# Patient Record
Sex: MALE | Race: WHITE | NOT HISPANIC OR LATINO | Employment: OTHER | URBAN - METROPOLITAN AREA
[De-identification: names, ages, dates, MRNs, and addresses within clinical notes are randomized per-mention and may not be internally consistent; named-entity substitution may affect disease eponyms.]

---

## 2017-05-01 ENCOUNTER — RECORDS - HEALTHEAST (OUTPATIENT)
Dept: LAB | Facility: CLINIC | Age: 62
End: 2017-05-01

## 2017-05-01 LAB
CHOLEST SERPL-MCNC: 211 MG/DL
FASTING STATUS PATIENT QL REPORTED: NO
HDLC SERPL-MCNC: 46 MG/DL
LDLC SERPL CALC-MCNC: 104 MG/DL
TRIGL SERPL-MCNC: 307 MG/DL

## 2018-04-18 ENCOUNTER — RECORDS - HEALTHEAST (OUTPATIENT)
Dept: LAB | Facility: CLINIC | Age: 63
End: 2018-04-18

## 2018-04-18 LAB
ALBUMIN SERPL-MCNC: 4 G/DL (ref 3.5–5)
ALP SERPL-CCNC: 98 U/L (ref 45–120)
ALT SERPL W P-5'-P-CCNC: 67 U/L (ref 0–45)
ANION GAP SERPL CALCULATED.3IONS-SCNC: 11 MMOL/L (ref 5–18)
AST SERPL W P-5'-P-CCNC: 34 U/L (ref 0–40)
BILIRUB SERPL-MCNC: 0.7 MG/DL (ref 0–1)
BUN SERPL-MCNC: 23 MG/DL (ref 8–22)
CALCIUM SERPL-MCNC: 9.3 MG/DL (ref 8.5–10.5)
CHLORIDE BLD-SCNC: 103 MMOL/L (ref 98–107)
CHOLEST SERPL-MCNC: 210 MG/DL
CO2 SERPL-SCNC: 24 MMOL/L (ref 22–31)
CREAT SERPL-MCNC: 0.84 MG/DL (ref 0.7–1.3)
FASTING STATUS PATIENT QL REPORTED: NO
GFR SERPL CREATININE-BSD FRML MDRD: >60 ML/MIN/1.73M2
GLUCOSE BLD-MCNC: 81 MG/DL (ref 70–125)
HDLC SERPL-MCNC: 47 MG/DL
LDLC SERPL CALC-MCNC: 120 MG/DL
POTASSIUM BLD-SCNC: 3.9 MMOL/L (ref 3.5–5)
PROT SERPL-MCNC: 8.4 G/DL (ref 6–8)
PSA SERPL-MCNC: 3 NG/ML (ref 0–4.5)
SODIUM SERPL-SCNC: 138 MMOL/L (ref 136–145)
TRIGL SERPL-MCNC: 217 MG/DL

## 2018-04-19 ENCOUNTER — RECORDS - HEALTHEAST (OUTPATIENT)
Dept: LAB | Facility: CLINIC | Age: 63
End: 2018-04-19

## 2018-04-20 LAB
ALBUMIN PERCENT: 55.9 % (ref 51–67)
ALBUMIN SERPL ELPH-MCNC: 4.2 G/DL (ref 3.2–4.7)
ALPHA 1 PERCENT: 2.3 % (ref 2–4)
ALPHA 2 PERCENT: 9.2 % (ref 5–13)
ALPHA1 GLOB SERPL ELPH-MCNC: 0.2 G/DL (ref 0.1–0.3)
ALPHA2 GLOB SERPL ELPH-MCNC: 0.7 G/DL (ref 0.4–0.9)
B-GLOBULIN SERPL ELPH-MCNC: 0.8 G/DL (ref 0.7–1.2)
BETA PERCENT: 11.1 % (ref 10–17)
GAMMA GLOB SERPL ELPH-MCNC: 1.6 G/DL (ref 0.6–1.4)
GAMMA GLOBULIN PERCENT: 21.5 % (ref 9–20)
M PROTEIN SERPL ELPH-MCNC: 1.4 G/DL
PATH ICD:: ABNORMAL
PATH ICD:: NORMAL
PROT PATTERN SERPL ELPH-IMP: ABNORMAL
PROT PATTERN SERPL IFE-IMP: NORMAL
PROT SERPL-MCNC: 7.6 G/DL (ref 6–8)
REVIEWING PATHOLOGIST: ABNORMAL
REVIEWING PATHOLOGIST: NORMAL

## 2018-05-09 ENCOUNTER — TRANSFERRED RECORDS (OUTPATIENT)
Dept: HEALTH INFORMATION MANAGEMENT | Facility: CLINIC | Age: 63
End: 2018-05-09

## 2018-11-18 ENCOUNTER — RECORDS - HEALTHEAST (OUTPATIENT)
Dept: LAB | Facility: CLINIC | Age: 63
End: 2018-11-18

## 2018-11-20 LAB — BACTERIA SPEC CULT: NORMAL

## 2018-12-19 ENCOUNTER — RECORDS - HEALTHEAST (OUTPATIENT)
Dept: LAB | Facility: CLINIC | Age: 63
End: 2018-12-19

## 2018-12-19 LAB
ALBUMIN SERPL-MCNC: 4 G/DL (ref 3.5–5)
ALP SERPL-CCNC: 90 U/L (ref 45–120)
ALT SERPL W P-5'-P-CCNC: 57 U/L (ref 0–45)
ANION GAP SERPL CALCULATED.3IONS-SCNC: 9 MMOL/L (ref 5–18)
AST SERPL W P-5'-P-CCNC: 34 U/L (ref 0–40)
BILIRUB SERPL-MCNC: 0.9 MG/DL (ref 0–1)
BUN SERPL-MCNC: 15 MG/DL (ref 8–22)
CALCIUM SERPL-MCNC: 9.4 MG/DL (ref 8.5–10.5)
CHLORIDE BLD-SCNC: 103 MMOL/L (ref 98–107)
CHOLEST SERPL-MCNC: 200 MG/DL
CO2 SERPL-SCNC: 26 MMOL/L (ref 22–31)
CREAT SERPL-MCNC: 0.89 MG/DL (ref 0.7–1.3)
FASTING STATUS PATIENT QL REPORTED: YES
GFR SERPL CREATININE-BSD FRML MDRD: >60 ML/MIN/1.73M2
GLUCOSE BLD-MCNC: 111 MG/DL (ref 70–125)
HDLC SERPL-MCNC: 52 MG/DL
LDLC SERPL CALC-MCNC: 109 MG/DL
POTASSIUM BLD-SCNC: 4.4 MMOL/L (ref 3.5–5)
PROT SERPL-MCNC: 8.4 G/DL (ref 6–8)
SODIUM SERPL-SCNC: 138 MMOL/L (ref 136–145)
TRIGL SERPL-MCNC: 193 MG/DL

## 2019-01-01 ENCOUNTER — TRANSFERRED RECORDS (OUTPATIENT)
Dept: MULTI SPECIALTY CLINIC | Facility: CLINIC | Age: 64
End: 2019-01-01

## 2019-11-25 ENCOUNTER — RECORDS - HEALTHEAST (OUTPATIENT)
Dept: LAB | Facility: CLINIC | Age: 64
End: 2019-11-25

## 2019-11-25 LAB
ALBUMIN SERPL-MCNC: 4.2 G/DL (ref 3.5–5)
ALP SERPL-CCNC: 89 U/L (ref 45–120)
ALT SERPL W P-5'-P-CCNC: 71 U/L (ref 0–45)
ANION GAP SERPL CALCULATED.3IONS-SCNC: 9 MMOL/L (ref 5–18)
AST SERPL W P-5'-P-CCNC: 41 U/L (ref 0–40)
BILIRUB SERPL-MCNC: 0.7 MG/DL (ref 0–1)
BUN SERPL-MCNC: 16 MG/DL (ref 8–22)
CALCIUM SERPL-MCNC: 9.1 MG/DL (ref 8.5–10.5)
CHLORIDE BLD-SCNC: 105 MMOL/L (ref 98–107)
CHOLEST SERPL-MCNC: 196 MG/DL
CO2 SERPL-SCNC: 24 MMOL/L (ref 22–31)
CREAT SERPL-MCNC: 0.84 MG/DL (ref 0.7–1.3)
FASTING STATUS PATIENT QL REPORTED: ABNORMAL
GFR SERPL CREATININE-BSD FRML MDRD: >60 ML/MIN/1.73M2
GLUCOSE BLD-MCNC: 94 MG/DL (ref 70–125)
HDLC SERPL-MCNC: 46 MG/DL
LDLC SERPL CALC-MCNC: 97 MG/DL
POTASSIUM BLD-SCNC: 3.7 MMOL/L (ref 3.5–5)
PROT SERPL-MCNC: 8.2 G/DL (ref 6–8)
PSA SERPL-MCNC: 2.8 NG/ML (ref 0–4.5)
SODIUM SERPL-SCNC: 138 MMOL/L (ref 136–145)
TRIGL SERPL-MCNC: 266 MG/DL

## 2020-05-19 ENCOUNTER — RECORDS - HEALTHEAST (OUTPATIENT)
Dept: LAB | Facility: CLINIC | Age: 65
End: 2020-05-19

## 2020-05-20 LAB
ALBUMIN SERPL-MCNC: 4 G/DL (ref 3.5–5)
ALP SERPL-CCNC: 83 U/L (ref 45–120)
ALT SERPL W P-5'-P-CCNC: 76 U/L (ref 0–45)
ANION GAP SERPL CALCULATED.3IONS-SCNC: 10 MMOL/L (ref 5–18)
AST SERPL W P-5'-P-CCNC: 35 U/L (ref 0–40)
BILIRUB SERPL-MCNC: 0.7 MG/DL (ref 0–1)
BUN SERPL-MCNC: 19 MG/DL (ref 8–22)
CALCIUM SERPL-MCNC: 8.9 MG/DL (ref 8.5–10.5)
CHLORIDE BLD-SCNC: 106 MMOL/L (ref 98–107)
CO2 SERPL-SCNC: 23 MMOL/L (ref 22–31)
CREAT SERPL-MCNC: 0.91 MG/DL (ref 0.7–1.3)
GFR SERPL CREATININE-BSD FRML MDRD: >60 ML/MIN/1.73M2
GLUCOSE BLD-MCNC: 102 MG/DL (ref 70–125)
POTASSIUM BLD-SCNC: 3.9 MMOL/L (ref 3.5–5)
PROT SERPL-MCNC: 8.5 G/DL (ref 6–8)
SODIUM SERPL-SCNC: 139 MMOL/L (ref 136–145)

## 2020-06-13 ENCOUNTER — RECORDS - HEALTHEAST (OUTPATIENT)
Dept: LAB | Facility: CLINIC | Age: 65
End: 2020-06-13

## 2020-06-16 LAB
B BURGDOR IGG+IGM SER QL: 2.47 INDEX VALUE
E CHAFFEENSIS IGG TITR SER IF: NORMAL {TITER}
E CHAFFEENSIS IGM TITR SER IF: NORMAL {TITER}

## 2020-06-19 LAB
B BURGDOR AB SER-IMP: NORMAL
LYME AB IGG BAND(S): NORMAL
LYME AB IGM BAND(S): NORMAL
LYME IGG BLOT: NEGATIVE
LYME IGM BLOT: NEGATIVE

## 2021-02-18 ENCOUNTER — RECORDS - HEALTHEAST (OUTPATIENT)
Dept: LAB | Facility: CLINIC | Age: 66
End: 2021-02-18

## 2021-02-18 LAB
ALBUMIN SERPL-MCNC: 4.2 G/DL (ref 3.5–5)
ALP SERPL-CCNC: 96 U/L (ref 45–120)
ALT SERPL W P-5'-P-CCNC: 93 U/L (ref 0–45)
ANION GAP SERPL CALCULATED.3IONS-SCNC: 9 MMOL/L (ref 5–18)
AST SERPL W P-5'-P-CCNC: 47 U/L (ref 0–40)
BILIRUB SERPL-MCNC: 0.7 MG/DL (ref 0–1)
BUN SERPL-MCNC: 15 MG/DL (ref 8–22)
CALCIUM SERPL-MCNC: 8.8 MG/DL (ref 8.5–10.5)
CHLORIDE BLD-SCNC: 102 MMOL/L (ref 98–107)
CHOLEST SERPL-MCNC: 214 MG/DL
CO2 SERPL-SCNC: 24 MMOL/L (ref 22–31)
CREAT SERPL-MCNC: 0.8 MG/DL (ref 0.7–1.3)
FASTING STATUS PATIENT QL REPORTED: ABNORMAL
GFR SERPL CREATININE-BSD FRML MDRD: >60 ML/MIN/1.73M2
GLUCOSE BLD-MCNC: 116 MG/DL (ref 70–125)
HDLC SERPL-MCNC: 43 MG/DL
LDLC SERPL CALC-MCNC: 118 MG/DL
POTASSIUM BLD-SCNC: 3.9 MMOL/L (ref 3.5–5)
PROT SERPL-MCNC: 8.7 G/DL (ref 6–8)
PSA SERPL-MCNC: 2.6 NG/ML (ref 0–4.5)
SODIUM SERPL-SCNC: 135 MMOL/L (ref 136–145)
TRIGL SERPL-MCNC: 266 MG/DL

## 2021-06-01 ENCOUNTER — RECORDS - HEALTHEAST (OUTPATIENT)
Dept: ADMINISTRATIVE | Facility: CLINIC | Age: 66
End: 2021-06-01

## 2021-09-14 ENCOUNTER — LAB REQUISITION (OUTPATIENT)
Dept: LAB | Facility: CLINIC | Age: 66
End: 2021-09-14
Payer: COMMERCIAL

## 2021-09-14 DIAGNOSIS — I10 ESSENTIAL (PRIMARY) HYPERTENSION: ICD-10-CM

## 2021-09-14 LAB
ANION GAP SERPL CALCULATED.3IONS-SCNC: 9 MMOL/L (ref 5–18)
BUN SERPL-MCNC: 14 MG/DL (ref 8–22)
CALCIUM SERPL-MCNC: 8.9 MG/DL (ref 8.5–10.5)
CHLORIDE BLD-SCNC: 105 MMOL/L (ref 98–107)
CO2 SERPL-SCNC: 24 MMOL/L (ref 22–31)
CREAT SERPL-MCNC: 0.82 MG/DL (ref 0.7–1.3)
GFR SERPL CREATININE-BSD FRML MDRD: >90 ML/MIN/1.73M2
GLUCOSE BLD-MCNC: 120 MG/DL (ref 70–125)
POTASSIUM BLD-SCNC: 3.8 MMOL/L (ref 3.5–5)
SODIUM SERPL-SCNC: 138 MMOL/L (ref 136–145)

## 2021-09-14 PROCEDURE — 80048 BASIC METABOLIC PNL TOTAL CA: CPT | Mod: ORL | Performed by: PHYSICIAN ASSISTANT

## 2021-09-14 PROCEDURE — 36415 COLL VENOUS BLD VENIPUNCTURE: CPT | Mod: ORL | Performed by: PHYSICIAN ASSISTANT

## 2021-12-10 ENCOUNTER — LAB REQUISITION (OUTPATIENT)
Dept: LAB | Facility: CLINIC | Age: 66
End: 2021-12-10
Payer: MEDICARE

## 2021-12-10 DIAGNOSIS — R19.7 DIARRHEA, UNSPECIFIED: ICD-10-CM

## 2021-12-10 PROCEDURE — 87493 C DIFF AMPLIFIED PROBE: CPT | Mod: ORL | Performed by: PHYSICIAN ASSISTANT

## 2021-12-10 PROCEDURE — 87177 OVA AND PARASITES SMEARS: CPT | Mod: ORL | Performed by: PHYSICIAN ASSISTANT

## 2021-12-10 PROCEDURE — 87209 SMEAR COMPLEX STAIN: CPT | Mod: ORL | Performed by: PHYSICIAN ASSISTANT

## 2021-12-10 PROCEDURE — 87506 IADNA-DNA/RNA PROBE TQ 6-11: CPT | Mod: ORL | Performed by: PHYSICIAN ASSISTANT

## 2021-12-11 LAB
C COLI+JEJUNI+LARI FUSA STL QL NAA+PROBE: NOT DETECTED
C DIFF TOX B STL QL: NEGATIVE
EC STX1 GENE STL QL NAA+PROBE: NOT DETECTED
EC STX2 GENE STL QL NAA+PROBE: NOT DETECTED
NOROV GI+II ORF1-ORF2 JNC STL QL NAA+PR: NOT DETECTED
RVA NSP5 STL QL NAA+PROBE: NOT DETECTED
SALMONELLA SP RPOD STL QL NAA+PROBE: NOT DETECTED
SHIGELLA SP+EIEC IPAH STL QL NAA+PROBE: NOT DETECTED
V CHOL+PARA RFBL+TRKH+TNAA STL QL NAA+PR: NOT DETECTED
Y ENTERO RECN STL QL NAA+PROBE: NOT DETECTED

## 2021-12-13 LAB — O+P STL MICRO: NEGATIVE

## 2022-08-22 ENCOUNTER — LAB REQUISITION (OUTPATIENT)
Dept: LAB | Facility: CLINIC | Age: 67
End: 2022-08-22
Payer: MEDICARE

## 2022-08-22 DIAGNOSIS — Z01.812 ENCOUNTER FOR PREPROCEDURAL LABORATORY EXAMINATION: ICD-10-CM

## 2022-08-22 PROCEDURE — U0003 INFECTIOUS AGENT DETECTION BY NUCLEIC ACID (DNA OR RNA); SEVERE ACUTE RESPIRATORY SYNDROME CORONAVIRUS 2 (SARS-COV-2) (CORONAVIRUS DISEASE [COVID-19]), AMPLIFIED PROBE TECHNIQUE, MAKING USE OF HIGH THROUGHPUT TECHNOLOGIES AS DESCRIBED BY CMS-2020-01-R: HCPCS | Mod: ORL | Performed by: PHYSICIAN ASSISTANT

## 2022-08-23 LAB — SARS-COV-2 RNA RESP QL NAA+PROBE: NEGATIVE

## 2022-12-12 ENCOUNTER — LAB REQUISITION (OUTPATIENT)
Dept: LAB | Facility: CLINIC | Age: 67
End: 2022-12-12
Payer: MEDICARE

## 2022-12-12 DIAGNOSIS — Z12.5 ENCOUNTER FOR SCREENING FOR MALIGNANT NEOPLASM OF PROSTATE: ICD-10-CM

## 2022-12-12 DIAGNOSIS — I10 ESSENTIAL (PRIMARY) HYPERTENSION: ICD-10-CM

## 2022-12-12 DIAGNOSIS — E78.5 HYPERLIPIDEMIA, UNSPECIFIED: ICD-10-CM

## 2022-12-12 LAB
ANION GAP SERPL CALCULATED.3IONS-SCNC: 12 MMOL/L (ref 7–15)
BUN SERPL-MCNC: 14.2 MG/DL (ref 8–23)
CALCIUM SERPL-MCNC: 9.2 MG/DL (ref 8.8–10.2)
CHLORIDE SERPL-SCNC: 100 MMOL/L (ref 98–107)
CHOLEST SERPL-MCNC: 201 MG/DL
CREAT SERPL-MCNC: 0.79 MG/DL (ref 0.67–1.17)
DEPRECATED HCO3 PLAS-SCNC: 25 MMOL/L (ref 22–29)
GFR SERPL CREATININE-BSD FRML MDRD: >90 ML/MIN/1.73M2
GLUCOSE SERPL-MCNC: 92 MG/DL (ref 70–99)
HDLC SERPL-MCNC: 47 MG/DL
LDLC SERPL CALC-MCNC: 85 MG/DL
NONHDLC SERPL-MCNC: 154 MG/DL
POTASSIUM SERPL-SCNC: 4 MMOL/L (ref 3.4–5.3)
PSA SERPL-MCNC: 2.34 NG/ML (ref 0–4.5)
SODIUM SERPL-SCNC: 137 MMOL/L (ref 136–145)
TRIGL SERPL-MCNC: 346 MG/DL

## 2022-12-12 PROCEDURE — G0103 PSA SCREENING: HCPCS | Mod: ORL | Performed by: PHYSICIAN ASSISTANT

## 2022-12-12 PROCEDURE — 80061 LIPID PANEL: CPT | Mod: ORL | Performed by: PHYSICIAN ASSISTANT

## 2022-12-12 PROCEDURE — 80048 BASIC METABOLIC PNL TOTAL CA: CPT | Mod: ORL | Performed by: PHYSICIAN ASSISTANT

## 2023-02-22 ENCOUNTER — LAB REQUISITION (OUTPATIENT)
Dept: LAB | Facility: CLINIC | Age: 68
End: 2023-02-22
Payer: MEDICARE

## 2023-02-22 DIAGNOSIS — R06.02 SHORTNESS OF BREATH: ICD-10-CM

## 2023-02-22 LAB
ALBUMIN SERPL BCG-MCNC: 4.3 G/DL (ref 3.5–5.2)
ALP SERPL-CCNC: 113 U/L (ref 40–129)
ALT SERPL W P-5'-P-CCNC: 95 U/L (ref 10–50)
ANION GAP SERPL CALCULATED.3IONS-SCNC: 13 MMOL/L (ref 7–15)
AST SERPL W P-5'-P-CCNC: 49 U/L (ref 10–50)
BILIRUB SERPL-MCNC: 0.7 MG/DL
BUN SERPL-MCNC: 18.6 MG/DL (ref 8–23)
CALCIUM SERPL-MCNC: 9 MG/DL (ref 8.8–10.2)
CHLORIDE SERPL-SCNC: 106 MMOL/L (ref 98–107)
CREAT SERPL-MCNC: 0.76 MG/DL (ref 0.67–1.17)
DEPRECATED HCO3 PLAS-SCNC: 20 MMOL/L (ref 22–29)
FERRITIN SERPL-MCNC: 311 NG/ML (ref 31–409)
GFR SERPL CREATININE-BSD FRML MDRD: >90 ML/MIN/1.73M2
GLUCOSE SERPL-MCNC: 109 MG/DL (ref 70–99)
POTASSIUM SERPL-SCNC: 4 MMOL/L (ref 3.4–5.3)
PROT SERPL-MCNC: 8.6 G/DL (ref 6.4–8.3)
SODIUM SERPL-SCNC: 139 MMOL/L (ref 136–145)
VIT B12 SERPL-MCNC: 817 PG/ML (ref 232–1245)

## 2023-02-22 PROCEDURE — 82728 ASSAY OF FERRITIN: CPT | Mod: ORL | Performed by: PHYSICIAN ASSISTANT

## 2023-02-22 PROCEDURE — 82607 VITAMIN B-12: CPT | Mod: ORL | Performed by: PHYSICIAN ASSISTANT

## 2023-02-22 PROCEDURE — 80053 COMPREHEN METABOLIC PANEL: CPT | Mod: ORL | Performed by: PHYSICIAN ASSISTANT

## 2023-03-06 ENCOUNTER — HOSPITAL ENCOUNTER (OUTPATIENT)
Dept: CARDIOLOGY | Facility: CLINIC | Age: 68
Discharge: HOME | End: 2023-03-06
Attending: PHYSICIAN ASSISTANT | Admitting: PHYSICIAN ASSISTANT
Payer: MEDICARE

## 2023-03-06 DIAGNOSIS — R06.02 SOB (SHORTNESS OF BREATH): ICD-10-CM

## 2023-03-06 PROCEDURE — 93016 CV STRESS TEST SUPVJ ONLY: CPT | Performed by: INTERNAL MEDICINE

## 2023-03-06 PROCEDURE — 93325 DOPPLER ECHO COLOR FLOW MAPG: CPT | Mod: 26 | Performed by: GENERAL ACUTE CARE HOSPITAL

## 2023-03-06 PROCEDURE — 93018 CV STRESS TEST I&R ONLY: CPT | Performed by: GENERAL ACUTE CARE HOSPITAL

## 2023-03-06 PROCEDURE — 93321 DOPPLER ECHO F-UP/LMTD STD: CPT | Mod: 26 | Performed by: GENERAL ACUTE CARE HOSPITAL

## 2023-03-06 PROCEDURE — 93325 DOPPLER ECHO COLOR FLOW MAPG: CPT | Mod: TC

## 2023-03-06 PROCEDURE — 93350 STRESS TTE ONLY: CPT | Mod: 26 | Performed by: GENERAL ACUTE CARE HOSPITAL

## 2023-03-06 PROCEDURE — 93321 DOPPLER ECHO F-UP/LMTD STD: CPT | Mod: TC

## 2023-03-06 RX ORDER — LOSARTAN POTASSIUM 50 MG/1
50 TABLET ORAL DAILY
COMMUNITY
End: 2024-01-19

## 2023-03-06 RX ORDER — HYDROCHLOROTHIAZIDE 12.5 MG/1
12.5 TABLET ORAL DAILY
Status: ON HOLD | COMMUNITY
End: 2023-06-11

## 2023-03-06 RX ORDER — NITROGLYCERIN 0.4 MG/1
0.4 TABLET SUBLINGUAL EVERY 5 MIN PRN
COMMUNITY
End: 2023-10-23

## 2023-03-06 RX ORDER — ASPIRIN 81 MG/1
81 TABLET ORAL DAILY
COMMUNITY
End: 2024-01-23

## 2023-03-06 RX ORDER — SIMVASTATIN 40 MG
40 TABLET ORAL DAILY
COMMUNITY
End: 2023-08-22

## 2023-04-19 ENCOUNTER — TRANSFERRED RECORDS (OUTPATIENT)
Dept: HEALTH INFORMATION MANAGEMENT | Facility: CLINIC | Age: 68
End: 2023-04-19

## 2023-06-02 ENCOUNTER — HOSPITAL ENCOUNTER (INPATIENT)
Facility: CLINIC | Age: 68
LOS: 7 days | Discharge: HOME | DRG: 193 | End: 2023-06-11
Attending: EMERGENCY MEDICINE | Admitting: INTERNAL MEDICINE
Payer: MEDICARE

## 2023-06-02 ENCOUNTER — APPOINTMENT (OUTPATIENT)
Dept: CT IMAGING | Facility: CLINIC | Age: 68
DRG: 193 | End: 2023-06-02
Attending: EMERGENCY MEDICINE
Payer: MEDICARE

## 2023-06-02 DIAGNOSIS — J98.4 PNEUMONITIS: Primary | ICD-10-CM

## 2023-06-02 DIAGNOSIS — C90.00 MULTIPLE MYELOMA NOT HAVING ACHIEVED REMISSION (H): ICD-10-CM

## 2023-06-02 DIAGNOSIS — J18.9 PNEUMONIA OF BOTH LUNGS DUE TO INFECTIOUS ORGANISM, UNSPECIFIED PART OF LUNG: ICD-10-CM

## 2023-06-02 DIAGNOSIS — J96.01 ACUTE HYPOXEMIC RESPIRATORY FAILURE (H): ICD-10-CM

## 2023-06-02 LAB
ALBUMIN SERPL-MCNC: 2.9 G/DL (ref 3.5–5)
ALBUMIN UR-MCNC: 10 MG/DL
ALP SERPL-CCNC: 155 U/L (ref 45–120)
ALT SERPL W P-5'-P-CCNC: 95 U/L (ref 0–45)
ANION GAP SERPL CALCULATED.3IONS-SCNC: 11 MMOL/L (ref 5–18)
APPEARANCE UR: CLEAR
AST SERPL W P-5'-P-CCNC: 32 U/L (ref 0–40)
ATRIAL RATE - MUSE: 88 BPM
BASOPHILS # BLD MANUAL: 0 10E3/UL (ref 0–0.2)
BASOPHILS NFR BLD MANUAL: 0 %
BILIRUB SERPL-MCNC: 0.5 MG/DL (ref 0–1)
BILIRUB UR QL STRIP: NEGATIVE
BNP SERPL-MCNC: 47 PG/ML (ref 0–65)
BUN SERPL-MCNC: 24 MG/DL (ref 8–22)
C REACTIVE PROTEIN LHE: 11.3 MG/DL (ref 0–?)
CALCIUM SERPL-MCNC: 10.2 MG/DL (ref 8.5–10.5)
CHLORIDE BLD-SCNC: 101 MMOL/L (ref 98–107)
CO2 SERPL-SCNC: 25 MMOL/L (ref 22–31)
COLOR UR AUTO: ABNORMAL
CREAT SERPL-MCNC: 0.82 MG/DL (ref 0.7–1.3)
DIASTOLIC BLOOD PRESSURE - MUSE: NORMAL MMHG
EOSINOPHIL # BLD MANUAL: 0 10E3/UL (ref 0–0.7)
EOSINOPHIL NFR BLD MANUAL: 0 %
ERYTHROCYTE [DISTWIDTH] IN BLOOD BY AUTOMATED COUNT: 15.9 % (ref 10–15)
GFR SERPL CREATININE-BSD FRML MDRD: >90 ML/MIN/1.73M2
GLUCOSE BLD-MCNC: 152 MG/DL (ref 70–125)
GLUCOSE UR STRIP-MCNC: NEGATIVE MG/DL
HCT VFR BLD AUTO: 25.8 % (ref 40–53)
HGB BLD-MCNC: 8.5 G/DL (ref 13.3–17.7)
HGB UR QL STRIP: NEGATIVE
INTERPRETATION ECG - MUSE: NORMAL
KETONES UR STRIP-MCNC: NEGATIVE MG/DL
LACTATE SERPL-SCNC: 1.4 MMOL/L (ref 0.7–2)
LEUKOCYTE ESTERASE UR QL STRIP: NEGATIVE
LYMPHOCYTES # BLD MANUAL: 0.5 10E3/UL (ref 0.8–5.3)
LYMPHOCYTES NFR BLD MANUAL: 6 %
MCH RBC QN AUTO: 33.7 PG (ref 26.5–33)
MCHC RBC AUTO-ENTMCNC: 32.9 G/DL (ref 31.5–36.5)
MCV RBC AUTO: 102 FL (ref 78–100)
METAMYELOCYTES # BLD MANUAL: 0.1 10E3/UL
METAMYELOCYTES NFR BLD MANUAL: 1 %
MONOCYTES # BLD MANUAL: 0.2 10E3/UL (ref 0–1.3)
MONOCYTES NFR BLD MANUAL: 2 %
MUCOUS THREADS #/AREA URNS LPF: PRESENT /LPF
NEUTROPHILS # BLD MANUAL: 7.6 10E3/UL (ref 1.6–8.3)
NEUTROPHILS NFR BLD MANUAL: 91 %
NITRATE UR QL: NEGATIVE
NRBC # BLD AUTO: 0.2 10E3/UL
NRBC BLD MANUAL-RTO: 3 %
P AXIS - MUSE: 44 DEGREES
PH UR STRIP: 7 [PH] (ref 5–7)
PLAT MORPH BLD: ABNORMAL
PLATELET # BLD AUTO: 363 10E3/UL (ref 150–450)
POLYCHROMASIA BLD QL SMEAR: SLIGHT
POTASSIUM BLD-SCNC: 3.8 MMOL/L (ref 3.5–5)
PR INTERVAL - MUSE: 154 MS
PROCALCITONIN SERPL-MCNC: 0.09 NG/ML (ref 0–0.49)
PROT SERPL-MCNC: 7.4 G/DL (ref 6–8)
QRS DURATION - MUSE: 88 MS
QT - MUSE: 372 MS
QTC - MUSE: 450 MS
R AXIS - MUSE: -20 DEGREES
RBC # BLD AUTO: 2.52 10E6/UL (ref 4.4–5.9)
RBC MORPH BLD: ABNORMAL
RBC URINE: 0 /HPF
SODIUM SERPL-SCNC: 137 MMOL/L (ref 136–145)
SP GR UR STRIP: 1.02 (ref 1–1.03)
SQUAMOUS EPITHELIAL: <1 /HPF
SYSTOLIC BLOOD PRESSURE - MUSE: NORMAL MMHG
T AXIS - MUSE: 48 DEGREES
TROPONIN I SERPL-MCNC: 0.02 NG/ML (ref 0–0.29)
UROBILINOGEN UR STRIP-MCNC: <2 MG/DL
VENTRICULAR RATE- MUSE: 88 BPM
WBC # BLD AUTO: 8.3 10E3/UL (ref 4–11)
WBC URINE: <1 /HPF

## 2023-06-02 PROCEDURE — 99285 EMERGENCY DEPT VISIT HI MDM: CPT | Mod: 25

## 2023-06-02 PROCEDURE — G1010 CDSM STANSON: HCPCS

## 2023-06-02 PROCEDURE — 250N000011 HC RX IP 250 OP 636: Performed by: EMERGENCY MEDICINE

## 2023-06-02 PROCEDURE — 83880 ASSAY OF NATRIURETIC PEPTIDE: CPT | Performed by: EMERGENCY MEDICINE

## 2023-06-02 PROCEDURE — 84145 PROCALCITONIN (PCT): CPT | Performed by: EMERGENCY MEDICINE

## 2023-06-02 PROCEDURE — 93005 ELECTROCARDIOGRAM TRACING: CPT | Performed by: EMERGENCY MEDICINE

## 2023-06-02 PROCEDURE — 83605 ASSAY OF LACTIC ACID: CPT | Performed by: EMERGENCY MEDICINE

## 2023-06-02 PROCEDURE — 87899 AGENT NOS ASSAY W/OPTIC: CPT | Performed by: INTERNAL MEDICINE

## 2023-06-02 PROCEDURE — 86140 C-REACTIVE PROTEIN: CPT | Performed by: EMERGENCY MEDICINE

## 2023-06-02 PROCEDURE — 87040 BLOOD CULTURE FOR BACTERIA: CPT | Performed by: EMERGENCY MEDICINE

## 2023-06-02 PROCEDURE — 84484 ASSAY OF TROPONIN QUANT: CPT | Performed by: EMERGENCY MEDICINE

## 2023-06-02 PROCEDURE — 85027 COMPLETE CBC AUTOMATED: CPT | Performed by: EMERGENCY MEDICINE

## 2023-06-02 PROCEDURE — 80053 COMPREHEN METABOLIC PANEL: CPT | Performed by: EMERGENCY MEDICINE

## 2023-06-02 PROCEDURE — 36415 COLL VENOUS BLD VENIPUNCTURE: CPT | Performed by: EMERGENCY MEDICINE

## 2023-06-02 PROCEDURE — 99223 1ST HOSP IP/OBS HIGH 75: CPT | Performed by: INTERNAL MEDICINE

## 2023-06-02 PROCEDURE — 85007 BL SMEAR W/DIFF WBC COUNT: CPT | Performed by: EMERGENCY MEDICINE

## 2023-06-02 PROCEDURE — G0378 HOSPITAL OBSERVATION PER HR: HCPCS

## 2023-06-02 PROCEDURE — 81003 URINALYSIS AUTO W/O SCOPE: CPT | Performed by: EMERGENCY MEDICINE

## 2023-06-02 RX ORDER — ACETAMINOPHEN 325 MG/1
975 TABLET ORAL EVERY 6 HOURS PRN
Status: DISCONTINUED | OUTPATIENT
Start: 2023-06-02 | End: 2023-06-03

## 2023-06-02 RX ORDER — IOPAMIDOL 755 MG/ML
100 INJECTION, SOLUTION INTRAVASCULAR ONCE
Status: COMPLETED | OUTPATIENT
Start: 2023-06-02 | End: 2023-06-02

## 2023-06-02 RX ORDER — ONDANSETRON 4 MG/1
4 TABLET, ORALLY DISINTEGRATING ORAL EVERY 6 HOURS PRN
Status: DISCONTINUED | OUTPATIENT
Start: 2023-06-02 | End: 2023-06-11 | Stop reason: HOSPADM

## 2023-06-02 RX ORDER — ONDANSETRON 2 MG/ML
4 INJECTION INTRAMUSCULAR; INTRAVENOUS EVERY 6 HOURS PRN
Status: DISCONTINUED | OUTPATIENT
Start: 2023-06-02 | End: 2023-06-11 | Stop reason: HOSPADM

## 2023-06-02 RX ORDER — HYDROCODONE BITARTRATE AND ACETAMINOPHEN 5; 325 MG/1; MG/1
1 TABLET ORAL EVERY 4 HOURS PRN
Status: DISCONTINUED | OUTPATIENT
Start: 2023-06-02 | End: 2023-06-04

## 2023-06-02 RX ORDER — SODIUM CHLORIDE 9 MG/ML
INJECTION, SOLUTION INTRAVENOUS CONTINUOUS
Status: DISCONTINUED | OUTPATIENT
Start: 2023-06-02 | End: 2023-06-06

## 2023-06-02 RX ORDER — ACETAMINOPHEN 650 MG/1
650 SUPPOSITORY RECTAL EVERY 6 HOURS PRN
Status: DISCONTINUED | OUTPATIENT
Start: 2023-06-02 | End: 2023-06-03

## 2023-06-02 RX ADMIN — IOPAMIDOL 100 ML: 755 INJECTION, SOLUTION INTRAVENOUS at 19:57

## 2023-06-02 ASSESSMENT — ACTIVITIES OF DAILY LIVING (ADL)
ADLS_ACUITY_SCORE: 35
ADLS_ACUITY_SCORE: 35
ADLS_ACUITY_SCORE: 33

## 2023-06-02 NOTE — ED PROVIDER NOTES
EMERGENCY DEPARTMENT ENCOUNTER      NAME: Billy Carroll  AGE: 67 year old male  YOB: 1955  MRN: [id number]  EVALUATION DATE & TIME: No admission date for patient encounter.    PCP: Jory Krishnamurthy    ED PROVIDER: Rosa Velázquez MD      Chief Complaint   Patient presents with     Shortness of Breath     Abnormal Labs         FINAL IMPRESSION:  1. Pneumonia of both lungs due to infectious organism, unspecified part of lung          ED COURSE & MEDICAL DECISION MAKING:    Pertinent Labs & Imaging studies reviewed. (See chart for details)    7:04 PM I introduced myself to the patient, obtained patient history, performed a physical exam, and discussed plan for ED workup including potential diagnostic laboratory/imaging studies and interventions.    9:45 PM I spoke with [facility], Dr. Melendez, regarding patient's care.    10:12 PM I spoke with hospitalist, Dr. Grubbs, regarding patient's care.    10:26 PM I updated the patient on laboratory and imaging results. We discussed plan of admission.    67 year old male with history of multiple myeloma on Revlimid, Velcade, and dexamethasone which started on 10 May who presents for evaluation of shortness of breath with recently diagnosed pneumonia.  He was seen by his oncologist with [facility] and had a chest x-ray performed which indicated infection.  They started him on levofloxacin which she has been taking for 3 days.  He was referred to pulmonology and called them today and was told he should present to the ER for evaluation due to concern for PCP pneumonia.  Patient is immunocompromised.  He did take his Levaquin today.  On exam, he is satting normally on room air and is in no acute distress.  He does not have any signs of respiratory distress at this time.  With his cancer will obtain CT of the chest for evaluation for PE as well as further characterization of the chest x-ray findings.  Also obtain laboratory studies including blood  cultures.  EKG was obtained which did not reveal any evidence of acute ischemia.    White blood cell count is 8.3.  Hemoglobin 8.5.  CMP largely unremarkable.  Lactic acid within normal limits.  CRP 11.  Troponin within normal limits.  BNP within normal limits making pulmonary edema less likely.  EKG without acute ischemia and troponin within normal limits making acute coronary syndrome less likely.  Blood cultures pending.  Urinalysis also ordered.  CT does not reveal any evidence of PE.  No signs of acute aortic syndrome.  Nonspecific acute symmetric bilateral multifocal pneumonitis.  Heart size is within normal limits but there is severe three-vessel coronary artery calcification and dense calcification and thickening of the aortic valve leaflets.  Destructive soft tissue mass centered in the right side of the T4 vertebral body crosses the T4-T5 interspace to involve the right side of the T5 vertebral body and may also encroach upon the right ventral epidural space.  Additional definitive lytic lesions throughout the visualized bones.  This is known.  Patient did have previous radiation.  Spoke with the on-call physician with [facility] who stated that they were concerned this could potentially be PCP pneumonia and that they had tried to get him expedited outpatient follow-up with ID and pulmonary but were unable to do so and do feel that he warrants admission for aggressive treatment of this.  Discussed if they had a specific antibiotic that they would like to use and they defer this to ID.  Spoke with the hospitalist who excepted the patient for admission and would also like ID to weigh in on antibiotic treatment.  Did attempt to page them but have not yet spoken to them on the patient's admission.  He has received levofloxacin today.  The hospitalist did not want me to provide any further IV antibiotics until ID gives their recommendation.  This is still pending.  Patient was in agreement with the  plan.  He was admitted to the hospitalist and his care was transferred to the hospitalist in stable condition.    ED Course as of 06/08/23 2145 Fri Jun 02, 2023 2156 Hospitalist requests ID consult for antibiotic recommendations and does not want to start IV bactrim or other antibiotics until ID input. Have not yet heard from ID on admission       At the conclusion of the encounter I discussed the results of all of the tests and the disposition. The questions were answered. The patient or family acknowledged understanding and was agreeable with the care plan.         Medical Decision Making    History:    Supplemental history from: Documented in chart, if applicable    External Record(s) reviewed: Documented in chart, if applicable.    Work Up:    Chart documentation includes differential considered and any EKGs or imaging independently interpreted by provider.    In additional to work up documented, I considered the following work up: Documented in chart, if applicable.    External consultation:    Discussion of management with another provider: Documented in chart, if applicable    Complicating factors:    Care impacted by chronic illness: Cancer/Chemotherapy    Care affected by social determinants of health: N/A    Disposition considerations: Admit.      MEDICATIONS GIVEN IN THE EMERGENCY:  Medications   iopamidol (ISOVUE-370) solution 100 mL (100 mLs Intravenous $Given 6/2/23 1957)       NEW PRESCRIPTIONS STARTED AT TODAY'S ER VISIT  New Prescriptions    No medications on file          =================================================================    HPI    Patient information was obtained from: Patient    Use of : N/A      Billy Carroll is a 67 year old male with a pertinent history of multiple myeloma who presents to this ED for evaluation of shortness of breath and abnormal chest x-ray.    Patient reports an onset of a cough which occurred on 5/30. He endorses swollen ankles, palpitations,  "and shortness of breath. He notes his shortness of breath worsens when walking, but sitting or laying down is ok.  He also developed a fever.  He spoke to his oncologist who prescribed him levofloxacin.. He has taken 3-4 tablets so far. Patient has been taking 3,000 mg of Tylenol daily since his coughing started.. He last took Tylenol at 1:00 PM today. Patient was called by pulmonary, regarding x-ray results from today. He had \"white spots\" in his lungs and a fungal concern. He was encouraged to go into the ED. No recent sick contacts. He did travel to [place] recently.  He did not go into any caves.  Patient denies chest pain, abdominal pain, nausea, and vomiting. No other medical concerns are expressed at this time.     Patient reports a having multiple melanoma, which he was diagnosed in 2018. He notes his symptoms got \"worse\" in March and April of this year. He received a CT scan in late April showing a mass in the thoracic spine and received radiation treatment for 5 days. His recent hemoglobin level was 8 and has a low white blood count. He recieves an IV infusion once a week for treatment.     REVIEW OF SYSTEMS   Review of Systems   Pertinent positives and negatives are documented in the HPI. All other systems reviewed and are negative.      PAST MEDICAL HISTORY:  Past Medical History:   Diagnosis Date     Cancer (H)        PAST SURGICAL HISTORY:  History reviewed. No pertinent surgical history.        CURRENT MEDICATIONS:    aspirin 81 MG EC tablet  hydrochlorothiazide (HYDRODIURIL) 12.5 MG tablet  losartan (COZAAR) 50 MG tablet  nitroGLYcerin (NITROSTAT) 0.4 MG sublingual tablet  simvastatin (ZOCOR) 40 MG tablet        ALLERGIES:  Allergies   Allergen Reactions     Tylenol With Codeine #3 [Acetaminophen-Codeine] Nausea       FAMILY HISTORY:  History reviewed. No pertinent family history.    SOCIAL HISTORY:   Social History     Socioeconomic History     Marital status:      Spouse name: None     " "Number of children: None     Years of education: None     Highest education level: None   Tobacco Use     Smoking status: Never     Smokeless tobacco: Never       VITALS:  BP (!) 140/93   Pulse 90   Temp 98.8  F (37.1  C) (Oral)   Resp 25   Ht 1.778 m (5' 10\")   Wt 90.7 kg (200 lb)   SpO2 94%   BMI 28.70 kg/m      PHYSICAL EXAM    Physical Exam  Constitutional: Well developed, Well nourished, NAD, GCS 15  HENT: Normocephalic, Atraumatic, Bilateral external ears normal, Oropharynx normal, mucous membranes moist, Nose normal. Neck- Normal range of motion, No tenderness, Supple, No stridor.   Eyes: PERRL, EOMI, Conjunctiva normal, No discharge.   Respiratory: Normal breath sounds, No respiratory distress, No wheezing or crackles, Speaks in full sentences easily.   Cardiovascular: Normal heart rate, Regular rhythm, No murmurs, No rubs, No gallops. 2+ radial pulses bilaterally  GI: Bowel sounds normal, Soft, No tenderness, No masses, No rebound or guarding.   Musculoskeletal: 2+ DP pulses. No significant lower extremity edema.  No cyanosis, No clubbing. Good range of motion in all major joints. No tenderness to palpation or major deformities noted. No tenderness of the CTLS spine.    Integument: Warm, Dry, No erythema, No rash. No petechiae.   Neurologic: Alert & oriented x 3, 5/5 strength in all 4 extremities bilaterally. Sensation intact to light touch in all 4 extremities and the face bilaterally. No focal deficits noted.   Psychiatric: Affect normal, Judgment normal, Mood normal. Cooperative.      LAB:  All pertinent labs reviewed and interpreted.  Results for orders placed or performed during the hospital encounter of 06/02/23   CT Chest Pulmonary Embolism w Contrast    Impression    IMPRESSION:  1.  No pulmonary emboli. No signs of acute aortic syndrome.  2.  Nonspecific acute symmetric bilateral multifocal pneumonitis.  3.  Heart size is within normal limits, but there is severe three-vessel coronary artery " calcification and dense calcification and thickening of the aortic valve leaflets.   4.  A 3.5 x 3.5 x 3.0 cm destructive soft tissue mass centered in the right side of the T4 vertebral body crosses the T4-T5 interspace to involve the right side of the T5 vertebral body and may also encroach upon the right ventral epidural space.   Additional diminutive lytic lesions throughout the visualized bones. Findings consistent with multiple myeloma.   Comprehensive metabolic panel   Result Value Ref Range    Sodium 137 136 - 145 mmol/L    Potassium 3.8 3.5 - 5.0 mmol/L    Chloride 101 98 - 107 mmol/L    Carbon Dioxide (CO2) 25 22 - 31 mmol/L    Anion Gap 11 5 - 18 mmol/L    Urea Nitrogen 24 (H) 8 - 22 mg/dL    Creatinine 0.82 0.70 - 1.30 mg/dL    Calcium 10.2 8.5 - 10.5 mg/dL    Glucose 152 (H) 70 - 125 mg/dL    Alkaline Phosphatase 155 (H) 45 - 120 U/L    AST 32 0 - 40 U/L    ALT 95 (H) 0 - 45 U/L    Protein Total 7.4 6.0 - 8.0 g/dL    Albumin 2.9 (L) 3.5 - 5.0 g/dL    Bilirubin Total 0.5 0.0 - 1.0 mg/dL    GFR Estimate >90 >60 mL/min/1.73m2   UA with Microscopic reflex to Culture    Specimen: Urine, Clean Catch   Result Value Ref Range    Color Urine Light Yellow Colorless, Straw, Light Yellow, Yellow    Appearance Urine Clear Clear    Glucose Urine Negative Negative mg/dL    Bilirubin Urine Negative Negative    Ketones Urine Negative Negative mg/dL    Specific Gravity Urine 1.025 1.001 - 1.030    Blood Urine Negative Negative    pH Urine 7.0 5.0 - 7.0    Protein Albumin Urine 10 (A) Negative mg/dL    Urobilinogen Urine <2.0 <2.0 mg/dL    Nitrite Urine Negative Negative    Leukocyte Esterase Urine Negative Negative    Mucus Urine Present (A) None Seen /LPF    RBC Urine 0 <=2 /HPF    WBC Urine <1 <=5 /HPF    Squamous Epithelials Urine <1 <=1 /HPF   CRP inflammation   Result Value Ref Range    CRP 11.3 (H) 0.0 - <0.8 mg/dL   Result Value Ref Range    Procalcitonin 0.09 0.00 - 0.49 ng/mL   Lactic acid whole blood   Result  Value Ref Range    Lactic Acid 1.4 0.7 - 2.0 mmol/L   B-Type Natriuretic Peptide ([facility] Only)   Result Value Ref Range    BNP 47 0 - 65 pg/mL   Result Value Ref Range    Troponin I 0.02 0.00 - 0.29 ng/mL   CBC with platelets and differential   Result Value Ref Range    WBC Count 8.3 4.0 - 11.0 10e3/uL    RBC Count 2.52 (L) 4.40 - 5.90 10e6/uL    Hemoglobin 8.5 (L) 13.3 - 17.7 g/dL    Hematocrit 25.8 (L) 40.0 - 53.0 %     (H) 78 - 100 fL    MCH 33.7 (H) 26.5 - 33.0 pg    MCHC 32.9 31.5 - 36.5 g/dL    RDW 15.9 (H) 10.0 - 15.0 %    Platelet Count 363 150 - 450 10e3/uL   Manual Differential   Result Value Ref Range    % Neutrophils 91 %    % Lymphocytes 6 %    % Monocytes 2 %    % Eosinophils 0 %    % Basophils 0 %    % Metamyelocytes 1 %    NRBCs per 100 WBC 3 (H) <=0 %    Absolute Neutrophils 7.6 1.6 - 8.3 10e3/uL    Absolute Lymphocytes 0.5 (L) 0.8 - 5.3 10e3/uL    Absolute Monocytes 0.2 0.0 - 1.3 10e3/uL    Absolute Eosinophils 0.0 0.0 - 0.7 10e3/uL    Absolute Basophils 0.0 0.0 - 0.2 10e3/uL    Absolute Metamyelocytes 0.1 (H) <=0.0 10e3/uL    Absolute NRBCs 0.2 (H) <=0.0 10e3/uL    RBC Morphology Confirmed RBC Indices     Platelet Assessment (A) Automated Count Confirmed. Platelet morphology is normal.     Automated Count Confirmed. Giant platelets are present.    Polychromasia Slight (A) None Seen   ECG 12-LEAD WITH MUSE ([facility])   Result Value Ref Range    Systolic Blood Pressure  mmHg    Diastolic Blood Pressure  mmHg    Ventricular Rate 88 BPM    Atrial Rate 88 BPM    VT Interval 154 ms    QRS Duration 88 ms     ms    QTc 450 ms    P Axis 44 degrees    R AXIS -20 degrees    T Axis 48 degrees    Interpretation ECG       Sinus rhythm  Voltage criteria for left ventricular hypertrophy  Abnormal ECG  No previous ECGs available  Confirmed by SEE ED PROVIDER NOTE FOR, ECG INTERPRETATION ([id number]),  GLORIA CIFUENTES ([id number]) on 6/2/2023 6:59:55 PM         RADIOLOGY:  Reviewed all pertinent imaging. Please  see official radiology report.  CT Chest Pulmonary Embolism w Contrast   Final Result   IMPRESSION:   1.  No pulmonary emboli. No signs of acute aortic syndrome.   2.  Nonspecific acute symmetric bilateral multifocal pneumonitis.   3.  Heart size is within normal limits, but there is severe three-vessel coronary artery calcification and dense calcification and thickening of the aortic valve leaflets.    4.  A 3.5 x 3.5 x 3.0 cm destructive soft tissue mass centered in the right side of the T4 vertebral body crosses the T4-T5 interspace to involve the right side of the T5 vertebral body and may also encroach upon the right ventral epidural space.    Additional diminutive lytic lesions throughout the visualized bones. Findings consistent with multiple myeloma.          EKG:    Performed at: 18:57    Impression: No evidence of acute ischemia.    Rate: 88  Rhythm: Sinus  Axis: Normal  CO Interval: 154  QRS Interval: 88  QTc Interval: 450  ST Changes: None  Comparison: No previous ECGs available    I have independently reviewed and interpreted the EKG(s) documented above.    PROCEDURES:   None      [facility] [employer] [facility] Documentation:   CMS Diagnoses:               I, Gracia Adela, am serving as a scribe to document services personally performed by Rosa Velázquez MD based on my observation and the provider's statements to me. I, Rosa Velázquez MD, attest that Gracia Chapman is acting in a scribe capacity, has observed my performance of the services and has documented them in accordance with my direction.    Rosa Velázquez MD  [facility] EMERGENCY ROOM  [address] [id number] [phone]     Rosa Velázquez MD  06/08/23 7435

## 2023-06-02 NOTE — ED TRIAGE NOTES
"Pt c/o shortness of breath and having an abnormal x-ray. The pt has a recent diagnoses of multiple myeloma and is currently being treated with radiation and chemo. He developed a new cough 1 week ago and fevers this past Tuesday. He was given antibiotics but a new scan of his chest showed new \"spots\" and his pulmonologist wanted him to go to the ED for evaluation.       Triage Assessment     Row Name 06/02/23 7575       Triage Assessment (Adult)    Airway WDL WDL       Respiratory WDL    Respiratory WDL X;rhythm/pattern    Rhythm/Pattern, Respiratory tachypneic       Skin Circulation/Temperature WDL    Skin Circulation/Temperature WDL WDL       Cardiac WDL    Cardiac WDL WDL       Peripheral/Neurovascular WDL    Peripheral Neurovascular WDL WDL       Cognitive/Neuro/Behavioral WDL    Cognitive/Neuro/Behavioral WDL WDL              "

## 2023-06-03 LAB
ALBUMIN SERPL-MCNC: 2.5 G/DL (ref 3.5–5)
ALP SERPL-CCNC: 132 U/L (ref 45–120)
ALT SERPL W P-5'-P-CCNC: 67 U/L (ref 0–45)
ANION GAP SERPL CALCULATED.3IONS-SCNC: 9 MMOL/L (ref 5–18)
AST SERPL W P-5'-P-CCNC: 19 U/L (ref 0–40)
BASE EXCESS BLDV CALC-SCNC: 1.5 MMOL/L
BASOPHILS # BLD MANUAL: 0 10E3/UL (ref 0–0.2)
BASOPHILS NFR BLD MANUAL: 0 %
BILIRUB SERPL-MCNC: 0.3 MG/DL (ref 0–1)
BUN SERPL-MCNC: 20 MG/DL (ref 8–22)
C PNEUM DNA SPEC QL NAA+PROBE: NOT DETECTED
CALCIUM SERPL-MCNC: 9 MG/DL (ref 8.5–10.5)
CHLORIDE BLD-SCNC: 101 MMOL/L (ref 98–107)
CO2 SERPL-SCNC: 26 MMOL/L (ref 22–31)
CREAT SERPL-MCNC: 0.8 MG/DL (ref 0.7–1.3)
CRYPTOC AG SPEC QL: NEGATIVE
EOSINOPHIL # BLD MANUAL: 0 10E3/UL (ref 0–0.7)
EOSINOPHIL NFR BLD MANUAL: 0 %
ERYTHROCYTE [DISTWIDTH] IN BLOOD BY AUTOMATED COUNT: 15.9 % (ref 10–15)
FLUAV H1 2009 PAND RNA SPEC QL NAA+PROBE: NOT DETECTED
FLUAV H1 RNA SPEC QL NAA+PROBE: NOT DETECTED
FLUAV H3 RNA SPEC QL NAA+PROBE: NOT DETECTED
FLUAV RNA SPEC QL NAA+PROBE: NOT DETECTED
FLUBV RNA SPEC QL NAA+PROBE: NOT DETECTED
GFR SERPL CREATININE-BSD FRML MDRD: >90 ML/MIN/1.73M2
GLUCOSE BLD-MCNC: 189 MG/DL (ref 70–125)
HADV DNA SPEC QL NAA+PROBE: NOT DETECTED
HCO3 BLDV-SCNC: 25 MMOL/L (ref 24–30)
HCOV PNL SPEC NAA+PROBE: NOT DETECTED
HCT VFR BLD AUTO: 24.4 % (ref 40–53)
HGB BLD-MCNC: 8 G/DL (ref 13.3–17.7)
HMPV RNA SPEC QL NAA+PROBE: NOT DETECTED
HPIV1 RNA SPEC QL NAA+PROBE: NOT DETECTED
HPIV2 RNA SPEC QL NAA+PROBE: NOT DETECTED
HPIV3 RNA SPEC QL NAA+PROBE: NOT DETECTED
HPIV4 RNA SPEC QL NAA+PROBE: NOT DETECTED
L PNEUMO1 AG UR QL IA: NEGATIVE
LYMPHOCYTES # BLD MANUAL: 0.6 10E3/UL (ref 0.8–5.3)
LYMPHOCYTES NFR BLD MANUAL: 10 %
M PNEUMO DNA SPEC QL NAA+PROBE: NOT DETECTED
MCH RBC QN AUTO: 33.5 PG (ref 26.5–33)
MCHC RBC AUTO-ENTMCNC: 32.8 G/DL (ref 31.5–36.5)
MCV RBC AUTO: 102 FL (ref 78–100)
METAMYELOCYTES # BLD MANUAL: 0.1 10E3/UL
METAMYELOCYTES NFR BLD MANUAL: 2 %
MONOCYTES # BLD MANUAL: 0.1 10E3/UL (ref 0–1.3)
MONOCYTES NFR BLD MANUAL: 2 %
NEUTROPHILS # BLD MANUAL: 5.5 10E3/UL (ref 1.6–8.3)
NEUTROPHILS NFR BLD MANUAL: 86 %
OXYHGB MFR BLDV: 75.8 % (ref 70–75)
PATH INTERP SPEC-IMP: NORMAL
PCO2 BLDV: 35 MM HG (ref 35–50)
PH BLDV: 7.46 [PH] (ref 7.35–7.45)
PLAT MORPH BLD: ABNORMAL
PLATELET # BLD AUTO: 303 10E3/UL (ref 150–450)
PO2 BLDV: 42 MM HG (ref 25–47)
POLYCHROMASIA BLD QL SMEAR: SLIGHT
POTASSIUM BLD-SCNC: 4 MMOL/L (ref 3.5–5)
PROT SERPL-MCNC: 6.5 G/DL (ref 6–8)
RBC # BLD AUTO: 2.39 10E6/UL (ref 4.4–5.9)
RBC MORPH BLD: ABNORMAL
RSV RNA SPEC QL NAA+PROBE: NOT DETECTED
RSV RNA SPEC QL NAA+PROBE: NOT DETECTED
RV+EV RNA SPEC QL NAA+PROBE: NOT DETECTED
S PNEUM AG SPEC QL: NEGATIVE
SAO2 % BLDV: 77.6 % (ref 70–75)
SARS-COV-2 RNA RESP QL NAA+PROBE: NEGATIVE
SODIUM SERPL-SCNC: 136 MMOL/L (ref 136–145)
TOXIC GRANULES BLD QL SMEAR: PRESENT
WBC # BLD AUTO: 6.4 10E3/UL (ref 4–11)

## 2023-06-03 PROCEDURE — 250N000013 HC RX MED GY IP 250 OP 250 PS 637: Performed by: INTERNAL MEDICINE

## 2023-06-03 PROCEDURE — 258N000003 HC RX IP 258 OP 636: Performed by: INTERNAL MEDICINE

## 2023-06-03 PROCEDURE — 87798 DETECT AGENT NOS DNA AMP: CPT | Performed by: INTERNAL MEDICINE

## 2023-06-03 PROCEDURE — 87015 SPECIMEN INFECT AGNT CONCNTJ: CPT | Performed by: INTERNAL MEDICINE

## 2023-06-03 PROCEDURE — 86789 WEST NILE VIRUS ANTIBODY: CPT | Performed by: INTERNAL MEDICINE

## 2023-06-03 PROCEDURE — 87449 NOS EACH ORGANISM AG IA: CPT | Performed by: INTERNAL MEDICINE

## 2023-06-03 PROCEDURE — 80053 COMPREHEN METABOLIC PANEL: CPT | Performed by: INTERNAL MEDICINE

## 2023-06-03 PROCEDURE — 99223 1ST HOSP IP/OBS HIGH 75: CPT | Performed by: INTERNAL MEDICINE

## 2023-06-03 PROCEDURE — 96376 TX/PRO/DX INJ SAME DRUG ADON: CPT

## 2023-06-03 PROCEDURE — 36415 COLL VENOUS BLD VENIPUNCTURE: CPT | Performed by: INTERNAL MEDICINE

## 2023-06-03 PROCEDURE — 99222 1ST HOSP IP/OBS MODERATE 55: CPT | Performed by: INTERNAL MEDICINE

## 2023-06-03 PROCEDURE — 86651 ENCEPHALITIS CALIFORN ANTBDY: CPT | Performed by: INTERNAL MEDICINE

## 2023-06-03 PROCEDURE — 86666 EHRLICHIA ANTIBODY: CPT | Performed by: INTERNAL MEDICINE

## 2023-06-03 PROCEDURE — 87633 RESP VIRUS 12-25 TARGETS: CPT | Performed by: INTERNAL MEDICINE

## 2023-06-03 PROCEDURE — 96361 HYDRATE IV INFUSION ADD-ON: CPT

## 2023-06-03 PROCEDURE — 85027 COMPLETE CBC AUTOMATED: CPT | Performed by: INTERNAL MEDICINE

## 2023-06-03 PROCEDURE — 87385 HISTOPLASMA CAPSUL AG IA: CPT | Performed by: INTERNAL MEDICINE

## 2023-06-03 PROCEDURE — 87476 LYME DIS DNA AMP PROBE: CPT | Performed by: INTERNAL MEDICINE

## 2023-06-03 PROCEDURE — 86622 BRUCELLA ANTIBODY: CPT | Performed by: INTERNAL MEDICINE

## 2023-06-03 PROCEDURE — 86606 ASPERGILLUS ANTIBODY: CPT | Performed by: INTERNAL MEDICINE

## 2023-06-03 PROCEDURE — 87486 CHLMYD PNEUM DNA AMP PROBE: CPT | Performed by: INTERNAL MEDICINE

## 2023-06-03 PROCEDURE — 250N000011 HC RX IP 250 OP 636: Performed by: INTERNAL MEDICINE

## 2023-06-03 PROCEDURE — 87484 EHRLICHA CHAFFEENSIS AMP PRB: CPT | Performed by: INTERNAL MEDICINE

## 2023-06-03 PROCEDURE — 87635 SARS-COV-2 COVID-19 AMP PRB: CPT | Performed by: INTERNAL MEDICINE

## 2023-06-03 PROCEDURE — 87899 AGENT NOS ASSAY W/OPTIC: CPT | Performed by: INTERNAL MEDICINE

## 2023-06-03 PROCEDURE — 85007 BL SMEAR W/DIFF WBC COUNT: CPT | Performed by: INTERNAL MEDICINE

## 2023-06-03 PROCEDURE — 99233 SBSQ HOSP IP/OBS HIGH 50: CPT | Performed by: INTERNAL MEDICINE

## 2023-06-03 PROCEDURE — 96366 THER/PROPH/DIAG IV INF ADDON: CPT

## 2023-06-03 PROCEDURE — 82805 BLOOD GASES W/O2 SATURATION: CPT | Performed by: INTERNAL MEDICINE

## 2023-06-03 PROCEDURE — G0378 HOSPITAL OBSERVATION PER HR: HCPCS

## 2023-06-03 PROCEDURE — 96365 THER/PROPH/DIAG IV INF INIT: CPT

## 2023-06-03 PROCEDURE — 86612 BLASTOMYCES ANTIBODY: CPT | Performed by: INTERNAL MEDICINE

## 2023-06-03 PROCEDURE — 87207 SMEAR SPECIAL STAIN: CPT | Performed by: INTERNAL MEDICINE

## 2023-06-03 PROCEDURE — 86618 LYME DISEASE ANTIBODY: CPT | Performed by: INTERNAL MEDICINE

## 2023-06-03 PROCEDURE — 96375 TX/PRO/DX INJ NEW DRUG ADDON: CPT

## 2023-06-03 RX ORDER — PROCHLORPERAZINE MALEATE 10 MG
10 TABLET ORAL EVERY 6 HOURS PRN
COMMUNITY
Start: 2023-05-17 | End: 2023-09-28

## 2023-06-03 RX ORDER — MEROPENEM 1 G/1
1 INJECTION, POWDER, FOR SOLUTION INTRAVENOUS EVERY 8 HOURS
Status: DISCONTINUED | OUTPATIENT
Start: 2023-06-03 | End: 2023-06-08

## 2023-06-03 RX ORDER — FLUCONAZOLE 100 MG/1
200 TABLET ORAL DAILY
Status: DISCONTINUED | OUTPATIENT
Start: 2023-06-03 | End: 2023-06-05

## 2023-06-03 RX ORDER — ZINC GLUCONATE 50 MG
50 TABLET ORAL DAILY
COMMUNITY
End: 2024-01-23

## 2023-06-03 RX ORDER — NALOXONE HYDROCHLORIDE 0.4 MG/ML
0.4 INJECTION, SOLUTION INTRAMUSCULAR; INTRAVENOUS; SUBCUTANEOUS
Status: DISCONTINUED | OUTPATIENT
Start: 2023-06-03 | End: 2023-06-11 | Stop reason: HOSPADM

## 2023-06-03 RX ORDER — HYDROCHLOROTHIAZIDE 12.5 MG/1
12.5 CAPSULE ORAL DAILY
Status: DISCONTINUED | OUTPATIENT
Start: 2023-06-03 | End: 2023-06-11 | Stop reason: HOSPADM

## 2023-06-03 RX ORDER — POLYETHYLENE GLYCOL 3350 17 G/17G
1 POWDER, FOR SOLUTION ORAL DAILY PRN
Status: ON HOLD | COMMUNITY
End: 2023-10-24

## 2023-06-03 RX ORDER — CYCLOBENZAPRINE HCL 5 MG
5 TABLET ORAL EVERY 8 HOURS PRN
COMMUNITY
Start: 2023-03-15 | End: 2023-06-03

## 2023-06-03 RX ORDER — LEVOFLOXACIN 250 MG/1
500 TABLET, FILM COATED ORAL DAILY
Status: DISCONTINUED | OUTPATIENT
Start: 2023-06-04 | End: 2023-06-05

## 2023-06-03 RX ORDER — LACTOBACILLUS RHAMNOSUS GG 10B CELL
1 CAPSULE ORAL DAILY
COMMUNITY
End: 2023-11-07

## 2023-06-03 RX ORDER — LEVOFLOXACIN 750 MG/1
750 TABLET, FILM COATED ORAL DAILY
Status: DISCONTINUED | OUTPATIENT
Start: 2023-06-03 | End: 2023-06-03

## 2023-06-03 RX ORDER — OMEGA-3S/DHA/EPA/FISH OIL/D3 300MG-1000
1 CAPSULE ORAL DAILY
COMMUNITY
End: 2024-03-14

## 2023-06-03 RX ORDER — ACETAMINOPHEN 325 MG/1
650 TABLET ORAL EVERY 6 HOURS PRN
Status: DISCONTINUED | OUTPATIENT
Start: 2023-06-03 | End: 2023-06-04

## 2023-06-03 RX ORDER — BORTEXOMIB 3.5 MG/1
1.3 INJECTION, POWDER, LYOPHILIZED, FOR SOLUTION INTRAVENOUS WEEKLY
COMMUNITY
End: 2023-11-07

## 2023-06-03 RX ORDER — SULFAMETHOXAZOLE/TRIMETHOPRIM 800-160 MG
2 TABLET ORAL 2 TIMES DAILY
Status: DISCONTINUED | OUTPATIENT
Start: 2023-06-03 | End: 2023-06-08

## 2023-06-03 RX ORDER — DOXYCYCLINE 100 MG/1
100 CAPSULE ORAL EVERY 12 HOURS SCHEDULED
Status: COMPLETED | OUTPATIENT
Start: 2023-06-03 | End: 2023-06-09

## 2023-06-03 RX ORDER — NALOXONE HYDROCHLORIDE 0.4 MG/ML
0.2 INJECTION, SOLUTION INTRAMUSCULAR; INTRAVENOUS; SUBCUTANEOUS
Status: DISCONTINUED | OUTPATIENT
Start: 2023-06-03 | End: 2023-06-11 | Stop reason: HOSPADM

## 2023-06-03 RX ORDER — UBIDECARENONE 100 MG
100 CAPSULE ORAL DAILY
COMMUNITY
End: 2024-01-23

## 2023-06-03 RX ORDER — ACYCLOVIR 200 MG/1
400 CAPSULE ORAL 2 TIMES DAILY
Status: DISCONTINUED | OUTPATIENT
Start: 2023-06-03 | End: 2023-06-11 | Stop reason: HOSPADM

## 2023-06-03 RX ORDER — VIT C/B6/B5/MAGNESIUM/HERB 173 50-5-6-5MG
2 CAPSULE ORAL DAILY
COMMUNITY
End: 2024-01-23

## 2023-06-03 RX ORDER — ACYCLOVIR 400 MG/1
400 TABLET ORAL 2 TIMES DAILY
COMMUNITY
Start: 2023-05-17 | End: 2024-01-09

## 2023-06-03 RX ORDER — SIMVASTATIN 40 MG
40 TABLET ORAL DAILY
Status: DISCONTINUED | OUTPATIENT
Start: 2023-06-03 | End: 2023-06-11 | Stop reason: HOSPADM

## 2023-06-03 RX ORDER — DEXAMETHASONE 4 MG/1
40 TABLET ORAL WEEKLY
Status: ON HOLD | COMMUNITY
Start: 2023-06-02 | End: 2023-06-11

## 2023-06-03 RX ORDER — HYDROCODONE BITARTRATE AND ACETAMINOPHEN 5; 325 MG/1; MG/1
1 TABLET ORAL EVERY 4 HOURS PRN
Status: DISCONTINUED | OUTPATIENT
Start: 2023-06-03 | End: 2023-06-04

## 2023-06-03 RX ORDER — NITROGLYCERIN 0.4 MG/1
0.4 TABLET SUBLINGUAL EVERY 5 MIN PRN
Status: DISCONTINUED | OUTPATIENT
Start: 2023-06-03 | End: 2023-06-11 | Stop reason: HOSPADM

## 2023-06-03 RX ORDER — LOSARTAN POTASSIUM 50 MG/1
100 TABLET ORAL DAILY
Status: DISCONTINUED | OUTPATIENT
Start: 2023-06-03 | End: 2023-06-11 | Stop reason: HOSPADM

## 2023-06-03 RX ORDER — ASPIRIN 81 MG/1
81 TABLET ORAL DAILY
Status: DISCONTINUED | OUTPATIENT
Start: 2023-06-03 | End: 2023-06-11 | Stop reason: HOSPADM

## 2023-06-03 RX ORDER — LEVOFLOXACIN 500 MG/1
500 TABLET, FILM COATED ORAL DAILY
Status: ON HOLD | COMMUNITY
Start: 2023-05-31 | End: 2023-06-11

## 2023-06-03 RX ORDER — LENALIDOMIDE 25 MG/1
25 CAPSULE ORAL DAILY
Status: ON HOLD | COMMUNITY
Start: 2023-05-24 | End: 2023-06-11

## 2023-06-03 RX ORDER — HYDROCODONE BITARTRATE AND ACETAMINOPHEN 5; 325 MG/1; MG/1
1 TABLET ORAL EVERY 4 HOURS PRN
COMMUNITY
Start: 2023-05-10 | End: 2023-08-24

## 2023-06-03 RX ORDER — HYDROCHLOROTHIAZIDE 12.5 MG/1
12.5 TABLET ORAL DAILY
Status: DISCONTINUED | OUTPATIENT
Start: 2023-06-03 | End: 2023-06-03

## 2023-06-03 RX ADMIN — Medication 1 MG: at 00:27

## 2023-06-03 RX ADMIN — MEROPENEM 1 G: 1 INJECTION, POWDER, FOR SOLUTION INTRAVENOUS at 18:02

## 2023-06-03 RX ADMIN — ACETAMINOPHEN 975 MG: 325 TABLET ORAL at 06:40

## 2023-06-03 RX ADMIN — SODIUM CHLORIDE: 9 INJECTION, SOLUTION INTRAVENOUS at 18:51

## 2023-06-03 RX ADMIN — ACETAMINOPHEN 650 MG: 325 TABLET ORAL at 20:51

## 2023-06-03 RX ADMIN — FLUCONAZOLE 200 MG: 100 TABLET ORAL at 11:07

## 2023-06-03 RX ADMIN — ASPIRIN 81 MG: 81 TABLET, COATED ORAL at 11:07

## 2023-06-03 RX ADMIN — SODIUM CHLORIDE: 9 INJECTION, SOLUTION INTRAVENOUS at 00:29

## 2023-06-03 RX ADMIN — SULFAMETHOXAZOLE AND TRIMETHOPRIM 2 TABLET: 800; 160 TABLET ORAL at 11:15

## 2023-06-03 RX ADMIN — ACYCLOVIR 400 MG: 200 CAPSULE ORAL at 20:50

## 2023-06-03 RX ADMIN — ACETAMINOPHEN 975 MG: 325 TABLET ORAL at 00:27

## 2023-06-03 RX ADMIN — ACYCLOVIR 400 MG: 200 CAPSULE ORAL at 11:08

## 2023-06-03 RX ADMIN — DOXYCYCLINE 100 MG: 100 CAPSULE ORAL at 13:55

## 2023-06-03 RX ADMIN — ACETAMINOPHEN 975 MG: 325 TABLET ORAL at 14:33

## 2023-06-03 RX ADMIN — SULFAMETHOXAZOLE AND TRIMETHOPRIM 2 TABLET: 800; 160 TABLET ORAL at 20:50

## 2023-06-03 RX ADMIN — HYDROCHLOROTHIAZIDE 12.5 MG: 12.5 CAPSULE ORAL at 11:08

## 2023-06-03 RX ADMIN — SIMVASTATIN 40 MG: 20 TABLET, FILM COATED ORAL at 11:07

## 2023-06-03 RX ADMIN — HYDROCODONE BITARTRATE AND ACETAMINOPHEN 1 TABLET: 5; 325 TABLET ORAL at 20:50

## 2023-06-03 RX ADMIN — DOXYCYCLINE 100 MG: 100 CAPSULE ORAL at 20:50

## 2023-06-03 RX ADMIN — VANCOMYCIN HYDROCHLORIDE 2000 MG: 5 INJECTION, POWDER, LYOPHILIZED, FOR SOLUTION INTRAVENOUS at 13:39

## 2023-06-03 RX ADMIN — LOSARTAN POTASSIUM 100 MG: 25 TABLET, FILM COATED ORAL at 11:05

## 2023-06-03 RX ADMIN — MEROPENEM 1 G: 1 INJECTION, POWDER, FOR SOLUTION INTRAVENOUS at 11:05

## 2023-06-03 ASSESSMENT — ACTIVITIES OF DAILY LIVING (ADL)
ADLS_ACUITY_SCORE: 22
ADLS_ACUITY_SCORE: 22
DEPENDENT_IADLS:: INDEPENDENT
ADLS_ACUITY_SCORE: 35
ADLS_ACUITY_SCORE: 35
ADLS_ACUITY_SCORE: 22
ADLS_ACUITY_SCORE: 35
ADLS_ACUITY_SCORE: 35
ADLS_ACUITY_SCORE: 22
ADLS_ACUITY_SCORE: 35
ADLS_ACUITY_SCORE: 22

## 2023-06-03 NOTE — PHARMACY-ADMISSION MEDICATION HISTORY
Pharmacist Admission Medication History    Admission medication history is complete. The information provided in this note is only as accurate as the sources available at the time of the update.    Medication reconciliation/reorder completed by provider prior to medication history? No    Information Source(s): Patient, Patient's pharmacy and Clinic records via in-person    Pertinent Information: Patient to bring in Revlimid     Changes made to PTA medication list:    Added: Revlimid, Velcade, Dexamethasone, levofloxacin, numerous vitamins/supplements.    Deleted: cyclobenzaprine, prochlorperazine     Changed: losartan increased     Medication Affordability:       Allergies reviewed with patient and updates made in EHR: yes    Medication History Completed By: Robert Huffman RPH 6/3/2023 8:11 AM    Prior to Admission medications    Medication Sig Last Dose Taking? Auth Provider Long Term End Date   acyclovir (ZOVIRAX) 400 MG tablet Take 400 mg by mouth 2 times daily 6/2/2023 at AM Yes Unknown, Entered By History Yes    aspirin 81 MG EC tablet Take 81 mg by mouth daily 6/2/2023 at AM Yes Reported, Patient     bortezomib 3.5 MG injection Inject 1.3 mg/m2 into the vein once a week 6/1/2023 Yes Unknown, Entered By History Yes    Calcium-Magnesium-Zinc 333-133-5 MG TABS per tablet Take 2 tablets by mouth daily 6/2/2023 Yes Unknown, Entered By History     cholecalciferol 50 MCG (2000 UT) tablet Take 2,000 Units by mouth daily 6/2/2023 at AM Yes Unknown, Entered By History     co-enzyme Q-10 100 MG CAPS capsule Take 100 mg by mouth daily 6/2/2023 Yes Unknown, Entered By History     dexamethasone (DECADRON) 4 MG tablet Take 40 mg by mouth once a week 6/1/2023 Yes Unknown, Entered By History Yes    hydrochlorothiazide (HYDRODIURIL) 12.5 MG tablet Take 12.5 mg by mouth daily 6/2/2023 Yes Reported, Patient Yes    HYDROcodone-acetaminophen (NORCO) 5-325 MG tablet Take 1 tablet by mouth every 4 hours as needed for pain Past Week  Yes Unknown, Entered By History     lactobacillus rhamnosus, GG, (CULTURELL) capsule Take 1 capsule by mouth daily 6/2/2023 Yes Unknown, Entered By History     levofloxacin (LEVAQUIN) 500 MG tablet Take 500 mg by mouth daily 6/2/2023 Yes Unknown, Entered By History  6/6/23   losartan (COZAAR) 50 MG tablet Take 100 mg by mouth daily 6/2/2023 Yes Reported, Patient Yes    nitroGLYcerin (NITROSTAT) 0.4 MG sublingual tablet Place 0.4 mg under the tongue every 5 minutes as needed for chest pain For GILLETTE More than a month Yes Reported, Patient Yes    Omega-3 Fatty Acids (FISH OIL BURP-LESS) 1000 MG CAPS Take 1 capsule by mouth daily 6/2/2023 Yes Unknown, Entered By History     polyethylene glycol (MIRALAX) 17 g packet Take 1 packet by mouth daily as needed for constipation Past Week Yes Unknown, Entered By History     prochlorperazine (COMPAZINE) 10 MG tablet Take 10 mg by mouth every 6 hours as needed for nausea More than a month Yes Unknown, Entered By History     REVLIMID 25 MG CAPS capsule Take 25 mg by mouth daily Take daily for 14 days then hold for 7 days every 21 day cycle 6/2/2023 at Pt to bring in Yes Unknown, Entered By History Yes    simvastatin (ZOCOR) 40 MG tablet Take 40 mg by mouth daily 6/2/2023 Yes Reported, Patient Yes    Turmeric 500 MG CAPS Take 2 capsules by mouth daily 6/2/2023 Yes Unknown, Entered By History     vitamin B complex with vitamin C (VITAMIN  B COMPLEX) tablet Take 1 tablet by mouth daily 6/2/2023 Yes Unknown, Entered By History     zinc gluconate 50 MG tablet Take 50 mg by mouth daily 6/2/2023 Yes Unknown, Entered By History

## 2023-06-03 NOTE — PROGRESS NOTES
"PRIMARY DIAGNOSIS: \"GENERIC\" NURSING   OUTPATIENT/OBSERVATION GOALS TO BE MET BEFORE DISCHARGE:  1. ADLs back to baseline: Yes    2. Activity and level of assistance: Ambulating independently.    3. Pain status: Improved-controlled with oral pain medications.    4. Return to near baseline physical activity: Yes     Discharge Planner Nurse   Safe discharge environment identified: Yes  Barriers to discharge: Yes       Entered by: Shanell Young RN 06/03/2023 5:55 AM     Please review provider order for any additional goals.   Nurse to notify provider when observation goals have been met and patient is ready for discharge.  .  .  .  .  .  Afebrile this shift, vs  Blood cultures pending  Urine strep/legionella pending  c/o pain to back, prn 975mg tylenol administered every 6hrs per pt   Need sputum   Consistant dry, non productive cough  Resp panel pending     Hem/Onc and ID consults pending   "

## 2023-06-03 NOTE — PROGRESS NOTES
PRIMARY DIAGNOSIS: PNEUMONIA  OUTPATIENT/OBSERVATION GOALS TO BE MET BEFORE DISCHARGE:  1. Dyspnea improved and O2 sats >88% on RA or back to baseline O2 levels: No --02 placed at 1L per NC when patient lying flatter  SpO2: (!) 89 %, O2 Device: Nasal cannula    2. Tolerating oral abx or appropriate plans made outpatient infusion: No--still requiring IV ABX    3. Vitals signs normal or return to baseline: No--02 at 1L per NC    4. Short term supplemental O2 needed with activity at home: No--TBD at discharge    5. Tolerate oral intake to maintain hydration: Yes    6. Return to near baseline physical activity: Yes    Discharge Planner Nurse   Safe discharge environment identified: Yes  Barriers to discharge: Yes--clearance from consulting MD's, IV ABX, Labs pending, 02 at 1L per NC       Entered by: SHELLEY DICKENS RN 06/03/2023 3:23 PM     Please review provider order for any additional goals.   Nurse to notify provider when observation goals have been met and patient is ready for discharge.

## 2023-06-03 NOTE — CONSULTS
[place]COLOGY Consult    IMPRESSION:   Billy Carroll is a 67-year-old male with a diagnosis of an IgG multiple myeloma.  I have been following him for a smoldering myeloma with stable quantitative IgG, calcium and hemoglobin as well as a normal skeletal survey.  Bone marrow biopsy shows 10 to 15% plasma cell.  Most recently he presented with back pain and had a CT scan which showed large soft tissue mass centered at the right T5 costovertebral junction and associated osseous destruction of T4, T5 and medial right #5 rib, moderate compression deformity at T5 and and diffuse lytic lesions throughout the remainder of the visible skeleton. He received palliative radiation therapy the involved area and systemic therapy with VRD.    Plan  I will hold myeloma therapy until acute process resolves.    Pneumonia: Antibiotics per ID and pulmo. Will await bronchoscopy    Multiple Myeloma: plan is 12-week treatment induction followed by stem cell transplant. Will hold triplet therapy Revlimid, Velcade and dexamethsone he started on 5/10/2023.     He continues to have low back and pelvic pain.  He has an MRI done at [place] yesterday.  We dont know results. He will continue with his current pain medication regimen.  He will be referred to palliative care.    Abnormal LFT's improving here in [place] compare to our reading on 6/1/23:  , , AST 54.             Billy Carroll is a 67-year-old male with a diagnosis of multiple myeloma.      His history is reviewed as follows.    4/18/2018: He had his routine annual examination with his primary care provider.  Comprehensive metabolic profile showed elevated total protein of 8.4.  Calcium and creatinine were all normal.    4/20/2018 serum protein electrophoresis was performed which showed a monoclonal peak of 1.4 g/dL.  Immunofixation revealed monoclonal IgG kappa.  Patient was referred to hematology clinic for further evaluation.    4/13/2018: Quantitative immunoglobulin showed  IgG to be 2246 and IgA 30.8 and IgM 26 (high IgG and low IgA and IgM).  Hemoglobin normal.  Serum protein electrophoresis showed an M protein of 1.7 g/dL.  Kappa light chain 2.36 and lambda light chain 0.77 with a kappa to lambda ratio of 3.06.  24 hours urine electrophoresis showed no increase in protein.  Skeletal survey showed no lytic lesions.    5/9/2018: Bone marrow biopsy shows plasma cell neoplasm involving 5% of marrow cellularity.    8/25/2022: Bone marrow biopsy shows plasma cell involving 10-15% of the bone marrow.  FISH for multiple myeloma shows positivity for a trisomy result with gains of chromosome 5, 9, and 15 suggesting hyperdiploidy.  Negative for remaining panel probes.    4/19/2023: CT chest without contrast shows large soft tissue mass centered at the right T5 costovertebral junction measuring up to 4.6 cm.  Associated osseous destruction of T4, T5 and medial right fifth rib, most significant for moderate compression deformity at T5 and soft tissue extension into the spinal cannel [appearing to abut the spinal cord].  Diffuse lytic lesions throughout the remainder of the visible skeleton.  Findings are typical for multiple myeloma in light of known history.    5/3/2023 to 5/9/2023:  Received IMRT radiation to T3-T5 vertebral bodies, 2000 cGy to vertebral bodies, 2500 cGy boost to gross disease) under the direction of Dr. Arcelia Sheldon.     5/10/2023: VRD started.    CURRENT HISTORY:  [place] hiking, no hot tub. No one he can remember being sick there.   He reports starting to have a cough on Tuesday of this week. He then had a fever to 103+. He called and was stated on an antibiotics (wed night) and this did not sig change his symptoms. He had a CXR that was abnormal and was told to come the the ED for evaluation.     Past Medical and Surgical History  1.  Hyperlipidemia  2.  Hypertension  3.  Osteoarthritis  4.   "Tonsillectomy  5.  Vasectomy  6.  Right thumb repair  7.  Right index finger extensor tendon repair    FAMILY HISTORY  Father had myeloma at age 77.  A sister with a history of breast cancer.    Social History  He is .  He is a non-smoker.  He is self-employed and semi-retired.  He drinks alcohol every day up to 10-12 drinks a week.Vital Signs    BP (!) 148/77 (BP Location: Right arm, Patient Position: Semi-Bobby's, Cuff Size: Adult Regular)   Pulse 78   Temp 98.9  F (37.2  C) (Oral)   Resp 20   Ht 1.778 m (5' 10\")   Wt 90.7 kg (200 lb)   SpO2 96%   BMI 28.70 kg/m        Head:  Normocephalic.    Eyes:  PERRLA, full EOM.  External exams normal.     Nose:  Patent, without deformity.    Throat:  Moist mucous membranes without lesions, erythema, or exudate.    Neck:  Supple, without masses, lymphadenopathy or tenderness.    Respiratory:  Normal respiratory effort.  Lungs are clear with good breath sounds.    Heart:  RR without murmurs, rubs, or gallops.    Abdomen:  The abdomen was flat, soft and nontender without guarding rebound or masses.    Extremities:  Full ROM without limitation, deformity or edema.   Skin:  Smooth without excessive sweating, with normal hair distribution.  No suspicious lesions visible.  Neuro:  CN 2-12 intact.  Strength and sensation intact in all extremities.  Motor function intact.       "

## 2023-06-03 NOTE — H&P
[facility] MEDICINE ADMISSION HISTORY AND PHYSICAL       Assessment & Plan      1. Cough, fever and exertional SOB with chest CT -- Nonspecific acute symmetric bilateral multifocal pneumonitis. On D3 - Levofloxacin    Has Multiple myeloma on Revlimid, Velcade and dexamethsone started on 5/10/2023. Also had radiation therapy for back pain, thoracic pain T4-T5    He was able to speak full sentences despite CT findings, and sat stayed 96% on RA. Did not appear SOB. Not septic looking at this point.    When he took his decadron, he was able to walk a mile with his dog which improved his breathing -- not sure if this pneumonitis is drug induced OR infectious     - IVF, pain meds  - ID consult/Onco consult   - COVID testing  - CMP/CBC and procalcitonin  - Sputum PCR, urinary antigens, sputum culture, PCP test   - VBG  - pulse ox     2. CT also showed -- severe three-vessel coronary artery calcification and dense calcification and thickening of the aortic valve leaflets -- He was informed of this an follow up with clinic MD. He said, he had stress test -- no CP     Last March 2023 --  Normal stress echocardiogram with no evidence of stress-induced ischemia.  Estimated post exercise left ventricular ejection fraction is greater than 70%.    3. Hypoalbuminemia - 2.9 - could be from chronic disease   - if dropping further, consider nutrition consult     4. Anemia of 8+ - stable  - transfuse if hgb less than 7     5. Loud murmur, LPSB --- has Calcified aortic valve with an indeterminate number of leaflets with moderate aortic stenosis. Peak forward velocity measures 3.9 m/s, mean gradient 37 mm Hg, calculated aortic valve area 1.1 cm2, and dimensionless index 0.31. Mild aortic regurgitation. He was made aware.     VTE prophylaxis: SCDs,  Diet:  Regular   Code Status: Full,  COVID vaccination: yes  Barriers to discharge: admitting clinical condition  Discharge Disposition and goals:  Unable to  determine at this point, pending clinical progress and response to treatment. Patient may need transfer to SNF or ACR if unsafe to go home and needed treatment inappropriate at home setting OR may need home health care evaluation if care can be delivered at home settings. Consider referral to care manager/    PPE - I was wearing PPE when I met the patient including but not limited to - N95 mask, Gloves, and/or Safety glasses.      Care plan was created based on available information and patient's condition at the time of encounter. This was discussed with the patient and/or family members using layman's terms, including counseling/education and they have agreed to proceed. I recommend to revise care plan and to review history if there is change in condition and/or new clinical information that is not available during my encounter. At the end of night shift, this case will be presented to the AM Hospitalist.       75 minutes spent by me on the date of service doing chart review, history, exam, diagnostic test results interpretation, documentation & further activities per the note.      Richi Grubbs MD, MPH, FACP, [facility]  Internal Medicine - Hospitalist        Chief Complaint Cough      HISTORY     - I met him in ED-9. He is here because of cough and been on D3 - Levofloxacin. Had chest XR June 2 and was abnormal per info. Dont have official result. He was told to come to ED. MD concern for PCP pneumonia. Plannd for bactrim but has not started.     - He has multiple myeloma and on chemotherapy. It was reported that cough started about 3-4 days ago, with fevers as high as 103 and been taking tylenol. Also feels SOB on exertion. Also has sore throat and neck nodes.    - He said, had his 40 mgs decadron and able to walk a mile with his dog today.      - In the ED, Chest CT with no PE - but has Nonspecific acute symmetric bilateral multifocal pneumonitis. I recommended ED to call ID for treatment guidance  regarding PCP concerns.     - ROS --- No headache. No dizziness. No weakness. No palpitations. No abdominal pain. No nausea or vomiting. No urinary symptoms. No bleeding symptoms. No weight loss. Rest of 12 point ROS was reviewed and negative.       Past Medical History     Past Medical History:   Diagnosis Date     Cancer (H)          Surgical History     History reviewed. No pertinent surgical history.     Family History      History reviewed. No pertinent family history.      Social History      .  Social History     Socioeconomic History     Marital status:      Spouse name: Not on file     Number of children: Not on file     Years of education: Not on file     Highest education level: Not on file   Occupational History     Not on file   Tobacco Use     Smoking status: Never     Smokeless tobacco: Never   Vaping Use     Vaping status: Not on file   Substance and Sexual Activity     Alcohol use: Not on file     Drug use: Not on file     Sexual activity: Not on file   Other Topics Concern     Not on file   Social History Narrative     Not on file     Social Determinants of Health     Financial Resource Strain: Not on file   Food Insecurity: Not on file   Transportation Needs: Not on file   Physical Activity: Not on file   Stress: Not on file   Social Connections: Not on file   Intimate Partner Violence: Not on file   Housing Stability: Not on file          Allergies        Allergies   Allergen Reactions     Tylenol With Codeine #3 [Acetaminophen-Codeine] Nausea         Prior to Admission Medications      No current facility-administered medications on file prior to encounter.  aspirin 81 MG EC tablet, Take 81 mg by mouth daily  hydrochlorothiazide (HYDRODIURIL) 12.5 MG tablet, Take 12.5 mg by mouth daily  losartan (COZAAR) 50 MG tablet, Take 50 mg by mouth daily  nitroGLYcerin (NITROSTAT) 0.4 MG sublingual tablet, Place 0.4 mg under the tongue every 5 minutes as needed for chest pain For GILLETTE  simvastatin  "(ZOCOR) 40 MG tablet, Take 40 mg by mouth At Bedtime            Review of Systems     A 12 point comprehensive review of systems was negative except as noted above in HPI.    PHYSICAL EXAMINATION       Vitals      Vitals: BP (!) 140/93   Pulse 90   Temp 98.8  F (37.1  C) (Oral)   Resp 25   Ht 1.778 m (5' 10\")   Wt 90.7 kg (200 lb)   SpO2 94%   BMI 28.70 kg/m    BMI= Body mass index is 28.7 kg/m .      Examination     General Appearance:  Alert, cooperative, no distress  Head:    Normocephalic, without obvious abnormality, atraumatic  EENT:  PERRL, conjunctiva/corneas clear, EOM's intact.   Neck:   Supple, symmetrical, trachea midline, no adenopathy; no NVE  Back:  Symmetric, no curvature, no CVA tenderness  Chest/Lungs: Clear to auscultation bilaterally, respirations unlabored, No tenderness or deformity. No abdominal breathing or use of accessory muscles.   Heart:    Regular rate and rhythm, S1 and S2 normal,2/6 SM at LPSB   Abdomen: Soft, non-tender, bowel sounds active all four quadrants, not peritoneal on palpation. Not distended  Extremities:  Extremities normal, atraumatic, no swelling   Skin:  Skin color, texture, turgor normal, no rashes or lesion  Neurologic:  Awake and alert,  No lateralizing or localizing signs        Pertinent Lab     Results for orders placed or performed during the hospital encounter of 06/02/23   CT Chest Pulmonary Embolism w Contrast    Impression    IMPRESSION:  1.  No pulmonary emboli. No signs of acute aortic syndrome.  2.  Nonspecific acute symmetric bilateral multifocal pneumonitis.  3.  Heart size is within normal limits, but there is severe three-vessel coronary artery calcification and dense calcification and thickening of the aortic valve leaflets.   4.  A 3.5 x 3.5 x 3.0 cm destructive soft tissue mass centered in the right side of the T4 vertebral body crosses the T4-T5 interspace to involve the right side of the T5 vertebral body and may also encroach upon the " right ventral epidural space.   Additional diminutive lytic lesions throughout the visualized bones. Findings consistent with multiple myeloma.   Comprehensive metabolic panel   Result Value Ref Range    Sodium 137 136 - 145 mmol/L    Potassium 3.8 3.5 - 5.0 mmol/L    Chloride 101 98 - 107 mmol/L    Carbon Dioxide (CO2) 25 22 - 31 mmol/L    Anion Gap 11 5 - 18 mmol/L    Urea Nitrogen 24 (H) 8 - 22 mg/dL    Creatinine 0.82 0.70 - 1.30 mg/dL    Calcium 10.2 8.5 - 10.5 mg/dL    Glucose 152 (H) 70 - 125 mg/dL    Alkaline Phosphatase 155 (H) 45 - 120 U/L    AST 32 0 - 40 U/L    ALT 95 (H) 0 - 45 U/L    Protein Total 7.4 6.0 - 8.0 g/dL    Albumin 2.9 (L) 3.5 - 5.0 g/dL    Bilirubin Total 0.5 0.0 - 1.0 mg/dL    GFR Estimate >90 >60 mL/min/1.73m2   UA with Microscopic reflex to Culture    Specimen: Urine, Clean Catch   Result Value Ref Range    Color Urine Light Yellow Colorless, Straw, Light Yellow, Yellow    Appearance Urine Clear Clear    Glucose Urine Negative Negative mg/dL    Bilirubin Urine Negative Negative    Ketones Urine Negative Negative mg/dL    Specific Gravity Urine 1.025 1.001 - 1.030    Blood Urine Negative Negative    pH Urine 7.0 5.0 - 7.0    Protein Albumin Urine 10 (A) Negative mg/dL    Urobilinogen Urine <2.0 <2.0 mg/dL    Nitrite Urine Negative Negative    Leukocyte Esterase Urine Negative Negative    Mucus Urine Present (A) None Seen /LPF    RBC Urine 0 <=2 /HPF    WBC Urine <1 <=5 /HPF    Squamous Epithelials Urine <1 <=1 /HPF   CRP inflammation   Result Value Ref Range    CRP 11.3 (H) 0.0 - <0.8 mg/dL   Result Value Ref Range    Procalcitonin 0.09 0.00 - 0.49 ng/mL   Lactic acid whole blood   Result Value Ref Range    Lactic Acid 1.4 0.7 - 2.0 mmol/L   B-Type Natriuretic Peptide ([facility] Only)   Result Value Ref Range    BNP 47 0 - 65 pg/mL   Result Value Ref Range    Troponin I 0.02 0.00 - 0.29 ng/mL   CBC with platelets and differential   Result Value Ref Range    WBC Count 8.3 4.0 - 11.0 10e3/uL     RBC Count 2.52 (L) 4.40 - 5.90 10e6/uL    Hemoglobin 8.5 (L) 13.3 - 17.7 g/dL    Hematocrit 25.8 (L) 40.0 - 53.0 %     (H) 78 - 100 fL    MCH 33.7 (H) 26.5 - 33.0 pg    MCHC 32.9 31.5 - 36.5 g/dL    RDW 15.9 (H) 10.0 - 15.0 %    Platelet Count 363 150 - 450 10e3/uL   Manual Differential   Result Value Ref Range    % Neutrophils 91 %    % Lymphocytes 6 %    % Monocytes 2 %    % Eosinophils 0 %    % Basophils 0 %    % Metamyelocytes 1 %    NRBCs per 100 WBC 3 (H) <=0 %    Absolute Neutrophils 7.6 1.6 - 8.3 10e3/uL    Absolute Lymphocytes 0.5 (L) 0.8 - 5.3 10e3/uL    Absolute Monocytes 0.2 0.0 - 1.3 10e3/uL    Absolute Eosinophils 0.0 0.0 - 0.7 10e3/uL    Absolute Basophils 0.0 0.0 - 0.2 10e3/uL    Absolute Metamyelocytes 0.1 (H) <=0.0 10e3/uL    Absolute NRBCs 0.2 (H) <=0.0 10e3/uL    RBC Morphology Confirmed RBC Indices     Platelet Assessment (A) Automated Count Confirmed. Platelet morphology is normal.     Automated Count Confirmed. Giant platelets are present.    Polychromasia Slight (A) None Seen   ECG 12-LEAD WITH MUSE ([facility])   Result Value Ref Range    Systolic Blood Pressure  mmHg    Diastolic Blood Pressure  mmHg    Ventricular Rate 88 BPM    Atrial Rate 88 BPM    OK Interval 154 ms    QRS Duration 88 ms     ms    QTc 450 ms    P Axis 44 degrees    R AXIS -20 degrees    T Axis 48 degrees    Interpretation ECG       Sinus rhythm  Voltage criteria for left ventricular hypertrophy  Abnormal ECG  No previous ECGs available  Confirmed by SEE ED PROVIDER NOTE FOR, ECG INTERPRETATION ([id number]),  GLORIA CIFUENTES ([id number]) on 6/2/2023 6:59:55 PM             Pertinent Radiology

## 2023-06-03 NOTE — PHARMACY-VANCOMYCIN DOSING SERVICE
Pharmacy Vancomycin Initial Note  Date of Service Xiomy 3, 2023  Patient's  1955  67 year old, male    Indication: MM, pneumonia    Current estimated CrCl = Estimated Creatinine Clearance: 98.7 mL/min (based on SCr of 0.8 mg/dL).    Creatinine for last 3 days  2023:  6:57 PM Creatinine 0.82 mg/dL  6/3/2023:  8:39 AM Creatinine 0.80 mg/dL    Recent Vancomycin Level(s) for last 3 days  No results found for requested labs within last 3 days.      Vancomycin IV Administrations (past 72 hours)      No vancomycin orders with administrations in past 72 hours.                Nephrotoxins and other renal medications (From now, onward)    Start     Dose/Rate Route Frequency Ordered Stop    23 0500  vancomycin (VANCOCIN) 1,500 mg in 0.9% NaCl 250 mL intermittent infusion         1,500 mg  over 90 Minutes Intravenous EVERY 18 HOURS 23 1011      23 1100  vancomycin (VANCOCIN) 2,000 mg in 0.9% NaCl 500 mL intermittent infusion         2,000 mg  over 2 Hours Intravenous ONCE 23 0953      23 1000  acyclovir (ZOVIRAX) capsule 400 mg        Note to Pharmacy: PTA Sig:Take 400 mg by mouth 2 times daily      400 mg Oral 2 TIMES DAILY 23 0854            Contrast Orders - past 72 hours (72h ago, onward)    Start     Dose/Rate Route Frequency Stop    23 2000  iopamidol (ISOVUE-370) solution 100 mL         100 mL Intravenous ONCE 23          [employer] Prediction of Planned Initial Vancomycin Regimen  Loading dose: 2000 mg at 10:00 2023.  Regimen: 1500 mg IV every 18 hours.  Start time: 10:12 on 2023  Exposure target: AUC24 (range)400-600 mg/L.hr   AUC24,ss: 470 mg/L.hr  Probability of AUC24 > 400: 67 %  Ctrough,ss: 13.3 mg/L  Probability of Ctrough,ss > 20: 21 %  Probability of nephrotoxicity (Lodise ALEKSANDRA ): 8 %          Plan:  1. Start vancomycin  2000mg IV x 1 then 1500mg every 18 hours  2. Vancomycin monitoring method: AUC  3. Vancomycin therapeutic monitoring  goal: 400-600 mg*h/L  4. Pharmacy will check vancomycin levels as appropriate in 1-3 Days.    5. Serum creatinine levels will be ordered daily for the first week of therapy and at least twice weekly for subsequent weeks.      Nisha Zuniga RPH

## 2023-06-03 NOTE — PLAN OF CARE
"NS ADMISSION NOTE    Patient admitted to room 2108 at approximately 1520 via bed from emergency room. Patient was accompanied by transport tech.     Verbal SBAR report received from LATONYA Bower prior to patient arrival.     Patient ambulated to bed independently. Patient alert and oriented X 3. Pain is controlled with current analgesics.  Medication(s) being used: acetaminophen and ibuprofen (OTC).  . Admission vital signs: Blood pressure (!) 160/86, pulse 78, temperature 98.3  F (36.8  C), temperature source Oral, resp. rate 20, height 1.778 m (5' 10\"), weight 90.7 kg (200 lb), SpO2 95 %. Patient was oriented to plan of care, call light, bed controls, tv, telephone, bathroom, and visiting hours.     Risk Assessment    The following safety risks were identified during admission: none. Yellow risk band applied: No      Skin Initial Assessment    This writer admitted this patient and completed a full skin assessment and Phillip score in the Adult PCS flowsheet. Appropriate interventions initiated as needed.     Refused full skin assessment at this time.    Phillip Risk Assessment  Sensory Perception: 4-->no impairment  Moisture: 4-->rarely moist  Activity: 3-->walks occasionally  Mobility: 4-->no limitation  Nutrition: 3-->adequate  Friction and Shear: 3-->no apparent problem  Phillip Score: 21    Education    Patient has a [place] to Observation order: Yes  Observation education completed and documented: Yes, pt reported.       Adrianne Smallwood RN      "

## 2023-06-03 NOTE — PROGRESS NOTES
PRIMARY DIAGNOSIS: PNEUMONIA  OUTPATIENT/OBSERVATION GOALS TO BE MET BEFORE DISCHARGE:  1. Dyspnea improved and O2 sats >88% on RA or back to baseline O2 levels: Yes   SpO2: 95 %, O2 Device: None (Room air)    2. Tolerating oral abx or appropriate plans made outpatient infusion: No    3. Vitals signs normal or return to baseline: Yes    4. Short term supplemental O2 needed with activity at home: No    5. Tolerate oral intake to maintain hydration: Yes    6. Return to near baseline physical activity: Yes    Discharge Planner Nurse   Safe discharge environment identified: Yes  Barriers to discharge: Yes--pt on IV ABX, to be seen by ID and HemOnc       Entered by: SHELLEY DICKENS RN 06/03/2023 9:44 AM     Please review provider order for any additional goals.   Nurse to notify provider when observation goals have been met and patient is ready for discharge.

## 2023-06-03 NOTE — CONSULTS
[facility]    Infectious Disease Consultation     Date of Admission:  6/2/2023  Date of Consult (When I saw the patient): 06/03/23    Assessment & Plan   Billy Carroll is a 67 year old male who was admitted on 6/2/2023.     Impression:    Multifocal, bilateral pneumonia  Immunosuppressed patient- On bortezomib infusions every Thursday.  Revlimid.  Started 14-day cycle on 5/31  MM plan was 12-week treatment induction followed by stem cell transplant- triplet therapy Revlimid, Velcade and dexamethsone he started on 5/10/2023.   Patient was at a cabin, had been planting, digging, irrigation system  Multiple mosquito bites  Tick bites, though patient thinks it was mostly wood ticks.  Patient had Lyme in the past  Broad differential diagnosis including opportunistic infections, viral bacterial fungal infections, tickborne infection  Back pain, MRI done at [facility] last week  Resp multiplex PCR panel negative        Recommendations  1. Pulmonary consult- bronch?  Appreciate pulmonary consult  2. Work up- bacterial, PJP, Viral, fungal  3. Empiric doxycycline, TMP-SMX  4. Vanco, Meropenem, Fluconazole  5. Extensive additional ID work-up ordered  6. Focus antibiotics based on clinical response, tests and culture results  7. Follow-up MRI at [facility] done earlier this week--primary team to coordinate please  8. Patient seen in ED and plan discussed  9. Thank you for consulting infectious disease.  We will follow        Krystal Kwok MD  [facility]  Answering Service: [phone]  On-Call ID provider: [phone], option: 9      Reason for Consult   Reason for consult: I was asked to evaluate this patient forcough and fever, has MM on chemo   concern for PCP pneumonia, has myeloma    Primary Care Physician   Jory Krishnamurthy    Chief Complaint   Weakness, cough  History is obtained from the patient and medical records    History of Present Illness   Billy  ARMANDO Carroll is a 67 year old male who presents with history of multiple myeloma, on therapy, presents with cough that developed over the last few days, with weakness.  Patient had been at a cabin, had been taking for plants, including hibiscus, shrubs, working on irrigation system inhaled dust.  Also had multiple allergens in the environment.  Patient also had back pain recently had MRI done at [facility]  Admitted with bilateral pneumonia, still has intermittent coughing, on supplemental oxygen.  Was not on oxygen prior to admission    Past Medical History   I have reviewed this patient's medical history and updated it with pertinent information if needed.   Past Medical History:   Diagnosis Date     Cancer (H)    Multiple myeloma, on chemotherapy, immunosuppressed  Patient Active Problem List   Diagnosis     Pneumonia of both lungs due to infectious organism, unspecified part of lung       Past Surgical History   I have reviewed this patient's surgical history and updated it with pertinent information if needed.  History reviewed. No pertinent surgical history.    Prior to Admission Medications   Prior to Admission Medications   Prescriptions Last Dose Informant Patient Reported? Taking?   Calcium-Magnesium-Zinc 333-133-5 MG TABS per tablet 6/2/2023  Yes Yes   Sig: Take 2 tablets by mouth daily   HYDROcodone-acetaminophen (NORCO) 5-325 MG tablet Past Week  Yes Yes   Sig: Take 1 tablet by mouth every 4 hours as needed for pain   Omega-3 Fatty Acids (FISH OIL BURP-LESS) 1000 MG CAPS 6/2/2023  Yes Yes   Sig: Take 1 capsule by mouth daily   REVLIMID 25 MG CAPS capsule 6/2/2023 at Pt to bring in  Yes Yes   Sig: Take 25 mg by mouth daily Take daily for 14 days then hold for 7 days every 21 day cycle   Turmeric 500 MG CAPS 6/2/2023  Yes Yes   Sig: Take 2 capsules by mouth daily   acyclovir (ZOVIRAX) 400 MG tablet 6/2/2023 at AM  Yes Yes   Sig: Take 400 mg by mouth 2 times daily   aspirin 81 MG EC tablet 6/2/2023 at  AM  Yes Yes   Sig: Take 81 mg by mouth daily   bortezomib 3.5 MG injection 6/1/2023  Yes Yes   Sig: Inject 1.3 mg/m2 into the vein once a week   cholecalciferol 50 MCG (2000 UT) tablet 6/2/2023 at AM  Yes Yes   Sig: Take 2,000 Units by mouth daily   co-enzyme Q-10 100 MG CAPS capsule 6/2/2023  Yes Yes   Sig: Take 100 mg by mouth daily   dexamethasone (DECADRON) 4 MG tablet 6/1/2023  Yes Yes   Sig: Take 40 mg by mouth once a week   hydrochlorothiazide (HYDRODIURIL) 12.5 MG tablet 6/2/2023  Yes Yes   Sig: Take 12.5 mg by mouth daily   lactobacillus rhamnosus, GG, (CULTURELL) capsule 6/2/2023  Yes Yes   Sig: Take 1 capsule by mouth daily   levofloxacin (LEVAQUIN) 500 MG tablet 6/2/2023  Yes Yes   Sig: Take 500 mg by mouth daily   losartan (COZAAR) 50 MG tablet 6/2/2023  Yes Yes   Sig: Take 100 mg by mouth daily   nitroGLYcerin (NITROSTAT) 0.4 MG sublingual tablet More than a month  Yes Yes   Sig: Place 0.4 mg under the tongue every 5 minutes as needed for chest pain For GILLETTE   polyethylene glycol (MIRALAX) 17 g packet Past Week  Yes Yes   Sig: Take 1 packet by mouth daily as needed for constipation   prochlorperazine (COMPAZINE) 10 MG tablet More than a month  Yes Yes   Sig: Take 10 mg by mouth every 6 hours as needed for nausea   simvastatin (ZOCOR) 40 MG tablet 6/2/2023  Yes Yes   Sig: Take 40 mg by mouth daily   vitamin B complex with vitamin C (VITAMIN  B COMPLEX) tablet 6/2/2023  Yes Yes   Sig: Take 1 tablet by mouth daily   zinc gluconate 50 MG tablet 6/2/2023  Yes Yes   Sig: Take 50 mg by mouth daily      Facility-Administered Medications: None     Allergies   Allergies   Allergen Reactions     Codeine Nausea       Immunization History   Immunization History   Administered Date(s) Administered     COVID-19 Bivalent 18+ (Moderna) 11/29/2022     COVID-19 Monovalent 18+ (Moderna) 02/12/2021, 03/14/2021, 11/09/2021, 04/15/2022       Social History   I have reviewed this patient's social history and updated it with  pertinent information if needed. Billy Carroll  reports that he has never smoked. He has never used smokeless tobacco.    Family History   I have reviewed this patient's family history and updated it with pertinent information if needed.   No sick contacts in family members    Review of Systems   The 10 point Review of Systems is negative other than noted in the HPI or here.     Physical Exam   Temp: 99.2  F (37.3  C) Temp src: Oral BP: (!) 143/80 Pulse: 83   Resp: 20 SpO2: 95 % O2 Device: None (Room air)    Vital Signs with Ranges  Temp:  [98.2  F (36.8  C)-99.2  F (37.3  C)] 99.2  F (37.3  C)  Pulse:  [83-98] 83  Resp:  [19-25] 20  BP: (135-154)/(73-93) 143/80  SpO2:  [94 %-96 %] 95 %  200 lbs 0 oz  Body mass index is 28.7 kg/m .    GENERAL APPEARANCE:  awake, NAD  EYES: Eyes grossly normal to inspection, conjunctivae and sclerae normal  HENT: mouth without ulcers or lesions  NECK: supple  RESP: On supplemental oxygen, harsh breath sounds bilaterally  CV: regular rates and rhythm, S1 S2, murmur  LYMPHATICS: no swelling  ABDOMEN: soft, nontender, nondistended  MS: extremities normal- no gross deformities noted  SKIN: no suspicious lesions or rashes  NEURO: coherent      LABORATORY DATA:  Reviewed    CBC RESULTS:   Recent Labs   Lab Test 06/03/23  0839   WBC 6.4   RBC 2.39*   HGB 8.0*   HCT 24.4*   *   MCH 33.5*   MCHC 32.8   RDW 15.9*           Last Comprehensive Metabolic Panel:  Sodium   Date Value Ref Range Status   06/02/2023 137 136 - 145 mmol/L Final     Potassium   Date Value Ref Range Status   06/02/2023 3.8 3.5 - 5.0 mmol/L Final     Chloride   Date Value Ref Range Status   06/02/2023 101 98 - 107 mmol/L Final     Carbon Dioxide (CO2)   Date Value Ref Range Status   06/02/2023 25 22 - 31 mmol/L Final     Anion Gap   Date Value Ref Range Status   06/02/2023 11 5 - 18 mmol/L Final     Glucose   Date Value Ref Range Status   06/02/2023 152 (H) 70 - 125 mg/dL Final     Urea Nitrogen   Date Value  Ref Range Status   06/02/2023 24 (H) 8 - 22 mg/dL Final     Creatinine   Date Value Ref Range Status   06/02/2023 0.82 0.70 - 1.30 mg/dL Final     GFR Estimate   Date Value Ref Range Status   06/02/2023 >90 >60 mL/min/1.73m2 Final     Comment:     eGFR calculated using 2021 CKD-EPI equation.   02/18/2021 >60 >60 mL/min/1.73m2 Final     Calcium   Date Value Ref Range Status   06/02/2023 10.2 8.5 - 10.5 mg/dL Final     Bilirubin Total   Date Value Ref Range Status   06/02/2023 0.5 0.0 - 1.0 mg/dL Final     Alkaline Phosphatase   Date Value Ref Range Status   06/02/2023 155 (H) 45 - 120 U/L Final     ALT   Date Value Ref Range Status   06/02/2023 95 (H) 0 - 45 U/L Final     AST   Date Value Ref Range Status   06/02/2023 32 0 - 40 U/L Final             CRP   Date Value Ref Range Status   06/02/2023 11.3 (H) 0.0 - <0.8 mg/dL Final            MICROBIOLOGY:    Reviewed    Blood cultures  Other Micro: Reviewed  7-Day Micro Results     Collected Updated Procedure Result Status      06/03/2023 1345 06/03/2023 1350 Blood parasitology exam [[id number]]   Peripheral Blood    In process Component Value   No component results            06/03/2023 0959 06/03/2023 1021 1,3 Beta D glucan fungitell [[id number]]   Blood from Arm, Right    In process Component Value   No component results            06/03/2023 0959 06/03/2023 1021 Blastomyces antibody ID [[id number]]   Blood from Arm, Right    In process Component Value   No component results            06/03/2023 0959 06/03/2023 1021 Fungal Antibodies [[id number]]   Blood from Arm, Right    In process Component Value   No component results            06/03/2023 0959 06/03/2023 1021 Blastomyces Agn Quant EIA Blood [[id number]]   Blood from Arm, Right    In process Component Value   No component results            06/03/2023 0959 06/03/2023 1021 Cryptococcus antigen [[id number]]   Blood from Arm, Right    In process Component Value   No component results            06/03/2023  0839 06/03/2023 0940 Histoplasma Capsulatum Antigen [[id number]]   Blood from Arm, Right    In process Component Value   No component results            06/03/2023 0032 06/03/2023 0212 Asymptomatic COVID-19 Virus (Coronavirus) by PCR Nasopharyngeal [[id number]]    Swab from Nasopharyngeal    Final result Component Value   SARS CoV2 PCR Negative   NEGATIVE: SARS-CoV-2 (COVID-19) RNA not detected, presumed negative.            06/03/2023 0032 06/03/2023 1209 Respiratory Panel PCR [[id number]]    Swab from Nasopharyngeal    Final result Component Value   Adenovirus Not Detected   Coronavirus Not Detected   This test detects Coronavirus 229E, HKU1, NL63 and OC43 but does not distinguish between them. It does not detect MERS ( Respiratory Syndrome), SARS (Severe Acute Respiratory Syndrome) or 2019-nCoV (Novel 2019) Coronavirus.   Human Metapneumovirus Not Detected   Human Rhin/Enterovirus Not Detected   Influenza A Not Detected   Influenza A, H1 Not Detected   Influenza A 2009 H1N1 Not Detected   Influenza A, H3 Not Detected   Influenza B Not Detected   Parainfluenza Virus 1 Not Detected   Parainfluenza Virus 2 Not Detected   Parainfluenza Virus 3 Not Detected   Parainfluenza Virus 4 Not Detected   Respiratory Syncytial Virus A Not Detected   Respiratory Syncytial Virus B Not Detected   Chlamydia Pneumoniae Not Detected   Mycoplasma Pneumoniae Not Detected            06/02/2023 1931 06/03/2023 1007 Legionella pneumophila antigen urine [[id number]]   Urine, Midstream    Final result Component Value   Legionella pneumophila serogroup 1 urinary antigen Negative   Suggests no recent or current infection. Infection due to Legionella cannot be ruled out, since other serogroups and species may cause disease, antigen may not be present in urine in early infection, and the level of antigen present in the urine may be below detectable limits of the test.            06/02/2023 1931 06/03/2023 1007  Streptococcus pneumoniae antigen [[id number]]   Urine, Midstream    Final result Component Value   Streptococcus pneumoniae antigen Negative   A negative Streptococcus pneumoniae antigen result does not rule out infection with Streptococcus pneumoniae.            06/02/2023 1912 06/03/2023 1302 Blood Culture Peripheral Blood [[id number]]   Peripheral Blood    Preliminary result Component Value   Culture No growth after 12 hours  [P]                06/02/2023 1857 06/03/2023 1016 Blood Culture Peripheral Blood [[id number]]   Peripheral Blood    Preliminary result Component Value   Culture No growth after 12 hours  [P]                        RADIOLOGY:    CT Chest Pulmonary Embolism w Contrast    Result Date: 6/2/2023  EXAM: CT CHEST PULMONARY EMBOLISM W CONTRAST LOCATION: [facility] DATE/TIME: 6/2/2023 8:00 PM CDT INDICATION: Cough, fever, myeloma, immunosuppressed, shortness of breath, eval for pneumonia, PE, etc COMPARISON: CT abdomen 02/24/2009 TECHNIQUE: CT chest pulmonary angiogram during arterial phase injection of IV contrast. Multiplanar reformats and MIP reconstructions were performed. Dose reduction techniques were used. CONTRAST: 100 ml Isovue 370 FINDINGS: ANGIOGRAM CHEST: Pulmonary arteries are normal caliber with no pulmonary emboli. Normal caliber thoracic aorta with no CTA signs of acute aortic syndrome. LUNGS AND PLEURA: Bilateral symmetric peribronchovascular distribution multifocal ground-glass airspace opacity that is mid and upper lung predominant and compatible with nonspecific acute multifocal pneumonitis. A 6 mL right middle lobe nodule is stable  and benign. No pleural effusion. No pneumothorax. MEDIASTINUM/AXILLAE: Heart size is within normal limits, but there is severe three-vessel coronary artery calcification and dense calcification and thickening of the aortic valve leaflets. No pericardial effusion. No lymphadenopathy. CORONARY ARTERY CALCIFICATION:  Severe. UPPER ABDOMEN: Normal. MUSCULOSKELETAL: A 3.5 x 3.5 x 3.0 cm destructive soft tissue mass centered in the right side of the T4 vertebral body crosses the T4-T5 interspace to involve the right side of the T5 vertebral body and may also encroach upon the right ventral epidural space. Additional diminutive lytic lesions throughout the visualized bones. Findings consistent with multiple myeloma.     IMPRESSION: 1.  No pulmonary emboli. No signs of acute aortic syndrome. 2.  Nonspecific acute symmetric bilateral multifocal pneumonitis. 3.  Heart size is within normal limits, but there is severe three-vessel coronary artery calcification and dense calcification and thickening of the aortic valve leaflets. 4.  A 3.5 x 3.5 x 3.0 cm destructive soft tissue mass centered in the right side of the T4 vertebral body crosses the T4-T5 interspace to involve the right side of the T5 vertebral body and may also encroach upon the right ventral epidural space. Additional diminutive lytic lesions throughout the visualized bones. Findings consistent with multiple myeloma.

## 2023-06-03 NOTE — CONSULTS
Ana Care Management Initial Consult    General Information  Assessment completed with: Patient, Children,    Type of CM/SW Visit: Initial Assessment    Primary Care Provider verified and updated as needed: Yes   Readmission within the last 30 days: no previous admission in last 30 days      Reason for Consult: discharge planning  Advance Care Planning: Advance Care Planning Reviewed: no concerns identified, other (see comments) (Declined Honoring Choices)          Communication Assessment  Patient's communication style: spoken language (English or Bilingual)    Hearing Difficulty or Deaf: yes   Wear Glasses or Blind: yes    Cognitive  Cognitive/Neuro/Behavioral: WDL                      Living Environment:   People in home: spouse     Current living Arrangements: house      Able to return to prior arrangements: yes  Living Arrangement Comments: Lives with wife Britney    Family/Social Support:  Care provided by: self  Provides care for: no one  Marital Status:   Wife, Children  Britney       Description of Support System: Supportive, Involved    Support Assessment: Adequate family and caregiver support    Current Resources:   Patient receiving home care services: No     Community Resources: None  Equipment currently used at home: none  Supplies currently used at home: Hearing Aid Batteries    Employment/Financial:  Employment Status: retired        Financial Concerns: No concerns identified         Does the patient's insurance plan have a 3 day qualifying hospital stay waiver? Yes    Lifestyle & Psychosocial Needs:  Social Determinants of Health     Tobacco Use: Low Risk  (6/2/2023)    Patient History      Smoking Tobacco Use: Never      Smokeless Tobacco Use: Never      Passive Exposure: Not on file   Alcohol Use: Not on file   Financial Resource Strain: Not on file   Food Insecurity: Not on file   Transportation Needs: Not on file   Physical Activity: Not on file   Stress: Not on file   Social Connections: Not on  file   Intimate Partner Violence: Not on file   Depression: Not on file   Housing Stability: Not on file       Functional Status:  Prior to admission patient needed assistance:   Dependent ADLs:: Independent  Dependent IADLs:: Independent  Assesssment of Functional Status: At functional baseline    Mental Health Status:          Chemical Dependency Status:              Values/Beliefs:  Spiritual, Cultural Beliefs, Jainism Practices, Values that affect care:               Additional Information:   Patient at [facility] ED because of cough and been on D3 - Levofloxacin. Had chest XR June 2 and was abnormal per info. Don t have official result. He was told to come to ED. MD concern for PCP pneumonia. Planned for Bactrim but has not started. Has multiple myeloma and on chemotherapy. It was reported that cough started about 3-4 days ago, with fevers as high as 103 and been taking Tylenol. Also feels SOB on exertion. Also has sore throat and neck nodes .    Reports he lives with wife Britney in a private residence. States independence with I/ADLs; no assistive device; no home care services; does drive. Explained LOCO/Observation Status to patient. Plans to return home with wife or daughter transporting patient on discharge. Don t anticipate CM needs on discharge.      RAJEEV RAMOS RN/CM

## 2023-06-03 NOTE — PROGRESS NOTES
[facility]    Medicine Progress Note - Hospitalist Service    Date of Admission:  6/2/2023    Assessment & Plan   67-year-old male immunosuppressed with history of IgG multiple myeloma, on treatment with bortezomib, Velcade and Revlimid.  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  Patient developed cough on Tuesday, May 30 and was started on Levaquin on Wednesday with no improvement.  Presents with cough, fever.   CT chest revealed acute symmetric bilateral multifocal pneumonitis.    Multifocal pneumonitis.  Immunosuppressed patient  Patient not requiring supplemental oxygen.  CTA PE protocol was negative.  CRP 11.3.  Lactic acid normal at 1.4.  Procalcitonin normal  CT chest shows no pulmonary emboli.  There is acute symmetric bilateral multifocal pneumonitis  Continue fluconazole 200 mg daily, meropenem 1 g every 8 hours, Bactrim DS 2 tabs twice daily and vancomycin pharmacy to dose.  Appreciate pulmonology and ID recommendations    Multiple myeloma  Monoclonal gammopathy  On bortezomib infusions every Thursday.  Revlimid.  Started 14-day cycle on 5/31.  Will hold pending oncology recommendations and ID recommendation  CT scan indicates soft tissue mass right side T4 vertebral body, additional lytic lesions throughout visualized bones consistent with multiple myeloma.  Continue PTA medication: Acyclovir 400 mg twice daily.    Mild elevation in liver transaminase  ALT 95.  Repeat hepatic panel in a.m.    Macrocytic anemia  Hemoglobin 8.5, .  Repeat CBC in a.m.    Essential hypertension   PTA medication: Hydrochlorothiazide 12.5 mg daily.  Losartan 100 mg daily.  Hypercholesterolemia  PTA medication: Simvastatin 40 mg daily.       Diet: Regular Diet Adult    DVT Prophylaxis: Pneumatic Compression Devices  Gutierrez Catheter: Not present  Lines: None     Cardiac Monitoring: None  Code Status: Full Code      Clinically Significant Risk  "Factors Present on Admission           # Hypercalcemia: Highest Ca = 10.2 mg/dL in last 2 days, will monitor as appropriate    # Hypoalbuminemia: Lowest albumin = 2.9 g/dL at 6/2/2023  6:57 PM, will monitor as appropriate   # Drug Induced Platelet Defect: home medication list includes an antiplatelet medication   # Hypertension: Home medication list includes antihypertensive(s)      # Overweight: Estimated body mass index is 28.7 kg/m  as calculated from the following:    Height as of this encounter: 1.778 m (5' 10\").    Weight as of this encounter: 90.7 kg (200 lb).            Disposition Plan      Expected Discharge Date: 06/03/2023                  Ashkan Morris DO  Hospitalist Service  [facility]  Securely message with [employer] (more info)  Text page via [employer] Paging/Directory   ______________________________________________________________________    Interval History   Patient reports cough    Physical Exam   Vital Signs: Temp: 98.2  F (36.8  C) Temp src: Oral BP: 135/73 Pulse: 83   Resp: 19 SpO2: 95 % O2 Device: None (Room air)    Weight: 200 lbs 0 oz  GEN: sitting up in bed, NAD  HEENT: MMM, large collar size  CVS: regular rhythm, no murmurs  RESP: faint crackles mraía, no wheezing  ABD: Soft, No abdominal pain with palpation, no guarding, no rigidity  EXT: Warm, well perfused, no edema  NEURO: moving all extremities, nonfocal  PSYCH: pleasant    Medical Decision Making     50 MINUTES SPENT BY ME on the date of service doing chart review, history, exam, documentation & further activities per the note.      Data     I have personally reviewed the following data over the past 24 hrs:    6.4  \   8.0 (L)   / 303     136 101 20 /  189 (H)   4.0 26 0.80 \       ALT: 67 (H) AST: 19 AP: 132 (H) TBILI: 0.3   ALB: 2.5 (L) TOT PROTEIN: 6.5 LIPASE: N/A       Trop: N/A BNP: 47       Procal: 0.09 CRP: 11.3 (H) Lactic Acid: 1.4         Imaging results reviewed over the past 24 hrs:   Recent " Results (from the past 24 hour(s))   CT Chest Pulmonary Embolism w Contrast    Narrative    EXAM: CT CHEST PULMONARY EMBOLISM W CONTRAST  LOCATION: [facility]  DATE/TIME: 6/2/2023 8:00 PM CDT    INDICATION: Cough, fever, myeloma, immunosuppressed, shortness of breath, eval for pneumonia, PE, etc  COMPARISON: CT abdomen 02/24/2009  TECHNIQUE: CT chest pulmonary angiogram during arterial phase injection of IV contrast. Multiplanar reformats and MIP reconstructions were performed. Dose reduction techniques were used.   CONTRAST: 100 ml Isovue 370    FINDINGS:  ANGIOGRAM CHEST: Pulmonary arteries are normal caliber with no pulmonary emboli. Normal caliber thoracic aorta with no CTA signs of acute aortic syndrome.    LUNGS AND PLEURA: Bilateral symmetric peribronchovascular distribution multifocal ground-glass airspace opacity that is mid and upper lung predominant and compatible with nonspecific acute multifocal pneumonitis. A 6 mL right middle lobe nodule is stable   and benign. No pleural effusion. No pneumothorax.    MEDIASTINUM/AXILLAE: Heart size is within normal limits, but there is severe three-vessel coronary artery calcification and dense calcification and thickening of the aortic valve leaflets. No pericardial effusion. No lymphadenopathy.    CORONARY ARTERY CALCIFICATION: Severe.    UPPER ABDOMEN: Normal.    MUSCULOSKELETAL: A 3.5 x 3.5 x 3.0 cm destructive soft tissue mass centered in the right side of the T4 vertebral body crosses the T4-T5 interspace to involve the right side of the T5 vertebral body and may also encroach upon the right ventral epidural   space. Additional diminutive lytic lesions throughout the visualized bones. Findings consistent with multiple myeloma.      Impression    IMPRESSION:  1.  No pulmonary emboli. No signs of acute aortic syndrome.  2.  Nonspecific acute symmetric bilateral multifocal pneumonitis.  3.  Heart size is within normal limits, but there is  severe three-vessel coronary artery calcification and dense calcification and thickening of the aortic valve leaflets.   4.  A 3.5 x 3.5 x 3.0 cm destructive soft tissue mass centered in the right side of the T4 vertebral body crosses the T4-T5 interspace to involve the right side of the T5 vertebral body and may also encroach upon the right ventral epidural space.   Additional diminutive lytic lesions throughout the visualized bones. Findings consistent with multiple myeloma.

## 2023-06-03 NOTE — CONSULTS
[facility] Pulmonary/Critical Care Consult Team Note    Billy Carroll,  1955, MRN [id number]  Admitting Dx: Pneumonia of both lungs due to infectious organism, unspecified part of lung [J18.9]  Date / Time of Admission:  2023  6:41 PM    [place], no hot tub. No one he can remember being sick there.   He reports starting to have a cough on Tuesday of this week. He then had a fever to 103+. He called and was stated on an antibiotics (wed night) and this did not sig change his symptoms. He had a CXR that was abnormal and was told to come the the ED for evaluation.       Assessment/Plan: Billy Carroll is a 67 year old male with PMHx of IgG multiple myeloma on Revlimid, Velcade and dexamethsone started on 5/10/2023 who started having a cough and was started on Abx with no improvement and a abnormal CXR told to come to the ED for evaluation    Bronchitis/Pneumonitis vs Drug Reaction  - Given pt is immunosuppressed fungal, viral and PNA panel  - agree with course of levaquin  - He was only on decadron once a week related to his injections  - we discussed possibility of drug reaction and likely matthews unless he has a dramatic improvement for a bronch on Monday  - He has no WBC count, neg Procalcitonin, no further fevers after the one, and no improvement with Abx          Medical Care Time excluding procedures and family discussions greater than: 45 Minutes    Risk Factors Present on Admission:  Clinically Significant Risk Factors Present on Admission           # Hypercalcemia: Highest Ca = 10.2 mg/dL in last 2 days, will monitor as appropriate    # Hypoalbuminemia: Lowest albumin = 2.5 g/dL at 6/3/2023  8:39 AM, will monitor as appropriate   # Drug Induced Platelet Defect: home medication list includes an antiplatelet medication   # Hypertension: Home medication list includes antihypertensive(s)      # Overweight: Estimated body mass index  "is 28.7 kg/m  as calculated from the following:    Height as of this encounter: 1.778 m (5' 10\").    Weight as of this encounter: 90.7 kg (200 lb).         # Anemia: based on hgb <11           Maru Malagon DO  Pulmonary and Critical Care Attending  pgr [phone]    Allergies   Allergen Reactions     Codeine Nausea       Meds: See MAR    Physical Exam:  BP (!) 148/77 (BP Location: Right arm, Patient Position: Semi-Bobby's, Cuff Size: Adult Regular)   Pulse 78   Temp 98.9  F (37.2  C) (Oral)   Resp 20   Ht 1.778 m (5' 10\")   Wt 90.7 kg (200 lb)   SpO2 96%   BMI 28.70 kg/m    Intake/Output this shift:  I/O this shift:  In: 638 [I.V.:638]  Out: -   GEN: sitting up in bed, NAD  HEENT: MMM, large collar size  CVS: regular rhythm, no murmurs  RESP: faint crackles maría, no wheezing  ABD: Soft, No abdominal pain with palpation, no guarding, no rigidity  EXT: Warm, well perfused, no edema  NEURO: moving all extremities, nonfocal  PSYCH: pleasant    Pertinent Labs: Latest lab results in EHR personally reviewed.   CMP  Recent Labs   Lab 06/03/23  0839 06/02/23  1857    137   POTASSIUM 4.0 3.8   CHLORIDE 101 101   CO2 26 25   ANIONGAP 9 11   * 152*   BUN 20 24*   CR 0.80 0.82   GFRESTIMATED >90 >90   SESAR 9.0 10.2   PROTTOTAL 6.5 7.4   ALBUMIN 2.5* 2.9*   BILITOTAL 0.3 0.5   ALKPHOS 132* 155*   AST 19 32   ALT 67* 95*     CBC  Recent Labs   Lab 06/03/23  0839 06/02/23  1857   WBC 6.4 8.3   RBC 2.39* 2.52*   HGB 8.0* 8.5*   HCT 24.4* 25.8*   * 102*   MCH 33.5* 33.7*   MCHC 32.8 32.9   RDW 15.9* 15.9*    363     INRNo lab results found in last 7 days.  Arterial Blood GasNo lab results found in last 7 days.    Cultures: not yet available.    Imaging: personally reviewed.   Results for orders placed or performed during the hospital encounter of 06/02/23   CT Chest Pulmonary Embolism w Contrast    Narrative    EXAM: CT CHEST PULMONARY EMBOLISM W CONTRAST  LOCATION: [facility]  DATE/TIME: 6/2/2023 8:00 PM CDT    INDICATION: Cough, fever, myeloma, immunosuppressed, shortness of breath, eval for pneumonia, PE, etc  COMPARISON: CT abdomen 02/24/2009  TECHNIQUE: CT chest pulmonary angiogram during arterial phase injection of IV contrast. Multiplanar reformats and MIP reconstructions were performed. Dose reduction techniques were used.   CONTRAST: 100 ml Isovue 370    FINDINGS:  ANGIOGRAM CHEST: Pulmonary arteries are normal caliber with no pulmonary emboli. Normal caliber thoracic aorta with no CTA signs of acute aortic syndrome.    LUNGS AND PLEURA: Bilateral symmetric peribronchovascular distribution multifocal ground-glass airspace opacity that is mid and upper lung predominant and compatible with nonspecific acute multifocal pneumonitis. A 6 mL right middle lobe nodule is stable   and benign. No pleural effusion. No pneumothorax.    MEDIASTINUM/AXILLAE: Heart size is within normal limits, but there is severe three-vessel coronary artery calcification and dense calcification and thickening of the aortic valve leaflets. No pericardial effusion. No lymphadenopathy.    CORONARY ARTERY CALCIFICATION: Severe.    UPPER ABDOMEN: Normal.    MUSCULOSKELETAL: A 3.5 x 3.5 x 3.0 cm destructive soft tissue mass centered in the right side of the T4 vertebral body crosses the T4-T5 interspace to involve the right side of the T5 vertebral body and may also encroach upon the right ventral epidural   space. Additional diminutive lytic lesions throughout the visualized bones. Findings consistent with multiple myeloma.      Impression    IMPRESSION:  1.  No pulmonary emboli. No signs of acute aortic syndrome.  2.  Nonspecific acute symmetric bilateral multifocal pneumonitis.  3.  Heart size is within normal limits, but there is severe three-vessel coronary artery calcification and dense calcification and thickening of the aortic valve leaflets.   4.  A 3.5 x 3.5 x 3.0 cm destructive soft tissue mass  centered in the right side of the T4 vertebral body crosses the T4-T5 interspace to involve the right side of the T5 vertebral body and may also encroach upon the right ventral epidural space.   Additional diminutive lytic lesions throughout the visualized bones. Findings consistent with multiple myeloma.       Patient Active Problem List   Diagnosis     Pneumonia of both lungs due to infectious organism, unspecified part of lung         Surgical History  He  has no past surgical history on file.    Family History  Reviewed, and family history is not on file.    Social History  Reviewed, and he  reports that he has never smoked. He has never used smokeless tobacco.    Allergies  Allergies   Allergen Reactions     Codeine Nausea              Maru Malagon,   Pulmonary and Critical Care Attending  pgr [phone]    Securely message with the [employer] (learn more here)

## 2023-06-04 PROBLEM — Z79.69 IMMUNOSUPPRESSED DUE TO CHEMOTHERAPY: Status: ACTIVE | Noted: 2023-06-04

## 2023-06-04 PROBLEM — D84.821 IMMUNOSUPPRESSED DUE TO CHEMOTHERAPY (H): Status: ACTIVE | Noted: 2023-06-04

## 2023-06-04 PROBLEM — J96.01 ACUTE HYPOXEMIC RESPIRATORY FAILURE (H): Status: ACTIVE | Noted: 2023-06-04

## 2023-06-04 PROBLEM — T45.1X5A IMMUNOSUPPRESSED DUE TO CHEMOTHERAPY (H): Status: ACTIVE | Noted: 2023-06-04

## 2023-06-04 PROBLEM — Z79.899 IMMUNOSUPPRESSED DUE TO CHEMOTHERAPY (H): Status: ACTIVE | Noted: 2023-06-04

## 2023-06-04 PROBLEM — M48.9 MASS OF THORACIC VERTEBRA: Status: ACTIVE | Noted: 2023-06-04

## 2023-06-04 LAB
1,3 BETA GLUCAN SER-MCNC: >500 PG/ML
ALBUMIN SERPL-MCNC: 2.6 G/DL (ref 3.5–5)
ALP SERPL-CCNC: 135 U/L (ref 45–120)
ALT SERPL W P-5'-P-CCNC: 65 U/L (ref 0–45)
ANAPLASMA BLD MOD GIEMSA: NEGATIVE
ANION GAP SERPL CALCULATED.3IONS-SCNC: 10 MMOL/L (ref 5–18)
AST SERPL W P-5'-P-CCNC: 28 U/L (ref 0–40)
B MICROTI BLD SMEAR: NEGATIVE
BILIRUB DIRECT SERPL-MCNC: 0.1 MG/DL
BILIRUB SERPL-MCNC: 0.4 MG/DL (ref 0–1)
BUN SERPL-MCNC: 15 MG/DL (ref 8–22)
CALCIUM SERPL-MCNC: 8.7 MG/DL (ref 8.5–10.5)
CHLORIDE BLD-SCNC: 104 MMOL/L (ref 98–107)
CO2 SERPL-SCNC: 21 MMOL/L (ref 22–31)
CREAT SERPL-MCNC: 0.87 MG/DL (ref 0.7–1.3)
EHRLICHIA SPEC QL MICRO: NEGATIVE
ERYTHROCYTE [DISTWIDTH] IN BLOOD BY AUTOMATED COUNT: 16.2 % (ref 10–15)
GFR SERPL CREATININE-BSD FRML MDRD: >90 ML/MIN/1.73M2
GLUCOSE BLD-MCNC: 134 MG/DL (ref 70–125)
HCT VFR BLD AUTO: 26.6 % (ref 40–53)
HGB BLD-MCNC: 8.7 G/DL (ref 13.3–17.7)
MCH RBC QN AUTO: 33.9 PG (ref 26.5–33)
MCHC RBC AUTO-ENTMCNC: 32.7 G/DL (ref 31.5–36.5)
MCV RBC AUTO: 104 FL (ref 78–100)
OBSERVATION IMP: POSITIVE
PLATELET # BLD AUTO: 305 10E3/UL (ref 150–450)
POTASSIUM BLD-SCNC: 4.1 MMOL/L (ref 3.5–5)
PROT SERPL-MCNC: 6.7 G/DL (ref 6–8)
RBC # BLD AUTO: 2.57 10E6/UL (ref 4.4–5.9)
SODIUM SERPL-SCNC: 135 MMOL/L (ref 136–145)
WBC # BLD AUTO: 7.4 10E3/UL (ref 4–11)

## 2023-06-04 PROCEDURE — 82248 BILIRUBIN DIRECT: CPT | Performed by: INTERNAL MEDICINE

## 2023-06-04 PROCEDURE — 120N000001 HC R&B MED SURG/OB

## 2023-06-04 PROCEDURE — 250N000013 HC RX MED GY IP 250 OP 250 PS 637: Performed by: INTERNAL MEDICINE

## 2023-06-04 PROCEDURE — 258N000003 HC RX IP 258 OP 636: Performed by: INTERNAL MEDICINE

## 2023-06-04 PROCEDURE — 99233 SBSQ HOSP IP/OBS HIGH 50: CPT | Performed by: INTERNAL MEDICINE

## 2023-06-04 PROCEDURE — 82310 ASSAY OF CALCIUM: CPT | Performed by: INTERNAL MEDICINE

## 2023-06-04 PROCEDURE — 36415 COLL VENOUS BLD VENIPUNCTURE: CPT | Performed by: INTERNAL MEDICINE

## 2023-06-04 PROCEDURE — 250N000011 HC RX IP 250 OP 636: Performed by: INTERNAL MEDICINE

## 2023-06-04 PROCEDURE — 96361 HYDRATE IV INFUSION ADD-ON: CPT

## 2023-06-04 PROCEDURE — 96376 TX/PRO/DX INJ SAME DRUG ADON: CPT

## 2023-06-04 PROCEDURE — 99232 SBSQ HOSP IP/OBS MODERATE 35: CPT | Performed by: INTERNAL MEDICINE

## 2023-06-04 PROCEDURE — 99233 SBSQ HOSP IP/OBS HIGH 50: CPT | Performed by: FAMILY MEDICINE

## 2023-06-04 PROCEDURE — G0378 HOSPITAL OBSERVATION PER HR: HCPCS

## 2023-06-04 PROCEDURE — 87798 DETECT AGENT NOS DNA AMP: CPT | Performed by: INTERNAL MEDICINE

## 2023-06-04 PROCEDURE — 85027 COMPLETE CBC AUTOMATED: CPT | Performed by: INTERNAL MEDICINE

## 2023-06-04 PROCEDURE — 250N000013 HC RX MED GY IP 250 OP 250 PS 637: Performed by: FAMILY MEDICINE

## 2023-06-04 RX ORDER — ACETAMINOPHEN 325 MG/1
325 TABLET ORAL
Status: DISCONTINUED | OUTPATIENT
Start: 2023-06-04 | End: 2023-06-11 | Stop reason: HOSPADM

## 2023-06-04 RX ORDER — CODEINE PHOSPHATE AND GUAIFENESIN 10; 100 MG/5ML; MG/5ML
10 SOLUTION ORAL EVERY 4 HOURS PRN
Status: DISCONTINUED | OUTPATIENT
Start: 2023-06-04 | End: 2023-06-11 | Stop reason: HOSPADM

## 2023-06-04 RX ORDER — HYDROCODONE BITARTRATE AND ACETAMINOPHEN 5; 325 MG/1; MG/1
1 TABLET ORAL EVERY 6 HOURS PRN
Status: DISCONTINUED | OUTPATIENT
Start: 2023-06-04 | End: 2023-06-11 | Stop reason: HOSPADM

## 2023-06-04 RX ORDER — AMOXICILLIN 250 MG
1 CAPSULE ORAL 2 TIMES DAILY PRN
Status: DISCONTINUED | OUTPATIENT
Start: 2023-06-04 | End: 2023-06-11 | Stop reason: HOSPADM

## 2023-06-04 RX ORDER — LIDOCAINE 40 MG/G
CREAM TOPICAL
Status: DISCONTINUED | OUTPATIENT
Start: 2023-06-04 | End: 2023-06-11 | Stop reason: HOSPADM

## 2023-06-04 RX ORDER — IBUPROFEN 400 MG/1
400 TABLET, FILM COATED ORAL ONCE
Status: COMPLETED | OUTPATIENT
Start: 2023-06-04 | End: 2023-06-04

## 2023-06-04 RX ORDER — ACETAMINOPHEN 325 MG/1
975 TABLET ORAL
Status: DISCONTINUED | OUTPATIENT
Start: 2023-06-04 | End: 2023-06-11 | Stop reason: HOSPADM

## 2023-06-04 RX ADMIN — SODIUM CHLORIDE: 9 INJECTION, SOLUTION INTRAVENOUS at 23:28

## 2023-06-04 RX ADMIN — ACETAMINOPHEN 650 MG: 325 TABLET ORAL at 04:52

## 2023-06-04 RX ADMIN — Medication 5 MG: at 20:36

## 2023-06-04 RX ADMIN — VANCOMYCIN HYDROCHLORIDE 1500 MG: 5 INJECTION, POWDER, LYOPHILIZED, FOR SOLUTION INTRAVENOUS at 21:24

## 2023-06-04 RX ADMIN — SENNOSIDES AND DOCUSATE SODIUM 1 TABLET: 50; 8.6 TABLET ORAL at 17:32

## 2023-06-04 RX ADMIN — ACYCLOVIR 400 MG: 200 CAPSULE ORAL at 20:35

## 2023-06-04 RX ADMIN — HYDROCHLOROTHIAZIDE 12.5 MG: 12.5 CAPSULE ORAL at 08:33

## 2023-06-04 RX ADMIN — ACETAMINOPHEN 975 MG: 325 TABLET ORAL at 17:30

## 2023-06-04 RX ADMIN — FLUCONAZOLE 200 MG: 100 TABLET ORAL at 08:32

## 2023-06-04 RX ADMIN — ACETAMINOPHEN 975 MG: 325 TABLET ORAL at 12:18

## 2023-06-04 RX ADMIN — MEROPENEM 1 G: 1 INJECTION, POWDER, FOR SOLUTION INTRAVENOUS at 01:59

## 2023-06-04 RX ADMIN — VANCOMYCIN HYDROCHLORIDE 1500 MG: 5 INJECTION, POWDER, LYOPHILIZED, FOR SOLUTION INTRAVENOUS at 04:52

## 2023-06-04 RX ADMIN — IBUPROFEN 400 MG: 400 TABLET ORAL at 13:54

## 2023-06-04 RX ADMIN — ACYCLOVIR 400 MG: 200 CAPSULE ORAL at 08:32

## 2023-06-04 RX ADMIN — LEVOFLOXACIN 500 MG: 500 TABLET, FILM COATED ORAL at 08:33

## 2023-06-04 RX ADMIN — HYDROCODONE BITARTRATE AND ACETAMINOPHEN 1 TABLET: 5; 325 TABLET ORAL at 01:59

## 2023-06-04 RX ADMIN — ACETAMINOPHEN 325 MG: 325 TABLET ORAL at 21:31

## 2023-06-04 RX ADMIN — MEROPENEM 1 G: 1 INJECTION, POWDER, FOR SOLUTION INTRAVENOUS at 17:30

## 2023-06-04 RX ADMIN — LOSARTAN POTASSIUM 100 MG: 25 TABLET, FILM COATED ORAL at 08:33

## 2023-06-04 RX ADMIN — SULFAMETHOXAZOLE AND TRIMETHOPRIM 2 TABLET: 800; 160 TABLET ORAL at 20:36

## 2023-06-04 RX ADMIN — SIMVASTATIN 40 MG: 20 TABLET, FILM COATED ORAL at 08:33

## 2023-06-04 RX ADMIN — SULFAMETHOXAZOLE AND TRIMETHOPRIM 2 TABLET: 800; 160 TABLET ORAL at 08:32

## 2023-06-04 RX ADMIN — ASPIRIN 81 MG: 81 TABLET, COATED ORAL at 08:32

## 2023-06-04 RX ADMIN — MEROPENEM 1 G: 1 INJECTION, POWDER, FOR SOLUTION INTRAVENOUS at 09:34

## 2023-06-04 RX ADMIN — DOXYCYCLINE 100 MG: 100 CAPSULE ORAL at 20:36

## 2023-06-04 RX ADMIN — DOXYCYCLINE 100 MG: 100 CAPSULE ORAL at 08:32

## 2023-06-04 ASSESSMENT — ACTIVITIES OF DAILY LIVING (ADL)
ADLS_ACUITY_SCORE: 22

## 2023-06-04 NOTE — PROGRESS NOTES
Care Management Follow Up    Length of Stay (days): 0    Expected Discharge Date: 06/04/2023     Concerns to be Addressed: discharge planning     Patient plan of care discussed at interdisciplinary rounds: Yes    Anticipated Discharge Disposition: Home     Anticipated Discharge Services: None  Anticipated Discharge DME: None    Patient/family educated on Medicare website which has current facility and service quality ratings:  n/a  Education Provided on the Discharge Plan:  yes  Patient/Family in Agreement with the Plan: yes    Referrals Placed by CM/SW:  yes  Private pay costs discussed: Not applicable    Additional Information:  No needs anticipated from CM at discharge per pt; independent at baseline. Care management to follow for discharge recommendations and progression of care through hospitalization. Family to transport home.  7:51 AM    DANIEL Zheng  6/4/2023

## 2023-06-04 NOTE — PLAN OF CARE
Problem: Pain Acute  Goal: Optimal Pain Control and Function  Outcome: Progressing  Intervention: Develop Pain Management Plan  Flowsheets  Taken 6/4/2023 1849  Pain Management Interventions:   medication (see MAR)   MD notified (comment)   rest   repositioned   quiet environment facilitated  Taken 6/4/2023 1354  Pain Management Interventions: medication (see MAR)  Taken 6/4/2023 1303  Pain Management Interventions: declines  Taken 6/4/2023 1218  Pain Management Interventions: medication (see MAR)  Taken 6/4/2023 0816  Pain Management Interventions: declines  Intervention: Prevent or Manage Pain  Flowsheets (Taken 6/4/2023 1849)  Bowel Elimination Promotion:   adequate fluid intake promoted   privacy promoted  Medication Review/Management: medications reviewed  Intervention: Optimize Psychosocial Wellbeing  Flowsheets (Taken 6/4/2023 1849)  Supportive Measures: active listening utilized

## 2023-06-04 NOTE — PROGRESS NOTES
[facility]    Infectious Disease Progress Note    Date of Service : 06/04/2023     Assessment & Plan   Billy Carroll is a 67 year old male who was admitted on 6/2/2023.     ASSESSMENT:  Multifocal, bilateral pneumonia/pneumonitis- active issue  Immunosuppressed patient- On bortezomib infusions every Thursday.  Revlimid.  Started 14-day cycle on 5/31  MM plan was 12-week treatment induction followed by stem cell transplant- triplet therapy Revlimid, Velcade and dexamethsone he started on 5/10/2023.   Patient was at a cabin, had been planting, digging, irrigation system  Multiple mosquito bites  Tick bites, though patient thinks it was mostly wood ticks.  Patient had Lyme in the past  Broad differential diagnosis including opportunistic infections, viral bacterial fungal infections, tickborne infection  Back pain, MRI done at [facility] last week  Resp multiplex PCR panel negative     Images on admission- reviewed         RECOMMENDATIONS:    1. Pulmonary consult appreciated- plan for bronch on 6/5/2023  2. Antibiotics- Vanco, meropenem, fluconazole  3. Risk of opportunistic infection- empiric TMP-SMX  4. Tick exposure/bite- Doxycycline while work up in process  5. Bacterial, Viral, PJP, Fungal work up in process  6. Back pain- follow up MRI at [facility] done earlier this week  7. Follow culture results   8. Focus/de-escalate antibiotics based on final culture results  9. Monitor CBC, CMP  10. Discussed with patient   11. ID will follow      Krystal Kwok MD  [facility]  [phone]      Interval History   Coughing this morning, better now  Updated patient regarding test results    Physical Exam   Temp: 99.3  F (37.4  C) Temp src: Oral BP: (!) 150/71 Pulse: 115   Resp: 16 SpO2: 94 % O2 Device: Nasal cannula Oxygen Delivery: 2 LPM  Vitals:    06/02/23 1806 06/04/23 0505   Weight: 90.7 kg (200 lb) 88.6 kg (195 lb 4.8 oz)     Vital Signs with  Ranges  Temp:  [98.1  F (36.7  C)-99.3  F (37.4  C)] 99.3  F (37.4  C)  Pulse:  [100-115] 115  Resp:  [16-18] 16  BP: (134-160)/(71-87) 150/71  SpO2:  [89 %-96 %] 94 %    Constitutional: Awake, no apparent distress  Lungs: coughing, scattered harsh breath sounds  Cardiovascular: Regular rate and rhythm, S1 S2  Abdomen: non distended  Skin: warm  Neuro: deconditioned  Psych: able to answer questions    Medications     [Held by provider] sodium chloride 75 mL/hr at 06/03/23 1851       acetaminophen  975 mg Oral TID AC     acyclovir  400 mg Oral BID     aspirin  81 mg Oral Daily     doxycycline hyclate  100 mg Oral Q12H [facility] (08/20)     fluconazole  200 mg Oral Daily     hydrochlorothiazide  12.5 mg Oral Daily     levofloxacin  500 mg Oral Daily     lidocaine inhalant  3 mL Nebulization Once     losartan  100 mg Oral Daily     melatonin  5 mg Oral At Bedtime     meropenem  1 g Intravenous Q8H     simvastatin  40 mg Oral Daily     sodium chloride (PF)  3 mL Intracatheter Q8H     sulfamethoxazole-trimethoprim  2 tablet Oral BID     vancomycin  1,500 mg Intravenous Q18H       Data   All microbiology laboratory data reviewed.  Reviewed CBC  Reviewed Creatinine    Recent Labs   Lab Test 06/04/23  0739 06/03/23  0839 06/02/23  1857   WBC 7.4 6.4 8.3   HGB 8.7* 8.0* 8.5*   HCT 26.6* 24.4* 25.8*   * 102* 102*    303 363     Recent Labs   Lab Test 06/04/23  0739 06/03/23  0839 06/02/23  1857   CR 0.87 0.80 0.82     No lab results found.  No lab results found.    Invalid input(s):     MICROBIOLOGY:    Reviewed Smear, resp panel, culture results    7-Day Micro Results     Collected Updated Procedure Result Status      06/03/2023 1345 06/04/2023 1437 Blood parasitology exam [[id number]]    Peripheral Blood    Final result Component Value   Babesia Negative   Anaplasma Negative   If Anaplasma (Ehrlichia) infection is highly suspected, consider obtaining blood PCR specific assay.  Light microscopy does not exclude  the diagnosis.     Ehrlichia Negative            06/03/2023 0959 06/03/2023 1021 1,3 Beta D glucan fungitell [[id number]]   Blood from Arm, Right    In process Component Value   No component results            06/03/2023 0959 06/03/2023 1021 Blastomyces antibody ID [[id number]]   Blood from Arm, Right    In process Component Value   No component results            06/03/2023 0959 06/03/2023 1021 Fungal Antibodies [[id number]]   Blood from Arm, Right    In process Component Value   No component results            06/03/2023 0959 06/03/2023 1021 Blastomyces Agn Quant EIA Blood [[id number]]   Blood from Arm, Right    In process Component Value   No component results            06/03/2023 0959 06/03/2023 1543 Cryptococcus antigen [[id number]]   Blood from Arm, Right    Final result Component Value   Cryptococcal Antigen Negative   Cryptococcal titer interpretation N/A            06/03/2023 0839 06/03/2023 0940 Histoplasma Capsulatum Antigen [[id number]]   Blood from Arm, Right    In process Component Value   No component results            06/03/2023 0032 06/03/2023 0212 Asymptomatic COVID-19 Virus (Coronavirus) by PCR Nasopharyngeal [[id number]]    Swab from Nasopharyngeal    Final result Component Value   SARS CoV2 PCR Negative   NEGATIVE: SARS-CoV-2 (COVID-19) RNA not detected, presumed negative.            06/03/2023 0032 06/03/2023 1209 Respiratory Panel PCR [[id number]]    Swab from Nasopharyngeal    Final result Component Value   Adenovirus Not Detected   Coronavirus Not Detected   This test detects Coronavirus 229E, HKU1, NL63 and OC43 but does not distinguish between them. It does not detect MERS ( Respiratory Syndrome), SARS (Severe Acute Respiratory Syndrome) or 2019-nCoV (Novel 2019) Coronavirus.   Human Metapneumovirus Not Detected   Human Rhin/Enterovirus Not Detected   Influenza A Not Detected   Influenza A, H1 Not Detected   Influenza A 2009 H1N1 Not Detected   Influenza A, H3  Not Detected   Influenza B Not Detected   Parainfluenza Virus 1 Not Detected   Parainfluenza Virus 2 Not Detected   Parainfluenza Virus 3 Not Detected   Parainfluenza Virus 4 Not Detected   Respiratory Syncytial Virus A Not Detected   Respiratory Syncytial Virus B Not Detected   Chlamydia Pneumoniae Not Detected   Mycoplasma Pneumoniae Not Detected            06/02/2023 1931 06/03/2023 1007 Legionella pneumophila antigen urine [[id number]]   Urine, Midstream    Final result Component Value   Legionella pneumophila serogroup 1 urinary antigen Negative   Suggests no recent or current infection. Infection due to Legionella cannot be ruled out, since other serogroups and species may cause disease, antigen may not be present in urine in early infection, and the level of antigen present in the urine may be below detectable limits of the test.            06/02/2023 1931 06/03/2023 1007 Streptococcus pneumoniae antigen [[id number]]   Urine, Midstream    Final result Component Value   Streptococcus pneumoniae antigen Negative   A negative Streptococcus pneumoniae antigen result does not rule out infection with Streptococcus pneumoniae.            06/02/2023 1912 06/04/2023 0104 Blood Culture Peripheral Blood [[id number]]   Peripheral Blood    Preliminary result Component Value   Culture No growth after 1 day  [P]                06/02/2023 1857 06/03/2023 2216 Blood Culture Peripheral Blood [[id number]]   Peripheral Blood    Preliminary result Component Value   Culture No growth after 1 day  [P]                       RADIOLOGY:    Reviewed  CT Chest Pulmonary Embolism w Contrast    Result Date: 6/2/2023  EXAM: CT CHEST PULMONARY EMBOLISM W CONTRAST LOCATION: [facility] DATE/TIME: 6/2/2023 8:00 PM CDT INDICATION: Cough, fever, myeloma, immunosuppressed, shortness of breath, eval for pneumonia, PE, etc COMPARISON: CT abdomen 02/24/2009 TECHNIQUE: CT chest pulmonary angiogram during arterial  phase injection of IV contrast. Multiplanar reformats and MIP reconstructions were performed. Dose reduction techniques were used. CONTRAST: 100 ml Isovue 370 FINDINGS: ANGIOGRAM CHEST: Pulmonary arteries are normal caliber with no pulmonary emboli. Normal caliber thoracic aorta with no CTA signs of acute aortic syndrome. LUNGS AND PLEURA: Bilateral symmetric peribronchovascular distribution multifocal ground-glass airspace opacity that is mid and upper lung predominant and compatible with nonspecific acute multifocal pneumonitis. A 6 mL right middle lobe nodule is stable  and benign. No pleural effusion. No pneumothorax. MEDIASTINUM/AXILLAE: Heart size is within normal limits, but there is severe three-vessel coronary artery calcification and dense calcification and thickening of the aortic valve leaflets. No pericardial effusion. No lymphadenopathy. CORONARY ARTERY CALCIFICATION: Severe. UPPER ABDOMEN: Normal. MUSCULOSKELETAL: A 3.5 x 3.5 x 3.0 cm destructive soft tissue mass centered in the right side of the T4 vertebral body crosses the T4-T5 interspace to involve the right side of the T5 vertebral body and may also encroach upon the right ventral epidural space. Additional diminutive lytic lesions throughout the visualized bones. Findings consistent with multiple myeloma.     IMPRESSION: 1.  No pulmonary emboli. No signs of acute aortic syndrome. 2.  Nonspecific acute symmetric bilateral multifocal pneumonitis. 3.  Heart size is within normal limits, but there is severe three-vessel coronary artery calcification and dense calcification and thickening of the aortic valve leaflets. 4.  A 3.5 x 3.5 x 3.0 cm destructive soft tissue mass centered in the right side of the T4 vertebral body crosses the T4-T5 interspace to involve the right side of the T5 vertebral body and may also encroach upon the right ventral epidural space. Additional diminutive lytic lesions throughout the visualized bones. Findings consistent  with multiple myeloma.

## 2023-06-04 NOTE — PROGRESS NOTES
Prn tylenol dose lowered to avoid doses >4g of tylenol daily as pt is also receiving norco. Concern was raised by the bedside RN.  Evie Taylor MD

## 2023-06-04 NOTE — PROGRESS NOTES
[facility] Pulmonary/Critical Care Consult Team Note    Billy Carroll,  1955, MRN [id number]  Admitting Dx: Pneumonia of both lungs due to infectious organism, unspecified part of lung [J18.9]  Date / Time of Admission:  2023  6:41 PM    He is feeling worse today then yesterday  Exhausted  Did not sleep well  He has overall acheness  His cough is dry and bothersome    ID Studies  West Nile - pending  Erhlichia  - pending  Lyme - pending  Brucella - pending  Parasite - pending  Crypto - neg  Blasto - pending  Fungal - pending  Babesia - neg  Histo - pending  Strep - neg  Legionella- neg  resp viral panel - neg    We discussed bronch for tomorrow, around 12-1pm, informed RN, consent signed, discussed with RT      Assessment/Plan: Billy Carroll is a 67 year old male with PMHx of IgG multiple myeloma on Revlimid, Velcade and dexamethsone started on 5/10/2023 who started having a cough and was started on Abx with no improvement and a abnormal CXR told to come to the ED for evaluation    Bronchitis/Pneumonitis vs Drug Reaction  - Given pt is immunosuppressed fungal, viral and PNA panel  - agree with course of levaquin  - He was only on decadron once a week related to his injections  - we discussed possibility of drug reaction and likely matthews unless he has a dramatic improvement for a bronch on Monday  - He has no WBC count, neg Procalcitonin, no further fevers after the one, and no improvement with Abx   - add cough meds     Medical Care Time excluding procedures and family discussions greater than: 45 Minutes    Risk Factors Present on Admission:  Clinically Significant Risk Factors Present on Admission           # Hypercalcemia: Highest Ca = 10.2 mg/dL in last 2 days, will monitor as appropriate    # Hypoalbuminemia: Lowest albumin = 2.5 g/dL at 6/3/2023  8:39 AM, will monitor as appropriate   # Drug Induced Platelet Defect: home medication list includes an antiplatelet medication   # Hypertension: Home  "medication list includes antihypertensive(s)      # Overweight: Estimated body mass index is 28.02 kg/m  as calculated from the following:    Height as of this encounter: 1.778 m (5' 10\").    Weight as of this encounter: 88.6 kg (195 lb 4.8 oz).         # Anemia: based on hgb <11           Maru Malagon DO  Pulmonary and Critical Care Attending  pgr [phone]    Allergies   Allergen Reactions     Codeine Nausea       Meds: See MAR    Physical Exam:  BP (!) 150/71 (BP Location: Right arm)   Pulse 115   Temp 99.3  F (37.4  C) (Oral)   Resp 16   Ht 1.778 m (5' 10\")   Wt 88.6 kg (195 lb 4.8 oz)   SpO2 94%   BMI 28.02 kg/m    Intake/Output this shift:  I/O this shift:  In: 1830 [I.V.:1830]  Out: -   GEN: sitting up in bed, NAD  HEENT: MMM, large collar size  CVS: regular rhythm, no murmurs  RESP: faint crackles maría, no wheezing  ABD: Soft, No abdominal pain with palpation, no guarding, no rigidity  EXT: Warm, well perfused, no edema  NEURO: moving all extremities, nonfocal  PSYCH: pleasant    Pertinent Labs: Latest lab results in EHR personally reviewed.   CMP  Recent Labs   Lab 06/04/23  0739 06/03/23  0839 06/02/23  1857   * 136 137   POTASSIUM 4.1 4.0 3.8   CHLORIDE 104 101 101   CO2 21* 26 25   ANIONGAP 10 9 11   * 189* 152*   BUN 15 20 24*   CR 0.87 0.80 0.82   GFRESTIMATED >90 >90 >90   SESAR 8.7 9.0 10.2   PROTTOTAL 6.7 6.5 7.4   ALBUMIN 2.6* 2.5* 2.9*   BILITOTAL 0.4 0.3 0.5   ALKPHOS 135* 132* 155*   AST 28 19 32   ALT 65* 67* 95*     CBC  Recent Labs   Lab 06/04/23  0739 06/03/23  0839 06/02/23  1857   WBC 7.4 6.4 8.3   RBC 2.57* 2.39* 2.52*   HGB 8.7* 8.0* 8.5*   HCT 26.6* 24.4* 25.8*   * 102* 102*   MCH 33.9* 33.5* 33.7*   MCHC 32.7 32.8 32.9   RDW 16.2* 15.9* 15.9*    303 363     INRNo lab results found in last 7 days.  Arterial Blood GasNo lab results found in last 7 days.    Cultures: personally reviewed.   7-Day Micro Results    Collected Updated Procedure Result " Status    06/03/2023 1345 06/04/2023 1220 Blood parasitology exam [[id number]]    Peripheral Blood    Preliminary result Component Value   Babesia Negative P   Anaplasma Negative P   If Anaplasma (Ehrlichia) infection is highly suspected, consider obtaining blood PCR specific assay.  Light microscopy does not exclude the diagnosis.    Ehrlichia Negative P          06/03/2023 0959 06/03/2023 1021 1,3 Beta D glucan fungitell [[id number]]   Blood from Arm, Right    In process Component Value   No component results          06/03/2023 0959 06/03/2023 1021 Blastomyces antibody ID [[id number]]   Blood from Arm, Right    In process Component Value   No component results          06/03/2023 0959 06/03/2023 1021 Fungal Antibodies [[id number]]   Blood from Arm, Right    In process Component Value   No component results          06/03/2023 0959 06/03/2023 1021 Blastomyces Agn Quant EIA Blood [[id number]]   Blood from Arm, Right    In process Component Value   No component results          06/03/2023 0959 06/03/2023 1543 Cryptococcus antigen [[id number]]   Blood from Arm, Right    Final result Component Value   Cryptococcal Antigen Negative   Cryptococcal titer interpretation N/A          06/03/2023 0839 06/03/2023 0940 Histoplasma Capsulatum Antigen [[id number]]   Blood from Arm, Right    In process Component Value   No component results          06/03/2023 0032 06/03/2023 0212 Asymptomatic COVID-19 Virus (Coronavirus) by PCR Nasopharyngeal [[id number]]    Swab from Nasopharyngeal    Final result Component Value   SARS CoV2 PCR Negative   NEGATIVE: SARS-CoV-2 (COVID-19) RNA not detected, presumed negative.          06/03/2023 0032 06/03/2023 1209 Respiratory Panel PCR [[id number]]    Swab from Nasopharyngeal    Final result Component Value   Adenovirus Not Detected   Coronavirus Not Detected   This test detects Coronavirus 229E, HKU1, NL63 and OC43 but does not distinguish between them. It does not detect MERS  ( Respiratory Syndrome), SARS (Severe Acute Respiratory Syndrome) or 2019-nCoV (Novel 2019) Coronavirus.   Human Metapneumovirus Not Detected   Human Rhin/Enterovirus Not Detected   Influenza A Not Detected   Influenza A, H1 Not Detected   Influenza A 2009 H1N1 Not Detected   Influenza A, H3 Not Detected   Influenza B Not Detected   Parainfluenza Virus 1 Not Detected   Parainfluenza Virus 2 Not Detected   Parainfluenza Virus 3 Not Detected   Parainfluenza Virus 4 Not Detected   Respiratory Syncytial Virus A Not Detected   Respiratory Syncytial Virus B Not Detected   Chlamydia Pneumoniae Not Detected   Mycoplasma Pneumoniae Not Detected          06/02/2023 1931 06/03/2023 1007 Legionella pneumophila antigen urine [[id number]]   Urine, Midstream    Final result Component Value   Legionella pneumophila serogroup 1 urinary antigen Negative   Suggests no recent or current infection. Infection due to Legionella cannot be ruled out, since other serogroups and species may cause disease, antigen may not be present in urine in early infection, and the level of antigen present in the urine may be below detectable limits of the test.          06/02/2023 1931 06/03/2023 1007 Streptococcus pneumoniae antigen [[id number]]   Urine, Midstream    Final result Component Value   Streptococcus pneumoniae antigen Negative   A negative Streptococcus pneumoniae antigen result does not rule out infection with Streptococcus pneumoniae.          06/02/2023 1912 06/04/2023 0104 Blood Culture Peripheral Blood [[id number]]   Peripheral Blood    Preliminary result Component Value   Culture No growth after 1 day P             06/02/2023 1857 06/03/2023 2216 Blood Culture Peripheral Blood [[id number]]   Peripheral Blood    Preliminary result Component Value   Culture No growth after 1 day P          Imaging: personally reviewed.   Results for orders placed or performed during the hospital encounter of 06/02/23   CT Chest  Pulmonary Embolism w Contrast    Narrative    EXAM: CT CHEST PULMONARY EMBOLISM W CONTRAST  LOCATION: [facility]  DATE/TIME: 6/2/2023 8:00 PM CDT    INDICATION: Cough, fever, myeloma, immunosuppressed, shortness of breath, eval for pneumonia, PE, etc  COMPARISON: CT abdomen 02/24/2009  TECHNIQUE: CT chest pulmonary angiogram during arterial phase injection of IV contrast. Multiplanar reformats and MIP reconstructions were performed. Dose reduction techniques were used.   CONTRAST: 100 ml Isovue 370    FINDINGS:  ANGIOGRAM CHEST: Pulmonary arteries are normal caliber with no pulmonary emboli. Normal caliber thoracic aorta with no CTA signs of acute aortic syndrome.    LUNGS AND PLEURA: Bilateral symmetric peribronchovascular distribution multifocal ground-glass airspace opacity that is mid and upper lung predominant and compatible with nonspecific acute multifocal pneumonitis. A 6 mL right middle lobe nodule is stable   and benign. No pleural effusion. No pneumothorax.    MEDIASTINUM/AXILLAE: Heart size is within normal limits, but there is severe three-vessel coronary artery calcification and dense calcification and thickening of the aortic valve leaflets. No pericardial effusion. No lymphadenopathy.    CORONARY ARTERY CALCIFICATION: Severe.    UPPER ABDOMEN: Normal.    MUSCULOSKELETAL: A 3.5 x 3.5 x 3.0 cm destructive soft tissue mass centered in the right side of the T4 vertebral body crosses the T4-T5 interspace to involve the right side of the T5 vertebral body and may also encroach upon the right ventral epidural   space. Additional diminutive lytic lesions throughout the visualized bones. Findings consistent with multiple myeloma.      Impression    IMPRESSION:  1.  No pulmonary emboli. No signs of acute aortic syndrome.  2.  Nonspecific acute symmetric bilateral multifocal pneumonitis.  3.  Heart size is within normal limits, but there is severe three-vessel coronary artery  calcification and dense calcification and thickening of the aortic valve leaflets.   4.  A 3.5 x 3.5 x 3.0 cm destructive soft tissue mass centered in the right side of the T4 vertebral body crosses the T4-T5 interspace to involve the right side of the T5 vertebral body and may also encroach upon the right ventral epidural space.   Additional diminutive lytic lesions throughout the visualized bones. Findings consistent with multiple myeloma.       Patient Active Problem List   Diagnosis     Pneumonia of both lungs due to infectious organism, unspecified part of lung     Multiple myeloma (H)     Mass of thoracic vertebra     Acute hypoxemic respiratory failure (H)     Immunosuppressed due to chemotherapy (H)         Surgical History  He  has no past surgical history on file.    Family History  Reviewed, and family history is not on file.    Social History  Reviewed, and he  reports that he has never smoked. He has never used smokeless tobacco.    Allergies  Allergies   Allergen Reactions     Codeine Nausea              Maru Malagon,   Pulmonary and Critical Care Attending  [facility] [phone]    Securely message with the Vocera Web Console (learn more here)

## 2023-06-04 NOTE — PROGRESS NOTES
Report given to RN assuming care of patient at 1700.     Patient transported to room 3112 at 1845. All belongings brought with patient.

## 2023-06-04 NOTE — PROGRESS NOTES
[facility] MEDICINE  PROGRESS NOTE     Code Status: Full Code       Identification/Summary:   Billy Carroll is a 67-year-old male immunosuppressed with history of IgG multiple myeloma, 3 cm mass on T4, on treatment with bortezomib, Velcade and Revlimid managed by [place] [facility].  Recent trip to his cabin, outdoors and around multiple people.  5/30 developed cough and fever.  5/31 started Levaquin with no improvement.  6/1 saw some at for thoracic MRI.  6/2 admitted.  CT chest revealed acute symmetric bilateral multifocal pneumonitis.  ID, oncology and pulmonary consulted.  6/4 requiring oxygen after ambulation.  Planning on bronchoscopy 6/5.  Consult [facility] spine.    Assessment and Plan:    Multifocal pneumonitis.  Immunosuppressed   Acute hypoxemic respiratory failure  At admission not requiring supplemental oxygen.    CT chest shows no pulmonary emboli.  There is acute symmetric bilateral multifocal pneumonitis.  CRP 11.3.  Lactic acid normal at 1.4.  Procalcitonin normal.  Appreciate pulmonology and ID recommendations.  Continue fluconazole 200 mg daily, meropenem 1 g every 8 hours, Bactrim DS 2 tabs twice daily and vancomycin pharmacy to dose.  Multiple respiratory studies pending.  6/4 developed hypoxia after ambulating to and from the bathroom.  Recovered with 2 L.  Anticipating bronchoscopy on 6/5.  Multiple myeloma  Monoclonal gammopathy  On bortezomib infusions every Thursday.  Revlimid.  Started 14-day cycle on 5/31.   Holding treatment per oncology and ID recommendations.  Continue PTA medication: Acyclovir 400 mg twice daily.  3 cm mass on T4  6/1 had MRI thoracic spine done at [facility].  CT scan indicates soft tissue mass right side T4 vertebral body, additional lytic lesions throughout visualized bones consistent with multiple myeloma.  Order scheduled Tylenol 975 mg 3 times daily with meals.  As needed Norco or oral Tylenol at bedtime.  Was  supposed to follow-up with [facility] on 6/5 as an outpatient.  We will consult them to assess while in the hospital.  Mild elevation in liver transaminase  ALT 95--> 67--> 65.  Likely secondary to cancer.  No further testing indicated.  Macrocytic anemia  Hemoglobin 8.5, .  Platelets 363.  Repeat hemoglobin 8--> 8.7.  No further checks at this time.  Essential hypertension   Continue home hydrochlorothiazide 12.5 mg daily.  Losartan 100 mg daily.  Hypercholesterolemia  Continue home simvastatin 40 mg daily.  ASCVD  Aortic valve calcifications  CT scan showed severe three-vessel coronary artery calcification and dense calcifications and thickening of the aortic valves.  March 2023 stress echo shows EF 55 to 60%.  Calcified aortic valve noted.  No evidence of stress-induced ischemia.  Follow-up as outpatient.    Anticoagulation   ASA 81 mg daily.  Encouraging ambulation and SCDs.    COVID-19 PCR negative from 6/3/2023  Noted.  Fluids: Saline lock  Pain meds: Scheduled Tylenol with meals and bedtime as needed Norco versus Tylenol  Therapy: N/A  Gutierrez:Not present  Lines: None       Current Diet  Orders Placed This Encounter      Regular Diet Adult    Supplements  None    Barriers to Discharge: Hypoxia, persistent fevers, pending bronchoscopy    Disposition: Likely here at least 2 more days    Addendum 11:11 AM    Spoke with Dr. Lanec Samayoa from [facility].  Patient had been seen previously x1 at [facility] for a lumbar MRI.  Findings showed right L5 nerve impingement.  Reviewed CT scan findings of T4 lesion with Dr. Samayoa.  He recommended follow-up if necessary with [facility].  Would not recommend a LYRIC at this time for the L5 impingement but could follow-up with [facility] after his more acute medical issues are addressed.    Franco Lora MD  6/4/2023  11:11 AM    Clinically Significant Risk Factors Present on Admission           # Hypercalcemia: Highest Ca = 10.2 mg/dL in last 2 days,  "will monitor as appropriate    # Hypoalbuminemia: Lowest albumin = 2.5 g/dL at 6/3/2023  8:39 AM, will monitor as appropriate   # Drug Induced Platelet Defect: home medication list includes an antiplatelet medication   # Hypertension: Home medication list includes antihypertensive(s)      # Overweight: Estimated body mass index is 28.02 kg/m  as calculated from the following:    Height as of this encounter: 1.778 m (5' 10\").    Weight as of this encounter: 88.6 kg (195 lb 4.8 oz).            Interval History/Subjective:  This morning after ambulating to the bathroom on room air patient desaturated into the 70s and was quite short of breath with coughing.  Rapidly recovered with just 2 L.  Still running a slight temperature 99.3.  Back pain states is routinely under control with extra strength Tylenol with meals and then either Norco or Tylenol at bedtime.  Like to have his pain meds adjusted to reflect this.  No nausea or vomiting.  Good oral intake.  Questions answered to verbalized satisfaction.      Last 24H PRN:      acetaminophen (TYLENOL) tablet 650 mg, 650 mg at 06/04/23 0452 **OR** [DISCONTINUED] acetaminophen (TYLENOL) Suppository 650 mg     HYDROcodone-acetaminophen (NORCO) 5-325 MG per tablet 1 tablet, 1 tablet at 06/04/23 0159     HYDROcodone-acetaminophen (NORCO) 5-325 MG per tablet 1 tablet, 1 tablet at 06/03/23 2050    Physical Exam/Objective:  Temp:  [98.1  F (36.7  C)-99.3  F (37.4  C)] 99.3  F (37.4  C)  Pulse:  [100-115] 115  Resp:  [16-18] 16  BP: (134-160)/(71-87) 150/71  SpO2:  [89 %-96 %] 92 %  Wt Readings from Last 4 Encounters:   06/04/23 88.6 kg (195 lb 4.8 oz)     Body mass index is 28.02 kg/m .    Constitutional: awake, alert, cooperative, no apparent distress, and appears stated age  ENT: Normocephalic, without obvious abnormality, atraumatic, external ears without lesions, oral pharynx with moist mucous membranes, tonsils without erythema or exudates, gums normal and good " dentition.  Respiratory: No increased work of breathing, good air exchange, clear to auscultation bilaterally, no crackles or wheezing  Cardiovascular: Tachycardic regular rhythm  GI: No scars, normal bowel sounds, soft, non-distended, non-tender, no masses palpated, no hepatosplenomegally  Skin: normal skin color, texture, turgor, no redness, warmth, or swelling, and no rashes  Musculoskeletal: There is no redness, warmth, or swelling of the joints.  Motor strength is 5 out of 5 all extremities bilaterally.  Tone is normal. no lower extremity pitting edema present  Neurologic: Cranial nerves II-XII are grossly intact. Sensory:  Sensory intact  Neuropsychiatric: General: normal, calm and normal eye contact Level of consciousness: alert / normal Affect: normal Orientation: oriented to self, place, time and situation Memory and insight: normal, memory for past and recent events intact and thought process normal      Medications:   Personally Reviewed.  Medications     sodium chloride 75 mL/hr at 06/03/23 1851       acyclovir  400 mg Oral BID     aspirin  81 mg Oral Daily     doxycycline hyclate  100 mg Oral Q12H [facility] (08/20)     fluconazole  200 mg Oral Daily     hydrochlorothiazide  12.5 mg Oral Daily     levofloxacin  500 mg Oral Daily     losartan  100 mg Oral Daily     meropenem  1 g Intravenous Q8H     simvastatin  40 mg Oral Daily     sulfamethoxazole-trimethoprim  2 tablet Oral BID     vancomycin  1,500 mg Intravenous Q18H       Data reviewed today: I personally reviewed all new medications, labs, imaging/diagnostics reports over the past 24 hours. Pertinent findings include:    Imaging:   No results found for this or any previous visit (from the past 24 hour(s)).    Labs:  CT Chest Pulmonary Embolism w Contrast   Final Result   IMPRESSION:   1.  No pulmonary emboli. No signs of acute aortic syndrome.   2.  Nonspecific acute symmetric bilateral multifocal pneumonitis.   3.  Heart size is within normal limits,  but there is severe three-vessel coronary artery calcification and dense calcification and thickening of the aortic valve leaflets.    4.  A 3.5 x 3.5 x 3.0 cm destructive soft tissue mass centered in the right side of the T4 vertebral body crosses the T4-T5 interspace to involve the right side of the T5 vertebral body and may also encroach upon the right ventral epidural space.    Additional diminutive lytic lesions throughout the visualized bones. Findings consistent with multiple myeloma.        Recent Results (from the past 24 hour(s))   Cryptococcus antigen    Collection Time: 06/03/23  9:59 AM    Specimen: Arm, Right; Blood   Result Value Ref Range    Cryptococcal Antigen Negative Negative    Cryptococcal titer interpretation N/A    Basic metabolic panel    Collection Time: 06/04/23  7:39 AM   Result Value Ref Range    Sodium 135 (L) 136 - 145 mmol/L    Potassium 4.1 3.5 - 5.0 mmol/L    Chloride 104 98 - 107 mmol/L    Carbon Dioxide (CO2) 21 (L) 22 - 31 mmol/L    Anion Gap 10 5 - 18 mmol/L    Urea Nitrogen 15 8 - 22 mg/dL    Creatinine 0.87 0.70 - 1.30 mg/dL    Calcium 8.7 8.5 - 10.5 mg/dL    Glucose 134 (H) 70 - 125 mg/dL    GFR Estimate >90 >60 mL/min/1.73m2   CBC with platelets    Collection Time: 06/04/23  7:39 AM   Result Value Ref Range    WBC Count 7.4 4.0 - 11.0 10e3/uL    RBC Count 2.57 (L) 4.40 - 5.90 10e6/uL    Hemoglobin 8.7 (L) 13.3 - 17.7 g/dL    Hematocrit 26.6 (L) 40.0 - 53.0 %     (H) 78 - 100 fL    MCH 33.9 (H) 26.5 - 33.0 pg    MCHC 32.7 31.5 - 36.5 g/dL    RDW 16.2 (H) 10.0 - 15.0 %    Platelet Count 305 150 - 450 10e3/uL   Hepatic function panel    Collection Time: 06/04/23  7:39 AM   Result Value Ref Range    Bilirubin Total 0.4 0.0 - 1.0 mg/dL    Bilirubin Direct 0.1 <=0.5 mg/dL    Protein Total 6.7 6.0 - 8.0 g/dL    Albumin 2.6 (L) 3.5 - 5.0 g/dL    Alkaline Phosphatase 135 (H) 45 - 120 U/L    AST 28 0 - 40 U/L    ALT 65 (H) 0 - 45 U/L       Pending Labs:  Unresulted Labs Ordered  in the Past 30 Days of this Admission     Date and Time Order Name Status Description    6/3/2023  2:31 PM Arbovirus Antibodies, IgG and IgM, Serum In process     6/3/2023  2:31 PM West nile virus RNA by PCR In process     6/3/2023  2:31 PM West Nile Virus Antibody IgG IgM In process     6/3/2023  2:31 PM Hypersensitivity Pneumonitis 2 In process     6/3/2023  2:31 PM Hypersensitivity pneumonitis In process     6/3/2023  1:32 PM Lyme Disease Total Abs Bld with Reflex to Confirm CLIA In process     6/3/2023  1:32 PM Lyme disease DNA detection by PCR In process     6/3/2023  1:32 PM Brucella species antibody In process     6/3/2023  1:32 PM Babesia Species by PCR In process     6/3/2023  1:32 PM Blood parasitology exam In process     6/3/2023  1:32 PM Ehrlichia Anaplasma Sp by PCR In process     6/3/2023  1:32 PM Anaplasma phagocytoph Antibodies IgG IgM In process     6/3/2023  9:22 AM Blastomyces Agn Quant EIA Blood In process     6/3/2023  9:22 AM Fungal Antibodies In process     6/3/2023  9:22 AM Blastomyces antibody ID In process     6/3/2023  9:22 AM Histoplasma Capsulatum Antigen In process     6/3/2023  9:22 AM 1,3 Beta D glucan fungitell In process     6/2/2023  6:32 PM Blood Culture Peripheral Blood Preliminary     6/2/2023  6:32 PM Blood Culture Peripheral Blood Preliminary             Franco Lora MD  [facility] Medicine  [facility]  Phone: #[phone]

## 2023-06-04 NOTE — PROGRESS NOTES
"PRIMARY DIAGNOSIS: \"GENERIC\" NURSING  OUTPATIENT/OBSERVATION GOALS TO BE MET BEFORE DISCHARGE:  1. ADLs back to baseline:yes    2. Activity and level of assistance: Independent. Ambulating in room.    3. Pain status:c/o back pain 5/10. Was given tylenol and norco for pain control.    4. Return to near baseline physical activity: Yes      Discharge Planner Nurse   Safe discharge environment identified: no  Barriers to discharge: Yes       Entered by: Olivia Bartlett RN 06/03/2023 9:18 PM   VSS except intermittently tachy <105. O2 has been satting >93% on RA. Afebrile. PIV running MF and intermittent abx. Reported GILLETTE and has frequent dry cough. Reported having BM x1. Will continue to monitor.   Please review provider order for any additional goals.   Nurse to notify provider when observation goals have been met and patient is ready for discharge.  "

## 2023-06-04 NOTE — PROGRESS NOTES
Patient ambulated to bathroom on room air, patient had self disconnected pulse ox while using the restroom. This RN entered room to give patient AM medications. Upon returning to bed and placing pulse ox, patient O2 saturation was 76%. Patient placed on 4L NC. O2 sat increased to 92%. Patient have continuous coughing spell after he was back in bed. Patient weaned down to 1L and O2 sat 91%.      After taking medications patient requested to sit in chair. This RN assisted patient with ambulating to chair. This RN increased O2 flow to 4L during ambulation, patient O2 saturation at 87% on 4L. Patient placed in chair and weaned down to 2L with O2 sat at 90%.

## 2023-06-04 NOTE — PROGRESS NOTES
"Patient states he is feeling \"much better\" than he was in the AM. Patient has not had any coughing spells in the afternoon. Patient is ambulating on 4L with O2 sat at 92% and at rest is on 1L with O2 sat at 93%. Patient is able to ambulate and does not have coughing spell after ambulation.   "

## 2023-06-04 NOTE — PLAN OF CARE
Problem: Gas Exchange Impaired  Goal: Optimal Gas Exchange  Outcome: Progressing     Problem: Pain Acute  Goal: Optimal Pain Control and Function  Outcome: Progressing  Intervention: Develop Pain Management Plan  Recent Flowsheet Documentation  Taken 6/4/2023 0159 by Olivia Bartlett RN  Pain Management Interventions: medication (see MAR)  Taken 6/3/2023 2341 by Olivia Bartlett RN  Pain Management Interventions: repositioned  Taken 6/3/2023 2051 by Olivia Bartlett RN  Pain Management Interventions: medication (see MAR)  Intervention: Prevent or Manage Pain  Recent Flowsheet Documentation  Taken 6/3/2023 2329 by Olivia Bartlett RN  Medication Review/Management: medications reviewed  Taken 6/3/2023 2053 by Olivia Bartlett RN  Medication Review/Management: medications reviewed     Goal Outcome Evaluation:  VSS except HTN within parameters, is on scheduled BP meds, Intermittently tachy <105. Afebrile. Patient did complain of GILLETTE when up OOB. Frequent dry cough. Educated patient on IS and encouraged use. O2 saturation was <92% late in the shift when sleeping, patient was put on 1L of O2 NC, O2 saturation increased to >93%. C/O back pain and headache,Tylenol and norco given for pain control. A&Ox4. PIV running NS @ 75ml/hr w/ intermittent abx. Up independently in room. Voiding spontaneously. X1 BM this shift. Continue w/ POC.

## 2023-06-04 NOTE — UTILIZATION REVIEW
Admission Status; Secondary Review Determination   Under the authority of the Utilization Management Committee, the utilization review process indicated a secondary review on Billy Carroll. The review outcome is based on review of the medical records, discussions with staff, and applying clinical experience noted on the date of the review.   (x) Inpatient Status Appropriate - This patient's medical care is consistent with medical management for inpatient care and reasonable inpatient medical practice.     RATIONALE FOR DETERMINATION   Billy Carroll is a 67 yr old male immunosuppressed with IgG multiple myeloma on bortezomib, velcade and revlimid who presented to ED on 6/1/23 with cough.  Symptoms since May 30.  On IV Levaquin but ongoing.  In ED noted multifocal pneumonitis.  Possible infection given fever x 1 but also possible drug reaction.  IV fluconazole, meropenem q8 and bactrim per ID recommendations.  Pulmonary consulted as well as Oncology.  Planning bronch to further identify etiology.  Patient also with hypoxia intermittently--mostly with exertion.  Currently 2L NC.    At the time of admission with the information available to the attending physician more than 2 nights Hospital complex care was anticipated, based on patient risk of adverse outcome if treated as outpatient and complex care required. Inpatient admission is appropriate based on the Medicare guidelines.   The information on this document is developed by the utilization review team in order for the business office to ensure compliance. This only denotes the appropriateness of proper admission status and does not reflect the quality of care rendered.   The definitions of Inpatient Status and Observation Status used in making the determination above are those provided in the CMS Coverage Manual, Chapter 1 and Chapter 6, section 70.4.   Sincerely,   Brittany Ayala MD  Utilization Review  Physician Advisor  [facility]

## 2023-06-04 NOTE — PROGRESS NOTES
"PRIMARY DIAGNOSIS: \"GENERIC\" NURSING  OUTPATIENT/OBSERVATION GOALS TO BE MET BEFORE DISCHARGE:  1. ADLs back to baseline:yes    Activity and level of assistance: Independent, ambulates to bathroom     2. Pain status: c/o back pain 5/10 norco given for pain control.     3. Return to near baseline physical activity: Yes      Discharge Planner Nurse   Safe discharge environment identified:No   Barriers to discharge:Yes        Entered by: Olivia Bartlett RN 06/04/2023 2:23 AM      VSS. Afebrile. O2 sats >93% on RA. A&Ox4. Ambulating to bathroom. Voiding spontaneously.   Patient is now resting. Will continue to monitor.   Please review provider order for any additional goals.   Nurse to notify provider when observation goals have been met and patient is ready for discharge.  "

## 2023-06-04 NOTE — PROVIDER NOTIFICATION
M.N. 2108- Patient has order for 975mg Tylenol and wants to take Norco as well. Can the Tylenol dose be lowered to 650mg? Thank you. Olivia HANKS    Addendum: Spoke w/ Dr. Taylor and was told order for tylenol 975mg will be modified.

## 2023-06-05 ENCOUNTER — OFFICE VISIT (OUTPATIENT)
Dept: CARDIOLOGY | Facility: CLINIC | Age: 68
DRG: 193 | End: 2023-06-05
Attending: INTERNAL MEDICINE
Payer: MEDICARE

## 2023-06-05 VITALS
OXYGEN SATURATION: 96 % | DIASTOLIC BLOOD PRESSURE: 61 MMHG | TEMPERATURE: 98.7 F | SYSTOLIC BLOOD PRESSURE: 108 MMHG | RESPIRATION RATE: 23 BRPM | HEART RATE: 102 BPM

## 2023-06-05 PROBLEM — M54.17 LUMBOSACRAL RADICULOPATHY AT L5: Status: ACTIVE | Noted: 2023-06-05

## 2023-06-05 LAB
APPEARANCE FLD: CLEAR
B BURGDOR IGG+IGM SER QL: 0.16
C PNEUM DNA SPEC QL NAA+PROBE: NOT DETECTED
CELL COUNT BODY FLUID SOURCE: NORMAL
COLOR FLD: COLORLESS
CREAT SERPL-MCNC: 0.85 MG/DL (ref 0.7–1.3)
FLUAV H1 2009 PAND RNA SPEC QL NAA+PROBE: NOT DETECTED
FLUAV H1 RNA SPEC QL NAA+PROBE: NOT DETECTED
FLUAV H3 RNA SPEC QL NAA+PROBE: NOT DETECTED
FLUAV RNA SPEC QL NAA+PROBE: NOT DETECTED
FLUBV RNA SPEC QL NAA+PROBE: NOT DETECTED
GFR SERPL CREATININE-BSD FRML MDRD: >90 ML/MIN/1.73M2
HADV DNA SPEC QL NAA+PROBE: NOT DETECTED
HCOV PNL SPEC NAA+PROBE: NOT DETECTED
HMPV RNA SPEC QL NAA+PROBE: NOT DETECTED
HPIV1 RNA SPEC QL NAA+PROBE: NOT DETECTED
HPIV2 RNA SPEC QL NAA+PROBE: NOT DETECTED
HPIV3 RNA SPEC QL NAA+PROBE: NOT DETECTED
HPIV4 RNA SPEC QL NAA+PROBE: NOT DETECTED
KOH PREPARATION: NORMAL
KOH PREPARATION: NORMAL
M PNEUMO DNA SPEC QL NAA+PROBE: NOT DETECTED
RSV RNA SPEC QL NAA+PROBE: NOT DETECTED
RSV RNA SPEC QL NAA+PROBE: NOT DETECTED
RV+EV RNA SPEC QL NAA+PROBE: NOT DETECTED
VANCOMYCIN SERPL-MCNC: 8.6 MG/L
WBC # FLD AUTO: 21 /UL

## 2023-06-05 PROCEDURE — 87081 CULTURE SCREEN ONLY: CPT | Performed by: INTERNAL MEDICINE

## 2023-06-05 PROCEDURE — 250N000013 HC RX MED GY IP 250 OP 250 PS 637: Performed by: INTERNAL MEDICINE

## 2023-06-05 PROCEDURE — 999N000289 HC STATISTIC BRONCHOSCOPY ASST

## 2023-06-05 PROCEDURE — 87486 CHLMYD PNEUM DNA AMP PROBE: CPT | Performed by: INTERNAL MEDICINE

## 2023-06-05 PROCEDURE — 87102 FUNGUS ISOLATION CULTURE: CPT | Performed by: INTERNAL MEDICINE

## 2023-06-05 PROCEDURE — 87633 RESP VIRUS 12-25 TARGETS: CPT | Performed by: INTERNAL MEDICINE

## 2023-06-05 PROCEDURE — 250N000013 HC RX MED GY IP 250 OP 250 PS 637: Performed by: FAMILY MEDICINE

## 2023-06-05 PROCEDURE — 0B9D8ZX DRAINAGE OF RIGHT MIDDLE LUNG LOBE, VIA NATURAL OR ARTIFICIAL OPENING ENDOSCOPIC, DIAGNOSTIC: ICD-10-PCS | Performed by: INTERNAL MEDICINE

## 2023-06-05 PROCEDURE — 82565 ASSAY OF CREATININE: CPT | Performed by: INTERNAL MEDICINE

## 2023-06-05 PROCEDURE — 250N000011 HC RX IP 250 OP 636: Performed by: INTERNAL MEDICINE

## 2023-06-05 PROCEDURE — 88305 TISSUE EXAM BY PATHOLOGIST: CPT | Mod: TC | Performed by: INTERNAL MEDICINE

## 2023-06-05 PROCEDURE — 31624 DX BRONCHOSCOPE/LAVAGE: CPT

## 2023-06-05 PROCEDURE — 87106 FUNGI IDENTIFICATION YEAST: CPT | Performed by: INTERNAL MEDICINE

## 2023-06-05 PROCEDURE — 99222 1ST HOSP IP/OBS MODERATE 55: CPT | Performed by: RADIOLOGY

## 2023-06-05 PROCEDURE — 89051 BODY FLUID CELL COUNT: CPT | Performed by: INTERNAL MEDICINE

## 2023-06-05 PROCEDURE — 80202 ASSAY OF VANCOMYCIN: CPT | Performed by: FAMILY MEDICINE

## 2023-06-05 PROCEDURE — 99233 SBSQ HOSP IP/OBS HIGH 50: CPT | Performed by: STUDENT IN AN ORGANIZED HEALTH CARE EDUCATION/TRAINING PROGRAM

## 2023-06-05 PROCEDURE — 99232 SBSQ HOSP IP/OBS MODERATE 35: CPT | Performed by: FAMILY MEDICINE

## 2023-06-05 PROCEDURE — 999N000157 HC STATISTIC RCP TIME EA 10 MIN

## 2023-06-05 PROCEDURE — G0500 MOD SEDAT ENDO SERVICE >5YRS: HCPCS

## 2023-06-05 PROCEDURE — 87210 SMEAR WET MOUNT SALINE/INK: CPT | Performed by: INTERNAL MEDICINE

## 2023-06-05 PROCEDURE — 87798 DETECT AGENT NOS DNA AMP: CPT | Performed by: INTERNAL MEDICINE

## 2023-06-05 PROCEDURE — 87206 SMEAR FLUORESCENT/ACID STAI: CPT | Performed by: INTERNAL MEDICINE

## 2023-06-05 PROCEDURE — 258N000003 HC RX IP 258 OP 636: Performed by: INTERNAL MEDICINE

## 2023-06-05 PROCEDURE — 250N000009 HC RX 250: Performed by: INTERNAL MEDICINE

## 2023-06-05 PROCEDURE — 258N000003 HC RX IP 258 OP 636: Performed by: STUDENT IN AN ORGANIZED HEALTH CARE EDUCATION/TRAINING PROGRAM

## 2023-06-05 PROCEDURE — 250N000011 HC RX IP 250 OP 636: Performed by: STUDENT IN AN ORGANIZED HEALTH CARE EDUCATION/TRAINING PROGRAM

## 2023-06-05 PROCEDURE — 87116 MYCOBACTERIA CULTURE: CPT | Performed by: INTERNAL MEDICINE

## 2023-06-05 PROCEDURE — 31624 DX BRONCHOSCOPE/LAVAGE: CPT | Performed by: INTERNAL MEDICINE

## 2023-06-05 PROCEDURE — 36415 COLL VENOUS BLD VENIPUNCTURE: CPT | Performed by: INTERNAL MEDICINE

## 2023-06-05 PROCEDURE — 120N000001 HC R&B MED SURG/OB

## 2023-06-05 RX ORDER — SODIUM CHLORIDE 9 MG/ML
INJECTION, SOLUTION INTRAVENOUS CONTINUOUS PRN
Status: SHIPPED | OUTPATIENT
Start: 2023-06-05

## 2023-06-05 RX ORDER — LIDOCAINE HYDROCHLORIDE 20 MG/ML
SOLUTION OROPHARYNGEAL PRN
Status: SHIPPED | OUTPATIENT
Start: 2023-06-05

## 2023-06-05 RX ORDER — FENTANYL CITRATE 50 UG/ML
INJECTION, SOLUTION INTRAMUSCULAR; INTRAVENOUS PRN
Status: SHIPPED | OUTPATIENT
Start: 2023-06-05

## 2023-06-05 RX ORDER — IBUPROFEN 200 MG
200-400 TABLET ORAL EVERY 6 HOURS PRN
Status: DISCONTINUED | OUTPATIENT
Start: 2023-06-05 | End: 2023-06-11 | Stop reason: HOSPADM

## 2023-06-05 RX ORDER — LIDOCAINE HYDROCHLORIDE 10 MG/ML
INJECTION, SOLUTION INFILTRATION; PERINEURAL PRN
Status: SHIPPED | OUTPATIENT
Start: 2023-06-05

## 2023-06-05 RX ORDER — LIDOCAINE HYDROCHLORIDE 40 MG/ML
INJECTION, SOLUTION RETROBULBAR PRN
Status: SHIPPED | OUTPATIENT
Start: 2023-06-05

## 2023-06-05 RX ADMIN — MEROPENEM 1 G: 1 INJECTION, POWDER, FOR SOLUTION INTRAVENOUS at 10:17

## 2023-06-05 RX ADMIN — MEROPENEM 1 G: 1 INJECTION, POWDER, FOR SOLUTION INTRAVENOUS at 19:26

## 2023-06-05 RX ADMIN — ACETAMINOPHEN 975 MG: 325 TABLET ORAL at 08:24

## 2023-06-05 RX ADMIN — DOXYCYCLINE 100 MG: 100 CAPSULE ORAL at 08:28

## 2023-06-05 RX ADMIN — LIDOCAINE HYDROCHLORIDE 30 ML: 20 SOLUTION ORAL; TOPICAL at 12:31

## 2023-06-05 RX ADMIN — HYDROCHLOROTHIAZIDE 12.5 MG: 12.5 CAPSULE ORAL at 08:25

## 2023-06-05 RX ADMIN — ACETAMINOPHEN 325 MG: 325 TABLET ORAL at 22:22

## 2023-06-05 RX ADMIN — FENTANYL CITRATE 75 MCG: 50 INJECTION, SOLUTION INTRAMUSCULAR; INTRAVENOUS at 12:41

## 2023-06-05 RX ADMIN — SULFAMETHOXAZOLE AND TRIMETHOPRIM 2 TABLET: 800; 160 TABLET ORAL at 08:25

## 2023-06-05 RX ADMIN — Medication 3 MG: at 22:19

## 2023-06-05 RX ADMIN — LOSARTAN POTASSIUM 100 MG: 25 TABLET, FILM COATED ORAL at 08:27

## 2023-06-05 RX ADMIN — ACETAMINOPHEN 975 MG: 325 TABLET ORAL at 16:00

## 2023-06-05 RX ADMIN — ASPIRIN 81 MG: 81 TABLET, COATED ORAL at 08:28

## 2023-06-05 RX ADMIN — MICAFUNGIN 100 MG: 10 INJECTION, POWDER, LYOPHILIZED, FOR SOLUTION INTRAVENOUS at 15:03

## 2023-06-05 RX ADMIN — SIMVASTATIN 40 MG: 20 TABLET, FILM COATED ORAL at 08:28

## 2023-06-05 RX ADMIN — SENNOSIDES AND DOCUSATE SODIUM 1 TABLET: 50; 8.6 TABLET ORAL at 16:07

## 2023-06-05 RX ADMIN — MEROPENEM 1 G: 1 INJECTION, POWDER, FOR SOLUTION INTRAVENOUS at 00:20

## 2023-06-05 RX ADMIN — SODIUM CHLORIDE 500 ML: 0.9 INJECTION, SOLUTION INTRAVENOUS at 12:34

## 2023-06-05 RX ADMIN — FLUCONAZOLE 200 MG: 100 TABLET ORAL at 08:25

## 2023-06-05 RX ADMIN — IBUPROFEN 400 MG: 200 TABLET, FILM COATED ORAL at 19:58

## 2023-06-05 RX ADMIN — SULFAMETHOXAZOLE AND TRIMETHOPRIM 2 TABLET: 800; 160 TABLET ORAL at 22:18

## 2023-06-05 RX ADMIN — LEVOFLOXACIN 500 MG: 500 TABLET, FILM COATED ORAL at 08:28

## 2023-06-05 RX ADMIN — LIDOCAINE HYDROCHLORIDE 4 ML: 40 INJECTION, SOLUTION RETROBULBAR; TOPICAL at 13:00

## 2023-06-05 RX ADMIN — DOXYCYCLINE 100 MG: 100 CAPSULE ORAL at 22:19

## 2023-06-05 RX ADMIN — ACYCLOVIR 400 MG: 200 CAPSULE ORAL at 08:28

## 2023-06-05 RX ADMIN — SODIUM CHLORIDE: 9 INJECTION, SOLUTION INTRAVENOUS at 19:25

## 2023-06-05 RX ADMIN — FENTANYL CITRATE 50 MCG: 50 INJECTION, SOLUTION INTRAMUSCULAR; INTRAVENOUS at 12:47

## 2023-06-05 RX ADMIN — MIDAZOLAM 1 MG: 1 INJECTION INTRAMUSCULAR; INTRAVENOUS at 12:47

## 2023-06-05 RX ADMIN — ACYCLOVIR 400 MG: 200 CAPSULE ORAL at 22:18

## 2023-06-05 RX ADMIN — LIDOCAINE HYDROCHLORIDE 4 ML: 10 INJECTION, SOLUTION INFILTRATION; PERINEURAL at 12:59

## 2023-06-05 RX ADMIN — MIDAZOLAM 2 MG: 1 INJECTION INTRAMUSCULAR; INTRAVENOUS at 12:41

## 2023-06-05 RX ADMIN — FENTANYL CITRATE 50 MCG: 50 INJECTION, SOLUTION INTRAMUSCULAR; INTRAVENOUS at 12:51

## 2023-06-05 ASSESSMENT — ACTIVITIES OF DAILY LIVING (ADL)
ADLS_ACUITY_SCORE: 22

## 2023-06-05 NOTE — PROGRESS NOTES
[facility] MEDICINE  PROGRESS NOTE     Code Status: Full Code       Identification/Summary:   Billy Carroll is a 67-year-old male immunosuppressed with history of IgG multiple myeloma, 3 cm mass on T4, on treatment with bortezomib, Velcade and Revlimid managed by [place] [facility].  Recent trip to his cabin, outdoors and around multiple people.  5/30 developed cough and fever.  5/31 started Levaquin with no improvement.  6/1 saw [facility] for low back pain.  Lumbar MRI showed right L5 nerve impingement.  6/2 admitted [facility] for fevers.  CT chest revealed acute symmetric bilateral multifocal pneumonitis.  ID, oncology and pulmonary consulted.  Receiving multiple antibiotics/antifungals.  6/4 requiring oxygen after ambulation.  Planning on bronchoscopy 6/5.       Assessment and Plan:     Multifocal pneumonitis.  Immunosuppressed   Acute hypoxemic respiratory failure  At admission not requiring supplemental oxygen.    CT chest no pulmonary emboli.  There is acute symmetric bilateral multifocal pneumonitis.  CRP 11.3.  Lactic acid normal at 1.4.  Procalcitonin normal.  Appreciate pulmonology and ID recommendations.  Receiving fluconazole 200 mg daily, meropenem 1 g every 8 hours, Bactrim DS 2 tabs twice daily and vancomycin pharmacy to dose.  6/4 developed hypoxia after ambulating to and from the bathroom.  Recovered with 2 L.  Respiratory viral PCR negative, Ehrlichia, Anaplasma right base negative.  B-D glucan positive.  Unclear significance.  Discussed case with Dr. Hernandez from infectious disease.  Further antibiotic adjustments per their recommendations.  Anticipating bronchoscopy on 6/5.  Multiple myeloma  Monoclonal gammopathy  On bortezomib infusions every Thursday.  Revlimid.  Started 14-day cycle on 5/31.   Holding treatment per oncology and ID recommendations.  Continue PTA medication: Acyclovir 400 mg twice daily.  3 cm mass on T4  CT scan indicates soft tissue mass right side T4  vertebral body, additional lytic lesions throughout visualized bones consistent with multiple myeloma.  Utilize scheduled Tylenol 975 mg 3 times daily with meals.  As needed Norco or oral Tylenol at bedtime.  Neurosurgery was consulted.  Likely would not undergo any procedures until treating his current infection and cleared by oncology.  Low back pain  Right L5 nerve impingement  6/1 saw [facility] for low back pain.  Per report from [facility] MRI lumbar spine showed right L5 nerve impingement.  Getting a TLSO brace per orthopedic recommendations.  Can follow-up with them as an outpatient.  Mild elevation in liver transaminase  ALT 95--> 67--> 65.  Likely secondary to cancer.  No further testing indicated.  Macrocytic anemia  Hemoglobin 8.5, .  Platelets 363.  Repeat hemoglobin 8--> 8.7.  No further checks at this time.  Essential hypertension   Continue home hydrochlorothiazide 12.5 mg daily.  Losartan 100 mg daily.  Hypercholesterolemia  Continue home simvastatin 40 mg daily.  ASCVD  Aortic valve calcifications  CT scan showed severe three-vessel coronary artery calcification and dense calcifications and thickening of the aortic valves.  March 2023 stress echo shows EF 55 to 60%.  Calcified aortic valve noted.  No evidence of stress-induced ischemia.  Follow-up as outpatient.     Anticoagulation   ASA 81 mg daily.  Encouraging ambulation and SCDs.     COVID-19 PCR negative from 6/3/2023  Noted.  Fluids:  Normal saline at 75 since n.p.o. last evening  Pain meds: Scheduled Tylenol with meals and bedtime as needed Norco versus Tylenol  Therapy: N/A  Gutierrze:Not present  Lines: None       Current Diet  Orders Placed This Encounter      NPO for Medical/Clinical Reasons Except for: Meds, Ice Chips    Supplements  None    Barriers to Discharge: Bronchoscopy, fevers, intravenous antibiotics/antifungals    Disposition: Likely here at least 2 more days    Clinically Significant Risk Factors Present on Admission  "             # Hypoalbuminemia: Lowest albumin = 2.5 g/dL at 6/3/2023  8:39 AM, will monitor as appropriate   # Drug Induced Platelet Defect: home medication list includes an antiplatelet medication   # Hypertension: Home medication list includes antihypertensive(s)      # Overweight: Estimated body mass index is 27.89 kg/m  as calculated from the following:    Height as of this encounter: 1.778 m (5' 10\").    Weight as of this encounter: 88.2 kg (194 lb 6.4 oz).            Interval History/Subjective:  Patient continues to have fevers temperature of 100 this morning.  Get sweats and chills.  Still requiring supplemental oxygen.  Back pain is manageable.  His worst pain is in the low back.  No nausea or vomiting.  Son is present and supportive.  Multiple questions answered to verbalized satisfaction.      Last 24H PRN:      acetaminophen (TYLENOL) tablet 325 mg, 325 mg at 06/04/23 2131 **OR** [DISCONTINUED] acetaminophen (TYLENOL) Suppository 650 mg     senna-docusate (SENOKOT-S/PERICOLACE) 8.6-50 MG per tablet 1 tablet, 1 tablet at 06/04/23 1732    Physical Exam/Objective:  Temp:  [98.3  F (36.8  C)-100  F (37.8  C)] 100  F (37.8  C)  Pulse:  [] 120  Resp:  [18-20] 18  BP: (119-135)/(60-68) 120/64  SpO2:  [91 %-94 %] 91 %  Wt Readings from Last 4 Encounters:   06/05/23 88.2 kg (194 lb 6.4 oz)     Body mass index is 27.89 kg/m .    Constitutional: awake, alert, cooperative, no apparent distress, and appears stated age and diaphoretic  ENT: Normocephalic, without obvious abnormality, atraumatic, external ears without lesions, oral pharynx with moist mucous membranes, tonsils without erythema or exudates, gums normal and good dentition.  Respiratory: No increased work of breathing, good air exchange, clear to auscultation bilaterally, no crackles or wheezing  Cardiovascular: Normal apical impulse, regular rate and rhythm, normal S1 and S2, no S3 or S4, and no murmur noted  GI: No scars, normal bowel sounds, " soft, non-distended, non-tender, no masses palpated, no hepatosplenomegally  Skin: normal skin color, texture, turgor, no redness, warmth, or swelling, and no rashes  Musculoskeletal: There is no redness, warmth, or swelling of the joints.  Motor strength is 5 out of 5 all extremities bilaterally.  Tone is normal. no lower extremity pitting edema present  Neurologic: Cranial nerves II-XII are grossly intact. Sensory:  Sensory intact  Neuropsychiatric: General: normal, calm and normal eye contact Level of consciousness: alert / normal Affect: normal Orientation: oriented to self, place, time and situation Memory and insight: normal, memory for past and recent events intact and thought process normal      Medications:   Personally Reviewed.  Medications     sodium chloride 75 mL/hr at 06/04/23 2328       acetaminophen  975 mg Oral TID AC     acyclovir  400 mg Oral BID     aspirin  81 mg Oral Daily     doxycycline hyclate  100 mg Oral Q12H [facility] (08/20)     fluconazole  200 mg Oral Daily     hydrochlorothiazide  12.5 mg Oral Daily     levofloxacin  500 mg Oral Daily     lidocaine inhalant  3 mL Nebulization Once     losartan  100 mg Oral Daily     melatonin  5 mg Oral At Bedtime     meropenem  1 g Intravenous Q8H     simvastatin  40 mg Oral Daily     sodium chloride (PF)  3 mL Intracatheter Q8H     sulfamethoxazole-trimethoprim  2 tablet Oral BID     vancomycin  1,500 mg Intravenous Q18H       Data reviewed today: I personally reviewed all new medications, labs, imaging/diagnostics reports over the past 24 hours. Pertinent findings include:    Imaging:   No results found for this or any previous visit (from the past 24 hour(s)).    Labs:  CT Chest Pulmonary Embolism w Contrast   Final Result   IMPRESSION:   1.  No pulmonary emboli. No signs of acute aortic syndrome.   2.  Nonspecific acute symmetric bilateral multifocal pneumonitis.   3.  Heart size is within normal limits, but there is severe three-vessel coronary  artery calcification and dense calcification and thickening of the aortic valve leaflets.    4.  A 3.5 x 3.5 x 3.0 cm destructive soft tissue mass centered in the right side of the T4 vertebral body crosses the T4-T5 interspace to involve the right side of the T5 vertebral body and may also encroach upon the right ventral epidural space.    Additional diminutive lytic lesions throughout the visualized bones. Findings consistent with multiple myeloma.        Recent Results (from the past 24 hour(s))   Creatinine    Collection Time: 06/05/23  7:49 AM   Result Value Ref Range    Creatinine 0.85 0.70 - 1.30 mg/dL    GFR Estimate >90 >60 mL/min/1.73m2       Pending Labs:  Unresulted Labs Ordered in the Past 30 Days of this Admission     Date and Time Order Name Status Description    6/5/2023  9:43 AM Vancomycin level In process     6/3/2023  2:31 PM Arbovirus Antibodies, IgG and IgM, Serum In process     6/3/2023  2:31 PM West nile virus RNA by PCR In process     6/3/2023  2:31 PM West Nile Virus Antibody IgG IgM In process     6/3/2023  2:31 PM Hypersensitivity Pneumonitis 2 In process     6/3/2023  2:31 PM Hypersensitivity pneumonitis In process     6/3/2023  1:32 PM Lyme Disease Total Abs Bld with Reflex to Confirm CLIA In process     6/3/2023  1:32 PM Lyme disease DNA detection by PCR In process     6/3/2023  1:32 PM Brucella species antibody In process     6/3/2023  1:32 PM Babesia Species by PCR In process     6/3/2023  1:32 PM Ehrlichia Anaplasma Sp by PCR In process     6/3/2023  1:32 PM Anaplasma phagocytoph Antibodies IgG IgM In process     6/3/2023  9:22 AM Blastomyces Agn Quant EIA Blood In process     6/3/2023  9:22 AM Fungal Antibodies In process     6/3/2023  9:22 AM Blastomyces antibody ID In process     6/3/2023  9:22 AM Histoplasma Capsulatum Antigen In process     6/2/2023  6:32 PM Blood Culture Peripheral Blood Preliminary     6/2/2023  6:32 PM Blood Culture Peripheral Blood Preliminary              Franco Lora MD  [facility] Medicine  [facility]  Phone: #[phone]

## 2023-06-05 NOTE — PROCEDURES
FIBEROPTIC BRONCHOSCOPY PROCEDURE NOTE (NON-OR)  Date of Procedure: 6/5/2023  Performing Physician: Maru Malagon DO  Pre-Procedure Diagnosis:   Pneumonia  Post-Procedure Diagnosis:  Same as Pre-Procedure Diagnosis  Procedure:  Diagnostic Flexible Fiberoptic Bronchoscopy   Indications:  Billy Carroll is a 67 year old male with PMHx of IgG multiple myeloma on Revlimid, Velcade and dexamethsone started on 5/10/2023 who started having a cough and was started on Abx with no improvement and a abnormal CXR told to come to the ED for evaluation   Preop evaluation:  Procedure:  Intravenous Sedation.    Expected level:  Moderate sedation  ASA Class:  2  Mallampati:  2.  Anesthesia:  3 mg Versed IV, 175 mcg fentanyl, 4ml of 4% nebulized lidocaine, 4ml 4% lidocaine to vocal cords,  2 ml 1% Xylocaine sub glottic   Specimen:  BAL Right Middle Lobe  Estimated Blood Loss: none  Complications:  none  TB RIsk: low and therefore procedure not done in a negative pressure room    Findings:  After obtaining informed consent and time out performed the bronchoscope was introduced through the mouth. The nasopharynx/oropharynx appears:  Normal .  The larynx appears normal.  The vocal cords are normal and moved normally with phonation and breathing rate is normal.  The trachea is of  Normal  caliber.  The tere is sharp.  The tracheal tree of the right and left lung was examined to at least the first subsegmental level.  Bronchial mucosa and anatomy were  Normal  .  There were no endobronchial lesions and secretions were minimal.  A BAL was performed on the right middle lobe with 80cc and returning 20cc's.    Specimens were sent for microbiological analysis, cytology, pathology.     Procedure Details:   A history and physical exam has been performed. Patients medications and allergies reviewed. The risks, benefits of the procedure as well  sedation options and risks were discussed as were potential complications and expected outcomes were  discussed with patient. The risks and potential complications of their problem and proposed treatment include but are not limited to infection, bleeding, pain, adverse drug reaction, pulmonary aspiration, the need for additional procedures, failure to diagnose a condition, creating a complication requiring transfusion or operation, and complication secondary to the anesthetic. All questions were answered and informed consent was obtained.     Patient identification and proposed procedure were verified prior to the procedure by the physician.  Mental status examination: Normal, Airway examination: Normal, ASA grade assessment: 2.  After reviewing the risks and benefits the patient was deemed in satisfactory condition to undergo the procedure.  Immediately prior to administration of medications patient was reassessed for adequacy to receive sedatives.  Heart rate, respiratory rate, oxygen saturations, blood pressure, adequacy of pulmonary ventilation, response to care were monitored throughout the procedure.  The physical status of the patient was reassessed after the procedure.     The procedure was a completed without difficulty the patient tolerated the procedure well.     Impression: Bronchoscopy findings are NOT consistent with infectious Pneumonia    Maru Malagon DO, 6/5/2023, 1:02 PM    Referring Physician: * No referring provider recorded for this case *  Attending Physician: Franco Lora MD  Primary Care Physician: Jroy Krishnamurthy

## 2023-06-05 NOTE — PHARMACY-VANCOMYCIN DOSING SERVICE
Pharmacy Vancomycin Note  Date of Service 2023  Patient's  1955   67 year old, male    Indication: MM, PNA  Day of Therapy: 3  Current vancomycin regimen:  1500 mg IV q18h  Current vancomycin monitoring method: AUC  Current vancomycin therapeutic monitoring goal: 400-600 mg*h/L    InsightRX Prediction of Current Vancomycin Regimen    Regimen: 1500 mg IV every 18 hours.  Start time: 15:24 on 2023  Exposure target: AUC24 (range)400-600 mg/L.hr   AUC24,ss: 344 mg/L.hr  Probability of AUC24 > 400: 22 %  Ctrough,ss: 7.4 mg/L  Probability of Ctrough,ss > 20: 0 %  Probability of nephrotoxicity (Lodise ALEKSANDRA ): 4 %    Current estimated CrCl = Estimated Creatinine Clearance: 91.8 mL/min (based on SCr of 0.85 mg/dL).    Creatinine for last 3 days  2023:  6:57 PM Creatinine 0.82 mg/dL  6/3/2023:  8:39 AM Creatinine 0.80 mg/dL  2023:  7:39 AM Creatinine 0.87 mg/dL  2023:  7:49 AM Creatinine 0.85 mg/dL    Recent Vancomycin Levels (past 3 days)  2023:  7:49 AM Vancomycin 8.6 mg/L    Vancomycin IV Administrations (past 72 hours)                   vancomycin (VANCOCIN) 1,500 mg in 0.9% NaCl 250 mL intermittent infusion (mg) 1,500 mg New Bag 23 2124     1,500 mg New Bag  0452    vancomycin (VANCOCIN) 2,000 mg in 0.9% NaCl 500 mL intermittent infusion (mg) 2,000 mg New Bag 23 1339                Nephrotoxins and other renal medications (From now, onward)    Start     Dose/Rate Route Frequency Ordered Stop    23 0500  vancomycin (VANCOCIN) 1,500 mg in 0.9% NaCl 250 mL intermittent infusion         1,500 mg  over 90 Minutes Intravenous EVERY 18 HOURS 23 1011      23 1000  acyclovir (ZOVIRAX) capsule 400 mg        Note to Pharmacy: PTA Sig:Take 400 mg by mouth 2 times daily      400 mg Oral 2 TIMES DAILY 23 0854               Contrast Orders - past 72 hours (72h ago, onward)    Start     Dose/Rate Route Frequency Stop    23  iopamidol (ISOVUE-370)  solution 100 mL         100 mL Intravenous ONCE 06/02/23 1957          Interpretation of levels and current regimen:  Vancomycin level is reflective of AUC less than 400    Has serum creatinine changed greater than 50% in last 72 hours: No    Urine output:  unable to determine    Renal Function: Stable    InsightRX Prediction of Planned New Vancomycin Regimen  Regimen: 1500 mg IV every 12 hours.  Start time: 15:24 on 06/05/2023  Exposure target: AUC24 (range)400-600 mg/L.hr   AUC24,ss: 514 mg/L.hr  Probability of AUC24 > 400: 90 %  Ctrough,ss: 13.6 mg/L  Probability of Ctrough,ss > 20: 10 %  Probability of nephrotoxicity (Lodise ALEKSANDRA 2009): 9 %      Plan:  1. Increase Dose to 1500mg q12h  2. Vancomycin monitoring method: AUC  3. Vancomycin therapeutic monitoring goal: 400-600 mg*h/L  4. Pharmacy will check vancomycin levels as appropriate in 1-3 Days.  5. Serum creatinine levels will be ordered daily for the first week of therapy and at least twice weekly for subsequent weeks.    Dana Ga, [facility]

## 2023-06-05 NOTE — PLAN OF CARE
Problem: Plan of Care - These are the overarching goals to be used throughout the patient stay.    Goal: Optimal Comfort and Wellbeing  Intervention: Monitor Pain and Promote Comfort      Received report from Evelina on 2E regarding pt. Pt arrived onto the unit at 1845, VSS on 1L of O2. CMS in tact. Pt was settled into recliner and was showed the call light. Pt independent in room. Pain free. Discussed plan of being NPO at midnight and for procedure tomorrow. Pt and family agreeable to this plan. Discharge home pending medical progression

## 2023-06-05 NOTE — PROGRESS NOTES
S: Billy was seen at the [facility] room 3112, for the Evaluation, Fit and Delivery of a LSO.  O:  A Large [place] quickdraw Style Orthosis was selected.  The rigid anterior panel was assembled and set to the patient s midline.  Strings when pulled forward will compression and add anterior/ posterior and lateral stability.  A:  I explained to the patient the purpose and function of the brace as well as donning and doffing.  I also provided the patient with the written use and care instructions from the .  P: If there is a need to adjust the brace while the patient still in the hospital, the patient s nurse should contact the [facility].  G: This orthosis applies compression to the patient s soft tissues in the lumbosacral region of the spine.  Compression of the patient s soft tissues unloads the vertebrae, which in turn reduces pain in the spine.  The orthosis also increases medial-lateral and anterior-posterior stability of the back/spine.  The goal of this orthosis is to reduce pain and increase the comfort while the patient performs their ADLs.    Electronically signed by  Carlos Montalvo, Board Eligible Prosthetist/ Orthotist

## 2023-06-05 NOTE — PROGRESS NOTES
[facility]  Neurosurgery Progress Note    67M with history of multiple myeloma, T4 mass previously treated with radiation 5/3 to 5/9 with Dr. Arcelia Sheldon, now admitted with pneumonia. [facility] managing L5 superior endplate compression fracture and consulted Neurosurgery for evaluation of T4 mass.     Reviewed with Dr. Aguilera. Patient would be a poor surgical candidate. Recommend Radiation Oncology consult for T4 mass.     Marilin Schuler CNP  [facility] Neurosurgery  [address] [address]  Tel [phone]  Pager [phone]

## 2023-06-05 NOTE — PLAN OF CARE
Problem: Plan of Care - These are the overarching goals to be used throughout the patient stay.    Goal: Optimal Comfort and Wellbeing  Intervention: Monitor Pain and Promote Comfort  Recent Flowsheet Documentation  Taken 6/5/2023 0824 by Cindy Londono RN  Pain Management Interventions:   medication (see MAR)   emotional support     Problem: Gas Exchange Impaired  Goal: Optimal Gas Exchange  Outcome: Progressing     Patient is alert and oriented x 4. Ambulating independently. Generalized pain managed with Tylenol. Patient requiring 5L O2 via NC. Patient went down for a Bronchoscopy this afternoon. Patient was very drowsy upon returning to the unit.

## 2023-06-05 NOTE — PROGRESS NOTES
Assisted with Bronchoscope procedure. Patient tolerated well without complications. BAL done and sample sent.     Marichuy Major RT

## 2023-06-05 NOTE — CONSULTS
ORTHOPEDIC CONSULTATION    Consultation  Billy Carroll,  1955, MRN [id number]    [place]  Pneumonia of both lungs due to infectious organism, unspecified part of lung [J18.9]    PCP: Jory Krishnamurthy, [phone]   Code status:  Full Code       Extended Emergency Contact Information  Primary Emergency Contact: CHRISTY CARROLL  Home Phone: [phone]  Relation: Spouse  Secondary Emergency Contact: Hood Carroll  Mobile Phone: [phone]  Relation: Son         IMPRESSION:   L5 superior endplate compression fracture, diagnosed on MRI on , initial encounter     PLAN:  This patient was discussed with Dr. Go, on-call surgeon for [facility] and they are in agreement with the following plan.     -TLSO brace  -Pain control  -PT OT  -Neurosurgery consult for T4 mass    Thank you for including [facility] in the care of Billy Carroll. It has been a pleasure participating in Billy's care.        CHIEF COMPLAINT: Pneumonia of both lungs due to infectious organism, unspecified part of lung    HISTORY OF PRESENT ILLNESS:  The patient is seen in orthopedic consultation at the request of Dr. Lora.  The patient is a 67 year old male with mild pain of the Low back . The patient reports that he had a MRI done at [facility] last Wednesday and was supposed to follow-up with us today for his low back pain.  He does not have any radicular symptoms down his bilateral lower legs.  He was recently also diagnosed with a mass on his T4 vertebrae.  Some does not follow thoracic spine at this time will order neurosurgery consult.  In regards to his compression fractures diagnosed on his MRI done at [facility] will order TLSO brace and have him follow-up on the  for kyphoplasty    PAST MEDICAL HISTORY:  Past Medical History:   Diagnosis Date     Cancer (H)      Multiple myeloma (H)           ALLERGIES:   Review of patient's allergies indicates   Allergies   Allergen Reactions     Codeine Nausea          MEDICATIONS UPON ADMISSION:  Medications were reviewed.  They include:   Medications Prior to Admission   Medication Sig Dispense Refill Last Dose     acyclovir (ZOVIRAX) 400 MG tablet Take 400 mg by mouth 2 times daily   6/2/2023 at AM     aspirin 81 MG EC tablet Take 81 mg by mouth daily   6/2/2023 at AM     bortezomib 3.5 MG injection Inject 1.3 mg/m2 into the vein once a week   6/1/2023     Calcium-Magnesium-Zinc 333-133-5 MG TABS per tablet Take 2 tablets by mouth daily   6/2/2023     cholecalciferol 50 MCG (2000 UT) tablet Take 2,000 Units by mouth daily   6/2/2023 at AM     co-enzyme Q-10 100 MG CAPS capsule Take 100 mg by mouth daily   6/2/2023     dexamethasone (DECADRON) 4 MG tablet Take 40 mg by mouth once a week   6/1/2023     hydrochlorothiazide (HYDRODIURIL) 12.5 MG tablet Take 12.5 mg by mouth daily   6/2/2023     HYDROcodone-acetaminophen (NORCO) 5-325 MG tablet Take 1 tablet by mouth every 4 hours as needed for pain   Past Week     lactobacillus rhamnosus, GG, (CULTURELL) capsule Take 1 capsule by mouth daily   6/2/2023     levofloxacin (LEVAQUIN) 500 MG tablet Take 500 mg by mouth daily   6/2/2023     losartan (COZAAR) 50 MG tablet Take 100 mg by mouth daily   6/2/2023     nitroGLYcerin (NITROSTAT) 0.4 MG sublingual tablet Place 0.4 mg under the tongue every 5 minutes as needed for chest pain For GILLETTE   More than a month     Omega-3 Fatty Acids (FISH OIL BURP-LESS) 1000 MG CAPS Take 1 capsule by mouth daily   6/2/2023     polyethylene glycol (MIRALAX) 17 g packet Take 1 packet by mouth daily as needed for constipation   Past Week     prochlorperazine (COMPAZINE) 10 MG tablet Take 10 mg by mouth every 6 hours as needed for nausea   More than a month     REVLIMID 25 MG CAPS capsule Take 25 mg by mouth daily Take daily for 14 days then hold for 7 days every 21 day cycle   6/2/2023 at Pt to bring in     simvastatin (ZOCOR) 40 MG tablet Take 40 mg by mouth daily   6/2/2023     Turmeric 500 MG  CAPS Take 2 capsules by mouth daily   6/2/2023     vitamin B complex with vitamin C (VITAMIN  B COMPLEX) tablet Take 1 tablet by mouth daily   6/2/2023     zinc gluconate 50 MG tablet Take 50 mg by mouth daily   6/2/2023         SOCIAL HISTORY:   he  reports that he has never smoked. He has never used smokeless tobacco.    FAMILY HISTORY:  family history is not on file.      REVIEW OF SYSTEMS:   Reviewed with patient. See HPI, otherwise negative       PHYSICAL EXAMINATION:  Vitals: Temp:  [98.3  F (36.8  C)-100  F (37.8  C)] 100  F (37.8  C)  Pulse:  [] 120  Resp:  [18-20] 18  BP: (119-135)/(60-68) 120/64  SpO2:  [91 %-94 %] 91 %  General: On examination, the patient is resting comfortably, NAD, awake and alert and oriented to person, place, time, and and general circumstances   SKIN: There is no evidence of ecchymosis, erythema, swelling, fluctuance or induration  Pulses:  dorsalis pedis and posterior tibial pulse is intact and equal bilaterally  Sensation: intact and equal bilaterally to the distal lower extremities.  Tenderness: Tenderness to palpation primarily at L5 vertebra as well as the paraspinous muscles  ROM: Able to fully range his hips knees ankles without pain  Motor: 5/5 strength of EHL, gastroc, quad and hamstring  Contralateral side= Full range of motion, Negative joint instability findings, 5/5 motor groups about the joint, Non-tender.       RADIOGRAPHIC EVALUATION:  Personally reviewed    PERTINENT LABS:  Lab Results: personally reviewed.     Lab Results   Component Value Date    WBC 7.4 06/04/2023    HGB 8.7 06/04/2023    HCT 26.6 06/04/2023     06/04/2023     06/04/2023         Carrol Rhodes PA-C, JIM  Date: 6/5/2023  Time: 9:44 AM    CC1:   Franco Lora MD    CC2:   Jory Krishnamurthy

## 2023-06-05 NOTE — PRE-PROCEDURE
GENERAL PRE-PROCEDURE:   Procedure:  Bronchoscopy  Date/Time:  6/5/2023 1:01 PM    Verbal consent obtained?: Yes    Written consent obtained?: Yes    Risks and benefits: Risks, benefits and alternatives were discussed    Consent given by:  Patient  Patient states understanding of procedure being performed: Yes    Patient's understanding of procedure matches consent: Yes    Procedure consent matches procedure scheduled: Yes    Expected level of sedation:  Moderate  Appropriately NPO:  Yes  Mallampati  :  Grade 1- soft palate, uvula, tonsillar pillars, and posterior pharyngeal wall visible  Lungs:  Crackles left base and crackles right base  Heart:  Normal heart sounds and rate  History & Physical reviewed:  History and physical reviewed and no updates needed  Statement of review:  I have reviewed the lab findings, diagnostic data, medications, and the plan for sedation

## 2023-06-05 NOTE — PROGRESS NOTES
Care Management Follow Up    Length of Stay (days): 1    Expected Discharge Date: 06/06/2023     Concerns to be Addressed: discharge planning     Patient plan of care discussed at interdisciplinary rounds: Yes    Anticipated Discharge Disposition: Home     Anticipated Discharge Services: None  Anticipated Discharge DME: None    Patient/family educated on Medicare website which has current facility and service quality ratings:  n/a  Education Provided on the Discharge Plan:  yes  Patient/Family in Agreement with the Plan: yes    Referrals Placed by CM/SW:  yes  Private pay costs discussed: Not applicable    Additional Information:  No needs anticipated from CM at discharge per pt; independent at baseline. Care management to follow for discharge recommendations and progression of care through hospitalization. Family to transport home.  2:48 PM    DANIEL Zheng  6/5/2023

## 2023-06-06 LAB
% LINING CELLS, BODY FLUID: 14 %
ASPERGILLUS AB TITR SER CF: NORMAL {TITER}
B DERMAT AB SER-ACNC: 0 IV
CMV DNA SPEC NAA+PROBE-ACNC: NOT DETECTED IU/ML
COCCIDIOIDES AB TITR SER CF: NORMAL {TITER}
CREAT SERPL-MCNC: 0.76 MG/DL (ref 0.7–1.3)
EOSINOPHIL NFR FLD MANUAL: 1 %
GFR SERPL CREATININE-BSD FRML MDRD: >90 ML/MIN/1.73M2
H CAPSUL MYC AB TITR SER CF: NORMAL {TITER}
H CAPSUL YST AB TITR SER CF: NORMAL {TITER}
LYMPHOCYTES NFR FLD MANUAL: 35 %
MONOS+MACROS NFR FLD MANUAL: 34 %
NEUTS BAND NFR FLD MANUAL: 13 %
OTHER CELLS FLD MANUAL: 3 %

## 2023-06-06 PROCEDURE — 250N000013 HC RX MED GY IP 250 OP 250 PS 637: Performed by: INTERNAL MEDICINE

## 2023-06-06 PROCEDURE — 258N000003 HC RX IP 258 OP 636: Performed by: STUDENT IN AN ORGANIZED HEALTH CARE EDUCATION/TRAINING PROGRAM

## 2023-06-06 PROCEDURE — 99233 SBSQ HOSP IP/OBS HIGH 50: CPT | Performed by: STUDENT IN AN ORGANIZED HEALTH CARE EDUCATION/TRAINING PROGRAM

## 2023-06-06 PROCEDURE — 250N000011 HC RX IP 250 OP 636: Performed by: STUDENT IN AN ORGANIZED HEALTH CARE EDUCATION/TRAINING PROGRAM

## 2023-06-06 PROCEDURE — 99232 SBSQ HOSP IP/OBS MODERATE 35: CPT | Performed by: INTERNAL MEDICINE

## 2023-06-06 PROCEDURE — 120N000001 HC R&B MED SURG/OB

## 2023-06-06 PROCEDURE — 250N000011 HC RX IP 250 OP 636: Performed by: FAMILY MEDICINE

## 2023-06-06 PROCEDURE — 250N000013 HC RX MED GY IP 250 OP 250 PS 637: Performed by: FAMILY MEDICINE

## 2023-06-06 PROCEDURE — 99232 SBSQ HOSP IP/OBS MODERATE 35: CPT | Performed by: FAMILY MEDICINE

## 2023-06-06 PROCEDURE — 82565 ASSAY OF CREATININE: CPT | Performed by: INTERNAL MEDICINE

## 2023-06-06 PROCEDURE — 250N000011 HC RX IP 250 OP 636: Performed by: INTERNAL MEDICINE

## 2023-06-06 PROCEDURE — 36415 COLL VENOUS BLD VENIPUNCTURE: CPT | Performed by: INTERNAL MEDICINE

## 2023-06-06 RX ORDER — FUROSEMIDE 10 MG/ML
20 INJECTION INTRAMUSCULAR; INTRAVENOUS ONCE
Status: COMPLETED | OUTPATIENT
Start: 2023-06-06 | End: 2023-06-06

## 2023-06-06 RX ORDER — METHYLPREDNISOLONE SODIUM SUCCINATE 40 MG/ML
40 INJECTION, POWDER, LYOPHILIZED, FOR SOLUTION INTRAMUSCULAR; INTRAVENOUS EVERY 12 HOURS
Status: DISCONTINUED | OUTPATIENT
Start: 2023-06-06 | End: 2023-06-07

## 2023-06-06 RX ORDER — HYDROXYZINE HYDROCHLORIDE 10 MG/1
10-20 TABLET, FILM COATED ORAL EVERY 8 HOURS PRN
COMMUNITY
End: 2023-08-24

## 2023-06-06 RX ORDER — HYDROXYZINE HYDROCHLORIDE 10 MG/1
10 TABLET, FILM COATED ORAL EVERY 6 HOURS PRN
Status: DISCONTINUED | OUTPATIENT
Start: 2023-06-06 | End: 2023-06-11 | Stop reason: HOSPADM

## 2023-06-06 RX ADMIN — SENNOSIDES AND DOCUSATE SODIUM 1 TABLET: 50; 8.6 TABLET ORAL at 01:59

## 2023-06-06 RX ADMIN — HYDROXYZINE HYDROCHLORIDE 10 MG: 10 TABLET ORAL at 22:14

## 2023-06-06 RX ADMIN — MICAFUNGIN 100 MG: 10 INJECTION, POWDER, LYOPHILIZED, FOR SOLUTION INTRAVENOUS at 12:29

## 2023-06-06 RX ADMIN — MEROPENEM 1 G: 1 INJECTION, POWDER, FOR SOLUTION INTRAVENOUS at 01:50

## 2023-06-06 RX ADMIN — ACYCLOVIR 400 MG: 200 CAPSULE ORAL at 08:30

## 2023-06-06 RX ADMIN — DOXYCYCLINE 100 MG: 100 CAPSULE ORAL at 08:30

## 2023-06-06 RX ADMIN — IBUPROFEN 400 MG: 200 TABLET, FILM COATED ORAL at 18:00

## 2023-06-06 RX ADMIN — MEROPENEM 1 G: 1 INJECTION, POWDER, FOR SOLUTION INTRAVENOUS at 17:33

## 2023-06-06 RX ADMIN — HYDROXYZINE HYDROCHLORIDE 10 MG: 10 TABLET ORAL at 15:32

## 2023-06-06 RX ADMIN — ACETAMINOPHEN 975 MG: 325 TABLET ORAL at 06:41

## 2023-06-06 RX ADMIN — ACETAMINOPHEN 975 MG: 325 TABLET ORAL at 15:32

## 2023-06-06 RX ADMIN — FUROSEMIDE 20 MG: 10 INJECTION, SOLUTION INTRAMUSCULAR; INTRAVENOUS at 09:33

## 2023-06-06 RX ADMIN — ACYCLOVIR 400 MG: 200 CAPSULE ORAL at 20:09

## 2023-06-06 RX ADMIN — METHYLPREDNISOLONE SODIUM SUCCINATE 40 MG: 40 INJECTION, POWDER, FOR SOLUTION INTRAMUSCULAR; INTRAVENOUS at 22:13

## 2023-06-06 RX ADMIN — SIMVASTATIN 40 MG: 20 TABLET, FILM COATED ORAL at 08:30

## 2023-06-06 RX ADMIN — METHYLPREDNISOLONE SODIUM SUCCINATE 40 MG: 40 INJECTION, POWDER, FOR SOLUTION INTRAMUSCULAR; INTRAVENOUS at 10:47

## 2023-06-06 RX ADMIN — ACETAMINOPHEN 975 MG: 325 TABLET ORAL at 10:51

## 2023-06-06 RX ADMIN — HYDROCODONE BITARTRATE AND ACETAMINOPHEN 1 TABLET: 5; 325 TABLET ORAL at 13:02

## 2023-06-06 RX ADMIN — SULFAMETHOXAZOLE AND TRIMETHOPRIM 2 TABLET: 800; 160 TABLET ORAL at 20:08

## 2023-06-06 RX ADMIN — SULFAMETHOXAZOLE AND TRIMETHOPRIM 2 TABLET: 800; 160 TABLET ORAL at 08:29

## 2023-06-06 RX ADMIN — HYDROCODONE BITARTRATE AND ACETAMINOPHEN 1 TABLET: 5; 325 TABLET ORAL at 22:14

## 2023-06-06 RX ADMIN — DOXYCYCLINE 100 MG: 100 CAPSULE ORAL at 20:08

## 2023-06-06 RX ADMIN — HYDROCHLOROTHIAZIDE 12.5 MG: 12.5 CAPSULE ORAL at 08:30

## 2023-06-06 RX ADMIN — MEROPENEM 1 G: 1 INJECTION, POWDER, FOR SOLUTION INTRAVENOUS at 09:40

## 2023-06-06 RX ADMIN — ASPIRIN 81 MG: 81 TABLET, COATED ORAL at 08:30

## 2023-06-06 RX ADMIN — HYDROCODONE BITARTRATE AND ACETAMINOPHEN 1 TABLET: 5; 325 TABLET ORAL at 01:49

## 2023-06-06 ASSESSMENT — ACTIVITIES OF DAILY LIVING (ADL)
ADLS_ACUITY_SCORE: 22

## 2023-06-06 NOTE — PROGRESS NOTES
[facility] MEDICINE  PROGRESS NOTE     Code Status: Full Code       Identification/Summary:   Billy Carroll is a 67-year-old male immunosuppressed with history of IgG multiple myeloma, 3 cm mass on T4, on treatment with bortezomib, Velcade and Revlimid managed by [facility].  Recent trip to his cabin, outdoors and around multiple people.  5/30 developed cough and fever.  5/31 started Levaquin with no improvement.  6/1 saw [place] for low back pain.  Lumbar MRI showed right L5 nerve impingement.  6/2 admitted [facility] for fevers.  CT chest revealed acute symmetric bilateral multifocal pneumonitis.  ID, oncology and pulmonary consulted.  Receiving multiple antibiotics/antifungals.  6/4 requiring oxygen after ambulation.  6/5 bronchoscopy.  Viral PCR testing negative.  Multiple studies pending.  6/6 oxygen need at 5 L.  Give Lasix IV x1.  Monitor hypoxia afterwards.     Assessment and Plan:     Multifocal pneumonitis  Fever  Immunosuppressed   Acute hypoxemic respiratory failure  Bronchoscopy 6/5/2023  At admission not requiring supplemental oxygen.    CT chest no pulmonary emboli.  There is acute symmetric bilateral multifocal pneumonitis.  CRP 11.3.  Lactic acid normal at 1.4.  Procalcitonin normal.  Given fluconazole 200 mg daily, meropenem 1 g every 8 hours, Bactrim DS 2 tabs twice daily and vancomycin pharmacy to dose.  6/4 developed hypoxia after ambulating to and from the bathroom.  Recovered with 2 L.  Respiratory viral PCR negative, Ehrlichia, Anaplasma right base negative.  B-D glucan positive.  Unclear significance.  6/5 underwent bronchoscopy.  No complications.  Afebrile overnight.  Treatment adjusted to acyclovir, doxycycline, Bactrim, meropenem and micafungin.  6/6 requiring 5 L nasal cannular oxygen.  Will give Lasix 20 mg IV x1.  Monitor response.  Further management per ID and pulmonary service.  Multiple myeloma  Monoclonal gammopathy  On bortezomib infusions  every Thursday.  Revlimid.  Started 14-day cycle on 5/31.   Holding treatment per oncology and ID recommendations.  Continue PTA medication: Acyclovir 400 mg twice daily.  3 cm mass on T4  CT scan indicates soft tissue mass right side T4 vertebral body, additional lytic lesions throughout visualized bones consistent with multiple myeloma.  Utilize scheduled Tylenol 975 mg 3 times daily with meals.  As needed Norco or oral Tylenol at bedtime.  Neurosurgery consulted.  Recommend further management per rad/onc with [facility].  Low back pain  Right L5 nerve impingement  6/1 saw [place] for low back pain.  Per report from [facility] MRI lumbar spine showed right L5 nerve impingement.  Getting a TLSO brace per orthopedic recommendations.  Will follow-up with them as an outpatient.  Mild elevation in liver transaminase  ALT 95--> 67--> 65.  Likely secondary to cancer.  No further testing indicated.  Macrocytic anemia  Hemoglobin 8.5, .  Platelets 363.  Repeat hemoglobin 8--> 8.7.  No further checks at this time.  Essential hypertension   Continue home hydrochlorothiazide 12.5 mg daily.  Losartan 100 mg daily.  Hypercholesterolemia  Continue home simvastatin 40 mg daily.  ASCVD  Aortic valve calcifications  CT scan showed severe three-vessel coronary artery calcification and dense calcifications and thickening of the aortic valves.  March 2023 stress echo shows EF 55 to 60%.  Calcified aortic valve noted.  No evidence of stress-induced ischemia.  Follow-up as outpatient.     Anticoagulation   ASA 81 mg daily.  Encouraging ambulation and SCDs.     COVID-19 PCR negative from 6/3/2023  Noted.  Fluids:   Saline lock  Pain meds: Scheduled Tylenol with meals and bedtime as needed Norco versus Tylenol  Therapy: N/A   Gutierrez:Not present  Lines: None       Current Diet  Orders Placed This Encounter      Regular Diet Adult    Supplements  None    Barriers to Discharge: Hypoxia, bronchoscopy  "results    Disposition: To be determined    Clinically Significant Risk Factors              # Hypoalbuminemia: Lowest albumin = 2.5 g/dL at 6/3/2023  8:39 AM, will monitor as appropriate            # Overweight: Estimated body mass index is 29.92 kg/m  as calculated from the following:    Height as of this encounter: 1.778 m (5' 10\").    Weight as of this encounter: 94.6 kg (208 lb 8.9 oz)., PRESENT ON ADMISSION          Interval History/Subjective:  Patient doing well after bronchoscopy.  Requiring 5 L of nasal cannular oxygen.  Denies any perceived shortness of breath at this time.  No chest pain.  No nausea or vomiting.  Tmax 100 degrees on 6/5 0730 hrs.  No fever overnight.  Questions answered to verbalized satisfaction.      Last 24H PRN:      acetaminophen (TYLENOL) tablet 325 mg, 325 mg at 06/05/23 2222 **OR** [DISCONTINUED] acetaminophen (TYLENOL) Suppository 650 mg     HYDROcodone-acetaminophen (NORCO) 5-325 MG per tablet 1 tablet, 1 tablet at 06/06/23 0149     ibuprofen (ADVIL/MOTRIN) tablet 200-400 mg, 400 mg at 06/05/23 1958     senna-docusate (SENOKOT-S/PERICOLACE) 8.6-50 MG per tablet 1 tablet, 1 tablet at 06/06/23 0159    Physical Exam/Objective:  Temp:  [97.5  F (36.4  C)-98.7  F (37.1  C)] 98.1  F (36.7  C)  Pulse:  [] 89  Resp:  [16-32] 16  BP: ()/(55-70) 114/55  SpO2:  [89 %-100 %] 89 %  Wt Readings from Last 4 Encounters:   06/06/23 94.6 kg (208 lb 8.9 oz)     Body mass index is 29.92 kg/m .    Constitutional: awake, alert, cooperative, no apparent distress, and appears stated age  ENT: Normocephalic, without obvious abnormality, atraumatic, external ears without lesions, oral pharynx with moist mucous membranes, tonsils without erythema or exudates, gums normal and good dentition.  Respiratory: Some crackles at bases.  Coughing with expiration otherwise good air movement.  Cardiovascular: Normal apical impulse, regular rate and rhythm, normal S1 and S2, no S3 or S4, and no murmur " noted  GI: No scars, normal bowel sounds, soft, non-distended, non-tender, no masses palpated, no hepatosplenomegally  Skin: normal skin color, texture, turgor, no redness, warmth, or swelling, and no rashes  Musculoskeletal: There is no redness, warmth, or swelling of the joints.  Motor strength is 5 out of 5 all extremities bilaterally.  Tone is normal. no lower extremity pitting edema present  Neurologic: Cranial nerves II-XII are grossly intact. Sensory:  Sensory intact  Neuropsychiatric: General: normal, calm and normal eye contact Level of consciousness: alert / normal Affect: normal Orientation: oriented to self, place, time and situation Memory and insight: normal, memory for past and recent events intact and thought process normal      Medications:   Personally Reviewed.  Medications       acetaminophen  975 mg Oral TID AC     acyclovir  400 mg Oral BID     aspirin  81 mg Oral Daily     doxycycline hyclate  100 mg Oral Q12H [facility] (08/20)     furosemide  20 mg Intravenous Once     hydrochlorothiazide  12.5 mg Oral Daily     lidocaine inhalant  3 mL Nebulization Once     losartan  100 mg Oral Daily     melatonin  5 mg Oral At Bedtime     meropenem  1 g Intravenous Q8H     micafungin  100 mg Intravenous Q24H     simvastatin  40 mg Oral Daily     sodium chloride (PF)  3 mL Intracatheter Q8H     sulfamethoxazole-trimethoprim  2 tablet Oral BID       Data reviewed today: I personally reviewed all new medications, labs, imaging/diagnostics reports over the past 24 hours. Pertinent findings include:    Imaging:   No results found for this or any previous visit (from the past 24 hour(s)).    Labs:  CT Chest Pulmonary Embolism w Contrast   Final Result   IMPRESSION:   1.  No pulmonary emboli. No signs of acute aortic syndrome.   2.  Nonspecific acute symmetric bilateral multifocal pneumonitis.   3.  Heart size is within normal limits, but there is severe three-vessel coronary artery calcification and dense  calcification and thickening of the aortic valve leaflets.    4.  A 3.5 x 3.5 x 3.0 cm destructive soft tissue mass centered in the right side of the T4 vertebral body crosses the T4-T5 interspace to involve the right side of the T5 vertebral body and may also encroach upon the right ventral epidural space.    Additional diminutive lytic lesions throughout the visualized bones. Findings consistent with multiple myeloma.        Recent Results (from the past 24 hour(s))   Respiratory Aerobic Bacterial Culture with Gram Stain    Collection Time: 06/05/23  1:34 PM    Specimen: Bronchus, Right; Bronchial Alveolar Lavage   Result Value Ref Range    Gram Stain Result <25 PMNs/low power field     Gram Stain Result No organisms seen    Koh prep - BAL Site 1    Collection Time: 06/05/23  1:34 PM    Specimen: Lung, Right; Bronchial Alveolar Lavage   Result Value Ref Range    KOH Preparation No fungal elements seen     KOH Preparation Reference Range: No fungal elements seen.    Respiratory Panel PCR - BAL Site 1    Collection Time: 06/05/23  1:34 PM    Specimen: Bronchus; Bronchial Alveolar Lavage   Result Value Ref Range    Adenovirus Not Detected Not Detected    Coronavirus Not Detected Not Detected    Human Metapneumovirus Not Detected Not Detected    Human Rhin/Enterovirus Not Detected Not Detected    Influenza A Not Detected Not Detected    Influenza A, H1 Not Detected Not Detected    Influenza A 2009 H1N1 Not Detected Not Detected    Influenza A, H3 Not Detected Not Detected    Influenza B Not Detected Not Detected    Parainfluenza Virus 1 Not Detected Not Detected    Parainfluenza Virus 2 Not Detected Not Detected    Parainfluenza Virus 3 Not Detected Not Detected    Parainfluenza Virus 4 Not Detected Not Detected    Respiratory Syncytial Virus A Not Detected Not Detected    Respiratory Syncytial Virus B Not Detected Not Detected    Chlamydia Pneumoniae Not Detected Not Detected    Mycoplasma Pneumoniae Not Detected Not  Detected   Cell Count Body Fluid    Collection Time: 06/05/23  1:34 PM   Result Value Ref Range    Color Colorless Colorless, Yellow    Clarity Clear Clear    Cell Count Fluid Source Lung, Right     Total Nucleated Cells 21 /uL   Creatinine    Collection Time: 06/06/23  6:42 AM   Result Value Ref Range    Creatinine 0.76 0.70 - 1.30 mg/dL    GFR Estimate >90 >60 mL/min/1.73m2       Pending Labs:  Unresulted Labs Ordered in the Past 30 Days of this Admission     Date and Time Order Name Status Description    6/5/2023  1:40 PM Differential Body Fluid In process     6/5/2023  1:33 PM Acid-Fast Bacilli Culture and Stain with AFB Stain In process     6/5/2023  1:07 PM CMV DNA quantification - BAL Site 1 In process     6/5/2023  1:07 PM Legionella culture - BAL Site 1 In process     6/5/2023  1:07 PM Fungus Culture, non-blood - BAL Site 1 In process     6/5/2023  1:07 PM Acid-Fast Bacilli Culture and Stain with AFB Stain In process     6/5/2023  1:07 PM Respiratory Aerobic Bacterial Culture with Gram Stain Preliminary     6/5/2023  1:07 PM Cytology non gyn - BAL Sites In process     6/3/2023  2:31 PM Arbovirus Antibodies, IgG and IgM, Serum In process     6/3/2023  2:31 PM West nile virus RNA by PCR In process     6/3/2023  2:31 PM West Nile Virus Antibody IgG IgM In process     6/3/2023  2:31 PM Hypersensitivity Pneumonitis 2 In process     6/3/2023  2:31 PM Hypersensitivity pneumonitis In process     6/3/2023  1:32 PM Lyme disease DNA detection by PCR In process     6/3/2023  1:32 PM Brucella species antibody In process     6/3/2023  1:32 PM Babesia Species by PCR In process     6/3/2023  1:32 PM Ehrlichia Anaplasma Sp by PCR In process     6/3/2023  1:32 PM Anaplasma phagocytoph Antibodies IgG IgM In process     6/3/2023  9:22 AM Blastomyces Agn Quant EIA Blood In process     6/3/2023  9:22 AM Blastomyces antibody ID In process     6/3/2023  9:22 AM Histoplasma Capsulatum Antigen In process     6/2/2023 11:31 PM  Pneumocystis jirovecil by PCR In process     6/2/2023  6:32 PM Blood Culture Peripheral Blood Preliminary     6/2/2023  6:32 PM Blood Culture Peripheral Blood Preliminary             Franco Lora MD  [facility]  Phone: #[phone]     Yes

## 2023-06-06 NOTE — PLAN OF CARE
Problem: Plan of Care - These are the overarching goals to be used throughout the patient stay.    Goal: Plan of Care Review  Description: The Plan of Care Review/Shift note should be completed every shift.  The Outcome Evaluation is a brief statement about your assessment that the patient is improving, declining, or no change.  This information will be displayed automatically on your shift note.  Outcome: Progressing     Pt alert and oriented. Pt is on 5L oxygen via NC. IV Fluids continuous. Pt uses urinal at bedside. Pt requested Ibuprofen for use between Tylenol doses. MD was paged and received PRN Ibuprofen orders- Ibuprofen was effective for pain. Pt tolerating regular diet. Family visited at bedside for dinner. Discharge to home once medically cleared.

## 2023-06-06 NOTE — PROGRESS NOTES
"[facility] Pulmonary/Critical Care Consult Team Note    Billy Carroll,  1955, MRN [id number]  Admitting Dx: Pneumonia of both lungs due to infectious organism, unspecified part of lung [J18.9]  Date / Time of Admission:  2023  6:41 PM    ID Studies  West Nile - pending  Erhlichia  - pending  Lyme - pending  Brucella - neg  Parasite - pending  Crypto - neg  Blasto - pending  Fungal - pending  Babesia - neg  Histo - pending  Strep - neg  Legionella- neg  resp viral panel - neg  Beta-Glucan - POSITIVE    Results of the Bronchoscopy are pending. On visual appearance the mucosa was not inflamed and there were no secretions indicating bacterial PNA  Discussed with him, he is also hoping the steroids will help with his back pain  He is still on 5L Oxymask      Assessment/Plan: Billy Carroll is a 67 year old male with PMHx of IgG multiple myeloma on Revlimid, Velcade and dexamethsone started on 5/10/2023 who started having a cough and was started on Abx with no improvement and a abnormal CXR told to come to the ED for evaluation    Bronchitis/Pneumonitis vs Drug Reaction  - Given pt is immunosuppressed fungal, viral and PNA panel  - agree with course of levaquin  - He was only on decadron once a week related to his injections  - unclear how to interpret the positive B-glucan test. I would give steroids given he is still needing supplemental O2, will start Methylpred 40 q12     Medical Care Time excluding procedures and family discussions greater than: 45 Minutes    Risk Factors Present on Admission:  Clinically Significant Risk Factors              # Hypoalbuminemia: Lowest albumin = 2.5 g/dL at 6/3/2023  8:39 AM, will monitor as appropriate            # Overweight: Estimated body mass index is 29.92 kg/m  as calculated from the following:    Height as of this encounter: 1.778 m (5' 10\").    Weight as of this encounter: 94.6 kg (208 lb 8.9 oz)., PRESENT ON ADMISSION                 Maru Malagon,   Pulmonary " "and Critical Care Attending  pgr [phone]    Allergies   Allergen Reactions     Codeine Nausea       Meds: See MAR    Physical Exam:  /55 (BP Location: Right arm)   Pulse 89   Temp 98.1  F (36.7  C) (Oral)   Resp 16   Ht 1.778 m (5' 10\")   Wt 94.6 kg (208 lb 8.9 oz)   SpO2 90%   BMI 29.92 kg/m    Intake/Output this shift:  I/O this shift:  In: 240 [P.O.:240]  Out: -   GEN: sitting up in bed, NAD  HEENT: MMM, large collar size, ventimask in place  CVS: regular rhythm, no murmurs  RESP: faint crackles maría, no wheezing  ABD: Soft, No abdominal pain with palpation, no guarding, no rigidity  EXT: Warm, well perfused, no edema  NEURO: moving all extremities, nonfocal  PSYCH: pleasant    Pertinent Labs: Latest lab results in EHR personally reviewed.   CMP  Recent Labs   Lab 06/06/23  0642 06/05/23  0749 06/04/23  0739 06/03/23  0839 06/02/23  1857   NA  --   --  135* 136 137   POTASSIUM  --   --  4.1 4.0 3.8   CHLORIDE  --   --  104 101 101   CO2  --   --  21* 26 25   ANIONGAP  --   --  10 9 11   GLC  --   --  134* 189* 152*   BUN  --   --  15 20 24*   CR 0.76 0.85 0.87 0.80 0.82   GFRESTIMATED >90 >90 >90 >90 >90   SESAR  --   --  8.7 9.0 10.2   PROTTOTAL  --   --  6.7 6.5 7.4   ALBUMIN  --   --  2.6* 2.5* 2.9*   BILITOTAL  --   --  0.4 0.3 0.5   ALKPHOS  --   --  135* 132* 155*   AST  --   --  28 19 32   ALT  --   --  65* 67* 95*     CBC  Recent Labs   Lab 06/04/23  0739 06/03/23  0839 06/02/23  1857   WBC 7.4 6.4 8.3   RBC 2.57* 2.39* 2.52*   HGB 8.7* 8.0* 8.5*   HCT 26.6* 24.4* 25.8*   * 102* 102*   MCH 33.9* 33.5* 33.7*   MCHC 32.7 32.8 32.9   RDW 16.2* 15.9* 15.9*    303 363     INRNo lab results found in last 7 days.  Arterial Blood GasNo lab results found in last 7 days.    Cultures: personally reviewed.   7-Day Micro Results    Collected Updated Procedure Result Status    06/05/2023 1334 06/06/2023 0721 Cytology non gyn - BAL Sites [[id number]]   Bronchial Alveolar Lavage from " Bronchus, Right    In process Component Value   No component results          06/05/2023 1334 06/05/2023 1529 Cell count with differential fluid - BAL Site 1 [[id number]]    Bronchial Alveolar Lavage from Bronchus, Right    In process Component Value   No component results          06/05/2023 1334 06/05/2023 2007 Respiratory Aerobic Bacterial Culture with Gram Stain [[id number]]   Bronchial Alveolar Lavage from Bronchus, Right    Preliminary result Component Value   Gram Stain Result <25 PMNs/low power field P    No organisms seen P             06/05/2023 1334 06/05/2023 1340 Acid-Fast Bacilli Culture and Stain with AFB Stain [[id number]]    Bronchial Alveolar Lavage from Lung, Right    In process Component Value   No component results          06/05/2023 1334 06/05/2023 1344 Fungus Culture, non-blood - BAL Site 1 [[id number]]   Bronchial Alveolar Lavage from Lung, Right    In process Component Value   No component results             06/05/2023 1334 06/05/2023 2044 Koh prep - BAL Site 1 [[id number]]   Bronchial Alveolar Lavage from Lung, Right    Final result Component Value   KOH Preparation No fungal elements seen   KOH Preparation Reference Range: No fungal elements seen.          06/05/2023 1334 06/05/2023 1344 Legionella culture - BAL Site 1 [[id number]]   Bronchial Alveolar Lavage from Lung, Right    In process Component Value   No component results             06/05/2023 1334 06/05/2023 2029 Respiratory Panel PCR - BAL Site 1 [[id number]]    Bronchial Alveolar Lavage from Bronchus    Final result Component Value   Adenovirus Not Detected   Coronavirus Not Detected   This test detects Coronavirus 229E, HKU1, NL63 and OC43 but does not distinguish between them. It does not detect MERS ( Respiratory Syndrome), SARS (Severe Acute Respiratory Syndrome) or 2019-nCoV (Novel 2019) Coronavirus.   Human Metapneumovirus Not Detected   Human Rhin/Enterovirus Not Detected   Influenza A Not  Detected   Influenza A, H1 Not Detected   Influenza A 2009 H1N1 Not Detected   Influenza A, H3 Not Detected   Influenza B Not Detected   Parainfluenza Virus 1 Not Detected   Parainfluenza Virus 2 Not Detected   Parainfluenza Virus 3 Not Detected   Parainfluenza Virus 4 Not Detected   Respiratory Syncytial Virus A Not Detected   Respiratory Syncytial Virus B Not Detected   Chlamydia Pneumoniae Not Detected   Mycoplasma Pneumoniae Not Detected          06/05/2023 1334 06/05/2023 1340 CMV DNA quantification - BAL Site 1 [[id number]]   Bronchial Alveolar Lavage from Lung, Right    In process Component Value   No component results          06/05/2023 1334 06/05/2023 1529 Cell Count Body Fluid [[id number]]    Bronchial Alveolar Lavage from Bronchus, Right    Final result Component Value Units   Color Colorless    Clarity Clear    Cell Count Fluid Source Lung, Right    Total Nucleated Cells 21 /uL          06/05/2023 1334 06/05/2023 1340 Acid-Fast Bacilli Culture and Stain with AFB Stain [[id number]]   Bronchial Alveolar Lavage from Lung, Right    In process Component Value   No component results          06/05/2023 1334 06/05/2023 1340 Differential Body Fluid [[id number]]   Bronchial Alveolar Lavage from Bronchus, Right    In process Component Value   No component results          06/03/2023 1345 06/04/2023 1437 Blood parasitology exam [[id number]]    Peripheral Blood    Final result Component Value   Babesia Negative   Anaplasma Negative   If Anaplasma (Ehrlichia) infection is highly suspected, consider obtaining blood PCR specific assay.  Light microscopy does not exclude the diagnosis.    Ehrlichia Negative          06/03/2023 0959 06/05/2023 0024 1,3 Beta D glucan fungitell [[id number]]   (Abnormal)   Blood from Arm, Right    Final result Component Value Units   (1,3)-Beta-D-Glucan >500 pg/mL   B-D GLUCAN INTERPRETATION (1,3) Positive Abnormal     INTERPRETIVE INFORMATION: (1,3)-beta-D-glucan (Fungitell)        Less than 31 pg/mL ................... Negative     31-59 pg/mL .......................... Negative     60-79 pg/mL .......................... Indeterminate     Greater than or equal to 80 pg/mL .... Positive     The Fungitell test is indicated for presumptive diagnosis   of fungal infection and should be used in conjunction with   other diagnostic procedures. This test does not detect   certain fungal species such as Cryptococcus, which produce   very low levels of (1,3)-beta-D-glucan. This test will not   detect the zygomycetes, such as Absidia, Mucor, and   Rhizopus, which are not known to produce   (1,3)-beta-D-glucan. In addition, the yeast phase of   Blastomyces dermatitidis produces little   (1,3)-beta-D-glucan and may not be detected by the assay.   Performed By: [facility]   [address] [place], [place] [id number]   : Benny Stein MD, PhD          06/03/2023 0959 06/03/2023 1021 Blastomyces antibody ID [[id number]]   Blood from Arm, Right    In process Component Value   No component results          06/03/2023 0959 06/06/2023 0552 Fungal Antibodies [[id number]]   Blood from Arm, Right    Final result Component Value Units   Blastomyces [place] by EIA 0.0 IV   INTERPRETIVE INFORMATION: Blastomyces Antibodies EIA, SER     0.9 IV or less.......Negative   1.0-1.4 IV...........Equivocal   1.5 IV or greater....Positive   Aspergillus Antibody by CF <1:8    INTERPRETIVE INFORMATION: Aspergillus Antibodies by CF     A titer of 1:8 or greater suggests Aspergillus infection or   allergy.  Cross-reactions with dimorphic fungi are not   unusual within the genus Aspergillus.   Performed By: [facility]   [address] [place], [place] [id number]   : Benny Stein MD, PhD   Coccidioides Antibody by CF <1:2    INTERPRETIVE INFORMATION: Coccidioides Ab by Complement   Fixation (CF)     Any titer suggests past or current infection. However,   greater  than 30 percent of cases with chronic residual   pulmonary disease have negative Complement Fixation (CF)   tests. Titers of less than 1:32 (even as low as 1:2) may   indicate past infection or self-limited disease;   anticoccidiodal CF antibody titers in excess of 1:16 may   indicate disseminated infection. CF serology may be used to   follow therapy. Antibody in CSF is considered diagnostic   for coccidioidal meningitis, although 10 percent of   patients with coccidioidal meningitis will not have   antibody in CSF.   Histoplasma Mycelia, CF <1:8    INTREPRETIVE INFORMATION: Histoplasma Mycelia Antibodies                             by CF   A titer of 1:8 or greater is generally considered   presumptive evidence of histoplasmosis. A titer of 1:32 or   greater or rising titers indicate strong presumptive   evidence of histoplasmosis. Cross reactions, usually at   lower titers, may occur with other fungal diseases.   Histoplasma Yeast, CF <1:8    INTERPRETIVE INFORMATION: Histoplasma Yeast Antibodies                             by CF   A titer of 1:8 or greater is generally considered   presumptive evidence of histoplasmosis. A titer of 1:32 or   greater or rising titers indicate strong presumptive   evidence of histoplasmosis. Cross reactions, usually at   lower titers, may occur with other fungal diseases.          06/03/2023 0959 06/03/2023 1021 Blastomyces Agn Quant EIA Blood [[id number]]   Blood from Arm, Right    In process Component Value   No component results          06/03/2023 0959 06/03/2023 1543 Cryptococcus antigen [[id number]]   Blood from Arm, Right    Final result Component Value   Cryptococcal Antigen Negative   Cryptococcal titer interpretation N/A          06/03/2023 0839 06/03/2023 0940 Histoplasma Capsulatum Antigen [[id number]]   Blood from Arm, Right    In process Component Value   No component results           06/05/2023 1343 Respiratory Aerobic Bacterial Culture with Gram Stain    Sputum from Expectorate     Component Value   No component results             06/03/2023 0032 06/03/2023 0212 Asymptomatic COVID-19 Virus (Coronavirus) by PCR Nasopharyngeal [[id number]]    Swab from Nasopharyngeal    Final result Component Value   SARS CoV2 PCR Negative   NEGATIVE: SARS-CoV-2 (COVID-19) RNA not detected, presumed negative.          06/03/2023 0032 06/03/2023 1209 Respiratory Panel PCR [[id number]]    Swab from Nasopharyngeal    Final result Component Value   Adenovirus Not Detected   Coronavirus Not Detected   This test detects Coronavirus 229E, HKU1, NL63 and OC43 but does not distinguish between them. It does not detect MERS ( Respiratory Syndrome), SARS (Severe Acute Respiratory Syndrome) or 2019-nCoV (Novel 2019) Coronavirus.   Human Metapneumovirus Not Detected   Human Rhin/Enterovirus Not Detected   Influenza A Not Detected   Influenza A, H1 Not Detected   Influenza A 2009 H1N1 Not Detected   Influenza A, H3 Not Detected   Influenza B Not Detected   Parainfluenza Virus 1 Not Detected   Parainfluenza Virus 2 Not Detected   Parainfluenza Virus 3 Not Detected   Parainfluenza Virus 4 Not Detected   Respiratory Syncytial Virus A Not Detected   Respiratory Syncytial Virus B Not Detected   Chlamydia Pneumoniae Not Detected   Mycoplasma Pneumoniae Not Detected          06/02/2023 1931 06/03/2023 1007 Legionella pneumophila antigen urine [[id number]]   Urine, Midstream    Final result Component Value   Legionella pneumophila serogroup 1 urinary antigen Negative   Suggests no recent or current infection. Infection due to Legionella cannot be ruled out, since other serogroups and species may cause disease, antigen may not be present in urine in early infection, and the level of antigen present in the urine may be below detectable limits of the test.          06/02/2023 1931 06/03/2023 1007 Streptococcus pneumoniae antigen [[id number]]   Urine, Midstream    Final result Component  Value   Streptococcus pneumoniae antigen Negative   A negative Streptococcus pneumoniae antigen result does not rule out infection with Streptococcus pneumoniae.          06/02/2023 1912 06/06/2023 0105 Blood Culture Peripheral Blood [[id number]]   Peripheral Blood    Preliminary result Component Value   Culture No growth after 3 days P             06/02/2023 1857 06/05/2023 2216 Blood Culture Peripheral Blood [[id number]]   Peripheral Blood    Preliminary result Component Value   Culture No growth after 3 days P            Imaging: personally reviewed.   Results for orders placed or performed during the hospital encounter of 06/02/23   CT Chest Pulmonary Embolism w Contrast    Narrative    EXAM: CT CHEST PULMONARY EMBOLISM W CONTRAST  LOCATION: [facility]  DATE/TIME: 6/2/2023 8:00 PM CDT    INDICATION: Cough, fever, myeloma, immunosuppressed, shortness of breath, eval for pneumonia, PE, etc  COMPARISON: CT abdomen 02/24/2009  TECHNIQUE: CT chest pulmonary angiogram during arterial phase injection of IV contrast. Multiplanar reformats and MIP reconstructions were performed. Dose reduction techniques were used.   CONTRAST: 100 ml Isovue 370    FINDINGS:  ANGIOGRAM CHEST: Pulmonary arteries are normal caliber with no pulmonary emboli. Normal caliber thoracic aorta with no CTA signs of acute aortic syndrome.    LUNGS AND PLEURA: Bilateral symmetric peribronchovascular distribution multifocal ground-glass airspace opacity that is mid and upper lung predominant and compatible with nonspecific acute multifocal pneumonitis. A 6 mL right middle lobe nodule is stable   and benign. No pleural effusion. No pneumothorax.    MEDIASTINUM/AXILLAE: Heart size is within normal limits, but there is severe three-vessel coronary artery calcification and dense calcification and thickening of the aortic valve leaflets. No pericardial effusion. No lymphadenopathy.    CORONARY ARTERY CALCIFICATION:  Severe.    UPPER ABDOMEN: Normal.    MUSCULOSKELETAL: A 3.5 x 3.5 x 3.0 cm destructive soft tissue mass centered in the right side of the T4 vertebral body crosses the T4-T5 interspace to involve the right side of the T5 vertebral body and may also encroach upon the right ventral epidural   space. Additional diminutive lytic lesions throughout the visualized bones. Findings consistent with multiple myeloma.      Impression    IMPRESSION:  1.  No pulmonary emboli. No signs of acute aortic syndrome.  2.  Nonspecific acute symmetric bilateral multifocal pneumonitis.  3.  Heart size is within normal limits, but there is severe three-vessel coronary artery calcification and dense calcification and thickening of the aortic valve leaflets.   4.  A 3.5 x 3.5 x 3.0 cm destructive soft tissue mass centered in the right side of the T4 vertebral body crosses the T4-T5 interspace to involve the right side of the T5 vertebral body and may also encroach upon the right ventral epidural space.   Additional diminutive lytic lesions throughout the visualized bones. Findings consistent with multiple myeloma.       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    Family History  Reviewed, and family history is not on file.    Social History  Reviewed, and he  reports that he has never smoked. He has never used smokeless tobacco.    Allergies  Allergies   Allergen Reactions     Codeine Nausea              Maru Malagon, DO  Pulmonary and Critical Care Attending  [facility] [phone]    Securely message with the Vocera Web Console (learn more here)

## 2023-06-06 NOTE — PROGRESS NOTES
[facility]    Infectious Disease Progress Note    Date of Service : 06/06/2023     Assessment & Plan   Billy Carroll is a 67 year old male who was admitted on 6/2/2023.     ASSESSMENT:  Multifocal, bilateral pneumonia/pneumonitis- active issue  Immunosuppressed patient- On bortezomib infusions every Thursday.  Revlimid.  Started 14-day cycle on 5/31  MM plan was 12-week treatment induction followed by stem cell transplant- triplet therapy Revlimid, Velcade and dexamethsone he started on 5/10/2023.   Patient was at a cabin, had been planting, digging, irrigation system  Multiple mosquito bites  Tick bites, though patient thinks it was mostly wood ticks.  Patient had Lyme in the past  Broad differential diagnosis including opportunistic infections, viral bacterial fungal infections, tickborne infection, drug-induced pneumonitis.  Beta D glucan positive.  Back pain, MRI done at [facility] last week  Resp multiplex PCR panel negative.  Positive beta D glucan on 6/3/2023.  Clinical significance unclear.     Images on admission- reviewed       Previously on acyclovir, doxycycline, fluconazole, Levaquin, meropenem, Bactrim, and IV vancomycin.  Sputum cultures so far no growth.  Vancomycin can be effectively discontinued.  Also will discontinue Levaquin since he is on meropenem which will provide Pseudomonas coverage and doxycycline which will provide atypical coverage.  Will change fluconazole to micafungin for positive beta D glucan. IV Vanco and Levaquin and Fluconazole discontinued on 6/5. Micafungin started 6/5. S/P bronc. Cultures in process.       ID Studies  West Nile - pending  Erhlichia  - pending  Lyme - pending  Brucella - neg  Parasite - pending  Crypto - neg  Blasto - pending  Fungal - pending  Babesia - neg  Histo - negative   Strep - neg  Legionella- neg  resp viral panel - neg  Beta-Glucan - POSITIVE    RECOMMENDATIONS:    1. Pulmonary consult appreciated- plan for bronch on  6/5/2023  2. Continue acyclovir, doxycycline, meropenem, Bactrim.  3. Continue micafungin.  4. Follow-up pending results.  5. Risk of opportunistic infection- empiric TMP-SMX  6. Tick exposure/bite- Doxycycline while work up in process  7. Bacterial, Viral, PJP, Fungal work up in process  8. Back pain- follow up MRI at [facility] done earlier this week  9. Monitor CBC, CMP  10. Discussed with patient, son, nursing staff, and Pharm.D.  11. ID will follow    Tita Hernandez MD  [facility]  [phone]      Interval History   Doing okay today.  Had bronchoscopy yesterday.  Cultures reviewed.    Physical Exam   Temp: 98.1  F (36.7  C) Temp src: Oral BP: 114/55 Pulse: 89   Resp: 16 SpO2: 91 % O2 Device: Nasal cannula Oxygen Delivery: 6 LPM  Vitals:    06/04/23 0505 06/05/23 0509 06/06/23 0008   Weight: 88.6 kg (195 lb 4.8 oz) 88.2 kg (194 lb 6.4 oz) 94.6 kg (208 lb 8.9 oz)     Vital Signs with Ranges  Temp:  [98.1  F (36.7  C)-98.5  F (36.9  C)] 98.1  F (36.7  C)  Pulse:  [] 89  Resp:  [16-20] 16  BP: (112-143)/(55-70) 114/55  SpO2:  [84 %-97 %] 91 %    Constitutional: Awake, no apparent distress  Lungs: coughing, scattered harsh breath sounds  Cardiovascular: Regular rate and rhythm, S1 S2  Abdomen: non distended  Skin: warm  Neuro: deconditioned  Psych: able to answer questions    Medications       acetaminophen  975 mg Oral TID AC     acyclovir  400 mg Oral BID     aspirin  81 mg Oral Daily     doxycycline hyclate  100 mg Oral Q12H [facility] (08/20)     hydrochlorothiazide  12.5 mg Oral Daily     losartan  100 mg Oral Daily     melatonin  5 mg Oral At Bedtime     meropenem  1 g Intravenous Q8H     methylPREDNISolone  40 mg Intravenous Q12H     micafungin  100 mg Intravenous Q24H     simvastatin  40 mg Oral Daily     sodium chloride (PF)  3 mL Intracatheter Q8H     sulfamethoxazole-trimethoprim  2 tablet Oral BID       Data   All microbiology laboratory data  reviewed.  Reviewed CBC  Reviewed Creatinine    Recent Labs   Lab Test 06/04/23  0739 06/03/23  0839 06/02/23  1857   WBC 7.4 6.4 8.3   HGB 8.7* 8.0* 8.5*   HCT 26.6* 24.4* 25.8*   * 102* 102*    303 363     Recent Labs   Lab Test 06/06/23  0642 06/05/23  0749 06/04/23  0739   CR 0.76 0.85 0.87     No lab results found.  No lab results found.    Invalid input(s):     MICROBIOLOGY:    Reviewed Smear, resp panel, culture results    7-Day Micro Results     Collected Updated Procedure Result Status      06/05/2023 1334 06/06/2023 0721 Cytology non gyn - BAL Sites [[id number]]   Bronchial Alveolar Lavage from Bronchus, Right    In process Component Value   No component results            06/05/2023 1334 06/06/2023 1101 Cell count with differential fluid - BAL Site 1 [[id number]]    Bronchial Alveolar Lavage from Bronchus, Right    Final result Component Value   No component results            06/05/2023 1334 06/06/2023 1205 Respiratory Aerobic Bacterial Culture with Gram Stain [[id number]]   Bronchial Alveolar Lavage from Bronchus, Right    Preliminary result Component Value   Culture No growth, less than 1 day  [P]    Gram Stain Result <25 PMNs/low power field  [P]     No organisms seen  [P]                06/05/2023 1334 06/05/2023 1340 Acid-Fast Bacilli Culture and Stain with AFB Stain [[id number]]    Bronchial Alveolar Lavage from Lung, Right    In process Component Value   No component results            06/05/2023 1334 06/05/2023 1344 Fungus Culture, non-blood - BAL Site 1 [[id number]]   Bronchial Alveolar Lavage from Lung, Right    In process Component Value   No component results               06/05/2023 1334 06/05/2023 2044 Koh prep - BAL Site 1 [[id number]]   Bronchial Alveolar Lavage from Lung, Right    Final result Component Value   KOH Preparation No fungal elements seen   KOH Preparation Reference Range: No fungal elements seen.            06/05/2023 1334 06/06/2023 1027 Legionella  culture - BAL Site 1 [[id number]]   Bronchial Alveolar Lavage from Lung, Right    Preliminary result Component Value   Culture Culture negative, monitoring continues  [P]                06/05/2023 1334 06/05/2023 2029 Respiratory Panel PCR - BAL Site 1 [[id number]]    Bronchial Alveolar Lavage from Bronchus    Final result Component Value   Adenovirus Not Detected   Coronavirus Not Detected   This test detects Coronavirus 229E, HKU1, NL63 and OC43 but does not distinguish between them. It does not detect MERS ( Respiratory Syndrome), SARS (Severe Acute Respiratory Syndrome) or 2019-nCoV (Novel 2019) Coronavirus.   Human Metapneumovirus Not Detected   Human Rhin/Enterovirus Not Detected   Influenza A Not Detected   Influenza A, H1 Not Detected   Influenza A 2009 H1N1 Not Detected   Influenza A, H3 Not Detected   Influenza B Not Detected   Parainfluenza Virus 1 Not Detected   Parainfluenza Virus 2 Not Detected   Parainfluenza Virus 3 Not Detected   Parainfluenza Virus 4 Not Detected   Respiratory Syncytial Virus A Not Detected   Respiratory Syncytial Virus B Not Detected   Chlamydia Pneumoniae Not Detected   Mycoplasma Pneumoniae Not Detected            06/05/2023 1334 06/06/2023 1305 CMV DNA quantification - BAL Site 1 [[id number]]    Bronchial Alveolar Lavage from Lung, Right    Final result Component Value Units   CMV DNA IU/mL Not Detected IU/mL            06/05/2023 1334 06/05/2023 1529 Cell Count Body Fluid [[id number]]    Bronchial Alveolar Lavage from Bronchus, Right    Final result Component Value Units   Color Colorless    Clarity Clear    Cell Count Fluid Source Lung, Right    Total Nucleated Cells 21 /uL            06/05/2023 1334 06/05/2023 1340 Acid-Fast Bacilli Culture and Stain with AFB Stain [[id number]]   Bronchial Alveolar Lavage from Lung, Right    In process Component Value   No component results            06/05/2023 1334 06/06/2023 1101 Differential Body Fluid  [[id number]]    Bronchial Alveolar Lavage from Bronchus, Right    Final result Component Value Units   % Neutrophils 13 %   % Lymphocytes 35 %   % Monocyte/Macrophages 34 %   % Eosinophils 1 %   % Lining Cells 14 %   % Other Cells 3 %            06/03/2023 1345 06/04/2023 1437 Blood parasitology exam [[id number]]    Peripheral Blood    Final result Component Value   Babesia Negative   Anaplasma Negative   If Anaplasma (Ehrlichia) infection is highly suspected, consider obtaining blood PCR specific assay.  Light microscopy does not exclude the diagnosis.     Ehrlichia Negative            06/03/2023 0959 06/05/2023 0024 1,3 Beta D glucan fungitell [[id number]]   (Abnormal)   Blood from Arm, Right    Final result Component Value Units   (1,3)-Beta-D-Glucan >500 pg/mL   B-D GLUCAN INTERPRETATION (1,3) Positive    INTERPRETIVE INFORMATION: (1,3)-beta-D-glucan (Fungitell)      Less than 31 pg/mL ................... Negative    31-59 pg/mL .......................... Negative    60-79 pg/mL .......................... Indeterminate    Greater than or equal to 80 pg/mL .... Positive    The Fungitell test is indicated for presumptive diagnosis   of fungal infection and should be used in conjunction with   other diagnostic procedures. This test does not detect   certain fungal species such as Cryptococcus, which produce   very low levels of (1,3)-beta-D-glucan. This test will not   detect the zygomycetes, such as Absidia, Mucor, and   Rhizopus, which are not known to produce   (1,3)-beta-D-glucan. In addition, the yeast phase of   Blastomyces dermatitidis produces little   (1,3)-beta-D-glucan and may not be detected by the assay.  Performed By: [facility]  [address] [place] [id number]  : Benny Stein MD, PhD            06/03/2023 0959 06/03/2023 1021 Blastomyces antibody ID [[id number]]   Blood from Arm, Right    In process Component Value   No component results             06/03/2023 0959 06/06/2023 0552 Fungal Antibodies [[id number]]   Blood from Arm, Right    Final result Component Value Units   Blastomyces [place] by EIA 0.0 IV   INTERPRETIVE INFORMATION: Blastomyces Antibodies EIA, SER    0.9 IV or less.......Negative  1.0-1.4 IV...........Equivocal   1.5 IV or greater....Positive   Aspergillus Antibody by CF <1:8    INTERPRETIVE INFORMATION: Aspergillus Antibodies by CF     A titer of 1:8 or greater suggests Aspergillus infection or   allergy.  Cross-reactions with dimorphic fungi are not   unusual within the genus Aspergillus.  Performed By: [facility]  [address] [place] [id number]  : Benny Stein MD, PhD   Coccidioides Antibody by CF <1:2    INTERPRETIVE INFORMATION: Coccidioides Ab by Complement   Fixation (CF)    Any titer suggests past or current infection. However,   greater than 30 percent of cases with chronic residual   pulmonary disease have negative Complement Fixation (CF)   tests. Titers of less than 1:32 (even as low as 1:2) may   indicate past infection or self-limited disease;   anticoccidiodal CF antibody titers in excess of 1:16 may   indicate disseminated infection. CF serology may be used to   follow therapy. Antibody in CSF is considered diagnostic   for coccidioidal meningitis, although 10 percent of   patients with coccidioidal meningitis will not have   antibody in CSF.   Histoplasma Mycelia, CF <1:8    INTREPRETIVE INFORMATION: Histoplasma Mycelia Antibodies                            by CF  A titer of 1:8 or greater is generally considered   presumptive evidence of histoplasmosis. A titer of 1:32 or   greater or rising titers indicate strong presumptive   evidence of histoplasmosis. Cross reactions, usually at   lower titers, may occur with other fungal diseases.   Histoplasma Yeast, CF <1:8    INTERPRETIVE INFORMATION: Histoplasma Yeast Antibodies                             by CF  A titer of 1:8 or greater is  generally considered   presumptive evidence of histoplasmosis. A titer of 1:32 or   greater or rising titers indicate strong presumptive   evidence of histoplasmosis. Cross reactions, usually at   lower titers, may occur with other fungal diseases.            06/03/2023 0959 06/03/2023 1021 Blastomyces Agn Quant EIA Blood [[id number]]   Blood from Arm, Right    In process Component Value   No component results            06/03/2023 0959 06/03/2023 1543 Cryptococcus antigen [[id number]]   Blood from Arm, Right    Final result Component Value   Cryptococcal Antigen Negative   Cryptococcal titer interpretation N/A            06/03/2023 0839 06/03/2023 0940 Histoplasma Capsulatum Antigen [[id number]]   Blood from Arm, Right    In process Component Value   No component results             06/05/2023 1343 Respiratory Aerobic Bacterial Culture with Gram Stain   Sputum from Expectorate     Component Value   No component results               06/03/2023 0032 06/03/2023 0212 Asymptomatic COVID-19 Virus (Coronavirus) by PCR Nasopharyngeal [[id number]]    Swab from Nasopharyngeal    Final result Component Value   SARS CoV2 PCR Negative   NEGATIVE: SARS-CoV-2 (COVID-19) RNA not detected, presumed negative.            06/03/2023 0032 06/03/2023 1209 Respiratory Panel PCR [[id number]]    Swab from Nasopharyngeal    Final result Component Value   Adenovirus Not Detected   Coronavirus Not Detected   This test detects Coronavirus 229E, HKU1, NL63 and OC43 but does not distinguish between them. It does not detect MERS ( Respiratory Syndrome), SARS (Severe Acute Respiratory Syndrome) or 2019-nCoV (Novel 2019) Coronavirus.   Human Metapneumovirus Not Detected   Human Rhin/Enterovirus Not Detected   Influenza A Not Detected   Influenza A, H1 Not Detected   Influenza A 2009 H1N1 Not Detected   Influenza A, H3 Not Detected   Influenza B Not Detected   Parainfluenza Virus 1 Not Detected   Parainfluenza Virus 2 Not  Detected   Parainfluenza Virus 3 Not Detected   Parainfluenza Virus 4 Not Detected   Respiratory Syncytial Virus A Not Detected   Respiratory Syncytial Virus B Not Detected   Chlamydia Pneumoniae Not Detected   Mycoplasma Pneumoniae Not Detected            06/02/2023 1931 06/03/2023 1007 Legionella pneumophila antigen urine [[id number]]   Urine, Midstream    Final result Component Value   Legionella pneumophila serogroup 1 urinary antigen Negative   Suggests no recent or current infection. Infection due to Legionella cannot be ruled out, since other serogroups and species may cause disease, antigen may not be present in urine in early infection, and the level of antigen present in the urine may be below detectable limits of the test.            06/02/2023 1931 06/03/2023 1007 Streptococcus pneumoniae antigen [[id number]]   Urine, Midstream    Final result Component Value   Streptococcus pneumoniae antigen Negative   A negative Streptococcus pneumoniae antigen result does not rule out infection with Streptococcus pneumoniae.            06/02/2023 1912 06/06/2023 0105 Blood Culture Peripheral Blood [[id number]]   Peripheral Blood    Preliminary result Component Value   Culture No growth after 3 days  [P]                06/02/2023 1857 06/05/2023 2216 Blood Culture Peripheral Blood [[id number]]   Peripheral Blood    Preliminary result Component Value   Culture No growth after 3 days  [P]                       RADIOLOGY:    Reviewed  CT Chest Pulmonary Embolism w Contrast    Result Date: 6/2/2023  EXAM: CT CHEST PULMONARY EMBOLISM W CONTRAST LOCATION: [facility] DATE/TIME: 6/2/2023 8:00 PM CDT INDICATION: Cough, fever, myeloma, immunosuppressed, shortness of breath, eval for pneumonia, PE, etc COMPARISON: CT abdomen 02/24/2009 TECHNIQUE: CT chest pulmonary angiogram during arterial phase injection of IV contrast. Multiplanar reformats and MIP reconstructions were performed. Dose  reduction techniques were used. CONTRAST: 100 ml Isovue 370 FINDINGS: ANGIOGRAM CHEST: Pulmonary arteries are normal caliber with no pulmonary emboli. Normal caliber thoracic aorta with no CTA signs of acute aortic syndrome. LUNGS AND PLEURA: Bilateral symmetric peribronchovascular distribution multifocal ground-glass airspace opacity that is mid and upper lung predominant and compatible with nonspecific acute multifocal pneumonitis. A 6 mL right middle lobe nodule is stable  and benign. No pleural effusion. No pneumothorax. MEDIASTINUM/AXILLAE: Heart size is within normal limits, but there is severe three-vessel coronary artery calcification and dense calcification and thickening of the aortic valve leaflets. No pericardial effusion. No lymphadenopathy. CORONARY ARTERY CALCIFICATION: Severe. UPPER ABDOMEN: Normal. MUSCULOSKELETAL: A 3.5 x 3.5 x 3.0 cm destructive soft tissue mass centered in the right side of the T4 vertebral body crosses the T4-T5 interspace to involve the right side of the T5 vertebral body and may also encroach upon the right ventral epidural space. Additional diminutive lytic lesions throughout the visualized bones. Findings consistent with multiple myeloma.     IMPRESSION: 1.  No pulmonary emboli. No signs of acute aortic syndrome. 2.  Nonspecific acute symmetric bilateral multifocal pneumonitis. 3.  Heart size is within normal limits, but there is severe three-vessel coronary artery calcification and dense calcification and thickening of the aortic valve leaflets. 4.  A 3.5 x 3.5 x 3.0 cm destructive soft tissue mass centered in the right side of the T4 vertebral body crosses the T4-T5 interspace to involve the right side of the T5 vertebral body and may also encroach upon the right ventral epidural space. Additional diminutive lytic lesions throughout the visualized bones. Findings consistent with multiple myeloma.

## 2023-06-06 NOTE — PLAN OF CARE
Problem: Pain Acute  Goal: Optimal Pain Control and Function  Intervention: Prevent or Manage Pain  Recent Flowsheet Documentation  Taken 6/6/2023 0530 by Josefa Smallwood RN  Bowel Elimination Promotion:   adequate fluid intake promoted   privacy promoted  Medication Review/Management: medications reviewed  Taken 6/6/2023 0130 by Josefa Smallwood RN  Bowel Elimination Promotion:   adequate fluid intake promoted   privacy promoted  Medication Review/Management: medications reviewed   Goal Outcome Evaluation:    A&OX4 and VSS on RA. Ind in room w/ urinal by bedside. Constipation treated w/ senna. Senna PRN BID and given one time on shift. Pt reports minimal pain and treated w/ Norco proving to be effective. Multiple cultures pending. NS infusing 125. Reg diet. Pt on 5 LO2 via NC. Cough frequent and course lung sounds. Discharge home pending cultures and medical progression.

## 2023-06-06 NOTE — PLAN OF CARE
Problem: Gas Exchange Impaired  Goal: Optimal Gas Exchange  Outcome: Progressing  Intervention: Optimize Oxygenation and Ventilation  Recent Flowsheet Documentation  Taken 6/6/2023 0808 by Debbi Gr RN  Head of Bed (HOB) Positioning: HOB at 60-90 degrees     Problem: Pain Acute  Goal: Optimal Pain Control and Function  Outcome: Progressing  Intervention: Develop Pain Management Plan  Recent Flowsheet Documentation  Taken 6/6/2023 1302 by Debbi Gr RN  Pain Management Interventions: medication (see MAR)  Intervention: Prevent or Manage Pain  Recent Flowsheet Documentation  Taken 6/6/2023 1530 by Debbi Gr RN  Medication Review/Management: medications reviewed  Taken 6/6/2023 0830 by Debbi Gr RN  Medication Review/Management: medications reviewed   Goal Outcome Evaluation:    Pt c/o of lower back pain and just not being able to get comfortable.  He was given some Norco with some relief and a hydroxazine.  His O2 has been in the high 80's to low 90's all day.  With activity he gets short of breath and his O2 drops to the low 80's.  Switched him to a forehead pulse ox instead of a finger one and this seems to work better. At one point he was on 10 L via oxymask after getting up to go to the bathroom.  He is on 7L NC and is sating in the low 90's.  He has his call light within reach and makes his needs known.

## 2023-06-06 NOTE — PROGRESS NOTES
Oncology Progress Note:    DOS:  6/6/2023    Oncologic History:  4/18/2018: He had his routine annual examination with his primary care provider.  Comprehensive metabolic profile showed elevated total protein of 8.4.  Calcium and creatinine were all normal.    4/20/2018 serum protein electrophoresis was performed which showed a monoclonal peak of 1.4 g/dL.  Immunofixation revealed monoclonal IgG kappa.  Patient was referred to hematology clinic for further evaluation.    4/13/2018: Quantitative immunoglobulin showed IgG to be 2246 and IgA 30.8 and IgM 26 (high IgG and low IgA and IgM).  Hemoglobin normal.  Serum protein electrophoresis showed an M protein of 1.7 g/dL.  Kappa light chain 2.36 and lambda light chain 0.77 with a kappa to lambda ratio of 3.06.  24 hours urine electrophoresis showed no increase in protein.  Skeletal survey showed no lytic lesions.    5/9/2018: Bone marrow biopsy shows plasma cell neoplasm involving 5% of marrow cellularity.    8/25/2022: Bone marrow biopsy shows plasma cell involving 10-15% of the bone marrow.  FISH for multiple myeloma shows positivity for a trisomy result with gains of chromosome 5, 9, and 15 suggesting hyperdiploidy.  Negative for remaining panel probes.    4/19/2023: CT chest without contrast shows large soft tissue mass centered at the right T5 costovertebral junction measuring up to 4.6 cm.  Associated osseous destruction of T4, T5 and medial right fifth rib, most significant for moderate compression deformity at T5 and soft tissue extension into the spinal cannel [appearing to abut the spinal cord].  Diffuse lytic lesions throughout the remainder of the visible skeleton.  Findings are typical for multiple myeloma in light of known history.    4/25/2023:  M-spike = 2.29.  Kappa light chain = 11.82, Lambda light chain = 0.29, K/L light chain ratio = 40.76.  IgG = 2617.54, IgA = 21.02, IgM = <15.00.    5/3/2023 to 5/9/2023:  Received IMRT radiation to T3-T5 vertebral  bodies, 2000 cGy to vertebral bodies, 2500 cGy boost to gross disease) under the direction of Dr. Arcelia Sheldon.     5/11/2023 - 5/25/2023:  Cycle 1 RVD - lenalidomide 25 mg days 1 - 21, bortezomib 2.8 mg subcutaneously weekly and dexamethasone 40 mg weekly).  M-spike = 0.74. Kappa light chain = 0.79, Lambda light chain = 0.27, K/L light chain ratio = 2.93.      6/1/2023:  Cycle 2, Day 1.  Started lenalidomide 25 mg and received bortezomib 2.8 mg subcutaneously.  Took dexamethasone 40 mg.  M protein = 0.48, Kappa light chain = 2.43, Lambda light chain = 1.03, K/L light chain ratio = 2.36, IgG = 746.62, IgA = 56.08,  IgM = 25.43.      Assessment/Plan:    1)  Multifocal pneumonitis:  Appreciate extensive work up and management of his oxygenation needs.  Several results still pending (See Dr. Malagon's note).  I reviewed the list with the patient so he could better understand that several results are pending and why those results take longer to receive.  Defer to pulmonary and ID regarding management plan.    2)  Multiple Myeloma:  Continue to hold therapy until current situation resolved.  If no infectious source is identified, would start course of prednisone.  Discussed with Dr. Ruelas and both lenalidomide and bortezomib can have pulmonary toxicity.  We will follow along closely to determine if/when prednisone should be initiated.  Just started Solu Medrol 40 mg IV every 12 hours this morning.      3)  Back pain:  Continue current pain management plan.     4)  Anemia:  Transfuse if Hgb < 7.0.  Would recommend CBC every other day to monitor.      Subjective:  Patient resting in bed with face mask on for oxygen.  Notes that the face mask makes him nervous and claustrophobic but also notes that the nasal cannula was always beeping because his oxygen saturations are low.  Discouraged by his respiratory status.  Notes continued back pain.      Objective:    General:  Alert, oriented and appears anxious.  VSS.      Labs  & Imaging reviewed.      We will continue to follow closely.     Gabby Owen, RN, MS, CNP  [facility]  [phone]  Cell:  [phone] (Tuesday - Friday, 8:30 am to 5 pm)

## 2023-06-07 ENCOUNTER — APPOINTMENT (OUTPATIENT)
Dept: CT IMAGING | Facility: CLINIC | Age: 68
DRG: 193 | End: 2023-06-07
Attending: HOSPITALIST
Payer: MEDICARE

## 2023-06-07 LAB
ABO/RH(D): NORMAL
ANION GAP SERPL CALCULATED.3IONS-SCNC: 8 MMOL/L (ref 5–18)
ANTIBODY SCREEN: NEGATIVE
B DERMAT AB SER QL ID: NOT DETECTED
BACTERIA BLD CULT: NO GROWTH
BASE EXCESS BLDV CALC-SCNC: 3.9 MMOL/L
BLD PROD TYP BPU: NORMAL
BLOOD COMPONENT TYPE: NORMAL
BNP SERPL-MCNC: 52 PG/ML (ref 0–65)
BUN SERPL-MCNC: 24 MG/DL (ref 8–22)
CALCIUM SERPL-MCNC: 8.7 MG/DL (ref 8.5–10.5)
CHLORIDE BLD-SCNC: 102 MMOL/L (ref 98–107)
CO2 SERPL-SCNC: 26 MMOL/L (ref 22–31)
CODING SYSTEM: NORMAL
CREAT SERPL-MCNC: 0.75 MG/DL (ref 0.7–1.3)
CREAT SERPL-MCNC: 0.77 MG/DL (ref 0.7–1.3)
CROSSMATCH: NORMAL
ERYTHROCYTE [DISTWIDTH] IN BLOOD BY AUTOMATED COUNT: 15.8 % (ref 10–15)
GFR SERPL CREATININE-BSD FRML MDRD: >90 ML/MIN/1.73M2
GFR SERPL CREATININE-BSD FRML MDRD: >90 ML/MIN/1.73M2
GLUCOSE BLD-MCNC: 135 MG/DL (ref 70–125)
HCO3 BLDV-SCNC: 28 MMOL/L (ref 24–30)
HCT VFR BLD AUTO: 20.9 % (ref 40–53)
HGB BLD-MCNC: 7 G/DL (ref 13.3–17.7)
HGB BLD-MCNC: 8.6 G/DL (ref 13.3–17.7)
ISSUE DATE AND TIME: NORMAL
MCH RBC QN AUTO: 33.7 PG (ref 26.5–33)
MCHC RBC AUTO-ENTMCNC: 33.5 G/DL (ref 31.5–36.5)
MCV RBC AUTO: 101 FL (ref 78–100)
OXYHGB MFR BLDV: 75.8 % (ref 70–75)
PCO2 BLDV: 40 MM HG (ref 35–50)
PH BLDV: 7.45 [PH] (ref 7.35–7.45)
PLATELET # BLD AUTO: 373 10E3/UL (ref 150–450)
PO2 BLDV: 43 MM HG (ref 25–47)
POTASSIUM BLD-SCNC: 4.6 MMOL/L (ref 3.5–5)
RBC # BLD AUTO: 2.08 10E6/UL (ref 4.4–5.9)
SAO2 % BLDV: 77.7 % (ref 70–75)
SODIUM SERPL-SCNC: 136 MMOL/L (ref 136–145)
SPECIMEN EXPIRATION DATE: NORMAL
UNIT ABO/RH: NORMAL
UNIT NUMBER: NORMAL
UNIT STATUS: NORMAL
UNIT TYPE ISBT: 5100
WBC # BLD AUTO: 8 10E3/UL (ref 4–11)

## 2023-06-07 PROCEDURE — 82805 BLOOD GASES W/O2 SATURATION: CPT | Performed by: HOSPITALIST

## 2023-06-07 PROCEDURE — 83880 ASSAY OF NATRIURETIC PEPTIDE: CPT | Performed by: HOSPITALIST

## 2023-06-07 PROCEDURE — 99233 SBSQ HOSP IP/OBS HIGH 50: CPT | Performed by: INTERNAL MEDICINE

## 2023-06-07 PROCEDURE — 258N000003 HC RX IP 258 OP 636: Performed by: STUDENT IN AN ORGANIZED HEALTH CARE EDUCATION/TRAINING PROGRAM

## 2023-06-07 PROCEDURE — 82565 ASSAY OF CREATININE: CPT | Performed by: FAMILY MEDICINE

## 2023-06-07 PROCEDURE — 250N000013 HC RX MED GY IP 250 OP 250 PS 637: Performed by: INTERNAL MEDICINE

## 2023-06-07 PROCEDURE — 85027 COMPLETE CBC AUTOMATED: CPT | Performed by: HOSPITALIST

## 2023-06-07 PROCEDURE — 86923 COMPATIBILITY TEST ELECTRIC: CPT | Performed by: FAMILY MEDICINE

## 2023-06-07 PROCEDURE — 250N000011 HC RX IP 250 OP 636: Performed by: FAMILY MEDICINE

## 2023-06-07 PROCEDURE — 250N000011 HC RX IP 250 OP 636: Performed by: HOSPITALIST

## 2023-06-07 PROCEDURE — 36415 COLL VENOUS BLD VENIPUNCTURE: CPT | Performed by: HOSPITALIST

## 2023-06-07 PROCEDURE — 36415 COLL VENOUS BLD VENIPUNCTURE: CPT | Performed by: FAMILY MEDICINE

## 2023-06-07 PROCEDURE — 85018 HEMOGLOBIN: CPT | Performed by: FAMILY MEDICINE

## 2023-06-07 PROCEDURE — 999N000215 HC STATISTIC HFNC ADULT NON-CPAP

## 2023-06-07 PROCEDURE — P9016 RBC LEUKOCYTES REDUCED: HCPCS | Performed by: FAMILY MEDICINE

## 2023-06-07 PROCEDURE — G1010 CDSM STANSON: HCPCS

## 2023-06-07 PROCEDURE — 80048 BASIC METABOLIC PNL TOTAL CA: CPT | Performed by: HOSPITALIST

## 2023-06-07 PROCEDURE — 250N000013 HC RX MED GY IP 250 OP 250 PS 637: Performed by: FAMILY MEDICINE

## 2023-06-07 PROCEDURE — 86850 RBC ANTIBODY SCREEN: CPT | Performed by: FAMILY MEDICINE

## 2023-06-07 PROCEDURE — 250N000011 HC RX IP 250 OP 636: Performed by: INTERNAL MEDICINE

## 2023-06-07 PROCEDURE — 71250 CT THORAX DX C-: CPT | Mod: MG

## 2023-06-07 PROCEDURE — 120N000004 HC R&B MS OVERFLOW

## 2023-06-07 PROCEDURE — 272N000054 HC CANNULA HIGH FLOW, ADULT

## 2023-06-07 PROCEDURE — 86901 BLOOD TYPING SEROLOGIC RH(D): CPT | Performed by: FAMILY MEDICINE

## 2023-06-07 PROCEDURE — 999N000157 HC STATISTIC RCP TIME EA 10 MIN

## 2023-06-07 PROCEDURE — 99233 SBSQ HOSP IP/OBS HIGH 50: CPT | Performed by: FAMILY MEDICINE

## 2023-06-07 PROCEDURE — 99233 SBSQ HOSP IP/OBS HIGH 50: CPT | Performed by: STUDENT IN AN ORGANIZED HEALTH CARE EDUCATION/TRAINING PROGRAM

## 2023-06-07 PROCEDURE — 250N000011 HC RX IP 250 OP 636: Performed by: STUDENT IN AN ORGANIZED HEALTH CARE EDUCATION/TRAINING PROGRAM

## 2023-06-07 RX ORDER — ENOXAPARIN SODIUM 100 MG/ML
40 INJECTION SUBCUTANEOUS EVERY 24 HOURS
Status: DISCONTINUED | OUTPATIENT
Start: 2023-06-07 | End: 2023-06-11 | Stop reason: HOSPADM

## 2023-06-07 RX ORDER — METHYLPREDNISOLONE SODIUM SUCCINATE 125 MG/2ML
60 INJECTION, POWDER, LYOPHILIZED, FOR SOLUTION INTRAMUSCULAR; INTRAVENOUS EVERY 6 HOURS
Status: DISCONTINUED | OUTPATIENT
Start: 2023-06-07 | End: 2023-06-11

## 2023-06-07 RX ORDER — METHYLPREDNISOLONE SODIUM SUCCINATE 125 MG/2ML
60 INJECTION, POWDER, LYOPHILIZED, FOR SOLUTION INTRAMUSCULAR; INTRAVENOUS EVERY 6 HOURS
Status: DISCONTINUED | OUTPATIENT
Start: 2023-06-07 | End: 2023-06-07

## 2023-06-07 RX ORDER — FUROSEMIDE 10 MG/ML
40 INJECTION INTRAMUSCULAR; INTRAVENOUS EVERY 12 HOURS
Status: DISCONTINUED | OUTPATIENT
Start: 2023-06-07 | End: 2023-06-11

## 2023-06-07 RX ORDER — NICOTINE POLACRILEX 4 MG
15-30 LOZENGE BUCCAL
Status: DISCONTINUED | OUTPATIENT
Start: 2023-06-07 | End: 2023-06-11 | Stop reason: HOSPADM

## 2023-06-07 RX ORDER — DEXTROSE MONOHYDRATE 25 G/50ML
25-50 INJECTION, SOLUTION INTRAVENOUS
Status: DISCONTINUED | OUTPATIENT
Start: 2023-06-07 | End: 2023-06-11 | Stop reason: HOSPADM

## 2023-06-07 RX ADMIN — ACETAMINOPHEN 975 MG: 325 TABLET ORAL at 11:25

## 2023-06-07 RX ADMIN — SULFAMETHOXAZOLE AND TRIMETHOPRIM 2 TABLET: 800; 160 TABLET ORAL at 07:56

## 2023-06-07 RX ADMIN — FUROSEMIDE 40 MG: 10 INJECTION, SOLUTION INTRAVENOUS at 14:44

## 2023-06-07 RX ADMIN — HYDROCODONE BITARTRATE AND ACETAMINOPHEN 1 TABLET: 5; 325 TABLET ORAL at 04:25

## 2023-06-07 RX ADMIN — FUROSEMIDE 40 MG: 10 INJECTION, SOLUTION INTRAVENOUS at 03:13

## 2023-06-07 RX ADMIN — Medication 5 MG: at 21:53

## 2023-06-07 RX ADMIN — METHYLPREDNISOLONE SODIUM SUCCINATE 62.5 MG: 125 INJECTION, POWDER, FOR SOLUTION INTRAMUSCULAR; INTRAVENOUS at 14:44

## 2023-06-07 RX ADMIN — METHYLPREDNISOLONE SODIUM SUCCINATE 40 MG: 40 INJECTION, POWDER, FOR SOLUTION INTRAMUSCULAR; INTRAVENOUS at 10:22

## 2023-06-07 RX ADMIN — ASPIRIN 81 MG: 81 TABLET, COATED ORAL at 07:57

## 2023-06-07 RX ADMIN — MEROPENEM 1 G: 1 INJECTION, POWDER, FOR SOLUTION INTRAVENOUS at 02:03

## 2023-06-07 RX ADMIN — ACETAMINOPHEN 975 MG: 325 TABLET ORAL at 16:10

## 2023-06-07 RX ADMIN — MEROPENEM 1 G: 1 INJECTION, POWDER, FOR SOLUTION INTRAVENOUS at 10:22

## 2023-06-07 RX ADMIN — LOSARTAN POTASSIUM 100 MG: 25 TABLET, FILM COATED ORAL at 07:59

## 2023-06-07 RX ADMIN — SIMVASTATIN 40 MG: 20 TABLET, FILM COATED ORAL at 07:57

## 2023-06-07 RX ADMIN — MICAFUNGIN 100 MG: 10 INJECTION, POWDER, LYOPHILIZED, FOR SOLUTION INTRAVENOUS at 13:02

## 2023-06-07 RX ADMIN — ACYCLOVIR 400 MG: 200 CAPSULE ORAL at 07:56

## 2023-06-07 RX ADMIN — ACETAMINOPHEN 975 MG: 325 TABLET ORAL at 07:56

## 2023-06-07 RX ADMIN — HYDROCHLOROTHIAZIDE 12.5 MG: 12.5 CAPSULE ORAL at 07:56

## 2023-06-07 RX ADMIN — DOXYCYCLINE 100 MG: 100 CAPSULE ORAL at 20:18

## 2023-06-07 RX ADMIN — MEROPENEM 1 G: 1 INJECTION, POWDER, FOR SOLUTION INTRAVENOUS at 18:12

## 2023-06-07 RX ADMIN — DOXYCYCLINE 100 MG: 100 CAPSULE ORAL at 07:56

## 2023-06-07 RX ADMIN — SULFAMETHOXAZOLE AND TRIMETHOPRIM 2 TABLET: 800; 160 TABLET ORAL at 20:17

## 2023-06-07 RX ADMIN — ACYCLOVIR 400 MG: 200 CAPSULE ORAL at 20:17

## 2023-06-07 RX ADMIN — IBUPROFEN 400 MG: 200 TABLET, FILM COATED ORAL at 02:08

## 2023-06-07 RX ADMIN — HYDROCODONE BITARTRATE AND ACETAMINOPHEN 1 TABLET: 5; 325 TABLET ORAL at 21:54

## 2023-06-07 RX ADMIN — METHYLPREDNISOLONE SODIUM SUCCINATE 62.5 MG: 125 INJECTION, POWDER, FOR SOLUTION INTRAMUSCULAR; INTRAVENOUS at 20:18

## 2023-06-07 RX ADMIN — ACETAMINOPHEN 325 MG: 325 TABLET ORAL at 21:54

## 2023-06-07 RX ADMIN — ENOXAPARIN SODIUM 40 MG: 100 INJECTION SUBCUTANEOUS at 10:22

## 2023-06-07 ASSESSMENT — ACTIVITIES OF DAILY LIVING (ADL)
ADLS_ACUITY_SCORE: 24
ADLS_ACUITY_SCORE: 22
ADLS_ACUITY_SCORE: 24
ADLS_ACUITY_SCORE: 24
ADLS_ACUITY_SCORE: 22
ADLS_ACUITY_SCORE: 24

## 2023-06-07 NOTE — PROGRESS NOTES
[facility] MEDICINE  PROGRESS NOTE     Code Status: Full Code       Identification/Summary:   Billy Carroll is a 67-year-old male immunosuppressed with history of IgG multiple myeloma, 3 cm mass on T4, on treatment with bortezomib, Velcade and Revlimid managed by [facility].  Recent trip to his cabin, outdoors and around multiple people.  5/30 developed cough and fever.  5/31 started Levaquin with no improvement.  6/1 saw [facility] for low back pain.  Lumbar MRI showed right L5 nerve impingement.  6/2 admitted [facility] for fevers.  CT chest revealed acute symmetric bilateral multifocal pneumonitis.  ID, oncology and pulmonary consulted.  Receiving multiple antibiotics/antifungals.  6/4 requiring oxygen after ambulation.  6/5 bronchoscopy.  Viral PCR testing negative.  Multiple studies pending.  6/6 worsening oxygen demand despite Solu-Medrol and Lasix IV x1.  6/7 overnight started on HFNC 40 L at 50% FiO2.  Discussed with pulmonary and transferring to ICU status.  Having some tearfulness likely secondary to steroids.  Family updated in person.     Assessment and Plan:     Multifocal pneumonitis  Fever  Immunosuppressed   Acute hypoxemic respiratory failure  Bronchoscopy 6/5/2023  At admission not requiring supplemental oxygen.    CT chest no pulmonary emboli.  There is acute symmetric bilateral multifocal pneumonitis.  CRP 11.3.  Lactic acid normal at 1.4.  Procalcitonin normal.  Given fluconazole 200 mg daily, meropenem 1 g every 8 hours, Bactrim DS 2 tabs twice daily and vancomycin pharmacy to dose.  6/4 developed hypoxia after ambulating to and from the bathroom.    Respiratory viral PCR negative, Ehrlichia, Anaplasma, Babesia negative.  B-D glucan/Fungitell positive but fungal antibodies negative.  Unclear significance.  Multiple studies pending.  6/5 underwent bronchoscopy.  No complications.  Afebrile overnight.  Treatment adjusted to acyclovir, doxycycline, Bactrim,  meropenem and micafungin.  6/6 requiring 5 L nasal cannular oxygen.    Given Lasix 20 mg IV x1 and intravenous Solu-Medrol.  6/7 overnight started on HFNC 40 L at 50% FiO2.  CT scan chest shows significant progression of bilateral infiltrates.  Discussed case with intensivist and will transfer to ICU.  Further management per ID and pulmonary service.  Multiple myeloma  Monoclonal gammopathy  On bortezomib infusions every Thursday.  Revlimid.  Started 14-day cycle on 5/31.   Holding treatment per oncology and ID recommendations.  Continue PTA medication: Acyclovir 400 mg twice daily.  3 cm mass on T4  CT scan indicates soft tissue mass right side T4 vertebral body, additional lytic lesions throughout visualized bones consistent with multiple myeloma.  Utilize scheduled Tylenol 975 mg 3 times daily with meals.  As needed Norco or oral Tylenol at bedtime.  Neurosurgery consulted.  Recommend further management per rad/onc with [facility].  Low back pain  Right L5 nerve impingement  6/1 saw [facility] for low back pain.  Per report from [facility] MRI lumbar spine showed right L5 nerve impingement.  Received TLSO brace per orthopedic recommendations.  Will follow-up with them as an outpatient.  Mild elevation in liver transaminase  ALT 95--> 67--> 65.  Likely secondary to cancer.  No further testing indicated.  Acute on chronic macrocytic anemia  Hemoglobin 8.5, .  Platelets 363.  Repeat hemoglobin 8--> 8.7--> 7.  6/7 transfusion consent obtained via telephone.  Will transfuse PRBC x1.  Follow daily CBC.  Essential hypertension   Continue home hydrochlorothiazide 12.5 mg daily.  Losartan 100 mg daily.  Hypercholesterolemia  Continue home simvastatin 40 mg daily.  ASCVD  Aortic valve calcifications  CT scan showed severe three-vessel coronary artery calcification and dense calcifications and thickening of the aortic valves.  March 2023 stress echo shows EF 55 to 60%.  Calcified aortic valve noted.  No  "evidence of stress-induced ischemia.  Follow-up as outpatient.  Tearfulness/emotional liability  Not unexpected given the intravenous steroids and clinical situation.  Patient's Atarax makes him very sleepy so we will hold at this time.  Can try as needed melatonin.  Other treatment options defer to pulmonary given respiratory situation.     Anticoagulation   ASA 81 mg daily.  6/7 order lovenox subcutaneous per protocol.     COVID-19 PCR negative from 6/3/2023  Noted.  Fluids:   Saline lock  Pain meds: Scheduled Tylenol with meals and bedtime as needed Norco versus Tylenol  Therapy: N/A    Gutierrez:Not present  Lines: None       Current Diet  Orders Placed This Encounter      Regular Diet Adult    Supplements  None    Barriers to Discharge: Worsening hypoxia    Disposition: Likely here 4-5 more days    Clinically Significant Risk Factors              # Hypoalbuminemia: Lowest albumin = 2.5 g/dL at 6/3/2023  8:39 AM, will monitor as appropriate            # Overweight: Estimated body mass index is 27.58 kg/m  as calculated from the following:    Height as of this encounter: 1.778 m (5' 10\").    Weight as of this encounter: 87.2 kg (192 lb 3.2 oz)., PRESENT ON ADMISSION          Interval History/Subjective:  Overnight patient had increasing oxygen demand requiring high flow nasal cannular oxygen.  Clinically patient states that his breathing feels stable.  Does feel little winded with ambulation.  Main complaint is significant tearfulness that he is interested in treating.  Did try Atarax which she has taken at home before but it made him extremely sleepy.  No chest pain.  No nausea or vomiting.  Good appetite.  Family members present and multiple questions answered to verbalized satisfaction.      Last 24H PRN:      HYDROcodone-acetaminophen (NORCO) 5-325 MG per tablet 1 tablet, 1 tablet at 06/07/23 2765     [Held by provider] hydrOXYzine (ATARAX) tablet 10 mg, 10 mg at 06/06/23 0914     ibuprofen (ADVIL/MOTRIN) tablet " 200-400 mg, 400 mg at 06/07/23 0208    Physical Exam/Objective:  Temp:  [98.1  F (36.7  C)-98.2  F (36.8  C)] 98.1  F (36.7  C)  Pulse:  [] 82  Resp:  [16-20] 20  BP: (115-137)/(62-81) 137/81  FiO2 (%):  [50 %-55 %] 50 %  SpO2:  [84 %-97 %] 94 %  Wt Readings from Last 4 Encounters:   06/07/23 87.2 kg (192 lb 3.2 oz)     Body mass index is 27.58 kg/m .    Constitutional: awake, alert, cooperative, no apparent distress, and appears stated age and tearful but appropriate  ENT: Normocephalic, without obvious abnormality, atraumatic, external ears without lesions, oral pharynx with moist mucous membranes, tonsils without erythema or exudates, gums normal and good dentition.  Respiratory: Good air movement.  Does cough after each expiration.  No wheezing rales or rhonchi.  Cardiovascular: Normal apical impulse, regular rate and rhythm, normal S1 and S2, no S3 or S4, and no murmur noted  GI: No scars, normal bowel sounds, soft, non-distended, non-tender, no masses palpated, no hepatosplenomegally  Skin: normal skin color, texture, turgor, no redness, warmth, or swelling, and no rashes  Musculoskeletal: There is no redness, warmth, or swelling of the joints.  Motor strength is 5 out of 5 all extremities bilaterally.  Tone is normal. no lower extremity pitting edema present  Neurologic: Cranial nerves II-XII are grossly intact. Sensory:  Sensory intact  Neuropsychiatric: General: normal, calm and normal eye contact Level of consciousness: alert / normal Affect: tearful Orientation: oriented to self, place, time and situation Memory and insight: normal, memory for past and recent events intact and thought process normal      Medications:   Personally Reviewed.  Medications       acetaminophen  975 mg Oral TID AC     acyclovir  400 mg Oral BID     aspirin  81 mg Oral Daily     doxycycline hyclate  100 mg Oral Q12H [facility] (08/20)     furosemide  40 mg Intravenous Q12H     hydrochlorothiazide  12.5 mg Oral Daily     losartan   100 mg Oral Daily     melatonin  5 mg Oral At Bedtime     meropenem  1 g Intravenous Q8H     methylPREDNISolone  40 mg Intravenous Q12H     micafungin  100 mg Intravenous Q24H     simvastatin  40 mg Oral Daily     sodium chloride (PF)  3 mL Intracatheter Q8H     sulfamethoxazole-trimethoprim  2 tablet Oral BID       Data reviewed today: I personally reviewed all new medications, labs, imaging/diagnostics reports over the past 24 hours. Pertinent findings include:    Imaging:   Recent Results (from the past 24 hour(s))   CT Chest w/o Contrast    Narrative    EXAM: CT CHEST W/O CONTRAST  LOCATION: [facility]  DATE/TIME: 6/7/2023 1:33 AM CDT    INDICATION: Bilateral infiltrates, worsening hypoxia.  COMPARISON: 6/2/2023  TECHNIQUE: CT chest without IV contrast. Multiplanar reformats were obtained. Dose reduction techniques were used.  CONTRAST: None.    FINDINGS:   LUNGS AND PLEURA: Extensive diffuse bilateral pulmonary infiltrates most marked in the upper lobes has shown significant progression compared to 6/2/2023. Central airways are clear. Minimal bilateral pleural effusions are new.    MEDIASTINUM/AXILLAE: No adenopathy. Normal heart size. No pericardial effusion. Borderline mild ectasia of the ascending thoracic aorta measuring 4.0 cm, unchanged. Descending thoracic aorta is normal in caliber. Esophagus is grossly negative.    CORONARY ARTERY CALCIFICATION: Severe.    UPPER ABDOMEN: No significant finding.    MUSCULOSKELETAL: Again noted are numerous lytic bony lesions compatible with multiple myeloma. Pathologic compression fracture at the T5 level is unchanged.      Impression    IMPRESSION:   1.  Extensive diffuse bilateral pulmonary infiltrates have shown significant progression compared with 6/2/2023.    2.  Small bilateral pleural effusions are new.    3.  Borderline mild ectasia of the ascending thoracic aorta measuring 4.0 cm, unchanged.    4.  Severe coronary artery  calcification.    5.  Numerous lytic bony lesions compatible with the patient's known multiple myeloma including pathologic fracture at the T5 level, unchanged.         Labs:  CT Chest w/o Contrast   Final Result   IMPRESSION:    1.  Extensive diffuse bilateral pulmonary infiltrates have shown significant progression compared with 6/2/2023.      2.  Small bilateral pleural effusions are new.      3.  Borderline mild ectasia of the ascending thoracic aorta measuring 4.0 cm, unchanged.      4.  Severe coronary artery calcification.      5.  Numerous lytic bony lesions compatible with the patient's known multiple myeloma including pathologic fracture at the T5 level, unchanged.         CT Chest Pulmonary Embolism w Contrast   Final Result   IMPRESSION:   1.  No pulmonary emboli. No signs of acute aortic syndrome.   2.  Nonspecific acute symmetric bilateral multifocal pneumonitis.   3.  Heart size is within normal limits, but there is severe three-vessel coronary artery calcification and dense calcification and thickening of the aortic valve leaflets.    4.  A 3.5 x 3.5 x 3.0 cm destructive soft tissue mass centered in the right side of the T4 vertebral body crosses the T4-T5 interspace to involve the right side of the T5 vertebral body and may also encroach upon the right ventral epidural space.    Additional diminutive lytic lesions throughout the visualized bones. Findings consistent with multiple myeloma.        Recent Results (from the past 24 hour(s))   B-Type Natriuretic Peptide ([facility] Only)    Collection Time: 06/07/23 12:49 AM   Result Value Ref Range    BNP 52 0 - 65 pg/mL   CBC with platelets    Collection Time: 06/07/23 12:49 AM   Result Value Ref Range    WBC Count 8.0 4.0 - 11.0 10e3/uL    RBC Count 2.08 (L) 4.40 - 5.90 10e6/uL    Hemoglobin 7.0 (L) 13.3 - 17.7 g/dL    Hematocrit 20.9 (L) 40.0 - 53.0 %     (H) 78 - 100 fL    MCH 33.7 (H) 26.5 - 33.0 pg    MCHC 33.5 31.5 - 36.5 g/dL    RDW 15.8 (H)  10.0 - 15.0 %    Platelet Count 373 150 - 450 10e3/uL   Basic metabolic panel    Collection Time: 06/07/23 12:49 AM   Result Value Ref Range    Sodium 136 136 - 145 mmol/L    Potassium 4.6 3.5 - 5.0 mmol/L    Chloride 102 98 - 107 mmol/L    Carbon Dioxide (CO2) 26 22 - 31 mmol/L    Anion Gap 8 5 - 18 mmol/L    Urea Nitrogen 24 (H) 8 - 22 mg/dL    Creatinine 0.77 0.70 - 1.30 mg/dL    Calcium 8.7 8.5 - 10.5 mg/dL    Glucose 135 (H) 70 - 125 mg/dL    GFR Estimate >90 >60 mL/min/1.73m2   Blood gas venous    Collection Time: 06/07/23 12:49 AM   Result Value Ref Range    pH Venous 7.45 7.35 - 7.45    pCO2 Venous 40 35 - 50 mm Hg    pO2 Venous 43 25 - 47 mm Hg    Bicarbonate Venous 28 24 - 30 mmol/L    Base Excess/Deficit (+/-) 3.9   mmol/L    Oxyhemoglobin Venous 75.8 (H) 70.0 - 75.0 %    O2 Sat, Venous 77.7 (H) 70.0 - 75.0 %       Pending Labs:  Unresulted Labs Ordered in the Past 30 Days of this Admission     Date and Time Order Name Status Description    6/5/2023  1:33 PM Acid-Fast Bacilli Culture and Stain with AFB Stain In process     6/5/2023  1:07 PM Legionella culture - BAL Site 1 Preliminary     6/5/2023  1:07 PM Fungus Culture, non-blood - BAL Site 1 Preliminary     6/5/2023  1:07 PM Acid-Fast Bacilli Culture and Stain with AFB Stain In process     6/5/2023  1:07 PM Respiratory Aerobic Bacterial Culture with Gram Stain Preliminary     6/5/2023  1:07 PM Cytology non gyn - BAL Sites In process     6/3/2023  2:31 PM Arbovirus Antibodies, IgG and IgM, Serum In process     6/3/2023  2:31 PM West nile virus RNA by PCR In process     6/3/2023  2:31 PM West Nile Virus Antibody IgG IgM In process     6/3/2023  2:31 PM Hypersensitivity Pneumonitis 2 In process     6/3/2023  2:31 PM Hypersensitivity pneumonitis In process     6/3/2023  1:32 PM Lyme disease DNA detection by PCR In process     6/3/2023  1:32 PM Brucella species antibody In process     6/3/2023  1:32 PM Babesia Species by PCR In process     6/3/2023  1:32 PM  Ehrlichia Anaplasma Sp by PCR In process     6/3/2023  1:32 PM Anaplasma phagocytoph Antibodies IgG IgM In process     6/3/2023  9:22 AM Blastomyces Agn Quant EIA Blood In process     6/3/2023  9:22 AM Blastomyces antibody ID In process     6/3/2023  9:22 AM Histoplasma Capsulatum Antigen In process     6/2/2023 11:31 PM Pneumocystis jirovecil by PCR In process     6/2/2023  6:32 PM Blood Culture Peripheral Blood Preliminary     6/2/2023  6:32 PM Blood Culture Peripheral Blood Preliminary             Franco Lora MD  [facility]  Phone: #[phone]

## 2023-06-07 NOTE — PROGRESS NOTES
Hospitalist follow up note:    Contacted by RN that O2 needs worsening, now on 12lpm, RT recommends HFNC. Will order this and BNP, CBC, BMP and repeat CT chest.     Christiano Chavez DO   Pager #: [phone]

## 2023-06-07 NOTE — PLAN OF CARE
Shift Events:     Pt. Tx to 324, 1 unit RBC transfused    Assessment:     Neuro: A/Ox4 follows commands, occasionally tearful    Respiratory: Ajit flow NC 50L 40%     Cardiovascular: Tele NSR    GI/: voiding up SBA to side of bed with urinal. Diet: Regular. No BM this shift.    Lines/Drains: L) AC and Hand PIVs    Pain: Chronic pain in lower back scheduled tylenol given.      Problem: Plan of Care - These are the overarching goals to be used throughout the patient stay.    Goal: Optimal Comfort and Wellbeing  Outcome: Progressing  Intervention: Monitor Pain and Promote Comfort  Recent Flowsheet Documentation  Taken 6/7/2023 1125 by Cindy Chow RN  Pain Management Interventions: medication (see MAR)     Problem: Pain Acute  Goal: Optimal Pain Control and Function  Outcome: Progressing  Intervention: Develop Pain Management Plan  Recent Flowsheet Documentation  Taken 6/7/2023 1125 by Cindy Chow RN  Pain Management Interventions: medication (see MAR)  Intervention: Prevent or Manage Pain  Recent Flowsheet Documentation  Taken 6/7/2023 1600 by Cindy Chow RN  Bowel Elimination Promotion:    adequate fluid intake promoted    privacy promoted  Medication Review/Management: medications reviewed  Taken 6/7/2023 1200 by Cindy Chow RN  Bowel Elimination Promotion:    adequate fluid intake promoted    privacy promoted  Medication Review/Management: medications reviewed     Problem: Risk for Delirium  Goal: Optimal Coping  Outcome: Progressing   Goal Outcome Evaluation:

## 2023-06-07 NOTE — PROGRESS NOTES
"/67 (BP Location: Right arm)   Pulse 87   Temp 98.1  F (36.7  C) (Axillary)   Resp 23   Ht 1.778 m (5' 10\")   Wt 87.2 kg (192 lb 3.2 oz)   SpO2 92%   BMI 27.58 kg/m      Pt remains on HFNC at this time. Current settings ae 40 lpm @ 50% fio2. Pt is tolerating it fine. RT will continue to follow.   "

## 2023-06-07 NOTE — PROGRESS NOTES
Oncology Progress Note:    DOS:  6/7/2023    Assessment/Plan:   1)  Multifocal pneumonitis:  Recent transfer to ICU due to increasing oxygenation needs and worsening CT scan results.Awaiting full report from bronchial specimens but fungal culture BAL site 1 shows yeast on preliminary result.  RICHARD negative.  Agree with increased steroids.  Would not assume drug toxicity at this point.  Review of pulmonary toxicity for lenalidomide and bortezomib shows symptoms of COPD and dyspnea, do not occur often and usually not of this severity.  Will continue to monitor closely.     2) Multiple Myeloma:  Continue to hold therapy until current situation resolved.  If no infectious source is identified, would start course of prednisone.  Solu Medrol dose increased to 60 mg every 12 hours.    3)  Back pain:  Improved with increased steroids.  Continue current pain management plan.     4)  Anemia: PRBCs being infused today for Hgb = 7.0.  Would continue to monitor every other day.     Subjective:  Patient resting in bed comfortably, eating lunch.  Notes that he feels good with the new oxygen delivery system.  Back pain improved with increased steroids. Does not appear anxious.     Objective:  General:  Alert, oriented and calm.  VSS.      Imaging:  CT of chest without contrast done overnight shows extensive diffuse bilateral pulmonary infiltrates have shown significant progression compared with 6/2/2023. Small bilateral pleural effusions are new.      Labs:  Hgb = 7.0, WBC = 8.0, Platlets = 373,000.    Bronchial washings:  Respiratory Aerobic bacterial culture in process but is showing 1+ candida albicans, <25 PMNs/low power field.  Fungus culture shows yeast.  KOH negative.           We will continue to follow.      Gabby Owen, RN, MS, CNP  [facility]  [phone]  Cell:  [phone] (Tuesday - Friday, 8:30 am to 5 pm)

## 2023-06-07 NOTE — PROGRESS NOTES
[facility] Pulmonary/Critical Care Consult Team Note    Billy Carroll,  1955, MRN [id number]  Admitting Dx: Pneumonia of both lungs due to infectious organism, unspecified part of lung [J18.9]  Date / Time of Admission:  2023  6:41 PM    ID Studies  Bal pending. NO positive findings to date  Beta-Glucan - POSITIVE    Results of the Bronchoscopy are pending. On visual appearance the mucosa was not inflamed and there were no secretions indicating bacterial PNA      Assessment/Plan: Billy Carroll is a 67 year old male with PMHx of IgG multiple myeloma on Revlimid, Velcade and dexamethsone started on 5/10/2023 who started having a cough and was started on Abx with no improvement and a abnormal CXR told to come to the ED for evaluation    #Infectious vs pneumotoxicity from chemo:   -Patient is s/p BAL on 23.  -> 30% lymphocyte noted on Lavage. Pointing to be drug induced pneumotoxicity rather than infection. Still fungus can also cause lymphocytic alveolitis.   - At this point after discussing with ID we will continue with broad spectrum antibiotics. Can stop later in next couple of days once Bal results are back.   - Ct chest on  shows b/l significant infiltrates.  Patient is transferred to ICU for closer monitoring especially due to need to be on high flow oxygen. Non toxic appearing.   - I have increased his solumedrol to 60mg Q6 h for now.  - Once he improves than he will need a long taper. Seems to be most likely secondary to Revliid as it was the most recent addition.    - He was only on decadron once a week related to his injections       Medical Care Time excluding procedures and family discussions greater than: 30 Minutes    Risk Factors Present on Admission:  Clinically Significant Risk Factors              # Hypoalbuminemia: Lowest albumin = 2.5 g/dL at 6/3/2023  8:39 AM, will monitor as appropriate            # Overweight: Estimated body mass index is 27.58 kg/m  as calculated from the  "following:    Height as of this encounter: 1.778 m (5' 10\").    Weight as of this encounter: 87.2 kg (192 lb 3.2 oz)., PRESENT ON ADMISSION                 Rolf Butler MD  Pulmonary and Critical Care Attending  pgr [phone]    Allergies   Allergen Reactions     Codeine Nausea       Meds: See MAR    Physical Exam:  /81 (BP Location: Left arm, Patient Position: Semi-Bobby's)   Pulse 86   Temp 98.1  F (36.7  C) (Oral)   Resp 20   Ht 1.778 m (5' 10\")   Wt 87.2 kg (192 lb 3.2 oz)   SpO2 96%   BMI 27.58 kg/m    Intake/Output this shift:  I/O this shift:  In: 1072 [P.O.:960; I.V.:112]  Out: -   GEN: sitting up in bed, NAD  HEENT: MMM, large collar size, ventimask in place  CVS: regular rhythm, no murmurs  RESP: faint crackles maría, no wheezing  ABD: Soft, No abdominal pain with palpation, no guarding, no rigidity  EXT: Warm, well perfused, no edema  NEURO: moving all extremities, nonfocal  PSYCH: pleasant    Pertinent Labs: Latest lab results in EHR personally reviewed.   CMP  Recent Labs   Lab 06/07/23  0925 06/07/23  0049 06/06/23  0642 06/05/23  0749 06/04/23  0739 06/03/23  0839 06/02/23  1857   NA  --  136  --   --  135* 136 137   POTASSIUM  --  4.6  --   --  4.1 4.0 3.8   CHLORIDE  --  102  --   --  104 101 101   CO2  --  26  --   --  21* 26 25   ANIONGAP  --  8  --   --  10 9 11   GLC  --  135*  --   --  134* 189* 152*   BUN  --  24*  --   --  15 20 24*   CR 0.75 0.77 0.76 0.85 0.87 0.80 0.82   GFRESTIMATED >90 >90 >90 >90 >90 >90 >90   SESAR  --  8.7  --   --  8.7 9.0 10.2   PROTTOTAL  --   --   --   --  6.7 6.5 7.4   ALBUMIN  --   --   --   --  2.6* 2.5* 2.9*   BILITOTAL  --   --   --   --  0.4 0.3 0.5   ALKPHOS  --   --   --   --  135* 132* 155*   AST  --   --   --   --  28 19 32   ALT  --   --   --   --  65* 67* 95*     CBC  Recent Labs   Lab 06/07/23  0049 06/04/23  0739 06/03/23  0839 06/02/23  3017   WBC 8.0 7.4 6.4 8.3   RBC 2.08* 2.57* 2.39* 2.52*   HGB 7.0* 8.7* 8.0* 8.5*   HCT 20.9* 26.6* " 24.4* 25.8*   * 104* 102* 102*   MCH 33.7* 33.9* 33.5* 33.7*   MCHC 33.5 32.7 32.8 32.9   RDW 15.8* 16.2* 15.9* 15.9*    305 303 363     INRNo lab results found in last 7 days.  Arterial Blood GasNo lab results found in last 7 days.    Cultures: personally reviewed.   7-Day Micro Results    Collected Updated Procedure Result Status    06/05/2023 1334 06/06/2023 0721 Cytology non gyn - BAL Sites [[id number]]   Bronchial Alveolar Lavage from Bronchus, Right    In process Component Value   No component results          06/05/2023 1334 06/05/2023 1529 Cell count with differential fluid - BAL Site 1 [[id number]]    Bronchial Alveolar Lavage from Bronchus, Right    In process Component Value   No component results          06/05/2023 1334 06/05/2023 2007 Respiratory Aerobic Bacterial Culture with Gram Stain [[id number]]   Bronchial Alveolar Lavage from Bronchus, Right    Preliminary result Component Value   Gram Stain Result <25 PMNs/low power field P    No organisms seen P             06/05/2023 1334 06/05/2023 1340 Acid-Fast Bacilli Culture and Stain with AFB Stain [[id number]]    Bronchial Alveolar Lavage from Lung, Right    In process Component Value   No component results          06/05/2023 1334 06/05/2023 1344 Fungus Culture, non-blood - BAL Site 1 [[id number]]   Bronchial Alveolar Lavage from Lung, Right    In process Component Value   No component results             06/05/2023 1334 06/05/2023 2044 Koh prep - BAL Site 1 [[id number]]   Bronchial Alveolar Lavage from Lung, Right    Final result Component Value   KOH Preparation No fungal elements seen   KOH Preparation Reference Range: No fungal elements seen.          06/05/2023 1334 06/05/2023 1344 Legionella culture - BAL Site 1 [[id number]]   Bronchial Alveolar Lavage from Lung, Right    In process Component Value   No component results             06/05/2023 1334 06/05/2023 2029 Respiratory Panel PCR - BAL Site 1 [[id number]]     Bronchial Alveolar Lavage from Bronchus    Final result Component Value   Adenovirus Not Detected   Coronavirus Not Detected   This test detects Coronavirus 229E, HKU1, NL63 and OC43 but does not distinguish between them. It does not detect MERS ( Respiratory Syndrome), SARS (Severe Acute Respiratory Syndrome) or 2019-nCoV (Novel 2019) Coronavirus.   Human Metapneumovirus Not Detected   Human Rhin/Enterovirus Not Detected   Influenza A Not Detected   Influenza A, H1 Not Detected   Influenza A 2009 H1N1 Not Detected   Influenza A, H3 Not Detected   Influenza B Not Detected   Parainfluenza Virus 1 Not Detected   Parainfluenza Virus 2 Not Detected   Parainfluenza Virus 3 Not Detected   Parainfluenza Virus 4 Not Detected   Respiratory Syncytial Virus A Not Detected   Respiratory Syncytial Virus B Not Detected   Chlamydia Pneumoniae Not Detected   Mycoplasma Pneumoniae Not Detected          06/05/2023 1334 06/05/2023 1340 CMV DNA quantification - BAL Site 1 [[id number]]   Bronchial Alveolar Lavage from Lung, Right    In process Component Value   No component results          06/05/2023 1334 06/05/2023 1529 Cell Count Body Fluid [[id number]]    Bronchial Alveolar Lavage from Bronchus, Right    Final result Component Value Units   Color Colorless    Clarity Clear    Cell Count Fluid Source Lung, Right    Total Nucleated Cells 21 /uL          06/05/2023 1334 06/05/2023 1340 Acid-Fast Bacilli Culture and Stain with AFB Stain [[id number]]   Bronchial Alveolar Lavage from Lung, Right    In process Component Value   No component results          06/05/2023 1334 06/05/2023 1340 Differential Body Fluid [[id number]]   Bronchial Alveolar Lavage from Bronchus, Right    In process Component Value   No component results          06/03/2023 1345 06/04/2023 1437 Blood parasitology exam [[id number]]    Peripheral Blood    Final result Component Value   Babesia Negative   Anaplasma Negative   If Anaplasma  (Ehrlichia) infection is highly suspected, consider obtaining blood PCR specific assay.  Light microscopy does not exclude the diagnosis.    Ehrlichia Negative          06/03/2023 0959 06/05/2023 0024 1,3 Beta D glucan fungitell [[id number]]   (Abnormal)   Blood from Arm, Right    Final result Component Value Units   (1,3)-Beta-D-Glucan >500 pg/mL   B-D GLUCAN INTERPRETATION (1,3) Positive Abnormal     INTERPRETIVE INFORMATION: (1,3)-beta-D-glucan (Fungitell)       Less than 31 pg/mL ................... Negative     31-59 pg/mL .......................... Negative     60-79 pg/mL .......................... Indeterminate     Greater than or equal to 80 pg/mL .... Positive     The Fungitell test is indicated for presumptive diagnosis   of fungal infection and should be used in conjunction with   other diagnostic procedures. This test does not detect   certain fungal species such as Cryptococcus, which produce   very low levels of (1,3)-beta-D-glucan. This test will not   detect the zygomycetes, such as Absidia, Mucor, and   Rhizopus, which are not known to produce   (1,3)-beta-D-glucan. In addition, the yeast phase of   Blastomyces dermatitidis produces little   (1,3)-beta-D-glucan and may not be detected by the assay.   Performed By: [facility]   [address] [place] [id number]   : Benny Stein MD, PhD          06/03/2023 0959 06/03/2023 1021 Blastomyces antibody ID [[id number]]   Blood from Arm, Right    In process Component Value   No component results          06/03/2023 0959 06/06/2023 0552 Fungal Antibodies [[id number]]   Blood from Arm, Right    Final result Component Value Units   Blastomyces [place] by EIA 0.0 IV   INTERPRETIVE INFORMATION: Blastomyces Antibodies EIA, SER     0.9 IV or less.......Negative   1.0-1.4 IV...........Equivocal   1.5 IV or greater....Positive   Aspergillus Antibody by CF <1:8    INTERPRETIVE INFORMATION: Aspergillus Antibodies by CF     A  titer of 1:8 or greater suggests Aspergillus infection or   allergy.  Cross-reactions with dimorphic fungi are not   unusual within the genus Aspergillus.   Performed By: [facility]   [address] [place] [address]   : Benny Stein MD, PhD   Coccidioides Antibody by CF <1:2    INTERPRETIVE INFORMATION: Coccidioides Ab by Complement   Fixation (CF)     Any titer suggests past or current infection. However,   greater than 30 percent of cases with chronic residual   pulmonary disease have negative Complement Fixation (CF)   tests. Titers of less than 1:32 (even as low as 1:2) may   indicate past infection or self-limited disease;   anticoccidiodal CF antibody titers in excess of 1:16 may   indicate disseminated infection. CF serology may be used to   follow therapy. Antibody in CSF is considered diagnostic   for coccidioidal meningitis, although 10 percent of   patients with coccidioidal meningitis will not have   antibody in CSF.   Histoplasma Mycelia, CF <1:8    INTREPRETIVE INFORMATION: Histoplasma Mycelia Antibodies                             by CF   A titer of 1:8 or greater is generally considered   presumptive evidence of histoplasmosis. A titer of 1:32 or   greater or rising titers indicate strong presumptive   evidence of histoplasmosis. Cross reactions, usually at   lower titers, may occur with other fungal diseases.   Histoplasma Yeast, CF <1:8    INTERPRETIVE INFORMATION: Histoplasma Yeast Antibodies                             by CF   A titer of 1:8 or greater is generally considered   presumptive evidence of histoplasmosis. A titer of 1:32 or   greater or rising titers indicate strong presumptive   evidence of histoplasmosis. Cross reactions, usually at   lower titers, may occur with other fungal diseases.          06/03/2023 0959 06/03/2023 1021 Blastomyces Agn Quant EIA Blood [[id number]]   Blood from Arm, Right    In process Component Value   No component  results          06/03/2023 0959 06/03/2023 1543 Cryptococcus antigen [[id number]]   Blood from Arm, Right    Final result Component Value   Cryptococcal Antigen Negative   Cryptococcal titer interpretation N/A          06/03/2023 0839 06/03/2023 0940 Histoplasma Capsulatum Antigen [[id number]]   Blood from Arm, Right    In process Component Value   No component results           06/05/2023 1343 Respiratory Aerobic Bacterial Culture with Gram Stain   Sputum from Expectorate     Component Value   No component results             06/03/2023 0032 06/03/2023 0212 Asymptomatic COVID-19 Virus (Coronavirus) by PCR Nasopharyngeal [[id number]]    Swab from Nasopharyngeal    Final result Component Value   SARS CoV2 PCR Negative   NEGATIVE: SARS-CoV-2 (COVID-19) RNA not detected, presumed negative.          06/03/2023 0032 06/03/2023 1209 Respiratory Panel PCR [[id number]]    Swab from Nasopharyngeal    Final result Component Value   Adenovirus Not Detected   Coronavirus Not Detected   This test detects Coronavirus 229E, HKU1, NL63 and OC43 but does not distinguish between them. It does not detect MERS ( Respiratory Syndrome), SARS (Severe Acute Respiratory Syndrome) or 2019-nCoV (Novel 2019) Coronavirus.   Human Metapneumovirus Not Detected   Human Rhin/Enterovirus Not Detected   Influenza A Not Detected   Influenza A, H1 Not Detected   Influenza A 2009 H1N1 Not Detected   Influenza A, H3 Not Detected   Influenza B Not Detected   Parainfluenza Virus 1 Not Detected   Parainfluenza Virus 2 Not Detected   Parainfluenza Virus 3 Not Detected   Parainfluenza Virus 4 Not Detected   Respiratory Syncytial Virus A Not Detected   Respiratory Syncytial Virus B Not Detected   Chlamydia Pneumoniae Not Detected   Mycoplasma Pneumoniae Not Detected          06/02/2023 1931 06/03/2023 1007 Legionella pneumophila antigen urine [[id number]]   Urine, Midstream    Final result Component Value   Legionella pneumophila  serogroup 1 urinary antigen Negative   Suggests no recent or current infection. Infection due to Legionella cannot be ruled out, since other serogroups and species may cause disease, antigen may not be present in urine in early infection, and the level of antigen present in the urine may be below detectable limits of the test.          06/02/2023 1931 06/03/2023 1007 Streptococcus pneumoniae antigen [[id number]]   Urine, Midstream    Final result Component Value   Streptococcus pneumoniae antigen Negative   A negative Streptococcus pneumoniae antigen result does not rule out infection with Streptococcus pneumoniae.          06/02/2023 1912 06/06/2023 0105 Blood Culture Peripheral Blood [[id number]]   Peripheral Blood    Preliminary result Component Value   Culture No growth after 3 days P             06/02/2023 1857 06/05/2023 2216 Blood Culture Peripheral Blood [[id number]]   Peripheral Blood    Preliminary result Component Value   Culture No growth after 3 days P            Imaging: personally reviewed.   Results for orders placed or performed during the hospital encounter of 06/02/23   CT Chest Pulmonary Embolism w Contrast    Narrative    EXAM: CT CHEST PULMONARY EMBOLISM W CONTRAST  LOCATION: [facility]  DATE/TIME: 6/2/2023 8:00 PM CDT    INDICATION: Cough, fever, myeloma, immunosuppressed, shortness of breath, eval for pneumonia, PE, etc  COMPARISON: CT abdomen 02/24/2009  TECHNIQUE: CT chest pulmonary angiogram during arterial phase injection of IV contrast. Multiplanar reformats and MIP reconstructions were performed. Dose reduction techniques were used.   CONTRAST: 100 ml Isovue 370    FINDINGS:  ANGIOGRAM CHEST: Pulmonary arteries are normal caliber with no pulmonary emboli. Normal caliber thoracic aorta with no CTA signs of acute aortic syndrome.    LUNGS AND PLEURA: Bilateral symmetric peribronchovascular distribution multifocal ground-glass airspace opacity that is mid  and upper lung predominant and compatible with nonspecific acute multifocal pneumonitis. A 6 mL right middle lobe nodule is stable   and benign. No pleural effusion. No pneumothorax.    MEDIASTINUM/AXILLAE: Heart size is within normal limits, but there is severe three-vessel coronary artery calcification and dense calcification and thickening of the aortic valve leaflets. No pericardial effusion. No lymphadenopathy.    CORONARY ARTERY CALCIFICATION: Severe.    UPPER ABDOMEN: Normal.    MUSCULOSKELETAL: A 3.5 x 3.5 x 3.0 cm destructive soft tissue mass centered in the right side of the T4 vertebral body crosses the T4-T5 interspace to involve the right side of the T5 vertebral body and may also encroach upon the right ventral epidural   space. Additional diminutive lytic lesions throughout the visualized bones. Findings consistent with multiple myeloma.      Impression    IMPRESSION:  1.  No pulmonary emboli. No signs of acute aortic syndrome.  2.  Nonspecific acute symmetric bilateral multifocal pneumonitis.  3.  Heart size is within normal limits, but there is severe three-vessel coronary artery calcification and dense calcification and thickening of the aortic valve leaflets.   4.  A 3.5 x 3.5 x 3.0 cm destructive soft tissue mass centered in the right side of the T4 vertebral body crosses the T4-T5 interspace to involve the right side of the T5 vertebral body and may also encroach upon the right ventral epidural space.   Additional diminutive lytic lesions throughout the visualized bones. Findings consistent with multiple myeloma.       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    Family  History  Reviewed, and family history is not on file.    Social History  Reviewed, and he  reports that he has never smoked. He has never used smokeless tobacco.    Allergies  Allergies   Allergen Reactions     Codeine Nausea              Hem Butler     Securely message with the Vocera Web Console (learn more here)

## 2023-06-07 NOTE — PLAN OF CARE
Goal Outcome Evaluation:      Problem: Plan of Care - These are the overarching goals to be used throughout the patient stay.    Goal: Plan of Care Review  Description: The Plan of Care Review/Shift note should be completed every shift.  The Outcome Evaluation is a brief statement about your assessment that the patient is improving, declining, or no change.  This information will be displayed automatically on your shift note.  Outcome: Progressing     Problem: Gas Exchange Impaired  Goal: Optimal Gas Exchange  Outcome: Progressing    Patient reports back pain 3-4/10 controlled with prn Norco and Ibuprofen.  Using urinal at bedside with SBA.  Pt on high flow O2 at 40L 55% FiO2 with sats in the low 90's.  Pt continues to have a frequent non-productive cough and GILLETTE.  Lung sounds coarse with crackles bilateral upper lobes.  Other vitals stable.

## 2023-06-07 NOTE — PROGRESS NOTES
Hospitalist follow up note:    CT with progression in infiltrates, small bilateral effusions. Will try lasix. O2 sats improved on HFNC.    Christiano Chavez, DO   Pager #: [phone]

## 2023-06-07 NOTE — PROGRESS NOTES
Called by RN to assess patient due to low oxygen. He was on 12L oxymask SAT 91%. Placed him on HFNC at 35L/50%. SAT 97% tolerating well. Will continue to follow as needed.

## 2023-06-07 NOTE — PROGRESS NOTES
[facility]    Infectious Disease Progress Note    Date of Service : 06/07/2023     Assessment & Plan   Billy Carroll is a 67 year old male who was admitted on 6/2/2023.     ASSESSMENT:  Multifocal, bilateral pneumonia/pneumonitis- active issue  Immunosuppressed patient- On bortezomib infusions every Thursday.  Revlimid.  Started 14-day cycle on 5/31  MM plan was 12-week treatment induction followed by stem cell transplant- triplet therapy Revlimid, Velcade and dexamethsone he started on 5/10/2023.   Patient was at a cabin, had been planting, digging, irrigation system  Multiple mosquito bites  Tick bites, though patient thinks it was mostly wood ticks.  Patient had Lyme in the past  Broad differential diagnosis including opportunistic infections, viral bacterial fungal infections, tickborne infection, drug-induced pneumonitis.  Beta D glucan positive.  Back pain, MRI done at [facility] last week  Resp multiplex PCR panel negative.  Positive beta D glucan on 6/3/2023.  Clinical significance unclear.  Status post bronchoscopy on 6/5/2023.  Respiratory multiplex PCR panel again negative.  Steroids added on 6/6/2023.     Images on admission- reviewed       Previously on acyclovir, doxycycline, fluconazole, Levaquin, meropenem, Bactrim, and IV vancomycin.  Sputum cultures so far no growth.  Vancomycin can be effectively discontinued.  Also will discontinue Levaquin since he is on meropenem which will provide Pseudomonas coverage and doxycycline which will provide atypical coverage. IV Vanco and Levaquin and Fluconazole discontinued on 6/5. Micafungin started 6/5 for positive beta D glucan. S/P [facility]. Cultures in process.  All cultures reviewed.      ID Studies  West Nile - pending  Erhlichia  - pending  Lyme - pending  Brucella - neg  Parasite - pending  Crypto - neg  Blasto - pending  Fungal - pending  Babesia - neg  Histo - negative   Strep - neg  Legionella- neg  resp viral panel -  neg  Beta-Glucan - POSITIVE    RECOMMENDATIONS:    1. Continue acyclovir, doxycycline, meropenem, Bactrim.  2. Continue micafungin.  3. Follow-up pending results.  4. Risk of opportunistic infection- empiric TMP-SMX.  We will continue until pathology from bronchoscopy is back.  5. Continue on steroid  6. Tick exposure/bite- Doxycycline while work up in process  7. Bacterial, Viral, PJP, Fungal work up in process  8. Back pain- follow up MRI at [facility] done earlier this week    Discussed with the patient, nursing staff, pulmonary, and Dr. Lora.    ID will follow.      Tita Hernandez MD  [facility]  [phone]      Interval History   Feels okay today.  CT scan reviewed.  Worsening despite very broad-spectrum antimicrobial.  Transferring to the ICU.  Clinically however he feels okay.    Physical Exam   Temp: 98.1  F (36.7  C) Temp src: Oral BP: 137/81 Pulse: 82   Resp: 20 SpO2: 94 % O2 Device: High Flow Nasal Cannula (HFNC) Oxygen Delivery: 40 LPM  Vitals:    06/05/23 0509 06/06/23 0008 06/07/23 0626   Weight: 88.2 kg (194 lb 6.4 oz) 94.6 kg (208 lb 8.9 oz) 87.2 kg (192 lb 3.2 oz)     Vital Signs with Ranges  Temp:  [98.1  F (36.7  C)-98.2  F (36.8  C)] 98.1  F (36.7  C)  Pulse:  [] 82  Resp:  [16-20] 20  BP: (115-137)/(62-81) 137/81  FiO2 (%):  [50 %-55 %] 50 %  SpO2:  [84 %-97 %] 94 %    Constitutional: Awake, no apparent distress  Lungs: coughing, scattered harsh breath sounds  Cardiovascular: Regular rate and rhythm, S1 S2  Abdomen: non distended  Skin: warm  Neuro: deconditioned  Psych: able to answer questions    Medications       acetaminophen  975 mg Oral TID AC     acyclovir  400 mg Oral BID     aspirin  81 mg Oral Daily     doxycycline hyclate  100 mg Oral Q12H [facility] (08/20)     enoxaparin ANTICOAGULANT  40 mg Subcutaneous Q24H     furosemide  40 mg Intravenous Q12H     hydrochlorothiazide  12.5 mg Oral Daily     losartan  100 mg Oral Daily      melatonin  5 mg Oral At Bedtime     meropenem  1 g Intravenous Q8H     methylPREDNISolone  40 mg Intravenous Q12H     micafungin  100 mg Intravenous Q24H     simvastatin  40 mg Oral Daily     sodium chloride (PF)  3 mL Intracatheter Q8H     sulfamethoxazole-trimethoprim  2 tablet Oral BID       Data   All microbiology laboratory data reviewed.  Reviewed CBC  Reviewed Creatinine    Recent Labs   Lab Test 06/07/23  0049 06/04/23  0739 06/03/23  0839   WBC 8.0 7.4 6.4   HGB 7.0* 8.7* 8.0*   HCT 20.9* 26.6* 24.4*   * 104* 102*    305 303     Recent Labs   Lab Test 06/07/23  0049 06/06/23  0642 06/05/23  0749   CR 0.77 0.76 0.85     No lab results found.  No lab results found.    Invalid input(s):     MICROBIOLOGY:    Reviewed Smear, resp panel, culture results    7-Day Micro Results     Collected Updated Procedure Result Status      06/05/2023 1334 06/06/2023 0721 Cytology non gyn - BAL Sites [[id number]]   Bronchial Alveolar Lavage from Bronchus, Right    In process Component Value   No component results            06/05/2023 1334 06/06/2023 1101 Cell count with differential fluid - BAL Site 1 [[id number]]    Bronchial Alveolar Lavage from Bronchus, Right    Final result Component Value   No component results            06/05/2023 1334 06/06/2023 1205 Respiratory Aerobic Bacterial Culture with Gram Stain [[id number]]   Bronchial Alveolar Lavage from Bronchus, Right    Preliminary result Component Value   Culture No growth, less than 1 day  [P]    Gram Stain Result <25 PMNs/low power field  [P]     No organisms seen  [P]                06/05/2023 1334 06/05/2023 1340 Acid-Fast Bacilli Culture and Stain with AFB Stain [[id number]]    Bronchial Alveolar Lavage from Lung, Right    In process Component Value   No component results            06/05/2023 1334 06/06/2023 1947 Fungus Culture, non-blood - BAL Site 1 [[id number]]   Bronchial Alveolar Lavage from Lung, Right    Preliminary result Component  Value   Culture No growth after 1 day  [P]                06/05/2023 1334 06/05/2023 2044 Koh prep - BAL Site 1 [[id number]]   Bronchial Alveolar Lavage from Lung, Right    Final result Component Value   KOH Preparation No fungal elements seen   KOH Preparation Reference Range: No fungal elements seen.            06/05/2023 1334 06/06/2023 1027 Legionella culture - BAL Site 1 [[id number]]   Bronchial Alveolar Lavage from Lung, Right    Preliminary result Component Value   Culture Culture negative, monitoring continues  [P]                06/05/2023 1334 06/05/2023 2029 Respiratory Panel PCR - BAL Site 1 [[id number]]    Bronchial Alveolar Lavage from Bronchus    Final result Component Value   Adenovirus Not Detected   Coronavirus Not Detected   This test detects Coronavirus 229E, HKU1, NL63 and OC43 but does not distinguish between them. It does not detect MERS ( Respiratory Syndrome), SARS (Severe Acute Respiratory Syndrome) or 2019-nCoV (Novel 2019) Coronavirus.   Human Metapneumovirus Not Detected   Human Rhin/Enterovirus Not Detected   Influenza A Not Detected   Influenza A, H1 Not Detected   Influenza A 2009 H1N1 Not Detected   Influenza A, H3 Not Detected   Influenza B Not Detected   Parainfluenza Virus 1 Not Detected   Parainfluenza Virus 2 Not Detected   Parainfluenza Virus 3 Not Detected   Parainfluenza Virus 4 Not Detected   Respiratory Syncytial Virus A Not Detected   Respiratory Syncytial Virus B Not Detected   Chlamydia Pneumoniae Not Detected   Mycoplasma Pneumoniae Not Detected            06/05/2023 1334 06/06/2023 1305 CMV DNA quantification - BAL Site 1 [[id number]]    Bronchial Alveolar Lavage from Lung, Right    Final result Component Value Units   CMV DNA IU/mL Not Detected IU/mL            06/05/2023 1334 06/05/2023 1529 Cell Count Body Fluid [[id number]]    Bronchial Alveolar Lavage from Bronchus, Right    Final result Component Value Units   Color Colorless    Clarity  Clear    Cell Count Fluid Source Lung, Right    Total Nucleated Cells 21 /uL            06/05/2023 1334 06/05/2023 1340 Acid-Fast Bacilli Culture and Stain with AFB Stain [[id number]]   Bronchial Alveolar Lavage from Lung, Right    In process Component Value   No component results            06/05/2023 1334 06/06/2023 1101 Differential Body Fluid [[id number]]    Bronchial Alveolar Lavage from Bronchus, Right    Final result Component Value Units   % Neutrophils 13 %   % Lymphocytes 35 %   % Monocyte/Macrophages 34 %   % Eosinophils 1 %   % Lining Cells 14 %   % Other Cells 3 %            06/03/2023 1345 06/04/2023 1437 Blood parasitology exam [[id number]]    Peripheral Blood    Final result Component Value   Babesia Negative   Anaplasma Negative   If Anaplasma (Ehrlichia) infection is highly suspected, consider obtaining blood PCR specific assay.  Light microscopy does not exclude the diagnosis.     Ehrlichia Negative            06/03/2023 0959 06/05/2023 0024 1,3 Beta D glucan fungitell [[id number]]   (Abnormal)   Blood from Arm, Right    Final result Component Value Units   (1,3)-Beta-D-Glucan >500 pg/mL   B-D GLUCAN INTERPRETATION (1,3) Positive    INTERPRETIVE INFORMATION: (1,3)-beta-D-glucan (Fungitell)      Less than 31 pg/mL ................... Negative    31-59 pg/mL .......................... Negative    60-79 pg/mL .......................... Indeterminate    Greater than or equal to 80 pg/mL .... Positive    The Fungitell test is indicated for presumptive diagnosis   of fungal infection and should be used in conjunction with   other diagnostic procedures. This test does not detect   certain fungal species such as Cryptococcus, which produce   very low levels of (1,3)-beta-D-glucan. This test will not   detect the zygomycetes, such as Absidia, Mucor, and   Rhizopus, which are not known to produce   (1,3)-beta-D-glucan. In addition, the yeast phase of   Blastomyces dermatitidis produces little    (1,3)-beta-D-glucan and may not be detected by the assay.  Performed By: [facility]  [address] [place] [address]  : Benny Stein MD, PhD            06/03/2023 0959 06/03/2023 1021 Blastomyces antibody ID [[id number]]   Blood from Arm, Right    In process Component Value   No component results            06/03/2023 0959 06/06/2023 0552 Fungal Antibodies [[id number]]   Blood from Arm, Right    Final result Component Value Units   Blastomyces [place] by EIA 0.0 IV   INTERPRETIVE INFORMATION: Blastomyces Antibodies EIA, SER    0.9 IV or less.......Negative  1.0-1.4 IV...........Equivocal   1.5 IV or greater....Positive   Aspergillus Antibody by CF <1:8    INTERPRETIVE INFORMATION: Aspergillus Antibodies by CF     A titer of 1:8 or greater suggests Aspergillus infection or   allergy.  Cross-reactions with dimorphic fungi are not   unusual within the genus Aspergillus.  Performed By: [facility]  [address] [place] [address]  : Benny Stein MD, PhD   Coccidioides Antibody by CF <1:2    INTERPRETIVE INFORMATION: Coccidioides Ab by Complement   Fixation (CF)    Any titer suggests past or current infection. However,   greater than 30 percent of cases with chronic residual   pulmonary disease have negative Complement Fixation (CF)   tests. Titers of less than 1:32 (even as low as 1:2) may   indicate past infection or self-limited disease;   anticoccidiodal CF antibody titers in excess of 1:16 may   indicate disseminated infection. CF serology may be used to   follow therapy. Antibody in CSF is considered diagnostic   for coccidioidal meningitis, although 10 percent of   patients with coccidioidal meningitis will not have   antibody in CSF.   Histoplasma Mycelia, CF <1:8    INTREPRETIVE INFORMATION: Histoplasma Mycelia Antibodies                            by CF  A titer of 1:8 or greater is generally considered   presumptive evidence  of histoplasmosis. A titer of 1:32 or   greater or rising titers indicate strong presumptive   evidence of histoplasmosis. Cross reactions, usually at   lower titers, may occur with other fungal diseases.   Histoplasma Yeast, CF <1:8    INTERPRETIVE INFORMATION: Histoplasma Yeast Antibodies                             by CF  A titer of 1:8 or greater is generally considered   presumptive evidence of histoplasmosis. A titer of 1:32 or   greater or rising titers indicate strong presumptive   evidence of histoplasmosis. Cross reactions, usually at   lower titers, may occur with other fungal diseases.            06/03/2023 0959 06/03/2023 1021 Blastomyces Agn Quant EIA Blood [[id number]]   Blood from Arm, Right    In process Component Value   No component results            06/03/2023 0959 06/03/2023 1543 Cryptococcus antigen [[id number]]   Blood from Arm, Right    Final result Component Value   Cryptococcal Antigen Negative   Cryptococcal titer interpretation N/A            06/03/2023 0839 06/03/2023 0940 Histoplasma Capsulatum Antigen [[id number]]   Blood from Arm, Right    In process Component Value   No component results             06/05/2023 1343 Respiratory Aerobic Bacterial Culture with Gram Stain   Sputum from Expectorate     Component Value   No component results               06/03/2023 0032 06/03/2023 0212 Asymptomatic COVID-19 Virus (Coronavirus) by PCR Nasopharyngeal [[id number]]    Swab from Nasopharyngeal    Final result Component Value   SARS CoV2 PCR Negative   NEGATIVE: SARS-CoV-2 (COVID-19) RNA not detected, presumed negative.            06/03/2023 0032 06/03/2023 1209 Respiratory Panel PCR [[id number]]    Swab from Nasopharyngeal    Final result Component Value   Adenovirus Not Detected   Coronavirus Not Detected   This test detects Coronavirus 229E, HKU1, NL63 and OC43 but does not distinguish between them. It does not detect MERS ( Respiratory Syndrome), SARS (Severe Acute  Respiratory Syndrome) or 2019-nCoV (Novel 2019) Coronavirus.   Human Metapneumovirus Not Detected   Human Rhin/Enterovirus Not Detected   Influenza A Not Detected   Influenza A, H1 Not Detected   Influenza A 2009 H1N1 Not Detected   Influenza A, H3 Not Detected   Influenza B Not Detected   Parainfluenza Virus 1 Not Detected   Parainfluenza Virus 2 Not Detected   Parainfluenza Virus 3 Not Detected   Parainfluenza Virus 4 Not Detected   Respiratory Syncytial Virus A Not Detected   Respiratory Syncytial Virus B Not Detected   Chlamydia Pneumoniae Not Detected   Mycoplasma Pneumoniae Not Detected            06/02/2023 1931 06/03/2023 1007 Legionella pneumophila antigen urine [[id number]]   Urine, Midstream    Final result Component Value   Legionella pneumophila serogroup 1 urinary antigen Negative   Suggests no recent or current infection. Infection due to Legionella cannot be ruled out, since other serogroups and species may cause disease, antigen may not be present in urine in early infection, and the level of antigen present in the urine may be below detectable limits of the test.            06/02/2023 1931 06/03/2023 1007 Streptococcus pneumoniae antigen [[id number]]   Urine, Midstream    Final result Component Value   Streptococcus pneumoniae antigen Negative   A negative Streptococcus pneumoniae antigen result does not rule out infection with Streptococcus pneumoniae.            06/02/2023 1912 06/07/2023 0106 Blood Culture Peripheral Blood [[id number]]   Peripheral Blood    Preliminary result Component Value   Culture No growth after 4 days  [P]                06/02/2023 1857 06/06/2023 2216 Blood Culture Peripheral Blood [[id number]]   Peripheral Blood    Preliminary result Component Value   Culture No growth after 4 days  [P]                       RADIOLOGY:    Reviewed  CT Chest Pulmonary Embolism w Contrast    Result Date: 6/2/2023  EXAM: CT CHEST PULMONARY EMBOLISM W CONTRAST LOCATION: [facility] DATE/TIME: 6/2/2023 8:00 PM CDT INDICATION: Cough, fever, myeloma, immunosuppressed, shortness of breath, eval for pneumonia, PE, etc COMPARISON: CT abdomen 02/24/2009 TECHNIQUE: CT chest pulmonary angiogram during arterial phase injection of IV contrast. Multiplanar reformats and MIP reconstructions were performed. Dose reduction techniques were used. CONTRAST: 100 ml Isovue 370 FINDINGS: ANGIOGRAM CHEST: Pulmonary arteries are normal caliber with no pulmonary emboli. Normal caliber thoracic aorta with no CTA signs of acute aortic syndrome. LUNGS AND PLEURA: Bilateral symmetric peribronchovascular distribution multifocal ground-glass airspace opacity that is mid and upper lung predominant and compatible with nonspecific acute multifocal pneumonitis. A 6 mL right middle lobe nodule is stable  and benign. No pleural effusion. No pneumothorax. MEDIASTINUM/AXILLAE: Heart size is within normal limits, but there is severe three-vessel coronary artery calcification and dense calcification and thickening of the aortic valve leaflets. No pericardial effusion. No lymphadenopathy. CORONARY ARTERY CALCIFICATION: Severe. UPPER ABDOMEN: Normal. MUSCULOSKELETAL: A 3.5 x 3.5 x 3.0 cm destructive soft tissue mass centered in the right side of the T4 vertebral body crosses the T4-T5 interspace to involve the right side of the T5 vertebral body and may also encroach upon the right ventral epidural space. Additional diminutive lytic lesions throughout the visualized bones. Findings consistent with multiple myeloma.     IMPRESSION: 1.  No pulmonary emboli. No signs of acute aortic syndrome. 2.  Nonspecific acute symmetric bilateral multifocal pneumonitis. 3.  Heart size is within normal limits, but there is severe three-vessel coronary artery calcification and dense calcification and thickening of the aortic valve leaflets. 4.  A 3.5 x 3.5 x 3.0 cm destructive soft tissue mass centered in the right side of  the T4 vertebral body crosses the T4-T5 interspace to involve the right side of the T5 vertebral body and may also encroach upon the right ventral epidural space. Additional diminutive lytic lesions throughout the visualized bones. Findings consistent with multiple myeloma.

## 2023-06-07 NOTE — PROVIDER NOTIFICATION
Paged Pulmonary per Dr. Lora to update him about pt status.  Dr. Butler called back and requested a page on vocera.  He is on his way to the hospital now.

## 2023-06-08 ENCOUNTER — HOME INFUSION (PRE-WILLOW HOME INFUSION) (OUTPATIENT)
Dept: PHARMACY | Facility: CLINIC | Age: 68
End: 2023-06-08
Payer: MEDICARE

## 2023-06-08 LAB
A PHAGOCYTOPH DNA BLD QL NAA+PROBE: NOT DETECTED
A PHAGOCYTOPH IGG TITR SER IF: NORMAL {TITER}
A PHAGOCYTOPH IGM TITR SER IF: NORMAL {TITER}
B BURGDOR DNA SPEC QL NAA+PROBE: NOT DETECTED
BACTERIA BLD CULT: NO GROWTH
BACTERIA BRONCH: ABNORMAL
BACTERIA BRONCH: ABNORMAL
BRUCELLA AB TITR SER AGGL: NORMAL {TITER}
CREAT SERPL-MCNC: 0.81 MG/DL (ref 0.7–1.3)
E CHAFFEENSIS DNA BLD QL NAA+PROBE: NOT DETECTED
E EWINGII DNA SPEC QL NAA+PROBE: NOT DETECTED
EHRLICHIA DNA SPEC QL NAA+PROBE: NOT DETECTED
ERYTHROCYTE [DISTWIDTH] IN BLOOD BY AUTOMATED COUNT: 16.3 % (ref 10–15)
GFR SERPL CREATININE-BSD FRML MDRD: >90 ML/MIN/1.73M2
GRAM STAIN RESULT: ABNORMAL
GRAM STAIN RESULT: ABNORMAL
HCT VFR BLD AUTO: 25 % (ref 40–53)
HGB BLD-MCNC: 8.1 G/DL (ref 13.3–17.7)
MCH RBC QN AUTO: 32.3 PG (ref 26.5–33)
MCHC RBC AUTO-ENTMCNC: 32.4 G/DL (ref 31.5–36.5)
MCV RBC AUTO: 100 FL (ref 78–100)
PATH REPORT.ADDENDUM SPEC: NORMAL
PATH REPORT.COMMENTS IMP SPEC: NORMAL
PATH REPORT.FINAL DX SPEC: NORMAL
PATH REPORT.GROSS SPEC: NORMAL
PATH REPORT.MICROSCOPIC SPEC OTHER STN: NORMAL
PATH REPORT.RELEVANT HX SPEC: NORMAL
PLATELET # BLD AUTO: 454 10E3/UL (ref 150–450)
RBC # BLD AUTO: 2.51 10E6/UL (ref 4.4–5.9)
SCANNED LAB RESULT: NORMAL
SCANNED LAB RESULT: NORMAL
WBC # BLD AUTO: 9.9 10E3/UL (ref 4–11)
WNV RNA SPEC QL NAA+PROBE: NOT DETECTED

## 2023-06-08 PROCEDURE — 88342 IMHCHEM/IMCYTCHM 1ST ANTB: CPT | Mod: 26 | Performed by: PATHOLOGY

## 2023-06-08 PROCEDURE — 250N000013 HC RX MED GY IP 250 OP 250 PS 637: Performed by: FAMILY MEDICINE

## 2023-06-08 PROCEDURE — 250N000013 HC RX MED GY IP 250 OP 250 PS 637: Performed by: INTERNAL MEDICINE

## 2023-06-08 PROCEDURE — 88364 INSITU HYBRIDIZATION (FISH): CPT | Mod: 26 | Performed by: PATHOLOGY

## 2023-06-08 PROCEDURE — 250N000011 HC RX IP 250 OP 636: Performed by: HOSPITALIST

## 2023-06-08 PROCEDURE — 120N000004 HC R&B MS OVERFLOW

## 2023-06-08 PROCEDURE — 88365 INSITU HYBRIDIZATION (FISH): CPT | Mod: 26 | Performed by: PATHOLOGY

## 2023-06-08 PROCEDURE — 82565 ASSAY OF CREATININE: CPT | Performed by: INTERNAL MEDICINE

## 2023-06-08 PROCEDURE — 250N000011 HC RX IP 250 OP 636: Performed by: INTERNAL MEDICINE

## 2023-06-08 PROCEDURE — 85027 COMPLETE CBC AUTOMATED: CPT | Performed by: FAMILY MEDICINE

## 2023-06-08 PROCEDURE — 99232 SBSQ HOSP IP/OBS MODERATE 35: CPT | Performed by: INTERNAL MEDICINE

## 2023-06-08 PROCEDURE — 88312 SPECIAL STAINS GROUP 1: CPT | Mod: 26 | Performed by: PATHOLOGY

## 2023-06-08 PROCEDURE — 88305 TISSUE EXAM BY PATHOLOGIST: CPT | Mod: 26 | Performed by: PATHOLOGY

## 2023-06-08 PROCEDURE — 250N000011 HC RX IP 250 OP 636: Performed by: STUDENT IN AN ORGANIZED HEALTH CARE EDUCATION/TRAINING PROGRAM

## 2023-06-08 PROCEDURE — 88108 CYTOPATH CONCENTRATE TECH: CPT | Mod: 26 | Performed by: PATHOLOGY

## 2023-06-08 PROCEDURE — 99232 SBSQ HOSP IP/OBS MODERATE 35: CPT | Performed by: STUDENT IN AN ORGANIZED HEALTH CARE EDUCATION/TRAINING PROGRAM

## 2023-06-08 PROCEDURE — 250N000011 HC RX IP 250 OP 636: Performed by: FAMILY MEDICINE

## 2023-06-08 PROCEDURE — 36415 COLL VENOUS BLD VENIPUNCTURE: CPT | Performed by: INTERNAL MEDICINE

## 2023-06-08 PROCEDURE — 258N000003 HC RX IP 258 OP 636: Performed by: STUDENT IN AN ORGANIZED HEALTH CARE EDUCATION/TRAINING PROGRAM

## 2023-06-08 PROCEDURE — 999N000215 HC STATISTIC HFNC ADULT NON-CPAP

## 2023-06-08 PROCEDURE — 999N000157 HC STATISTIC RCP TIME EA 10 MIN

## 2023-06-08 PROCEDURE — 99232 SBSQ HOSP IP/OBS MODERATE 35: CPT | Performed by: FAMILY MEDICINE

## 2023-06-08 PROCEDURE — 88341 IMHCHEM/IMCYTCHM EA ADD ANTB: CPT | Mod: 26 | Performed by: PATHOLOGY

## 2023-06-08 RX ADMIN — ACETAMINOPHEN 975 MG: 325 TABLET ORAL at 15:56

## 2023-06-08 RX ADMIN — IBUPROFEN 400 MG: 200 TABLET, FILM COATED ORAL at 02:02

## 2023-06-08 RX ADMIN — DOXYCYCLINE 100 MG: 100 CAPSULE ORAL at 20:23

## 2023-06-08 RX ADMIN — METHYLPREDNISOLONE SODIUM SUCCINATE 62.5 MG: 125 INJECTION, POWDER, FOR SOLUTION INTRAMUSCULAR; INTRAVENOUS at 01:02

## 2023-06-08 RX ADMIN — METHYLPREDNISOLONE SODIUM SUCCINATE 62.5 MG: 125 INJECTION, POWDER, FOR SOLUTION INTRAMUSCULAR; INTRAVENOUS at 20:23

## 2023-06-08 RX ADMIN — METHYLPREDNISOLONE SODIUM SUCCINATE 62.5 MG: 125 INJECTION, POWDER, FOR SOLUTION INTRAMUSCULAR; INTRAVENOUS at 07:54

## 2023-06-08 RX ADMIN — Medication 5 MG: at 20:23

## 2023-06-08 RX ADMIN — SENNOSIDES AND DOCUSATE SODIUM 1 TABLET: 50; 8.6 TABLET ORAL at 10:03

## 2023-06-08 RX ADMIN — LOSARTAN POTASSIUM 100 MG: 25 TABLET, FILM COATED ORAL at 07:53

## 2023-06-08 RX ADMIN — ACETAMINOPHEN 325 MG: 325 TABLET ORAL at 20:22

## 2023-06-08 RX ADMIN — DOXYCYCLINE 100 MG: 100 CAPSULE ORAL at 07:53

## 2023-06-08 RX ADMIN — SENNOSIDES AND DOCUSATE SODIUM 1 TABLET: 50; 8.6 TABLET ORAL at 20:39

## 2023-06-08 RX ADMIN — METHYLPREDNISOLONE SODIUM SUCCINATE 62.5 MG: 125 INJECTION, POWDER, FOR SOLUTION INTRAMUSCULAR; INTRAVENOUS at 15:36

## 2023-06-08 RX ADMIN — HYDROCHLOROTHIAZIDE 12.5 MG: 12.5 CAPSULE ORAL at 07:54

## 2023-06-08 RX ADMIN — FUROSEMIDE 40 MG: 10 INJECTION, SOLUTION INTRAVENOUS at 02:03

## 2023-06-08 RX ADMIN — FUROSEMIDE 40 MG: 10 INJECTION, SOLUTION INTRAVENOUS at 15:37

## 2023-06-08 RX ADMIN — ENOXAPARIN SODIUM 40 MG: 100 INJECTION SUBCUTANEOUS at 10:04

## 2023-06-08 RX ADMIN — ACETAMINOPHEN 975 MG: 325 TABLET ORAL at 07:53

## 2023-06-08 RX ADMIN — ACETAMINOPHEN 975 MG: 325 TABLET ORAL at 11:02

## 2023-06-08 RX ADMIN — ACYCLOVIR 400 MG: 200 CAPSULE ORAL at 20:22

## 2023-06-08 RX ADMIN — HYDROCODONE BITARTRATE AND ACETAMINOPHEN 1 TABLET: 5; 325 TABLET ORAL at 20:22

## 2023-06-08 RX ADMIN — MEROPENEM 1 G: 1 INJECTION, POWDER, FOR SOLUTION INTRAVENOUS at 11:02

## 2023-06-08 RX ADMIN — ACYCLOVIR 400 MG: 200 CAPSULE ORAL at 07:53

## 2023-06-08 RX ADMIN — MEROPENEM 1 G: 1 INJECTION, POWDER, FOR SOLUTION INTRAVENOUS at 01:02

## 2023-06-08 RX ADMIN — MICAFUNGIN 100 MG: 10 INJECTION, POWDER, LYOPHILIZED, FOR SOLUTION INTRAVENOUS at 12:37

## 2023-06-08 RX ADMIN — SIMVASTATIN 40 MG: 20 TABLET, FILM COATED ORAL at 07:53

## 2023-06-08 RX ADMIN — SULFAMETHOXAZOLE AND TRIMETHOPRIM 2 TABLET: 800; 160 TABLET ORAL at 07:54

## 2023-06-08 RX ADMIN — ASPIRIN 81 MG: 81 TABLET, COATED ORAL at 07:52

## 2023-06-08 ASSESSMENT — ACTIVITIES OF DAILY LIVING (ADL)
ADLS_ACUITY_SCORE: 24
ADLS_ACUITY_SCORE: 24
ADLS_ACUITY_SCORE: 32
ADLS_ACUITY_SCORE: 26
ADLS_ACUITY_SCORE: 32
ADLS_ACUITY_SCORE: 24
ADLS_ACUITY_SCORE: 32
ADLS_ACUITY_SCORE: 32
ADLS_ACUITY_SCORE: 26
ADLS_ACUITY_SCORE: 24

## 2023-06-08 NOTE — PROGRESS NOTES
Oncology Progress Note:    DOS:  6/8/2023    Assessment/Plan:  1)  Multifocal pneumonitis:  Agree with increased steroids to every 6 hours.  Discussed again with Dr. Ruelas again.  Drug toxicity is rare but his clinical picture is starting to look more and more like it is the underlying etiology of his pneumonia.  Continue to monitor closely.  Not ready to covert to oral steroids yet but would recommend a prednisone dose equivalent to his current IV dose or 1 mg/kg (whichever is less).  I did discuss with patient, wife and daughter that his oral taper would take some time and depend on his clinical improvement. Will continue to monitor closely. While not close to discharge, we would monitor his prednisone taper as an outpatient.     2) Multiple Myeloma:  Continue to hold therapy until current situation resolved.  Will need to be followed closely in our clinic and treatment plan will need to be revised.      3)  Back pain:  Improved with increased steroids.  Continue current pain management plan.      4)  Anemia: PRBCs being infused today for Hgb = 7.0.  Would continue to monitor every other day.     Subjective:  Patient resting in bed comfortably.  Notes that his oxygen saturations continue to drop with any standing or movement.  Back pain is controlled with increased steroids and pain medication.  He does report 2 episodes of nosebleeds since yesterday.  He reports them as minor but in discussions with his nurse, the bleeding was substantial and required pressure to stop the bleeding.     Objective:  General:  Alert, oriented and calm.  VSS.  O2 saturation at 90% at time of visit.     Labs:  Hgb = 8.1, WBC = 9.9, platelets = 454,000.    We will continue to follow. Dr. Ruelas can be reached via our office number - [phone]    Gabby Owen, RN, MS, CNP  [facility]  [phone]  Cell:  [phone] (Tuesday - Friday, 8:30 am to 5 pm)

## 2023-06-08 NOTE — PROGRESS NOTES
"[facility] Pulmonary/Critical Care Consult Team Note    Billy Carroll,  1955, MRN [id number]  Admitting Dx: Pneumonia of both lungs due to infectious organism, unspecified part of lung [J18.9]  Date / Time of Admission:  2023  6:41 PM    ID Studies  West Nile - neg  Erhlichia  - neg  Lyme - neg  Brucella - neg  Parasite - neg  Crypto - neg  Blasto - neg  Babesia - neg  Histo - neg  Strep - neg  Legionella- neg  resp viral panel - neg  CMV - neg  Beta-Glucan - POSITIVE  BAL - Candida    He is in the ICU  He is on HFNC  He feels the same as yesterday  He denies pain, his son is in the room and updated    Assessment/Plan: Billy Carroll is a 67 year old male with PMHx of IgG multiple myeloma on Revlimid, Velcade and dexamethsone started on 5/10/2023 who started having a cough and was started on Abx with no improvement and a abnormal CXR told to come to the ED for evaluation    Bronchitis/Pneumonitis vs Drug Reaction  - Given pt is immunosuppressed fungal, viral and PNA panel  - agree with course of levaquin  - He was only on decadron once a week related to his injections  - unclear how to interpret the positive B-glucan test.  - is on solumedrol 60q6 since      Medical Care Time excluding procedures and family discussions greater than: 45 Minutes    Risk Factors Present on Admission:  Clinically Significant Risk Factors              # Hypoalbuminemia: Lowest albumin = 2.5 g/dL at 6/3/2023  8:39 AM, will monitor as appropriate            # Overweight: Estimated body mass index is 27.58 kg/m  as calculated from the following:    Height as of this encounter: 1.778 m (5' 10\").    Weight as of this encounter: 87.2 kg (192 lb 3.2 oz)., PRESENT ON ADMISSION                 Maru Malagon DO  Pulmonary and Critical Care Attending  pgr [phone]    Allergies   Allergen Reactions     Codeine Nausea       Meds: See MAR    Physical Exam:  /64 (BP Location: Right arm)   Pulse 87   Temp 98.3  F (36.8  C) " "(Oral)   Resp 16   Ht 1.778 m (5' 10\")   Wt 87.2 kg (192 lb 3.2 oz)   SpO2 93%   BMI 27.58 kg/m    Intake/Output this shift:  I/O this shift:  In: 480 [P.O.:480]  Out: 300 [Urine:300]  GEN: sitting up in bed, NAD  HEENT: MMM, large collar size, HFNC in place  CVS: regular rhythm, no murmurs  RESP: faint crackles maría, no wheezing  ABD: Soft, No abdominal pain with palpation, no guarding, no rigidity  EXT: Warm, well perfused, no edema  NEURO: moving all extremities, nonfocal  PSYCH: pleasant    Pertinent Labs: Latest lab results in EHR personally reviewed.   CMP  Recent Labs   Lab 06/08/23  0508 06/07/23  0925 06/07/23  0049 06/06/23  0642 06/05/23  0749 06/04/23  0739 06/03/23  0839 06/02/23  1857   NA  --   --  136  --   --  135* 136 137   POTASSIUM  --   --  4.6  --   --  4.1 4.0 3.8   CHLORIDE  --   --  102  --   --  104 101 101   CO2  --   --  26  --   --  21* 26 25   ANIONGAP  --   --  8  --   --  10 9 11   GLC  --   --  135*  --   --  134* 189* 152*   BUN  --   --  24*  --   --  15 20 24*   CR 0.81 0.75 0.77 0.76   < > 0.87 0.80 0.82   GFRESTIMATED >90 >90 >90 >90   < > >90 >90 >90   SESAR  --   --  8.7  --   --  8.7 9.0 10.2   PROTTOTAL  --   --   --   --   --  6.7 6.5 7.4   ALBUMIN  --   --   --   --   --  2.6* 2.5* 2.9*   BILITOTAL  --   --   --   --   --  0.4 0.3 0.5   ALKPHOS  --   --   --   --   --  135* 132* 155*   AST  --   --   --   --   --  28 19 32   ALT  --   --   --   --   --  65* 67* 95*    < > = values in this interval not displayed.     CBC  Recent Labs   Lab 06/08/23  0508 06/07/23  1559 06/07/23  0049 06/04/23  0739 06/03/23  0839   WBC 9.9  --  8.0 7.4 6.4   RBC 2.51*  --  2.08* 2.57* 2.39*   HGB 8.1* 8.6* 7.0* 8.7* 8.0*   HCT 25.0*  --  20.9* 26.6* 24.4*     --  101* 104* 102*   MCH 32.3  --  33.7* 33.9* 33.5*   MCHC 32.4  --  33.5 32.7 32.8   RDW 16.3*  --  15.8* 16.2* 15.9*   *  --  373 305 303     INRNo lab results found in last 7 days.  Arterial Blood GasNo lab results " found in last 7 days.    Cultures: personally reviewed.   7-Day Micro Results    Collected Updated Procedure Result Status    06/05/2023 1334 06/06/2023 0721 Cytology non gyn - BAL Sites [[id number]]   Bronchial Alveolar Lavage from Bronchus, Right    In process Component Value   No component results          06/05/2023 1334 06/05/2023 1529 Cell count with differential fluid - BAL Site 1 [[id number]]    Bronchial Alveolar Lavage from Bronchus, Right    In process Component Value   No component results          06/05/2023 1334 06/05/2023 2007 Respiratory Aerobic Bacterial Culture with Gram Stain [[id number]]   Bronchial Alveolar Lavage from Bronchus, Right    Preliminary result Component Value   Gram Stain Result <25 PMNs/low power field P    No organisms seen P             06/05/2023 1334 06/05/2023 1340 Acid-Fast Bacilli Culture and Stain with AFB Stain [[id number]]    Bronchial Alveolar Lavage from Lung, Right    In process Component Value   No component results          06/05/2023 1334 06/05/2023 1344 Fungus Culture, non-blood - BAL Site 1 [[id number]]   Bronchial Alveolar Lavage from Lung, Right    In process Component Value   No component results             06/05/2023 1334 06/05/2023 2044 Koh prep - BAL Site 1 [[id number]]   Bronchial Alveolar Lavage from Lung, Right    Final result Component Value   KOH Preparation No fungal elements seen   KOH Preparation Reference Range: No fungal elements seen.          06/05/2023 1334 06/05/2023 1344 Legionella culture - BAL Site 1 [[id number]]   Bronchial Alveolar Lavage from Lung, Right    In process Component Value   No component results             06/05/2023 1334 06/05/2023 2029 Respiratory Panel PCR - BAL Site 1 [[id number]]    Bronchial Alveolar Lavage from Bronchus    Final result Component Value   Adenovirus Not Detected   Coronavirus Not Detected   This test detects Coronavirus 229E, HKU1, NL63 and OC43 but does not distinguish between them. It does  not detect MERS ( Respiratory Syndrome), SARS (Severe Acute Respiratory Syndrome) or 2019-nCoV (Novel 2019) Coronavirus.   Human Metapneumovirus Not Detected   Human Rhin/Enterovirus Not Detected   Influenza A Not Detected   Influenza A, H1 Not Detected   Influenza A 2009 H1N1 Not Detected   Influenza A, H3 Not Detected   Influenza B Not Detected   Parainfluenza Virus 1 Not Detected   Parainfluenza Virus 2 Not Detected   Parainfluenza Virus 3 Not Detected   Parainfluenza Virus 4 Not Detected   Respiratory Syncytial Virus A Not Detected   Respiratory Syncytial Virus B Not Detected   Chlamydia Pneumoniae Not Detected   Mycoplasma Pneumoniae Not Detected          06/05/2023 1334 06/05/2023 1340 CMV DNA quantification - BAL Site 1 [[id number]]   Bronchial Alveolar Lavage from Lung, Right    In process Component Value   No component results          06/05/2023 1334 06/05/2023 1529 Cell Count Body Fluid [[id number]]    Bronchial Alveolar Lavage from Bronchus, Right    Final result Component Value Units   Color Colorless    Clarity Clear    Cell Count Fluid Source Lung, Right    Total Nucleated Cells 21 /uL          06/05/2023 1334 06/05/2023 1340 Acid-Fast Bacilli Culture and Stain with AFB Stain [[id number]]   Bronchial Alveolar Lavage from Lung, Right    In process Component Value   No component results          06/05/2023 1334 06/05/2023 1340 Differential Body Fluid [[id number]]   Bronchial Alveolar Lavage from Bronchus, Right    In process Component Value   No component results          06/03/2023 1345 06/04/2023 1437 Blood parasitology exam [[id number]]    Peripheral Blood    Final result Component Value   Babesia Negative   Anaplasma Negative   If Anaplasma (Ehrlichia) infection is highly suspected, consider obtaining blood PCR specific assay.  Light microscopy does not exclude the diagnosis.    Ehrlichia Negative          06/03/2023 0959 06/05/2023 0024 1,3 Beta D glucan fungitell  [[id number]]   (Abnormal)   Blood from Arm, Right    Final result Component Value Units   (1,3)-Beta-D-Glucan >500 pg/mL   B-D GLUCAN INTERPRETATION (1,3) Positive Abnormal     INTERPRETIVE INFORMATION: (1,3)-beta-D-glucan (Fungitell)       Less than 31 pg/mL ................... Negative     31-59 pg/mL .......................... Negative     60-79 pg/mL .......................... Indeterminate     Greater than or equal to 80 pg/mL .... Positive     The Fungitell test is indicated for presumptive diagnosis   of fungal infection and should be used in conjunction with   other diagnostic procedures. This test does not detect   certain fungal species such as Cryptococcus, which produce   very low levels of (1,3)-beta-D-glucan. This test will not   detect the zygomycetes, such as Absidia, Mucor, and   Rhizopus, which are not known to produce   (1,3)-beta-D-glucan. In addition, the yeast phase of   Blastomyces dermatitidis produces little   (1,3)-beta-D-glucan and may not be detected by the assay.   Performed By: [facility]   [address] [place] [id number]   : Benny Stein MD, PhD          06/03/2023 0959 06/03/2023 1021 Blastomyces antibody ID [[id number]]   Blood from Arm, Right    In process Component Value   No component results          06/03/2023 0959 06/06/2023 0552 Fungal Antibodies [[id number]]   Blood from Arm, Right    Final result Component Value Units   Blastomyces [place] by EIA 0.0 IV   INTERPRETIVE INFORMATION: Blastomyces Antibodies EIA, SER     0.9 IV or less.......Negative   1.0-1.4 IV...........Equivocal   1.5 IV or greater....Positive   Aspergillus Antibody by CF <1:8    INTERPRETIVE INFORMATION: Aspergillus Antibodies by CF     A titer of 1:8 or greater suggests Aspergillus infection or   allergy.  Cross-reactions with dimorphic fungi are not   unusual within the genus Aspergillus.   Performed By: [facility]   [address] [place] [address]  [id number]   : Benny Stein MD, PhD   Coccidioides Antibody by CF <1:2    INTERPRETIVE INFORMATION: Coccidioides Ab by Complement   Fixation (CF)     Any titer suggests past or current infection. However,   greater than 30 percent of cases with chronic residual   pulmonary disease have negative Complement Fixation (CF)   tests. Titers of less than 1:32 (even as low as 1:2) may   indicate past infection or self-limited disease;   anticoccidiodal CF antibody titers in excess of 1:16 may   indicate disseminated infection. CF serology may be used to   follow therapy. Antibody in CSF is considered diagnostic   for coccidioidal meningitis, although 10 percent of   patients with coccidioidal meningitis will not have   antibody in CSF.   Histoplasma Mycelia, CF <1:8    INTREPRETIVE INFORMATION: Histoplasma Mycelia Antibodies                             by CF   A titer of 1:8 or greater is generally considered   presumptive evidence of histoplasmosis. A titer of 1:32 or   greater or rising titers indicate strong presumptive   evidence of histoplasmosis. Cross reactions, usually at   lower titers, may occur with other fungal diseases.   Histoplasma Yeast, CF <1:8    INTERPRETIVE INFORMATION: Histoplasma Yeast Antibodies                             by CF   A titer of 1:8 or greater is generally considered   presumptive evidence of histoplasmosis. A titer of 1:32 or   greater or rising titers indicate strong presumptive   evidence of histoplasmosis. Cross reactions, usually at   lower titers, may occur with other fungal diseases.          06/03/2023 0959 06/03/2023 1021 Blastomyces Agn Quant EIA Blood [[id number]]   Blood from Arm, Right    In process Component Value   No component results          06/03/2023 0959 06/03/2023 1543 Cryptococcus antigen [[id number]]   Blood from Arm, Right    Final result Component Value   Cryptococcal Antigen Negative   Cryptococcal titer interpretation N/A           06/03/2023 0839 06/03/2023 0940 Histoplasma Capsulatum Antigen [[id number]]   Blood from Arm, Right    In process Component Value   No component results           06/05/2023 1343 Respiratory Aerobic Bacterial Culture with Gram Stain   Sputum from Expectorate     Component Value   No component results             06/03/2023 0032 06/03/2023 0212 Asymptomatic COVID-19 Virus (Coronavirus) by PCR Nasopharyngeal [[id number]]    Swab from Nasopharyngeal    Final result Component Value   SARS CoV2 PCR Negative   NEGATIVE: SARS-CoV-2 (COVID-19) RNA not detected, presumed negative.          06/03/2023 0032 06/03/2023 1209 Respiratory Panel PCR [[id number]]    Swab from Nasopharyngeal    Final result Component Value   Adenovirus Not Detected   Coronavirus Not Detected   This test detects Coronavirus 229E, HKU1, NL63 and OC43 but does not distinguish between them. It does not detect MERS ( Respiratory Syndrome), SARS (Severe Acute Respiratory Syndrome) or 2019-nCoV (Novel 2019) Coronavirus.   Human Metapneumovirus Not Detected   Human Rhin/Enterovirus Not Detected   Influenza A Not Detected   Influenza A, H1 Not Detected   Influenza A 2009 H1N1 Not Detected   Influenza A, H3 Not Detected   Influenza B Not Detected   Parainfluenza Virus 1 Not Detected   Parainfluenza Virus 2 Not Detected   Parainfluenza Virus 3 Not Detected   Parainfluenza Virus 4 Not Detected   Respiratory Syncytial Virus A Not Detected   Respiratory Syncytial Virus B Not Detected   Chlamydia Pneumoniae Not Detected   Mycoplasma Pneumoniae Not Detected          06/02/2023 1931 06/03/2023 1007 Legionella pneumophila antigen urine [[id number]]   Urine, Midstream    Final result Component Value   Legionella pneumophila serogroup 1 urinary antigen Negative   Suggests no recent or current infection. Infection due to Legionella cannot be ruled out, since other serogroups and species may cause disease, antigen may not be present in urine in  early infection, and the level of antigen present in the urine may be below detectable limits of the test.          06/02/2023 1931 06/03/2023 1007 Streptococcus pneumoniae antigen [[id number]]   Urine, Midstream    Final result Component Value   Streptococcus pneumoniae antigen Negative   A negative Streptococcus pneumoniae antigen result does not rule out infection with Streptococcus pneumoniae.          06/02/2023 1912 06/06/2023 0105 Blood Culture Peripheral Blood [[id number]]   Peripheral Blood    Preliminary result Component Value   Culture No growth after 3 days P             06/02/2023 1857 06/05/2023 2216 Blood Culture Peripheral Blood [[id number]]   Peripheral Blood    Preliminary result Component Value   Culture No growth after 3 days P            Imaging: personally reviewed.   Results for orders placed or performed during the hospital encounter of 06/02/23   CT Chest Pulmonary Embolism w Contrast    Narrative    EXAM: CT CHEST PULMONARY EMBOLISM W CONTRAST  LOCATION: [facility]  DATE/TIME: 6/2/2023 8:00 PM CDT    INDICATION: Cough, fever, myeloma, immunosuppressed, shortness of breath, eval for pneumonia, PE, etc  COMPARISON: CT abdomen 02/24/2009  TECHNIQUE: CT chest pulmonary angiogram during arterial phase injection of IV contrast. Multiplanar reformats and MIP reconstructions were performed. Dose reduction techniques were used.   CONTRAST: 100 ml Isovue 370    FINDINGS:  ANGIOGRAM CHEST: Pulmonary arteries are normal caliber with no pulmonary emboli. Normal caliber thoracic aorta with no CTA signs of acute aortic syndrome.    LUNGS AND PLEURA: Bilateral symmetric peribronchovascular distribution multifocal ground-glass airspace opacity that is mid and upper lung predominant and compatible with nonspecific acute multifocal pneumonitis. A 6 mL right middle lobe nodule is stable   and benign. No pleural effusion. No pneumothorax.    MEDIASTINUM/AXILLAE: Heart size is  within normal limits, but there is severe three-vessel coronary artery calcification and dense calcification and thickening of the aortic valve leaflets. No pericardial effusion. No lymphadenopathy.    CORONARY ARTERY CALCIFICATION: Severe.    UPPER ABDOMEN: Normal.    MUSCULOSKELETAL: A 3.5 x 3.5 x 3.0 cm destructive soft tissue mass centered in the right side of the T4 vertebral body crosses the T4-T5 interspace to involve the right side of the T5 vertebral body and may also encroach upon the right ventral epidural   space. Additional diminutive lytic lesions throughout the visualized bones. Findings consistent with multiple myeloma.      Impression    IMPRESSION:  1.  No pulmonary emboli. No signs of acute aortic syndrome.  2.  Nonspecific acute symmetric bilateral multifocal pneumonitis.  3.  Heart size is within normal limits, but there is severe three-vessel coronary artery calcification and dense calcification and thickening of the aortic valve leaflets.   4.  A 3.5 x 3.5 x 3.0 cm destructive soft tissue mass centered in the right side of the T4 vertebral body crosses the T4-T5 interspace to involve the right side of the T5 vertebral body and may also encroach upon the right ventral epidural space.   Additional diminutive lytic lesions throughout the visualized bones. Findings consistent with multiple myeloma.       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    Family History  Reviewed, and family history is not on file.    Social History  Reviewed, and he  reports that he has never smoked. He has never used smokeless tobacco.    Allergies  Allergies   Allergen Reactions     Codeine Nausea               Maru Malagon,   Pulmonary and Critical Care Attending  pgr [phone]    Securely message with the Vocera Web Console (learn more here)

## 2023-06-08 NOTE — PROGRESS NOTES
[facility]    Infectious Disease Progress Note    Date of Service : 06/08/2023     Assessment & Plan   Billy Carroll is a 67 year old male who was admitted on 6/2/2023.     ASSESSMENT:  Multifocal, bilateral pneumonia/pneumonitis- active issue  Immunosuppressed patient- On bortezomib infusions every Thursday.  Revlimid.  Started 14-day cycle on 5/31  MM plan was 12-week treatment induction followed by stem cell transplant- triplet therapy Revlimid, Velcade and dexamethsone he started on 5/10/2023.   Patient was at a cabin, had been planting, digging, irrigation system  Multiple mosquito bites  Tick bites, though patient thinks it was mostly wood ticks.  Patient had Lyme in the past  Broad differential diagnosis including opportunistic infections, viral bacterial fungal infections, tickborne infection, drug-induced pneumonitis.  Beta D glucan positive.  Back pain, MRI done at [facility] last week  Resp multiplex PCR panel negative.  Positive beta D glucan on 6/3/2023.  Clinical significance unclear.  Status post bronchoscopy on 6/5/2023.  Respiratory multiplex PCR panel again negative.  Sputum culture from 6/5/2023 growing Candida albicans.  This is almost always contaminant.  Steroids added on 6/6/2023.     Images on admission- reviewed       Previously on acyclovir, doxycycline, fluconazole, Levaquin, meropenem, Bactrim, and IV vancomycin.  Sputum cultures so far no growth.  Vancomycin can be effectively discontinued.  Also will discontinue Levaquin since he is on meropenem which will provide Pseudomonas coverage and doxycycline which will provide atypical coverage. IV Vanco and Levaquin and Fluconazole discontinued on 6/5. Micafungin started 6/5 for positive beta D glucan. S/P [facility]. Cultures in process.  All cultures reviewed.      ID Studies  West Nile - pending  Erhlichia  - pending  Lyme - pending  Brucella - neg  Parasite - pending  Crypto - neg  Blasto - pending  Fungal -  pending  Babesia - neg  Histo - negative   Strep - neg  Legionella- neg  resp viral panel - neg  Beta-Glucan - POSITIVE    RECOMMENDATIONS:    1. Continue acyclovir, doxycycline.  2. Discontinue meropenem, Bactrim.  3. Continue micafungin.  4. Follow-up pending results.  5. Continue on steroid  6. Back pain- follow up MRI at [facility] done earlier this week    Discussed with the patient, nursing staff, Dr. Lopez, and Dr. Lora.    ID will follow.      Tita Hernandez MD  [facility]  [phone]      Interval History   Doing about the same today.  All cultures reviewed.  So far no growth except for Candida albicans from bronchoscopy.  Physical Exam   Temp: 98.3  F (36.8  C) Temp src: Oral BP: 128/64 Pulse: 87   Resp: 16 SpO2: 93 % O2 Device: High Flow Nasal Cannula (HFNC) Oxygen Delivery: 40 LPM  Vitals:    06/05/23 0509 06/06/23 0008 06/07/23 0626   Weight: 88.2 kg (194 lb 6.4 oz) 94.6 kg (208 lb 8.9 oz) 87.2 kg (192 lb 3.2 oz)     Vital Signs with Ranges  Temp:  [95  F (35  C)-98.6  F (37  C)] 98.3  F (36.8  C)  Pulse:  [65-95] 87  Resp:  [15-33] 16  BP: (119-155)/(60-79) 128/64  FiO2 (%):  [50 %-54 %] 52 %  SpO2:  [90 %-96 %] 93 %    Constitutional: Awake, no apparent distress  Lungs: coughing, scattered harsh breath sounds  Cardiovascular: Regular rate and rhythm, S1 S2  Abdomen: non distended  Skin: warm  Neuro: deconditioned  Psych: able to answer questions    Medications       acetaminophen  975 mg Oral TID AC     acyclovir  400 mg Oral BID     aspirin  81 mg Oral Daily     doxycycline hyclate  100 mg Oral Q12H [facility] (08/20)     enoxaparin ANTICOAGULANT  40 mg Subcutaneous Q24H     furosemide  40 mg Intravenous Q12H     hydrochlorothiazide  12.5 mg Oral Daily     losartan  100 mg Oral Daily     melatonin  5 mg Oral At Bedtime     meropenem  1 g Intravenous Q8H     methylPREDNISolone  62.5 mg Intravenous Q6H     micafungin  100 mg Intravenous Q24H     simvastatin   40 mg Oral Daily     sodium chloride (PF)  3 mL Intracatheter Q8H     sulfamethoxazole-trimethoprim  2 tablet Oral BID       Data   All microbiology laboratory data reviewed.  Reviewed CBC  Reviewed Creatinine    Recent Labs   Lab Test 06/08/23  0508 06/07/23  1559 06/07/23  0049 06/04/23  0739   WBC 9.9  --  8.0 7.4   HGB 8.1* 8.6* 7.0* 8.7*   HCT 25.0*  --  20.9* 26.6*     --  101* 104*   *  --  373 305     Recent Labs   Lab Test 06/08/23  0508 06/07/23  0925 06/07/23  0049   CR 0.81 0.75 0.77     No lab results found.  No lab results found.    Invalid input(s):     MICROBIOLOGY:    Reviewed Smear, resp panel, culture results    7-Day Micro Results     Collected Updated Procedure Result Status      06/05/2023 1334 06/06/2023 0721 Cytology non gyn - BAL Sites [[id number]]   Bronchial Alveolar Lavage from Bronchus, Right    In process Component Value   No component results            06/05/2023 1334 06/06/2023 1101 Cell count with differential fluid - BAL Site 1 [[id number]]    Bronchial Alveolar Lavage from Bronchus, Right    Final result Component Value   No component results            06/05/2023 1334 06/07/2023 1446 Respiratory Aerobic Bacterial Culture with Gram Stain [[id number]]   (Abnormal)   Bronchial Alveolar Lavage from Bronchus, Right    Preliminary result Component Value   Culture Culture in progress  [P]     1+ Candida albicans  [P]     Susceptibilities not routinely done, refer to antibiogram to view typical susceptibility profiles   Gram Stain Result <25 PMNs/low power field  [P]     No organisms seen  [P]                06/05/2023 1334 06/05/2023 1340 Acid-Fast Bacilli Culture and Stain with AFB Stain [[id number]]    Bronchial Alveolar Lavage from Lung, Right    In process Component Value   No component results            06/05/2023 1334 06/07/2023 0956 Fungus Culture, non-blood - BAL Site 1 [[id number]]   (Abnormal)   Bronchial Alveolar Lavage from Lung, Right    Preliminary  result Component Value   Culture Yeast  [P]                06/05/2023 1334 06/05/2023 2044 Koh prep - BAL Site 1 [[id number]]   Bronchial Alveolar Lavage from Lung, Right    Final result Component Value   KOH Preparation No fungal elements seen   KOH Preparation Reference Range: No fungal elements seen.            06/05/2023 1334 06/06/2023 1027 Legionella culture - BAL Site 1 [[id number]]   Bronchial Alveolar Lavage from Lung, Right    Preliminary result Component Value   Culture Culture negative, monitoring continues  [P]                06/05/2023 1334 06/05/2023 2029 Respiratory Panel PCR - BAL Site 1 [[id number]]    Bronchial Alveolar Lavage from Bronchus    Final result Component Value   Adenovirus Not Detected   Coronavirus Not Detected   This test detects Coronavirus 229E, HKU1, NL63 and OC43 but does not distinguish between them. It does not detect MERS ( Respiratory Syndrome), SARS (Severe Acute Respiratory Syndrome) or 2019-nCoV (Novel 2019) Coronavirus.   Human Metapneumovirus Not Detected   Human Rhin/Enterovirus Not Detected   Influenza A Not Detected   Influenza A, H1 Not Detected   Influenza A 2009 H1N1 Not Detected   Influenza A, H3 Not Detected   Influenza B Not Detected   Parainfluenza Virus 1 Not Detected   Parainfluenza Virus 2 Not Detected   Parainfluenza Virus 3 Not Detected   Parainfluenza Virus 4 Not Detected   Respiratory Syncytial Virus A Not Detected   Respiratory Syncytial Virus B Not Detected   Chlamydia Pneumoniae Not Detected   Mycoplasma Pneumoniae Not Detected            06/05/2023 1334 06/06/2023 1305 CMV DNA quantification - BAL Site 1 [[id number]]    Bronchial Alveolar Lavage from Lung, Right    Final result Component Value Units   CMV DNA IU/mL Not Detected IU/mL            06/05/2023 1334 06/05/2023 1529 Cell Count Body Fluid [[id number]]    Bronchial Alveolar Lavage from Bronchus, Right    Final result Component Value Units   Color Colorless    Clarity  Clear    Cell Count Fluid Source Lung, Right    Total Nucleated Cells 21 /uL            06/05/2023 1334 06/07/2023 1617 Acid-Fast Bacilli Culture and Stain with AFB Stain [[id number]]    Bronchial Alveolar Lavage from Lung, Right    Preliminary result Component Value   Acid Fast Stain No acid fast bacilli seen  [P]    Performed by [employer],[address] [place] [address] [phone].[employer], Benny Stein MD, PHD, Lab. Director            06/05/2023 1334 06/06/2023 1101 Differential Body Fluid [[id number]]    Bronchial Alveolar Lavage from Bronchus, Right    Final result Component Value Units   % Neutrophils 13 %   % Lymphocytes 35 %   % Monocyte/Macrophages 34 %   % Eosinophils 1 %   % Lining Cells 14 %   % Other Cells 3 %            06/03/2023 1345 06/04/2023 1437 Blood parasitology exam [[id number]]    Peripheral Blood    Final result Component Value   Babesia Negative   Anaplasma Negative   If Anaplasma (Ehrlichia) infection is highly suspected, consider obtaining blood PCR specific assay.  Light microscopy does not exclude the diagnosis.     Ehrlichia Negative            06/03/2023 0959 06/05/2023 0024 1,3 Beta D glucan fungitell [[id number]]   (Abnormal)   Blood from Arm, Right    Final result Component Value Units   (1,3)-Beta-D-Glucan >500 pg/mL   B-D GLUCAN INTERPRETATION (1,3) Positive    INTERPRETIVE INFORMATION: (1,3)-beta-D-glucan (Fungitell)      Less than 31 pg/mL ................... Negative    31-59 pg/mL .......................... Negative    60-79 pg/mL .......................... Indeterminate    Greater than or equal to 80 pg/mL .... Positive    The Fungitell test is indicated for presumptive diagnosis   of fungal infection and should be used in conjunction with   other diagnostic procedures. This test does not detect   certain fungal species such as Cryptococcus, which produce   very low levels of (1,3)-beta-D-glucan. This test will not   detect the zygomycetes, such  as Absidia, Mucor, and   Rhizopus, which are not known to produce   (1,3)-beta-D-glucan. In addition, the yeast phase of   Blastomyces dermatitidis produces little   (1,3)-beta-D-glucan and may not be detected by the assay.  Performed By: [facility]  [address] [place] [address]  : Benny Stein MD, PhD            06/03/2023 0959 06/07/2023 2018 Blastomyces antibody ID [[id number]]   Blood from Arm, Right    Final result Component Value   Blastomyces dermatitidis Abs, Precipitin Not Detected     No Blastomyces antibodies were detected. This result does   not exclude Blastomyces infection.  Performed by [facility],  [address] [place] [address] [phone]  [email or web address], Benny Stein MD, PHD, Lab. Director            06/03/2023 0959 06/06/2023 0552 Fungal Antibodies [[id number]]   Blood from Arm, Right    Final result Component Value Units   Blastomyces [place] by EIA 0.0 IV   INTERPRETIVE INFORMATION: Blastomyces Antibodies EIA, SER    0.9 IV or less.......Negative  1.0-1.4 IV...........Equivocal   1.5 IV or greater....Positive   Aspergillus Antibody by CF <1:8    INTERPRETIVE INFORMATION: Aspergillus Antibodies by CF     A titer of 1:8 or greater suggests Aspergillus infection or   allergy.  Cross-reactions with dimorphic fungi are not   unusual within the genus Aspergillus.  Performed By: [facility]  [address] [place] [address]  : Benny Stein MD, PhD   Coccidioides Antibody by CF <1:2    INTERPRETIVE INFORMATION: Coccidioides Ab by Complement   Fixation (CF)    Any titer suggests past or current infection. However,   greater than 30 percent of cases with chronic residual   pulmonary disease have negative Complement Fixation (CF)   tests. Titers of less than 1:32 (even as low as 1:2) may   indicate past infection or self-limited disease;   anticoccidiodal CF antibody titers in excess of 1:16 may   indicate  disseminated infection. CF serology may be used to   follow therapy. Antibody in CSF is considered diagnostic   for coccidioidal meningitis, although 10 percent of   patients with coccidioidal meningitis will not have   antibody in CSF.   Histoplasma Mycelia, CF <1:8    INTREPRETIVE INFORMATION: Histoplasma Mycelia Antibodies                            by CF  A titer of 1:8 or greater is generally considered   presumptive evidence of histoplasmosis. A titer of 1:32 or   greater or rising titers indicate strong presumptive   evidence of histoplasmosis. Cross reactions, usually at   lower titers, may occur with other fungal diseases.   Histoplasma Yeast, CF <1:8    INTERPRETIVE INFORMATION: Histoplasma Yeast Antibodies                             by CF  A titer of 1:8 or greater is generally considered   presumptive evidence of histoplasmosis. A titer of 1:32 or   greater or rising titers indicate strong presumptive   evidence of histoplasmosis. Cross reactions, usually at   lower titers, may occur with other fungal diseases.            06/03/2023 0959 06/08/2023 0852 Blastomyces Agn Quant EIA Blood [[id number]]   Blood from Arm, Right    Final result Component Value   See Scanned Result BLASTOMYCES AGN QUANT EIA BLOOD-Scanned            06/03/2023 0959 06/03/2023 1543 Cryptococcus antigen [[id number]]   Blood from Arm, Right    Final result Component Value   Cryptococcal Antigen Negative   Cryptococcal titer interpretation N/A            06/03/2023 0839 06/08/2023 0855 Histoplasma Capsulatum Antigen [[id number]]   Blood from Arm, Right    Final result Component Value   See Scanned Result HISTOPLASMA CAPSULATUM ANTIGEN-Scanned             06/05/2023 1343 Respiratory Aerobic Bacterial Culture with Gram Stain   Sputum from Expectorate     Component Value   No component results               06/03/2023 0032 06/03/2023 0212 Asymptomatic COVID-19 Virus (Coronavirus) by PCR Nasopharyngeal [[id number]]    Swab from  Nasopharyngeal    Final result Component Value   SARS CoV2 PCR Negative   NEGATIVE: SARS-CoV-2 (COVID-19) RNA not detected, presumed negative.            06/03/2023 0032 06/03/2023 1209 Respiratory Panel PCR [[id number]]    Swab from Nasopharyngeal    Final result Component Value   Adenovirus Not Detected   Coronavirus Not Detected   This test detects Coronavirus 229E, HKU1, NL63 and OC43 but does not distinguish between them. It does not detect MERS ( Respiratory Syndrome), SARS (Severe Acute Respiratory Syndrome) or 2019-nCoV (Novel 2019) Coronavirus.   Human Metapneumovirus Not Detected   Human Rhin/Enterovirus Not Detected   Influenza A Not Detected   Influenza A, H1 Not Detected   Influenza A 2009 H1N1 Not Detected   Influenza A, H3 Not Detected   Influenza B Not Detected   Parainfluenza Virus 1 Not Detected   Parainfluenza Virus 2 Not Detected   Parainfluenza Virus 3 Not Detected   Parainfluenza Virus 4 Not Detected   Respiratory Syncytial Virus A Not Detected   Respiratory Syncytial Virus B Not Detected   Chlamydia Pneumoniae Not Detected   Mycoplasma Pneumoniae Not Detected            06/02/2023 1931 06/03/2023 1007 Legionella pneumophila antigen urine [[id number]]   Urine, Midstream    Final result Component Value   Legionella pneumophila serogroup 1 urinary antigen Negative   Suggests no recent or current infection. Infection due to Legionella cannot be ruled out, since other serogroups and species may cause disease, antigen may not be present in urine in early infection, and the level of antigen present in the urine may be below detectable limits of the test.            06/02/2023 1931 06/03/2023 1007 Streptococcus pneumoniae antigen [[id number]]   Urine, Midstream    Final result Component Value   Streptococcus pneumoniae antigen Negative   A negative Streptococcus pneumoniae antigen result does not rule out infection with Streptococcus pneumoniae.            06/02/2023 1912  06/08/2023 0106 Blood Culture Peripheral Blood [[id number]]   Peripheral Blood    Final result Component Value   Culture No Growth               06/02/2023 1857 06/07/2023 2216 Blood Culture Peripheral Blood [[id number]]   Peripheral Blood    Final result Component Value   Culture No Growth                      RADIOLOGY:    Reviewed  CT Chest Pulmonary Embolism w Contrast    Result Date: 6/2/2023  EXAM: CT CHEST PULMONARY EMBOLISM W CONTRAST LOCATION: [facility] DATE/TIME: 6/2/2023 8:00 PM CDT INDICATION: Cough, fever, myeloma, immunosuppressed, shortness of breath, eval for pneumonia, PE, etc COMPARISON: CT abdomen 02/24/2009 TECHNIQUE: CT chest pulmonary angiogram during arterial phase injection of IV contrast. Multiplanar reformats and MIP reconstructions were performed. Dose reduction techniques were used. CONTRAST: 100 ml Isovue 370 FINDINGS: ANGIOGRAM CHEST: Pulmonary arteries are normal caliber with no pulmonary emboli. Normal caliber thoracic aorta with no CTA signs of acute aortic syndrome. LUNGS AND PLEURA: Bilateral symmetric peribronchovascular distribution multifocal ground-glass airspace opacity that is mid and upper lung predominant and compatible with nonspecific acute multifocal pneumonitis. A 6 mL right middle lobe nodule is stable  and benign. No pleural effusion. No pneumothorax. MEDIASTINUM/AXILLAE: Heart size is within normal limits, but there is severe three-vessel coronary artery calcification and dense calcification and thickening of the aortic valve leaflets. No pericardial effusion. No lymphadenopathy. CORONARY ARTERY CALCIFICATION: Severe. UPPER ABDOMEN: Normal. MUSCULOSKELETAL: A 3.5 x 3.5 x 3.0 cm destructive soft tissue mass centered in the right side of the T4 vertebral body crosses the T4-T5 interspace to involve the right side of the T5 vertebral body and may also encroach upon the right ventral epidural space. Additional diminutive lytic lesions  throughout the visualized bones. Findings consistent with multiple myeloma.     IMPRESSION: 1.  No pulmonary emboli. No signs of acute aortic syndrome. 2.  Nonspecific acute symmetric bilateral multifocal pneumonitis. 3.  Heart size is within normal limits, but there is severe three-vessel coronary artery calcification and dense calcification and thickening of the aortic valve leaflets. 4.  A 3.5 x 3.5 x 3.0 cm destructive soft tissue mass centered in the right side of the T4 vertebral body crosses the T4-T5 interspace to involve the right side of the T5 vertebral body and may also encroach upon the right ventral epidural space. Additional diminutive lytic lesions throughout the visualized bones. Findings consistent with multiple myeloma.

## 2023-06-08 NOTE — PROGRESS NOTES
[facility]    Referral received  from Christal Roach RN CM, for IV antibiotics.    Benefits verified. Pt does not have coverage for IV antibiotics in the home under his Medicare and BCBS supplemental plan.  The drug would be billed to the Part D and supplies would be self-pay.  Based on Micafungin 100 mg q 24 hours, the total cost is $42.47/day for drug and supplies.    For nursing, the patient should have coverage, if he is homebound.  [facility] would need to secure an agency, if he is homebound.  Patient should have coverage in a TCU or possibly in an infusion center, depending on the final IV antibiotic orders.    Should pt require home IV antibiotics at discharge, writer will speak with pt to review benefits, home infusion and to offer choice of agency/home infusion provider.    Thank you for the referral.    Isis Ramirez RN, BSN  [facility] Liaison  [phone] (Mon through Fri, 8:00 am-5:00 pm)  [phone] (Office)

## 2023-06-08 NOTE — PROGRESS NOTES
Care Management Follow Up    Length of Stay (days): 4    Expected Discharge Date: 06/10/2023     Concerns to be Addressed: discharge planning     Patient plan of care discussed at interdisciplinary rounds: Yes    Anticipated Discharge Disposition: Home vs TCU IV abx     Anticipated Discharge Services: TBD  Anticipated Discharge DME: None      Additional Information:  Per chart review, multiple cultures pending, s/p bronchoscopy.  Patient immunosupressed due to dx Multiple Myeloma/Chemo.    Referral sent [facility] to check benefits if needs IV abx at discharge.  Unclear at this time discharge home vs TCU.    CM/SW will continue to follow along.     UPDATE: Per Cindy VELASCOHI patient has no home infusion coverage.   If patient's discharges with just Micafungin, cost to patient for drug/supplies: $42.47.  If patient home bound, patient cost for for RN visit: $90.00/per visit.  Patient does have TCU coverage for infusion therapy.      Christal Roach CM

## 2023-06-08 NOTE — PROGRESS NOTES
Patient does not have iv abx coverage in the home with their Medicare and BCBS Supplement plan. Drug would be billed to the part D and supplies will be self-pay. Based on Micafungin 100mg q24h, total cost is $42.47/day for drug and supplies.     For nursing, patient should have coverage if homebound, however \[facility]\"" is not contracted with Medicare and an outside nursing agency would be utilized instead. If patient is not homebound, there is no coverage and I can see patient if patient agrees to self-pay for $90 per visit.    Patient should have coverage in a TCU or infusion center.    (WW) In reference to admission date 06/02/2023.    Please contact Intake with any questions, [phone] or In Basket pool, [facility] ([phone]).

## 2023-06-08 NOTE — PROGRESS NOTES
[facility] MEDICINE  PROGRESS NOTE     Code Status: Full Code       Identification/Summary:   Billy Carroll is a 67-year-old male immunosuppressed with history of IgG multiple myeloma, 3 cm mass on T4, on treatment with bortezomib, Velcade and Revlimid managed by [place] [facility].  Recent trip to his cabin, outdoors and around multiple people.  5/30 developed cough and fever.  5/31 started Levaquin with no improvement.  6/1 saw [place] for low back pain.  Lumbar MRI showed right L5 nerve impingement.  6/2 admitted [facility] for fevers.  CT chest revealed acute symmetric bilateral multifocal pneumonitis.  ID, oncology and pulmonary consulted.  Receiving multiple antibiotics/antifungals.  6/4 requiring oxygen after ambulation.  6/5 bronchoscopy.  Viral PCR and bacterial testing negative.  Fungitell testing positive.  6/6 worsening oxygen demand despite Solu-Medrol and Lasix IV.  6/7 overnight started on HFNC 40 L at 50% FiO2. Repeat CT chest shows progression of bilateral infiltrates.  Transferred to ICU.  Respiratory status stable.  Treatment simplified to doxycycline and micafungin.  Appreciate pulmonary and infectious disease consultations.  Likely slow progression.     Assessment and Plan:     Multifocal pneumonitis  Fever  Immunosuppressed   Acute hypoxemic respiratory failure  Bronchoscopy 6/5/2023  At admission not requiring supplemental oxygen.    CT chest no pulmonary emboli.  There is acute symmetric bilateral multifocal pneumonitis.  CRP 11.3.  Lactic acid normal at 1.4.  Procalcitonin normal.  Given fluconazole 200 mg daily, meropenem 1 g every 8 hours, Bactrim DS 2 tabs twice daily and vancomycin pharmacy to dose.  6/4 developed hypoxia after ambulating.    Respiratory viral PCR negative, Ehrlichia, Anaplasma, Babesia negative.  B-D glucan/Fungitell positive but fungal antibodies negative.    6/5 underwent bronchoscopy.  No complications.  Afebrile overnight.  Treatment  adjusted to acyclovir, doxycycline, Bactrim, meropenem and micafungin.  6/6 requiring 5 L nasal cannular oxygen.    Started Lasix mg IV and intravenous Solu-Medrol.  6/7 overnight started on HFNC 40 L at 50% FiO2.  Transferred to ICU.  CT scan chest shows significant progression of bilateral infiltrates.  6/8 respiratory status holding stable.  Treatment simplified to doxycycline, micafungin and prophylactic acyclovir.  Further management per ID and pulmonary service.  Multiple myeloma  Monoclonal gammopathy  On bortezomib infusions every Thursday.  Revlimid.  Started 14-day cycle on 5/31.   Holding treatment per oncology and ID recommendations.  Continue PTA medication: Acyclovir 400 mg twice daily.  3 cm mass on T4  CT scan indicates soft tissue mass right side T4 vertebral body, additional lytic lesions throughout visualized bones consistent with multiple myeloma.  Utilize scheduled Tylenol 975 mg 3 times daily with meals.  As needed Norco or oral Tylenol at bedtime.  Neurosurgery recommend further management per rad/onc with [place] [facility] as an outpatient.  Low back pain  Right L5 nerve impingement  6/1 saw [place] for low back pain.  Per report from [place] [facility] MRI lumbar spine showed right L5 nerve impingement.  Received TLSO brace per orthopedic recommendations.  Will follow-up with them as an outpatient.  Mild elevation in liver transaminase  ALT 95--> 67--> 65.  Likely secondary to cancer.  No further testing indicated.  Acute on chronic macrocytic anemia  PRBC transfusion x1  Hemoglobin 8.5, .  Platelets 363.  Repeat hemoglobin 8--> 8.7--> 7.  6/7 transfused PRBC x1.  6/8 hemoglobin 8.6--> 8.1.  Follow daily CBC.  Essential hypertension   Continue home hydrochlorothiazide 12.5 mg daily.  Losartan 100 mg daily.  Hypercholesterolemia  Continue home simvastatin 40 mg daily.  ASCVD  Aortic valve calcifications  CT scan showed severe three-vessel coronary artery calcification and dense  "calcifications and thickening of the aortic valves.  March 2023 stress echo shows EF 55 to 60%.  Calcified aortic valve noted.  No evidence of stress-induced ischemia.  Follow-up as outpatient.  Tearfulness/emotional liability  Not unexpected given the intravenous steroids and clinical situation.  Patient's Atarax makes him very sleepy so holding at this time.  Utilize as needed melatonin.  Other treatment options defer to pulmonary given respiratory situation.     Anticoagulation   ASA 81 mg daily.  6/7 lovenox subcutaneous per ICU protocol.     COVID-19 PCR negative from 6/3/2023  Noted.  Fluids:   Saline lock  Pain meds: Scheduled Tylenol with meals and bedtime as needed Norco versus Tylenol  Therapy: N/A     Gutierrez:Not present  Lines: None       Current Diet  Orders Placed This Encounter      Regular Diet Adult    Supplements  None    Barriers to Discharge: HFNC, intravenous antibiotics and antifungals    Disposition: Prolonged hospitalization likely    Clinically Significant Risk Factors              # Hypoalbuminemia: Lowest albumin = 2.5 g/dL at 6/3/2023  8:39 AM, will monitor as appropriate            # Overweight: Estimated body mass index is 27.58 kg/m  as calculated from the following:    Height as of this encounter: 1.778 m (5' 10\").    Weight as of this encounter: 87.2 kg (192 lb 3.2 oz).           Interval History/Subjective:  Patient stable on his HFNC.  Did have some epistaxis today but controlled.  Otherwise feels like he is doing okay.  No chest pain.  No nausea or vomiting.  Coughing is stable.  Good intake.  Mood is improved.  Questions answered to verbalized satisfaction.      Last 24H PRN:      acetaminophen (TYLENOL) tablet 325 mg, 325 mg at 06/07/23 2154 **OR** [DISCONTINUED] acetaminophen (TYLENOL) Suppository 650 mg     HYDROcodone-acetaminophen (NORCO) 5-325 MG per tablet 1 tablet, 1 tablet at 06/07/23 2154     ibuprofen (ADVIL/MOTRIN) tablet 200-400 mg, 400 mg at 06/08/23 0202     " senna-docusate (SENOKOT-S/PERICOLACE) 8.6-50 MG per tablet 1 tablet, 1 tablet at 06/08/23 1003    Physical Exam/Objective:  Temp:  [95  F (35  C)-98.6  F (37  C)] 98  F (36.7  C)  Pulse:  [65-92] 87  Resp:  [15-26] 23  BP: (119-149)/(60-76) 130/62  FiO2 (%):  [50 %-54 %] 52 %  SpO2:  [90 %-96 %] 91 %  Wt Readings from Last 4 Encounters:   06/07/23 87.2 kg (192 lb 3.2 oz)     Body mass index is 27.58 kg/m .    Constitutional: awake, alert, cooperative, no apparent distress, and appears stated age  ENT: Normocephalic, without obvious abnormality, atraumatic, external ears without lesions, oral pharynx with moist mucous membranes, tonsils without erythema or exudates, gums normal and good dentition.  Respiratory: Decreased air movement.  Slight crackles.  No wheezing.  Cardiovascular: Normal apical impulse, regular rate and rhythm, normal S1 and S2, no S3 or S4, and no murmur noted  GI: No scars, normal bowel sounds, soft, non-distended, non-tender, no masses palpated, no hepatosplenomegally  Skin: normal skin color, texture, turgor, no redness, warmth, or swelling, and no rashes  Musculoskeletal: There is no redness, warmth, or swelling of the joints.  Motor strength is 5 out of 5 all extremities bilaterally.  Tone is normal. no lower extremity pitting edema present  Neurologic: Cranial nerves II-XII are grossly intact. Sensory:  Sensory intact  Neuropsychiatric: General: normal, calm and normal eye contact Level of consciousness: alert / normal Affect: normal Orientation: oriented to self, place, time and situation Memory and insight: normal, memory for past and recent events intact and thought process normal      Medications:   Personally Reviewed.  Medications       acetaminophen  975 mg Oral TID AC     acyclovir  400 mg Oral BID     aspirin  81 mg Oral Daily     doxycycline hyclate  100 mg Oral Q12H [facility] (08/20)     enoxaparin ANTICOAGULANT  40 mg Subcutaneous Q24H     furosemide  40 mg Intravenous Q12H      hydrochlorothiazide  12.5 mg Oral Daily     losartan  100 mg Oral Daily     melatonin  5 mg Oral At Bedtime     methylPREDNISolone  62.5 mg Intravenous Q6H     micafungin  100 mg Intravenous Q24H     simvastatin  40 mg Oral Daily     sodium chloride (PF)  3 mL Intracatheter Q8H       Data reviewed today: I personally reviewed all new medications, labs, imaging/diagnostics reports over the past 24 hours. Pertinent findings include:    Imaging:   No results found for this or any previous visit (from the past 24 hour(s)).    Labs:  CT Chest w/o Contrast   Final Result   IMPRESSION:    1.  Extensive diffuse bilateral pulmonary infiltrates have shown significant progression compared with 6/2/2023.      2.  Small bilateral pleural effusions are new.      3.  Borderline mild ectasia of the ascending thoracic aorta measuring 4.0 cm, unchanged.      4.  Severe coronary artery calcification.      5.  Numerous lytic bony lesions compatible with the patient's known multiple myeloma including pathologic fracture at the T5 level, unchanged.         CT Chest Pulmonary Embolism w Contrast   Final Result   IMPRESSION:   1.  No pulmonary emboli. No signs of acute aortic syndrome.   2.  Nonspecific acute symmetric bilateral multifocal pneumonitis.   3.  Heart size is within normal limits, but there is severe three-vessel coronary artery calcification and dense calcification and thickening of the aortic valve leaflets.    4.  A 3.5 x 3.5 x 3.0 cm destructive soft tissue mass centered in the right side of the T4 vertebral body crosses the T4-T5 interspace to involve the right side of the T5 vertebral body and may also encroach upon the right ventral epidural space.    Additional diminutive lytic lesions throughout the visualized bones. Findings consistent with multiple myeloma.        Recent Results (from the past 24 hour(s))   Hemoglobin    Collection Time: 06/07/23  3:59 PM   Result Value Ref Range    Hemoglobin 8.6 (L) 13.3 - 17.7  g/dL   Creatinine    Collection Time: 06/08/23  5:08 AM   Result Value Ref Range    Creatinine 0.81 0.70 - 1.30 mg/dL    GFR Estimate >90 >60 mL/min/1.73m2   CBC with platelets    Collection Time: 06/08/23  5:08 AM   Result Value Ref Range    WBC Count 9.9 4.0 - 11.0 10e3/uL    RBC Count 2.51 (L) 4.40 - 5.90 10e6/uL    Hemoglobin 8.1 (L) 13.3 - 17.7 g/dL    Hematocrit 25.0 (L) 40.0 - 53.0 %     78 - 100 fL    MCH 32.3 26.5 - 33.0 pg    MCHC 32.4 31.5 - 36.5 g/dL    RDW 16.3 (H) 10.0 - 15.0 %    Platelet Count 454 (H) 150 - 450 10e3/uL       Pending Labs:  Unresulted Labs Ordered in the Past 30 Days of this Admission     Date and Time Order Name Status Description    6/5/2023  1:07 PM Legionella culture - BAL Site 1 Preliminary     6/5/2023  1:07 PM Fungus Culture, non-blood - BAL Site 1 Preliminary     6/5/2023  1:07 PM Acid-Fast Bacilli Culture and Stain with AFB Stain In process     6/3/2023  2:31 PM Arbovirus Antibodies, IgG and IgM, Serum In process     6/3/2023  2:31 PM West Nile Virus Antibody IgG IgM In process     6/3/2023  2:31 PM Hypersensitivity Pneumonitis 2 In process     6/3/2023  2:31 PM Hypersensitivity pneumonitis In process     6/3/2023  1:32 PM Lyme disease DNA detection by PCR In process     6/3/2023  1:32 PM Brucella species antibody In process     6/3/2023  1:32 PM Babesia Species by PCR In process     6/3/2023  1:32 PM Anaplasma phagocytoph Antibodies IgG IgM In process     6/2/2023 11:31 PM Pneumocystis jirovecil by PCR In process             Franco Lora MD  [facility]  [facility]  Phone: #[phone]

## 2023-06-08 NOTE — PLAN OF CARE
Problem: Gas Exchange Impaired  Goal: Optimal Gas Exchange  Outcome: Progressing  Intervention: Optimize Oxygenation and Ventilation  Recent Flowsheet Documentation  Taken 6/8/2023 1200 by Liliya Petit RN  Head of Bed (HOB) Positioning: HOB at 60-90 degrees   Goal Outcome Evaluation:  Pt had 2nd nosebleed, bleeding from both nostrils;  pressure applied manually to bridge of nose ; assisted pt with partial bath after nosebleed; called RT to come/ replace highflow cannula with new one;  observe pt desat to low 80s on room air;  exertional SOB with getting OOB/ up to bedside commode. Lungs diminished with fine crackles to R)lungs, especially RUL.  Updated Dr Malagon regarding nosebleed.

## 2023-06-09 LAB
ANION GAP SERPL CALCULATED.3IONS-SCNC: 9 MMOL/L (ref 5–18)
B MICROTI DNA BLD QL NAA+PROBE: NOT DETECTED
BABESIA DNA BLD QL NAA+PROBE: NOT DETECTED
BUN SERPL-MCNC: 28 MG/DL (ref 8–22)
CALCIUM SERPL-MCNC: 8.6 MG/DL (ref 8.5–10.5)
CHLORIDE BLD-SCNC: 104 MMOL/L (ref 98–107)
CO2 SERPL-SCNC: 28 MMOL/L (ref 22–31)
CREAT SERPL-MCNC: 0.7 MG/DL (ref 0.7–1.3)
CREAT SERPL-MCNC: 0.7 MG/DL (ref 0.7–1.3)
EEEV IGG TITR SER IF: NORMAL {TITER}
EEEV IGM TITR SER IF: NORMAL {TITER}
ERYTHROCYTE [DISTWIDTH] IN BLOOD BY AUTOMATED COUNT: 15.8 % (ref 10–15)
GFR SERPL CREATININE-BSD FRML MDRD: >90 ML/MIN/1.73M2
GFR SERPL CREATININE-BSD FRML MDRD: >90 ML/MIN/1.73M2
GLUCOSE BLD-MCNC: 173 MG/DL (ref 70–125)
HCT VFR BLD AUTO: 25.4 % (ref 40–53)
HGB BLD-MCNC: 8.2 G/DL (ref 13.3–17.7)
LACV IGG TITR SER IF: NORMAL {TITER}
LACV IGM TITR SER IF: NORMAL {TITER}
MCH RBC QN AUTO: 32.3 PG (ref 26.5–33)
MCHC RBC AUTO-ENTMCNC: 32.3 G/DL (ref 31.5–36.5)
MCV RBC AUTO: 100 FL (ref 78–100)
P JIROVECII DNA SPEC QL NAA+PROBE: DETECTED
PLATELET # BLD AUTO: 536 10E3/UL (ref 150–450)
POTASSIUM BLD-SCNC: 4.3 MMOL/L (ref 3.5–5)
RBC # BLD AUTO: 2.54 10E6/UL (ref 4.4–5.9)
SLEV IGG TITR SER IF: NORMAL {TITER}
SLEV IGM TITR SER IF: NORMAL {TITER}
SODIUM SERPL-SCNC: 141 MMOL/L (ref 136–145)
WBC # BLD AUTO: 10.8 10E3/UL (ref 4–11)
WEEV IGG TITR SER IF: NORMAL {TITER}
WEEV IGM TITR SER IF: NORMAL {TITER}
WNV IGG SER IA-ACNC: 0.02 IV
WNV IGM SER IA-ACNC: 0.02 IV

## 2023-06-09 PROCEDURE — 999N000287 HC ICU ADULT ROUNDING, EACH 10 MINS

## 2023-06-09 PROCEDURE — 250N000013 HC RX MED GY IP 250 OP 250 PS 637: Performed by: FAMILY MEDICINE

## 2023-06-09 PROCEDURE — 82565 ASSAY OF CREATININE: CPT | Performed by: INTERNAL MEDICINE

## 2023-06-09 PROCEDURE — 250N000011 HC RX IP 250 OP 636: Performed by: FAMILY MEDICINE

## 2023-06-09 PROCEDURE — 36415 COLL VENOUS BLD VENIPUNCTURE: CPT | Performed by: INTERNAL MEDICINE

## 2023-06-09 PROCEDURE — 250N000013 HC RX MED GY IP 250 OP 250 PS 637: Performed by: INTERNAL MEDICINE

## 2023-06-09 PROCEDURE — 120N000004 HC R&B MS OVERFLOW

## 2023-06-09 PROCEDURE — 85027 COMPLETE CBC AUTOMATED: CPT | Performed by: FAMILY MEDICINE

## 2023-06-09 PROCEDURE — 99232 SBSQ HOSP IP/OBS MODERATE 35: CPT | Performed by: STUDENT IN AN ORGANIZED HEALTH CARE EDUCATION/TRAINING PROGRAM

## 2023-06-09 PROCEDURE — 250N000011 HC RX IP 250 OP 636: Performed by: HOSPITALIST

## 2023-06-09 PROCEDURE — 99232 SBSQ HOSP IP/OBS MODERATE 35: CPT | Performed by: FAMILY MEDICINE

## 2023-06-09 PROCEDURE — 250N000011 HC RX IP 250 OP 636: Performed by: INTERNAL MEDICINE

## 2023-06-09 PROCEDURE — 80048 BASIC METABOLIC PNL TOTAL CA: CPT | Performed by: INTERNAL MEDICINE

## 2023-06-09 PROCEDURE — 99233 SBSQ HOSP IP/OBS HIGH 50: CPT | Performed by: INTERNAL MEDICINE

## 2023-06-09 PROCEDURE — 999N000157 HC STATISTIC RCP TIME EA 10 MIN

## 2023-06-09 RX ORDER — SULFAMETHOXAZOLE/TRIMETHOPRIM 800-160 MG
1 TABLET ORAL
Status: DISCONTINUED | OUTPATIENT
Start: 2023-06-09 | End: 2023-06-11 | Stop reason: HOSPADM

## 2023-06-09 RX ADMIN — FUROSEMIDE 40 MG: 10 INJECTION, SOLUTION INTRAVENOUS at 02:56

## 2023-06-09 RX ADMIN — ACYCLOVIR 400 MG: 200 CAPSULE ORAL at 08:04

## 2023-06-09 RX ADMIN — SIMVASTATIN 40 MG: 20 TABLET, FILM COATED ORAL at 08:05

## 2023-06-09 RX ADMIN — METHYLPREDNISOLONE SODIUM SUCCINATE 62.5 MG: 125 INJECTION, POWDER, FOR SOLUTION INTRAMUSCULAR; INTRAVENOUS at 21:05

## 2023-06-09 RX ADMIN — FUROSEMIDE 40 MG: 10 INJECTION, SOLUTION INTRAVENOUS at 14:16

## 2023-06-09 RX ADMIN — DOXYCYCLINE 100 MG: 100 CAPSULE ORAL at 21:05

## 2023-06-09 RX ADMIN — HYDROCODONE BITARTRATE AND ACETAMINOPHEN 1 TABLET: 5; 325 TABLET ORAL at 21:06

## 2023-06-09 RX ADMIN — ACYCLOVIR 400 MG: 200 CAPSULE ORAL at 22:13

## 2023-06-09 RX ADMIN — METHYLPREDNISOLONE SODIUM SUCCINATE 62.5 MG: 125 INJECTION, POWDER, FOR SOLUTION INTRAMUSCULAR; INTRAVENOUS at 08:06

## 2023-06-09 RX ADMIN — ASPIRIN 81 MG: 81 TABLET, COATED ORAL at 08:04

## 2023-06-09 RX ADMIN — METHYLPREDNISOLONE SODIUM SUCCINATE 62.5 MG: 125 INJECTION, POWDER, FOR SOLUTION INTRAMUSCULAR; INTRAVENOUS at 02:56

## 2023-06-09 RX ADMIN — ACETAMINOPHEN 975 MG: 325 TABLET ORAL at 17:10

## 2023-06-09 RX ADMIN — HYDROCHLOROTHIAZIDE 12.5 MG: 12.5 CAPSULE ORAL at 08:04

## 2023-06-09 RX ADMIN — METHYLPREDNISOLONE SODIUM SUCCINATE 62.5 MG: 125 INJECTION, POWDER, FOR SOLUTION INTRAMUSCULAR; INTRAVENOUS at 14:17

## 2023-06-09 RX ADMIN — IBUPROFEN 400 MG: 200 TABLET, FILM COATED ORAL at 05:37

## 2023-06-09 RX ADMIN — SULFAMETHOXAZOLE AND TRIMETHOPRIM 1 TABLET: 800; 160 TABLET ORAL at 14:23

## 2023-06-09 RX ADMIN — ACETAMINOPHEN 975 MG: 325 TABLET ORAL at 11:14

## 2023-06-09 RX ADMIN — ENOXAPARIN SODIUM 40 MG: 100 INJECTION SUBCUTANEOUS at 11:15

## 2023-06-09 RX ADMIN — DOXYCYCLINE 100 MG: 100 CAPSULE ORAL at 08:06

## 2023-06-09 RX ADMIN — LOSARTAN POTASSIUM 100 MG: 25 TABLET, FILM COATED ORAL at 08:05

## 2023-06-09 RX ADMIN — ACETAMINOPHEN 975 MG: 325 TABLET ORAL at 08:05

## 2023-06-09 RX ADMIN — Medication 5 MG: at 21:05

## 2023-06-09 ASSESSMENT — ACTIVITIES OF DAILY LIVING (ADL)
ADLS_ACUITY_SCORE: 32
ADLS_ACUITY_SCORE: 30
ADLS_ACUITY_SCORE: 32
ADLS_ACUITY_SCORE: 30
ADLS_ACUITY_SCORE: 32

## 2023-06-09 NOTE — PLAN OF CARE
Problem: Gas Exchange Impaired  Goal: Optimal Gas Exchange  Outcome: Progressing  Intervention: Optimize Oxygenation and Ventilation  Recent Flowsheet Documentation  Taken 6/9/2023 0400 by Baylee Hansen RN  Head of Bed ([facility]) Positioning: HOB at 30-45 degrees  Taken 6/9/2023 0000 by Baylee Hansen RN  Head of Bed ([facility]) Positioning: HOB at 30-45 degrees  Taken 6/8/2023 2000 by Baylee Hansen RN  Head of Bed ([facility]) Positioning: HOB at 30-45 degrees   Goal Outcome Evaluation:               Patient alert and orientated. HARDING. Follows commands. Able to make needs known. Map >65. HiFlow NC 30% 50% with 02sat goal>90. RR 20-30s. Dyspnea with exertion. Lung sounds crackles/dimin. 2 PIVs intact. Voids per urinal. Scheduled IV steroid and lasix administered. Patient has chronic pain, PRN tylenol, ibuprofen, and norco given. Patient rested off and on throughout shift. Hourly rounding ongoing.

## 2023-06-09 NOTE — PROGRESS NOTES
[facility]    Infectious Disease Progress Note    Date of Service : 06/09/2023     Assessment & Plan   Billy Carroll is a 67 year old male who was admitted on 6/2/2023.     ASSESSMENT:  Multifocal, bilateral pneumonia/pneumonitis- active issue  Immunosuppressed patient- On bortezomib infusions every Thursday.  Revlimid.  Started 14-day cycle on 5/31  MM plan was 12-week treatment induction followed by stem cell transplant- triplet therapy Revlimid, Velcade and dexamethsone he started on 5/10/2023.   Patient was at a cabin, had been planting, digging, irrigation system  Multiple mosquito bites  Tick bites, though patient thinks it was mostly wood ticks.  Patient had Lyme in the past  Broad differential diagnosis including opportunistic infections, viral bacterial fungal infections, tickborne infection, drug-induced pneumonitis.  Beta D glucan positive.  Back pain, MRI done at [facility] last week  Resp multiplex PCR panel negative.  Positive beta D glucan on 6/3/2023.  Clinical significance unclear.  Status post bronchoscopy on 6/5/2023.  Respiratory multiplex PCR panel again negative.  Sputum culture from 6/5/2023 growing Candida albicans.  This is almost always contaminant.  Steroids added on 6/6/2023.     Images on admission- reviewed       Previously on acyclovir, doxycycline, fluconazole, Levaquin, meropenem, Bactrim, and IV vancomycin.  Sputum cultures so far no growth.  Vancomycin can be effectively discontinued.  Also will discontinue Levaquin since he is on meropenem which will provide Pseudomonas coverage and doxycycline which will provide atypical coverage. IV Vanco and Levaquin and Fluconazole discontinued on 6/5. Micafungin started 6/5 for positive beta D glucan. S/P [facility]. Cultures in process.  All cultures reviewed.      ID Studies  West Nile - pending  Erhlichia  - pending  Lyme - pending  Brucella - neg  Parasite - pending  Crypto - neg  Blasto - pending  Fungal -  pending  Babesia - neg  Histo - negative   Strep - neg  Legionella- neg  resp viral panel - neg  Beta-Glucan - POSITIVE    Overall most likely drug-induced pneumonitis.  All cultures negative.  I discussed with Dr. Pang.  He will be transition to oral prednisone today with PCP prophylaxis and outpatient follow-up with Dr. Pang.     RECOMMENDATIONS:    1. Continue acyclovir,   2. Complete a 7-day course of doxycycline.  3. Discontinued meropenem, Bactrim.  4. Discontinue micafungin.  5. Follow-up pending results.  6. Continue on steroid  7. Back pain- follow up MRI at [facility] done earlier this week    Discussed with the patient, nursing staff, Dr. Pang.     ID will sign off.      Tita Hernandez MD  [facility]  [phone]      Interval History   Doing better today.  All cultures reviewed.    Physical Exam   Temp: 98  F (36.7  C) Temp src: Oral BP: 118/62 Pulse: 74   Resp: 18 SpO2: 92 % O2 Device: Nasal cannula with humidification Oxygen Delivery: 5 LPM  Vitals:    06/06/23 0008 06/07/23 0626 06/09/23 0530   Weight: 94.6 kg (208 lb 8.9 oz) 87.2 kg (192 lb 3.2 oz) 86.9 kg (191 lb 9.6 oz)     Vital Signs with Ranges  Temp:  [97.5  F (36.4  C)-98  F (36.7  C)] 98  F (36.7  C)  Pulse:  [] 74  Resp:  [14-38] 18  BP: (118-159)/(58-97) 118/62  FiO2 (%):  [47 %-54 %] 47 %  SpO2:  [88 %-94 %] 92 %    Constitutional: Awake, no apparent distress  Lungs: coughing, scattered harsh breath sounds  Cardiovascular: Regular rate and rhythm, S1 S2  Abdomen: non distended  Skin: warm  Neuro: deconditioned  Psych: able to answer questions    Medications       acetaminophen  975 mg Oral TID AC     acyclovir  400 mg Oral BID     aspirin  81 mg Oral Daily     doxycycline hyclate  100 mg Oral Q12H RAFFAELE (08/20)     enoxaparin ANTICOAGULANT  40 mg Subcutaneous Q24H     furosemide  40 mg Intravenous Q12H     hydrochlorothiazide  12.5 mg Oral Daily     losartan  100 mg Oral Daily      melatonin  5 mg Oral At Bedtime     methylPREDNISolone  62.5 mg Intravenous Q6H     micafungin  100 mg Intravenous Q24H     simvastatin  40 mg Oral Daily     sodium chloride (PF)  3 mL Intracatheter Q8H       Data   All microbiology laboratory data reviewed.  Reviewed CBC  Reviewed Creatinine    Recent Labs   Lab Test 06/09/23  0513 06/08/23  0508 06/07/23  1559 06/07/23  0049   WBC 10.8 9.9  --  8.0   HGB 8.2* 8.1* 8.6* 7.0*   HCT 25.4* 25.0*  --  20.9*    100  --  101*   * 454*  --  373     Recent Labs   Lab Test 06/09/23  0513 06/08/23  0508 06/07/23  0925   CR 0.70  0.70 0.81 0.75     No lab results found.  No lab results found.    Invalid input(s):     MICROBIOLOGY:    Reviewed Smear, resp panel, culture results    7-Day Micro Results     Collected Updated Procedure Result Status      06/05/2023 1334 06/08/2023 1447 Cytology non gyn - BAL Sites [[id number]]   Bronchial Alveolar Lavage from Bronchus, Right    Edited Result - FINAL Component Value   Addendum This result contains rich text formatting which cannot be displayed here.   Final Diagnosis This result contains rich text formatting which cannot be displayed here.   Comment This result contains rich text formatting which cannot be displayed here.   Clinical Information This result contains rich text formatting which cannot be displayed here.   Gross Description This result contains rich text formatting which cannot be displayed here.   Microscopic Description This result contains rich text formatting which cannot be displayed here.   Performing Labs This result contains rich text formatting which cannot be displayed here.   Disclaimer This result contains rich text formatting which cannot be displayed here.            06/05/2023 1334 06/06/2023 1101 Cell count with differential fluid - BAL Site 1 [[id number]]    Bronchial Alveolar Lavage from Bronchus, Right    Final result Component Value   No component results            06/05/2023  1334 06/08/2023 1233 Respiratory Aerobic Bacterial Culture with Gram Stain [[id number]]   (Abnormal)   Bronchial Alveolar Lavage from Bronchus, Right    Final result Component Value   Culture 1+ Candida albicans    Susceptibilities not routinely done, refer to antibiogram to view typical susceptibility profiles    1+ Normal de   Gram Stain Result <25 PMNs/low power field    No organisms seen               06/05/2023 1334 06/05/2023 1340 Acid-Fast Bacilli Culture and Stain with AFB Stain [[id number]]    Bronchial Alveolar Lavage from Lung, Right    In process Component Value   No component results            06/05/2023 1334 06/07/2023 0956 Fungus Culture, non-blood - BAL Site 1 [[id number]]   (Abnormal)   Bronchial Alveolar Lavage from Lung, Right    Preliminary result Component Value   Culture Yeast  [P]                06/05/2023 1334 06/05/2023 2044 Koh prep - BAL Site 1 [[id number]]   Bronchial Alveolar Lavage from Lung, Right    Final result Component Value   KOH Preparation No fungal elements seen   KOH Preparation Reference Range: No fungal elements seen.            06/05/2023 1334 06/06/2023 1027 Legionella culture - BAL Site 1 [[id number]]   Bronchial Alveolar Lavage from Lung, Right    Preliminary result Component Value   Culture Culture negative, monitoring continues  [P]                06/05/2023 1334 06/05/2023 2029 Respiratory Panel PCR - BAL Site 1 [[id number]]    Bronchial Alveolar Lavage from Bronchus    Final result Component Value   Adenovirus Not Detected   Coronavirus Not Detected   This test detects Coronavirus 229E, HKU1, NL63 and OC43 but does not distinguish between them. It does not detect MERS ( Respiratory Syndrome), SARS (Severe Acute Respiratory Syndrome) or 2019-nCoV (Novel 2019) Coronavirus.   Human Metapneumovirus Not Detected   Human Rhin/Enterovirus Not Detected   Influenza A Not Detected   Influenza A, H1 Not Detected   Influenza A 2009 H1N1 Not Detected    Influenza A, H3 Not Detected   Influenza B Not Detected   Parainfluenza Virus 1 Not Detected   Parainfluenza Virus 2 Not Detected   Parainfluenza Virus 3 Not Detected   Parainfluenza Virus 4 Not Detected   Respiratory Syncytial Virus A Not Detected   Respiratory Syncytial Virus B Not Detected   Chlamydia Pneumoniae Not Detected   Mycoplasma Pneumoniae Not Detected            06/05/2023 1334 06/06/2023 1305 CMV DNA quantification - BAL Site 1 [[id number]]    Bronchial Alveolar Lavage from Lung, Right    Final result Component Value Units   CMV DNA IU/mL Not Detected IU/mL            06/05/2023 1334 06/05/2023 1529 Cell Count Body Fluid [[id number]]    Bronchial Alveolar Lavage from Bronchus, Right    Final result Component Value Units   Color Colorless    Clarity Clear    Cell Count Fluid Source Lung, Right    Total Nucleated Cells 21 /uL            06/05/2023 1334 06/07/2023 1617 Acid-Fast Bacilli Culture and Stain with AFB Stain [[id number]]    Bronchial Alveolar Lavage from Lung, Right    Preliminary result Component Value   Acid Fast Stain No acid fast bacilli seen  [P]    Performed by [facility] [place] [address] [phone], Benny Stein MD, PHD, Lab. Director            06/05/2023 1334 06/06/2023 1101 Differential Body Fluid [[id number]]    Bronchial Alveolar Lavage from Bronchus, Right    Final result Component Value Units   % Neutrophils 13 %   % Lymphocytes 35 %   % Monocyte/Macrophages 34 %   % Eosinophils 1 %   % Lining Cells 14 %   % Other Cells 3 %            06/03/2023 1345 06/04/2023 1437 Blood parasitology exam [[id number]]    Peripheral Blood    Final result Component Value   Babesia Negative   Anaplasma Negative   If Anaplasma (Ehrlichia) infection is highly suspected, consider obtaining blood PCR specific assay.  Light microscopy does not exclude the diagnosis.     Ehrlichia Negative            06/03/2023 0959 06/05/2023 0024 1,3 Beta D glucan  fungitell [[id number]]   (Abnormal)   Blood from Arm, Right    Final result Component Value Units   (1,3)-Beta-D-Glucan >500 pg/mL   B-D GLUCAN INTERPRETATION (1,3) Positive    INTERPRETIVE INFORMATION: (1,3)-beta-D-glucan (Fungitell)      Less than 31 pg/mL ................... Negative    31-59 pg/mL .......................... Negative    60-79 pg/mL .......................... Indeterminate    Greater than or equal to 80 pg/mL .... Positive    The Fungitell test is indicated for presumptive diagnosis   of fungal infection and should be used in conjunction with   other diagnostic procedures. This test does not detect   certain fungal species such as Cryptococcus, which produce   very low levels of (1,3)-beta-D-glucan. This test will not   detect the zygomycetes, such as Absidia, Mucor, and   Rhizopus, which are not known to produce   (1,3)-beta-D-glucan. In addition, the yeast phase of   Blastomyces dermatitidis produces little   (1,3)-beta-D-glucan and may not be detected by the assay.  Performed By: [facility]  [address] [place] [address]  : Benny Stein MD, PhD            06/03/2023 0959 06/07/2023 2018 Blastomyces antibody ID [[id number]]   Blood from Arm, Right    Final result Component Value   Blastomyces dermatitidis Abs, Precipitin Not Detected     No Blastomyces antibodies were detected. This result does   not exclude Blastomyces infection.  Performed by [facility],  [address] [place] [address] [phone]  [email or web address], Benny Stein MD, PHD, Lab. Director            06/03/2023 0959 06/06/2023 0552 Fungal Antibodies [[id number]]   Blood from Arm, Right    Final result Component Value Units   Blastomyces [place] by EIA 0.0 IV   INTERPRETIVE INFORMATION: Blastomyces Antibodies EIA, SER    0.9 IV or less.......Negative  1.0-1.4 IV...........Equivocal   1.5 IV or greater....Positive   Aspergillus Antibody by CF <1:8    INTERPRETIVE INFORMATION:  Aspergillus Antibodies by CF     A titer of 1:8 or greater suggests Aspergillus infection or   allergy.  Cross-reactions with dimorphic fungi are not   unusual within the genus Aspergillus.  Performed By: [employer]  [address] [place] [id number]  : Benny Stein MD, PhD   Coccidioides Antibody by CF <1:2    INTERPRETIVE INFORMATION: Coccidioides Ab by Complement   Fixation (CF)    Any titer suggests past or current infection. However,   greater than 30 percent of cases with chronic residual   pulmonary disease have negative Complement Fixation (CF)   tests. Titers of less than 1:32 (even as low as 1:2) may   indicate past infection or self-limited disease;   anticoccidiodal CF antibody titers in excess of 1:16 may   indicate disseminated infection. CF serology may be used to   follow therapy. Antibody in CSF is considered diagnostic   for coccidioidal meningitis, although 10 percent of   patients with coccidioidal meningitis will not have   antibody in CSF.   Histoplasma Mycelia, CF <1:8    INTREPRETIVE INFORMATION: Histoplasma Mycelia Antibodies                            by CF  A titer of 1:8 or greater is generally considered   presumptive evidence of histoplasmosis. A titer of 1:32 or   greater or rising titers indicate strong presumptive   evidence of histoplasmosis. Cross reactions, usually at   lower titers, may occur with other fungal diseases.   Histoplasma Yeast, CF <1:8    INTERPRETIVE INFORMATION: Histoplasma Yeast Antibodies                             by CF  A titer of 1:8 or greater is generally considered   presumptive evidence of histoplasmosis. A titer of 1:32 or   greater or rising titers indicate strong presumptive   evidence of histoplasmosis. Cross reactions, usually at   lower titers, may occur with other fungal diseases.            06/03/2023 0959 06/08/2023 0852 Blastomyces Agn Quant EIA Blood [[id number]]   Blood from Arm, Right    Final result  Component Value   See Scanned Result BLASTOMYCES AGN QUANT EIA BLOOD-Scanned            06/03/2023 0959 06/03/2023 1543 Cryptococcus antigen [[id number]]   Blood from Arm, Right    Final result Component Value   Cryptococcal Antigen Negative   Cryptococcal titer interpretation N/A            06/03/2023 0839 06/08/2023 0855 Histoplasma Capsulatum Antigen [[id number]]   Blood from Arm, Right    Final result Component Value   See Scanned Result HISTOPLASMA CAPSULATUM ANTIGEN-Scanned             06/05/2023 1343 Respiratory Aerobic Bacterial Culture with Gram Stain   Sputum from Expectorate     Component Value   No component results               06/03/2023 0032 06/03/2023 0212 Asymptomatic COVID-19 Virus (Coronavirus) by PCR Nasopharyngeal [[id number]]    Swab from Nasopharyngeal    Final result Component Value   SARS CoV2 PCR Negative   NEGATIVE: SARS-CoV-2 (COVID-19) RNA not detected, presumed negative.            06/03/2023 0032 06/03/2023 1209 Respiratory Panel PCR [[id number]]    Swab from Nasopharyngeal    Final result Component Value   Adenovirus Not Detected   Coronavirus Not Detected   This test detects Coronavirus 229E, HKU1, NL63 and OC43 but does not distinguish between them. It does not detect MERS ( Respiratory Syndrome), SARS (Severe Acute Respiratory Syndrome) or 2019-nCoV (Novel 2019) Coronavirus.   Human Metapneumovirus Not Detected   Human Rhin/Enterovirus Not Detected   Influenza A Not Detected   Influenza A, H1 Not Detected   Influenza A 2009 H1N1 Not Detected   Influenza A, H3 Not Detected   Influenza B Not Detected   Parainfluenza Virus 1 Not Detected   Parainfluenza Virus 2 Not Detected   Parainfluenza Virus 3 Not Detected   Parainfluenza Virus 4 Not Detected   Respiratory Syncytial Virus A Not Detected   Respiratory Syncytial Virus B Not Detected   Chlamydia Pneumoniae Not Detected   Mycoplasma Pneumoniae Not Detected            06/02/2023 1931 06/03/2023 1007 Legionella  pneumophila antigen urine [[id number]]   Urine, Midstream    Final result Component Value   Legionella pneumophila serogroup 1 urinary antigen Negative   Suggests no recent or current infection. Infection due to Legionella cannot be ruled out, since other serogroups and species may cause disease, antigen may not be present in urine in early infection, and the level of antigen present in the urine may be below detectable limits of the test.            06/02/2023 1931 06/03/2023 1007 Streptococcus pneumoniae antigen [[id number]]   Urine, Midstream    Final result Component Value   Streptococcus pneumoniae antigen Negative   A negative Streptococcus pneumoniae antigen result does not rule out infection with Streptococcus pneumoniae.            06/02/2023 1912 06/08/2023 0106 Blood Culture Peripheral Blood [[id number]]   Peripheral Blood    Final result Component Value   Culture No Growth               06/02/2023 1857 06/07/2023 2216 Blood Culture Peripheral Blood [[id number]]   Peripheral Blood    Final result Component Value   Culture No Growth                      RADIOLOGY:    Reviewed  CT Chest Pulmonary Embolism w Contrast    Result Date: 6/2/2023  EXAM: CT CHEST PULMONARY EMBOLISM W CONTRAST LOCATION: [facility] DATE/TIME: 6/2/2023 8:00 PM CDT INDICATION: Cough, fever, myeloma, immunosuppressed, shortness of breath, eval for pneumonia, PE, etc COMPARISON: CT abdomen 02/24/2009 TECHNIQUE: CT chest pulmonary angiogram during arterial phase injection of IV contrast. Multiplanar reformats and MIP reconstructions were performed. Dose reduction techniques were used. CONTRAST: 100 ml Isovue 370 FINDINGS: ANGIOGRAM CHEST: Pulmonary arteries are normal caliber with no pulmonary emboli. Normal caliber thoracic aorta with no CTA signs of acute aortic syndrome. LUNGS AND PLEURA: Bilateral symmetric peribronchovascular distribution multifocal ground-glass airspace opacity that is mid and  upper lung predominant and compatible with nonspecific acute multifocal pneumonitis. A 6 mL right middle lobe nodule is stable  and benign. No pleural effusion. No pneumothorax. MEDIASTINUM/AXILLAE: Heart size is within normal limits, but there is severe three-vessel coronary artery calcification and dense calcification and thickening of the aortic valve leaflets. No pericardial effusion. No lymphadenopathy. CORONARY ARTERY CALCIFICATION: Severe. UPPER ABDOMEN: Normal. MUSCULOSKELETAL: A 3.5 x 3.5 x 3.0 cm destructive soft tissue mass centered in the right side of the T4 vertebral body crosses the T4-T5 interspace to involve the right side of the T5 vertebral body and may also encroach upon the right ventral epidural space. Additional diminutive lytic lesions throughout the visualized bones. Findings consistent with multiple myeloma.     IMPRESSION: 1.  No pulmonary emboli. No signs of acute aortic syndrome. 2.  Nonspecific acute symmetric bilateral multifocal pneumonitis. 3.  Heart size is within normal limits, but there is severe three-vessel coronary artery calcification and dense calcification and thickening of the aortic valve leaflets. 4.  A 3.5 x 3.5 x 3.0 cm destructive soft tissue mass centered in the right side of the T4 vertebral body crosses the T4-T5 interspace to involve the right side of the T5 vertebral body and may also encroach upon the right ventral epidural space. Additional diminutive lytic lesions throughout the visualized bones. Findings consistent with multiple myeloma.

## 2023-06-09 NOTE — PROGRESS NOTES
Care Management Follow Up    Length of Stay (days): 5    Expected Discharge Date: 06/10/2023     Concerns to be Addressed: discharge planning         Private pay costs discussed: insurance costs out of pocket expenses    Additional Information:  Writer met with patient to discuss potential for PP home infusion if ordered at discharge.  Patient reports does not want to discharge TCU and has financial resources to PP for home infusion if required at discharge.  Patient son is RN in PACU [facility] and can assist with IV abx administration.     Patient remains high flow oxygen 10L NC and anticipates discharge early next week    Family to transport.    UPDATE: Per I/D, patient will not need IV abx at discharge and transitioned today PO Prednisone for probable drug-induced Pneumonitis.      Christal Roach CM

## 2023-06-09 NOTE — PROGRESS NOTES
Pt currently on O2 5L NC, sats low to mid 90's. BS clear. Pt wean off HFNC this morning form 30L/50% to 5L NC. Pt tolerated NC well.     Gerardo Garcia, RT

## 2023-06-09 NOTE — PROGRESS NOTES
CLINICAL NUTRITION SERVICES     Reviewed nutrition risk factors due to LOS. Pt reports that he is eating well with a good appetite and has no nutrition concerns or questions. He is ordering 3 nutritionally adequate meals per day with % intake noted per chart and [employer]. No nutrition issues identified at this time. RD will follow peripherally at this time, unless consulted.    Mary Arias RDN, LD

## 2023-06-09 NOTE — PROGRESS NOTES
Patient is alert and oriented. Pt placed on 5L nasal cannula, O2 in the 90's. Pt up to the commode and using urinal at bedside. Pt NSR on tele. Pt calls appropriately.     Jasmine Zazueta RN

## 2023-06-09 NOTE — PROGRESS NOTES
Oncology Progress Note:    DOS:  6/9/2023    Assessment/Plan:  1)  Multifocal pneumonitis:  Continue steroids every 6 hours.  Continue to monitor closely.  Would recommend a prednisone dose equivalent to his current IV dose or 1 mg/kg (whichever is less). When ready to discharge, we will  monitor his prednisone taper as an outpatient. Defer to ID and Pulmonology whether he will need any continued IV antibiotics/antifungals at discharge.  He is clearly improved today.      2) Multiple Myeloma:  Continue to hold therapy until current situation resolved.  Will need to be followed closely in our clinic and treatment plan will need to be revised.     3)  Back pain:  Improved with increased steroids.  Continue current pain management plan.     4)  Anemia: Hemoglobin stable at 8.1 after PRBCs transfused earlier this week.  Continue to monitor and transfuse if Hgb equal to or <7.0.    Subjective:  Patient sitting up in recliner and notes that he has been there about 1 hour.  Not overly tired.  No report of nosebleeds today.  Feels much better but continues to desaturate if he is up and moving.  Back on nasal cannula for oxygen administration at 4 liters..  Hoping to go home early next week.     Objective:    General:  Alert, oriented and calm.  VSS.  O2 saturation at 94% at time of visit.     Labs:  Hgb = 8.2, WBC = 10.8 & platlets = 536,000.      Cytology from bronchoscopy shows cytology and histology are scant and consist of reactive appearing respiratory epithelium and mixed inflammatory cells.  DOMENICO and Kappa and lambda in situ hybridization studies are negative/noncontributory for clonality (history of multiple myeloma).  53 demonstrates a wild-type expression pattern.  TTF-1, Napsin A and CD56 are all negative for lung cancer.  Napsin A highlights macrophages.  Pending GMS and AFB stains.     Please contact our office - [phone] over the weekend if there are any questions or concerns.      Gabby Owen RN, MS,  CNP  [facility]  [phone]  Cell:  [phone] (Tuesday - Friday, 8:30 am to 5 pm)

## 2023-06-09 NOTE — PROGRESS NOTES
[facility] Pulmonary/Critical Care Consult Team Note    Billy Carroll,  1955, MRN [id number]  Admitting Dx: Pneumonia of both lungs due to infectious organism, unspecified part of lung [J18.9]  Date / Time of Admission:  2023  6:41 PM    ID Studies  Bal pending. NO positive findings to date  Beta-Glucan - POSITIVE    Results of the Bronchoscopy are pending. On visual appearance the mucosa was not inflamed and there were no secretions indicating bacterial PNA      Assessment/Plan: Billy Carroll is a 67 year old male with PMHx of IgG multiple myeloma on Revlimid, Velcade and dexamethsone started on 5/10/2023 who started having a cough and was started on Abx with no improvement. Imaging was consistent with BL GGO and given negative BAL cultures and significant improvement on steroids, this is likely organizing pneumonia from Revlamid toxicity.    1. Acute Hypoxemic Respiratory Failure: Secondary to Infectious vs pneumotoxicity from chemo. Underwent bronchoscopy with a BAL which was lymphocyte predominant with negative cultures.     Talked with ID who agree that we should discontinue antibiotics and angtifungals. He will complete his 7th day of Doxycycline today.    Continue IV steroids for another 24h given significant improvement in his oxygenation has occurred only today, will switch to orals over the weekend.    Will treat at 1mg/kg PO prednisone for a prolonged taper over 8-10 weeks.    I will see him in clinic in 4 weeks with repeat imaging of his chest to ensure ongoing improvement.    Will initiate Bactrim Single Strength PO Q M, W, Fr until he is on >20mg every day of prednisone.    Maintain SpO2>88%. Able to wean off from NFNC today to NC.    Medical Care Time excluding procedures and family discussions greater than: 30 Minutes       Karen Pang MD  Pulmonary and Critical Care  (P) [phone]    Securely message with the Vocera Web Console (learn more here)    Meds: See MAR    Physical  "Exam:  /62 (BP Location: Right arm, Cuff Size: Adult Regular)   Pulse 74   Temp 98  F (36.7  C) (Oral)   Resp 16   Ht 1.778 m (5' 10\")   Wt 86.9 kg (191 lb 9.6 oz)   SpO2 94%   BMI 27.49 kg/m    GEN: NAD  HEENT: PERRL, No JVD  LUNGS: Coarse BS BL  CVS: S1 & S2 no added sounds  ABD: No HSM, BS audible  EXT: No edema, no rashes  NEURO: AO*3 CN2-12 intact    Pertinent Labs: Latest lab results in EHR personally reviewed.   CMP  Recent Labs   Lab 06/09/23  0513 06/08/23  0508 06/07/23  0925 06/07/23  0049 06/05/23  0749 06/04/23  0739 06/03/23  0839 06/02/23  1857     --   --  136  --  135* 136 137   POTASSIUM 4.3  --   --  4.6  --  4.1 4.0 3.8   CHLORIDE 104  --   --  102  --  104 101 101   CO2 28  --   --  26  --  21* 26 25   ANIONGAP 9  --   --  8  --  10 9 11   *  --   --  135*  --  134* 189* 152*   BUN 28*  --   --  24*  --  15 20 24*   CR 0.70  0.70 0.81 0.75 0.77   < > 0.87 0.80 0.82   GFRESTIMATED >90  >90 >90 >90 >90   < > >90 >90 >90   SESAR 8.6  --   --  8.7  --  8.7 9.0 10.2   PROTTOTAL  --   --   --   --   --  6.7 6.5 7.4   ALBUMIN  --   --   --   --   --  2.6* 2.5* 2.9*   BILITOTAL  --   --   --   --   --  0.4 0.3 0.5   ALKPHOS  --   --   --   --   --  135* 132* 155*   AST  --   --   --   --   --  28 19 32   ALT  --   --   --   --   --  65* 67* 95*    < > = values in this interval not displayed.     CBC  Recent Labs   Lab 06/09/23  0513 06/08/23  0508 06/07/23  1559 06/07/23  0049 06/04/23  0739   WBC 10.8 9.9  --  8.0 7.4   RBC 2.54* 2.51*  --  2.08* 2.57*   HGB 8.2* 8.1* 8.6* 7.0* 8.7*   HCT 25.4* 25.0*  --  20.9* 26.6*    100  --  101* 104*   MCH 32.3 32.3  --  33.7* 33.9*   MCHC 32.3 32.4  --  33.5 32.7   RDW 15.8* 16.3*  --  15.8* 16.2*   * 454*  --  373 305     INRNo lab results found in last 7 days.  Arterial Blood GasNo lab results found in last 7 days.    Cultures: personally reviewed.   7-Day Micro Results    "

## 2023-06-09 NOTE — PROGRESS NOTES
[facility] MEDICINE PROGRESS NOTE      Identification/Summary:   Billy Carroll is a 67-year-old male immunosuppressed with history of IgG multiple myeloma, 3 cm mass on T4, on treatment with bortezomib, Velcade and Revlimid managed by [place] [facility].  Recent trip to his cabin, outdoors and around multiple people.  5/30 developed cough and fever.  5/31 started Levaquin with no improvement.  6/1 saw [facility] for low back pain.  Lumbar MRI showed right L5 nerve impingement.  6/2 admitted [facility] for fevers.  CT chest revealed acute symmetric bilateral multifocal pneumonitis.  ID, oncology and pulmonary consulted.  Receiving multiple antibiotics/antifungals.  6/4 requiring oxygen after ambulation.  6/5 bronchoscopy.  Viral PCR and bacterial testing negative.  Fungitell testing positive.  6/6 worsening oxygen demand despite Solu-Medrol and Lasix IV.  6/7 overnight started on HFNC 40 L at 50% FiO2. Repeat CT chest shows progression of bilateral infiltrates.  Transferred to ICU.  Respiratory status stable.  Treatment simplified to doxycycline and micafungin.  Appreciate pulmonary and infectious disease consultations.  Downgraded from ICU status today to general care.  Patient reports he is feeling better and able to get off high flow oxygen and doing well on 4 L of nasal cannula oxygen.      Assessment and Plan:     Acute hypoxemic respiratory failure, etiology still not totally clear  Multifocal pneumonitis  Fever  Immunosuppressed   Bronchoscopy 6/5/2023  At admission not requiring supplemental oxygen.    CT chest no pulmonary emboli.  There is acute symmetric bilateral multifocal pneumonitis.  CRP 11.3.  Lactic acid normal at 1.4.  Procalcitonin normal.  Given fluconazole 200 mg daily, meropenem 1 g every 8 hours, Bactrim DS 2 tabs twice daily and vancomycin pharmacy to dose.  6/4 developed hypoxia after ambulating.    Respiratory viral PCR negative, Ehrlichia, Anaplasma, Babesia negative.  B-D  glucan/Fungitell positive but fungal antibodies negative.    6/5 underwent bronchoscopy.  No complications.  Afebrile overnight.  Treatment adjusted to acyclovir, doxycycline, Bactrim, meropenem and micafungin.  6/6 requiring 5 L nasal cannular oxygen.    Started Lasix mg IV and intravenous Solu-Medrol.  6/7 overnight started on HFNC 40 L at 50% FiO2.  Transferred to ICU.  CT scan chest shows significant progression of bilateral infiltrates.  6/8 respiratory status holding stable.  Treatment simplified to doxycycline, micafungin and prophylactic acyclovir.  Further management per ID and pulmonary service.  6/9 able to be weaned off of high flow oxygen at 30% to nasal cannula oxygen at 4 L and is currently doing well.  Downgraded from ICU status    Multiple myeloma  Monoclonal gammopathy  On bortezomib infusions every Thursday.  Revlimid.  Started 14-day cycle on 5/31.   Holding treatment per oncology and ID recommendations.  Continue PTA medication: Acyclovir 400 mg twice daily.    3 cm mass on T4  CT scan indicates soft tissue mass right side T4 vertebral body, additional lytic lesions throughout visualized bones consistent with multiple myeloma.  Utilize scheduled Tylenol 975 mg 3 times daily with meals.  As needed Norco or oral Tylenol at bedtime.  Neurosurgery recommend further management per rad/onc with [facility] as an outpatient.    Low back pain  Right L5 nerve impingement  6/1 saw [facility] for low back pain.  Per report from [facility] MRI lumbar spine showed right L5 nerve impingement.  Received TLSO brace per orthopedic recommendations.  Will follow-up with them as an outpatient.    Mild elevation in liver transaminase  ALT 95--> 67--> 65.  Likely secondary to cancer.  No further testing indicated.    Acute on chronic macrocytic anemia  PRBC transfusion x1  Hemoglobin 8.5, .  Platelets 363.  Repeat hemoglobin 8--> 8.7--> 7.  6/7 transfused PRBC x1.  6/8 hemoglobin 8.6--> 8.1.  Follow  daily CBC.    Essential hypertension   Continue home hydrochlorothiazide 12.5 mg daily.  Losartan 100 mg daily.    Hypercholesterolemia  Continue home simvastatin 40 mg daily.    ASCVD  Aortic valve calcifications, with history of moderate aortic stenosis  CT scan showed severe three-vessel coronary artery calcification and dense calcifications and thickening of the aortic valves.  March 2023 stress echo shows EF 55 to 60%.  Calcified aortic valve noted.  No evidence of stress-induced ischemia.  Follow-up as outpatient.    Tearfulness/emotional liability, this seems improved  Not unexpected given the intravenous steroids and clinical situation.  Patient's Atarax makes him very sleepy so holding at this time.  Utilize as needed melatonin.  Other treatment options defer to pulmonary given respiratory situation.     Anticoagulation   ASA 81 mg daily.  6/7 lovenox subcutaneous per ICU protocol.     COVID-19 PCR negative from 6/3/2023  Noted.  Fluids:   Saline lock  Pain meds: Scheduled Tylenol with meals and bedtime as needed Norco versus Tylenol  Therapy: N/A     Gutierrez:Not present  Lines: None        Current Diet  Orders Placed This Encounter      Regular Diet Adult     Supplements  None     Barriers to Discharge: HFNC, intravenous antibiotics and antifungals     Disposition: Prolonged hospitalization likely       The patient's care was discussed with the Bedside Nurse, Patient and ICU team at [facility].    Suleiman Smallwood MD  [facility]  [facility]  Phone: #[phone]  Securely message with the Vocera Web Console (learn more here)  Text page via ttwick Paging/Directory     Interval History/Subjective:  Patient feels she is definitely better today.  Slept well last night.  Appetite is good.  No dyspnea at rest.  No new pain.  Denies any nausea.  Overall he is pretty encouraged today    Physical Exam/Objective:  Temp:  [97.5  F (36.4  C)-98  F (36.7  C)] 98  F (36.7   C)  Pulse:  [] 74  Resp:  [14-38] 16  BP: (118-159)/(58-97) 118/62  FiO2 (%):  [47 %-54 %] 47 %  SpO2:  [88 %-94 %] 94 %  Body mass index is 27.49 kg/m .    GENERAL:  Alert, appears comfortable, in no acute distress, appears stated age   HEAD:  Normocephalic, without obvious abnormality, atraumatic   LUNGS:   Clear to auscultation bilaterally, mild rales over the right lower one half lung field.  No wheezes.  No increased respiratory effort   HEART:  Regular rate and rhythm, grade 3/6 systolic murmur heard loudest at the right upper sternal margin   ABDOMEN:   Soft, non-tender   EXTREMITIES: Extremities normal, atraumatic, no cyanosis or edema    SKIN: Dry to touch, no exanthems in the visualized areas   NEURO: Alert, appears cognitively intact, moves all four extremities freely   PSYCH: Cooperative, behavior is appropriate      Data reviewed today: I personally reviewed all new medications, labs, imaging/diagnostics reports over the past 24 hours. Pertinent findings include:    Labs:  Most Recent 3 CBC's:Recent Labs   Lab Test 06/09/23  0513 06/08/23  0508 06/07/23  1559 06/07/23  0049   WBC 10.8 9.9  --  8.0   HGB 8.2* 8.1* 8.6* 7.0*    100  --  101*   * 454*  --  373     Most Recent 3 BMP's:Recent Labs   Lab Test 06/09/23  0513 06/08/23  0508 06/07/23  0925 06/07/23  0049 06/05/23  0749 06/04/23  0739     --   --  136  --  135*   POTASSIUM 4.3  --   --  4.6  --  4.1   CHLORIDE 104  --   --  102  --  104   CO2 28  --   --  26  --  21*   BUN 28*  --   --  24*  --  15   CR 0.70  0.70 0.81 0.75 0.77   < > 0.87   ANIONGAP 9  --   --  8  --  10   SESAR 8.6  --   --  8.7  --  8.7   *  --   --  135*  --  134*    < > = values in this interval not displayed.     Most Recent 2 LFT's:Recent Labs   Lab Test 06/04/23  0739 06/03/23  0839   AST 28 19   ALT 65* 67*   ALKPHOS 135* 132*   BILITOTAL 0.4 0.3       Medications:   Personally Reviewed.  Medications       acetaminophen  975 mg Oral TID  AC     acyclovir  400 mg Oral BID     aspirin  81 mg Oral Daily     doxycycline hyclate  100 mg Oral Q12H [facility] (08/20)     enoxaparin ANTICOAGULANT  40 mg Subcutaneous Q24H     furosemide  40 mg Intravenous Q12H     hydrochlorothiazide  12.5 mg Oral Daily     losartan  100 mg Oral Daily     melatonin  5 mg Oral At Bedtime     methylPREDNISolone  62.5 mg Intravenous Q6H     micafungin  100 mg Intravenous Q24H     simvastatin  40 mg Oral Daily     sodium chloride (PF)  3 mL Intracatheter Q8H

## 2023-06-10 ENCOUNTER — APPOINTMENT (OUTPATIENT)
Dept: OCCUPATIONAL THERAPY | Facility: CLINIC | Age: 68
DRG: 193 | End: 2023-06-10
Attending: FAMILY MEDICINE
Payer: MEDICARE

## 2023-06-10 ENCOUNTER — APPOINTMENT (OUTPATIENT)
Dept: PHYSICAL THERAPY | Facility: CLINIC | Age: 68
DRG: 193 | End: 2023-06-10
Attending: FAMILY MEDICINE
Payer: MEDICARE

## 2023-06-10 PROBLEM — J98.4 PNEUMONITIS: Status: ACTIVE | Noted: 2023-06-10

## 2023-06-10 LAB
CREAT SERPL-MCNC: 0.68 MG/DL (ref 0.7–1.3)
ERYTHROCYTE [DISTWIDTH] IN BLOOD BY AUTOMATED COUNT: 15.1 % (ref 10–15)
GFR SERPL CREATININE-BSD FRML MDRD: >90 ML/MIN/1.73M2
HCT VFR BLD AUTO: 25.4 % (ref 40–53)
HGB BLD-MCNC: 8.5 G/DL (ref 13.3–17.7)
MCH RBC QN AUTO: 32.7 PG (ref 26.5–33)
MCHC RBC AUTO-ENTMCNC: 33.5 G/DL (ref 31.5–36.5)
MCV RBC AUTO: 98 FL (ref 78–100)
PLATELET # BLD AUTO: 580 10E3/UL (ref 150–450)
RBC # BLD AUTO: 2.6 10E6/UL (ref 4.4–5.9)
WBC # BLD AUTO: 12.6 10E3/UL (ref 4–11)

## 2023-06-10 PROCEDURE — 250N000013 HC RX MED GY IP 250 OP 250 PS 637: Performed by: INTERNAL MEDICINE

## 2023-06-10 PROCEDURE — 120N000004 HC R&B MS OVERFLOW

## 2023-06-10 PROCEDURE — 250N000013 HC RX MED GY IP 250 OP 250 PS 637: Performed by: FAMILY MEDICINE

## 2023-06-10 PROCEDURE — 250N000011 HC RX IP 250 OP 636: Performed by: HOSPITALIST

## 2023-06-10 PROCEDURE — 97116 GAIT TRAINING THERAPY: CPT | Mod: GP

## 2023-06-10 PROCEDURE — 36415 COLL VENOUS BLD VENIPUNCTURE: CPT | Performed by: FAMILY MEDICINE

## 2023-06-10 PROCEDURE — 97161 PT EVAL LOW COMPLEX 20 MIN: CPT | Mod: GP

## 2023-06-10 PROCEDURE — 99233 SBSQ HOSP IP/OBS HIGH 50: CPT | Performed by: INTERNAL MEDICINE

## 2023-06-10 PROCEDURE — 82565 ASSAY OF CREATININE: CPT | Performed by: FAMILY MEDICINE

## 2023-06-10 PROCEDURE — 99232 SBSQ HOSP IP/OBS MODERATE 35: CPT | Performed by: FAMILY MEDICINE

## 2023-06-10 PROCEDURE — 250N000011 HC RX IP 250 OP 636: Performed by: INTERNAL MEDICINE

## 2023-06-10 PROCEDURE — 97166 OT EVAL MOD COMPLEX 45 MIN: CPT | Mod: GO

## 2023-06-10 PROCEDURE — 85027 COMPLETE CBC AUTOMATED: CPT | Performed by: FAMILY MEDICINE

## 2023-06-10 PROCEDURE — 97535 SELF CARE MNGMENT TRAINING: CPT | Mod: GO

## 2023-06-10 PROCEDURE — 250N000011 HC RX IP 250 OP 636: Performed by: FAMILY MEDICINE

## 2023-06-10 RX ORDER — SULFAMETHOXAZOLE AND TRIMETHOPRIM 400; 80 MG/1; MG/1
1 TABLET ORAL
Status: DISCONTINUED | OUTPATIENT
Start: 2023-06-12 | End: 2023-06-10

## 2023-06-10 RX ADMIN — ASPIRIN 81 MG: 81 TABLET, COATED ORAL at 08:13

## 2023-06-10 RX ADMIN — ACYCLOVIR 400 MG: 200 CAPSULE ORAL at 08:20

## 2023-06-10 RX ADMIN — METHYLPREDNISOLONE SODIUM SUCCINATE 62.5 MG: 125 INJECTION, POWDER, FOR SOLUTION INTRAMUSCULAR; INTRAVENOUS at 08:13

## 2023-06-10 RX ADMIN — ACETAMINOPHEN 975 MG: 325 TABLET ORAL at 17:55

## 2023-06-10 RX ADMIN — ENOXAPARIN SODIUM 40 MG: 100 INJECTION SUBCUTANEOUS at 08:28

## 2023-06-10 RX ADMIN — METHYLPREDNISOLONE SODIUM SUCCINATE 62.5 MG: 125 INJECTION, POWDER, FOR SOLUTION INTRAMUSCULAR; INTRAVENOUS at 02:30

## 2023-06-10 RX ADMIN — SIMVASTATIN 40 MG: 20 TABLET, FILM COATED ORAL at 08:13

## 2023-06-10 RX ADMIN — ACETAMINOPHEN 975 MG: 325 TABLET ORAL at 11:10

## 2023-06-10 RX ADMIN — METHYLPREDNISOLONE SODIUM SUCCINATE 62.5 MG: 125 INJECTION, POWDER, FOR SOLUTION INTRAMUSCULAR; INTRAVENOUS at 15:34

## 2023-06-10 RX ADMIN — LOSARTAN POTASSIUM 100 MG: 25 TABLET, FILM COATED ORAL at 08:13

## 2023-06-10 RX ADMIN — Medication 5 MG: at 22:05

## 2023-06-10 RX ADMIN — FUROSEMIDE 40 MG: 10 INJECTION, SOLUTION INTRAVENOUS at 15:34

## 2023-06-10 RX ADMIN — HYDROCHLOROTHIAZIDE 12.5 MG: 12.5 CAPSULE ORAL at 08:13

## 2023-06-10 RX ADMIN — METHYLPREDNISOLONE SODIUM SUCCINATE 62.5 MG: 125 INJECTION, POWDER, FOR SOLUTION INTRAMUSCULAR; INTRAVENOUS at 19:51

## 2023-06-10 RX ADMIN — IBUPROFEN 200 MG: 200 TABLET, FILM COATED ORAL at 22:05

## 2023-06-10 RX ADMIN — ACYCLOVIR 400 MG: 200 CAPSULE ORAL at 19:51

## 2023-06-10 RX ADMIN — HYDROCODONE BITARTRATE AND ACETAMINOPHEN 1 TABLET: 5; 325 TABLET ORAL at 22:04

## 2023-06-10 RX ADMIN — ACETAMINOPHEN 975 MG: 325 TABLET ORAL at 08:13

## 2023-06-10 RX ADMIN — IBUPROFEN 200 MG: 200 TABLET, FILM COATED ORAL at 15:43

## 2023-06-10 RX ADMIN — FUROSEMIDE 40 MG: 10 INJECTION, SOLUTION INTRAVENOUS at 04:48

## 2023-06-10 ASSESSMENT — ACTIVITIES OF DAILY LIVING (ADL)
ADLS_ACUITY_SCORE: 28
ADLS_ACUITY_SCORE: 30
ADLS_ACUITY_SCORE: 28
IADL_COMMENTS: FAMILY CAN ASSIST AS NEEDED
ADLS_ACUITY_SCORE: 30
ADLS_ACUITY_SCORE: 28
ADLS_ACUITY_SCORE: 28
ADLS_ACUITY_SCORE: 30
ADLS_ACUITY_SCORE: 28

## 2023-06-10 NOTE — PROGRESS NOTES
Patient is alert and oriented. NSR on tele. Tele was discontinued this shift. Pt working with PT and OT today. Ambulating in hallway O2 decreased to 85% on 5L NC. Saturations recovered quickly. Pt was not SOB while walking.     Jasmine Zazueta RN

## 2023-06-10 NOTE — PROGRESS NOTES
06/10/23 1100   Appointment Info   Signing Clinician's Name / Credentials (OT) Alissa Phillips, OTR/L   Living Environment   People in Home spouse   Current Living Arrangements house   Home Accessibility no concerns   Living Environment Comments one level   Self-Care   Equipment Currently Used at Home raised toilet seat;grab bar, tub/shower  (adjustable bed)   Instrumental Activities of Daily Living (IADL)   IADL Comments family can assist as needed   General Information   Onset of Illness/Injury or Date of Surgery 06/02/23   Referring Physician Dr. Suleiman Smallwood   Patient/Family Therapy Goal Statement (OT) return home   Additional Occupational Profile Info/Pertinent History of Current Problem Pt is a 66 yo male s/p pna and recent L5 fracture with PMH: IgG multiple myeloma, 3 cm mass on T4, on treatment with bortezomib, Velcade and Revlimid managed by [facility].   Existing Precautions/Restrictions immunosuppressed;oxygen therapy device and L/min  (4-5L O2 via NC is new to pt)   Cognitive Status Examination   Orientation Status orientation to person, place and time   Visual Perception   Visual Impairment/Limitations corrective lenses full-time   Sensory   Sensory Quick Adds sensation intact   Pain Assessment   Patient Currently in Pain Yes, see Vital Sign flowsheet   Posture   Posture not impaired   Range of Motion Comprehensive   General Range of Motion bilateral upper extremity ROM WFL   Strength Comprehensive (MMT)   General Manual Muscle Testing (MMT) Assessment no strength deficits identified   Muscle Tone Assessment   Muscle Tone Quick Adds No deficits were identified   Coordination   Upper Extremity Coordination No deficits were identified   Bed Mobility   Bed Mobility supine-sit;sit-supine   Supine-Sit [place] (Bed Mobility) modified independence   Sit-Supine [place] (Bed Mobility) modified independence   Assistive Device (Bed Mobility)   (adjustable bed)   Comment (Bed Mobility)  consistent with home set up   Transfers   Transfers toilet transfer   Toilet Transfer   Type (Toilet Transfer) sit-stand;stand-sit   [place] (Toilet Transfer) modified independence   Assistive Device (Toilet Transfer) raised toilet seat   Toilet Transfer Comments consistent with home set up   Balance   Balance Assessment standing balance: dynamic   Balance Comments no AD and no LOB   Activities of Daily Living   BADL Assessment/Intervention lower body dressing;upper body dressing   Upper Body Dressing Assessment/Training   Position (Upper Body Dressing) unsupported standing   [place] (Upper Body Dressing) independent   Lower Body Dressing Assessment/Training   Position (Lower Body Dressing) long sitting;unsupported standing   [place] (Lower Body Dressing) modified independence   Clinical Impression   Criteria for Skilled Therapeutic Interventions Met (OT) Yes, treatment indicated   OT Diagnosis decreased ADLs   OT Problem List-Impairments impacting ADL activity tolerance impaired;pain   Assessment of Occupational Performance 3-5 Performance Deficits   Identified Performance Deficits standing ADLs, fxl txrs, fxl mob, bathing   Planned Therapy Interventions (OT) ADL retraining;transfer training   Clinical Decision Making Complexity (OT) moderate complexity   Risk & Benefits of therapy have been explained evaluation/treatment results reviewed;care plan/treatment goals reviewed;participants voiced agreement with care plan;participants included;patient;spouse/significant other;son   OT Total Evaluation Time   OT Eval, Moderate Complexity Minutes (85801) 15   OT Goals   Therapy Frequency (OT) One time eval and treatment   OT Predicted Duration/Target Date for Goal Attainment 06/10/23   OT Goals Upper Body Dressing;Lower Body Dressing;Toilet Transfer/Toileting;Aerobic Activity   OT: Upper Body Dressing Modified independent;Completed;Goal Met   OT: Lower Body Dressing Modified  independent;Completed;Goal Met   OT: Toilet Transfer/Toileting Modified independent;Completed;Goal Met   OT: Perform aerobic activity with stable cardiovascular response intermittent activity;10 minutes;Completed;Goal Met   Interventions   Interventions Quick Adds Self-Care/Home Management   Self-Care/Home Management   Self-Care/Home Mgmt/ADL, Compensatory, Meal Prep Minutes (28333) 12   Symptoms Noted During/After Treatment (Meal Preparation/Planning Training) none   Treatment Detail/Skilled Intervention Pt in bed and agreeable to OT session. Pt on 5L O2 via NC throughout session. Pt completed bed mob with MOD I, LB dressing with MOD I, UB dressing independently(I) while standing unsupported, sit <> stand txrs (I), fxl mob around room (I), and toilet txr with MOD I. Pt educated regarding O2 cord mgmt, PLB, and energy conservation techniques with verbalized understanding. Pt left seated in recliner with call bell in reach.   OT Discharge Planning   OT Plan d/c   OT Discharge Recommendation (DC Rec) home with home care occupational therapy   OT Rationale for DC Rec Pt is able to complete ADLs and fxl txrs with independence to MOD I. He will need support for IADLs and has family to aid him. Rec home OT to address act flores/energy conservation and new O2 need.   Total Session Time   Timed Code Treatment Minutes 12   Total Session Time (sum of timed and untimed services) 27

## 2023-06-10 NOTE — PROGRESS NOTES
[facility] MEDICINE PROGRESS NOTE      Billy Carroll is a 67-year-old male immunosuppressed with history of IgG multiple myeloma, 3 cm mass on T4, on treatment with bortezomib, Velcade and Revlimid managed by [facility].  Recent trip to his cabin, outdoors and around multiple people.  5/30 developed cough and fever.  5/31 started Levaquin with no improvement.  6/1 saw [facility] for low back pain.  Lumbar MRI showed right L5 nerve impingement.  6/2 admitted [facility] for fevers.  CT chest revealed acute symmetric bilateral multifocal pneumonitis.  ID, oncology and pulmonary consulted.  Receiving multiple antibiotics/antifungals.  6/4 requiring oxygen after ambulation.  6/5 bronchoscopy.  Viral PCR and bacterial testing negative.  Fungitell testing positive.  6/6 worsening oxygen demand despite Solu-Medrol and Lasix IV.  6/7 overnight started on HFNC 40 L at 50% FiO2. Repeat CT chest shows progression of bilateral infiltrates.  Transferred to ICU.  Respiratory status stable.  Treatment simplified to doxycycline and micafungin.  Appreciate pulmonary and infectious disease consultations.  Downgraded from ICU status 6/9 to general care.  Patient reports he is feeling better and was able to get off high flow oxygen on 6/9 and doing well on 4-5 L of nasal cannula oxygen.  Pulmonary now thinks this is likely organizing pneumonia from Revlimid toxicity.  He is completed course of antibiotics and antifungals.  On trimethoprim-sulfamethoxazole for PCP prophylaxis.  He will have prolonged prednisone taper of 8 to 10 weeks and follow-up with pulmonary.     Assessment and Plan:     Acute hypoxemic respiratory failure, etiology still not totally clear but pulmonary thinks this is likely organizing pneumonia from Revlimid toxicity  Multifocal pneumonitis  Fever  Immunosuppressed   Bronchoscopy 6/5/2023  At admission not requiring supplemental oxygen.    CT chest no pulmonary emboli.  There is acute  symmetric bilateral multifocal pneumonitis.  CRP 11.3.  Lactic acid normal at 1.4.  Procalcitonin normal.  Given fluconazole 200 mg daily, meropenem 1 g every 8 hours, Bactrim DS 2 tabs twice daily and vancomycin pharmacy to dose.  6/4 developed hypoxia after ambulating.    Respiratory viral PCR negative, Ehrlichia, Anaplasma, Babesia negative.  B-D glucan/Fungitell positive but fungal antibodies negative.    6/5 underwent bronchoscopy.  No complications.  Afebrile overnight.  Treatment adjusted to acyclovir, doxycycline, Bactrim, meropenem and micafungin.  6/6 requiring 5 L nasal cannular oxygen.    Started Lasix mg IV and intravenous Solu-Medrol.  6/7 overnight started on HFNC 40 L at 50% FiO2.  Transferred to ICU.  CT scan chest shows significant progression of bilateral infiltrates.  6/8 respiratory status holding stable.  Treatment simplified to doxycycline, micafungin and prophylactic acyclovir.  Further management per ID and pulmonary service.  6/9 able to be weaned off of high flow oxygen at 30% to nasal cannula oxygen at 4-5 L on 6/9.  Downgraded on 6/9 from ICU status.  He continues to feel better but is quite deconditioned tendon restarting physical therapy today.  Now off antibiotics and antifungals and will be on a long taper of prednisone per pulmonary.       Multiple myeloma  Monoclonal gammopathy  On bortezomib infusions every Thursday.  Revlimid.  Started 14-day cycle on 5/31.   Holding treatment per oncology and ID recommendations.  Continue PTA medication: Acyclovir 400 mg twice daily.     3 cm mass on T4  CT scan indicates soft tissue mass right side T4 vertebral body, additional lytic lesions throughout visualized bones consistent with multiple myeloma.  Utilize scheduled Tylenol 975 mg 3 times daily with meals.  As needed Norco or oral Tylenol at bedtime.  Neurosurgery recommend further management per rad/onc with [facility] as an outpatient.  Patient feels quite comfortable sitting and  pain about 2/10 when up and moving around     Low back pain  Right L5 nerve impingement  6/1 saw [facility] for low back pain.  Per report from [facility] orthopedics MRI lumbar spine showed right L5 nerve impingement.  Received TLSO brace per orthopedic recommendations.  Will follow-up with them as an outpatient.     Mild elevation in liver transaminase  ALT 95--> 67--> 65.  Likely secondary to cancer.  No further testing indicated.     Acute on chronic macrocytic anemia  PRBC transfusion x1  Hemoglobin 8.5, .  Platelets 363.  Repeat hemoglobin 8--> 8.7--> 7.  6/7 transfused PRBC x1.  6/8 hemoglobin 8.6--> 8.1--> 8.5.  Follow daily CBC.     Essential hypertension   Continue home hydrochlorothiazide 12.5 mg daily.  Losartan 100 mg daily.     Hypercholesterolemia  Continue home simvastatin 40 mg daily.     ASCVD  Aortic valve calcifications, with history of moderate aortic stenosis  CT scan showed severe three-vessel coronary artery calcification and dense calcifications and thickening of the aortic valves.  March 2023 stress echo shows EF 55 to 60%.  Calcified aortic valve noted.  No evidence of stress-induced ischemia.  Follow-up as outpatient.     Tearfulness/emotional liability, this seems improved  Not unexpected given the intravenous steroids and clinical situation.  Patient's Atarax makes him very sleepy so holding at this time.  Utilize as needed melatonin.  Other treatment options defer to pulmonary given respiratory situation.     Anticoagulation   ASA 81 mg daily.  6/7 lovenox subcutaneous per ICU protocol.     Fluids:   Saline lock  Pain meds: Scheduled Tylenol with meals and bedtime as needed Norco versus Tylenol  Therapy: Ordered OT and PT today  Gutierrez:Not present  Lines: None        Current Diet  Orders Placed This Encounter      Regular Diet Adult        Barriers to Discharge: Increase activity and decrease oxygen needs.  Like to see him on no supplemental oxygen or 2 L and perhaps send him home then  with supplemental nasal cannula oxygen     Disposition: Now significantly improved.  Suspect he could be discharged in the next 1-3 days.  Get occupational therapy and physical therapy to see him today        The patient's care was discussed with the Bedside Nurse, Patient     Suleiman Smallwood MD  [facility]  Phone: #[phone]  Securely message with the Vocera Web Console (learn more here)  Text page via fflap Paging/Directory     Interval History/Subjective:  Patient feels like he is doing better.  Denies any dyspnea at rest.  No nausea.  He is eating well.  No other concerns or issues.    Physical Exam/Objective:  Temp:  [97.8  F (36.6  C)-98.6  F (37  C)] 98.1  F (36.7  C)  Pulse:  [] 100  Resp:  [16-26] 18  BP: (120-131)/(68-76) 131/70  SpO2:  [92 %-94 %] 92 %  Body mass index is 27.28 kg/m .    GENERAL:  Alert, appears comfortable, in no acute distress, appears stated age   HEAD:  Normocephalic, without obvious abnormality, atraumatic   LUNGS:   Clear to auscultation bilaterally   HEART:  Regular rate and rhythm, no murmur   ABDOMEN:   Soft, non-tender   EXTREMITIES:  No ankle edema    SKIN: Dry to touch, no exanthems in the visualized areas   NEURO: Alert, peers cognitively intact, moves all four extremities freely   PSYCH: Cooperative, behavior is appropriate      Data reviewed today: I personally reviewed all new medications, labs, imaging/diagnostics reports over the past 24 hours. Pertinent findings include:    Labs:  Most Recent 3 CBC's:Recent Labs   Lab Test 06/10/23  0457 06/09/23  0513 06/08/23  0508   WBC 12.6* 10.8 9.9   HGB 8.5* 8.2* 8.1*   MCV 98 100 100   * 536* 454*     Most Recent 3 BMP's:Recent Labs   Lab Test 06/10/23  0457 06/09/23  0513 06/08/23  0508 06/07/23  0925 06/07/23  0049 06/05/23  0749 06/04/23  0739   NA  --  141  --   --  136  --  135*   POTASSIUM  --  4.3  --   --  4.6  --  4.1   CHLORIDE  --  104  --    --  102  --  104   CO2  --  28  --   --  26  --  21*   BUN  --  28*  --   --  24*  --  15   CR 0.68* 0.70  0.70 0.81   < > 0.77   < > 0.87   ANIONGAP  --  9  --   --  8  --  10   SESAR  --  8.6  --   --  8.7  --  8.7   GLC  --  173*  --   --  135*  --  134*    < > = values in this interval not displayed.     Most Recent 2 LFT's:Recent Labs   Lab Test 06/04/23  0739 06/03/23  0839   AST 28 19   ALT 65* 67*   ALKPHOS 135* 132*   BILITOTAL 0.4 0.3       Medications:   Personally Reviewed.  Medications       acetaminophen  975 mg Oral TID AC     acyclovir  400 mg Oral BID     aspirin  81 mg Oral Daily     enoxaparin ANTICOAGULANT  40 mg Subcutaneous Q24H     furosemide  40 mg Intravenous Q12H     hydrochlorothiazide  12.5 mg Oral Daily     losartan  100 mg Oral Daily     melatonin  5 mg Oral At Bedtime     methylPREDNISolone  62.5 mg Intravenous Q6H     simvastatin  40 mg Oral Daily     sodium chloride (PF)  3 mL Intracatheter Q8H     sulfamethoxazole-trimethoprim  1 tablet Oral Once per day on Mon Wed Fri

## 2023-06-10 NOTE — PROGRESS NOTES
"[facility] Pulmonary/Critical Care Consult Team Note    Billy Carroll,  1955, MRN [id number]  Admitting Dx: Pneumonia of both lungs due to infectious organism, unspecified part of lung [J18.9]  Date / Time of Admission:  2023  6:41 PM    ID Studies  Bal pending. NO positive findings to date  Beta-Glucan - POSITIVE    Results of the Bronchoscopy are pending. On visual appearance the mucosa was not inflamed and there were no secretions indicating bacterial PNA      Assessment/Plan: Billy Carroll is a 67 year old male with PMHx of IgG multiple myeloma on Revlimid, Velcade and dexamethsone started on 5/10/2023 who started having a cough and was started on Abx with no improvement. Imaging was consistent with BL GGO and given negative BAL cultures and significant improvement on steroids, this is likely organizing pneumonia from Revlamid toxicity.    1. Acute Hypoxemic Respiratory Failure: Secondary to Infectious vs pneumotoxicity from chemo. Underwent bronchoscopy with a BAL which was lymphocyte predominant with negative cultures.     Completed Doxycycline on  and all other antibiotics and antifungals were discontinued.    Continue IV steroids for another given that he is still a fair amount of supplemental oxygen.    Will treat at 1mg/kg PO prednisone for a prolonged taper over 8-10 weeks (will likely start this in the next day or so)    I will see him in clinic in 4 weeks with repeat imaging of his chest to ensure ongoing improvement.    Will initiate Bactrim Single Strength PO Q M, W, Fr until he is on >20mg every day of prednisone.    Maintain SpO2>88%.       Karen Pang MD  Pulmonary and Critical Care  (p) [phone]    Securely message with the Vocera Web Console (learn more here)    Meds: See MAR    Physical Exam:  /70 (BP Location: Right arm, Patient Position: Semi-Bobby's, Cuff Size: Adult Regular)   Pulse 100   Temp 98.1  F (36.7  C) (Oral)   Resp 18   Ht 1.778 m (5' 10\")   Wt " 86.2 kg (190 lb 2.2 oz)   SpO2 92%   BMI 27.28 kg/m    GEN: NAD  HEENT: PERRL, No JVD  LUNGS: Coarse BS BL  CVS: S1 & S2 no added sounds  ABD: No HSM, BS audible  EXT: No edema, no rashes  NEURO: AO*3 CN2-12 intact    Pertinent Labs: Latest lab results in EHR personally reviewed.   CMP  Recent Labs   Lab 06/10/23  0457 06/09/23  0513 06/08/23  0508 06/07/23  0925 06/07/23  0049 06/05/23  0749 06/04/23  0739   NA  --  141  --   --  136  --  135*   POTASSIUM  --  4.3  --   --  4.6  --  4.1   CHLORIDE  --  104  --   --  102  --  104   CO2  --  28  --   --  26  --  21*   ANIONGAP  --  9  --   --  8  --  10   GLC  --  173*  --   --  135*  --  134*   BUN  --  28*  --   --  24*  --  15   CR 0.68* 0.70  0.70 0.81 0.75 0.77   < > 0.87   GFRESTIMATED >90 >90  >90 >90 >90 >90   < > >90   SESAR  --  8.6  --   --  8.7  --  8.7   PROTTOTAL  --   --   --   --   --   --  6.7   ALBUMIN  --   --   --   --   --   --  2.6*   BILITOTAL  --   --   --   --   --   --  0.4   ALKPHOS  --   --   --   --   --   --  135*   AST  --   --   --   --   --   --  28   ALT  --   --   --   --   --   --  65*    < > = values in this interval not displayed.     CBC  Recent Labs   Lab 06/10/23  0457 06/09/23  0513 06/08/23  0508 06/07/23  1559 06/07/23  0049   WBC 12.6* 10.8 9.9  --  8.0   RBC 2.60* 2.54* 2.51*  --  2.08*   HGB 8.5* 8.2* 8.1* 8.6* 7.0*   HCT 25.4* 25.4* 25.0*  --  20.9*   MCV 98 100 100  --  101*   MCH 32.7 32.3 32.3  --  33.7*   MCHC 33.5 32.3 32.4  --  33.5   RDW 15.1* 15.8* 16.3*  --  15.8*   * 536* 454*  --  373     INRNo lab results found in last 7 days.  Arterial Blood GasNo lab results found in last 7 days.    Cultures: personally reviewed.   7-Day Micro Results

## 2023-06-10 NOTE — PROGRESS NOTES
06/10/23 1400   Appointment Info   Signing Clinician's Name / Credentials (PT) Zoya Barry, PT, DPT   Living Environment   People in Home spouse   Current Living Arrangements house   Home Accessibility no concerns   Transportation Anticipated family or friend will provide   Living Environment Comments All needs met on one level.   Self-Care   Usual Activity Tolerance excellent   Current Activity Tolerance good   Equipment Currently Used at Home none   Fall history within last six months yes   Number of times patient has fallen within last six months 1   Activity/Exercise/Self-Care Comment Patient is independent at baseline.   General Information   Onset of Illness/Injury or Date of Surgery 06/02/23   Referring Physician Suleiman Smallwood MD   Patient/Family Therapy Goals Statement (PT) Return home   Pertinent History of Current Problem (include personal factors and/or comorbidities that impact the POC) Acute hypoxemic respiratory failure   Existing Precautions/Restrictions oxygen therapy device and L/min  (5L/min)   Weight-Bearing Status - LLE full weight-bearing   Weight-Bearing Status - RLE full weight-bearing   Posture    Posture Not impaired   Range of Motion (ROM)   Range of Motion ROM is WFL   Strength (Manual Muscle Testing)   Strength (Manual Muscle Testing) strength is WFL   Bed Mobility   Bed Mobility no deficits identified   Transfers   Transfers sit-stand transfer   Sit-Stand Transfer   Sit-Stand [place] (Transfers) independent   Gait/Stairs (Locomotion)   [place] Level (Gait) supervision;verbal cues;1 person to manage equipment   Distance in Feet (Gait) 300' x 2   Pattern (Gait) step-through   Deviations/Abnormal Patterns (Gait) gait speed decreased;stride length decreased   Balance   Balance no deficits were identified   Clinical Impression   Criteria for Skilled Therapeutic Intervention Yes, treatment indicated   PT Diagnosis (PT) Impaired activity tolerance   Influenced by the  following impairments Deconditioning, respiratory status   Functional limitations due to impairments Gait   Clinical Presentation (PT Evaluation Complexity) Stable/Uncomplicated   Clinical Presentation Rationale Patient presents as medically diagnosed.   Clinical Decision Making (Complexity) low complexity   Planned Therapy Interventions (PT) balance training;gait training;home exercise program;patient/family education;progressive activity/exercise;home program guidelines   Risk & Benefits of therapy have been explained evaluation/treatment results reviewed;care plan/treatment goals reviewed;patient   PT Total Evaluation Time   PT Eval, Low Complexity Minutes (39191) 10   Physical Therapy Goals   PT Frequency Daily   PT Predicted Duration/Target Date for Goal Attainment 06/17/23   PT Goals Gait;Aerobic Activity   PT: Gait Independent;Greater than 200 feet   Interventions   Interventions Quick Adds Gait Training   Gait Training   Gait Training Minutes (53947) 15   Symptoms Noted During/After Treatment (Gait Training) shortness of breath   Treatment Detail/Skilled Intervention IND donning tennis shoes for ambulation. Patient ambulated 300' x 2 with supervision. Assist to manage portable oxygen tank. 1 seated rest break. Continuous pulse oximeter. SnO2 between 88-96% on 6L/min. HR  bpm.   [place] Level (Gait Training) stand-by assist   Physical Assistance Level (Gait Training) supervision;1 person assist   PT Discharge Planning   PT Plan Activity tolerance: Ambulation, standing exercises, step ups   PT Discharge Recommendation (DC Rec) (S)  home with assist   PT Rationale for DC Rec Patient is ambulating safely. Only limitation is supplemental oxygen.   PT Brief overview of current status SnO2 88-96% on 6L/min for ambulation.   Total Session Time   Timed Code Treatment Minutes 15   Total Session Time (sum of timed and untimed services) 25

## 2023-06-10 NOTE — PROGRESS NOTES
Care Management Follow Up    Length of Stay (days): 6    Expected Discharge Date: 06/12/2023     Concerns to be Addressed: Medical-IV steroids, IV Lasix, oxygen needs.     Patient plan of care discussed at interdisciplinary rounds: Yes    Anticipated Discharge Disposition: Home     Anticipated Discharge Services: None  Anticipated Discharge DME: None    Patient/family educated on Medicare website which has current facility and service quality ratings:  Not needed at this time  Education Provided on the Discharge Plan:  Per Team  Patient/Family in Agreement with the Plan: yes    Referrals Placed by CM/SW:  None at this time  Private pay costs discussed: Not applicable    Additional Information:  Chart reviewed. IV antibiotics/antifungals discontinued, transitioned to oral doxycycline-completed 6/9. If for some reason needing IV antibiotics patient reported they had financial resources to private pay for home infusion. Goal to return home at discharge. Care manager to follow.     Cristel Diego RN

## 2023-06-10 NOTE — PROGRESS NOTES
Occupational Therapy Discharge Summary    Reason for therapy discharge:    All goals and outcomes met, no further needs identified.    Progress towards therapy goal(s). See goals on Care Plan in [facility] electronic health record for goal details.  Goals met    Therapy recommendation(s):    Continued therapy is recommended.  Rationale/Recommendations:   Rec home OT to address act flores/energy conservation and new O2 need.

## 2023-06-11 ENCOUNTER — DOCUMENTATION ONLY (OUTPATIENT)
Dept: HOME HEALTH SERVICES | Facility: CLINIC | Age: 68
End: 2023-06-11
Payer: MEDICARE

## 2023-06-11 VITALS
DIASTOLIC BLOOD PRESSURE: 67 MMHG | SYSTOLIC BLOOD PRESSURE: 128 MMHG | BODY MASS INDEX: 27.35 KG/M2 | OXYGEN SATURATION: 93 % | WEIGHT: 191 LBS | RESPIRATION RATE: 18 BRPM | HEIGHT: 70 IN | HEART RATE: 108 BPM | TEMPERATURE: 98.3 F

## 2023-06-11 LAB
A FLAVUS AB SER QL ID: NORMAL
A FUMIGATUS1 AB SER QL ID: NORMAL
A FUMIGATUS2 AB SER QL ID: NORMAL
A FUMIGATUS3 AB SER QL ID: NORMAL
A FUMIGATUS6 AB SER QL ID: NORMAL
A PULLULANS AB SER QL ID: NORMAL
ANION GAP SERPL CALCULATED.3IONS-SCNC: 13 MMOL/L (ref 5–18)
BUN SERPL-MCNC: 49 MG/DL (ref 8–22)
CALCIUM SERPL-MCNC: 8.3 MG/DL (ref 8.5–10.5)
CHLORIDE BLD-SCNC: 96 MMOL/L (ref 98–107)
CO2 SERPL-SCNC: 27 MMOL/L (ref 22–31)
CREAT SERPL-MCNC: 0.74 MG/DL (ref 0.7–1.3)
ERYTHROCYTE [DISTWIDTH] IN BLOOD BY AUTOMATED COUNT: 15 % (ref 10–15)
GFR SERPL CREATININE-BSD FRML MDRD: >90 ML/MIN/1.73M2
GLUCOSE BLD-MCNC: 264 MG/DL (ref 70–125)
HCT VFR BLD AUTO: 28.9 % (ref 40–53)
HGB BLD-MCNC: 9.8 G/DL (ref 13.3–17.7)
MAGNESIUM SERPL-MCNC: 2.4 MG/DL (ref 1.8–2.6)
MCH RBC QN AUTO: 33.2 PG (ref 26.5–33)
MCHC RBC AUTO-ENTMCNC: 33.9 G/DL (ref 31.5–36.5)
MCV RBC AUTO: 98 FL (ref 78–100)
PIGEON SERUM AB QL ID: NORMAL
PLATELET # BLD AUTO: 732 10E3/UL (ref 150–450)
POTASSIUM BLD-SCNC: 4.1 MMOL/L (ref 3.5–5)
RBC # BLD AUTO: 2.95 10E6/UL (ref 4.4–5.9)
S RECTIVIRGULA AB SER QL ID: NORMAL
S VIRIDIS AB SER QL ID: NORMAL
SODIUM SERPL-SCNC: 136 MMOL/L (ref 136–145)
T CANDIDUS AB SER QL: NORMAL
WBC # BLD AUTO: 14.2 10E3/UL (ref 4–11)

## 2023-06-11 PROCEDURE — 250N000013 HC RX MED GY IP 250 OP 250 PS 637: Performed by: INTERNAL MEDICINE

## 2023-06-11 PROCEDURE — 250N000012 HC RX MED GY IP 250 OP 636 PS 637: Performed by: INTERNAL MEDICINE

## 2023-06-11 PROCEDURE — 80048 BASIC METABOLIC PNL TOTAL CA: CPT | Performed by: FAMILY MEDICINE

## 2023-06-11 PROCEDURE — 99232 SBSQ HOSP IP/OBS MODERATE 35: CPT | Performed by: INTERNAL MEDICINE

## 2023-06-11 PROCEDURE — 85027 COMPLETE CBC AUTOMATED: CPT | Performed by: FAMILY MEDICINE

## 2023-06-11 PROCEDURE — 83735 ASSAY OF MAGNESIUM: CPT | Performed by: FAMILY MEDICINE

## 2023-06-11 PROCEDURE — 36415 COLL VENOUS BLD VENIPUNCTURE: CPT | Performed by: FAMILY MEDICINE

## 2023-06-11 PROCEDURE — 250N000011 HC RX IP 250 OP 636: Performed by: HOSPITALIST

## 2023-06-11 PROCEDURE — 99239 HOSP IP/OBS DSCHRG MGMT >30: CPT | Performed by: INTERNAL MEDICINE

## 2023-06-11 PROCEDURE — 250N000013 HC RX MED GY IP 250 OP 250 PS 637: Performed by: FAMILY MEDICINE

## 2023-06-11 PROCEDURE — 250N000011 HC RX IP 250 OP 636: Performed by: INTERNAL MEDICINE

## 2023-06-11 RX ORDER — PREDNISONE 20 MG/1
80 TABLET ORAL ONCE
Status: COMPLETED | OUTPATIENT
Start: 2023-06-11 | End: 2023-06-11

## 2023-06-11 RX ORDER — PREDNISONE 20 MG/1
60 TABLET ORAL DAILY
Qty: 100 TABLET | Refills: 0 | Status: SHIPPED | OUTPATIENT
Start: 2023-06-11 | End: 2023-06-11

## 2023-06-11 RX ORDER — FUROSEMIDE 40 MG
40 TABLET ORAL DAILY
Status: DISCONTINUED | OUTPATIENT
Start: 2023-06-11 | End: 2023-06-11 | Stop reason: HOSPADM

## 2023-06-11 RX ORDER — FUROSEMIDE 40 MG
40 TABLET ORAL DAILY
Qty: 90 TABLET | Refills: 0 | Status: ON HOLD | OUTPATIENT
Start: 2023-06-12 | End: 2023-09-07

## 2023-06-11 RX ORDER — PREDNISONE 20 MG/1
80 TABLET ORAL DAILY
Qty: 100 TABLET | Refills: 0 | Status: SHIPPED | OUTPATIENT
Start: 2023-06-11 | End: 2023-09-01

## 2023-06-11 RX ORDER — PREDNISONE 20 MG/1
60 TABLET ORAL ONCE
Status: DISCONTINUED | OUTPATIENT
Start: 2023-06-11 | End: 2023-06-11

## 2023-06-11 RX ORDER — ACETAMINOPHEN 325 MG/1
975 TABLET ORAL
Qty: 200 TABLET | Refills: 0 | Status: ON HOLD | OUTPATIENT
Start: 2023-06-11 | End: 2023-10-24 | Stop reason: DRUGHIGH

## 2023-06-11 RX ORDER — SULFAMETHOXAZOLE/TRIMETHOPRIM 800-160 MG
1 TABLET ORAL
Qty: 30 TABLET | Refills: 0 | Status: SHIPPED | OUTPATIENT
Start: 2023-06-12 | End: 2024-03-14

## 2023-06-11 RX ADMIN — METHYLPREDNISOLONE SODIUM SUCCINATE 62.5 MG: 125 INJECTION, POWDER, FOR SOLUTION INTRAMUSCULAR; INTRAVENOUS at 01:35

## 2023-06-11 RX ADMIN — ACETAMINOPHEN 975 MG: 325 TABLET ORAL at 12:42

## 2023-06-11 RX ADMIN — PREDNISONE 80 MG: 20 TABLET ORAL at 10:42

## 2023-06-11 RX ADMIN — LOSARTAN POTASSIUM 100 MG: 25 TABLET, FILM COATED ORAL at 08:28

## 2023-06-11 RX ADMIN — IBUPROFEN 400 MG: 200 TABLET, FILM COATED ORAL at 04:55

## 2023-06-11 RX ADMIN — ACYCLOVIR 400 MG: 200 CAPSULE ORAL at 08:29

## 2023-06-11 RX ADMIN — ACETAMINOPHEN 975 MG: 325 TABLET ORAL at 08:28

## 2023-06-11 RX ADMIN — HYDROCHLOROTHIAZIDE 12.5 MG: 12.5 CAPSULE ORAL at 08:28

## 2023-06-11 RX ADMIN — SIMVASTATIN 40 MG: 20 TABLET, FILM COATED ORAL at 08:28

## 2023-06-11 RX ADMIN — METHYLPREDNISOLONE SODIUM SUCCINATE 62.5 MG: 125 INJECTION, POWDER, FOR SOLUTION INTRAMUSCULAR; INTRAVENOUS at 08:29

## 2023-06-11 RX ADMIN — FUROSEMIDE 40 MG: 10 INJECTION, SOLUTION INTRAVENOUS at 04:33

## 2023-06-11 RX ADMIN — ACETAMINOPHEN 325 MG: 325 TABLET ORAL at 01:36

## 2023-06-11 RX ADMIN — ASPIRIN 81 MG: 81 TABLET, COATED ORAL at 08:28

## 2023-06-11 ASSESSMENT — ACTIVITIES OF DAILY LIVING (ADL)
ADLS_ACUITY_SCORE: 28

## 2023-06-11 NOTE — PROGRESS NOTES
I certify that this patient, Billy Carroll has been under my care (or a nurse practitioner or physican's assistant working with me). This is the face-to-face encounter for oxygen medical necessity.      At the time of this encounter supplemental oxygen is reasonable and necessary and is expected to improve the patient's condition in a home setting.       Patient has continued oxygen desaturation due to Pneumonia J18.9.    If portability is ordered, is the patient mobile within the home? Yes    Rohini Villar MD

## 2023-06-11 NOTE — PROGRESS NOTES
Received intake call for home oxygen at 10:55AM. Reviewed patient's chart; Patient qualifies under insurance guidelines and all documentation is in the chart including a good order.     10:55AM - Called care coordinator, Aisha, to confirm receipt of intake.  I had 3 at the same time.  This one was the 3 and would worked in that order.      12:27PM- Spoke with care coordinator, Aisha, confirmed we received the order, and provided them with ETA of oxygen.     12:28PM- Called to offer choice and patient is okay with [facility] setting them up. Discussed equipment with patient and informed them that we would be to bedside with oxygen. Patient said his son was on his way and had hoped I was telling him we were on our way.  I did explain that I had 3 intakes at the same time.  His was the 3rd one and that we are looking at a 4 hour window but we would get there as soon as we can.

## 2023-06-11 NOTE — PLAN OF CARE
"Patient is ready to discharge.  Awaiting arrival of home O2 equipment.    Problem: Plan of Care - These are the overarching goals to be used throughout the patient stay.    Goal: Plan of Care Review  Description: The Plan of Care Review/Shift note should be completed every shift.  The Outcome Evaluation is a brief statement about your assessment that the patient is improving, declining, or no change.  This information will be displayed automatically on your shift note.  Outcome: Adequate for Care Transition  Goal: Patient-Specific Goal (Individualized)  Description: You can add care plan individualizations to a care plan. Examples of Individualization might be:  \"Parent requests to be called daily at 9am for status\", \"I have a hard time hearing out of my right ear\", or \"Do not touch me to wake me up as it startles me\".  Outcome: Adequate for Care Transition  Goal: Absence of Hospital-Acquired Illness or Injury  Outcome: Adequate for Care Transition  Intervention: Identify and Manage Fall Risk  Recent Flowsheet Documentation  Taken 6/11/2023 0900 by Aisha Raya RN  Safety Promotion/Fall Prevention:   safety round/check completed   assistive device/personal items within reach   clutter free environment maintained   nonskid shoes/slippers when out of bed  Goal: Optimal Comfort and Wellbeing  Outcome: Adequate for Care Transition  Intervention: Monitor Pain and Promote Comfort  Recent Flowsheet Documentation  Taken 6/11/2023 1242 by Aisha Raya RN  Pain Management Interventions: medication (see MAR)  Taken 6/11/2023 0900 by Aisha Raya RN  Pain Management Interventions: medication (see MAR)  Intervention: Provide Person-Centered Care  Recent Flowsheet Documentation  Taken 6/11/2023 0900 by Aisha Raya RN  Trust Relationship/Rapport:   care explained   choices provided   questions answered  Goal: Readiness for Transition of Care  Outcome: Adequate for Care Transition     Problem: Gas Exchange " Impaired  Goal: Optimal Gas Exchange  Outcome: Adequate for Care Transition     Problem: Pain Acute  Goal: Optimal Pain Control and Function  Outcome: Adequate for Care Transition  Intervention: Develop Pain Management Plan  Recent Flowsheet Documentation  Taken 6/11/2023 1242 by Aihsa Raya RN  Pain Management Interventions: medication (see MAR)  Taken 6/11/2023 0900 by Aisha Raya, RN  Pain Management Interventions: medication (see MAR)     Problem: Risk for Delirium  Goal: Optimal Coping  Outcome: Adequate for Care Transition  Goal: Improved Behavioral Control  Outcome: Adequate for Care Transition  Intervention: Minimize Safety Risk  Recent Flowsheet Documentation  Taken 6/11/2023 0900 by Aisha Raya, RN  Enhanced Safety Measures: room near unit station  Trust Relationship/Rapport:   care explained   choices provided   questions answered  Goal: Improved Attention and Thought Clarity  Outcome: Adequate for Care Transition  Goal: Improved Sleep  Outcome: Adequate for Care Transition   Goal Outcome Evaluation:

## 2023-06-11 NOTE — PROGRESS NOTES
"[facility] Pulmonary/Critical Care Consult Team Note    Billy Carroll,  1955, MRN [id number]  Admitting Dx: Pneumonia of both lungs due to infectious organism, unspecified part of lung [J18.9]  Date / Time of Admission:  2023  6:41 PM    ID Studies  Bal pending. NO positive findings to date  Beta-Glucan - POSITIVE    Results of the Bronchoscopy are pending. On visual appearance the mucosa was not inflamed and there were no secretions indicating bacterial PNA      Assessment/Plan: Billy Carroll is a 67 year old male with PMHx of IgG multiple myeloma on Revlimid, Velcade and dexamethsone started on 5/10/2023 who started having a cough and was started on Abx with no improvement. Imaging was consistent with BL GGO and given negative BAL cultures and significant improvement on steroids, this is likely organizing pneumonia from Revlamid toxicity.    1. Acute Hypoxemic Respiratory Failure: Secondary to Infectious vs pneumotoxicity from chemo. Underwent bronchoscopy with a BAL which was lymphocyte predominant with negative cultures.     Completed Doxycycline on  and all other antibiotics and antifungals were discontinued.    On RA today, has been switched to prednisone.    Will treat at 1mg/kg PO prednisone for a prolonged taper over 8-10 weeks; 10 mg reduction every week, have discussed this with Dr. Villar.    I will see him in clinic in 4 weeks with repeat imaging of his chest to ensure ongoing improvement (will arrange followup visit).    Will initiate Bactrim Single Strength PO Q M, W, Fr until he is on >20mg every day of prednisone.    Maintain SpO2>88%.      Pulmonary will sign off.       Karen Pang MD  Pulmonary and Critical Care  P) [phone]    Securely message with the Vocera Web Console (learn more here)    Meds: See MAR    Physical Exam:  /67 (BP Location: Right arm)   Pulse 108   Temp 98.3  F (36.8  C) (Oral)   Resp 18   Ht 1.778 m (5' 10\")   Wt 86.6 kg (191 lb)   SpO2 (!) " 86%   BMI 27.41 kg/m    GEN: NAD  HEENT: PERRL, No JVD  LUNGS: Coarse BS BL  CVS: S1 & S2 no added sounds  ABD: No HSM, BS audible  EXT: No edema, no rashes  NEURO: AO*3 CN2-12 intact    Pertinent Labs: Latest lab results in EHR personally reviewed.   CMP  Recent Labs   Lab 06/11/23  0500 06/10/23  0457 06/09/23  0513 06/08/23  0508 06/07/23  0925 06/07/23  0049     --  141  --   --  136   POTASSIUM 4.1  --  4.3  --   --  4.6   CHLORIDE 96*  --  104  --   --  102   CO2 27  --  28  --   --  26   ANIONGAP 13  --  9  --   --  8   *  --  173*  --   --  135*   BUN 49*  --  28*  --   --  24*   CR 0.74 0.68* 0.70  0.70 0.81   < > 0.77   GFRESTIMATED >90 >90 >90  >90 >90   < > >90   SESAR 8.3*  --  8.6  --   --  8.7   MAG 2.4  --   --   --   --   --     < > = values in this interval not displayed.     CBC  Recent Labs   Lab 06/11/23  0500 06/10/23  0457 06/09/23  0513 06/08/23  0508   WBC 14.2* 12.6* 10.8 9.9   RBC 2.95* 2.60* 2.54* 2.51*   HGB 9.8* 8.5* 8.2* 8.1*   HCT 28.9* 25.4* 25.4* 25.0*   MCV 98 98 100 100   MCH 33.2* 32.7 32.3 32.3   MCHC 33.9 33.5 32.3 32.4   RDW 15.0 15.1* 15.8* 16.3*   * 580* 536* 454*     INRNo lab results found in last 7 days.  Arterial Blood GasNo lab results found in last 7 days.    Cultures: personally reviewed.   7-Day Micro Results

## 2023-06-11 NOTE — PROGRESS NOTES
Physical Therapy Discharge Summary    Reason for therapy discharge:    Discharged to home.    Progress towards therapy goal(s). See goals on Care Plan in [facility] electronic health record for goal details.  Goals met    Therapy recommendation(s):    No further therapy is recommended.

## 2023-06-11 NOTE — DISCHARGE SUMMARY
[facility]  Hospitalist Discharge Summary      Date of Admission:  6/2/2023  Date of Discharge:  6/11/2023  Discharging Provider: Rohini Villar MD  Discharge Service: Hospitalist Service    Discharge Diagnoses     Billy Carroll is a 67-year-old male immunosuppressed with history of IgG multiple myeloma, 3 cm mass on T4, on treatment with bortezomib, Velcade and Revlimid managed by [facility].  Recent trip to his cabin, outdoors and around multiple people.  5/30 developed cough and fever.  5/31 started Levaquin with no improvement.  6/1 saw [facility] for low back pain.  Lumbar MRI showed right L5 nerve impingement.  6/2 admitted [facility] for fevers.  CT chest revealed acute symmetric bilateral multifocal pneumonitis.  ID, oncology and pulmonary consulted.  Receiving multiple antibiotics/antifungals.  6/4 requiring oxygen after ambulation.  6/5 bronchoscopy.  Viral PCR and bacterial testing negative.  Fungitell testing positive.  6/6 worsening oxygen demand despite Solu-Medrol and Lasix IV.  6/7 overnight started on HFNC 40 L at 50% FiO2. Repeat CT chest shows progression of bilateral infiltrates.  Transferred to ICU.  Respiratory status stable.  Treatment simplified to doxycycline and micafungin.  Appreciate pulmonary and infectious disease consultations.  Downgraded from ICU status 6/9 to general care.  Patient reports he is feeling better and was able to get off high flow oxygen on 6/9 and doing well on 4-5 L of nasal cannula oxygen.  Pulmonary now thinks this is likely organizing pneumonia from Revlimid toxicity.  He is completed course of antibiotics and antifungals.  On trimethoprim-sulfamethoxazole for PCP prophylaxis.  He will have prolonged prednisone taper of 8 to 10 weeks and follow-up with pulmonary.     Assessment and Plan:     Acute hypoxemic respiratory failure, etiology still not totally clear but pulmonary thinks this is likely organizing pneumonia from Revlimid toxicity and  fluid overload with Moderate AS   Multifocal pneumonitis  Fever  Immunosuppressed   Bronchoscopy 6/5/2023  At admission not requiring supplemental oxygen.    CT chest no pulmonary emboli.  There is acute symmetric bilateral multifocal pneumonitis.  CRP 11.3.  Lactic acid normal at 1.4.  Procalcitonin normal.  Given fluconazole 200 mg daily, meropenem 1 g every 8 hours, Bactrim DS 2 tabs twice daily and vancomycin pharmacy to dose.  6/4 developed hypoxia after ambulating.    Respiratory viral PCR negative, Ehrlichia, Anaplasma, Babesia negative.  B-D glucan/Fungitell positive but fungal antibodies negative.    6/5 underwent bronchoscopy.  No complications.  Afebrile overnight.  Treatment adjusted to acyclovir, doxycycline, Bactrim, meropenem and micafungin.  6/6 requiring 5 L nasal cannular oxygen.    Started Lasix mg IV and intravenous Solu-Medrol.  6/7 overnight started on HFNC 40 L at 50% FiO2.  Transferred to ICU.  CT scan chest shows significant progression of bilateral infiltrates.  6/8 respiratory status holding stable.  Treatment simplified to doxycycline, micafungin and prophylactic acyclovir.  Further management per ID and pulmonary service.  6/9 able to be weaned off of high flow oxygen at 30% to nasal cannula oxygen at 4-5 L on 6/9.  Downgraded on 6/9 from ICU status.  He continues to feel better but is quite deconditioned tendon restarting physical therapy today.  Now off antibiotics and antifungals   As per the Intensivist   ? Will treat  PO prednisone for a prolonged taper over 8-10 weeks (will likely start this in the next day or so)  ? I will see him in clinic in 4 weeks with repeat imaging of his chest to ensure ongoing improvement.  ? Will initiate Bactrim Single Strength PO Q M, W, Fr until he is on >20mg every day of prednisone.  ? Maintain SpO2>88%.  ? Was on lasix 40mg IV BID   ? Switched lasix to 40mg po daily on discharge     Patient is doing well at rest but desats to 86% while walking with  activity.  We will discharge him with 2 L of oxygen by nasal cannula during activity    Multiple myeloma  Monoclonal gammopathy  On bortezomib infusions every Thursday.  Revlimid.  Started 14-day cycle on 5/31.   Holding treatment per oncology and ID recommendations.  Continue PTA medication: Acyclovir 400 mg twice daily.     3 cm mass on T4  F/u with [facility] as out pt   Due to above issues chemo on hold for now   Was VRD chemo for known  multiple Myeloma   CT scan indicates soft tissue mass right side T4 vertebral body, additional lytic lesions throughout visualized bones consistent with multiple myeloma.  Utilize scheduled Tylenol 975 mg 3 times daily with meals.  As needed Norco or oral Tylenol at bedtime.  Neurosurgery recommend further management per rad/onc with [facility] as an outpatient.  Patient feels quite comfortable sitting and pain about 2/10 when up and moving around     Low back pain  Right L5 nerve impingement  6/1 saw [facility] for low back pain.  Per report from [facility] MRI lumbar spine showed right L5 nerve impingement.  Received TLSO brace per orthopedic recommendations.  Will follow-up with them as an outpatient.     Mild elevation in liver transaminase  ALT 95--> 67--> 65.  Likely secondary to cancer.  No further testing indicated.     Acute on chronic macrocytic anemia  PRBC transfusion x1  Hemoglobin 8.5, .  Platelets 363.  Repeat hemoglobin 8--> 8.7--> 7.  6/7 transfused PRBC x1.  6/8 hemoglobin 8.6--> 8.1--> 8.5.  Follow daily CBC.     Essential hypertension   Continue home hydrochlorothiazide 12.5 mg daily.  Losartan 100 mg daily.     Hypercholesterolemia  Continue home simvastatin 40 mg daily.     ASCVD  Aortic valve calcifications, with history of moderate aortic stenosis  CT scan showed severe three-vessel coronary artery calcification and dense calcifications and thickening of the aortic valves.  March 2023 stress echo shows EF 55 to 60%.  Calcified aortic  "valve noted.  No evidence of stress-induced ischemia.  Follow-up as outpatient.     Tearfulness/emotional liability, this seems improved  Not unexpected given the intravenous steroids and clinical situation.  Patient's Atarax makes him very sleepy so holding at this time.  Utilize as needed melatonin.  Other treatment options defer to pulmonary given respiratory situation.     Anticoagulation   ASA 81 mg daily.  6/7 lovenox subcutaneous per ICU protocol.     Fluids:   Saline lock  Pain meds: Scheduled Tylenol with meals and bedtime as needed Norco versus Tylenol  Therapy: Ordered OT and PT today  Gutierrez:Not present  Lines: None        Current Diet  Orders Placed This Encounter      Regular Diet Adult  Clinically Significant Risk Factors     # Overweight: Estimated body mass index is 27.41 kg/m  as calculated from the following:    Height as of this encounter: 1.778 m (5' 10\").    Weight as of this encounter: 86.6 kg (191 lb).       Follow-ups Needed After Discharge   CBC, BMP in 1week  Repeat imaging in 4-6weeks follow up with PUlmonary medicine     Unresulted Labs Ordered in the Past 30 Days of this Admission     Date and Time Order Name Status Description    6/5/2023  1:07 PM Legionella culture - BAL Site 1 Preliminary     6/5/2023  1:07 PM Fungus Culture, non-blood - BAL Site 1 Preliminary     6/5/2023  1:07 PM Acid-Fast Bacilli Culture and Stain with AFB Stain In process     6/3/2023  2:31 PM West Nile Virus Antibody IgG IgM In process           Discharge Disposition   Discharged to home  Condition at discharge: Stable        Consultations This Hospital Stay   INFECTIOUS DISEASES IP CONSULT  INFECTIOUS DISEASES IP CONSULT  HEMATOLOGY & ONCOLOGY IP CONSULT  PULMONARY IP CONSULT  PHARMACY TO DOSE VANCO  CARE MANAGEMENT / SOCIAL WORK IP CONSULT  CARE MANAGEMENT / SOCIAL WORK IP CONSULT  ORTHOPEDIC SURGERY IP CONSULT  NEUROSURGERY IP CONSULT  ORTHOSIS BRACE IP CONSULT  RADIATION ONCOLOGY IP CONSULT  PHYSICAL THERAPY " ADULT IP CONSULT  OCCUPATIONAL THERAPY ADULT IP CONSULT    Code Status   Full Code    Time Spent on this Encounter   I, Rohini Villar MD, personally saw the patient today and spent greater than 30 minutes discharging this patient.       Rohini Villar MD  [facility] 3 ICU  [address] [facility] [address]  Phone: [phone]  Fax: [phone]  ______________________________________________________________________    Physical Exam   Vital Signs: Temp: 98.3  F (36.8  C) Temp src: Oral BP: 128/67 Pulse: 108   Resp: 18 SpO2: 93 % O2 Device: Nasal cannula Oxygen Delivery: 3 LPM  Weight: 191 lbs 0 oz  General Appearance: Alert, awake, NAD   Respiratory: CTA b/l , no tachypnea or Respiratory distress noted   Cardiovascular: RRR, 2+ murmur heard   GI: soft, NT, ND  Skin: warm and dry   Extremities; no clubbing, cyanosis or Edema        Primary Care Physician   Jory Krishnamurthy    Discharge Orders      Reason for your hospital stay    Pneumonitis thought to be secondary to Chemotherapy. Treated with antibiotics and steroids and diuretics     Follow-up and recommended labs and tests     F/u with PCP in 1week. Recheck CBC, BMP in 1week   F/u with [facility] in 1-2weeks   F/u with Cardiology in 1-2weeks for Moderate aortic stenosis     Activity    Your activity upon discharge: activity as tolerated     Diet    Follow this diet upon discharge: Orders Placed This Encounter      Regular Diet Adult       Significant Results and Procedures   Most Recent 3 CBC's:Recent Labs   Lab Test 06/11/23  0500 06/10/23  0457 06/09/23  0513   WBC 14.2* 12.6* 10.8   HGB 9.8* 8.5* 8.2*   MCV 98 98 100   * 580* 536*     Most Recent 3 BMP's:Recent Labs   Lab Test 06/11/23  0500 06/10/23  0457 06/09/23  0513 06/07/23  0925 06/07/23  0049     --  141  --  136   POTASSIUM 4.1  --  4.3  --  4.6   CHLORIDE 96*  --  104  --  102   CO2 27  --  28  --  26   BUN 49*  --  28*  --  24*   CR 0.74 0.68* 0.70   0.70   < > 0.77   ANIONGAP 13  --  9  --  8   SESAR 8.3*  --  8.6  --  8.7   *  --  173*  --  135*    < > = values in this interval not displayed.     Most Recent 2 LFT's:Recent Labs   Lab Test 06/04/23  0739 06/03/23  0839   AST 28 19   ALT 65* 67*   ALKPHOS 135* 132*   BILITOTAL 0.4 0.3     Most Recent 3 INR's:No lab results found.  Most Recent INR's and Anticoagulation Dosing History:  Anticoagulation Dose History          View : No data to display.                  ,   Results for orders placed or performed during the hospital encounter of 06/02/23   CT Chest Pulmonary Embolism w Contrast    Narrative    EXAM: CT CHEST PULMONARY EMBOLISM W CONTRAST  LOCATION: [facility]  DATE/TIME: 6/2/2023 8:00 PM CDT    INDICATION: Cough, fever, myeloma, immunosuppressed, shortness of breath, eval for pneumonia, PE, etc  COMPARISON: CT abdomen 02/24/2009  TECHNIQUE: CT chest pulmonary angiogram during arterial phase injection of IV contrast. Multiplanar reformats and MIP reconstructions were performed. Dose reduction techniques were used.   CONTRAST: 100 ml Isovue 370    FINDINGS:  ANGIOGRAM CHEST: Pulmonary arteries are normal caliber with no pulmonary emboli. Normal caliber thoracic aorta with no CTA signs of acute aortic syndrome.    LUNGS AND PLEURA: Bilateral symmetric peribronchovascular distribution multifocal ground-glass airspace opacity that is mid and upper lung predominant and compatible with nonspecific acute multifocal pneumonitis. A 6 mL right middle lobe nodule is stable   and benign. No pleural effusion. No pneumothorax.    MEDIASTINUM/AXILLAE: Heart size is within normal limits, but there is severe three-vessel coronary artery calcification and dense calcification and thickening of the aortic valve leaflets. No pericardial effusion. No lymphadenopathy.    CORONARY ARTERY CALCIFICATION: Severe.    UPPER ABDOMEN: Normal.    MUSCULOSKELETAL: A 3.5 x 3.5 x 3.0 cm destructive soft  tissue mass centered in the right side of the T4 vertebral body crosses the T4-T5 interspace to involve the right side of the T5 vertebral body and may also encroach upon the right ventral epidural   space. Additional diminutive lytic lesions throughout the visualized bones. Findings consistent with multiple myeloma.      Impression    IMPRESSION:  1.  No pulmonary emboli. No signs of acute aortic syndrome.  2.  Nonspecific acute symmetric bilateral multifocal pneumonitis.  3.  Heart size is within normal limits, but there is severe three-vessel coronary artery calcification and dense calcification and thickening of the aortic valve leaflets.   4.  A 3.5 x 3.5 x 3.0 cm destructive soft tissue mass centered in the right side of the T4 vertebral body crosses the T4-T5 interspace to involve the right side of the T5 vertebral body and may also encroach upon the right ventral epidural space.   Additional diminutive lytic lesions throughout the visualized bones. Findings consistent with multiple myeloma.   CT Chest w/o Contrast    Narrative    EXAM: CT CHEST W/O CONTRAST  LOCATION: [facility]  DATE/TIME: 6/7/2023 1:33 AM CDT    INDICATION: Bilateral infiltrates, worsening hypoxia.  COMPARISON: 6/2/2023  TECHNIQUE: CT chest without IV contrast. Multiplanar reformats were obtained. Dose reduction techniques were used.  CONTRAST: None.    FINDINGS:   LUNGS AND PLEURA: Extensive diffuse bilateral pulmonary infiltrates most marked in the upper lobes has shown significant progression compared to 6/2/2023. Central airways are clear. Minimal bilateral pleural effusions are new.    MEDIASTINUM/AXILLAE: No adenopathy. Normal heart size. No pericardial effusion. Borderline mild ectasia of the ascending thoracic aorta measuring 4.0 cm, unchanged. Descending thoracic aorta is normal in caliber. Esophagus is grossly negative.    CORONARY ARTERY CALCIFICATION: Severe.    UPPER ABDOMEN: No significant  finding.    MUSCULOSKELETAL: Again noted are numerous lytic bony lesions compatible with multiple myeloma. Pathologic compression fracture at the T5 level is unchanged.      Impression    IMPRESSION:   1.  Extensive diffuse bilateral pulmonary infiltrates have shown significant progression compared with 6/2/2023.    2.  Small bilateral pleural effusions are new.    3.  Borderline mild ectasia of the ascending thoracic aorta measuring 4.0 cm, unchanged.    4.  Severe coronary artery calcification.    5.  Numerous lytic bony lesions compatible with the patient's known multiple myeloma including pathologic fracture at the T5 level, unchanged.           Discharge Medications   Current Discharge Medication List      START taking these medications    Details   acetaminophen (TYLENOL) 325 MG tablet Take 3 tablets (975 mg) by mouth 3 times daily (before meals)  Qty: 200 tablet, Refills: 0    Associated Diagnoses: Acute hypoxemic respiratory failure (H)      furosemide (LASIX) 40 MG tablet Take 1 tablet (40 mg) by mouth daily Take additional 40mg daily as needed for shortness of breath or  worsening Lower extremity edema  Qty: 90 tablet, Refills: 0    Associated Diagnoses: Acute hypoxemic respiratory failure (H)      predniSONE (DELTASONE) 20 MG tablet Take 4 tablets (80 mg) by mouth daily Come down by 10mg every week  Qty: 100 tablet, Refills: 0    Associated Diagnoses: Acute hypoxemic respiratory failure (H)      sulfamethoxazole-trimethoprim (BACTRIM DS) 800-160 MG tablet Take 1 tablet by mouth three times a week Take on Mondays, Wednesday, fridaysof week while taking Prednsione more than 20mg daily for PCP prophylaxis  Qty: 30 tablet, Refills: 0    Associated Diagnoses: Acute hypoxemic respiratory failure (H)         CONTINUE these medications which have NOT CHANGED    Details   acyclovir (ZOVIRAX) 400 MG tablet Take 400 mg by mouth 2 times daily      aspirin 81 MG EC tablet Take 81 mg by mouth daily      bortezomib 3.5  MG injection Inject 1.3 mg/m2 into the vein once a week      Calcium-Magnesium-Zinc 333-133-5 MG TABS per tablet Take 2 tablets by mouth daily      cholecalciferol 50 MCG (2000 UT) tablet Take 2,000 Units by mouth daily      co-enzyme Q-10 100 MG CAPS capsule Take 100 mg by mouth daily      HYDROcodone-acetaminophen (NORCO) 5-325 MG tablet Take 1 tablet by mouth every 4 hours as needed for pain      hydrOXYzine (ATARAX) 10 MG tablet Take 10-20 mg by mouth every 8 hours as needed for itching or anxiety      lactobacillus rhamnosus, GG, (CULTURELL) capsule Take 1 capsule by mouth daily      losartan (COZAAR) 50 MG tablet Take 100 mg by mouth daily      nitroGLYcerin (NITROSTAT) 0.4 MG sublingual tablet Place 0.4 mg under the tongue every 5 minutes as needed for chest pain For GILLETTE      Omega-3 Fatty Acids (FISH OIL BURP-LESS) 1000 MG CAPS Take 1 capsule by mouth daily      polyethylene glycol (MIRALAX) 17 g packet Take 1 packet by mouth daily as needed for constipation      prochlorperazine (COMPAZINE) 10 MG tablet Take 10 mg by mouth every 6 hours as needed for nausea      simvastatin (ZOCOR) 40 MG tablet Take 40 mg by mouth daily      Turmeric 500 MG CAPS Take 2 capsules by mouth daily      vitamin B complex with vitamin C (VITAMIN  B COMPLEX) tablet Take 1 tablet by mouth daily      zinc gluconate 50 MG tablet Take 50 mg by mouth daily         STOP taking these medications       dexamethasone (DECADRON) 4 MG tablet Comments:   Reason for Stopping:         hydrochlorothiazide (HYDRODIURIL) 12.5 MG tablet Comments:   Reason for Stopping:         levofloxacin (LEVAQUIN) 500 MG tablet Comments:   Reason for Stopping:         REVLIMID 25 MG CAPS capsule Comments:   Reason for Stopping:             Allergies   Allergies   Allergen Reactions     Codeine Nausea

## 2023-06-11 NOTE — PLAN OF CARE
Problem: Gas Exchange Impaired  Goal: Optimal Gas Exchange  Outcome: Progressing  Intervention: Optimize Oxygenation and Ventilation  Recent Flowsheet Documentation  Taken 6/11/2023 0030 by Mary Lou Palacio RN  Head of Bed ([facility]) Positioning: HOB at 30 degrees  Taken 6/10/2023 2010 by Mary Lou Palacio RN  Head of Bed ([facility]) Positioning: HOB at 30-45 degrees   Goal Outcome Evaluation:       Patient with non productive cough. Desats to mid 80% with activity but able to titrate O2 down to 3L/NC at present. IS encouraged. Lasix and steroids given per orders.

## 2023-06-11 NOTE — PROGRESS NOTES
Care Management Discharge Note    Discharge Date: 06/11/2023       Discharge Disposition: Home    Discharge Services: None    Discharge DME: None    Discharge Transportation: family or friend will provide    Education Provided on the Discharge Plan: Yes (AVS per bedside RN)    Persons Notified of Discharge Plans: patient    Patient/Family in Agreement with the Plan: yes        Additional Information:  CM reviewed chart. Patient will be discharge per bedside RN. No CM needs identified.         Cande Jade RN

## 2023-06-11 NOTE — PROGRESS NOTES
Home Oxygen Assessment Tools  Patient's 02 sat on RA at rest 93% for minutes. If patient's Sp02 at rest is 88%, then oxygen may be needed and must monitor patient's Sp02 with activity. Patient 02 86% sat on  RA with activity after 5 minutes.

## 2023-06-12 NOTE — PROGRESS NOTES
Pulmonary Clinic Follow-Up          Assessment/Plan:     67 year old immunosuppressed male with a history of multiple myeloma, presenting for hospital follow-up.      Acute hypoxemic respiratory failure  Organizing pneumonia 2/2 Revlimid toxicity  Hospitalized 6/2 - 6/11/23 at [facility] with cough, fever.  CT chest with bilateral GGOs. Bronchoscopy on 6/5/23 with negative BAL cultures, lymphocyte predominant.  Beta D Glucan positive- did receive treatment although both pulm and ID mentioned clinical significance unclear.  No improvement with antibiotics but significant improvement with steroids.  Likely organizing pneumonia from Revlimid toxicity, receiving for multiple myeloma treatment.  Repeat chest CT on 7/5/23 indicates improved infiltrates.  He reports continued improvement in respiratory symptoms since hospital discharge.  He is no longer requiring oxygen.  His oxygen saturations at home have been in the 90s.  He has been able to walk outside with his dog.  He is currently on 20mg prednisone and will decrease to 10mg tomorrow, his oncologist has been managing his taper.  Plan:  - reviewed images with patient today  - continue prednisone, taper by 10mg/week.  He has treatment upcoming on Tuesday per oncology and he states he will most likely be placed on prednisone course again.  - continue Bactrim 3x per week until under 20mg prednisone/day.  We discussed this today, he states he believes his oncologist will keep him on Bactrim for prophylaxis during treatment, I asked that he discuss with them on Tuesdays visit.  - repeat imaging in 4 weeks, follow-up in clinic after        Rylie Tijerina CNP  Pulmonary Medicine  [facility]  [phone]         CC:     Hospital follow-up     HPI:     67 year old immunosuppressed male with a history of multiple myeloma, presenting for hospital follow-up.      Hospitalized 6/2 - 6/11/23 at [facility] with cough, fever.  CT chest with  bilateral GGOs. Bronchoscopy on 6/5/23 with negative BAL cultures.  No improvement with antibiotics but significant improvement with steroids.  Likely organizing pneumonia from Revlimid toxicity.    Since discharge, feeling improved.  Hasn't used oxygen since first couple nights.  Oxygen saturations at home have been in 90s.  Prednisone 20mg, started on Monday, this Saturday will go to 10mg - oncology has been managing since hospital discharge.  Starting myeloma treatment next Tuesday.  Stopped lasix that was prescribed in hospital, has not had increase in edema or SOB.  Can walk quarter a mile with dog.     Medical records reviewed for this visit include hospitalist notes, inpatient pulmonology notes.     ROS:     A 12-system review was obtained and was negative with the exception of the symptoms endorsed in the HPI.       Medical history:       PMH:  Past Medical History:   Diagnosis Date     Cancer (H)      Multiple myeloma (H)        PSH:  No past surgical history on file.    Allergies:  Allergies   Allergen Reactions     Acetaminophen-Codeine Nausea     Codeine Nausea       Family Hx:  No family history on file.    Social Hx:  Social History     Socioeconomic History     Marital status:      Spouse name: Not on file     Number of children: Not on file     Years of education: Not on file     Highest education level: Not on file   Occupational History     Not on file   Tobacco Use     Smoking status: Never     Smokeless tobacco: Never   Substance and Sexual Activity     Alcohol use: Not on file     Drug use: Not on file     Sexual activity: Not on file   Other Topics Concern     Not on file   Social History Narrative     Not on file     Social Determinants of Health     Financial Resource Strain: Not on file   Food Insecurity: Not on file   Transportation Needs: Not on file   Physical Activity: Not on file   Stress: Not on file   Social Connections: Not on file   Intimate Partner Violence: Not on file    Housing Stability: Not on file       Current Meds:  Current Outpatient Medications   Medication Sig Dispense Refill     acyclovir (ZOVIRAX) 400 MG tablet Take 400 mg by mouth 2 times daily       aspirin 81 MG EC tablet Take 81 mg by mouth daily       Calcium-Magnesium-Zinc 333-133-5 MG TABS per tablet Take 2 tablets by mouth daily       cholecalciferol 50 MCG (2000 UT) tablet Take 2,000 Units by mouth daily       co-enzyme Q-10 100 MG CAPS capsule Take 100 mg by mouth daily       furosemide (LASIX) 40 MG tablet Take 1 tablet (40 mg) by mouth daily Take additional 40mg daily as needed for shortness of breath or  worsening Lower extremity edema 90 tablet 0     lactobacillus rhamnosus, GG, (CULTURELL) capsule Take 1 capsule by mouth daily       losartan (COZAAR) 50 MG tablet Take 100 mg by mouth daily       nitroGLYcerin (NITROSTAT) 0.4 MG sublingual tablet Place 0.4 mg under the tongue every 5 minutes as needed for chest pain For GILLETTE       Omega-3 Fatty Acids (FISH OIL BURP-LESS) 1000 MG CAPS Take 1 capsule by mouth daily       polyethylene glycol (MIRALAX) 17 g packet Take 1 packet by mouth daily as needed for constipation       predniSONE (DELTASONE) 20 MG tablet Take 4 tablets (80 mg) by mouth daily Come down by 10mg every week 100 tablet 0     simvastatin (ZOCOR) 40 MG tablet Take 40 mg by mouth daily       sulfamethoxazole-trimethoprim (BACTRIM DS) 800-160 MG tablet Take 1 tablet by mouth three times a week Take on Mondays, Wednesday, fridaysof week while taking Prednsione more than 20mg daily for PCP prophylaxis 30 tablet 0     Turmeric 500 MG CAPS Take 2 capsules by mouth daily       vitamin B complex with vitamin C (VITAMIN  B COMPLEX) tablet Take 1 tablet by mouth daily       zinc gluconate 50 MG tablet Take 50 mg by mouth daily       acetaminophen (TYLENOL) 325 MG tablet Take 3 tablets (975 mg) by mouth 3 times daily (before meals) (Patient not taking: Reported on 6/26/2023) 200 tablet 0     bortezomib 3.5  MG injection Inject 1.3 mg/m2 into the vein once a week (Patient not taking: Reported on 6/26/2023)       HYDROcodone-acetaminophen (NORCO) 5-325 MG tablet Take 1 tablet by mouth every 4 hours as needed for pain (Patient not taking: Reported on 6/26/2023)       hydrOXYzine (ATARAX) 10 MG tablet Take 10-20 mg by mouth every 8 hours as needed for itching or anxiety (Patient not taking: Reported on 6/26/2023)       prochlorperazine (COMPAZINE) 10 MG tablet Take 10 mg by mouth every 6 hours as needed for nausea (Patient not taking: Reported on 6/26/2023)            Physical Exam:     /76 (BP Location: Left arm, Patient Position: Sitting, Cuff Size: Adult Regular)   Pulse 100   Wt 92 kg (202 lb 12.8 oz)   SpO2 95%   BMI 29.10 kg/m    Gen: adult male , appears in NAD  HEENT: clear conjunctivae, moist mucous membranes  CV: RRR, + murmur  Resp: CTAB, no focal crackles or wheezes.  Respirations even and unlabored.  On RA.   Skin: no apparent rashes on visible skin  Ext: no cyanosis, clubbing or edema  Neuro: alert and answering questions appropriately       Data:     Labs:  Reviewed  Pneumocystis jirovecil by PCR  Order: [id number]   Status: Final result      Visible to patient: Yes (seen)      0 Result Notes      Component 1 mo ago   P. jirovecii By PCR Detected Abnormal              Imaging studies:  I have personally reviewed all pertinent imaging studies and PFT results unless otherwise noted.    EXAM: CT CHEST W/O CONTRAST  LOCATION: [facility]  DATE: 7/5/2023  FINDINGS:   LUNGS AND PLEURA: The majority of the bilateral pulmonary infiltrates have improved. However, there is increased pulmonary consolidation in the posteromedial right upper lobe and the superior segment medial right lower lobe. These consolidations lie   adjacent the destructive soft tissue mass of T4 and T5.  No pleural effusion. Right middle lobe 4 mm peripheral nodule was partially visualized on the 2009 CT, and  therefore likely stable and benign.  MEDIASTINUM/AXILLAE: No adenopathy. No significant change 4 cm ascending aortic aneurysm.  CORONARY ARTERY CALCIFICATION: Severe.  UPPER ABDOMEN: No significant finding.  MUSCULOSKELETAL: No significant change in the large destructive lesion involving T4 and T5 with soft tissue extension into the vertebral spinal canal. Multiple smaller lytic lesions of the vertebral bodies and ribs.                                                                   IMPRESSION:   1.  Overall improved pulmonary infiltrates.  2.  Increased right upper and lower lobe medial consolidation adjacent the destructive process involving T4 and T5. This could represent pneumonia, or myelomatous invasion.  3.  Ascending aortic 4 cm aneurysm.  4.  Probably benign right middle lobe 4 mm nodule.      Chest CT 6/7/23:  IMPRESSION:   1.  Extensive diffuse bilateral pulmonary infiltrates have shown significant progression compared with 6/2/2023.  2.  Small bilateral pleural effusions are new.  3.  Borderline mild ectasia of the ascending thoracic aorta measuring 4.0 cm, unchanged.  4.  Severe coronary artery calcification.  5.  Numerous lytic bony lesions compatible with the patient's known multiple myeloma including pathologic fracture at the T5 level, unchanged.

## 2023-06-16 ENCOUNTER — LAB REQUISITION (OUTPATIENT)
Dept: LAB | Facility: CLINIC | Age: 68
End: 2023-06-16
Payer: MEDICARE

## 2023-06-16 ENCOUNTER — TRANSFERRED RECORDS (OUTPATIENT)
Dept: HEALTH INFORMATION MANAGEMENT | Facility: CLINIC | Age: 68
End: 2023-06-16

## 2023-06-16 DIAGNOSIS — C90.00 MULTIPLE MYELOMA NOT HAVING ACHIEVED REMISSION (H): ICD-10-CM

## 2023-06-16 LAB
ANION GAP SERPL CALCULATED.3IONS-SCNC: 15 MMOL/L (ref 7–15)
BUN SERPL-MCNC: 18.2 MG/DL (ref 8–23)
CALCIUM SERPL-MCNC: 8.7 MG/DL (ref 8.8–10.2)
CHLORIDE SERPL-SCNC: 97 MMOL/L (ref 98–107)
CREAT SERPL-MCNC: 0.71 MG/DL (ref 0.67–1.17)
DEPRECATED HCO3 PLAS-SCNC: 26 MMOL/L (ref 22–29)
GFR SERPL CREATININE-BSD FRML MDRD: >90 ML/MIN/1.73M2
GLUCOSE SERPL-MCNC: 113 MG/DL (ref 70–99)
POTASSIUM SERPL-SCNC: 3.8 MMOL/L (ref 3.4–5.3)
SODIUM SERPL-SCNC: 138 MMOL/L (ref 136–145)

## 2023-06-16 PROCEDURE — 80048 BASIC METABOLIC PNL TOTAL CA: CPT | Mod: ORL | Performed by: STUDENT IN AN ORGANIZED HEALTH CARE EDUCATION/TRAINING PROGRAM

## 2023-06-19 LAB — BACTERIA BRONCH: NORMAL

## 2023-06-21 ENCOUNTER — TELEPHONE (OUTPATIENT)
Dept: TRANSPLANT | Facility: CLINIC | Age: 68
End: 2023-06-21
Payer: MEDICARE

## 2023-06-21 DIAGNOSIS — C90.00 MULTIPLE MYELOMA (H): Primary | ICD-10-CM

## 2023-06-23 ENCOUNTER — TRANSFERRED RECORDS (OUTPATIENT)
Dept: HEALTH INFORMATION MANAGEMENT | Facility: CLINIC | Age: 68
End: 2023-06-23

## 2023-06-26 ENCOUNTER — ALLIED HEALTH/NURSE VISIT (OUTPATIENT)
Dept: TRANSPLANT | Facility: CLINIC | Age: 68
End: 2023-06-26
Payer: MEDICARE

## 2023-06-26 ENCOUNTER — MEDICAL CORRESPONDENCE (OUTPATIENT)
Dept: TRANSPLANT | Facility: CLINIC | Age: 68
End: 2023-06-26
Payer: MEDICARE

## 2023-06-26 ENCOUNTER — OFFICE VISIT (OUTPATIENT)
Dept: TRANSPLANT | Facility: CLINIC | Age: 68
End: 2023-06-26
Payer: MEDICARE

## 2023-06-26 ENCOUNTER — CARE COORDINATION (OUTPATIENT)
Dept: TRANSPLANT | Facility: CLINIC | Age: 68
End: 2023-06-26
Payer: MEDICARE

## 2023-06-26 ENCOUNTER — TRANSFERRED RECORDS (OUTPATIENT)
Dept: HEALTH INFORMATION MANAGEMENT | Facility: CLINIC | Age: 68
End: 2023-06-26

## 2023-06-26 VITALS
BODY MASS INDEX: 28.11 KG/M2 | HEART RATE: 108 BPM | WEIGHT: 195.9 LBS | TEMPERATURE: 98.3 F | SYSTOLIC BLOOD PRESSURE: 118 MMHG | RESPIRATION RATE: 20 BRPM | DIASTOLIC BLOOD PRESSURE: 69 MMHG | OXYGEN SATURATION: 97 %

## 2023-06-26 DIAGNOSIS — D84.821 IMMUNOSUPPRESSED DUE TO CHEMOTHERAPY (H): ICD-10-CM

## 2023-06-26 DIAGNOSIS — T45.1X5A IMMUNOSUPPRESSED DUE TO CHEMOTHERAPY (H): ICD-10-CM

## 2023-06-26 DIAGNOSIS — Z71.9 VISIT FOR COUNSELING: Primary | ICD-10-CM

## 2023-06-26 DIAGNOSIS — Z79.899 IMMUNOSUPPRESSED DUE TO CHEMOTHERAPY (H): ICD-10-CM

## 2023-06-26 DIAGNOSIS — C90.00 MULTIPLE MYELOMA NOT HAVING ACHIEVED REMISSION (H): ICD-10-CM

## 2023-06-26 DIAGNOSIS — C90.00 MULTIPLE MYELOMA, REMISSION STATUS UNSPECIFIED (H): Primary | ICD-10-CM

## 2023-06-26 PROBLEM — J96.01 ACUTE HYPOXEMIC RESPIRATORY FAILURE (H): Status: RESOLVED | Noted: 2023-06-04 | Resolved: 2023-06-26

## 2023-06-26 PROCEDURE — G0463 HOSPITAL OUTPT CLINIC VISIT: HCPCS

## 2023-06-26 PROCEDURE — 99205 OFFICE O/P NEW HI 60 MIN: CPT

## 2023-06-26 NOTE — LETTER
6/26/2023         RE: Billy Carroll  [address] [place] [address]        Dear Colleague,    Thank you for referring your patient, Billy Carroll, to the [facility] BLOOD AND MARROW TRANSPLANT PROGRAM [place]. Please see a copy of my visit note below.    BMT NEW PATIENT CONSULT.  06/26/23    I had a pleasure to meet Mr.Mark Carroll referred by  for management of multiple myeloma.    Pt has IgG kappa MM standard risk now after 1 cycle of RVD therapy complicated by development of bilateral pulmonary infiltrates with acute respiratory failure and intubation in May 2023; he was just discharged on 6/15/2023. His PJP PCR was positive - patient saw his PCP earlier and is now on Bactrim 1po BID x 10 more days and Prednisone 60mg a day taper.    ONCOLOGY HISTORY:  Mr. Carroll is a pleasant 67 was old  male with a history of a smoldering myeloma seen this 2018 at that time he had a elevated IgG level normal skeletal survey and bone marrow biopsy with 5% plasma cells.  Most recently in august 2022, he presented with a large soft tissue mass centered at the right T5 costovertebral junction associated with the destruction of the T4-T5 and medial right fifth rib on the right.  The patient received palliative radiation therapy for involved thoracic spine and systemic first cycle which Revlimid Velcade dexamethasone treatment.    His initial bone marrow biopsy showed 15-20% involvement by the clonal plasma cells.  The cytogenetic revealed trisomy with gains of chromosome 5/9/2015 with a hyperdiploid T consistent with a standard risk myeloma.    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  The PCR did not come back until later.  The patient was treated with the steroids and some Bactrim and improved significantly.  He was briefly on oxygen in the intensive care unit but  is now fully recovered of oxygen and ambulatory.    Diagnostic work-up:  BMBx 8/2023  BONE MARROW AND PERIPHERAL BLOOD:   1.  Plasma cell myeloma (WHO 2016)       a.  Bone marrow involvement:  10-15%       b.  Bone marrow cellularity:  45%       c.  Peripheral blood:           - No circulating plasma cells           - Mild normochromic, normocytic   Cytogenetics:  The Plasma Cell Dyscrasia FISH Panel revealed a trisomy result with gains of chromosomes 5, 9, and 15 suggesting hyperdiploidy. Chromosome analysis revealed a normal male karyotype with no abnormal clonal population of metaphases detected. A trisomy result was detected in the previous FISH study (Cytogenetics case [id number] within Pathology case [id number]).  Albumin 4.2  Ca normal  M spike 2.06gm/dl       INTERVAL HISTORY:  Today Billy presents feeling better today after discharge for PJP pneumonia.  His main complains is getting weak legs from prednisone and lower mid back pain due to compression fracture.He no longer has dyspnea or shortness of breath.  He is tired but no other side effects from chemotherapy.  His second cycle has been on hold now.    He is on Bactrim 1 po BID for 10 more days and Pred 60mg daily.     The review of system revealed no chest pains or cardiac history; he is not a smoker.  No kidney or liver disease.  He could he has some swelling of his feet since on prednisone.  No rashes.  No history of a blood clot or bleeding.  FHx: Father had myeloma in his 80s. Lived 6 months  Sister has breast Ca, another cousin has breast Ca.  He has 1 daugher,  Is , wife has mild disability due to back issues and walks with walker.     SHx:they live in 1 story house in [place]  Pt was at mold manufacturing for 20 years, and 20years doing office work.  Non-smoker    PMHx:  Acute hypoxemic respiratory failure, etiology PJP pos by PCR   Moderate AS   Multifocal pneumonitis  Fever  Immunosuppressed   Bronchoscopy 6/5/2023  Low back  pain  Right L5 nerve impingement  Anemia  Mild LFT elevation  Essential hypertension  Hypercholestorelemia  ASCVD Aortic valve calcifications, with history of moderate aortic stenosis  CT scan showed severe three-vessel coronary artery calcification and dense calcifications and thickening of the aortic valves.  March 2023 stress echo shows EF 55 to 60%.  Calcified aortic valve noted.  No evidence of stress-induced ischemia.      Current Outpatient Medications   Medication    acyclovir (ZOVIRAX) 400 MG tablet    aspirin 81 MG EC tablet    Calcium-Magnesium-Zinc 333-133-5 MG TABS per tablet    cholecalciferol 50 MCG (2000 UT) tablet    co-enzyme Q-10 100 MG CAPS capsule    furosemide (LASIX) 40 MG tablet    lactobacillus rhamnosus, GG, (CULTURELL) capsule    losartan (COZAAR) 50 MG tablet    nitroGLYcerin (NITROSTAT) 0.4 MG sublingual tablet    Omega-3 Fatty Acids (FISH OIL BURP-LESS) 1000 MG CAPS    polyethylene glycol (MIRALAX) 17 g packet    predniSONE (DELTASONE) 20 MG tablet    simvastatin (ZOCOR) 40 MG tablet    sulfamethoxazole-trimethoprim (BACTRIM DS) 800-160 MG tablet    Turmeric 500 MG CAPS    vitamin B complex with vitamin C (VITAMIN  B COMPLEX) tablet    zinc gluconate 50 MG tablet    acetaminophen (TYLENOL) 325 MG tablet    bortezomib 3.5 MG injection    HYDROcodone-acetaminophen (NORCO) 5-325 MG tablet    hydrOXYzine (ATARAX) 10 MG tablet    prochlorperazine (COMPAZINE) 10 MG tablet     No current facility-administered medications for this visit.     Facility-Administered Medications Ordered in Other Visits   Medication    fentaNYL (PF) (SUBLIMAZE) injection    lidocaine (viscous) (XYLOCAINE) 2 % solution    lidocaine 1 % injection    lidocaine 4 % injection    midazolam (VERSED) injection    sodium chloride 0.9% infusion      Physical Exam:  /69   Pulse 108   Temp 98.3  F (36.8  C) (Oral)   Resp 20   Wt 88.9 kg (195 lb 14.4 oz)   SpO2 97%   BMI 28.11 kg/m    KPS 80%  Patient is ambulating ,  AOx3, NAD, well appearing patient.   HEENT: No mucositis, MM moist, no thrush or ulcers, buccal mucosa intact  Neck supple, no peripheral adenopathy Thyroid gland midline, not enlarged   Lungs: good air exchange, CTA, no crackles,no wheezing.   Heart RRR S1 S2, no murmur or gallop   Abd soft, NT, ND. Without hepato-splenomegaly, no ascites,    LE symmetrical, no edema   Skin without lesion or rashes. No petechiae or ecchymosis.  Neuro  Intact, AOx3    ASSESSMENT AND PLAN:  In summary,  is 68yo with IgG MM, standard risk now undergoing induction chemotherapy with RVD. He developed severe PJP but is now recovered fully. The prednisone contributes to muscle weakness and hopefully we can quickly tapered it off.    For PJP, I agree to complete Bactrim course and accelerate the Prednisone taper. He will drop to 40mg on Th and then follow taper per  - likely more rapid taper over 10 days.     For MM, patient seems to have responsive disease even after 1 cycle. We spent most of visit reviewing the role of autologous PBSCT for MM. We discussed the G-CSF mobilization, work-up studies, the administration of melphalan high dose chemotherapy and stem cell infusion. Also complications and risks including infections. I stressed he seems to be a good candidate for this procedure; he has comorbidities but all seem to be managed.    My recommendation is to come back in October/early Nov for work-up for autologous HSCT. Plan is to obtain up to 8 mil stem cells and plan outpatient transplant.   He has excellent family support and lives locally.   He will need continuous secodary PJP prophylaxis and cardiology clearance given aortic valve stenosis.     All questions were answered and I reviewed these recommendations with .    Jerri Frey MD

## 2023-06-26 NOTE — PROGRESS NOTES
Spoke with Shabnam , patient's daughter, following new transplant visit with Dr. Frey. Reviewed plan of care per NT conversation for Stem Cell Transplant. Explained role of the Nurse Coordinator throughout the BMT process as well as general time line and expectations for transplant. Discussed necessity of caregiver and program's proximity requirements. All questions were answered.     Plan: Autologous Transplant Myeloma-Outpatient, pending 3 additional cycles of therapy with RMD    Timeline Notes:approximately October    Contact information provided for :      AUTO for MM:    Outpatient Infusion: Yes    EOC Reason updated: yes

## 2023-06-26 NOTE — PROGRESS NOTES
Blood and Marrow Transplant   New Transplant Visit with   Clinical     Assessment completed on 23 in the BMT clinic. Information for this assessment was provided by pt and pt's daughter's report, consultation with medical team, and medical chart review.      Present:  Patient: Billy Carroll  Daughter: Valentine Zuniga  : MICKEY Wu, [facility]    Medical Team   Nurse Coordinator: Harlan Michel RN  BMT Physician: Jerri Frey MD  Referring Physician: Micha Ruelas MD    Diagnosis: Multiple Myeloma  Diagnosis Date: 2023    Presenting Information:  Pt is a 67 year old male diagnosed with Multiple Myeloma. Pt was diagnosed on 2023. Pt presents for an autologous stem cell transplant discussion.    Contact Information:  Cell Phone: [phone]  Wife's Cell Phone: [phone]  Email: [email or web address]    Special Needs:   No needs identified at this time.     Relocation Requirement:   Pt lives in [place], [place] (approximately [place] minutes from [facility]) which is within the required distance of the hospital. Pt does not need to relocate.     Living Situation:   Pt and his wife live in [place], [place] w/ their 3 y/o black lab.    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    Spouse/Partner Employed: No; retired  Occupation: Teacher    Insurance:   Medicare + BCBS. No insurance concerns identified at this time. SW provided information regarding the insurance authorization process and the role of the BMT Financial . SW provided contact info for the BMT Financial  and referred pt to them for future insurance questions.     Finances:   No financial concerns identified at this time.  SW discussed albert options and asked pt to let SW know if they would like to apply in the future.     Caregiver:   SW discussed with the patient and his dtr the caregiver role and expectation at length. Pt is agreeable to having a full time caregiver for the minimum of 30 days until cleared by the BMT Physician. Pt's identified caregiver is his wife and children. Caregiver education and information provided. No caregiver concerns identified.     Healthcare Directive:  Yes. Pt has a health care directive and will bring it when they return to the BMT program.     Resources Provided:  -BMT Information Book  -BMT Resources Packet  -Healthcare Directive  -Honoring Choices - Your Rights: Making Your Own Health Care Treatment Decisions  -Caregiver Contract/Description  -Transplant Unit Description and Information   -Lodging Resources    Identified Concerns:  No concerns identified at this time.       Summary:  Pt presents to [facility] regarding an autologous stem cell transplant. Pt and pt's daughter asked good/appropriate questions regarding psychosocial factors related to BMT; all questions were addressed. Pt presented as pleasant, calm, and engaged. Pt's affect was appropriate. Patient indicated they are currently feeling well-supported. Pt was slightly tearful at times throughout SW visit and attributed this to the use of steroids. Pt declined mental health changes these last few months. He shared initial difficulty sleeping that resolved with starting a medication he believes is olanzapine at night. Family's affect was involved, engaged, and supportive.     Plan:   SW provided contact information and encouraged pt to contact SW with any additional questions, concerns, resources and/or for support. SW will continue to follow pt to provide support and guidance with resources as needed.     MICKEY Wu, [facility]  Adult Blood & Marrow Transplant   Phone: [phone]  Pager: [phone]

## 2023-06-26 NOTE — NURSING NOTE
"Oncology Rooming Note    June 26, 2023 1:18 PM   Billy Carroll is a 67 year old male who presents for:    Chief Complaint   Patient presents with     RECHECK     MM here for provider visit     Initial Vitals: /69   Pulse 108   Temp 98.3  F (36.8  C) (Oral)   Resp 20   Wt 88.9 kg (195 lb 14.4 oz)   SpO2 97%   BMI 28.11 kg/m   Estimated body mass index is 28.11 kg/m  as calculated from the following:    Height as of 6/2/23: 1.778 m (5' 10\").    Weight as of this encounter: 88.9 kg (195 lb 14.4 oz). Body surface area is 2.1 meters squared.  Data Unavailable Comment: Data Unavailable   No LMP for male patient.  Allergies reviewed: Yes  Medications reviewed: Yes    Medications: Medication refills not needed today.  Pharmacy name entered into [facility]: [id number] [place], [place] - [address]    Clinical concerns: None      Marbin Davis RN              "

## 2023-06-26 NOTE — PROGRESS NOTES
[facility] BMT and Cell Therapy Program  RN Coordinator Pre-Visit Documentation      Billy Carroll is a 67 year old male who has been referred to the [facility] BMT and Cell Therapy Program for hematopoietic cell transplant or immune effector cell therapy.      Referring MD Name: Dr. Micha Ruelas , telephone [phone]    Reason for referral: Autologous BMT    Link to BMT & CT Program Algorithms      All relevant clinical notes, labs, imaging, and pathology may be reviewed in Epic Bookmarks under name: Harlan Michel      Patient Care Team       Relationship Specialty Notifications Start End    Jory Krishnamurthy PA-C PCP - General Physician Assistant  2/24/23     Phone: [phone] Fax: [phone] [address] [place] [address]    Arcelia Sheldon Consulting Physician Radiation Oncology  4/25/23     Phone: [phone] Fax: [phone]         [facility] [address] [facility] [address]            Harlan Michel, RN

## 2023-06-26 NOTE — PROGRESS NOTES
BMT NEW PATIENT CONSULT.  06/26/23    I had a pleasure to meet Mr.Mark Carroll referred by  for management of multiple myeloma.    Pt has IgG kappa MM standard risk now after 1 cycle of RVD therapy complicated by development of bilateral pulmonary infiltrates with acute respiratory failure and intubation in May 2023; he was just discharged on 6/15/2023. His PJP PCR was positive - patient saw his PCP earlier and is now on Bactrim 1po BID x 10 more days and Prednisone 60mg a day taper.    ONCOLOGY HISTORY:  Mr. Carroll is a pleasant 67 was old  male with a history of a smoldering myeloma seen this 2018 at that time he had a elevated IgG level normal skeletal survey and bone marrow biopsy with 5% plasma cells.  Most recently in august 2022, he presented with a large soft tissue mass centered at the right T5 costovertebral junction associated with the destruction of the T4-T5 and medial right fifth rib on the right.  The patient received palliative radiation therapy for involved thoracic spine and systemic first cycle which Revlimid Velcade dexamethasone treatment.    His initial bone marrow biopsy showed 15-20% involvement by the clonal plasma cells.  The cytogenetic revealed trisomy with gains of chromosome 5/9/2015 with a hyperdiploid T consistent with a standard risk myeloma.    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  The PCR did not come back until later.  The patient was treated with the steroids and some Bactrim and improved significantly.  He was briefly on oxygen in the intensive care unit but is now fully recovered of oxygen and ambulatory.    Diagnostic work-up:  BMBx 8/2023  BONE MARROW AND PERIPHERAL BLOOD:   1.  Plasma cell myeloma (WHO 2016)       a.  Bone marrow involvement:  10-15%       b.  Bone marrow cellularity:  45%       c.  Peripheral blood:           -  No circulating plasma cells           - Mild normochromic, normocytic   Cytogenetics:  The Plasma Cell Dyscrasia FISH Panel revealed a trisomy result with gains of chromosomes 5, 9, and 15 suggesting hyperdiploidy. Chromosome analysis revealed a normal male karyotype with no abnormal clonal population of metaphases detected. A trisomy result was detected in the previous FISH study (Cytogenetics case [id number] within Pathology case [id number]).  Albumin 4.2  Ca normal  M spike 2.06gm/dl       INTERVAL HISTORY:  Today Billy presents feeling better today after discharge for PJP pneumonia.  His main complains is getting weak legs from prednisone and lower mid back pain due to compression fracture.He no longer has dyspnea or shortness of breath.  He is tired but no other side effects from chemotherapy.  His second cycle has been on hold now.    He is on Bactrim 1 po BID for 10 more days and Pred 60mg daily.     The review of system revealed no chest pains or cardiac history; he is not a smoker.  No kidney or liver disease.  He could he has some swelling of his feet since on prednisone.  No rashes.  No history of a blood clot or bleeding.  FHx: Father had myeloma in his 80s. Lived 6 months  Sister has breast Ca, another cousin has breast Ca.  He has 1 daugher,  Is , wife has mild disability due to back issues and walks with walker.     SHx:they live in 1 story house in [facility]  Pt was at [employer] for 20 years, and 20years doing office work.  Non-smoker    PMHx:  Acute hypoxemic respiratory failure, etiology PJP pos by PCR   Moderate AS   Multifocal pneumonitis  Fever  Immunosuppressed   Bronchoscopy 6/5/2023  Low back pain  Right L5 nerve impingement  Anemia  Mild LFT elevation  Essential hypertension  Hypercholestorelemia  ASCVD Aortic valve calcifications, with history of moderate aortic stenosis  CT scan showed severe three-vessel coronary artery calcification and dense calcifications and  thickening of the aortic valves.  March 2023 stress echo shows EF 55 to 60%.  Calcified aortic valve noted.  No evidence of stress-induced ischemia.      Current Outpatient Medications   Medication     acyclovir (ZOVIRAX) 400 MG tablet     aspirin 81 MG EC tablet     Calcium-Magnesium-Zinc 333-133-5 MG TABS per tablet     cholecalciferol 50 MCG (2000 UT) tablet     co-enzyme Q-10 100 MG CAPS capsule     furosemide (LASIX) 40 MG tablet     lactobacillus rhamnosus, GG, (CULTURELL) capsule     losartan (COZAAR) 50 MG tablet     nitroGLYcerin (NITROSTAT) 0.4 MG sublingual tablet     Omega-3 Fatty Acids (FISH OIL BURP-LESS) 1000 MG CAPS     polyethylene glycol (MIRALAX) 17 g packet     predniSONE (DELTASONE) 20 MG tablet     simvastatin (ZOCOR) 40 MG tablet     sulfamethoxazole-trimethoprim (BACTRIM DS) 800-160 MG tablet     Turmeric 500 MG CAPS     vitamin B complex with vitamin C (VITAMIN  B COMPLEX) tablet     zinc gluconate 50 MG tablet     acetaminophen (TYLENOL) 325 MG tablet     bortezomib 3.5 MG injection     HYDROcodone-acetaminophen (NORCO) 5-325 MG tablet     hydrOXYzine (ATARAX) 10 MG tablet     prochlorperazine (COMPAZINE) 10 MG tablet     No current facility-administered medications for this visit.     Facility-Administered Medications Ordered in Other Visits   Medication     fentaNYL (PF) (SUBLIMAZE) injection     lidocaine (viscous) (XYLOCAINE) 2 % solution     lidocaine 1 % injection     lidocaine 4 % injection     midazolam (VERSED) injection     sodium chloride 0.9% infusion      Physical Exam:  /69   Pulse 108   Temp 98.3  F (36.8  C) (Oral)   Resp 20   Wt 88.9 kg (195 lb 14.4 oz)   SpO2 97%   BMI 28.11 kg/m    KPS 80%  Patient is ambulating , AOx3, NAD, well appearing patient.   HEENT: No mucositis, MM moist, no thrush or ulcers, buccal mucosa intact  Neck supple, no peripheral adenopathy Thyroid gland midline, not enlarged   Lungs: good air exchange, CTA, no crackles,no wheezing.   Heart  RRR S1 S2, no murmur or gallop   Abd soft, NT, ND. Without hepato-splenomegaly, no ascites,    LE symmetrical, no edema   Skin without lesion or rashes. No petechiae or ecchymosis.  Neuro  Intact, AOx3    ASSESSMENT AND PLAN:  In summary,  is 68yo with IgG MM, standard risk now undergoing induction chemotherapy with RVD. He developed severe PJP but is now recovered fully. The prednisone contributes to muscle weakness and hopefully we can quickly tapered it off.    For PJP, I agree to complete Bactrim course and accelerate the Prednisone taper. He will drop to 40mg on Th and then follow taper per  - likely more rapid taper over 10 days.     For MM, patient seems to have responsive disease even after 1 cycle. We spent most of visit reviewing the role of autologous PBSCT for MM. We discussed the G-CSF mobilization, work-up studies, the administration of melphalan high dose chemotherapy and stem cell infusion. Also complications and risks including infections. I stressed he seems to be a good candidate for this procedure; he has comorbidities but all seem to be managed.    My recommendation is to come back in October/early Nov for work-up for autologous HSCT. Plan is to obtain up to 8 mil stem cells and plan outpatient transplant.   He has excellent family support and lives locally.   He will need continuous secodary PJP prophylaxis and cardiology clearance given aortic valve stenosis.     All questions were answered and I reviewed these recommendations with .    Jreri Frey MD

## 2023-07-03 LAB — BACTERIA BRONCH: ABNORMAL

## 2023-07-05 ENCOUNTER — HOSPITAL ENCOUNTER (OUTPATIENT)
Dept: CT IMAGING | Facility: CLINIC | Age: 68
Discharge: HOME | End: 2023-07-05
Attending: INTERNAL MEDICINE | Admitting: INTERNAL MEDICINE
Payer: MEDICARE

## 2023-07-05 DIAGNOSIS — C90.00 MULTIPLE MYELOMA NOT HAVING ACHIEVED REMISSION (H): ICD-10-CM

## 2023-07-05 DIAGNOSIS — J96.01 ACUTE HYPOXEMIC RESPIRATORY FAILURE (H): ICD-10-CM

## 2023-07-05 DIAGNOSIS — J98.4 PNEUMONITIS: ICD-10-CM

## 2023-07-05 PROCEDURE — 71250 CT THORAX DX C-: CPT | Mod: MG

## 2023-07-05 PROCEDURE — G1010 CDSM STANSON: HCPCS

## 2023-07-07 ENCOUNTER — OFFICE VISIT (OUTPATIENT)
Dept: PULMONOLOGY | Facility: CLINIC | Age: 68
End: 2023-07-07
Payer: MEDICARE

## 2023-07-07 VITALS
BODY MASS INDEX: 29.1 KG/M2 | HEART RATE: 100 BPM | SYSTOLIC BLOOD PRESSURE: 128 MMHG | OXYGEN SATURATION: 95 % | WEIGHT: 202.8 LBS | DIASTOLIC BLOOD PRESSURE: 76 MMHG

## 2023-07-07 DIAGNOSIS — J84.89 ORGANIZING PNEUMONIA (H): ICD-10-CM

## 2023-07-07 DIAGNOSIS — J96.01 ACUTE HYPOXEMIC RESPIRATORY FAILURE (H): ICD-10-CM

## 2023-07-07 DIAGNOSIS — J98.4 PNEUMONITIS: Primary | ICD-10-CM

## 2023-07-07 PROCEDURE — 99204 OFFICE O/P NEW MOD 45 MIN: CPT | Performed by: NURSE PRACTITIONER

## 2023-07-07 NOTE — PATIENT INSTRUCTIONS
It was a pleasure to see you in clinic today.   Here is what we discussed:    Continue prednisone taper, as directed by oncology.  Continue bactrim until you are under 20mg/day - but discuss ongoing treatment if indicated with your oncologist.  Call us with any change or worsening of your breathing.  Follow-up in 4 weeks, after chest CT.    Rylie Tijerina, CNP  Pulmonary Medicine  [facility]  [phone]

## 2023-07-18 ENCOUNTER — TRANSFERRED RECORDS (OUTPATIENT)
Dept: HEALTH INFORMATION MANAGEMENT | Facility: CLINIC | Age: 68
End: 2023-07-18
Payer: MEDICARE

## 2023-07-19 ENCOUNTER — MEDICAL CORRESPONDENCE (OUTPATIENT)
Dept: HEALTH INFORMATION MANAGEMENT | Facility: CLINIC | Age: 68
End: 2023-07-19
Payer: MEDICARE

## 2023-07-27 NOTE — TELEPHONE ENCOUNTER
Medication Question or Refill    Contacts         Type Contact Phone/Fax    07/27/2023 11:27 AM CDT Phone (Incoming) Billy Carroll (Self) [phone] (M)            What medication are you calling about (include dose and sig)?: Simvastatin 40 mg    Preferred Pharmacy:   [id number] [address] [address]  Phone: [phone] Fax: [phone]      Controlled Substance Agreement on file:   CSA -- Patient Level:    CSA: None found at the patient level.       Who prescribed the medication?: Cipriano Fuentes    Do you need a refill? Yes    When did you use the medication last? Today    Patient offered an appointment? No Patient is already scheduled to see provider at 8/21/2023    Do you have any questions or concerns?  Yes: Has a week left of prescription      Could we send this information to you in [facility] or would you prefer to receive a phone call?:   No preference   Okay to leave a detailed message?: Yes at Cell number on file:    Telephone Information:   Mobile [phone]

## 2023-07-28 RX ORDER — SIMVASTATIN 40 MG
40 TABLET ORAL DAILY
OUTPATIENT
Start: 2023-07-28

## 2023-07-31 LAB
ACID FAST STAIN (ARUP): NORMAL
ACID FAST STAIN (ARUP): NORMAL

## 2023-08-02 ENCOUNTER — HOSPITAL ENCOUNTER (OUTPATIENT)
Dept: CT IMAGING | Facility: CLINIC | Age: 68
Discharge: HOME | End: 2023-08-02
Attending: NURSE PRACTITIONER | Admitting: NURSE PRACTITIONER
Payer: MEDICARE

## 2023-08-02 DIAGNOSIS — J98.4 PNEUMONITIS: ICD-10-CM

## 2023-08-02 PROCEDURE — 71250 CT THORAX DX C-: CPT | Mod: ME

## 2023-08-02 PROCEDURE — G1010 CDSM STANSON: HCPCS

## 2023-08-02 NOTE — PROGRESS NOTES
Pulmonary Clinic Follow-Up          Assessment/Plan:     67 year old immunosuppressed male with a history of multiple myeloma, presenting for chest CT follow-up.    Acute hypoxemic respiratory failure  Organizing pneumonia 2/2 Revlimid toxicity  Hospitalized 6/2 - 6/11/23 at [facility] with cough, fever.  CT chest with bilateral GGOs. Bronchoscopy on 6/5/23 with negative BAL cultures, lymphocyte predominant.  Beta D Glucan positive- did receive treatment although both pulm and ID mentioned clinical significance unclear.  No improvement with antibiotics but significant improvement with steroids.  Likely organizing pneumonia from Revlimid toxicity, receiving for multiple myeloma treatment.  Repeat chest CT on 7/5/23 indicates improved infiltrates.  Repeat chest CT on 8/2/23 shows continued improvement, with resolution of previously noted infiltrates.  He denies any issues with his breathing, no SOB.  He is no longer on prednisone.  He continues on Bactrim M, W, F through oncology for prophylaxis.    Plan:  - reviewed CT results with patient today.  - follow-up with oncology as scheduled  - only need to return to our clinic if issues arise      Rylie Tijerina CNP  Pulmonary Medicine  [facility]  [phone]         CC:     Chest CT follow-up     HPI:     67 year old immunosuppressed male with a history of multiple myeloma, presenting for hospital follow-up.      Previous HPI:  Hospitalized 6/2 - 6/11/23 at [facility] with cough, fever.  CT chest with bilateral GGOs. Bronchoscopy on 6/5/23 with negative BAL cultures.  No improvement with antibiotics but significant improvement with steroids.  Likely organizing pneumonia from Revlimid toxicity.  Since discharge, feeling improved.  Hasn't used oxygen since first couple nights.  Oxygen saturations at home have been in 90s.  Prednisone 20mg, started on Monday, this Saturday will go to 10mg - oncology has been managing since hospital  discharge.  Starting myeloma treatment next Tuesday.  Stopped lasix that was prescribed in hospital, has not had increase in edema or SOB.  Can walk quarter a mile with dog.     He reports feeling well.  Mornings has a little cough.  No SOB.  Off prednisone.  Bactrim M, W, F through oncology.  Seeing cardiology on 8/23/23.       ROS:     A 6-system review was obtained and was negative with the exception of the symptoms endorsed in the HPI.       Medical history:       PMH:  Past Medical History:   Diagnosis Date    Cancer (H)     Multiple myeloma (H)        PSH:  No past surgical history on file.    Allergies:  Allergies   Allergen Reactions    Acetaminophen-Codeine Nausea    Codeine Nausea       Family Hx:  No family history on file.    Social Hx:  Social History     Socioeconomic History    Marital status:      Spouse name: Not on file    Number of children: Not on file    Years of education: Not on file    Highest education level: Not on file   Occupational History    Not on file   Tobacco Use    Smoking status: Never    Smokeless tobacco: Never   Substance and Sexual Activity    Alcohol use: Not on file    Drug use: Not on file    Sexual activity: Not on file   Other Topics Concern    Not on file   Social History Narrative    Not on file     Social Determinants of Health     Financial Resource Strain: Not on file   Food Insecurity: Not on file   Transportation Needs: Not on file   Physical Activity: Not on file   Stress: Not on file   Social Connections: Not on file   Intimate Partner Violence: Not on file   Housing Stability: Not on file       Current Meds:  Current Outpatient Medications   Medication Sig Dispense Refill    acetaminophen (TYLENOL) 325 MG tablet Take 3 tablets (975 mg) by mouth 3 times daily (before meals) 200 tablet 0    acyclovir (ZOVIRAX) 400 MG tablet Take 400 mg by mouth 2 times daily      aspirin 81 MG EC tablet Take 81 mg by mouth daily      B Complex-C-Folic Acid (HM VITAMIN B  COMPLEX/VITAMIN C) TABS       Calcium-Magnesium-Zinc 333-133-5 MG TABS per tablet Take 2 tablets by mouth daily      cholecalciferol 50 MCG (2000 UT) tablet Take 2,000 Units by mouth daily      co-enzyme Q-10 100 MG CAPS capsule Take 100 mg by mouth daily      lactobacillus rhamnosus, GG, (CULTURELL) capsule Take 1 capsule by mouth daily      losartan (COZAAR) 50 MG tablet Take 100 mg by mouth daily      nitroGLYcerin (NITROSTAT) 0.4 MG sublingual tablet Place 0.4 mg under the tongue every 5 minutes as needed for chest pain For GILLETTE      Omega-3 Fatty Acids (FISH OIL BURP-LESS) 1000 MG CAPS Take 1 capsule by mouth daily      polyethylene glycol (MIRALAX) 17 g packet Take 1 packet by mouth daily as needed for constipation      predniSONE (DELTASONE) 20 MG tablet Take 4 tablets (80 mg) by mouth daily Come down by 10mg every week 100 tablet 0    simvastatin (ZOCOR) 40 MG tablet Take 40 mg by mouth daily      sulfamethoxazole-trimethoprim (BACTRIM DS) 800-160 MG tablet Take 1 tablet by mouth three times a week Take on Mondays, Wednesday, fridaysof week while taking Prednsione more than 20mg daily for PCP prophylaxis 30 tablet 0    Turmeric 500 MG CAPS Take 2 capsules by mouth daily      vitamin B complex with vitamin C (VITAMIN  B COMPLEX) tablet Take 1 tablet by mouth daily      zinc gluconate 50 MG tablet Take 50 mg by mouth daily      bortezomib 3.5 MG injection Inject 1.3 mg/m2 into the vein once a week (Patient not taking: Reported on 6/26/2023)      furosemide (LASIX) 40 MG tablet Take 1 tablet (40 mg) by mouth daily Take additional 40mg daily as needed for shortness of breath or  worsening Lower extremity edema (Patient not taking: Reported on 8/4/2023) 90 tablet 0    HYDROcodone-acetaminophen (NORCO) 5-325 MG tablet Take 1 tablet by mouth every 4 hours as needed for pain (Patient not taking: Reported on 6/26/2023)      hydrOXYzine (ATARAX) 10 MG tablet Take 10-20 mg by mouth every 8 hours as needed for itching or  anxiety (Patient not taking: Reported on 6/26/2023)      prochlorperazine (COMPAZINE) 10 MG tablet Take 10 mg by mouth every 6 hours as needed for nausea (Patient not taking: Reported on 6/26/2023)            Physical Exam:     /78 (BP Location: Left arm, Patient Position: Sitting, Cuff Size: Adult Regular)   Pulse 99   Wt 96.6 kg (213 lb)   SpO2 96%   BMI 30.56 kg/m    Gen: adult male , appears in NAD  HEENT: clear conjunctivae, moist mucous membranes  CV: RRR, + murmur  Resp: CTAB, no focal crackles or wheezes.  Respirations even and unlabored.  On RA.   Skin: no apparent rashes on visible skin  Ext: no cyanosis, clubbing or edema  Neuro: alert and answering questions appropriately       Data:     Labs:  Reviewed  Pneumocystis jirovecil by PCR  Order: [id number]  Status: Final result     Visible to patient: Yes (seen)     0 Result Notes      Component 1 mo ago   P. jirovecii By PCR Detected Abnormal              Imaging studies:  I have personally reviewed all pertinent imaging studies and PFT results unless otherwise noted.    EXAM: CT CHEST W/O CONTRAST  LOCATION: [facility]  DATE: 7/5/2023  FINDINGS:   LUNGS AND PLEURA: The majority of the bilateral pulmonary infiltrates have improved. However, there is increased pulmonary consolidation in the posteromedial right upper lobe and the superior segment medial right lower lobe. These consolidations lie   adjacent the destructive soft tissue mass of T4 and T5.  No pleural effusion. Right middle lobe 4 mm peripheral nodule was partially visualized on the 2009 CT, and therefore likely stable and benign.  MEDIASTINUM/AXILLAE: No adenopathy. No significant change 4 cm ascending aortic aneurysm.  CORONARY ARTERY CALCIFICATION: Severe.  UPPER ABDOMEN: No significant finding.  MUSCULOSKELETAL: No significant change in the large destructive lesion involving T4 and T5 with soft tissue extension into the vertebral spinal canal. Multiple  smaller lytic lesions of the vertebral bodies and ribs.                                                                   IMPRESSION:   1.  Overall improved pulmonary infiltrates.  2.  Increased right upper and lower lobe medial consolidation adjacent the destructive process involving T4 and T5. This could represent pneumonia, or myelomatous invasion.  3.  Ascending aortic 4 cm aneurysm.  4.  Probably benign right middle lobe 4 mm nodule.      Chest CT 6/7/23:  IMPRESSION:   1.  Extensive diffuse bilateral pulmonary infiltrates have shown significant progression compared with 6/2/2023.  2.  Small bilateral pleural effusions are new.  3.  Borderline mild ectasia of the ascending thoracic aorta measuring 4.0 cm, unchanged.  4.  Severe coronary artery calcification.  5.  Numerous lytic bony lesions compatible with the patient's known multiple myeloma including pathologic fracture at the T5 level, unchanged.

## 2023-08-04 ENCOUNTER — OFFICE VISIT (OUTPATIENT)
Dept: PULMONOLOGY | Facility: CLINIC | Age: 68
End: 2023-08-04
Payer: MEDICARE

## 2023-08-04 VITALS
WEIGHT: 213 LBS | SYSTOLIC BLOOD PRESSURE: 134 MMHG | HEART RATE: 99 BPM | DIASTOLIC BLOOD PRESSURE: 78 MMHG | BODY MASS INDEX: 30.56 KG/M2 | OXYGEN SATURATION: 96 %

## 2023-08-04 DIAGNOSIS — J84.89 ORGANIZING PNEUMONIA (H): ICD-10-CM

## 2023-08-04 DIAGNOSIS — J98.4 PNEUMONITIS: Primary | ICD-10-CM

## 2023-08-04 PROCEDURE — 99213 OFFICE O/P EST LOW 20 MIN: CPT | Performed by: NURSE PRACTITIONER

## 2023-08-04 NOTE — PATIENT INSTRUCTIONS
It was a pleasure to see you in clinic today.   Here is what we discussed:    Call us with any change or worsening of your breathing.  Follow-up as needed.    Rylie Tijerina, CNP  Pulmonary Medicine  [facility]  [phone]

## 2023-08-15 ENCOUNTER — TRANSFERRED RECORDS (OUTPATIENT)
Dept: HEALTH INFORMATION MANAGEMENT | Facility: CLINIC | Age: 68
End: 2023-08-15
Payer: MEDICARE

## 2023-08-21 ENCOUNTER — OFFICE VISIT (OUTPATIENT)
Dept: FAMILY MEDICINE | Facility: CLINIC | Age: 68
End: 2023-08-21
Payer: MEDICARE

## 2023-08-21 VITALS
DIASTOLIC BLOOD PRESSURE: 86 MMHG | HEART RATE: 77 BPM | SYSTOLIC BLOOD PRESSURE: 157 MMHG | TEMPERATURE: 98.1 F | OXYGEN SATURATION: 95 % | BODY MASS INDEX: 29.78 KG/M2 | RESPIRATION RATE: 18 BRPM | HEIGHT: 70 IN | WEIGHT: 208 LBS

## 2023-08-21 DIAGNOSIS — C90.00 MULTIPLE MYELOMA NOT HAVING ACHIEVED REMISSION (H): ICD-10-CM

## 2023-08-21 DIAGNOSIS — D84.821 IMMUNOSUPPRESSED DUE TO CHEMOTHERAPY (H): ICD-10-CM

## 2023-08-21 DIAGNOSIS — Z79.899 IMMUNOSUPPRESSED DUE TO CHEMOTHERAPY (H): ICD-10-CM

## 2023-08-21 DIAGNOSIS — T45.1X5A IMMUNOSUPPRESSED DUE TO CHEMOTHERAPY (H): ICD-10-CM

## 2023-08-21 DIAGNOSIS — M85.851 OSTEOPENIA OF NECK OF RIGHT FEMUR: Primary | ICD-10-CM

## 2023-08-21 PROBLEM — G47.00 ORGANIC INSOMNIA: Status: ACTIVE | Noted: 2023-06-15

## 2023-08-21 PROBLEM — G89.3 CHRONIC PAIN DUE TO MALIGNANT NEOPLASTIC DISEASE: Status: ACTIVE | Noted: 2023-06-15

## 2023-08-21 PROBLEM — F41.9 ANXIETY: Status: ACTIVE | Noted: 2023-06-15

## 2023-08-21 PROCEDURE — 99214 OFFICE O/P EST MOD 30 MIN: CPT | Performed by: PHYSICIAN ASSISTANT

## 2023-08-21 RX ORDER — LOPERAMIDE HCL 2 MG
1 CAPSULE ORAL 4 TIMES DAILY PRN
COMMUNITY
Start: 2023-08-21 | End: 2023-11-07

## 2023-08-21 RX ORDER — OXYCODONE HYDROCHLORIDE 5 MG/1
1 TABLET ORAL EVERY 6 HOURS PRN
COMMUNITY
Start: 2023-08-21 | End: 2023-09-28

## 2023-08-21 RX ORDER — ALENDRONATE SODIUM 70 MG/1
70 TABLET ORAL
Qty: 12 TABLET | Refills: 1 | Status: SHIPPED | OUTPATIENT
Start: 2023-08-21 | End: 2024-01-23

## 2023-08-21 RX ORDER — LENALIDOMIDE 25 MG/1
25 CAPSULE ORAL SEE ADMIN INSTRUCTIONS
COMMUNITY
Start: 2023-07-26 | End: 2023-11-07

## 2023-08-21 RX ORDER — MIRTAZAPINE 15 MG/1
15 TABLET, FILM COATED ORAL AT BEDTIME
COMMUNITY
Start: 2023-08-05 | End: 2024-02-22

## 2023-08-21 ASSESSMENT — ENCOUNTER SYMPTOMS: COUGH: 1

## 2023-08-21 ASSESSMENT — PAIN SCALES - GENERAL: PAINLEVEL: NO PAIN (0)

## 2023-08-21 NOTE — PROGRESS NOTES
"  {PROVIDER CHARTING PREFERENCE:749907}    Avila Cervantes is a 67 year old, presenting for the following health issues:  Cough      8/21/2023    10:25 AM   Additional Questions   Roomed by Bernadette COOK       Cough    History of Present Illness       Reason for visit:  Dexiscan    He eats 0-1 servings of fruits and vegetables daily.He consumes 1 sweetened beverage(s) daily.He exercises with enough effort to increase his heart rate 10 to 19 minutes per day.  He exercises with enough effort to increase his heart rate 5 days per week.   He is taking medications regularly.    {PROVIDER CHARTING PREFERENCE:547757}    Avila Cervantes is a 67 year old, presenting for the following health issues:  Cough      8/21/2023    10:25 AM   Additional Questions   Roomed by Bernadette DELGADILLO.       @[facility]@     {SUPERLIST (Optional):862130}  {additonal problems for provider to add (Optional):508394}      @[facility]@   {ROS COMP (Optional):504653}      Objective    BP (!) 157/86 (BP Location: Right arm, Patient Position: Sitting, Cuff Size: Adult Regular)   Pulse 77   Temp 98.1  F (36.7  C) (Temporal)   Resp 18   Ht 1.765 m (5' 9.5\")   Wt 94.3 kg (208 lb)   SpO2 95%   BMI 30.28 kg/m    Body mass index is 30.28 kg/m .  [facility]   {Exam List (Optional):861433}    {Diagnostic Test Results (Optional):913528}    {AMBULATORY ATTESTATION (Optional):999800}                 {SUPERLIST (Optional):622817}  {additonal problems for provider to add (Optional):181745}      Review of Systems   Respiratory:  Positive for cough.       {ROS COMP (Optional):437658}      Objective    BP (!) 156/86 (BP Location: Right arm, Patient Position: Sitting, Cuff Size: Adult Regular)   Pulse 73   Temp 98.1  F (36.7  C) (Temporal)   Ht 1.765 m (5' 9.5\")   Wt 94.3 kg (208 lb)   SpO2 95%   BMI 30.28 kg/m    Body mass index is 30.28 kg/m .  Physical Exam   {Exam List (Optional):811044}    {Diagnostic Test Results (Optional):945077}    {AMBULATORY ATTESTATION " (Optional):292283}

## 2023-08-21 NOTE — PROGRESS NOTES
"  Assessment & Plan     (M85.851) Osteopenia of neck of right femur  (primary encounter diagnosis)  Comment: stable  Plan: alendronate (FOSAMAX) 70 MG tablet        Risks and benefits of alendronate discussed today, patient has no contraindications to starting this med, will start with once weekly dosing and repeat DEXA scan in 1 year.  Patient advised that should he develop any side effects from taking the medication he should contact the clinic immediately.    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 Ruelas    (C90.00) Multiple myeloma not having achieved remission (H)  Comment: Current  Plan: No interactions found today with alendronate and current multiple myeloma treatment, patient does see oncology tomorrow and he will double check with him prior to starting alendronate.             BMI:   Estimated body mass index is 30.28 kg/m  as calculated from the following:    Height as of this encounter: 1.765 m (5' 9.5\").    Weight as of this encounter: 94.3 kg (208 lb).   Weight management plan: Discussed healthy diet and exercise guidelines        JIM Teixeira [facility]    Avila Cervantes is a 67 year old, presenting for the following health issues:  Cough      8/21/2023    10:25 AM   Additional Questions   Roomed by Bernadette Amanda  The current episode started more than 1 week ago. The problem occurs every few hours. The problem has not changed since onset.  History of Present Illness       Reason for visit:  Dexiscan    He eats 0-1 servings of fruits and vegetables daily.He consumes 1 sweetened beverage(s) daily.He exercises with enough effort to increase his heart rate 10 to 19 minutes per day.  He exercises with enough effort to increase his heart rate 5 days per week.   He is taking medications regularly.     Patient " "currently being treated for multiple myeloma with previous pathological fracture and T-spine.  DEXA scan was performed showing multiple areas of osteopenia, worst area with right femur at -2.4.  Patient is performing weightbearing exercise, taking calcium and vitamin D here to discuss treatments for her osteopenia borderline osteoporosis.    Patient reports that he does not have any trouble swallowing pills, no previous gastric bypass, no known kidney issues, currently receiving antineoplastic agents for multiple myeloma with a planned stem cell bone marrow transplant in October.  Kidney function is checked on a weekly basis.            Review of Systems   Respiratory:  Positive for cough.             Objective    BP (!) 157/86 (BP Location: Right arm, Patient Position: Sitting, Cuff Size: Adult Regular)   Pulse 77   Temp 98.1  F (36.7  C) (Temporal)   Resp 18   Ht 1.765 m (5' 9.5\")   Wt 94.3 kg (208 lb)   SpO2 95%   BMI 30.28 kg/m    Body mass index is 30.28 kg/m .  Physical Exam   GENERAL: healthy, alert and no distress  EYES: Eyes grossly normal to inspection, EOM intact and conjunctivae normal  RESP: breathing comfortably on room air  PSYCH: mentation appears normal, affect normal/bright                        "

## 2023-08-22 DIAGNOSIS — E78.2 MIXED HYPERLIPIDEMIA: Primary | ICD-10-CM

## 2023-08-23 ENCOUNTER — TELEPHONE (OUTPATIENT)
Dept: CARDIOLOGY | Facility: CLINIC | Age: 68
End: 2023-08-23

## 2023-08-23 ENCOUNTER — OFFICE VISIT (OUTPATIENT)
Dept: CARDIOLOGY | Facility: CLINIC | Age: 68
End: 2023-08-23
Payer: MEDICARE

## 2023-08-23 VITALS
WEIGHT: 209.2 LBS | DIASTOLIC BLOOD PRESSURE: 80 MMHG | BODY MASS INDEX: 29.95 KG/M2 | RESPIRATION RATE: 16 BRPM | SYSTOLIC BLOOD PRESSURE: 164 MMHG | HEART RATE: 88 BPM | HEIGHT: 70 IN

## 2023-08-23 DIAGNOSIS — I25.9 CHEST PAIN DUE TO MYOCARDIAL ISCHEMIA, UNSPECIFIED ISCHEMIC CHEST PAIN TYPE: Primary | ICD-10-CM

## 2023-08-23 DIAGNOSIS — I25.9 CHEST PAIN DUE TO MYOCARDIAL ISCHEMIA, UNSPECIFIED ISCHEMIC CHEST PAIN TYPE: ICD-10-CM

## 2023-08-23 DIAGNOSIS — E78.2 MIXED HYPERLIPIDEMIA: Primary | ICD-10-CM

## 2023-08-23 DIAGNOSIS — I35.0 NONRHEUMATIC AORTIC VALVE STENOSIS: ICD-10-CM

## 2023-08-23 DIAGNOSIS — R06.09 EXERTIONAL DYSPNEA: ICD-10-CM

## 2023-08-23 DIAGNOSIS — E78.2 MIXED HYPERLIPIDEMIA: ICD-10-CM

## 2023-08-23 DIAGNOSIS — I25.10 CORONARY ARTERY CALCIFICATION SEEN ON CT SCAN: ICD-10-CM

## 2023-08-23 DIAGNOSIS — C90.00 MULTIPLE MYELOMA NOT HAVING ACHIEVED REMISSION (H): ICD-10-CM

## 2023-08-23 PROCEDURE — 99204 OFFICE O/P NEW MOD 45 MIN: CPT | Performed by: INTERNAL MEDICINE

## 2023-08-23 RX ORDER — NITROGLYCERIN 0.4 MG/1
TABLET SUBLINGUAL
Qty: 25 TABLET | Refills: 3 | Status: ON HOLD | OUTPATIENT
Start: 2023-08-23 | End: 2023-10-24

## 2023-08-23 NOTE — PROGRESS NOTES
HEART CARE ENCOUNTER CONSULTATON NOTE      [facility]  [phone]      Assessment/Recommendations   Assessment:   Mild exertional dyspnea and exertional chest pressure   2. Aortic Valve Stenosis, moderate/moderate to severe (JOHANNA:1.1 cm2, peak kishor: 3.95 m/sec, man gradient:37 mmHg).    3. Left Ventricular hypertrophy   4. Multiple Myeloma working toward a stem cell transplant at [facility].  5. Anemia     6. Severe Coronary Calcification on CT Chest (8/2/2023)  7. Hypertension   8. Abnormal exercise ECG     Plan:   Coronary angiogram with possible PCI. Discussed procedure.   2.  Complete echo cardiogram to reevaluate aortic stenosis and fully interrogate valve  3. Continue ASA  4. Continue simvastatin   5. PRN SLNG  6. Continue losartan for HTN         History of Present Illness/Subjective    HPI: Billy Carroll is a 68 year old male with hypertension, hyperlipidemia, active multiply [place] who presents to cardiology clinic in consultation for severe coronary calcification, abnormal stress test and aortic stenosis.    Cording to the patient in the winter 2023 he experienced severe shortness of breath and chest tightness while in [facility].  He skied 1 run and had difficulty with his breathing and the rest the time he was at altitude.  He went to [place] to improve his symptoms but continued to have ongoing shortness of breath.  Due to this he underwent stress echocardiogram which demonstrated no stress-induced wall motion but significant EKG changes on personal review at peak exercise.  He was able to do 7 METS.  Of note he had moderate to moderate to severe aortic stenosis.      Later in the spring he was noting to have worsening cough.  Was found to have malignant lesion on his spine.  This resulted in further evaluation and diagnosis of active multiply myeloma.  During evaluation for his myeloma CT scans of his chest demonstrated severe calcification of his coronary arteries.  I  personally reviewed his chest CT which does demonstrate severe diffuse coronary calcifications.    Currently the patient is able to walk approximately half a mile before he develops symptoms that require him to stop due to dyspnea.  Occasionally he feels some pressure in his chest.  Denies any syncopal episodes.  During hospitalization in June he had significant lower extremity edema and required Lasix.  Currently he is taking his Lasix intermittently.    He is currently on simvastatin.  He takes aspirin with no bleeding issues.  He had to take nitroglycerin 1 time this summer.    Stress echocardiogram Results: 3/6/2023    1. Normal stress echocardiogram with no evidence of stress-induced ischemia.  Estimated post exercise left ventricular ejection fraction is greater than  70%.  2. Abnormal stress EKG with development of 1 mm horizontal ST depressions in  leads V5-V6, II, III, and aVF at peak exercise. This is felt to represent a  false positive finding for ischemia given the normal post exercise  echocardiographic findings.  3. Exercise capacity is average for age and gender. The patient exercised for  12:00 minutes on the modified Darron protocol, achieving 7.3 METs and 93% the  age-predicted maximum heart rate. The patient did not experience chest pain.     Rest findings:  1. Left ventricular size, wall thickness, and systolic function are normal.  The estimated left ventricular ejection fraction is 55-60%.  2. Right ventricular size and systolic function are normal.  3. Mild left atrial enlargement.  4. Calcified aortic valve with an indeterminate number of leaflets with  moderate aortic stenosis. Peak forward velocity measures 3.9 m/s, mean  gradient 37 mm Hg, calculated aortic valve area 1.1 cm2, and dimensionless  index 0.31. Mild aortic regurgitation.     Physical Examination  Review of Systems   Vitals: BP (!) 164/80 (BP Location: Right arm, Patient Position: Sitting, Cuff Size: Adult Regular)   Pulse 88   " Resp 16   Ht 1.778 m (5' 10\")   Wt 94.9 kg (209 lb 3.2 oz)   BMI 30.02 kg/m    BMI= Body mass index is 30.02 kg/m .  Wt Readings from Last 3 Encounters:   08/23/23 94.9 kg (209 lb 3.2 oz)   08/21/23 94.3 kg (208 lb)   08/04/23 96.6 kg (213 lb)        Pleasant, mild obesity.    ENT/Mouth: membranes moist, no oral lesions or bleeding gums.      EYES:  no scleral icterus, normal conjunctivae       Chest/Lungs:   lungs are clear to auscultation, no rales or wheezing, no sternal scar, equal chest wall expansion    Cardiovascular:   Regular. Normal first and second heart sounds with 3/6 mid to late peaking systolic  murmur. No rubs, or gallops; the carotid, radial and posterior tibial pulses are intact, Jugular venous pressure normal, no edema bilaterally    Abdomen:  no tenderness; bowel sounds are present   Extremities: no cyanosis or clubbing   Skin: no xanthelasma, warm.    Neurologic: normal  bilateral, no tremors     Psychiatric: alert and oriented x3, calm        Please refer above for cardiac ROS details.        Medical History  Surgical History Family History Social History   Past Medical History:   Diagnosis Date    Cancer (H)     Multiple myeloma (H)      No past surgical history on file.  No family history on file.     Social History     Socioeconomic History    Marital status:      Spouse name: Not on file    Number of children: Not on file    Years of education: Not on file    Highest education level: Not on file   Occupational History    Not on file   Tobacco Use    Smoking status: Never    Smokeless tobacco: Never   Vaping Use    Vaping Use: Never used   Substance and Sexual Activity    Alcohol use: Not on file    Drug use: Never    Sexual activity: Not on file   Other Topics Concern    Not on file   Social History Narrative    Not on file     Social Determinants of Health     Financial Resource Strain: Not on file   Food Insecurity: Not on file   Transportation Needs: Not on file "   Physical Activity: Not on file   Stress: Not on file   Social Connections: Not on file   Intimate Partner Violence: Not on file   Housing Stability: Not on file           Medications  Allergies   Current Outpatient Medications   Medication Sig Dispense Refill    acetaminophen (TYLENOL) 325 MG tablet Take 3 tablets (975 mg) by mouth 3 times daily (before meals) 200 tablet 0    acyclovir (ZOVIRAX) 400 MG tablet Take 400 mg by mouth 2 times daily      alendronate (FOSAMAX) 70 MG tablet Take 1 tablet (70 mg) by mouth every 7 days 12 tablet 1    aspirin 81 MG EC tablet Take 81 mg by mouth daily      B Complex-C-Folic Acid (HM VITAMIN B COMPLEX/VITAMIN C) TABS Take 1 tablet by mouth daily      bortezomib 3.5 MG injection Inject 1.3 mg/m2 into the vein once a week      Calcium-Magnesium-Zinc 333-133-5 MG TABS per tablet Take 2 tablets by mouth daily      cholecalciferol 50 MCG (2000 UT) tablet Take 2,000 Units by mouth daily      co-enzyme Q-10 100 MG CAPS capsule Take 100 mg by mouth daily      furosemide (LASIX) 40 MG tablet Take 1 tablet (40 mg) by mouth daily Take additional 40mg daily as needed for shortness of breath or  worsening Lower extremity edema 90 tablet 0    lactobacillus rhamnosus, GG, (CULTURELL) capsule Take 1 capsule by mouth daily      LENalidomide (REVLIMID) 25 MG CAPS capsule Take 25 mg by mouth daily      loperamide (IMODIUM) 2 MG capsule Take 1 capsule by mouth 4 times daily as needed for diarrhea      losartan (COZAAR) 50 MG tablet Take 100 mg by mouth daily      mirtazapine (REMERON) 15 MG tablet Take 15 mg by mouth At Bedtime      Omega-3 Fatty Acids (FISH OIL BURP-LESS) 1000 MG CAPS Take 1 capsule by mouth daily      oxyCODONE (ROXICODONE) 5 MG tablet Take 1 tablet by mouth every 6 hours as needed for moderate to severe pain      polyethylene glycol (MIRALAX) 17 g packet Take 1 packet by mouth daily as needed for constipation      simvastatin (ZOCOR) 40 MG tablet Take 40 mg by mouth daily       sulfamethoxazole-trimethoprim (BACTRIM DS) 800-160 MG tablet Take 1 tablet by mouth three times a week Take on Mondays, Wednesday, fridaysof week while taking Prednsione more than 20mg daily for PCP prophylaxis 30 tablet 0    Turmeric 500 MG CAPS Take 2 capsules by mouth daily      vitamin B complex with vitamin C (VITAMIN  B COMPLEX) tablet Take 1 tablet by mouth daily      zinc gluconate 50 MG tablet Take 50 mg by mouth daily      HYDROcodone-acetaminophen (NORCO) 5-325 MG tablet Take 1 tablet by mouth every 4 hours as needed for pain (Patient not taking: Reported on 6/26/2023)      hydrOXYzine (ATARAX) 10 MG tablet Take 10-20 mg by mouth every 8 hours as needed for itching or anxiety (Patient not taking: Reported on 6/26/2023)      nitroGLYcerin (NITROSTAT) 0.4 MG sublingual tablet Place 0.4 mg under the tongue every 5 minutes as needed for chest pain For GILLETTE (Patient not taking: Reported on 8/23/2023)      predniSONE (DELTASONE) 20 MG tablet Take 4 tablets (80 mg) by mouth daily Come down by 10mg every week (Patient not taking: Reported on 8/23/2023) 100 tablet 0    prochlorperazine (COMPAZINE) 10 MG tablet Take 10 mg by mouth every 6 hours as needed for nausea (Patient not taking: Reported on 8/21/2023)         Allergies   Allergen Reactions    Acetaminophen-Codeine Nausea    Codeine Nausea          Lab Results    Chemistry/lipid CBC Cardiac Enzymes/BNP/TSH/INR   Recent Labs   Lab Test 12/12/22  1112   CHOL 201*   HDL 47   LDL 85   TRIG 346*     Recent Labs   Lab Test 12/12/22  1112 02/18/21  0927 11/25/19  1431   LDL 85 118 97     Recent Labs   Lab Test 06/16/23  0942      POTASSIUM 3.8   CHLORIDE 97*   CO2 26   *   BUN 18.2   CR 0.71   GFRESTIMATED >90   SESAR 8.7*     Recent Labs   Lab Test 06/16/23  0942 06/11/23  0500 06/10/23  0457   CR 0.71 0.74 0.68*     No results for input(s): A1C in the last 97299 hours.       Recent Labs   Lab Test 06/11/23  0500   WBC 14.2*   HGB 9.8*   HCT 28.9*   MCV  98   *     Recent Labs   Lab Test 06/11/23  0500 06/10/23  0457 06/09/23  0513   HGB 9.8* 8.5* 8.2*    Recent Labs   Lab Test 06/02/23  1857   TROPONINI 0.02     Recent Labs   Lab Test 06/07/23  0049 06/02/23  1857   BNP 52 47     No results for input(s): TSH in the last 75667 hours.  No results for input(s): INR in the last 17212 hours.     Adalberto Nelson,       Today's clinic visit entailed:  86 minutes spent by me on the date of the encounter doing chart review, history and exam, documentation and further activities per the note.

## 2023-08-23 NOTE — TELEPHONE ENCOUNTER
----- Message from Adalberto Nelson DO sent at 8/23/2023 11:32 AM CDT -----  Patient needs coronary angiogram with possible PCI.  Indication: chest pressure, exertional dyspnea, abnormal stress testing, moderate to severe aortic stenosis.  Need to be preformed by Dr. Field or Sohail.  Also need a complete echo at time of cath to re-evaluated AORTIC STENOSIS.     I spoke with patient in detail today in clinic.      Thank you!    Damian

## 2023-08-23 NOTE — TELEPHONE ENCOUNTER
Case Type: CORS POSS PCI  Ordering Provider: TED  H&P: 8/23  Alerts: NONE.   Additional Info/Urgency: ASAP, with Sohail or Rashida only. Aortic Stenosis. ECHO day of angio also.  Orders for Pre-Procedure Labs? Yes. Ordered.  Send tach my way once arranged. Thank you Nolan REESE     Echo, procedure and pre-procedure labs ordered. Staff message to procedure schedulers. CMM,Rn

## 2023-08-23 NOTE — H&P (VIEW-ONLY)
HEART CARE ENCOUNTER CONSULTATON NOTE      [facility]  [phone]      Assessment/Recommendations   Assessment:   Mild exertional dyspnea and exertional chest pressure   2. Aortic Valve Stenosis, moderate/moderate to severe (JOHANNA:1.1 cm2, peak kishor: 3.95 m/sec, man gradient:37 mmHg).    3. Left Ventricular hypertrophy   4. Multiple Myeloma working toward a stem cell transplant at [facility].  5. Anemia     6. Severe Coronary Calcification on CT Chest (8/2/2023)  7. Hypertension   8. Abnormal exercise ECG     Plan:   Coronary angiogram with possible PCI. Discussed procedure.   2.  Complete echo cardiogram to reevaluate aortic stenosis and fully interrogate valve  3. Continue ASA  4. Continue simvastatin   5. PRN SLNG  6. Continue losartan for HTN         History of Present Illness/Subjective    HPI: Billy Carroll is a 68 year old male with hypertension, hyperlipidemia, active multiply [place] who presents to cardiology clinic in consultation for severe coronary calcification, abnormal stress test and aortic stenosis.    Cording to the patient in the winter 2023 he experienced severe shortness of breath and chest tightness while in [facility].  He skied 1 run and had difficulty with his breathing and the rest the time he was at altitude.  He went to [place] to improve his symptoms but continued to have ongoing shortness of breath.  Due to this he underwent stress echocardiogram which demonstrated no stress-induced wall motion but significant EKG changes on personal review at peak exercise.  He was able to do 7 METS.  Of note he had moderate to moderate to severe aortic stenosis.      Later in the spring he was noting to have worsening cough.  Was found to have malignant lesion on his spine.  This resulted in further evaluation and diagnosis of active multiply myeloma.  During evaluation for his myeloma CT scans of his chest demonstrated severe calcification of his coronary arteries.  I  personally reviewed his chest CT which does demonstrate severe diffuse coronary calcifications.    Currently the patient is able to walk approximately half a mile before he develops symptoms that require him to stop due to dyspnea.  Occasionally he feels some pressure in his chest.  Denies any syncopal episodes.  During hospitalization in June he had significant lower extremity edema and required Lasix.  Currently he is taking his Lasix intermittently.    He is currently on simvastatin.  He takes aspirin with no bleeding issues.  He had to take nitroglycerin 1 time this summer.    Stress echocardiogram Results: 3/6/2023    1. Normal stress echocardiogram with no evidence of stress-induced ischemia.  Estimated post exercise left ventricular ejection fraction is greater than  70%.  2. Abnormal stress EKG with development of 1 mm horizontal ST depressions in  leads V5-V6, II, III, and aVF at peak exercise. This is felt to represent a  false positive finding for ischemia given the normal post exercise  echocardiographic findings.  3. Exercise capacity is average for age and gender. The patient exercised for  12:00 minutes on the modified Darron protocol, achieving 7.3 METs and 93% the  age-predicted maximum heart rate. The patient did not experience chest pain.     Rest findings:  1. Left ventricular size, wall thickness, and systolic function are normal.  The estimated left ventricular ejection fraction is 55-60%.  2. Right ventricular size and systolic function are normal.  3. Mild left atrial enlargement.  4. Calcified aortic valve with an indeterminate number of leaflets with  moderate aortic stenosis. Peak forward velocity measures 3.9 m/s, mean  gradient 37 mm Hg, calculated aortic valve area 1.1 cm2, and dimensionless  index 0.31. Mild aortic regurgitation.     Physical Examination  Review of Systems   Vitals: BP (!) 164/80 (BP Location: Right arm, Patient Position: Sitting, Cuff Size: Adult Regular)   Pulse 88   " Resp 16   Ht 1.778 m (5' 10\")   Wt 94.9 kg (209 lb 3.2 oz)   BMI 30.02 kg/m    BMI= Body mass index is 30.02 kg/m .  Wt Readings from Last 3 Encounters:   08/23/23 94.9 kg (209 lb 3.2 oz)   08/21/23 94.3 kg (208 lb)   08/04/23 96.6 kg (213 lb)        Pleasant, mild obesity.    ENT/Mouth: membranes moist, no oral lesions or bleeding gums.      EYES:  no scleral icterus, normal conjunctivae       Chest/Lungs:   lungs are clear to auscultation, no rales or wheezing, no sternal scar, equal chest wall expansion    Cardiovascular:   Regular. Normal first and second heart sounds with 3/6 mid to late peaking systolic  murmur. No rubs, or gallops; the carotid, radial and posterior tibial pulses are intact, Jugular venous pressure normal, no edema bilaterally    Abdomen:  no tenderness; bowel sounds are present   Extremities: no cyanosis or clubbing   Skin: no xanthelasma, warm.    Neurologic: normal  bilateral, no tremors     Psychiatric: alert and oriented x3, calm        Please refer above for cardiac ROS details.        Medical History  Surgical History Family History Social History   Past Medical History:   Diagnosis Date    Cancer (H)     Multiple myeloma (H)      No past surgical history on file.  No family history on file.     Social History     Socioeconomic History    Marital status:      Spouse name: Not on file    Number of children: Not on file    Years of education: Not on file    Highest education level: Not on file   Occupational History    Not on file   Tobacco Use    Smoking status: Never    Smokeless tobacco: Never   Vaping Use    Vaping Use: Never used   Substance and Sexual Activity    Alcohol use: Not on file    Drug use: Never    Sexual activity: Not on file   Other Topics Concern    Not on file   Social History Narrative    Not on file     Social Determinants of Health     Financial Resource Strain: Not on file   Food Insecurity: Not on file   Transportation Needs: Not on file "   Physical Activity: Not on file   Stress: Not on file   Social Connections: Not on file   Intimate Partner Violence: Not on file   Housing Stability: Not on file           Medications  Allergies   Current Outpatient Medications   Medication Sig Dispense Refill    acetaminophen (TYLENOL) 325 MG tablet Take 3 tablets (975 mg) by mouth 3 times daily (before meals) 200 tablet 0    acyclovir (ZOVIRAX) 400 MG tablet Take 400 mg by mouth 2 times daily      alendronate (FOSAMAX) 70 MG tablet Take 1 tablet (70 mg) by mouth every 7 days 12 tablet 1    aspirin 81 MG EC tablet Take 81 mg by mouth daily      B Complex-C-Folic Acid (HM VITAMIN B COMPLEX/VITAMIN C) TABS Take 1 tablet by mouth daily      bortezomib 3.5 MG injection Inject 1.3 mg/m2 into the vein once a week      Calcium-Magnesium-Zinc 333-133-5 MG TABS per tablet Take 2 tablets by mouth daily      cholecalciferol 50 MCG (2000 UT) tablet Take 2,000 Units by mouth daily      co-enzyme Q-10 100 MG CAPS capsule Take 100 mg by mouth daily      furosemide (LASIX) 40 MG tablet Take 1 tablet (40 mg) by mouth daily Take additional 40mg daily as needed for shortness of breath or  worsening Lower extremity edema 90 tablet 0    lactobacillus rhamnosus, GG, (CULTURELL) capsule Take 1 capsule by mouth daily      LENalidomide (REVLIMID) 25 MG CAPS capsule Take 25 mg by mouth daily      loperamide (IMODIUM) 2 MG capsule Take 1 capsule by mouth 4 times daily as needed for diarrhea      losartan (COZAAR) 50 MG tablet Take 100 mg by mouth daily      mirtazapine (REMERON) 15 MG tablet Take 15 mg by mouth At Bedtime      Omega-3 Fatty Acids (FISH OIL BURP-LESS) 1000 MG CAPS Take 1 capsule by mouth daily      oxyCODONE (ROXICODONE) 5 MG tablet Take 1 tablet by mouth every 6 hours as needed for moderate to severe pain      polyethylene glycol (MIRALAX) 17 g packet Take 1 packet by mouth daily as needed for constipation      simvastatin (ZOCOR) 40 MG tablet Take 40 mg by mouth daily       sulfamethoxazole-trimethoprim (BACTRIM DS) 800-160 MG tablet Take 1 tablet by mouth three times a week Take on Mondays, Wednesday, fridaysof week while taking Prednsione more than 20mg daily for PCP prophylaxis 30 tablet 0    Turmeric 500 MG CAPS Take 2 capsules by mouth daily      vitamin B complex with vitamin C (VITAMIN  B COMPLEX) tablet Take 1 tablet by mouth daily      zinc gluconate 50 MG tablet Take 50 mg by mouth daily      HYDROcodone-acetaminophen (NORCO) 5-325 MG tablet Take 1 tablet by mouth every 4 hours as needed for pain (Patient not taking: Reported on 6/26/2023)      hydrOXYzine (ATARAX) 10 MG tablet Take 10-20 mg by mouth every 8 hours as needed for itching or anxiety (Patient not taking: Reported on 6/26/2023)      nitroGLYcerin (NITROSTAT) 0.4 MG sublingual tablet Place 0.4 mg under the tongue every 5 minutes as needed for chest pain For GILLETTE (Patient not taking: Reported on 8/23/2023)      predniSONE (DELTASONE) 20 MG tablet Take 4 tablets (80 mg) by mouth daily Come down by 10mg every week (Patient not taking: Reported on 8/23/2023) 100 tablet 0    prochlorperazine (COMPAZINE) 10 MG tablet Take 10 mg by mouth every 6 hours as needed for nausea (Patient not taking: Reported on 8/21/2023)         Allergies   Allergen Reactions    Acetaminophen-Codeine Nausea    Codeine Nausea          Lab Results    Chemistry/lipid CBC Cardiac Enzymes/BNP/TSH/INR   Recent Labs   Lab Test 12/12/22  1112   CHOL 201*   HDL 47   LDL 85   TRIG 346*     Recent Labs   Lab Test 12/12/22  1112 02/18/21  0927 11/25/19  1431   LDL 85 118 97     Recent Labs   Lab Test 06/16/23  0942      POTASSIUM 3.8   CHLORIDE 97*   CO2 26   *   BUN 18.2   CR 0.71   GFRESTIMATED >90   SESAR 8.7*     Recent Labs   Lab Test 06/16/23  0942 06/11/23  0500 06/10/23  0457   CR 0.71 0.74 0.68*     No results for input(s): A1C in the last 00819 hours.       Recent Labs   Lab Test 06/11/23  0500   WBC 14.2*   HGB 9.8*   HCT 28.9*   MCV  98   *     Recent Labs   Lab Test 06/11/23  0500 06/10/23  0457 06/09/23  0513   HGB 9.8* 8.5* 8.2*    Recent Labs   Lab Test 06/02/23  1857   TROPONINI 0.02     Recent Labs   Lab Test 06/07/23  0049 06/02/23  1857   BNP 52 47     No results for input(s): TSH in the last 38781 hours.  No results for input(s): INR in the last 28771 hours.     Adalberto Nelson,       Today's clinic visit entailed:  86 minutes spent by me on the date of the encounter doing chart review, history and exam, documentation and further activities per the note.

## 2023-08-23 NOTE — PATIENT INSTRUCTIONS
Please contact direct nurses line Monday through Friday 8 AM to 5 PM @ [phone]    After-hours contact cardiology office at [phone].     Plan:   Echocardiogram   Coronary angiogram     Diagnosis:   Moderate aortic stenosis   Coronary calcification on CT (severe).

## 2023-08-23 NOTE — LETTER
8/23/2023    Cipriano Fuentes PA-C  [address], [address]  [place] [address]    RE: Billy Carroll       Dear Colleague,     I had the pleasure of seeing Billy Carroll in the [facility].    HEART CARE ENCOUNTER CONSULTATON NOTE      [facility]IE [facility]  [phone]      Assessment/Recommendations   Assessment:   Mild exertional dyspnea and exertional chest pressure   2. Aortic Valve Stenosis, moderate/moderate to severe (JOHANNA:1.1 cm2, peak kishor: 3.95 m/sec, man gradient:37 mmHg).    3. Left Ventricular hypertrophy   4. Multiple Myeloma working toward a stem cell transplant at [facility].  5. Anemia     6. Severe Coronary Calcification on CT Chest (8/2/2023)  7. Hypertension   8. Abnormal exercise ECG     Plan:   Coronary angiogram with possible PCI. Discussed procedure.   2.  Complete echo cardiogram to reevaluate aortic stenosis and fully interrogate valve  3. Continue ASA  4. Continue simvastatin   5. PRN SLNG  6. Continue losartan for HTN         History of Present Illness/Subjective    HPI: Billy Carroll is a 68 year old male with hypertension, hyperlipidemia, active multiply [place] who presents to cardiology clinic in consultation for severe coronary calcification, abnormal stress test and aortic stenosis.    Cording to the patient in the winter 2023 he experienced severe shortness of breath and chest tightness while in [facility].  He skied 1 run and had difficulty with his breathing and the rest the time he was at altitude.  He went to [place] to improve his symptoms but continued to have ongoing shortness of breath.  Due to this he underwent stress echocardiogram which demonstrated no stress-induced wall motion but significant EKG changes on personal review at peak exercise.  He was able to do 7 METS.  Of note he had moderate to moderate to severe aortic stenosis.      Later in the spring he was noting to have worsening cough.  Was found to have malignant lesion on his  spine.  This resulted in further evaluation and diagnosis of active multiply myeloma.  During evaluation for his myeloma CT scans of his chest demonstrated severe calcification of his coronary arteries.  I personally reviewed his chest CT which does demonstrate severe diffuse coronary calcifications.    Currently the patient is able to walk approximately half a mile before he develops symptoms that require him to stop due to dyspnea.  Occasionally he feels some pressure in his chest.  Denies any syncopal episodes.  During hospitalization in June he had significant lower extremity edema and required Lasix.  Currently he is taking his Lasix intermittently.    He is currently on simvastatin.  He takes aspirin with no bleeding issues.  He had to take nitroglycerin 1 time this summer.    Stress echocardiogram Results: 3/6/2023    1. Normal stress echocardiogram with no evidence of stress-induced ischemia.  Estimated post exercise left ventricular ejection fraction is greater than  70%.  2. Abnormal stress EKG with development of 1 mm horizontal ST depressions in  leads V5-V6, II, III, and aVF at peak exercise. This is felt to represent a  false positive finding for ischemia given the normal post exercise  echocardiographic findings.  3. Exercise capacity is average for age and gender. The patient exercised for  12:00 minutes on the modified Darron protocol, achieving 7.3 METs and 93% the  age-predicted maximum heart rate. The patient did not experience chest pain.     Rest findings:  1. Left ventricular size, wall thickness, and systolic function are normal.  The estimated left ventricular ejection fraction is 55-60%.  2. Right ventricular size and systolic function are normal.  3. Mild left atrial enlargement.  4. Calcified aortic valve with an indeterminate number of leaflets with  moderate aortic stenosis. Peak forward velocity measures 3.9 m/s, mean  gradient 37 mm Hg, calculated aortic valve area 1.1 cm2, and  "dimensionless  index 0.31. Mild aortic regurgitation.     Physical Examination  Review of Systems   Vitals: BP (!) 164/80 (BP Location: Right arm, Patient Position: Sitting, Cuff Size: Adult Regular)   Pulse 88   Resp 16   Ht 1.778 m (5' 10\")   Wt 94.9 kg (209 lb 3.2 oz)   BMI 30.02 kg/m    BMI= Body mass index is 30.02 kg/m .  Wt Readings from Last 3 Encounters:   08/23/23 94.9 kg (209 lb 3.2 oz)   08/21/23 94.3 kg (208 lb)   08/04/23 96.6 kg (213 lb)        Pleasant, mild obesity.    ENT/Mouth: membranes moist, no oral lesions or bleeding gums.      EYES:  no scleral icterus, normal conjunctivae       Chest/Lungs:   lungs are clear to auscultation, no rales or wheezing, no sternal scar, equal chest wall expansion    Cardiovascular:   Regular. Normal first and second heart sounds with 3/6 mid to late peaking systolic  murmur. No rubs, or gallops; the carotid, radial and posterior tibial pulses are intact, Jugular venous pressure normal, no edema bilaterally    Abdomen:  no tenderness; bowel sounds are present   Extremities: no cyanosis or clubbing   Skin: no xanthelasma, warm.    Neurologic: normal  bilateral, no tremors     Psychiatric: alert and oriented x3, calm        Please refer above for cardiac ROS details.        Medical History  Surgical History Family History Social History   Past Medical History:   Diagnosis Date    Cancer (H)     Multiple myeloma (H)      No past surgical history on file.  No family history on file.     Social History     Socioeconomic History    Marital status:      Spouse name: Not on file    Number of children: Not on file    Years of education: Not on file    Highest education level: Not on file   Occupational History    Not on file   Tobacco Use    Smoking status: Never    Smokeless tobacco: Never   Vaping Use    Vaping Use: Never used   Substance and Sexual Activity    Alcohol use: Not on file    Drug use: Never    Sexual activity: Not on file   Other Topics " Concern    Not on file   Social History Narrative    Not on file     Social Determinants of Health     Financial Resource Strain: Not on file   Food Insecurity: Not on file   Transportation Needs: Not on file   Physical Activity: Not on file   Stress: Not on file   Social Connections: Not on file   Intimate Partner Violence: Not on file   Housing Stability: Not on file           Medications  Allergies   Current Outpatient Medications   Medication Sig Dispense Refill    acetaminophen (TYLENOL) 325 MG tablet Take 3 tablets (975 mg) by mouth 3 times daily (before meals) 200 tablet 0    acyclovir (ZOVIRAX) 400 MG tablet Take 400 mg by mouth 2 times daily      alendronate (FOSAMAX) 70 MG tablet Take 1 tablet (70 mg) by mouth every 7 days 12 tablet 1    aspirin 81 MG EC tablet Take 81 mg by mouth daily      B Complex-C-Folic Acid (HM VITAMIN B COMPLEX/VITAMIN C) TABS Take 1 tablet by mouth daily      bortezomib 3.5 MG injection Inject 1.3 mg/m2 into the vein once a week      Calcium-Magnesium-Zinc 333-133-5 MG TABS per tablet Take 2 tablets by mouth daily      cholecalciferol 50 MCG (2000 UT) tablet Take 2,000 Units by mouth daily      co-enzyme Q-10 100 MG CAPS capsule Take 100 mg by mouth daily      furosemide (LASIX) 40 MG tablet Take 1 tablet (40 mg) by mouth daily Take additional 40mg daily as needed for shortness of breath or  worsening Lower extremity edema 90 tablet 0    lactobacillus rhamnosus, GG, (CULTURELL) capsule Take 1 capsule by mouth daily      LENalidomide (REVLIMID) 25 MG CAPS capsule Take 25 mg by mouth daily      loperamide (IMODIUM) 2 MG capsule Take 1 capsule by mouth 4 times daily as needed for diarrhea      losartan (COZAAR) 50 MG tablet Take 100 mg by mouth daily      mirtazapine (REMERON) 15 MG tablet Take 15 mg by mouth At Bedtime      Omega-3 Fatty Acids (FISH OIL BURP-LESS) 1000 MG CAPS Take 1 capsule by mouth daily      oxyCODONE (ROXICODONE) 5 MG tablet Take 1 tablet by mouth every 6 hours  as needed for moderate to severe pain      polyethylene glycol (MIRALAX) 17 g packet Take 1 packet by mouth daily as needed for constipation      simvastatin (ZOCOR) 40 MG tablet Take 40 mg by mouth daily      sulfamethoxazole-trimethoprim (BACTRIM DS) 800-160 MG tablet Take 1 tablet by mouth three times a week Take on Mondays, Wednesday, fridaysof week while taking Prednsione more than 20mg daily for PCP prophylaxis 30 tablet 0    Turmeric 500 MG CAPS Take 2 capsules by mouth daily      vitamin B complex with vitamin C (VITAMIN  B COMPLEX) tablet Take 1 tablet by mouth daily      zinc gluconate 50 MG tablet Take 50 mg by mouth daily      HYDROcodone-acetaminophen (NORCO) 5-325 MG tablet Take 1 tablet by mouth every 4 hours as needed for pain (Patient not taking: Reported on 6/26/2023)      hydrOXYzine (ATARAX) 10 MG tablet Take 10-20 mg by mouth every 8 hours as needed for itching or anxiety (Patient not taking: Reported on 6/26/2023)      nitroGLYcerin (NITROSTAT) 0.4 MG sublingual tablet Place 0.4 mg under the tongue every 5 minutes as needed for chest pain For GILLETTE (Patient not taking: Reported on 8/23/2023)      predniSONE (DELTASONE) 20 MG tablet Take 4 tablets (80 mg) by mouth daily Come down by 10mg every week (Patient not taking: Reported on 8/23/2023) 100 tablet 0    prochlorperazine (COMPAZINE) 10 MG tablet Take 10 mg by mouth every 6 hours as needed for nausea (Patient not taking: Reported on 8/21/2023)         Allergies   Allergen Reactions    Acetaminophen-Codeine Nausea    Codeine Nausea          Lab Results    Chemistry/lipid CBC Cardiac Enzymes/BNP/TSH/INR   Recent Labs   Lab Test 12/12/22  1112   CHOL 201*   HDL 47   LDL 85   TRIG 346*     Recent Labs   Lab Test 12/12/22  1112 02/18/21  0927 11/25/19  1431   LDL 85 118 97     Recent Labs   Lab Test 06/16/23  0942      POTASSIUM 3.8   CHLORIDE 97*   CO2 26   *   BUN 18.2   CR 0.71   GFRESTIMATED >90   SESAR 8.7*     Recent Labs   Lab Test  06/16/23  0942 06/11/23  0500 06/10/23  0457   CR 0.71 0.74 0.68*     No results for input(s): A1C in the last 45753 hours.       Recent Labs   Lab Test 06/11/23  0500   WBC 14.2*   HGB 9.8*   HCT 28.9*   MCV 98   *     Recent Labs   Lab Test 06/11/23  0500 06/10/23  0457 06/09/23  0513   HGB 9.8* 8.5* 8.2*    Recent Labs   Lab Test 06/02/23  1857   TROPONINI 0.02     Recent Labs   Lab Test 06/07/23  0049 06/02/23  1857   BNP 52 47     No results for input(s): TSH in the last 59320 hours.  No results for input(s): INR in the last 03030 hours.     Adalberto Nelson DO      Today's clinic visit entailed:  86 minutes spent by me on the date of the encounter doing chart review, history and exam, documentation and further activities per the note.       Thank you for allowing me to participate in the care of your patient.      Sincerely,     Adalberto Nelson DO     [facility]  cc:   No referring provider defined for this encounter.     no

## 2023-08-24 RX ORDER — SIMVASTATIN 40 MG
40 TABLET ORAL DAILY
Qty: 90 TABLET | Refills: 3 | Status: SHIPPED | OUTPATIENT
Start: 2023-08-24 | End: 2023-11-02

## 2023-08-24 NOTE — TELEPHONE ENCOUNTER
Routing refill request to provider for review/approval because:  --Not ordered by a family practice provider yet.      --Last visit:  8/21/2023     --Future Visit: none.        --Last Written Prescription:

## 2023-09-01 NOTE — TELEPHONE ENCOUNTER
Health Call Center    Phone Message    May a detailed message be left on voicemail: yes     Reason for Call: Other: Pt is requesting a call back from Sanjay HANKS. Thank you     Action Taken: Message routed to:  Other: Cardiology    Travel Screening: Not Applicable      Thank you!  [facility]                 Return call to patient. Informs writer that every Tuesday takes Dexamethasone 40 mg for treatment of multiple myeloma, and wants to make sure he can take prior to heart cath. Instructed patient to take per usual routine. HUGH,Rn

## 2023-09-06 ENCOUNTER — LAB (OUTPATIENT)
Dept: CARDIOLOGY | Facility: CLINIC | Age: 68
End: 2023-09-06
Payer: MEDICARE

## 2023-09-06 ENCOUNTER — PREP FOR PROCEDURE (OUTPATIENT)
Dept: CARDIOLOGY | Facility: CLINIC | Age: 68
End: 2023-09-06

## 2023-09-06 ENCOUNTER — APPOINTMENT (OUTPATIENT)
Dept: CARDIOLOGY | Facility: CLINIC | Age: 68
End: 2023-09-06
Payer: MEDICARE

## 2023-09-06 DIAGNOSIS — R06.09 EXERTIONAL DYSPNEA: Primary | ICD-10-CM

## 2023-09-06 DIAGNOSIS — E78.2 MIXED HYPERLIPIDEMIA: ICD-10-CM

## 2023-09-06 DIAGNOSIS — R06.09 EXERTIONAL DYSPNEA: ICD-10-CM

## 2023-09-06 DIAGNOSIS — I25.9 CHEST PAIN DUE TO MYOCARDIAL ISCHEMIA, UNSPECIFIED ISCHEMIC CHEST PAIN TYPE: ICD-10-CM

## 2023-09-06 DIAGNOSIS — I35.0 NONRHEUMATIC AORTIC VALVE STENOSIS: ICD-10-CM

## 2023-09-06 LAB
ANION GAP SERPL CALCULATED.3IONS-SCNC: 11 MMOL/L (ref 5–18)
BUN SERPL-MCNC: 17 MG/DL (ref 8–28)
CALCIUM SERPL-MCNC: 9 MG/DL (ref 8.5–10.5)
CHLORIDE BLD-SCNC: 105 MMOL/L (ref 98–107)
CHOLEST SERPL-MCNC: 268 MG/DL
CO2 SERPL-SCNC: 23 MMOL/L (ref 22–31)
CREAT SERPL-MCNC: 0.76 MG/DL (ref 0.7–1.3)
EGFRCR SERPLBLD CKD-EPI 2021: >90 ML/MIN/1.73M2
ERYTHROCYTE [DISTWIDTH] IN BLOOD BY AUTOMATED COUNT: 14.1 % (ref 10–15)
FASTING STATUS PATIENT QL REPORTED: ABNORMAL
GLUCOSE BLD-MCNC: 157 MG/DL (ref 70–125)
HCT VFR BLD AUTO: 36.3 % (ref 40–53)
HDLC SERPL-MCNC: 61 MG/DL
HGB BLD-MCNC: 11.9 G/DL (ref 13.3–17.7)
LDLC SERPL CALC-MCNC: 138 MG/DL
MCH RBC QN AUTO: 31.5 PG (ref 26.5–33)
MCHC RBC AUTO-ENTMCNC: 32.8 G/DL (ref 31.5–36.5)
MCV RBC AUTO: 96 FL (ref 78–100)
PLATELET # BLD AUTO: 289 10E3/UL (ref 150–450)
POTASSIUM BLD-SCNC: 3.8 MMOL/L (ref 3.5–5)
RBC # BLD AUTO: 3.78 10E6/UL (ref 4.4–5.9)
SODIUM SERPL-SCNC: 139 MMOL/L (ref 136–145)
TRIGL SERPL-MCNC: 345 MG/DL
WBC # BLD AUTO: 15.5 10E3/UL (ref 4–11)

## 2023-09-06 PROCEDURE — 36415 COLL VENOUS BLD VENIPUNCTURE: CPT

## 2023-09-06 PROCEDURE — 80048 BASIC METABOLIC PNL TOTAL CA: CPT

## 2023-09-06 PROCEDURE — 85027 COMPLETE CBC AUTOMATED: CPT

## 2023-09-06 PROCEDURE — 80061 LIPID PANEL: CPT

## 2023-09-06 RX ORDER — LIDOCAINE 40 MG/G
CREAM TOPICAL
Status: CANCELLED | OUTPATIENT
Start: 2023-09-06

## 2023-09-06 RX ORDER — SODIUM CHLORIDE 9 MG/ML
INJECTION, SOLUTION INTRAVENOUS CONTINUOUS
Status: CANCELLED | OUTPATIENT
Start: 2023-09-06

## 2023-09-06 RX ORDER — ASPIRIN 81 MG/1
243 TABLET, CHEWABLE ORAL ONCE
Status: CANCELLED | OUTPATIENT
Start: 2023-09-06

## 2023-09-06 RX ORDER — ASPIRIN 325 MG
325 TABLET ORAL ONCE
Status: CANCELLED | OUTPATIENT
Start: 2023-09-06 | End: 2023-09-06

## 2023-09-06 NOTE — TELEPHONE ENCOUNTER
Pt here for in office teach. Education and prep completed. Opportunity to ask questions, have them answered. None further at this time. Reports cough for the past 5 weeks, no fever. Takes chemotherapy. Of note, this a non-chemo week. On steroids also, taking Dexamethasone twice this week, per usual routine, previous 2 days. Also, of note pt does best for lab draws with use of a butterfly needle. Of note, [place], uses hearing aids bilaterally. Has impacted cerumen, wondering where to get help with removal. Instructed to contact PCP. Pt verbalized understanding. Orders entered. Pt will have labs drawn. Documentation. Pt ready for procedure. HUGH,Rn     Billy Carroll  [address] [place] [address] [phone] (home)     Reason: Dyspnea on exertion, abnormal stress test, aortic stenosis  Procedure cardiologist:  Dr. Sauceda   PCP:  Cipriano Fuentes  H&P completed by:  8/23/23, Dr Sharma   Admit date 9/7/23  Arrival time:  0630  Anticoagulation: None.  CPAP: No  Previous PCI: None.  Bypass Grafts: No  Renal Issues: No  Diabetic?: No  Device?: No    Ambulatory?: Independently.   + Hard of hearing, uses bilateral hearing aids  ++ Echocardiogram day of procedure     Angiogram Teaching    Reason for Visit:  Patient seen for pre-procedure education in preparation for: Coronary Angiogram with Possible Percutaneous Coronary Intervention   Procedure Prep:  Primary Cardiologist note dated: 8/23/23  EKG results obtained, dated: Morning of procedure  Pre-procedure labs: Hemogram, BMP and T&S results obtained: 9/6/23  Lipid Profile results obtained: 9/6/23    Pre-procedure instructions  In preparation for this procedure, we would ask that you have:  No solid food for 8 hours prior to your procedure: Midnight  No clear liquids 2 hours prior to your procedure: 4:30 am    Patient instructed to shower the evening before or the morning of the procedure.  Leave all valuables at home (jewlery, rings, watches, large amounts  of money).  Patient understands (2) visitors allowed during patients stay.   Patient instructed to arrange for transportation home following procedure from a responsible family member of friend. No driving for at least 24 hours post-procedure.  Patient instructed to have a responsible adult with them for 24 hours post-procedure.  Post-procedure follow up process.  Conscious sedation discussed.    Pre-procedure medication instructions  Patient instructed on antiplatelet medication.  Continue medications as scheduled, with a small amount of water on the day of the procedure unless indicated.  Patient instructed to take 325 mg of Aspirin am of procedure: Yes  Other medication: Morning of procedure please HOLD all vitamins, minerals, and supplements. Please TAKE (4) baby aspirin or (1) full size 325 mg aspirin morning of procedure. Do not take any as needed furosemide morning of procedure.       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 Takes in divided doses 20 mg, for 2 days in a row.       acetaminophen (TYLENOL) 325 MG tablet Take 3 tablets (975 mg) by mouth 3 times daily (before meals) 200 tablet 0    acyclovir (ZOVIRAX) 400 MG tablet Take 400 mg by mouth 2 times daily      alendronate (FOSAMAX) 70 MG tablet Take 1 tablet (70 mg) by mouth every 7 days 12 tablet 1    aspirin 81 MG EC tablet Take 81 mg by mouth daily      B Complex-C-Folic Acid (HM VITAMIN B  COMPLEX/VITAMIN C) TABS Take 1 tablet by mouth daily      bortezomib 3.5 MG injection Inject 1.3 mg/m2 into the vein once a week      Calcium-Magnesium-Zinc 333-133-5 MG TABS per tablet Take 2 tablets by mouth daily      cholecalciferol 50 MCG (2000 UT) tablet Take 2,000 Units by mouth daily      co-enzyme Q-10 100 MG CAPS capsule Take 100 mg by mouth daily      furosemide (LASIX) 40 MG tablet Take 1 tablet (40 mg) by mouth daily Take additional 40mg daily as needed for shortness of breath or  worsening Lower extremity edema 90 tablet 0    lactobacillus rhamnosus, GG, (CULTURELL) capsule Take 1 capsule by mouth daily      LENalidomide (REVLIMID) 25 MG CAPS capsule Take 25 mg by mouth daily      loperamide (IMODIUM) 2 MG capsule Take 1 capsule by mouth 4 times daily as needed for diarrhea      losartan (COZAAR) 50 MG tablet Take 100 mg by mouth daily      mirtazapine (REMERON) 15 MG tablet Take 15 mg by mouth At Bedtime      nitroGLYcerin (NITROSTAT) 0.4 MG sublingual tablet For chest pain place 1 tablet under the tongue every 5 minutes for 3 doses. If symptoms persist 5 minutes after 1st dose call 911. 25 tablet 3    nitroGLYcerin (NITROSTAT) 0.4 MG sublingual tablet Place 0.4 mg under the tongue every 5 minutes as needed for chest pain For GILLETTE (Patient not taking: Reported on 8/23/2023)      Omega-3 Fatty Acids (FISH OIL BURP-LESS) 1000 MG CAPS Take 1 capsule by mouth daily      oxyCODONE (ROXICODONE) 5 MG tablet Take 1 tablet by mouth every 6 hours as needed for moderate to severe pain      polyethylene glycol (MIRALAX) 17 g packet Take 1 packet by mouth daily as needed for constipation      prochlorperazine (COMPAZINE) 10 MG tablet Take 10 mg by mouth every 6 hours as needed for nausea (Patient not taking: Reported on 8/21/2023)      simvastatin (ZOCOR) 40 MG tablet Take 1 tablet (40 mg) by mouth daily 90 tablet 3    sulfamethoxazole-trimethoprim (BACTRIM DS) 800-160 MG tablet Take 1 tablet by mouth three times a  week Take on Mondays, Wednesday, fridaysof week while taking Prednsione more than 20mg daily for PCP prophylaxis 30 tablet 0    Turmeric 500 MG CAPS Take 2 capsules by mouth daily      vitamin B complex with vitamin C (VITAMIN  B COMPLEX) tablet Take 1 tablet by mouth daily      zinc gluconate 50 MG tablet Take 50 mg by mouth daily         Allergies   Allergen Reactions    Acetaminophen-Codeine Nausea    Codeine Nausea       Procedure order set placed: 9/6/23    Plan: Patient education completed. Opportunity to ask questions and have them answered. None further at this time. Patient verbalized understanding of responsible adult for 24 hours and  post-procedure at time of discharge. Patient ready for procedure.     Sanjay Valdovinos RN

## 2023-09-07 ENCOUNTER — HOSPITAL ENCOUNTER (OUTPATIENT)
Dept: CARDIOLOGY | Facility: HOSPITAL | Age: 68
Discharge: HOME | End: 2023-09-07
Attending: INTERNAL MEDICINE | Admitting: INTERNAL MEDICINE
Payer: MEDICARE

## 2023-09-07 ENCOUNTER — HOSPITAL ENCOUNTER (OUTPATIENT)
Facility: HOSPITAL | Age: 68
Discharge: HOME | End: 2023-09-07
Attending: INTERNAL MEDICINE | Admitting: INTERNAL MEDICINE
Payer: MEDICARE

## 2023-09-07 ENCOUNTER — TELEPHONE (OUTPATIENT)
Dept: CARDIOLOGY | Facility: CLINIC | Age: 68
End: 2023-09-07

## 2023-09-07 VITALS
BODY MASS INDEX: 29.35 KG/M2 | HEART RATE: 79 BPM | SYSTOLIC BLOOD PRESSURE: 153 MMHG | WEIGHT: 205 LBS | HEIGHT: 70 IN | DIASTOLIC BLOOD PRESSURE: 81 MMHG | OXYGEN SATURATION: 94 % | TEMPERATURE: 98.2 F

## 2023-09-07 DIAGNOSIS — I35.0 NONRHEUMATIC AORTIC VALVE STENOSIS: ICD-10-CM

## 2023-09-07 DIAGNOSIS — I25.9 CHEST PAIN DUE TO MYOCARDIAL ISCHEMIA, UNSPECIFIED ISCHEMIC CHEST PAIN TYPE: ICD-10-CM

## 2023-09-07 DIAGNOSIS — E78.2 MIXED HYPERLIPIDEMIA: ICD-10-CM

## 2023-09-07 DIAGNOSIS — I35.0 NONRHEUMATIC AORTIC VALVE STENOSIS: Primary | ICD-10-CM

## 2023-09-07 DIAGNOSIS — R06.09 EXERTIONAL DYSPNEA: ICD-10-CM

## 2023-09-07 LAB
ABO/RH(D): NORMAL
ANTIBODY SCREEN: NEGATIVE
LVEF ECHO: NORMAL
SPECIMEN EXPIRATION DATE: NORMAL

## 2023-09-07 PROCEDURE — C1894 INTRO/SHEATH, NON-LASER: HCPCS | Performed by: INTERNAL MEDICINE

## 2023-09-07 PROCEDURE — 93005 ELECTROCARDIOGRAM TRACING: CPT

## 2023-09-07 PROCEDURE — 272N000001 HC OR GENERAL SUPPLY STERILE: Performed by: INTERNAL MEDICINE

## 2023-09-07 PROCEDURE — 99152 MOD SED SAME PHYS/QHP 5/>YRS: CPT | Performed by: INTERNAL MEDICINE

## 2023-09-07 PROCEDURE — 999N000054 HC STATISTIC EKG NON-CHARGEABLE

## 2023-09-07 PROCEDURE — 86901 BLOOD TYPING SEROLOGIC RH(D): CPT | Performed by: INTERNAL MEDICINE

## 2023-09-07 PROCEDURE — 93454 CORONARY ARTERY ANGIO S&I: CPT | Mod: 26 | Performed by: INTERNAL MEDICINE

## 2023-09-07 PROCEDURE — C1769 GUIDE WIRE: HCPCS | Performed by: INTERNAL MEDICINE

## 2023-09-07 PROCEDURE — 255N000002 HC RX 255 OP 636: Performed by: INTERNAL MEDICINE

## 2023-09-07 PROCEDURE — 93010 ELECTROCARDIOGRAM REPORT: CPT | Mod: HOP | Performed by: INTERNAL MEDICINE

## 2023-09-07 PROCEDURE — 93306 TTE W/DOPPLER COMPLETE: CPT | Mod: 26 | Performed by: INTERNAL MEDICINE

## 2023-09-07 PROCEDURE — 93454 CORONARY ARTERY ANGIO S&I: CPT | Performed by: INTERNAL MEDICINE

## 2023-09-07 PROCEDURE — 250N000011 HC RX IP 250 OP 636: Performed by: INTERNAL MEDICINE

## 2023-09-07 PROCEDURE — 36415 COLL VENOUS BLD VENIPUNCTURE: CPT | Performed by: INTERNAL MEDICINE

## 2023-09-07 PROCEDURE — 250N000009 HC RX 250: Performed by: INTERNAL MEDICINE

## 2023-09-07 PROCEDURE — 86850 RBC ANTIBODY SCREEN: CPT | Performed by: INTERNAL MEDICINE

## 2023-09-07 PROCEDURE — 999N000208 ECHOCARDIOGRAM COMPLETE

## 2023-09-07 RX ORDER — LIDOCAINE 40 MG/G
CREAM TOPICAL
Status: CANCELLED | OUTPATIENT
Start: 2023-09-07

## 2023-09-07 RX ORDER — NITROGLYCERIN 5 MG/ML
VIAL (ML) INTRAVENOUS
Status: DISCONTINUED | OUTPATIENT
Start: 2023-09-07 | End: 2023-09-07 | Stop reason: HOSPADM

## 2023-09-07 RX ORDER — LIDOCAINE 40 MG/G
CREAM TOPICAL
Status: DISCONTINUED | OUTPATIENT
Start: 2023-09-07 | End: 2023-09-07 | Stop reason: HOSPADM

## 2023-09-07 RX ORDER — ASPIRIN 325 MG
325 TABLET ORAL ONCE
Status: COMPLETED | OUTPATIENT
Start: 2023-09-07 | End: 2023-09-07

## 2023-09-07 RX ORDER — NALOXONE HYDROCHLORIDE 0.4 MG/ML
0.4 INJECTION, SOLUTION INTRAMUSCULAR; INTRAVENOUS; SUBCUTANEOUS
Status: DISCONTINUED | OUTPATIENT
Start: 2023-09-07 | End: 2023-09-07 | Stop reason: HOSPADM

## 2023-09-07 RX ORDER — FLUMAZENIL 0.1 MG/ML
0.2 INJECTION, SOLUTION INTRAVENOUS
Status: DISCONTINUED | OUTPATIENT
Start: 2023-09-07 | End: 2023-09-07 | Stop reason: HOSPADM

## 2023-09-07 RX ORDER — FENTANYL CITRATE 50 UG/ML
25 INJECTION, SOLUTION INTRAMUSCULAR; INTRAVENOUS
Status: DISCONTINUED | OUTPATIENT
Start: 2023-09-07 | End: 2023-09-07 | Stop reason: HOSPADM

## 2023-09-07 RX ORDER — SODIUM CHLORIDE 9 MG/ML
INJECTION, SOLUTION INTRAVENOUS CONTINUOUS
Status: CANCELLED | OUTPATIENT
Start: 2023-09-07

## 2023-09-07 RX ORDER — HEPARIN SODIUM 1000 [USP'U]/ML
INJECTION, SOLUTION INTRAVENOUS; SUBCUTANEOUS
Status: DISCONTINUED | OUTPATIENT
Start: 2023-09-07 | End: 2023-09-07 | Stop reason: HOSPADM

## 2023-09-07 RX ORDER — FENTANYL CITRATE 50 UG/ML
INJECTION, SOLUTION INTRAMUSCULAR; INTRAVENOUS
Status: DISCONTINUED | OUTPATIENT
Start: 2023-09-07 | End: 2023-09-07 | Stop reason: HOSPADM

## 2023-09-07 RX ORDER — ATROPINE SULFATE 0.1 MG/ML
0.5 INJECTION INTRAVENOUS
Status: DISCONTINUED | OUTPATIENT
Start: 2023-09-07 | End: 2023-09-07 | Stop reason: HOSPADM

## 2023-09-07 RX ORDER — OXYCODONE HYDROCHLORIDE 5 MG/1
10 TABLET ORAL EVERY 4 HOURS PRN
Status: CANCELLED | OUTPATIENT
Start: 2023-09-07

## 2023-09-07 RX ORDER — SODIUM CHLORIDE 9 MG/ML
INJECTION, SOLUTION INTRAVENOUS CONTINUOUS
Status: DISCONTINUED | OUTPATIENT
Start: 2023-09-07 | End: 2023-09-07 | Stop reason: HOSPADM

## 2023-09-07 RX ORDER — ASPIRIN 81 MG/1
243 TABLET, CHEWABLE ORAL ONCE
Status: COMPLETED | OUTPATIENT
Start: 2023-09-07 | End: 2023-09-07

## 2023-09-07 RX ORDER — ACETAMINOPHEN 325 MG/1
650 TABLET ORAL EVERY 4 HOURS PRN
Status: CANCELLED | OUTPATIENT
Start: 2023-09-07

## 2023-09-07 RX ORDER — IODIXANOL 320 MG/ML
INJECTION, SOLUTION INTRAVASCULAR
Status: DISCONTINUED | OUTPATIENT
Start: 2023-09-07 | End: 2023-09-07 | Stop reason: HOSPADM

## 2023-09-07 RX ORDER — OXYCODONE HYDROCHLORIDE 5 MG/1
5 TABLET ORAL EVERY 4 HOURS PRN
Status: CANCELLED | OUTPATIENT
Start: 2023-09-07

## 2023-09-07 RX ORDER — NALOXONE HYDROCHLORIDE 0.4 MG/ML
0.2 INJECTION, SOLUTION INTRAMUSCULAR; INTRAVENOUS; SUBCUTANEOUS
Status: DISCONTINUED | OUTPATIENT
Start: 2023-09-07 | End: 2023-09-07 | Stop reason: HOSPADM

## 2023-09-07 RX ADMIN — PERFLUTREN 2 ML: 6.52 INJECTION, SUSPENSION INTRAVENOUS at 10:11

## 2023-09-07 ASSESSMENT — ACTIVITIES OF DAILY LIVING (ADL)
ADLS_ACUITY_SCORE: 35

## 2023-09-07 NOTE — PRE-PROCEDURE
GENERAL PRE-PROCEDURE:   Procedure:  Coronary angiogram with possible PCI  Date/Time:  9/7/2023 8:23 AM    Written consent obtained?: Yes    Risks and benefits: Risks, benefits and alternatives were discussed    Consent given by:  Patient  Patient states understanding of procedure being performed: Yes    Patient's understanding of procedure matches consent: Yes    Procedure consent matches procedure scheduled: Yes    Expected level of sedation:  Moderate  Appropriately NPO:  Yes  ASA Class:  3 (severe coronary calcifications on CT, abnormal exercise EKG, moderate-severe AS, HTN, HLD, anemia, LVH, multiple myeloma, borderline obesity; BMI 29.41kg/m2)  Mallampati  :  Grade 2- soft palate, base of uvula, tonsillar pillars, and portion of posterior pharyngeal wall visible  Lungs:  Lungs clear with good breath sounds bilaterally  Heart:  Systolic murmur  History & Physical reviewed:  History and physical reviewed and updates made (see comment)  H&P Comments:  Clinically Significant Risk Factors Present on Admission    Cardiovascular : Valvular heart disease; moderate Aortic valve stenosis    Fluid & Electrolyte Disorders : Not present on admission    Gastroenterology : Not present on admission    Hematology/Oncology : Chronic anemia, multiple myeloma    Nephrology : Not present on admission    Neurology : Not present on admission    Pulmonology : Not present on admission    Systemic : Not present on admission      Statement of review:  I have reviewed the lab findings, diagnostic data, medications, and the plan for sedation  Sedation and Procedure Attestation:    I attest that Billy Carroll underwent a pre-sedation assessment and is an appropriate candidate to undergo the planned procedure and planned sedation. Risks, benefits, and alternatives were discussed. The patient/representative was given an opportunity to ask questions and seems to understand. Informed consent was obtained for the procedure, including  sedation.    The patient was re-evaluated immediately prior to sedation administration.    Domenico Sauceda MD  Interventional Cardiology

## 2023-09-07 NOTE — DISCHARGE INSTRUCTIONS

## 2023-09-07 NOTE — INTERVAL H&P NOTE
"I have reviewed the surgical (or preoperative) H&P that is linked to this encounter, and examined the patient. There are no significant changes    Clinical Conditions Present on Arrival:  Clinically Significant Risk Factors Present on Admission                 # Drug Induced Platelet Defect: home medication list includes an antiplatelet medication  # Overweight: Estimated body mass index is 29.41 kg/m  as calculated from the following:    Height as of this encounter: 1.778 m (5' 10\").    Weight as of this encounter: 93 kg (205 lb).       "

## 2023-09-07 NOTE — TELEPHONE ENCOUNTER
Received call from [facility] [place] who said patient is s/p angiogram with Dr. Sauceda, per provider patient needs to be scheduled in valve clinic. Reviewed documentation from provider- evidence of severe aortic stenosis. Lab work from yesterday 9/6 shows GFR >90.     CTA TAVR ordered along with valve clinic referral. Patient will be contacted by scheduling/ to arrange for CT imaging, IC visit and surgery visit. Will inform patient of need for dental clearance.     Orders place and msg sent to .     Shanell Gill RN on 9/7/2023 at 9:46 AM

## 2023-09-07 NOTE — TELEPHONE ENCOUNTER
===View-only below this line===  ----- Message -----  From: Ivette Foreman  Sent: 9/7/2023   9:57 AM CDT  To: Shanell Gill RN  Subject: RE: valve clinic referral                        CT 9/19  Valve clinic/Media: 9/28    I will call him next week and go over the appointments with him.

## 2023-09-07 NOTE — PROGRESS NOTES
Valve clinic called to schedule appointment with Dr Sauceda. Referral in, Clinic to contact Pt with Appt time.

## 2023-09-08 ENCOUNTER — OFFICE VISIT (OUTPATIENT)
Dept: FAMILY MEDICINE | Facility: CLINIC | Age: 68
End: 2023-09-08
Payer: MEDICARE

## 2023-09-08 VITALS
BODY MASS INDEX: 30.25 KG/M2 | OXYGEN SATURATION: 97 % | WEIGHT: 211.3 LBS | RESPIRATION RATE: 16 BRPM | DIASTOLIC BLOOD PRESSURE: 68 MMHG | SYSTOLIC BLOOD PRESSURE: 154 MMHG | TEMPERATURE: 98.7 F | HEIGHT: 70 IN | HEART RATE: 97 BPM

## 2023-09-08 DIAGNOSIS — I10 ESSENTIAL HYPERTENSION: ICD-10-CM

## 2023-09-08 DIAGNOSIS — Z11.59 NEED FOR HEPATITIS C SCREENING TEST: Primary | ICD-10-CM

## 2023-09-08 DIAGNOSIS — H69.92 DYSFUNCTION OF LEFT EUSTACHIAN TUBE: ICD-10-CM

## 2023-09-08 LAB
ATRIAL RATE - MUSE: 75 BPM
DIASTOLIC BLOOD PRESSURE - MUSE: NORMAL MMHG
INTERPRETATION ECG - MUSE: NORMAL
P AXIS - MUSE: 51 DEGREES
PR INTERVAL - MUSE: 150 MS
QRS DURATION - MUSE: 88 MS
QT - MUSE: 400 MS
QTC - MUSE: 446 MS
R AXIS - MUSE: -28 DEGREES
SYSTOLIC BLOOD PRESSURE - MUSE: NORMAL MMHG
T AXIS - MUSE: 27 DEGREES
VENTRICULAR RATE- MUSE: 75 BPM

## 2023-09-08 PROCEDURE — 99213 OFFICE O/P EST LOW 20 MIN: CPT | Performed by: STUDENT IN AN ORGANIZED HEALTH CARE EDUCATION/TRAINING PROGRAM

## 2023-09-08 RX ORDER — FLUTICASONE PROPIONATE 50 MCG
1 SPRAY, SUSPENSION (ML) NASAL DAILY PRN
COMMUNITY
End: 2024-03-14

## 2023-09-08 RX ORDER — LORATADINE 10 MG/1
10 TABLET ORAL DAILY PRN
COMMUNITY

## 2023-09-08 NOTE — PROGRESS NOTES
Assessment & Plan     Dysfunction of left eustachian tube  Mr. Carroll is a 68-year-old female with history sensorineural bilateral hearing loss who uses a hearing aid, who is having left sided ear pressure and decreased hearing for some months.  He has been having sinus congestion and postnasal drip, which is improving somewhat with use of over-the-counter Claritin medication.  On examination there is a small serous effusion behind the left tympanic membrane.  No other abnormalities on exam.    Diagnosis; dysfunction of left eustachian tube    I recommended we attempt treatment with Flonase daily, described use, risks, and benefits of the medication.  Patient reports he already has this at home and does not require a prescription.  Encourage patient to try this for a few months as well as doing auto insufflation.  Believe that this will improve patient's symptoms.  She also continue to use an antihistamine as it is likely that allergy symptoms are the root cause of eustachian tube dysfunction.  If not improving over 3 months she return to care, as he may at that point require a ENT referral.  Also encouraged patient to go to [facility] where he does his hearing testing as he has not had this done for about a year.    Essential Hypertension    Patient has essential hypertension.  He is currently taking 100 mg of losartan daily, which he reports being very good about taking.  He has elevated blood pressures at today's visit measuring 152/68 initially and on repeat 154/68.  Asked patient to keep a blood pressure journal at home, which he reports he can do.  Recommended patient follow-up with his PCM to discuss possible medication addition or change.  Patient agrees to plan             Miguel Harrell MD  [facility]    Avila Cervantes is a 68 year old, presenting for the following health issues:  Plugged Ears (Left ear)  Billy is had difficulty hearing on the left side, feeling like his  "left ear is underwater, stating he feels pressure on that side.  Denies having any ear pain or fluid coming out of his ear.  He believes he has earwax on that side and had tried using Debrox, although not currently using.  He has had difficulty hearing for some years due to sensorineural hearing loss, he does have hearing aids which he wears.  He does report having sinus congestion and runny nose, postnasal drip.  This has improved somewhat with use of Claritin over the last weeks.  No other questions or concerns at this time      9/8/2023    10:41 AM   Additional Questions   Roomed by Ellen RITCHIE       History of Present Illness       Reason for visit:  Ears are plugged  Symptom onset:  More than a month    He eats 2-3 servings of fruits and vegetables daily.He consumes 2 sweetened beverage(s) daily.He exercises with enough effort to increase his heart rate 30 to 60 minutes per day.  He exercises with enough effort to increase his heart rate 7 days per week.   He is taking medications regularly.               Review of Systems         Objective    BP (!) 152/68 (BP Location: Right arm, Patient Position: Sitting, Cuff Size: Adult Regular)   Pulse 97   Temp 98.7  F (37.1  C) (Oral)   Resp 16   Ht 1.778 m (5' 10\")   Wt 95.8 kg (211 lb 4.8 oz)   SpO2 97%   BMI 30.32 kg/m    Body mass index is 30.32 kg/m .  Physical Exam   GENERAL: healthy, alert and no distress  EYES: Eyes grossly normal to inspection, and conjunctivae and sclerae normal  HENT: ear canals normal, there is no cerumen impaction, there is a serous effusion, which is mild, of the left TM, nose and mouth without ulcers or lesions  NECK: no adenopathy, no asymmetry, masses, or scars and thyroid normal to palpation  SKIN: no suspicious lesions or rashes  NEURO: Normal strength and tone, mentation intact and speech normal  PSYCH: mentation appears normal, affect normal/bright                "

## 2023-09-12 ENCOUNTER — TRANSFERRED RECORDS (OUTPATIENT)
Dept: HEALTH INFORMATION MANAGEMENT | Facility: CLINIC | Age: 68
End: 2023-09-12
Payer: MEDICARE

## 2023-09-19 ENCOUNTER — HOSPITAL ENCOUNTER (OUTPATIENT)
Dept: CT IMAGING | Facility: CLINIC | Age: 68
Discharge: HOME | End: 2023-09-19
Attending: INTERNAL MEDICINE | Admitting: INTERNAL MEDICINE
Payer: MEDICARE

## 2023-09-19 DIAGNOSIS — I35.0 NONRHEUMATIC AORTIC VALVE STENOSIS: ICD-10-CM

## 2023-09-19 PROCEDURE — 71275 CT ANGIOGRAPHY CHEST: CPT | Mod: 26 | Performed by: INTERNAL MEDICINE

## 2023-09-19 PROCEDURE — G1010 CDSM STANSON: HCPCS | Performed by: INTERNAL MEDICINE

## 2023-09-19 PROCEDURE — G1010 CDSM STANSON: HCPCS

## 2023-09-19 PROCEDURE — 74174 CTA ABD&PLVS W/CONTRAST: CPT | Mod: 26 | Performed by: INTERNAL MEDICINE

## 2023-09-19 PROCEDURE — 250N000011 HC RX IP 250 OP 636: Performed by: INTERNAL MEDICINE

## 2023-09-19 RX ORDER — IOPAMIDOL 755 MG/ML
100 INJECTION, SOLUTION INTRAVASCULAR ONCE
Status: COMPLETED | OUTPATIENT
Start: 2023-09-19 | End: 2023-09-19

## 2023-09-19 RX ADMIN — IOPAMIDOL 100 ML: 755 INJECTION, SOLUTION INTRAVENOUS at 09:23

## 2023-09-28 ENCOUNTER — OFFICE VISIT (OUTPATIENT)
Dept: CARDIOLOGY | Facility: CLINIC | Age: 68
End: 2023-09-28
Payer: MEDICARE

## 2023-09-28 ENCOUNTER — DOCUMENTATION ONLY (OUTPATIENT)
Dept: CARDIOLOGY | Facility: CLINIC | Age: 68
End: 2023-09-28

## 2023-09-28 ENCOUNTER — ALLIED HEALTH/NURSE VISIT (OUTPATIENT)
Dept: CARDIOLOGY | Facility: CLINIC | Age: 68
End: 2023-09-28
Payer: MEDICARE

## 2023-09-28 ENCOUNTER — LAB (OUTPATIENT)
Dept: CARDIOLOGY | Facility: CLINIC | Age: 68
End: 2023-09-28
Payer: MEDICARE

## 2023-09-28 VITALS
DIASTOLIC BLOOD PRESSURE: 80 MMHG | BODY MASS INDEX: 30.96 KG/M2 | WEIGHT: 215.8 LBS | RESPIRATION RATE: 16 BRPM | SYSTOLIC BLOOD PRESSURE: 142 MMHG | HEART RATE: 83 BPM

## 2023-09-28 DIAGNOSIS — I25.9 CHEST PAIN DUE TO MYOCARDIAL ISCHEMIA, UNSPECIFIED ISCHEMIC CHEST PAIN TYPE: Primary | ICD-10-CM

## 2023-09-28 DIAGNOSIS — I35.0 NONRHEUMATIC AORTIC VALVE STENOSIS: Primary | ICD-10-CM

## 2023-09-28 DIAGNOSIS — I25.9 CHEST PAIN DUE TO MYOCARDIAL ISCHEMIA, UNSPECIFIED ISCHEMIC CHEST PAIN TYPE: ICD-10-CM

## 2023-09-28 LAB
ATRIAL RATE - MUSE: 83 BPM
DIASTOLIC BLOOD PRESSURE - MUSE: NORMAL MMHG
INTERPRETATION ECG - MUSE: NORMAL
P AXIS - MUSE: 10 DEGREES
PR INTERVAL - MUSE: 154 MS
QRS DURATION - MUSE: 90 MS
QT - MUSE: 380 MS
QTC - MUSE: 446 MS
R AXIS - MUSE: -31 DEGREES
SYSTOLIC BLOOD PRESSURE - MUSE: NORMAL MMHG
T AXIS - MUSE: 53 DEGREES
VENTRICULAR RATE- MUSE: 83 BPM

## 2023-09-28 PROCEDURE — 99207 PR NO CHARGE LOS: CPT

## 2023-09-28 PROCEDURE — 93000 ELECTROCARDIOGRAM COMPLETE: CPT | Performed by: INTERNAL MEDICINE

## 2023-09-28 PROCEDURE — 99215 OFFICE O/P EST HI 40 MIN: CPT | Mod: 25 | Performed by: INTERNAL MEDICINE

## 2023-09-28 NOTE — LETTER
9/28/2023    Cipriano Fuentes PA-C  [address], [address]  [place] [address]    RE: Billy ROBERTS [place]       Dear Colleague,     I had the pleasure of seeing Billy Carroll in the [facility].  [facility]   [address], [place] [address]   [email or web address]   OFFICE: [phone]     IMPRESSION:  Severe, symptomatic aortic valve stenosis.   Functional Capacity:  NYHA class III, CCS class 0   Moderate nonobstructive coronary artery disease  Metabolic syndrome with hypertension and hyperlipidemia  Multiple myeloma post palliative radiation now on systemic chemotherapy (VRD) undergoing evaluation for stem cell transplant        SUMMARY OF RECOMMENDATIONS: We discussed natural progression and therapeutic options for severe aortic valve stenosis with Mr. Carroll. He appears to be an appropriate candidate for transcatheter aortic valve replacement and we will continue his work-up as planned.  We will also contact his oncologist regarding timing of his chemotherapy, plan for stem cell transplant, and expected survival (per patient impression and documentation >1 year).  Once we have all the available data we will discuss his case at our multidisciplinary valve team and contact Mr. Carroll with the plan.      Dear Cipriano Fuentes     I had the pleasure of seeing Billy Carroll today at the [facility] for evaluation for Transcatheter Aortic Valve Replacement for symptomatic severe aortic valve stenosis. As you know, Mr. Carroll is a 68-year-old gentleman with complex past medical history including multiple myeloma post palliative radiation and now on systemic chemotherapy (VRD) undergoing evaluation for stem cell transplant, history of PJP pneumonia now on Bactrim prophylaxis, metabolic syndrome with hypertension hyperlipidemia, moderate nonobstructive coronary  disease, and severe, symptomatic aortic stenosis.  Briefly, patient was recently evaluated by cardiology for dyspnea on exertion in the setting of work-up for stem cell transplant was noted to have an abnormal stress echocardiogram.  Coronary angiogram was notable for moderate nonobstructive coronary artery disease.  Echocardiogram was notable for severe aortic valve stenosis.  Today, reports dyspnea on exertion over the past 6 months.  Denies chest pain, palpitations, lightheadedness, dizziness, presyncope, or syncope.  He has no orthopnea or PND.  He has no significant lower extremity edema, early satiety, or abdominal bloating.  He has no strokelike symptoms or claudication.       ROS:  A 14 point review of symptoms was reviewed.  No prior hx of rheumatic fever or chest irradiation. There were no additional findings pertinent to this encounter.     Past Medical History: as above     Social History: Denies tobacco use, minimal alcohol use, no illicit drug use    Family History: No early family history of cardiovascular disease or sudden cardiac death.    Allergies: reviewed   Allergies   Allergen Reactions    Acetaminophen-Codeine Nausea    Codeine Nausea       Pertinent Medications:    Current Outpatient Medications   Medication    acetaminophen (TYLENOL) 325 MG tablet    acyclovir (ZOVIRAX) 400 MG tablet    alendronate (FOSAMAX) 70 MG tablet    aspirin 81 MG EC tablet    B Complex-C-Folic Acid (HM VITAMIN B COMPLEX/VITAMIN C) TABS    bortezomib 3.5 MG injection    Calcium-Magnesium-Zinc 333-133-5 MG TABS per tablet    cholecalciferol 50 MCG (2000 UT) tablet    co-enzyme Q-10 100 MG CAPS capsule    Dexamethasone 20 MG TABS    fluticasone (FLONASE) 50 MCG/ACT nasal spray    lactobacillus rhamnosus, GG, (CULTURELL) capsule    LENalidomide (REVLIMID) 25 MG CAPS capsule    loperamide (IMODIUM) 2 MG capsule    loratadine (CLARITIN) 10 MG tablet    losartan (COZAAR) 50 MG tablet    mirtazapine (REMERON) 15 MG tablet     nitroGLYcerin (NITROSTAT) 0.4 MG sublingual tablet    nitroGLYcerin (NITROSTAT) 0.4 MG sublingual tablet    Omega-3 Fatty Acids (FISH OIL BURP-LESS) 1000 MG CAPS    polyethylene glycol (MIRALAX) 17 g packet    simvastatin (ZOCOR) 40 MG tablet    sulfamethoxazole-trimethoprim (BACTRIM DS) 800-160 MG tablet    Turmeric 500 MG CAPS    vitamin B complex with vitamin C (VITAMIN  B COMPLEX) tablet    zinc gluconate 50 MG tablet     No current facility-administered medications for this visit.     Facility-Administered Medications Ordered in Other Visits   Medication    fentaNYL (PF) (SUBLIMAZE) injection    lidocaine (viscous) (XYLOCAINE) 2 % solution    lidocaine 1 % injection    lidocaine 4 % injection    midazolam (VERSED) injection    sodium chloride 0.9% infusion       OBJECTIVE:  Physical Examination   BP (!) 142/80 (BP Location: Right arm, Patient Position: Sitting, Cuff Size: Adult Large)   Pulse 83   Resp 16   Wt 97.9 kg (215 lb 12.8 oz)   BMI 30.96 kg/m     Constitutional: Not in acute distress  Cognitive: Alert, oriented x 3, Appropriate speech  Chest:  Clear bilaterally   Cardiac: regular rhythm, normal S1/S2, late peaking systolic murmur consistent with severe aortic stenosis. JVP not elevated  Abdoment:  Soft, non-tender  Pulses: 2 + femoral, distal pulses.  No carotid bruits  Skin and Integument:  No peripheral edema, extremities warm  Neuro:  Grossly normal for motor, sensory.      Studies:     Laboratory performed 09/2023 and reviewed by me personally showed: Creatinine of 0.76, white blood cell count of 15.5, hemoglobin of 11.9, and platelet count of 289.    Electrocardiogram performed today and reviewed by me personally showed: Sinus, LVH    TTE performed 09/2023 and reviewed by me personally showed: Normal LV size mild LVH, normal LV function, estimated EF of 60 to 65%, normal RV size and function, probably trileaflet aortic valve (not well visualized) with likely severe aortic valve stenosis peak  velocity 4.23 m/s, mean gradient of 41 mmHg -unable to calculate valve area Doppler interrogation incomplete.      Stress echocardiogram in March 2023 with peak velocity of 3.9 m/s, mean gradient of 37 mmHg, and calculated valve area of 1.1 cm .    Cath performed 09/2023 and reviewed by me personally showed: Moderate nonobstructive coronary artery disease.    TAVR CT performed 09/2023 and reviewed by me personally showed: Aortic annulus will accommodate a 26 mm Adam PAUL Ultra via right TF      I reviewed the TAVR procedure, risk and expected outcomes with the patient and family members in detail.  I explained that the aortic valve is very significantly impaired, and strenuous exercise should be avoided until after the valve is repaired. Risks associated with transcatheter valve therapy were reviewed. Risks including death, MI, stroke, permanent disability, neurologic injury, renal failure, major bleeding requiring blood transfusion, emergency thoracotomy for catastrophic complications, vascular injury requiring endovascular or surgical repair, need for permanent pacemaker and anesthesia related complications were fully explained.  Alternative approaches including surgical aortic valve replacement and palliative care were discussed.  My own experience with these procedures were reviewed, and the patient and family appeared to understand the information I presented.  Questions were asked and answered to their apparent satisfaction.      I sincerely appreciate the opportunity to participate in this patient's care.  Please do not hesitate to contact me if any questions or concerns arise.     Domenico Sauceda MD  Interventional Cardiology       Thank you for allowing me to participate in the care of your patient.

## 2023-09-28 NOTE — PROGRESS NOTES
Criteria for moderate sedation  Mallampati > 2: yes  Transfemoral Access: yes  History of (If yes to any, proceed with MAC):  -Adverse anesthesia events: no  -BMI >43: no  -Intolerable chronic pain: no  -History of difficult airway: no  -History of being unable to tolerate moderate sedation: no  -Valve-in-valve (not Trifecta): no  -Any considerations: N/A    Unless otherwise determined by structural, anesthesia or cath lab teams, patient will proceed with TAVR under moderate sedation.      Shanell Gill RN on 9/28/2023 at 4:41 PM

## 2023-09-28 NOTE — PROGRESS NOTES
[facility]   [address], [place] [address]   [email or web address]   OFFICE: [phone]     IMPRESSION:  Severe, symptomatic aortic valve stenosis.   Functional Capacity:  NYHA class III, CCS class 0   Moderate nonobstructive coronary artery disease  Metabolic syndrome with hypertension and hyperlipidemia  Multiple myeloma post palliative radiation now on systemic chemotherapy (VRD) undergoing evaluation for stem cell transplant        SUMMARY OF RECOMMENDATIONS: We discussed natural progression and therapeutic options for severe aortic valve stenosis with Mr. Carroll. He appears to be an appropriate candidate for transcatheter aortic valve replacement and we will continue his work-up as planned.  We will also contact his oncologist regarding timing of his chemotherapy, plan for stem cell transplant, and expected survival (per patient impression and documentation >1 year).  Once we have all the available data we will discuss his case at our multidisciplinary valve team and contact Mr. Carroll with the plan.      Dear Cipriano Fuentes     I had the pleasure of seeing Billy Carroll today at the [facility] for evaluation for Transcatheter Aortic Valve Replacement for symptomatic severe aortic valve stenosis. As you know, Mr. Carroll is a 68-year-old gentleman with complex past medical history including multiple myeloma post palliative radiation and now on systemic chemotherapy (VRD) undergoing evaluation for stem cell transplant, history of PJP pneumonia now on Bactrim prophylaxis, metabolic syndrome with hypertension hyperlipidemia, moderate nonobstructive coronary disease, and severe, symptomatic aortic stenosis.  Briefly, patient was recently evaluated by cardiology for dyspnea on exertion in the setting of work-up for stem cell transplant was noted to have an abnormal stress  echocardiogram.  Coronary angiogram was notable for moderate nonobstructive coronary artery disease.  Echocardiogram was notable for severe aortic valve stenosis.  Today, reports dyspnea on exertion over the past 6 months.  Denies chest pain, palpitations, lightheadedness, dizziness, presyncope, or syncope.  He has no orthopnea or PND.  He has no significant lower extremity edema, early satiety, or abdominal bloating.  He has no strokelike symptoms or claudication.       ROS:  A 14 point review of symptoms was reviewed.  No prior hx of rheumatic fever or chest irradiation. There were no additional findings pertinent to this encounter.     Past Medical History: as above     Social History: Denies tobacco use, minimal alcohol use, no illicit drug use    Family History: No early family history of cardiovascular disease or sudden cardiac death.    Allergies: reviewed   Allergies   Allergen Reactions    Acetaminophen-Codeine Nausea    Codeine Nausea       Pertinent Medications:    Current Outpatient Medications   Medication    acetaminophen (TYLENOL) 325 MG tablet    acyclovir (ZOVIRAX) 400 MG tablet    alendronate (FOSAMAX) 70 MG tablet    aspirin 81 MG EC tablet    B Complex-C-Folic Acid ( VITAMIN B COMPLEX/VITAMIN C) TABS    bortezomib 3.5 MG injection    Calcium-Magnesium-Zinc 333-133-5 MG TABS per tablet    cholecalciferol 50 MCG (2000 UT) tablet    co-enzyme Q-10 100 MG CAPS capsule    Dexamethasone 20 MG TABS    fluticasone (FLONASE) 50 MCG/ACT nasal spray    lactobacillus rhamnosus, GG, (CULTURELL) capsule    LENalidomide (REVLIMID) 25 MG CAPS capsule    loperamide (IMODIUM) 2 MG capsule    loratadine (CLARITIN) 10 MG tablet    losartan (COZAAR) 50 MG tablet    mirtazapine (REMERON) 15 MG tablet    nitroGLYcerin (NITROSTAT) 0.4 MG sublingual tablet    nitroGLYcerin (NITROSTAT) 0.4 MG sublingual tablet    Omega-3 Fatty Acids (FISH OIL BURP-LESS) 1000 MG CAPS    polyethylene glycol (MIRALAX) 17 g packet     simvastatin (ZOCOR) 40 MG tablet    sulfamethoxazole-trimethoprim (BACTRIM DS) 800-160 MG tablet    Turmeric 500 MG CAPS    vitamin B complex with vitamin C (VITAMIN  B COMPLEX) tablet    zinc gluconate 50 MG tablet     No current facility-administered medications for this visit.     Facility-Administered Medications Ordered in Other Visits   Medication    fentaNYL (PF) (SUBLIMAZE) injection    lidocaine (viscous) (XYLOCAINE) 2 % solution    lidocaine 1 % injection    lidocaine 4 % injection    midazolam (VERSED) injection    sodium chloride 0.9% infusion       OBJECTIVE:  Physical Examination   BP (!) 142/80 (BP Location: Right arm, Patient Position: Sitting, Cuff Size: Adult Large)   Pulse 83   Resp 16   Wt 97.9 kg (215 lb 12.8 oz)   BMI 30.96 kg/m     Constitutional: Not in acute distress  Cognitive: Alert, oriented x 3, Appropriate speech  Chest:  Clear bilaterally   Cardiac: regular rhythm, normal S1/S2, late peaking systolic murmur consistent with severe aortic stenosis. JVP not elevated  Abdoment:  Soft, non-tender  Pulses: 2 + femoral, distal pulses.  No carotid bruits  Skin and Integument:  No peripheral edema, extremities warm  Neuro:  Grossly normal for motor, sensory.      Studies:     Laboratory performed 09/2023 and reviewed by me personally showed: Creatinine of 0.76, white blood cell count of 15.5, hemoglobin of 11.9, and platelet count of 289.    Electrocardiogram performed today and reviewed by me personally showed: Sinus, LVH    TTE performed 09/2023 and reviewed by me personally showed: Normal LV size mild LVH, normal LV function, estimated EF of 60 to 65%, normal RV size and function, probably trileaflet aortic valve (not well visualized) with likely severe aortic valve stenosis peak velocity 4.23 m/s, mean gradient of 41 mmHg -unable to calculate valve area Doppler interrogation incomplete.      Stress echocardiogram in March 2023 with peak velocity of 3.9 m/s, mean gradient of 37 mmHg, and  calculated valve area of 1.1 cm .    Cath performed 09/2023 and reviewed by me personally showed: Moderate nonobstructive coronary artery disease.    TAVR CT performed 09/2023 and reviewed by me personally showed: Aortic annulus will accommodate a 26 mm Adam PAUL Ultra via right TF      I reviewed the TAVR procedure, risk and expected outcomes with the patient and family members in detail.  I explained that the aortic valve is very significantly impaired, and strenuous exercise should be avoided until after the valve is repaired. Risks associated with transcatheter valve therapy were reviewed. Risks including death, MI, stroke, permanent disability, neurologic injury, renal failure, major bleeding requiring blood transfusion, emergency thoracotomy for catastrophic complications, vascular injury requiring endovascular or surgical repair, need for permanent pacemaker and anesthesia related complications were fully explained.  Alternative approaches including surgical aortic valve replacement and palliative care were discussed.  My own experience with these procedures were reviewed, and the patient and family appeared to understand the information I presented.  Questions were asked and answered to their apparent satisfaction.      I sincerely appreciate the opportunity to participate in this patient's care.  Please do not hesitate to contact me if any questions or concerns arise.     Domenico Sauceda MD  Interventional Cardiology

## 2023-09-28 NOTE — PROGRESS NOTES
Valve Clinic TAVR - 9/28/23  (See consult note from Dr. Sauceda)    Referring provider: Dr. Sauceda (s/p echo and coronary angiogram while hospitalized for chest pain)     Labs completed at visit today: No, not required.     Labs reviewed prior to clinic:    Latest Reference Range & Units 09/06/23 11:05   Sodium 136 - 145 mmol/L 139   Potassium 3.5 - 5.0 mmol/L 3.8   Chloride 98 - 107 mmol/L 105   Carbon Dioxide 22 - 31 mmol/L 23   Urea Nitrogen 8 - 28 mg/dL 17   Creatinine 0.70 - 1.30 mg/dL 0.76   GFR Estimate >60 mL/min/1.73m2 >90   Calcium 8.5 - 10.5 mg/dL 9.0   Anion Gap 5 - 18 mmol/L 11   Cholesterol <=199 mg/dL 268 (H)   Patient Fasting?  Unknown   HDL Cholesterol >=40 mg/dL 61   LDL Cholesterol Calculated <=129 mg/dL 138 (H)   Triglycerides <=149 mg/dL 345 (H)   Glucose 70 - 125 mg/dL 157 (H)   WBC 4.0 - 11.0 10e3/uL 15.5 (H)   Hemoglobin 13.3 - 17.7 g/dL 11.9 (L)   Hematocrit 40.0 - 53.0 % 36.3 (L)   Platelet Count 150 - 450 10e3/uL 289   RBC Count 4.40 - 5.90 10e6/uL 3.78 (L)   MCV 78 - 100 fL 96   MCH 26.5 - 33.0 pg 31.5   MCHC 31.5 - 36.5 g/dL 32.8   RDW 10.0 - 15.0 % 14.1   (H): Data is abnormally high  (L): Data is abnormally low      Preliminary STS Score: 1.42%      KCCQ12 (date completed 9/28/23), scanned into chart      PMH: chest pain due to ischemia, chronic pain due to malignant neoplastic disease, anxiety, insomnia pneumonitis, lumbosacral radiculopathy, multiple myeloma, mass thoracic vertebra, acute resp failure, immunosuppression due to chemotherapy, bilateral pneumonia, severe aortic stenosis     Patient had coronary angiogram on 9/7/23 showing mild-moderate diffuse coronary artery disease. No lesions required intervention during this procedure. Invasive gradients confirmed severe aortic stenosis.     Symptoms: GILLETTE     Social: Patient presents to clinic today with his Son who will be with him the date of his procedure.       TTE date 9/7/23  EF 60-65%  AV mean gradient: 42 mmHg  AV Peak  Stephane: 410 cm/sec  AV area: not calculated   AV DIM IND VTI: not calculated  LV SVi: not calculated     Comments on valves: mitral valve leaflets are normal, there is no stenosis and trace regurgitation. Tricuspid valve is not commented on. Aortic valve has moderate calcification, trace regurgitation and severe stenosis- doppler interrogation incomplete.       Plan: Pt would like to move forward with workup for TAVR. Patient has already been completed his coronary angiogram and his CTA TAVR. Patient will need to be scheduled for CT surgery consultation. This was scheduled during his clinic visit today.     Pt was recently seen at [facility] in [facility] (3 weeks prior). He did not have xrays done which he noted provider may want. Writer noted I would contact dental clinic to see if they would be ok signing off or if he would need to come in for an additional appt. Patient sent with dental clearance form    Reviewed pre-procedure work up and procedural related education information with patient and son. All questions answered, no further questions at this time. Patient has my direct contact information as well as the valve clinic line and was encouraged to call with questions or concerns. We will be scheduling patient for procedure on 10/24/23. Patient aware of pre-procedure RN visit and what to expect during hospitalization and immediate post procedure phase.     Yehuda Gill RN BSN- Valve Clinic Coordinator   [facility]  [facility]  Phone: [phone]  Fax: [phone]

## 2023-09-28 NOTE — PROGRESS NOTES
"Valve Clinic Prep Worksheet    Patient Name: Billy Carroll  : 1955  AGE: 68 year old     Patient Address: [address] [place] [address] [address] [place] [address] BMI: 30    Height (CM):    Ht Readings from Last 1 Encounters:   23 1.778 m (5' 10\")                                                      Weight (kg):   Wt Readings from Last 1 Encounters:   23 95.8 kg (211 lb 4.8 oz)                                                          Contact info/Phone number:  [phone]  Initial STS: 1.42%                                           Referred by: Dr. 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  2023: Pneumonitis  2023: Lumbosacral radiculopathy at L5  2023: Multiple myeloma (H)  2023: Mass of thoracic vertebra  2023: Acute hypoxemic respiratory failure (H)  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)  2023: Pneumonia of both lungs due to infectious organism,   unspecified part of lung    Patient had cath on 23- provider identified severe aortic stenosis during procedure; referred to valve clinic      Transthoracic Echocardiogram    Date (23): M mmHg Peak V: 410 cm/sec JOHANNA: not calculated DI: not calculated SVI: not calculated  EF: 60-65 %  Ao max P mmHg       Heart Cath Summary CTA (TAVR) NASIR   Cardiac Catheterization    Result Date: 2023  CARDIAC CATHETERIZATION AND " INTERVENTION REPORT    PATIENT NAME:  Billy Carroll          MEDICAL RECORD NUMBER: [id number]  : 1955       PROCEDURE DATE: 2023    CONCLUSIONS: Moderate nonobstructive coronary disease.    RECOMMENDATIONS:   1.  Usual postprocedure cares, please see orders.  2.  Recommend medical management of patient's coronary artery disease.  3.  Aggressive risk factor modification for secondary prevention.  4.  Recommend referral to structural heart clinic for evaluation of aortic   valve replacement given moderate to severe aortic valve stenosis noted on   stress echocardiogram.    PROCEDURE PERFORMED:   Selective right and left coronary angiography    PRE-OP DIAGNOSIS:  Atypical chest pain   Abnormal stress test  Aortic valve stenosis    POST-OP DIAGNOSIS: Same     PROCEDURALISTS: Domenico Sauceda MD    DIAGNOSTIC PROCEDURAL DETAILS:   Signed, informed consent was obtained. The patient was placed on the table   and prepped and draped in the usual fashion. Time out and site   verification performed.   Access: Right radial artery  Catheters: JL 4 to engage the left main coronary, JR4 for to engage right   coronary artery  Hemostasis: TR band    PROCEDURAL MEDICATIONS:  Conscious sedation - midazolam and fentanyl, please see procedure log for   dosing.   Local anesthesia - 1% lidocaine   Procedural anticoagulation - 75 units/kg of heparin     CONTRAST VOLUME: 48 mL Omnipaque  BLOOD LOSS: minimal   FLUIDS: None   SPECIMENS: None  COMPLICATIONS: None    Coronary Angiography:  LEFT MAIN: Normal caliber vessel that trifurcates into the left anterior   descending, ramus intermedius, and left circumflex arteries.  The left   main 30% diffuse disease.   LEFT ANTERIOR DESCENDING: Large vessel that gives rise to multiple   diagonal branches and septal perforators.  The LAD wraps around the apex   distally.  The proximal to mid LAD has 50-60% diffuse calcific disease   followed by moderate diffuse disease distally of  the LAD and its branches.  LEFT CIRCUMFLEX: Small nondominant vessel that gives rise to medium size   OM branch and terminates into a small vessel distally.  The left   circumflex and its branches have moderate diffuse disease.  RIGHT CORONARY ARTERY: Large dominant vessel that gives rise to right   posterior descending artery and posterolateral system.  The proximal to   mid right coronary artery moderate diffuse disease followed by a 60%   eccentric lesion in the mid to distal portion.  The right posterior   descending artery has moderate diffuse disease in the proximal portion   with a 60% stenosis in the midportion and mild diffuse disease distally.    The posterolateral system has mild diffuse disease.  RAMUS INTERMEDIUS: Medium to large size vessel with mild to moderate   diffuse disease.    Domenico Sauceda MD  Interventional Cardiology       9/19/23:  Right TF access  29 S3  N/A   Labs   Creat:   Creatinine   Date Value Ref Range Status   09/06/2023 0.76 0.70 - 1.30 mg/dL Final       GFR:   GFR Estimate   Date Value Ref Range Status   09/06/2023 >90 >60 mL/min/1.73m2 Final   06/16/2023 >90 >60 mL/min/1.73m2 Final     Comment:     eGFR calculated using 2021 CKD-EPI equation.   06/11/2023 >90 >60 mL/min/1.73m2 Final     Comment:     eGFR calculated using 2021 CKD-EPI equation.   02/18/2021 >60 >60 mL/min/1.73m2 Final   05/19/2020 >60 >60 mL/min/1.73m2 Final   11/25/2019 >60 >60 mL/min/1.73m2 Final     GFR Estimate If Black   Date Value Ref Range Status   02/18/2021 >60 >60 mL/min/1.73m2 Final   05/19/2020 >60 >60 mL/min/1.73m2 Final   11/25/2019 >60 >60 mL/min/1.73m2 Final       Potassium:   Potassium   Date Value Ref Range Status   09/06/2023 3.8 3.5 - 5.0 mmol/L Final       Sodium:   Sodium   Date Value Ref Range Status   09/06/2023 139 136 - 145 mmol/L Final       WBC:   WBC Count   Date Value Ref Range Status   09/06/2023 15.5 (H) 4.0 - 11.0 10e3/uL Final       Hgb:   Hemoglobin   Date Value Ref Range  Status   09/06/2023 11.9 (L) 13.3 - 17.7 g/dL Final   06/11/2023 9.8 (L) 13.3 - 17.7 g/dL Final       HCT:   Hematocrit   Date Value Ref Range Status   09/06/2023 36.3 (L) 40.0 - 53.0 % Final       Plts:   Platelet Count   Date Value Ref Range Status   09/06/2023 289 150 - 450 10e3/uL Final       Albumin:   Lab Results   Component Value Date    ALBUMIN 2.6 06/04/2023       INR: No results found for: INR     Current Medications: Allergies:    Current Outpatient Medications   Medication    acetaminophen (TYLENOL) 325 MG tablet    acyclovir (ZOVIRAX) 400 MG tablet    alendronate (FOSAMAX) 70 MG tablet    aspirin 81 MG EC tablet    B Complex-C-Folic Acid (HM VITAMIN B COMPLEX/VITAMIN C) TABS    bortezomib 3.5 MG injection    Calcium-Magnesium-Zinc 333-133-5 MG TABS per tablet    cholecalciferol 50 MCG (2000 UT) tablet    co-enzyme Q-10 100 MG CAPS capsule    Dexamethasone 20 MG TABS    fluticasone (FLONASE) 50 MCG/ACT nasal spray    lactobacillus rhamnosus, GG, (CULTURELL) capsule    LENalidomide (REVLIMID) 25 MG CAPS capsule    loperamide (IMODIUM) 2 MG capsule    loratadine (CLARITIN) 10 MG tablet    losartan (COZAAR) 50 MG tablet    mirtazapine (REMERON) 15 MG tablet    nitroGLYcerin (NITROSTAT) 0.4 MG sublingual tablet    nitroGLYcerin (NITROSTAT) 0.4 MG sublingual tablet    Omega-3 Fatty Acids (FISH OIL BURP-LESS) 1000 MG CAPS    oxyCODONE (ROXICODONE) 5 MG tablet    polyethylene glycol (MIRALAX) 17 g packet    prochlorperazine (COMPAZINE) 10 MG tablet    simvastatin (ZOCOR) 40 MG tablet    sulfamethoxazole-trimethoprim (BACTRIM DS) 800-160 MG tablet    Turmeric 500 MG CAPS    vitamin B complex with vitamin C (VITAMIN  B COMPLEX) tablet    zinc gluconate 50 MG tablet     No current facility-administered medications for this visit.     Facility-Administered Medications Ordered in Other Visits   Medication    fentaNYL (PF) (SUBLIMAZE) injection    lidocaine (viscous) (XYLOCAINE) 2 % solution    lidocaine 1 %  injection    lidocaine 4 % injection    midazolam (VERSED) injection    sodium chloride 0.9% infusion        Allergies   Allergen Reactions    Acetaminophen-Codeine Nausea    Codeine Nausea

## 2023-10-03 ENCOUNTER — MEDICAL CORRESPONDENCE (OUTPATIENT)
Dept: TRANSPLANT | Facility: CLINIC | Age: 68
End: 2023-10-03

## 2023-10-04 ENCOUNTER — OFFICE VISIT (OUTPATIENT)
Dept: CARDIOLOGY | Facility: CLINIC | Age: 68
End: 2023-10-04
Attending: INTERNAL MEDICINE
Payer: MEDICARE

## 2023-10-04 ENCOUNTER — TELEPHONE (OUTPATIENT)
Dept: CARDIOLOGY | Facility: CLINIC | Age: 68
End: 2023-10-04

## 2023-10-04 VITALS
WEIGHT: 211 LBS | RESPIRATION RATE: 16 BRPM | BODY MASS INDEX: 30.28 KG/M2 | HEART RATE: 80 BPM | SYSTOLIC BLOOD PRESSURE: 130 MMHG | DIASTOLIC BLOOD PRESSURE: 80 MMHG

## 2023-10-04 DIAGNOSIS — D84.821 IMMUNOSUPPRESSED DUE TO CHEMOTHERAPY (H): ICD-10-CM

## 2023-10-04 DIAGNOSIS — C90.00 MULTIPLE MYELOMA NOT HAVING ACHIEVED REMISSION (H): ICD-10-CM

## 2023-10-04 DIAGNOSIS — T45.1X5A IMMUNOSUPPRESSED DUE TO CHEMOTHERAPY (H): ICD-10-CM

## 2023-10-04 DIAGNOSIS — I35.0 NONRHEUMATIC AORTIC VALVE STENOSIS: Primary | ICD-10-CM

## 2023-10-04 DIAGNOSIS — Z79.899 IMMUNOSUPPRESSED DUE TO CHEMOTHERAPY (H): ICD-10-CM

## 2023-10-04 PROCEDURE — 99204 OFFICE O/P NEW MOD 45 MIN: CPT | Performed by: THORACIC SURGERY (CARDIOTHORACIC VASCULAR SURGERY)

## 2023-10-04 NOTE — PROGRESS NOTES
ASKED BY REFERRING PHYSICIAN: Dr. Sauceda to evaluate this patient for open surgical versus transcatheter aortic valve replacement    CHIEF COMPLAINT: Shortness of breath with exertion    HPI: Billy is a 68 year old male who presents with severe symptomatic aortic stenosis.  He has multiple myeloma and has had palliative radiation and is now on systemic chemotherapy.  He is being evaluated for a possible stem cell transplant. He does not have significant coronary artery disease.    PAST MEDICAL HISTORY:  Past Medical History:   Diagnosis Date    Cancer (H)     Multiple myeloma (H)        PAST SURGICAL HISTORY:  Past Surgical History:   Procedure Laterality Date    CV CORONARY ANGIOGRAM N/A 9/7/2023    Procedure: Coronary Angiogram;  Surgeon: Domenico Sauceda MD;  Location: [facility]       FAMILY HISTORY:   No family history on file.    SOCIAL HISTORY:  Social History     Socioeconomic History    Marital status:      Spouse name: Not on file    Number of children: Not on file    Years of education: Not on file    Highest education level: Not on file   Occupational History    Not on file   Tobacco Use    Smoking status: Never     Passive exposure: Never    Smokeless tobacco: Never   Vaping Use    Vaping Use: Never used   Substance and Sexual Activity    Alcohol use: Not on file    Drug use: Never    Sexual activity: Not on file   Other Topics Concern    Not on file   Social History Narrative    Not on file     Social Determinants of Health     Financial Resource Strain: Not on file   Food Insecurity: Not on file   Transportation Needs: Not on file   Physical Activity: Not on file   Stress: Not on file   Social Connections: Not on file   Interpersonal Safety: Not on file   Housing Stability: Not on file        ALLERGIES:   Allergies   Allergen Reactions    Acetaminophen-Codeine Nausea    Codeine Nausea       CURRENT MEDICATIONS:   Prescription Medications as of 10/4/2023         Rx Number Disp  Refills Start End Last Dispensed Date Next Fill Date Owning Pharmacy    acetaminophen (TYLENOL) 325 MG tablet  200 tablet 0 6/11/2023    [facility] [address] [place], [place] [id number] [facility]    Sig: Take 3 tablets (975 mg) by mouth 3 times daily (before meals)    Class: E-Prescribe    Route: Oral    acyclovir (ZOVIRAX) 400 MG tablet    5/17/2023        Sig: Take 400 mg by mouth 2 times daily    Class: Historical    Route: Oral    alendronate (FOSAMAX) 70 MG tablet  12 tablet 1 8/21/2023    [facility] [address] [place], [place] [id number] [facility]    Sig: Take 1 tablet (70 mg) by mouth every 7 days    Class: E-Prescribe    Route: Oral    aspirin 81 MG EC tablet            Sig: Take 81 mg by mouth daily    Class: Historical    Route: Oral    B Complex-C-Folic Acid (HM VITAMIN B COMPLEX/VITAMIN C) TABS            Sig: Take 1 tablet by mouth daily    Class: Historical    Route: Oral    bortezomib 3.5 MG injection            Sig: Inject 1.3 mg/m2 into the vein once a week    Class: Historical    Route: Intravenous    Calcium-Magnesium-Zinc 333-133-5 MG TABS per tablet            Sig: Take 2 tablets by mouth daily    Class: Historical    Route: Oral    cholecalciferol 50 MCG (2000 UT) tablet            Sig: Take 2,000 Units by mouth daily    Class: Historical    Route: Oral    co-enzyme Q-10 100 MG CAPS capsule            Sig: Take 100 mg by mouth daily    Class: Historical    Route: Oral    Dexamethasone 20 MG TABS            Sig: Take 40 mg by mouth every 7 days Takes on Tuesdays for treatment of multiple myeloma    Class: Historical    Route: Oral    fluticasone (FLONASE) 50 MCG/ACT nasal spray            Sig: Spray 1 spray into both nostrils as needed    Class: Historical    Route: Both Nostrils    lactobacillus rhamnosus, GG, (CULTURELL) capsule            Sig: Take 1 capsule by mouth daily    Class: Historical    Route: Oral    LENalidomide (REVLIMID) 25 MG CAPS capsule    7/26/2023        Sig: Take 25 mg by  mouth daily    Class: Historical    Route: Oral    loperamide (IMODIUM) 2 MG capsule    8/21/2023        Sig: Take 1 capsule by mouth 4 times daily as needed for diarrhea    Class: Historical    Route: Oral    loratadine (CLARITIN) 10 MG tablet            Sig: Take 10 mg by mouth daily    Class: Historical    Route: Oral    losartan (COZAAR) 50 MG tablet            Sig: Take 100 mg by mouth daily    Class: Historical    Route: Oral    mirtazapine (REMERON) 15 MG tablet    8/5/2023        Sig: Take 15 mg by mouth At Bedtime    Class: Historical    Route: Oral    nitroGLYcerin (NITROSTAT) 0.4 MG sublingual tablet  25 tablet 3 8/23/2023    [facility] [address] [place] [address]    Sig: For chest pain place 1 tablet under the tongue every 5 minutes for 3 doses. If symptoms persist 5 minutes after 1st dose call 911.    Class: E-Prescribe    nitroGLYcerin (NITROSTAT) 0.4 MG sublingual tablet            Sig: Place 0.4 mg under the tongue every 5 minutes as needed for chest pain For GILLETTE    Class: Historical    Route: Sublingual    Omega-3 Fatty Acids (FISH OIL BURP-LESS) 1000 MG CAPS            Sig: Take 1 capsule by mouth daily    Class: Historical    Route: Oral    polyethylene glycol (MIRALAX) 17 g packet            Sig: Take 1 packet by mouth daily as needed for constipation    Class: Historical    Route: Oral    simvastatin (ZOCOR) 40 MG tablet  90 tablet 3 8/24/2023    [facility] [address] [place] [address]    Sig: Take 1 tablet (40 mg) by mouth daily    Class: E-Prescribe    Route: Oral    sulfamethoxazole-trimethoprim (BACTRIM DS) 800-160 MG tablet  30 tablet 0 6/12/2023    [facility] [address] [place] [address]    Sig: Take 1 tablet by mouth three times a week Take on Mondays, Wednesday, fridaysof week while taking Prednsione more than 20mg daily for PCP prophylaxis    Class: E-Prescribe    Route: Oral    Turmeric 500 MG CAPS            Sig: Take 2 capsules  by mouth daily    Class: Historical    Route: Oral    vitamin B complex with vitamin C (VITAMIN  B COMPLEX) tablet            Sig: Take 1 tablet by mouth daily    Class: Historical    Route: Oral    zinc gluconate 50 MG tablet            Sig: Take 50 mg by mouth daily    Class: Historical    Route: Oral          Hospital Medications as of 10/4/2023         Dose Frequency Start End    fentaNYL (PF) (SUBLIMAZE) injection  PRN 6/5/2023     Route: Intravenous    lidocaine (viscous) (XYLOCAINE) 2 % solution  PRN 6/5/2023     Route: Mouth/Throat    lidocaine 1 % injection  PRN 6/5/2023     lidocaine 4 % injection  PRN 6/5/2023     midazolam (VERSED) injection  PRN 6/5/2023     Route: Intravenous    sodium chloride 0.9% infusion  CONTINUOUS PRN 6/5/2023     Route: Intravenous            REVIEW OF SYSTEMS:   Gen: denies frequent headaches, double/blurry vision, insomnia, fatigue, unexplained weight loss/gain. No previous anesthesia reactions.  CV: denies chest pain, shortness of breath, palpitations, peripheral edema.    Pulm: denies shortness of breath, asthma or wheezing  GI/: denies liver or kidney problems, blood in stool or BRBPR, difficulty urinating  Endo: denies thyroid problems or Diabetes  Heme/Onc: Multiple myeloma  MS: no weakness, tremors or gait instability  Neuro: denies depression, memory problems, no dysesthesias, no previous strokes, no migraines, no dysphagia  Skin: No petechiae, purpura or rash.    PHYSICAL EXAMINATION:   /80 (BP Location: Right arm, Patient Position: Sitting, Cuff Size: Adult Large)   Pulse 80   Resp 16   Wt 95.7 kg (211 lb)   BMI 30.28 kg/m    General: alert and oriented x 3, pleasant, no acute distress  CV: S1 S2, no murmurs, rubs or gallops, regular rate and rhythm, no peripheral edema, no carotid or abdomenal bruits, pulses in upper and lower extremities palpable  Pulm: bilateral breath sounds, clear to auscultation, easy work of breathing  GI: (+) bowel sounds, soft  non-tender and non-distended  : voiding without problems  MS: moves all extremities x 4,  5+/5+ equal strength bilaterally  Neuro: pupils equal round and reactive to light, cranial nerves, II-XII grossly intact, no gross neurologic deficits noted    LABS:  BMP RESULTS:  Lab Results   Component Value Date     09/06/2023    POTASSIUM 3.8 09/06/2023    CHLORIDE 105 09/06/2023    CO2 23 09/06/2023    ANIONGAP 11 09/06/2023     (H) 09/06/2023    BUN 17 09/06/2023    CR 0.76 09/06/2023    GFRESTIMATED >90 09/06/2023    GFRESTIMATED >60 02/18/2021    GFRESTBLACK >60 02/18/2021    SESAR 9.0 09/06/2023        CBC RESULTS:  Lab Results   Component Value Date    WBC 15.5 (H) 09/06/2023    RBC 3.78 (L) 09/06/2023    HGB 11.9 (L) 09/06/2023    HCT 36.3 (L) 09/06/2023    MCV 96 09/06/2023    MCH 31.5 09/06/2023    MCHC 32.8 09/06/2023    RDW 14.1 09/06/2023     09/06/2023         PROCEDURES/IMAGING:  Chest X-Ray: Not done  Angio: Nonobstructive coronary artery disease  Echo: Severe aortic stenosis   CT: TF approach  MRI: Not done  Carotid US: Not odne     ASSESSMENT/PLAN:   Billy is a 68 year old gentleman with severe symptomatic aortic stenosis.  I believe that he would benefit from aortic valve replacement. Given his age and multiple myeloma I support his candidacy to undergo transcatheter aortic valve replacement.  He understands that the risks for this procedure include: bleeding, infection, stroke, heart block requiring a pacemaker, aortic root rupture, aortic dissection, and an operative mortality of 1 to 2 percent.  He accepts these risks and is eager to have his TAVR procedure later this month..    1. Complete outpatient work-up  2. Transfemoral TAVR on October 24, 2023     Approximately 30 minutes were spent with the patient in clinic at this visit.    CC  Patient Care Team:  Cipriano Fuentes PA-C as PCP - General (Physician Assistant)  Arcelia Sheldon as Consulting Physician (Radiation  Oncology)  Adalberto Nelson DO as Physician (Cardiovascular Disease)  Miguel Harrell MD as Assigned PCP  Oniel Lugo MD as Assigned Heart and Vascular Provider  ONIEL LUGO

## 2023-10-04 NOTE — H&P (VIEW-ONLY)
ASKED BY REFERRING PHYSICIAN: Dr. Sauceda to evaluate this patient for open surgical versus transcatheter aortic valve replacement    CHIEF COMPLAINT: Shortness of breath with exertion    HPI: Billy is a 68 year old male who presents with severe symptomatic aortic stenosis.  He has multiple myeloma and has had palliative radiation and is now on systemic chemotherapy.  He is being evaluated for a possible stem cell transplant. He does not have significant coronary artery disease.    PAST MEDICAL HISTORY:  Past Medical History:   Diagnosis Date    Cancer (H)     Multiple myeloma (H)        PAST SURGICAL HISTORY:  Past Surgical History:   Procedure Laterality Date    CV CORONARY ANGIOGRAM N/A 9/7/2023    Procedure: Coronary Angiogram;  Surgeon: Domenico Sauceda MD;  Location: [facility] CV       FAMILY HISTORY:   No family history on file.    SOCIAL HISTORY:  Social History     Socioeconomic History    Marital status:      Spouse name: Not on file    Number of children: Not on file    Years of education: Not on file    Highest education level: Not on file   Occupational History    Not on file   Tobacco Use    Smoking status: Never     Passive exposure: Never    Smokeless tobacco: Never   Vaping Use    Vaping Use: Never used   Substance and Sexual Activity    Alcohol use: Not on file    Drug use: Never    Sexual activity: Not on file   Other Topics Concern    Not on file   Social History Narrative    Not on file     Social Determinants of Health     Financial Resource Strain: Not on file   Food Insecurity: Not on file   Transportation Needs: Not on file   Physical Activity: Not on file   Stress: Not on file   Social Connections: Not on file   Interpersonal Safety: Not on file   Housing Stability: Not on file        ALLERGIES:   Allergies   Allergen Reactions    Acetaminophen-Codeine Nausea    Codeine Nausea       CURRENT MEDICATIONS:   Prescription Medications as of 10/4/2023         Rx Number Disp  Refills Start End Last Dispensed Date Next Fill Date Owning Pharmacy    acetaminophen (TYLENOL) 325 MG tablet  200 tablet 0 6/11/2023    [facility] [address] [place], [place] [id number] [facility]    Sig: Take 3 tablets (975 mg) by mouth 3 times daily (before meals)    Class: E-Prescribe    Route: Oral    acyclovir (ZOVIRAX) 400 MG tablet    5/17/2023        Sig: Take 400 mg by mouth 2 times daily    Class: Historical    Route: Oral    alendronate (FOSAMAX) 70 MG tablet  12 tablet 1 8/21/2023    [facility] [address] [place], [place] [id number] [facility]    Sig: Take 1 tablet (70 mg) by mouth every 7 days    Class: E-Prescribe    Route: Oral    aspirin 81 MG EC tablet            Sig: Take 81 mg by mouth daily    Class: Historical    Route: Oral    B Complex-C-Folic Acid (HM VITAMIN B COMPLEX/VITAMIN C) TABS            Sig: Take 1 tablet by mouth daily    Class: Historical    Route: Oral    bortezomib 3.5 MG injection            Sig: Inject 1.3 mg/m2 into the vein once a week    Class: Historical    Route: Intravenous    Calcium-Magnesium-Zinc 333-133-5 MG TABS per tablet            Sig: Take 2 tablets by mouth daily    Class: Historical    Route: Oral    cholecalciferol 50 MCG (2000 UT) tablet            Sig: Take 2,000 Units by mouth daily    Class: Historical    Route: Oral    co-enzyme Q-10 100 MG CAPS capsule            Sig: Take 100 mg by mouth daily    Class: Historical    Route: Oral    Dexamethasone 20 MG TABS            Sig: Take 40 mg by mouth every 7 days Takes on Tuesdays for treatment of multiple myeloma    Class: Historical    Route: Oral    fluticasone (FLONASE) 50 MCG/ACT nasal spray            Sig: Spray 1 spray into both nostrils as needed    Class: Historical    Route: Both Nostrils    lactobacillus rhamnosus, GG, (CULTURELL) capsule            Sig: Take 1 capsule by mouth daily    Class: Historical    Route: Oral    LENalidomide (REVLIMID) 25 MG CAPS capsule    7/26/2023        Sig: Take 25 mg by  mouth daily    Class: Historical    Route: Oral    loperamide (IMODIUM) 2 MG capsule    8/21/2023        Sig: Take 1 capsule by mouth 4 times daily as needed for diarrhea    Class: Historical    Route: Oral    loratadine (CLARITIN) 10 MG tablet            Sig: Take 10 mg by mouth daily    Class: Historical    Route: Oral    losartan (COZAAR) 50 MG tablet            Sig: Take 100 mg by mouth daily    Class: Historical    Route: Oral    mirtazapine (REMERON) 15 MG tablet    8/5/2023        Sig: Take 15 mg by mouth At Bedtime    Class: Historical    Route: Oral    nitroGLYcerin (NITROSTAT) 0.4 MG sublingual tablet  25 tablet 3 8/23/2023    [facility] [address] [place] [address]    Sig: For chest pain place 1 tablet under the tongue every 5 minutes for 3 doses. If symptoms persist 5 minutes after 1st dose call 911.    Class: E-Prescribe    nitroGLYcerin (NITROSTAT) 0.4 MG sublingual tablet            Sig: Place 0.4 mg under the tongue every 5 minutes as needed for chest pain For GILLETTE    Class: Historical    Route: Sublingual    Omega-3 Fatty Acids (FISH OIL BURP-LESS) 1000 MG CAPS            Sig: Take 1 capsule by mouth daily    Class: Historical    Route: Oral    polyethylene glycol (MIRALAX) 17 g packet            Sig: Take 1 packet by mouth daily as needed for constipation    Class: Historical    Route: Oral    simvastatin (ZOCOR) 40 MG tablet  90 tablet 3 8/24/2023    [facility] [address] [place] [address]    Sig: Take 1 tablet (40 mg) by mouth daily    Class: E-Prescribe    Route: Oral    sulfamethoxazole-trimethoprim (BACTRIM DS) 800-160 MG tablet  30 tablet 0 6/12/2023    [facility] [address] [place] [address]    Sig: Take 1 tablet by mouth three times a week Take on Mondays, Wednesday, fridaysof week while taking Prednsione more than 20mg daily for PCP prophylaxis    Class: E-Prescribe    Route: Oral    Turmeric 500 MG CAPS            Sig: Take 2 capsules  by mouth daily    Class: Historical    Route: Oral    vitamin B complex with vitamin C (VITAMIN  B COMPLEX) tablet            Sig: Take 1 tablet by mouth daily    Class: Historical    Route: Oral    zinc gluconate 50 MG tablet            Sig: Take 50 mg by mouth daily    Class: Historical    Route: Oral          Hospital Medications as of 10/4/2023         Dose Frequency Start End    fentaNYL (PF) (SUBLIMAZE) injection  PRN 6/5/2023     Route: Intravenous    lidocaine (viscous) (XYLOCAINE) 2 % solution  PRN 6/5/2023     Route: Mouth/Throat    lidocaine 1 % injection  PRN 6/5/2023     lidocaine 4 % injection  PRN 6/5/2023     midazolam (VERSED) injection  PRN 6/5/2023     Route: Intravenous    sodium chloride 0.9% infusion  CONTINUOUS PRN 6/5/2023     Route: Intravenous            REVIEW OF SYSTEMS:   Gen: denies frequent headaches, double/blurry vision, insomnia, fatigue, unexplained weight loss/gain. No previous anesthesia reactions.  CV: denies chest pain, shortness of breath, palpitations, peripheral edema.    Pulm: denies shortness of breath, asthma or wheezing  GI/: denies liver or kidney problems, blood in stool or BRBPR, difficulty urinating  Endo: denies thyroid problems or Diabetes  Heme/Onc: Multiple myeloma  MS: no weakness, tremors or gait instability  Neuro: denies depression, memory problems, no dysesthesias, no previous strokes, no migraines, no dysphagia  Skin: No petechiae, purpura or rash.    PHYSICAL EXAMINATION:   /80 (BP Location: Right arm, Patient Position: Sitting, Cuff Size: Adult Large)   Pulse 80   Resp 16   Wt 95.7 kg (211 lb)   BMI 30.28 kg/m    General: alert and oriented x 3, pleasant, no acute distress  CV: S1 S2, no murmurs, rubs or gallops, regular rate and rhythm, no peripheral edema, no carotid or abdomenal bruits, pulses in upper and lower extremities palpable  Pulm: bilateral breath sounds, clear to auscultation, easy work of breathing  GI: (+) bowel sounds, soft  non-tender and non-distended  : voiding without problems  MS: moves all extremities x 4,  5+/5+ equal strength bilaterally  Neuro: pupils equal round and reactive to light, cranial nerves, II-XII grossly intact, no gross neurologic deficits noted    LABS:  BMP RESULTS:  Lab Results   Component Value Date     09/06/2023    POTASSIUM 3.8 09/06/2023    CHLORIDE 105 09/06/2023    CO2 23 09/06/2023    ANIONGAP 11 09/06/2023     (H) 09/06/2023    BUN 17 09/06/2023    CR 0.76 09/06/2023    GFRESTIMATED >90 09/06/2023    GFRESTIMATED >60 02/18/2021    GFRESTBLACK >60 02/18/2021    SESAR 9.0 09/06/2023        CBC RESULTS:  Lab Results   Component Value Date    WBC 15.5 (H) 09/06/2023    RBC 3.78 (L) 09/06/2023    HGB 11.9 (L) 09/06/2023    HCT 36.3 (L) 09/06/2023    MCV 96 09/06/2023    MCH 31.5 09/06/2023    MCHC 32.8 09/06/2023    RDW 14.1 09/06/2023     09/06/2023         PROCEDURES/IMAGING:  Chest X-Ray: Not done  Angio: Nonobstructive coronary artery disease  Echo: Severe aortic stenosis   CT: TF approach  MRI: Not done  Carotid US: Not odne     ASSESSMENT/PLAN:   Billy is a 68 year old gentleman with severe symptomatic aortic stenosis.  I believe that he would benefit from aortic valve replacement. Given his age and multiple myeloma I support his candidacy to undergo transcatheter aortic valve replacement.  He understands that the risks for this procedure include: bleeding, infection, stroke, heart block requiring a pacemaker, aortic root rupture, aortic dissection, and an operative mortality of 1 to 2 percent.  He accepts these risks and is eager to have his TAVR procedure later this month..    1. Complete outpatient work-up  2. Transfemoral TAVR on October 24, 2023     Approximately 30 minutes were spent with the patient in clinic at this visit.    CC  Patient Care Team:  Cipriano Fuentes PA-C as PCP - General (Physician Assistant)  Arcelia Sheldon as Consulting Physician (Radiation  Oncology)  Adalberto Nelson DO as Physician (Cardiovascular Disease)  Miguel Harrell MD as Assigned PCP  Oniel Lugo MD as Assigned Heart and Vascular Provider  ONIEL LUGO

## 2023-10-04 NOTE — LETTER
10/4/2023    Cipriano Fuentes PA-C  [address], [address]  [place] [address]    RE: Billy Carroll       Dear Colleague,     I had the pleasure of seeing Billy Carroll in the [facility].  ASKED BY REFERRING PHYSICIAN: Dr. Sauceda to evaluate this patient for open surgical versus transcatheter aortic valve replacement    CHIEF COMPLAINT: Shortness of breath with exertion    HPI: Billy is a 68 year old male who presents with severe symptomatic aortic stenosis.  He has multiple myeloma and has had palliative radiation and is now on systemic chemotherapy.  He is being evaluated for a possible stem cell transplant. He does not have significant coronary artery disease.    PAST MEDICAL HISTORY:  Past Medical History:   Diagnosis Date    Cancer (H)     Multiple myeloma (H)        PAST SURGICAL HISTORY:  Past Surgical History:   Procedure Laterality Date    CV CORONARY ANGIOGRAM N/A 9/7/2023    Procedure: Coronary Angiogram;  Surgeon: Domenico Sauceda MD;  Location: [facility] CV       FAMILY HISTORY:   No family history on file.    SOCIAL HISTORY:  Social History     Socioeconomic History    Marital status:      Spouse name: Not on file    Number of children: Not on file    Years of education: Not on file    Highest education level: Not on file   Occupational History    Not on file   Tobacco Use    Smoking status: Never     Passive exposure: Never    Smokeless tobacco: Never   Vaping Use    Vaping Use: Never used   Substance and Sexual Activity    Alcohol use: Not on file    Drug use: Never    Sexual activity: Not on file   Other Topics Concern    Not on file   Social History Narrative    Not on file     Social Determinants of Health     Financial Resource Strain: Not on file   Food Insecurity: Not on file   Transportation Needs: Not on file   Physical Activity: Not on file   Stress: Not on file   Social Connections: Not on file   Interpersonal Safety: Not on file   Housing  Stability: Not on file        ALLERGIES:   Allergies   Allergen Reactions    Acetaminophen-Codeine Nausea    Codeine Nausea       CURRENT MEDICATIONS:   Prescription Medications as of 10/4/2023         Rx Number Disp Refills Start End Last Dispensed Date Next Fill Date Owning Pharmacy    acetaminophen (TYLENOL) 325 MG tablet  200 tablet 0 6/11/2023    [facility] [address] [place] [address]    Sig: Take 3 tablets (975 mg) by mouth 3 times daily (before meals)    Class: E-Prescribe    Route: Oral    acyclovir (ZOVIRAX) 400 MG tablet    5/17/2023        Sig: Take 400 mg by mouth 2 times daily    Class: Historical    Route: Oral    alendronate (FOSAMAX) 70 MG tablet  12 tablet 1 8/21/2023    [facility] [address] [place], [place] [id number] [facility]    Sig: Take 1 tablet (70 mg) by mouth every 7 days    Class: E-Prescribe    Route: Oral    aspirin 81 MG EC tablet            Sig: Take 81 mg by mouth daily    Class: Historical    Route: Oral    B Complex-C-Folic Acid ( VITAMIN B COMPLEX/VITAMIN C) TABS            Sig: Take 1 tablet by mouth daily    Class: Historical    Route: Oral    bortezomib 3.5 MG injection            Sig: Inject 1.3 mg/m2 into the vein once a week    Class: Historical    Route: Intravenous    Calcium-Magnesium-Zinc 333-133-5 MG TABS per tablet            Sig: Take 2 tablets by mouth daily    Class: Historical    Route: Oral    cholecalciferol 50 MCG (2000 UT) tablet            Sig: Take 2,000 Units by mouth daily    Class: Historical    Route: Oral    co-enzyme Q-10 100 MG CAPS capsule            Sig: Take 100 mg by mouth daily    Class: Historical    Route: Oral    Dexamethasone 20 MG TABS            Sig: Take 40 mg by mouth every 7 days Takes on Tuesdays for treatment of multiple myeloma    Class: Historical    Route: Oral    fluticasone (FLONASE) 50 MCG/ACT nasal spray            Sig: Spray 1 spray into both nostrils as needed    Class: Historical    Route: Both Nostrils     lactobacillus rhamnosus, GG, (CULTURELL) capsule            Sig: Take 1 capsule by mouth daily    Class: Historical    Route: Oral    LENalidomide (REVLIMID) 25 MG CAPS capsule    7/26/2023        Sig: Take 25 mg by mouth daily    Class: Historical    Route: Oral    loperamide (IMODIUM) 2 MG capsule    8/21/2023        Sig: Take 1 capsule by mouth 4 times daily as needed for diarrhea    Class: Historical    Route: Oral    loratadine (CLARITIN) 10 MG tablet            Sig: Take 10 mg by mouth daily    Class: Historical    Route: Oral    losartan (COZAAR) 50 MG tablet            Sig: Take 100 mg by mouth daily    Class: Historical    Route: Oral    mirtazapine (REMERON) 15 MG tablet    8/5/2023        Sig: Take 15 mg by mouth At Bedtime    Class: Historical    Route: Oral    nitroGLYcerin (NITROSTAT) 0.4 MG sublingual tablet  25 tablet 3 8/23/2023    [facility] [address] [place] [address]    Sig: For chest pain place 1 tablet under the tongue every 5 minutes for 3 doses. If symptoms persist 5 minutes after 1st dose call 911.    Class: E-Prescribe    nitroGLYcerin (NITROSTAT) 0.4 MG sublingual tablet            Sig: Place 0.4 mg under the tongue every 5 minutes as needed for chest pain For GILLETTE    Class: Historical    Route: Sublingual    Omega-3 Fatty Acids (FISH OIL BURP-LESS) 1000 MG CAPS            Sig: Take 1 capsule by mouth daily    Class: Historical    Route: Oral    polyethylene glycol (MIRALAX) 17 g packet            Sig: Take 1 packet by mouth daily as needed for constipation    Class: Historical    Route: Oral    simvastatin (ZOCOR) 40 MG tablet  90 tablet 3 8/24/2023    [facility] [address] [place] [address]    Sig: Take 1 tablet (40 mg) by mouth daily    Class: E-Prescribe    Route: Oral    sulfamethoxazole-trimethoprim (BACTRIM DS) 800-160 MG tablet  30 tablet 0 6/12/2023    [facility] [address] [place] [address]    Sig: Take 1 tablet by mouth three  times a week Take on Mondays, Wednesday, fridaysof week while taking Prednsione more than 20mg daily for PCP prophylaxis    Class: E-Prescribe    Route: Oral    Turmeric 500 MG CAPS            Sig: Take 2 capsules by mouth daily    Class: Historical    Route: Oral    vitamin B complex with vitamin C (VITAMIN  B COMPLEX) tablet            Sig: Take 1 tablet by mouth daily    Class: Historical    Route: Oral    zinc gluconate 50 MG tablet            Sig: Take 50 mg by mouth daily    Class: Historical    Route: Oral          Hospital Medications as of 10/4/2023         Dose Frequency Start End    fentaNYL (PF) (SUBLIMAZE) injection  PRN 6/5/2023     Route: Intravenous    lidocaine (viscous) (XYLOCAINE) 2 % solution  PRN 6/5/2023     Route: Mouth/Throat    lidocaine 1 % injection  PRN 6/5/2023     lidocaine 4 % injection  PRN 6/5/2023     midazolam (VERSED) injection  PRN 6/5/2023     Route: Intravenous    sodium chloride 0.9% infusion  CONTINUOUS PRN 6/5/2023     Route: Intravenous            REVIEW OF SYSTEMS:   Gen: denies frequent headaches, double/blurry vision, insomnia, fatigue, unexplained weight loss/gain. No previous anesthesia reactions.  CV: denies chest pain, shortness of breath, palpitations, peripheral edema.    Pulm: denies shortness of breath, asthma or wheezing  GI/: denies liver or kidney problems, blood in stool or BRBPR, difficulty urinating  Endo: denies thyroid problems or Diabetes  Heme/Onc: Multiple myeloma  MS: no weakness, tremors or gait instability  Neuro: denies depression, memory problems, no dysesthesias, no previous strokes, no migraines, no dysphagia  Skin: No petechiae, purpura or rash.    PHYSICAL EXAMINATION:   /80 (BP Location: Right arm, Patient Position: Sitting, Cuff Size: Adult Large)   Pulse 80   Resp 16   Wt 95.7 kg (211 lb)   BMI 30.28 kg/m    General: alert and oriented x 3, pleasant, no acute distress  CV: S1 S2, no murmurs, rubs or gallops, regular rate and  rhythm, no peripheral edema, no carotid or abdomenal bruits, pulses in upper and lower extremities palpable  Pulm: bilateral breath sounds, clear to auscultation, easy work of breathing  GI: (+) bowel sounds, soft non-tender and non-distended  : voiding without problems  MS: moves all extremities x 4,  5+/5+ equal strength bilaterally  Neuro: pupils equal round and reactive to light, cranial nerves, II-XII grossly intact, no gross neurologic deficits noted    LABS:  BMP RESULTS:  Lab Results   Component Value Date     09/06/2023    POTASSIUM 3.8 09/06/2023    CHLORIDE 105 09/06/2023    CO2 23 09/06/2023    ANIONGAP 11 09/06/2023     (H) 09/06/2023    BUN 17 09/06/2023    CR 0.76 09/06/2023    GFRESTIMATED >90 09/06/2023    GFRESTIMATED >60 02/18/2021    GFRESTBLACK >60 02/18/2021    SESAR 9.0 09/06/2023        CBC RESULTS:  Lab Results   Component Value Date    WBC 15.5 (H) 09/06/2023    RBC 3.78 (L) 09/06/2023    HGB 11.9 (L) 09/06/2023    HCT 36.3 (L) 09/06/2023    MCV 96 09/06/2023    MCH 31.5 09/06/2023    MCHC 32.8 09/06/2023    RDW 14.1 09/06/2023     09/06/2023         PROCEDURES/IMAGING:  Chest X-Ray: Not done  Angio: Nonobstructive coronary artery disease  Echo: Severe aortic stenosis   CT: TF approach  MRI: Not done  Carotid US: Not odne     ASSESSMENT/PLAN:   Billy is a 68 year old gentleman with severe symptomatic aortic stenosis.  I believe that he would benefit from aortic valve replacement. Given his age and multiple myeloma I support his candidacy to undergo transcatheter aortic valve replacement.  He understands that the risks for this procedure include: bleeding, infection, stroke, heart block requiring a pacemaker, aortic root rupture, aortic dissection, and an operative mortality of 1 to 2 percent.  He accepts these risks and is eager to have his TAVR procedure later this month..    1. Complete outpatient work-up  2. Transfemoral TAVR on October 24, 2023     Approximately 30  minutes were spent with the patient in clinic at this visit.    CC  Patient Care Team:  Cipriano Fuentes PA-C as PCP - General (Physician Assistant)  Arcelia Sheldon as Consulting Physician (Radiation Oncology)  Adalberto Nelson DO as Physician (Cardiovascular Disease)  Miguel Harrell MD as Assigned PCP  Oniel Sauceda MD as Assigned Heart and Vascular Provider  ONIEL SAUCEDA      Thank you for allowing me to participate in the care of your patient.    Sincerely,     Catalina Perez MD     [facility]  cc:   Oniel Sauceda MD  [address] [address]

## 2023-10-19 DIAGNOSIS — R06.02 SOB (SHORTNESS OF BREATH): Primary | ICD-10-CM

## 2023-10-19 DIAGNOSIS — I35.0 AORTIC STENOSIS: ICD-10-CM

## 2023-10-22 LAB
ABO/RH(D): NORMAL
ANTIBODY SCREEN: NEGATIVE
SPECIMEN EXPIRATION DATE: NORMAL

## 2023-10-23 ENCOUNTER — ALLIED HEALTH/NURSE VISIT (OUTPATIENT)
Dept: CARDIOLOGY | Facility: CLINIC | Age: 68
End: 2023-10-23
Payer: MEDICARE

## 2023-10-23 ENCOUNTER — LAB (OUTPATIENT)
Dept: CARDIOLOGY | Facility: CLINIC | Age: 68
End: 2023-10-23
Payer: MEDICARE

## 2023-10-23 ENCOUNTER — PREP FOR PROCEDURE (OUTPATIENT)
Dept: CARDIOLOGY | Facility: CLINIC | Age: 68
End: 2023-10-23

## 2023-10-23 VITALS
BODY MASS INDEX: 30.85 KG/M2 | DIASTOLIC BLOOD PRESSURE: 72 MMHG | SYSTOLIC BLOOD PRESSURE: 140 MMHG | HEART RATE: 104 BPM | RESPIRATION RATE: 16 BRPM | WEIGHT: 215 LBS

## 2023-10-23 DIAGNOSIS — R06.02 SOB (SHORTNESS OF BREATH): ICD-10-CM

## 2023-10-23 DIAGNOSIS — I35.0 AORTIC STENOSIS: ICD-10-CM

## 2023-10-23 DIAGNOSIS — I35.0 AORTIC STENOSIS: Primary | ICD-10-CM

## 2023-10-23 LAB
ALBUMIN SERPL BCG-MCNC: 4.5 G/DL (ref 3.5–5.2)
ALP SERPL-CCNC: 110 U/L (ref 40–129)
ALT SERPL W P-5'-P-CCNC: 106 U/L (ref 0–70)
ANION GAP SERPL CALCULATED.3IONS-SCNC: 11 MMOL/L (ref 7–15)
AST SERPL W P-5'-P-CCNC: 49 U/L (ref 0–45)
ATRIAL RATE - MUSE: 104 BPM
BASO+EOS+MONOS # BLD AUTO: ABNORMAL 10*3/UL
BASO+EOS+MONOS NFR BLD AUTO: ABNORMAL %
BASOPHILS # BLD AUTO: 0.1 10E3/UL (ref 0–0.2)
BASOPHILS NFR BLD AUTO: 1 %
BILIRUB SERPL-MCNC: 0.6 MG/DL
BUN SERPL-MCNC: 11.3 MG/DL (ref 8–23)
CALCIUM SERPL-MCNC: 9 MG/DL (ref 8.8–10.2)
CHLORIDE SERPL-SCNC: 106 MMOL/L (ref 98–107)
CREAT SERPL-MCNC: 0.71 MG/DL (ref 0.67–1.17)
DEPRECATED HCO3 PLAS-SCNC: 26 MMOL/L (ref 22–29)
DIASTOLIC BLOOD PRESSURE - MUSE: NORMAL MMHG
EGFRCR SERPLBLD CKD-EPI 2021: >90 ML/MIN/1.73M2
EOSINOPHIL # BLD AUTO: 0.1 10E3/UL (ref 0–0.7)
EOSINOPHIL NFR BLD AUTO: 2 %
ERYTHROCYTE [DISTWIDTH] IN BLOOD BY AUTOMATED COUNT: 14.3 % (ref 10–15)
GLUCOSE SERPL-MCNC: 121 MG/DL (ref 70–99)
HCT VFR BLD AUTO: 39.7 % (ref 40–53)
HGB BLD-MCNC: 13.1 G/DL (ref 13.3–17.7)
IMM GRANULOCYTES # BLD: 0 10E3/UL
IMM GRANULOCYTES NFR BLD: 0 %
INR PPP: 0.95 (ref 0.85–1.15)
INTERPRETATION ECG - MUSE: NORMAL
LYMPHOCYTES # BLD AUTO: 0.8 10E3/UL (ref 0.8–5.3)
LYMPHOCYTES NFR BLD AUTO: 20 %
MAGNESIUM SERPL-MCNC: 2.2 MG/DL (ref 1.7–2.3)
MCH RBC QN AUTO: 30.9 PG (ref 26.5–33)
MCHC RBC AUTO-ENTMCNC: 33 G/DL (ref 31.5–36.5)
MCV RBC AUTO: 94 FL (ref 78–100)
MONOCYTES # BLD AUTO: 0.6 10E3/UL (ref 0–1.3)
MONOCYTES NFR BLD AUTO: 15 %
NEUTROPHILS # BLD AUTO: 2.4 10E3/UL (ref 1.6–8.3)
NEUTROPHILS NFR BLD AUTO: 62 %
NRBC # BLD AUTO: 0 10E3/UL
NRBC BLD AUTO-RTO: 0 /100
NT-PROBNP SERPL-MCNC: 74 PG/ML (ref 0–900)
P AXIS - MUSE: 24 DEGREES
PLATELET # BLD AUTO: 228 10E3/UL (ref 150–450)
POTASSIUM SERPL-SCNC: 3.9 MMOL/L (ref 3.4–5.3)
PR INTERVAL - MUSE: 162 MS
PROT SERPL-MCNC: 6.6 G/DL (ref 6.4–8.3)
QRS DURATION - MUSE: 90 MS
QT - MUSE: 356 MS
QTC - MUSE: 468 MS
R AXIS - MUSE: -56 DEGREES
RBC # BLD AUTO: 4.24 10E6/UL (ref 4.4–5.9)
SODIUM SERPL-SCNC: 143 MMOL/L (ref 135–145)
SYSTOLIC BLOOD PRESSURE - MUSE: NORMAL MMHG
T AXIS - MUSE: 80 DEGREES
VENTRICULAR RATE- MUSE: 104 BPM
WBC # BLD AUTO: 3.8 10E3/UL (ref 4–11)

## 2023-10-23 PROCEDURE — 86900 BLOOD TYPING SEROLOGIC ABO: CPT

## 2023-10-23 PROCEDURE — 80053 COMPREHEN METABOLIC PANEL: CPT

## 2023-10-23 PROCEDURE — 83735 ASSAY OF MAGNESIUM: CPT

## 2023-10-23 PROCEDURE — 99207 PR NO CHARGE LOS: CPT

## 2023-10-23 PROCEDURE — 83880 ASSAY OF NATRIURETIC PEPTIDE: CPT

## 2023-10-23 PROCEDURE — 85610 PROTHROMBIN TIME: CPT

## 2023-10-23 PROCEDURE — 93000 ELECTROCARDIOGRAM COMPLETE: CPT | Performed by: INTERNAL MEDICINE

## 2023-10-23 PROCEDURE — 86901 BLOOD TYPING SEROLOGIC RH(D): CPT

## 2023-10-23 PROCEDURE — 36415 COLL VENOUS BLD VENIPUNCTURE: CPT

## 2023-10-23 PROCEDURE — 86850 RBC ANTIBODY SCREEN: CPT

## 2023-10-23 PROCEDURE — 85025 COMPLETE CBC W/AUTO DIFF WBC: CPT

## 2023-10-23 RX ORDER — ASPIRIN 325 MG
325 TABLET ORAL ONCE
Status: CANCELLED | OUTPATIENT
Start: 2023-10-24 | End: 2023-10-23

## 2023-10-23 RX ORDER — LIDOCAINE 40 MG/G
CREAM TOPICAL
Status: CANCELLED | OUTPATIENT
Start: 2023-10-23

## 2023-10-23 RX ORDER — SODIUM CHLORIDE 9 MG/ML
INJECTION, SOLUTION INTRAVENOUS CONTINUOUS
Status: CANCELLED | OUTPATIENT
Start: 2023-10-24

## 2023-10-23 RX ORDER — CEFAZOLIN SODIUM/WATER 2 G/20 ML
2 SYRINGE (ML) INTRAVENOUS
Status: CANCELLED | OUTPATIENT
Start: 2023-10-24

## 2023-10-23 RX ORDER — DIAZEPAM 5 MG
5 TABLET ORAL
Status: CANCELLED | OUTPATIENT
Start: 2023-10-24

## 2023-10-23 RX ORDER — FENTANYL CITRATE 50 UG/ML
25 INJECTION, SOLUTION INTRAMUSCULAR; INTRAVENOUS
Status: CANCELLED | OUTPATIENT
Start: 2023-10-24

## 2023-10-23 NOTE — PROGRESS NOTES
"TAVR Pre-Op RN Visit     Patient in to see RN for Pre-TAVR visit on 10/23/2023    All pre-procedure labs drawn: Yes    EKG obtained: Yes    Labs reviewed: Pending at time of visit; ordered under SHAHANA Liz Jones PA-C; will be reviewed prior to procedure time tomorrow     STS score: 1.42%      Patient instructed on the following medications:   AM of procedure take losartan, lenalidomide (revlimid) and FOUR baby aspirin (81 mg for total dose of 325 mg).     Loading dose of ASA to be given in pre-procedure/CSC if not already taken at home.     Patient given instructions on bathing including no use of deodorant/lotions/creams/powders.      Patient has advanced directive: No documents on file. Previous code status listed as \"FULL CODE\" during most recent hospital admissions. Bailout plan for TAVR is FULL Bailout.     Education was given to patient regarding what to expect pre-procedure.     Patient was informed procedure will be done at [facility]: Main Entrance at [address] [place] [address]; [place] [address] and their arrival time is at 7 AM.     TAVR and blood consents signed at the time of the appt: Yes; scanned into EPIC.     All questions from patient and family were answered by RN.    Patient presented independently at the time of appointment. His spouse and son will be with him tomorrow morning for procedure and his two daughters will come to see him after he is admitted to the inpatient unit.     Orders placed for procedure.       Yehuda Gill RN BSN- Structural Heart Coordinator   Structural Heart Coordinator  [facility]   [phone]    "

## 2023-10-24 ENCOUNTER — HOSPITAL ENCOUNTER (INPATIENT)
Facility: HOSPITAL | Age: 68
LOS: 1 days | Discharge: HOME | DRG: 267 | End: 2023-10-25
Attending: INTERNAL MEDICINE | Admitting: INTERNAL MEDICINE
Payer: MEDICARE

## 2023-10-24 ENCOUNTER — HOSPITAL ENCOUNTER (OUTPATIENT)
Dept: CARDIOLOGY | Facility: HOSPITAL | Age: 68
Discharge: HOME | DRG: 267 | End: 2023-10-24
Attending: INTERNAL MEDICINE | Admitting: INTERNAL MEDICINE
Payer: MEDICARE

## 2023-10-24 DIAGNOSIS — Z95.2 S/P TAVR (TRANSCATHETER AORTIC VALVE REPLACEMENT): Primary | ICD-10-CM

## 2023-10-24 DIAGNOSIS — I35.0 NONRHEUMATIC AORTIC VALVE STENOSIS: ICD-10-CM

## 2023-10-24 DIAGNOSIS — I35.0 AORTIC STENOSIS: ICD-10-CM

## 2023-10-24 PROBLEM — E78.5 HYPERLIPIDEMIA: Status: ACTIVE | Noted: 2023-10-24

## 2023-10-24 PROBLEM — I10 BENIGN ESSENTIAL HYPERTENSION: Status: ACTIVE | Noted: 2023-10-24

## 2023-10-24 LAB
ACT BLD: 259 SECONDS (ref 74–150)
ACT BLD: 260 SECONDS (ref 74–150)
ALBUMIN SERPL BCG-MCNC: 3.9 G/DL (ref 3.5–5.2)
ALP SERPL-CCNC: 86 U/L (ref 40–129)
ALT SERPL W P-5'-P-CCNC: 77 U/L (ref 0–70)
ANION GAP SERPL CALCULATED.3IONS-SCNC: 12 MMOL/L (ref 7–15)
AST SERPL W P-5'-P-CCNC: 39 U/L (ref 0–45)
ATRIAL RATE - MUSE: 76 BPM
BILIRUB SERPL-MCNC: 0.5 MG/DL
BLD PROD TYP BPU: NORMAL
BLD PROD TYP BPU: NORMAL
BLOOD COMPONENT TYPE: NORMAL
BLOOD COMPONENT TYPE: NORMAL
BUN SERPL-MCNC: 9.1 MG/DL (ref 8–23)
CALCIUM SERPL-MCNC: 7.9 MG/DL (ref 8.8–10.2)
CHLORIDE SERPL-SCNC: 108 MMOL/L (ref 98–107)
CODING SYSTEM: NORMAL
CODING SYSTEM: NORMAL
CREAT SERPL-MCNC: 0.77 MG/DL (ref 0.67–1.17)
CROSSMATCH: NORMAL
CROSSMATCH: NORMAL
DEPRECATED HCO3 PLAS-SCNC: 22 MMOL/L (ref 22–29)
DIASTOLIC BLOOD PRESSURE - MUSE: NORMAL MMHG
EGFRCR SERPLBLD CKD-EPI 2021: >90 ML/MIN/1.73M2
ERYTHROCYTE [DISTWIDTH] IN BLOOD BY AUTOMATED COUNT: 14.2 % (ref 10–15)
GLUCOSE SERPL-MCNC: 114 MG/DL (ref 70–99)
HCT VFR BLD AUTO: 33.2 % (ref 40–53)
HGB BLD-MCNC: 11.2 G/DL (ref 13.3–17.7)
INTERPRETATION ECG - MUSE: NORMAL
ISSUE DATE AND TIME: NORMAL
ISSUE DATE AND TIME: NORMAL
MAGNESIUM SERPL-MCNC: 2 MG/DL (ref 1.7–2.3)
MCH RBC QN AUTO: 31.1 PG (ref 26.5–33)
MCHC RBC AUTO-ENTMCNC: 33.7 G/DL (ref 31.5–36.5)
MCV RBC AUTO: 92 FL (ref 78–100)
P AXIS - MUSE: 56 DEGREES
PLATELET # BLD AUTO: 196 10E3/UL (ref 150–450)
POTASSIUM SERPL-SCNC: 3.8 MMOL/L (ref 3.4–5.3)
PR INTERVAL - MUSE: 170 MS
PROT SERPL-MCNC: 5.7 G/DL (ref 6.4–8.3)
QRS DURATION - MUSE: 94 MS
QT - MUSE: 414 MS
QTC - MUSE: 465 MS
R AXIS - MUSE: -32 DEGREES
RBC # BLD AUTO: 3.6 10E6/UL (ref 4.4–5.9)
SODIUM SERPL-SCNC: 142 MMOL/L (ref 135–145)
SYSTOLIC BLOOD PRESSURE - MUSE: NORMAL MMHG
T AXIS - MUSE: 29 DEGREES
UNIT ABO/RH: NORMAL
UNIT ABO/RH: NORMAL
UNIT NUMBER: NORMAL
UNIT NUMBER: NORMAL
UNIT STATUS: NORMAL
UNIT STATUS: NORMAL
UNIT TYPE ISBT: 5100
UNIT TYPE ISBT: 5100
VENTRICULAR RATE- MUSE: 76 BPM
WBC # BLD AUTO: 3.7 10E3/UL (ref 4–11)

## 2023-10-24 PROCEDURE — 93010 ELECTROCARDIOGRAM REPORT: CPT | Mod: HIP | Performed by: INTERNAL MEDICINE

## 2023-10-24 PROCEDURE — C1733 CATH, EP, OTHR THAN COOL-TIP: HCPCS | Performed by: INTERNAL MEDICINE

## 2023-10-24 PROCEDURE — 250N000011 HC RX IP 250 OP 636: Performed by: INTERNAL MEDICINE

## 2023-10-24 PROCEDURE — 210N000001 HC R&B IMCU HEART CARE

## 2023-10-24 PROCEDURE — 93308 TTE F-UP OR LMTD: CPT

## 2023-10-24 PROCEDURE — 99153 MOD SED SAME PHYS/QHP EA: CPT | Performed by: INTERNAL MEDICINE

## 2023-10-24 PROCEDURE — 250N000011 HC RX IP 250 OP 636: Mod: JZ | Performed by: INTERNAL MEDICINE

## 2023-10-24 PROCEDURE — 85027 COMPLETE CBC AUTOMATED: CPT | Performed by: PHYSICIAN ASSISTANT

## 2023-10-24 PROCEDURE — 02RF38Z REPLACEMENT OF AORTIC VALVE WITH ZOOPLASTIC TISSUE, PERCUTANEOUS APPROACH: ICD-10-PCS | Performed by: INTERNAL MEDICINE

## 2023-10-24 PROCEDURE — 93321 DOPPLER ECHO F-UP/LMTD STD: CPT | Mod: 26 | Performed by: INTERNAL MEDICINE

## 2023-10-24 PROCEDURE — 33361 REPLACE AORTIC VALVE PERQ: CPT | Mod: 62 | Performed by: INTERNAL MEDICINE

## 2023-10-24 PROCEDURE — 272N000001 HC OR GENERAL SUPPLY STERILE: Performed by: INTERNAL MEDICINE

## 2023-10-24 PROCEDURE — 36415 COLL VENOUS BLD VENIPUNCTURE: CPT | Performed by: PHYSICIAN ASSISTANT

## 2023-10-24 PROCEDURE — 86923 COMPATIBILITY TEST ELECTRIC: CPT | Performed by: INTERNAL MEDICINE

## 2023-10-24 PROCEDURE — 33361 REPLACE AORTIC VALVE PERQ: CPT | Performed by: INTERNAL MEDICINE

## 2023-10-24 PROCEDURE — 250N000009 HC RX 250: Performed by: INTERNAL MEDICINE

## 2023-10-24 PROCEDURE — 99152 MOD SED SAME PHYS/QHP 5/>YRS: CPT | Performed by: INTERNAL MEDICINE

## 2023-10-24 PROCEDURE — 80053 COMPREHEN METABOLIC PANEL: CPT | Performed by: PHYSICIAN ASSISTANT

## 2023-10-24 PROCEDURE — 93005 ELECTROCARDIOGRAM TRACING: CPT

## 2023-10-24 PROCEDURE — C1894 INTRO/SHEATH, NON-LASER: HCPCS | Performed by: INTERNAL MEDICINE

## 2023-10-24 PROCEDURE — 83735 ASSAY OF MAGNESIUM: CPT | Performed by: PHYSICIAN ASSISTANT

## 2023-10-24 PROCEDURE — 93308 TTE F-UP OR LMTD: CPT | Mod: 26 | Performed by: INTERNAL MEDICINE

## 2023-10-24 PROCEDURE — 255N000002 HC RX 255 OP 636: Performed by: INTERNAL MEDICINE

## 2023-10-24 PROCEDURE — C1769 GUIDE WIRE: HCPCS | Performed by: INTERNAL MEDICINE

## 2023-10-24 PROCEDURE — 999N000248 HC STATISTIC IV INSERT WITH US BY RN

## 2023-10-24 PROCEDURE — 93325 DOPPLER ECHO COLOR FLOW MAPG: CPT | Mod: 26 | Performed by: INTERNAL MEDICINE

## 2023-10-24 PROCEDURE — 250N000011 HC RX IP 250 OP 636: Mod: JZ | Performed by: PHYSICIAN ASSISTANT

## 2023-10-24 PROCEDURE — C1760 CLOSURE DEV, VASC: HCPCS | Performed by: INTERNAL MEDICINE

## 2023-10-24 PROCEDURE — C1889 IMPLANT/INSERT DEVICE, NOC: HCPCS | Performed by: INTERNAL MEDICINE

## 2023-10-24 PROCEDURE — 33361 REPLACE AORTIC VALVE PERQ: CPT | Mod: 62 | Performed by: STUDENT IN AN ORGANIZED HEALTH CARE EDUCATION/TRAINING PROGRAM

## 2023-10-24 PROCEDURE — 85347 COAGULATION TIME ACTIVATED: CPT

## 2023-10-24 PROCEDURE — 258N000003 HC RX IP 258 OP 636: Performed by: INTERNAL MEDICINE

## 2023-10-24 PROCEDURE — 250N000013 HC RX MED GY IP 250 OP 250 PS 637: Performed by: PHYSICIAN ASSISTANT

## 2023-10-24 PROCEDURE — 99232 SBSQ HOSP IP/OBS MODERATE 35: CPT | Performed by: INTERNAL MEDICINE

## 2023-10-24 PROCEDURE — P9016 RBC LEUKOCYTES REDUCED: HCPCS | Performed by: INTERNAL MEDICINE

## 2023-10-24 DEVICE — VALVE AORTIC TRANSCATHETER HEART 26MM SAPIEN 3 ULTRA RESILIA: Type: IMPLANTABLE DEVICE | Site: HEART | Status: FUNCTIONAL

## 2023-10-24 DEVICE — DEVICE CLOSURE VASCULAR MANTA 18FR 2115: Type: IMPLANTABLE DEVICE | Site: ILIAC/FEMORALS | Status: FUNCTIONAL

## 2023-10-24 DEVICE — CLOSURE ANGIOSEAL 6FR 610130: Type: IMPLANTABLE DEVICE | Site: ILIAC/FEMORALS | Status: FUNCTIONAL

## 2023-10-24 RX ORDER — NICOTINE POLACRILEX 4 MG
15-30 LOZENGE BUCCAL
Status: DISCONTINUED | OUTPATIENT
Start: 2023-10-24 | End: 2023-10-25 | Stop reason: HOSPADM

## 2023-10-24 RX ORDER — ACETAMINOPHEN 325 MG/1
650 TABLET ORAL EVERY 4 HOURS PRN
Status: DISCONTINUED | OUTPATIENT
Start: 2023-10-24 | End: 2023-10-25 | Stop reason: HOSPADM

## 2023-10-24 RX ORDER — NALOXONE HYDROCHLORIDE 0.4 MG/ML
0.2 INJECTION, SOLUTION INTRAMUSCULAR; INTRAVENOUS; SUBCUTANEOUS
Status: DISCONTINUED | OUTPATIENT
Start: 2023-10-24 | End: 2023-10-25 | Stop reason: HOSPADM

## 2023-10-24 RX ORDER — IODIXANOL 320 MG/ML
INJECTION, SOLUTION INTRAVASCULAR
Status: DISCONTINUED | OUTPATIENT
Start: 2023-10-24 | End: 2023-10-24 | Stop reason: HOSPADM

## 2023-10-24 RX ORDER — ONDANSETRON 4 MG/1
4 TABLET, ORALLY DISINTEGRATING ORAL EVERY 6 HOURS PRN
Status: DISCONTINUED | OUTPATIENT
Start: 2023-10-24 | End: 2023-10-25 | Stop reason: HOSPADM

## 2023-10-24 RX ORDER — DEXAMETHASONE 4 MG/1
20 TABLET ORAL SEE ADMIN INSTRUCTIONS
COMMUNITY
End: 2023-11-07

## 2023-10-24 RX ORDER — ASPIRIN 81 MG/1
81 TABLET ORAL DAILY
Status: DISCONTINUED | OUTPATIENT
Start: 2023-10-25 | End: 2023-10-25 | Stop reason: HOSPADM

## 2023-10-24 RX ORDER — LIDOCAINE 40 MG/G
CREAM TOPICAL
Status: DISCONTINUED | OUTPATIENT
Start: 2023-10-24 | End: 2023-10-24 | Stop reason: HOSPADM

## 2023-10-24 RX ORDER — OXYCODONE HYDROCHLORIDE 5 MG/1
5 TABLET ORAL EVERY 4 HOURS PRN
Status: DISCONTINUED | OUTPATIENT
Start: 2023-10-24 | End: 2023-10-25 | Stop reason: HOSPADM

## 2023-10-24 RX ORDER — NITROGLYCERIN 0.4 MG/1
0.4 TABLET SUBLINGUAL EVERY 5 MIN PRN
Status: DISCONTINUED | OUTPATIENT
Start: 2023-10-24 | End: 2023-10-25 | Stop reason: HOSPADM

## 2023-10-24 RX ORDER — FENTANYL CITRATE 50 UG/ML
INJECTION, SOLUTION INTRAMUSCULAR; INTRAVENOUS
Status: DISCONTINUED | OUTPATIENT
Start: 2023-10-24 | End: 2023-10-24 | Stop reason: HOSPADM

## 2023-10-24 RX ORDER — NALOXONE HYDROCHLORIDE 0.4 MG/ML
0.4 INJECTION, SOLUTION INTRAMUSCULAR; INTRAVENOUS; SUBCUTANEOUS
Status: DISCONTINUED | OUTPATIENT
Start: 2023-10-24 | End: 2023-10-25 | Stop reason: HOSPADM

## 2023-10-24 RX ORDER — SODIUM CHLORIDE 9 MG/ML
INJECTION, SOLUTION INTRAVENOUS CONTINUOUS
Status: ACTIVE | OUTPATIENT
Start: 2023-10-24 | End: 2023-10-24

## 2023-10-24 RX ORDER — HEPARIN SODIUM 1000 [USP'U]/ML
INJECTION, SOLUTION INTRAVENOUS; SUBCUTANEOUS
Status: DISCONTINUED | OUTPATIENT
Start: 2023-10-24 | End: 2023-10-24 | Stop reason: HOSPADM

## 2023-10-24 RX ORDER — SULFAMETHOXAZOLE/TRIMETHOPRIM 800-160 MG
1 TABLET ORAL
Status: DISCONTINUED | OUTPATIENT
Start: 2023-10-25 | End: 2023-10-25 | Stop reason: HOSPADM

## 2023-10-24 RX ORDER — SODIUM CHLORIDE 9 MG/ML
INJECTION, SOLUTION INTRAVENOUS CONTINUOUS
Status: DISCONTINUED | OUTPATIENT
Start: 2023-10-24 | End: 2023-10-24 | Stop reason: HOSPADM

## 2023-10-24 RX ORDER — DIAZEPAM 5 MG
5 TABLET ORAL
Status: DISCONTINUED | OUTPATIENT
Start: 2023-10-24 | End: 2023-10-24 | Stop reason: HOSPADM

## 2023-10-24 RX ORDER — CEFAZOLIN SODIUM/WATER 2 G/20 ML
2 SYRINGE (ML) INTRAVENOUS
Status: DISCONTINUED | OUTPATIENT
Start: 2023-10-24 | End: 2023-10-24 | Stop reason: HOSPADM

## 2023-10-24 RX ORDER — ONDANSETRON 2 MG/ML
4 INJECTION INTRAMUSCULAR; INTRAVENOUS EVERY 6 HOURS PRN
Status: DISCONTINUED | OUTPATIENT
Start: 2023-10-24 | End: 2023-10-25 | Stop reason: HOSPADM

## 2023-10-24 RX ORDER — FENTANYL CITRATE 50 UG/ML
25 INJECTION, SOLUTION INTRAMUSCULAR; INTRAVENOUS
Status: DISCONTINUED | OUTPATIENT
Start: 2023-10-24 | End: 2023-10-24 | Stop reason: HOSPADM

## 2023-10-24 RX ORDER — ACYCLOVIR 400 MG/1
400 TABLET ORAL 2 TIMES DAILY
Status: DISCONTINUED | OUTPATIENT
Start: 2023-10-24 | End: 2023-10-25 | Stop reason: HOSPADM

## 2023-10-24 RX ORDER — MIRTAZAPINE 15 MG/1
15 TABLET, FILM COATED ORAL AT BEDTIME
Status: DISCONTINUED | OUTPATIENT
Start: 2023-10-24 | End: 2023-10-25 | Stop reason: HOSPADM

## 2023-10-24 RX ORDER — NITROGLYCERIN 5 MG/ML
VIAL (ML) INTRAVENOUS
Status: DISCONTINUED | OUTPATIENT
Start: 2023-10-24 | End: 2023-10-24 | Stop reason: HOSPADM

## 2023-10-24 RX ORDER — LOSARTAN POTASSIUM 50 MG/1
100 TABLET ORAL DAILY
Status: DISCONTINUED | OUTPATIENT
Start: 2023-10-25 | End: 2023-10-25

## 2023-10-24 RX ORDER — SIMVASTATIN 40 MG
40 TABLET ORAL DAILY
Status: DISCONTINUED | OUTPATIENT
Start: 2023-10-25 | End: 2023-10-25 | Stop reason: HOSPADM

## 2023-10-24 RX ORDER — SENNOSIDES 8.6 MG
1300 CAPSULE ORAL EVERY 8 HOURS PRN
COMMUNITY
End: 2024-01-19

## 2023-10-24 RX ORDER — ASPIRIN 325 MG
325 TABLET ORAL ONCE
Status: COMPLETED | OUTPATIENT
Start: 2023-10-24 | End: 2023-10-24

## 2023-10-24 RX ORDER — OXYCODONE HYDROCHLORIDE 5 MG/1
10 TABLET ORAL EVERY 4 HOURS PRN
Status: DISCONTINUED | OUTPATIENT
Start: 2023-10-24 | End: 2023-10-25 | Stop reason: HOSPADM

## 2023-10-24 RX ORDER — HYDRALAZINE HYDROCHLORIDE 20 MG/ML
10 INJECTION INTRAMUSCULAR; INTRAVENOUS
Status: DISCONTINUED | OUTPATIENT
Start: 2023-10-24 | End: 2023-10-25 | Stop reason: HOSPADM

## 2023-10-24 RX ORDER — DEXTROSE MONOHYDRATE 25 G/50ML
25-50 INJECTION, SOLUTION INTRAVENOUS
Status: DISCONTINUED | OUTPATIENT
Start: 2023-10-24 | End: 2023-10-25 | Stop reason: HOSPADM

## 2023-10-24 RX ORDER — CEFAZOLIN SODIUM 2 G/100ML
INJECTION, SOLUTION INTRAVENOUS CONTINUOUS PRN
Status: COMPLETED | OUTPATIENT
Start: 2023-10-24 | End: 2023-10-24

## 2023-10-24 RX ADMIN — ACETAMINOPHEN 650 MG: 325 TABLET ORAL at 22:45

## 2023-10-24 RX ADMIN — ACETAMINOPHEN 650 MG: 325 TABLET ORAL at 14:47

## 2023-10-24 RX ADMIN — MIRTAZAPINE 15 MG: 15 TABLET, FILM COATED ORAL at 21:17

## 2023-10-24 RX ADMIN — ACYCLOVIR 400 MG: 400 TABLET ORAL at 21:17

## 2023-10-24 RX ADMIN — SODIUM CHLORIDE: 9 INJECTION, SOLUTION INTRAVENOUS at 08:17

## 2023-10-24 RX ADMIN — HYDRALAZINE HYDROCHLORIDE 10 MG: 20 INJECTION INTRAMUSCULAR; INTRAVENOUS at 22:57

## 2023-10-24 ASSESSMENT — ACTIVITIES OF DAILY LIVING (ADL)
ADLS_ACUITY_SCORE: 35

## 2023-10-24 ASSESSMENT — EJECTION FRACTION: EF_VALUE: .29

## 2023-10-24 NOTE — PRE-PROCEDURE
GENERAL PRE-PROCEDURE:   Date/Time:  10/24/2023 8:30 AM    Written consent obtained?: Yes    Risks and benefits: Risks, benefits and alternatives were discussed    Consent given by:  Patient  Patient states understanding of procedure being performed: Yes    Patient's understanding of procedure matches consent: Yes    Procedure consent matches procedure scheduled: Yes    Expected level of sedation:  Moderate  Appropriately NPO:  Yes  ASA Class:  4 (severe coronary calcifications on CT, abnormal exercise EKG, moderate-severe AS, HTN, HLD, anemia, LVH, multiple myeloma, borderline obesity; BMI 29.41kg/m2)  Mallampati  :  Grade 2- soft palate, base of uvula, tonsillar pillars, and portion of posterior pharyngeal wall visible  Heart:  Systolic murmur  History & Physical reviewed:  History and physical reviewed and updates made (see comment)  H&P Comments:  Clinically Significant Risk Factors Present on Admission    Cardiovascular: Non-Rheumatic Valve Disease: Aortic valve stenosis    Fluid & Electrolyte Disorders : Not present on admission    Gastroenterology : Not present on admission    Hematology/Oncology : Chronic anemia, multiple myeloma    Nephrology : Not present on admission    Neurology : Not present on admission    Pulmonology : Not present on admission    Systemic : Not present on admission        Statement of review:  I have reviewed the lab findings, diagnostic data, medications, and the plan for sedation

## 2023-10-24 NOTE — PHARMACY-ADMISSION MEDICATION HISTORY
Pharmacist Admission Medication History    Admission medication history is complete. The information provided in this note is only as accurate as the sources available at the time of the update.    Information Source(s): Patient and CareEverywhere/SureScripts via in-person    Allergies reviewed with patient and updates made in EHR: yes    Medication History Completed By: Janna Ybarra RPH 10/24/2023 8:27 AM    PTA Med List   Medication Sig Last Dose    acetaminophen (ACETAMINOPHEN 8 HOUR) 650 MG CR tablet Take 1,300 mg by mouth every 8 hours as needed for mild pain or fever 10/24/2023 at am    acyclovir (ZOVIRAX) 400 MG tablet Take 400 mg by mouth 2 times daily 10/24/2023 at am    alendronate (FOSAMAX) 70 MG tablet Take 1 tablet (70 mg) by mouth every 7 days 10/23/2023 at am    aspirin 81 MG EC tablet Take 81 mg by mouth daily 10/24/2023 at 324 mg @ am    bortezomib 3.5 MG injection Inject 1.3 mg/m2 into the vein once a week 10/10/2023 at unknown    Calcium-Magnesium-Zinc 333-133-5 MG TABS per tablet Take 2 tablets by mouth daily 10/23/2023 at am    cholecalciferol 50 MCG (2000 UT) tablet Take 2,000 Units by mouth daily 10/23/2023 at am    co-enzyme Q-10 100 MG CAPS capsule Take 100 mg by mouth daily 10/23/2023 at am    dexAMETHasone (DECADRON) 4 MG tablet Take 20 mg by mouth See Admin Instructions Tuesday and Wednesday 10/11/2023 at am    fluticasone (FLONASE) 50 MCG/ACT nasal spray Spray 1 spray into both nostrils daily as needed for rhinitis or allergies Unknown at unknown    lactobacillus rhamnosus, GG, (CULTURELL) capsule Take 1 capsule by mouth daily 10/23/2023 at am    LENalidomide (REVLIMID) 25 MG CAPS capsule Take 25 mg by mouth See Admin Instructions 2 weeks on, 1 week off 10/10/2023 at am    loperamide (IMODIUM) 2 MG capsule Take 1 capsule by mouth 4 times daily as needed for diarrhea Unknown at unknown    loratadine (CLARITIN) 10 MG tablet Take 10 mg by mouth daily as needed for allergies Unknown at  unknown    losartan (COZAAR) 50 MG tablet Take 100 mg by mouth daily 10/24/2023 at am    medical cannabis (Patient's own supply) See Admin Instructions (The purpose of this order is to document that the patient reports taking medical cannabis.  This is not a prescription, and is not used to certify that the patient has a qualifying medical condition.) Past Month at unknown    mirtazapine (REMERON) 15 MG tablet Take 15 mg by mouth At Bedtime 10/23/2023 at pm    Omega-3 Fatty Acids (FISH OIL BURP-LESS) 1000 MG CAPS Take 1 capsule by mouth daily 10/23/2023 at am    psyllium (METAMUCIL/KONSYL) Packet Take 1 packet by mouth daily 10/23/2023 at am    simvastatin (ZOCOR) 40 MG tablet Take 1 tablet (40 mg) by mouth daily 10/23/2023 at am    sulfamethoxazole-trimethoprim (BACTRIM DS) 800-160 MG tablet Take 1 tablet by mouth three times a week Take on Mondays, Wednesday, fridaysof week while taking Prednsione more than 20mg daily for PCP prophylaxis 10/23/2023 at am    Turmeric 500 MG CAPS Take 2 capsules by mouth daily 10/23/2023 at am    vitamin B complex with vitamin C (VITAMIN  B COMPLEX) tablet Take 1 tablet by mouth daily 10/23/2023 at am    zinc gluconate 50 MG tablet Take 50 mg by mouth daily 10/23/2023 at am

## 2023-10-24 NOTE — OP NOTE
DATE OF OPERATION:  October 24, 2023     PROCEDURE:  1.  Transfemoral transcatheter aortic valve replacement with 26mm Adam Lisa Resilia    SURGEON:  Sahra Mosher MD    STRUCTURAL HEART CARDIOLOGISTS:   Dr. Charles Field and Dr. Domenico Sauceda    ANESTHESIA: Moderate sedation with local    INCISIONS:  None    DRAINS: None    CULTURES:  None    SPECIMENS: None    CLOSURE:  Manta vascular closure device    PREOPERATIVE DIAGNOSES:  1.  Severe aortic stenosis     POSTOPERATIVE DIAGNOSES:  Same    BRIEF HISTORY:  The patient is a 68-year-old man with a history of multiple myeloma s/p palliative radiation on systemic chemotherapy who was found to have severe aortic valve stenosis. He was evaluated by the multi-disciplinary TAVR team of both cardiology and cardiac surgery and the decision was made to proceed with TAVR. Informed consent was obtained.    FINDINGS:  After valve deployment by transthoracic echo there was a 5mmHg gradient across the aortic valve.  There was no aortic insufficiency and no pericardial effusion or perivalvular leak.  After closure DSA revealed no narrowing or extravasation of the femoral arteries.      PROCEDURES:  The patient arrived in the operating and was positioned supine.  Moderate sedation was given.  The patient's neck, chest, abdomen, and both groins were prepped and draped in a standard sterile fashion.  Local anesthetic was applied to all access sites.  The bilateral femoral arteries and left femoral vein were cannulated.  Through the left femoral vein a temporary pacing wire was placed in the right ventricle.  It was tested and captured well.  Heparin was administered.  The necessary intracardiac wires were placed via the left femoral artery.  Through the right femoral artery the Adam eSheath was passed without difficulty. On the back table a 26 mm Adam Lisa Resilia valve was prepared.  When the ACT was appropriate, the valve was brought onto the operative field.  It  was passed up the Adam eSheath, prepared and positioned.  Rapid pacing was done and the valve was slowly deployed.  The valve deployment apparatus was removed from the heart and transthoracic echo was done with the above findings.  The valve deployment apparatus was removed as was one of the intracardiac wires.  The Adam eSheath was removed and the Manta vascular closure device was deployed.  DSA revealed no narrowing or extravasation of the femoral arteries. The estimated blood loss was minimal.  The patient was brought to the [facility] in stable condition.

## 2023-10-24 NOTE — Clinical Note
Unless otherwise noted, the J wire was used to insert, remove, exchange, and reposition all catheters.Pigtail catheter inserted over J wire.

## 2023-10-24 NOTE — PLAN OF CARE
"  Problem: Adult Inpatient Plan of Care  Goal: Plan of Care Review  Description: The Plan of Care Review/Shift note should be completed every shift.  The Outcome Evaluation is a brief statement about your assessment that the patient is improving, declining, or no change.  This information will be displayed automatically on your shift  note.  Outcome: Progressing  Goal: Patient-Specific Goal (Individualized)  Description: You can add care plan individualizations to a care plan. Examples of Individualization might be:  \"Parent requests to be called daily at 9am for status\", \"I have a hard time hearing out of my right ear\", or \"Do not touch me to wake me up as it startles  me\".  Outcome: Progressing  Goal: Absence of Hospital-Acquired Illness or Injury  Outcome: Progressing  Intervention: Identify and Manage Fall Risk  Recent Flowsheet Documentation  Taken 10/24/2023 1502 by Leonor Ybarra, RN  Safety Promotion/Fall Prevention: activity supervised  Goal: Optimal Comfort and Wellbeing  Outcome: Progressing  Intervention: Monitor Pain and Promote Comfort  Recent Flowsheet Documentation  Taken 10/24/2023 1447 by Leonor Ybarra, RN  Pain Management Interventions: medication (see MAR)   Goal Outcome Evaluation:       Pt is alert and oriented denied any Neuro deficits  Off Bedrest at at 1500 was up in the bathroom voided and now sitting in the recliner visiting with family complained of low back pain 2/10 with good pain control from Tylenol 650 mg  and position changed also helped  Bilateral groin sites stable  no signs of bleeding or hematoma  denied any Numbness or tingling on bilateral LE    Monitor Reading showed Sinus Dysrhythmia .                 "

## 2023-10-24 NOTE — PROGRESS NOTES
[facility]    Hospitalist Progress Note    Assessment & Plan   68 year old male who was admitted on 10/24/2023 for TAVR procedure for severe aortic stenosis, with SOB after walking 3-4 blocks.     Impression:   Principal Problem:    Severe aortic stenosis --S/P TAVR on 10/24/23   -- doing well post procedure       Multiple myeloma (H)   -- followed by Dr. Ruelas with [facility], has follow-up arranged and current chemo on hold as planned given the TAVR procedure      Benign essential hypertension   -- continue Losartan 100 mg daily      Hyperlipidemia   -- continue Zocor 40 mg daily      Plan:  as above    DVT Prophylaxis: Pneumatic Compression Devices  Code Status: Full Code    Disposition: Expected discharge home tomorrow.     Ashkan Masterson MD  Pager [phone]  Cell Phone [phone]  Text Page (7am to 6pm)    Interval History   Feels fine, no complaints.  Prior to procedure he would notice SOB after walking 4 blocks with his dog -- where as 2 yrs ago he could walk 2 miles without SOB.     Physical Exam   Temp: 98.1  F (36.7  C) Temp src: Oral BP: (!) 161/83 Pulse: 84   Resp: 20 SpO2: 94 % O2 Device: None (Room air) Oxygen Delivery: 2 LPM  Vitals:    10/24/23 0726   Weight: 95.3 kg (210 lb)     Vital Signs with Ranges  Temp:  [98.1  F (36.7  C)-98.4  F (36.9  C)] 98.1  F (36.7  C)  Pulse:  [78-95] 84  Resp:  [5-25] 20  BP: (134-171)/(70-94) 161/83  SpO2:  [92 %-97 %] 94 %  I/O last 3 completed shifts:  In: 500 [I.V.:500]  Out: -     # Pain Assessment:      10/24/2023     2:35 PM   Current Pain Score   Patient currently in pain? yes   Billy s pain level was assessed and he currently denies pain.        Constitutional: Awake, alert, cooperative, no apparent distress  Respiratory: Clear to auscultation bilaterally, no crackles or wheezing  Cardiovascular: Regular rate and rhythm, normal S1 and S2, and no murmur noted  GI: Normal bowel sounds, soft, non-distended, non-tender  Extrem:  No calf tenderness, no ankle edema  Neuro: Ox3, no focal motor or sensory deficits    Medications    sodium chloride Stopped (10/24/23 1535)      acyclovir  400 mg Oral BID    [START ON 10/25/2023] aspirin  81 mg Oral Daily    [START ON 10/25/2023] losartan  100 mg Oral Daily    mirtazapine  15 mg Oral At Bedtime    [START ON 10/25/2023] psyllium  1 packet Oral Daily    [START ON 10/25/2023] simvastatin  40 mg Oral Daily       Data   Recent Labs   Lab 10/24/23  1119 10/23/23  0941   WBC 3.7* 3.8*   HGB 11.2* 13.1*   MCV 92 94    228   INR  --  0.95    143   POTASSIUM 3.8 3.9   CHLORIDE 108* 106   CO2 22 26   BUN 9.1 11.3   CR 0.77 0.71   ANIONGAP 12 11   SESAR 7.9* 9.0   * 121*   ALBUMIN 3.9 4.5   PROTTOTAL 5.7* 6.6   BILITOTAL 0.5 0.6   ALKPHOS 86 110   ALT 77* 106*   AST 39 49*       Imaging:   Recent Results (from the past 24 hour(s))   Cardiac Catheterization    Narrative    Procedure: Transcatheter aortic valve replacement    Indication: Severe symptomatic aortic stenosis with the patient felt to be   an appropriate candidate for transcatheter aortic valve replacement after   a thorough multidisciplinary approach, including a visit with a surgeon.    Operators:   Interventional Cardiology: Charles Field MD,Domenico Sauceda MD  CT Surgery: Sahra Mosher MD  Echocardiography: Transthoracic    Approach: R transfemoral    Anesthesia: Moderate    Procedure Details:  Informed consent obtained before the patient was brought to the procedure   room.  Appropriate timeout was performed before the procedure was started.    Access was obtained in the left femoral artery and vein using the modified   Seldinger technique, and then in the right femoral artery under direct   fluoroscopic visualization.  Access site was pre-measured for Manta   closure device.  A L femoral vein was used to place a temporary pacemaker   in the RV.    A pigtail catheter was placed at the aortic annulus via the left femoral    artery to determine the coplanar angle.     After appropriate anticoagulation, the Adam E sheath was placed through   the right femoral arteriotomy with the help of a stiff guidewire.  The   aortic valve was then crossed and a Safari 2 wire was placed at the LV   apex.    A 26mm Lisa 3 Resilia valve at 1 ml over nominal fill was then   successfully deployed in the aortic position under rapid pacing.  Post   deployment to the patient had good hemodynamics, with trace aortic   insufficiency and a mean gradient of 5 mmHg. Relative aortic/venticular   implant ratio was 100/0.     The delivery system and sheath were then removed, with successful   hemostasis of the arteriotomy by deployment of the Manta closure device.    The left femoral artery and venous sheaths were removed as well with   successful hemostasis via the Angio-Seal device.    Conclusions:  1. Severe symptomatic aortic stenosis status post successful transcatheter   aortic valve replacement with a 26mm Lisa 3 valve Resilia, delivered via   the right transfemoral approach.    2.  Trace aortic insufficiency post deployment with a mean gradient of 5   mmHg.    Postprocedure patient status:  Patient planned to be transferred to the PACU/recovery for ongoing   management.  Temporary pacemaker removed and need to be re-assessed in the   next 24 hours based on clinical need.      Charles Field MD  Interventional Cardiology               Echocardiogram Complete    Narrative    [id number]  [id number]  [id number]  [id number] [address]     Name: JOSE HILL  MRN: [id number]  : 1955  Study Date: 10/24/2023 09:38 AM  Age: 68 yrs  Gender: Male  Patient Location: [facility]  Reason For Study: Nonrheumatic aortic valve stenosis  Ordering Physician: ONIEL LUGO  Referring Physician: ONIEL LUGO  Performed By: JIMMY     BSA: 2.1 m2  Height: 70 in  Weight: 211 lb  HR:  85  ______________________________________________________________________________  Procedure  Complete Echo Adult.  ______________________________________________________________________________  Interpretation Summary     Pre-TAVR:  Left Ventricle:  The estimated left ventricular ejection fraction is 60%.  Right Ventricle:  Normal size and systolic function.  Aortic Valve:  There is severe global calcification with reduced excursion of the aortic  valve present. Severe aortic stenosis (mean gradient: 29 mmHg. Peak stephane: 3.3  m/sec). Mild aortic regurgitation with a centrally directed jet.  Mitral Valve:  No mitral stenosis present. Trace regurgitation.  Pericardium: No pericardial effusion.     POST-TAVR:  Left Ventricle:  The estimated left ventricular ejection fraction is 60%. No acute wall motion  abnormalities.  Right Ventricle:  Normal size and systolic function.  Aortic Valve:  There is well seated 26mm Lisa 3 valve Resilia in aortic position with  normal gradient (peak and mean gradients are 9 and 5 mmHg, respectively). No  paravalvular leak. Normal bioprosthetic leaflet motion.  Mitral Valve:  No mitral stenosis present.Trace regurgitation. No acute change in mitral  valve function  Pericardium: No pericardial effusion.  ______________________________________________________________________________  ______________________________________________________________________________  MMode/2D Measurements & Calculations  LVOT diam: 2.2 cm  LVOT area: 3.8 cm2     Doppler Measurements & Calculations  Ao V2 max: 135.0 cm/sec  Ao max P.3 mmHg  Ao V2 mean: 94.3 cm/sec  Ao mean P.0 mmHg  Ao V2 VTI: 25.2 cm  JOHANNA(I,D): 2.9 cm2  JOHANNA(V,D): 2.4 cm2  LV V1 max PG: 3.0 mmHg  LV V1 max: 86.8 cm/sec  LV V1 VTI: 19.3 cm  SV(LVOT): 73.4 ml  SI(LVOT): 34.4 ml/m2  AV Stephane Ratio (DI): 0.64  JOHANNA Index (cm2/m2): 1.4     ______________________________________________________________________________  Report approved by:  Lula Stover 10/24/2023 01:27 PM

## 2023-10-24 NOTE — INTERVAL H&P NOTE
"I have reviewed the surgical (or preoperative) H&P that is linked to this encounter, and examined the patient. There are no significant changes    Clinical Conditions Present on Arrival:  Clinically Significant Risk Factors Present on Admission                 # Drug Induced Platelet Defect: home medication list includes an antiplatelet medication  # Obesity: Estimated body mass index is 30.13 kg/m  as calculated from the following:    Height as of this encounter: 1.778 m (5' 10\").    Weight as of this encounter: 95.3 kg (210 lb).       "

## 2023-10-24 NOTE — Clinical Note
dry, intact, no bleeding and no hematoma. Left femoral site. Angio seal to arterial and venous sites. Primapore applied.

## 2023-10-24 NOTE — PLAN OF CARE
Pt alert and oriented. Vss on room air. Pt right and left procedural sites cdi. Pt primapore dressings on bilateral groin sites cdi. CMS+, no signs of bleeding/hematoma. Pt tolerates bedrest well. Bedrest until 1500. Pt ate 100% meal, tolerated well.  Pt wife Britney in room visiting. Pt ready to be admitted to P3. Report given to Leonor HANKS

## 2023-10-24 NOTE — DISCHARGE INSTRUCTIONS
Patient Instructions - Going Home after TAVR:  Going Home: It s normal to feel a little anxious after leaving the hospital and when fewer people are nearby. People do much better when they feel as though they have support- if you live alone we suggest you arrange to have someone stay with you for a day or two to help you recover.   Medications:  Take all of your medications as directed in your discharge summary. Do not stop taking these medications without speaking with your cardiologist. If you have any questions about any of your medications, speak to your pharmacist or provider.   Follow up Appointments  You will follow up at 1 week and one month after surgery at [facility]. You will receive these appointments at the time of discharge.  If you need to reschedule your appointment, please call:  [phone]  See your regular cardiologist in 6 months. Make an appointment once you get home. Your regular heart doctor will continue to be your heart specialist.   See your family doctor in 2 weeks.  Make an appointment once you get home.  You will receive a temporary  heart valve  wallet card. We recommend that you keep it in your wallet or purse at all times. You will be receiving a permanent wallet card from the  within 6 months.   Before an MRI (magnetic resonance imaging) procedure, always notify the doctor (or medical technician) that you have an implanted heart valve.   Site Care: Shower every day- let the water run over your incision or access site, but do not rub the area. No tub baths, pools or hot-tubs for the 1st week you are at home. Do not put ointments, powders, or lotions on your access site and/or incision.  It is normal to feel a small lump the size of a marble in the groin access site.  That is the closure device used to close the vein.  If you are experiencing discomfort, redness or increasing swelling, please call us.  Dental Work: Avoid major dental work for the next 6  months if possible. You will need antibiotics before dental work or surgery. This would decrease the risk of infection.   Driving:  You should not drive for the first 2 weeks after your procedure. The first time you drive, you must have someone with you. You may ride in a car.   Activity: People recover at different rates depending on their general health and the type of heart valve procedure. Most people take about 4-10 weeks to feel fully recovered. Daily activity and exercise are an important part of your recovery.  Do not lift, push or pull anything > 10 lb. for the first 2 weeks.        CALL THE VALVE CLINIC IF YOU HAVE ANY QUESTIONS OR CONCERNS:  [phone]  For the first 2 weeks after TAVR   Do Not:   Lift, push, or pull more than ten pounds (including pets, laundry, groceries, etc)  Garden, including lawn mowing and raking  Excessively bear down or strain when having a bowel movement   Ride a bike   Do:  Weigh yourself every day in the morning after using the bathroom.  If you notice weight gain of more than 3 pounds in 1 day or more than 5 pounds in 1 week, call the cardiology clinic.   Get up and get dressed every day. Do not stay in bed.  Set a daily routine.   Walk as much as you can. You may walk up and down stairs. When you are tired, rest.   Cough and deep breathe. Use your incentive spirometer 5-10 times each hour when you are awake.   We strongly recommend you participate in a cardiac rehabilitation program. This type of program will help you learn about your heart health, prevent more heart problems, participate in safe and heart-healthy activities, and learn how to return to your activities of daily living and hobbies.   Let the cardiology clinic know if you need to leave town before your first follow-up visit.     When to call the Cardiology Clinic to speak with a nurse?   Swelling of the legs, ankles, or feet  Increasing shortness of breath  Change in the color or temperature of your lower  legs and feet  Abdominal pain or unrelieved nausea and/or vomiting  Redness and warmth around your access site and/or incision that does not go away  Yellow or green drainage from your access site and/or incision   Weight gain of 3 pounds in one day or 5 pounds in one week  Develop any numbness, tingling, or limited movement of your arms or legs.   Chest pain that does not go away  New confusion or you cannot think clearly  Fever and chills         [facility] Clinic:  [phone]  If you are calling after hours, please listen to the entire voicemail, a live  will answer at the end of the message

## 2023-10-24 NOTE — PROGRESS NOTES
Procedure: TAVR    Anesthesia type: Moderate sedation    Valve type: Adam Lisa Resilia    Valve size: 26 mm    Access used:   1) Valve Delivery Site: Right Femoral Artery  2) Pigtail site: Left Femoral Artery  3) Temporary pacing wire: Left Femoral Vein    Implanting physician: Dr. Field    Surgeon who assisted in case: Dr. Sahra Ventura PA-C  Structural Heart Program  [facility]

## 2023-10-24 NOTE — BRIEF OP NOTE
[facility]    Brief Operative Note    Pre-operative diagnosis: Severe aortic stenosis  Post-operative diagnosis Same as pre-operative diagnosis    Procedure: Transfemoral transcatheter aortic valve replacement    Surgeon: Surgeon(s) and Role:  Panel 1:     * Charles Field MD - Primary     * Domenico Sauceda MD  Panel 2:     * Sahra Mosher MD - Primary  Anesthesia: Moderate Sedation   Estimated Blood Loss: Minimal    Drains: None  Specimens: * No specimens in log *  Findings:   Gradient across aortic valve was 5 mmHg, no aortic insufficiency, perivalvular leak or pericardial effusion .  Complications: None.  Implants:   Implant Name Type Inv. Item Serial No.  Lot No. LRB No. Used Action   VALVE AORTIC TRANSCATHETER HEART 26MM PAUL 3 ULTRA RESILIA - [id number] Valve VALVE AORTIC TRANSCATHETER HEART 26MM PAUL 3 ULTRA RESILIA [id number] N/A 1 Implanted   DEVICE CLOSURE VASCULAR MANTA 18FR 2115 - [id number] Occlusion Device DEVICE CLOSURE VASCULAR MANTA 18FR 2115  [facility] [id number] Right 1 Implanted   CLOSURE ANGIOSEAL 6FR 073551 - [id number]  CLOSURE ANGIOSEAL 6FR [id number]  [employer] [id number] Left 1 Implanted   CLOSURE ANGIOSEAL 6FR 643609 - [id number]  CLOSURE ANGIOSEAL 6FR [id number]  [employer] [id number] Left 1 Implanted

## 2023-10-24 NOTE — Clinical Note
Temporary pacemaker Rate= 40bpmPaced mA= 252Pacer wire was captured appropriately, Pacer is set and Demand on Standby

## 2023-10-24 NOTE — Clinical Note
dry, intact, no bleeding and no hematoma. Right femoral site. Prolene suture to approximate skin closure. Primapore applied.

## 2023-10-25 ENCOUNTER — APPOINTMENT (OUTPATIENT)
Dept: RADIOLOGY | Facility: HOSPITAL | Age: 68
DRG: 267 | End: 2023-10-25
Attending: PHYSICIAN ASSISTANT
Payer: MEDICARE

## 2023-10-25 ENCOUNTER — APPOINTMENT (OUTPATIENT)
Dept: CARDIOLOGY | Facility: HOSPITAL | Age: 68
DRG: 267 | End: 2023-10-25
Attending: PHYSICIAN ASSISTANT
Payer: MEDICARE

## 2023-10-25 ENCOUNTER — APPOINTMENT (OUTPATIENT)
Dept: OCCUPATIONAL THERAPY | Facility: HOSPITAL | Age: 68
DRG: 267 | End: 2023-10-25
Attending: INTERNAL MEDICINE
Payer: MEDICARE

## 2023-10-25 VITALS
DIASTOLIC BLOOD PRESSURE: 83 MMHG | WEIGHT: 211.3 LBS | HEIGHT: 70 IN | BODY MASS INDEX: 30.25 KG/M2 | RESPIRATION RATE: 20 BRPM | TEMPERATURE: 98.2 F | SYSTOLIC BLOOD PRESSURE: 169 MMHG | HEART RATE: 112 BPM | OXYGEN SATURATION: 95 %

## 2023-10-25 DIAGNOSIS — Z95.2 S/P TAVR (TRANSCATHETER AORTIC VALVE REPLACEMENT): Primary | ICD-10-CM

## 2023-10-25 LAB
ANION GAP SERPL CALCULATED.3IONS-SCNC: 9 MMOL/L (ref 7–15)
ATRIAL RATE - MUSE: 113 BPM
BUN SERPL-MCNC: 8.2 MG/DL (ref 8–23)
CALCIUM SERPL-MCNC: 8.8 MG/DL (ref 8.8–10.2)
CHLORIDE SERPL-SCNC: 106 MMOL/L (ref 98–107)
CREAT SERPL-MCNC: 0.65 MG/DL (ref 0.67–1.17)
DEPRECATED HCO3 PLAS-SCNC: 24 MMOL/L (ref 22–29)
DIASTOLIC BLOOD PRESSURE - MUSE: NORMAL MMHG
EGFRCR SERPLBLD CKD-EPI 2021: >90 ML/MIN/1.73M2
ERYTHROCYTE [DISTWIDTH] IN BLOOD BY AUTOMATED COUNT: 14.5 % (ref 10–15)
GLUCOSE SERPL-MCNC: 122 MG/DL (ref 70–99)
HCT VFR BLD AUTO: 34.4 % (ref 40–53)
HGB BLD-MCNC: 11.5 G/DL (ref 13.3–17.7)
INTERPRETATION ECG - MUSE: NORMAL
LVEF ECHO: NORMAL
MAGNESIUM SERPL-MCNC: 2 MG/DL (ref 1.7–2.3)
MCH RBC QN AUTO: 30.6 PG (ref 26.5–33)
MCHC RBC AUTO-ENTMCNC: 33.4 G/DL (ref 31.5–36.5)
MCV RBC AUTO: 92 FL (ref 78–100)
P AXIS - MUSE: 48 DEGREES
PLATELET # BLD AUTO: 182 10E3/UL (ref 150–450)
POTASSIUM SERPL-SCNC: 3.7 MMOL/L (ref 3.4–5.3)
PR INTERVAL - MUSE: 154 MS
QRS DURATION - MUSE: 84 MS
QT - MUSE: 340 MS
QTC - MUSE: 466 MS
R AXIS - MUSE: -52 DEGREES
RBC # BLD AUTO: 3.76 10E6/UL (ref 4.4–5.9)
SODIUM SERPL-SCNC: 139 MMOL/L (ref 135–145)
SYSTOLIC BLOOD PRESSURE - MUSE: NORMAL MMHG
T AXIS - MUSE: 70 DEGREES
VENTRICULAR RATE- MUSE: 113 BPM
WBC # BLD AUTO: 4.7 10E3/UL (ref 4–11)

## 2023-10-25 PROCEDURE — 85014 HEMATOCRIT: CPT | Performed by: PHYSICIAN ASSISTANT

## 2023-10-25 PROCEDURE — 93005 ELECTROCARDIOGRAM TRACING: CPT | Performed by: PHYSICIAN ASSISTANT

## 2023-10-25 PROCEDURE — 97165 OT EVAL LOW COMPLEX 30 MIN: CPT | Mod: GO

## 2023-10-25 PROCEDURE — 93306 TTE W/DOPPLER COMPLETE: CPT | Mod: 26 | Performed by: INTERNAL MEDICINE

## 2023-10-25 PROCEDURE — 93010 ELECTROCARDIOGRAM REPORT: CPT | Mod: HIP | Performed by: INTERNAL MEDICINE

## 2023-10-25 PROCEDURE — 80048 BASIC METABOLIC PNL TOTAL CA: CPT | Performed by: PHYSICIAN ASSISTANT

## 2023-10-25 PROCEDURE — 83735 ASSAY OF MAGNESIUM: CPT | Performed by: PHYSICIAN ASSISTANT

## 2023-10-25 PROCEDURE — 99232 SBSQ HOSP IP/OBS MODERATE 35: CPT | Performed by: HOSPITALIST

## 2023-10-25 PROCEDURE — 250N000013 HC RX MED GY IP 250 OP 250 PS 637: Performed by: PHYSICIAN ASSISTANT

## 2023-10-25 PROCEDURE — 71045 X-RAY EXAM CHEST 1 VIEW: CPT

## 2023-10-25 PROCEDURE — 97110 THERAPEUTIC EXERCISES: CPT | Mod: GO

## 2023-10-25 PROCEDURE — 250N000011 HC RX IP 250 OP 636: Mod: JZ | Performed by: PHYSICIAN ASSISTANT

## 2023-10-25 PROCEDURE — 93306 TTE W/DOPPLER COMPLETE: CPT

## 2023-10-25 PROCEDURE — 36415 COLL VENOUS BLD VENIPUNCTURE: CPT | Performed by: PHYSICIAN ASSISTANT

## 2023-10-25 PROCEDURE — 93005 ELECTROCARDIOGRAM TRACING: CPT

## 2023-10-25 PROCEDURE — 250N000013 HC RX MED GY IP 250 OP 250 PS 637: Performed by: INTERNAL MEDICINE

## 2023-10-25 PROCEDURE — 99233 SBSQ HOSP IP/OBS HIGH 50: CPT | Mod: 25 | Performed by: PHYSICIAN ASSISTANT

## 2023-10-25 PROCEDURE — 97535 SELF CARE MNGMENT TRAINING: CPT | Mod: GO

## 2023-10-25 RX ORDER — METOPROLOL SUCCINATE 25 MG/1
25 TABLET, EXTENDED RELEASE ORAL ONCE
Status: COMPLETED | OUTPATIENT
Start: 2023-10-25 | End: 2023-10-25

## 2023-10-25 RX ORDER — LOSARTAN POTASSIUM 50 MG/1
100 TABLET ORAL DAILY
Status: DISCONTINUED | OUTPATIENT
Start: 2023-10-26 | End: 2023-10-25 | Stop reason: HOSPADM

## 2023-10-25 RX ORDER — LOSARTAN POTASSIUM 50 MG/1
50 TABLET ORAL DAILY
Status: DISCONTINUED | OUTPATIENT
Start: 2023-10-25 | End: 2023-10-25

## 2023-10-25 RX ORDER — LOSARTAN POTASSIUM 50 MG/1
50 TABLET ORAL ONCE
Status: COMPLETED | OUTPATIENT
Start: 2023-10-25 | End: 2023-10-25

## 2023-10-25 RX ORDER — METOPROLOL SUCCINATE 25 MG/1
25 TABLET, EXTENDED RELEASE ORAL DAILY
Qty: 90 TABLET | Refills: 3 | Status: SHIPPED | OUTPATIENT
Start: 2023-10-26 | End: 2023-12-29

## 2023-10-25 RX ORDER — METOPROLOL SUCCINATE 25 MG/1
25 TABLET, EXTENDED RELEASE ORAL DAILY
Status: DISCONTINUED | OUTPATIENT
Start: 2023-10-25 | End: 2023-10-25 | Stop reason: HOSPADM

## 2023-10-25 RX ADMIN — HYDRALAZINE HYDROCHLORIDE 10 MG: 20 INJECTION INTRAMUSCULAR; INTRAVENOUS at 05:52

## 2023-10-25 RX ADMIN — SIMVASTATIN 40 MG: 40 TABLET, FILM COATED ORAL at 08:31

## 2023-10-25 RX ADMIN — METOPROLOL SUCCINATE 25 MG: 25 TABLET, EXTENDED RELEASE ORAL at 08:31

## 2023-10-25 RX ADMIN — ACYCLOVIR 400 MG: 400 TABLET ORAL at 08:31

## 2023-10-25 RX ADMIN — METOPROLOL SUCCINATE 25 MG: 25 TABLET, EXTENDED RELEASE ORAL at 12:10

## 2023-10-25 RX ADMIN — PSYLLIUM HUSK 1 PACKET: 3.4 POWDER ORAL at 09:09

## 2023-10-25 RX ADMIN — ACETAMINOPHEN 650 MG: 325 TABLET ORAL at 07:47

## 2023-10-25 RX ADMIN — OXYCODONE HYDROCHLORIDE 5 MG: 5 TABLET ORAL at 05:26

## 2023-10-25 RX ADMIN — Medication 81 MG: at 08:31

## 2023-10-25 RX ADMIN — HYDRALAZINE HYDROCHLORIDE 10 MG: 20 INJECTION INTRAMUSCULAR; INTRAVENOUS at 00:10

## 2023-10-25 RX ADMIN — LOSARTAN POTASSIUM 50 MG: 50 TABLET, FILM COATED ORAL at 12:10

## 2023-10-25 RX ADMIN — LOSARTAN POTASSIUM 50 MG: 50 TABLET, FILM COATED ORAL at 08:31

## 2023-10-25 RX ADMIN — SULFAMETHOXAZOLE AND TRIMETHOPRIM 1 TABLET: 800; 160 TABLET ORAL at 08:31

## 2023-10-25 ASSESSMENT — ACTIVITIES OF DAILY LIVING (ADL)
ADLS_ACUITY_SCORE: 37
ADLS_ACUITY_SCORE: 37
ADLS_ACUITY_SCORE: 35
ADLS_ACUITY_SCORE: 35
ADLS_ACUITY_SCORE: 37
ADLS_ACUITY_SCORE: 37
ADLS_ACUITY_SCORE: 35
ADLS_ACUITY_SCORE: 37
PREVIOUS_RESPONSIBILITIES: MEAL PREP;HOUSEKEEPING;LAUNDRY;SHOPPING;YARDWORK;MEDICATION MANAGEMENT;FINANCES;DRIVING

## 2023-10-25 NOTE — PROGRESS NOTES
Care Management Discharge Note    Discharge Date: 10/25/2023     Discharge Disposition: Home    Discharge Services: None    Discharge DME: None    Discharge Transportation: car, drives self, family or friend will provide    Private pay costs discussed: Not applicable    Does the patient's insurance plan have a 3 day qualifying hospital stay waiver?  No    PAS Confirmation Code: N/A  Patient/family educated on Medicare website which has current facility and service quality ratings: N/A    Education Provided on the Discharge Plan: Per Team  Persons Notified of Discharge Plans: Patient  Patient/Family in Agreement with the Plan: Yes    Handoff Referral Completed: No    Additional Information:  Chart reviewed initially due to risk score. Pt is post-procedure day 1 after TAVR for aortic valve stenosis. Also follows with [facility] for multiple myeloma treatment. Anticipate return home with family transport. CM team will follow to monitor progression of care, recommendations, and assist with discharge planning as needed.    3:13 PM  Pt is now stable for discharge. OT recommends home with outpatient cardiac rehab. Pt will discharge home with family, no CM needs identified.     KAYLA Lemus

## 2023-10-25 NOTE — PROGRESS NOTES
[facility]    Hospitalist Progress Note    Assessment & Plan   68 year old male who was admitted on 10/24/2023 for TAVR procedure for severe aortic stenosis.     Impression:   Principal Problem:    Severe aortic stenosis --S/P TAVR on 10/24/23   -- doing well post procedure       Multiple myeloma (H)   -- followed by Dr. Ruelas with [facility], has follow-up arranged and current chemo on hold as planned given the TAVR procedure      Benign essential hypertension   -- continue Losartan 100 mg daily      Hyperlipidemia   -- continue Zocor 40 mg daily      Plan:  as above    DVT Prophylaxis: Pneumatic Compression Devices, ambulate  Code Status: Full Code      Cam Cunningham MD      Interval History     Doing well post procedure.      Physical Exam   Temp: 97.8  F (36.6  C) Temp src: Oral BP: (!) 160/87 Pulse: 115   Resp: 20 SpO2: 95 % O2 Device: None (Room air)    Vitals:    10/24/23 0726 10/25/23 0438   Weight: 95.3 kg (210 lb) 95.8 kg (211 lb 4.8 oz)     Vital Signs with Ranges  Temp:  [97.4  F (36.3  C)-99.3  F (37.4  C)] 97.8  F (36.6  C)  Pulse:  [] 115  Resp:  [5-25] 20  BP: (131-173)/(64-87) 160/87  SpO2:  [93 %-97 %] 95 %  I/O last 3 completed shifts:  In: 500 [I.V.:500]  Out: 1350 [Urine:1350]    # Pain Assessment:      10/25/2023     7:47 AM   Current Pain Score   Patient currently in pain? yes     Exam: ambulating hallway with cardiac rehab without issues.  Easy respirations      Medications        acyclovir  400 mg Oral BID    aspirin  81 mg Oral Daily    [START ON 10/26/2023] losartan  100 mg Oral Daily    losartan  50 mg Oral Once    metoprolol succinate ER  25 mg Oral Daily    metoprolol succinate ER  25 mg Oral Once    mirtazapine  15 mg Oral At Bedtime    psyllium  1 packet Oral Daily    simvastatin  40 mg Oral Daily    sulfamethoxazole-trimethoprim  1 tablet Oral Once per day on Monday Wednesday Friday       Data   Recent Labs   Lab 10/25/23  0425 10/24/23  1119 10/23/23  3347    WBC 4.7 3.7* 3.8*   HGB 11.5* 11.2* 13.1*   MCV 92 92 94    196 228   INR  --   --  0.95    142 143   POTASSIUM 3.7 3.8 3.9   CHLORIDE 106 108* 106   CO2 24 22 26   BUN 8.2 9.1 11.3   CR 0.65* 0.77 0.71   ANIONGAP 9 12 11   SESAR 8.8 7.9* 9.0   * 114* 121*   ALBUMIN  --  3.9 4.5   PROTTOTAL  --  5.7* 6.6   BILITOTAL  --  0.5 0.6   ALKPHOS  --  86 110   ALT  --  77* 106*   AST  --  39 49*       Imaging:   Recent Results (from the past 24 hour(s))   XR Chest Port 1 View    Narrative    EXAM: XR CHEST PORT 1 VIEW  LOCATION: [facility]  DATE: 10/25/2023    INDICATION: S P Transcatheter Aortic Valve Replacement  COMPARISON: None.      Impression    IMPRESSION:  Endovascular aortic valve appears in satisfactory position. Heart size and pulmonary vascularity within normal limits. No focal lung infiltrates. Osseous structures grossly intact. Visualized upper abdomen unremarkable.   Echocardiogram Complete   Result Value    LVEF  > 65%    Narrative    [id number]  [id number]  [id number]  [id number]^TERESA^[place] [address]     Name: JOSE HILL  MRN: [id number]  : 1955  Study Date: 10/25/2023 09:11 AM  Age: 68 yrs  Gender: Male  Patient Location: [facility]  Reason For Study: Aortic Valve Replacement  Ordering Physician: BHARATI MOORE  Referring Physician: ONIEL LUGO  Performed By: JURGEN     BSA: 2.1 m2  Height: 70 in  Weight: 211 lb  HR: 110  BP: 160/87 mmHg  ______________________________________________________________________________  Procedure  Complete Portable Echo Adult. Adequate quality two-dimensional was performed  and interpreted. Adequate quality color and spectral Doppler were performed  and interpreted.  ______________________________________________________________________________  Interpretation Summary     The left ventricle is normal in size with mild concentric left ventricular  hypertrophy.  Left  ventricular function is normal.The ejection fraction is > 65%.  Normal right ventricle size and systolic function.  Left atrium is mildly dilated.  There is well seated 26mm Lisa 3 valve Resilia in aortic position with  normal gradient (peak and mean gradients are 15 and 11 mmHg, respectively). No  paravalvular leak. Normal bioprosthetic leaflet motion.  ______________________________________________________________________________  Left Ventricle  The left ventricle is normal in size. Left ventricular function is normal.The  ejection fraction is > 65%. There is mild concentric left ventricular  hypertrophy. Left ventricular diastolic function is normal. No regional wall  motion abnormalities noted.     Right Ventricle  Normal right ventricle size and systolic function.     Atria  The left atrium is mildly dilated. Right atrial size is normal. There is no  color Doppler evidence of an atrial shunt.     Mitral Valve  Mitral valve leaflets appear normal. There is no evidence of mitral stenosis  or clinically significant mitral regurgitation.     Tricuspid Valve  Tricuspid valve leaflets appear normal. There is no evidence of tricuspid  stenosis or clinically significant tricuspid regurgitation. Right ventricular  systolic pressure could not be approximated due to inadequate tricuspid  regurgitation.     Aortic Valve  There is well seated 26mm Lisa 3 valve Resilia in aortic position with  normal gradient (peak and mean gradients are 15 and 11 mmHg, respectively). No  paravalvular leak. Normal bioprosthetic leaflet motion.     Pulmonic Valve  The pulmonic valve is not well seen, but is grossly normal. This degree of  valvular regurgitation is within normal limits. There is trace pulmonic  valvular regurgitation.     Vessels  The aorta root is normal. The ascending aorta is Mildly dilated. IVC diameter  <2.1 cm collapsing >50% with sniff suggests a normal RA pressure of 3 mmHg.     Pericardium  There is no  pericardial effusion.     Rhythm  Sinus rhythm was noted.  ______________________________________________________________________________  MMode/2D Measurements & Calculations  IVSd: 1.4 cm     LVIDd: 4.3 cm  LVIDs: 2.4 cm  LVPWd: 1.1 cm  FS: 42.7 %  LV mass(C)d: 191.0 grams  LV mass(C)dI: 89.5 grams/m2  Ao root diam: 3.3 cm  asc Aorta Diam: 4.4 cm  LVOT diam: 2.1 cm  LVOT area: 3.4 cm2  Ao root diam index Ht(cm/m): 1.9  Ao root diam index BSA (cm/m2): 1.5  Asc Ao diam index BSA (cm/m2): 2.0  Asc Ao diam index Ht(cm/m): 2.5  LA Volume (BP): 48.3 ml     LA Volume Index (BP): 22.6 ml/m2  LA Volume Indexed (AL/bp): 25.2 ml/m2  RWT: 0.52  TAPSE: 3.4 cm     Doppler Measurements & Calculations  MV E max stephane: 97.9 cm/sec  MV A max stephane: 112.0 cm/sec  MV E/A: 0.87  MV dec slope: 419.0 cm/sec2  MV dec time: 0.23 sec     Ao V2 max: 194.0 cm/sec  Ao max PG: 15.0 mmHg  Ao V2 mean: 147.0 cm/sec  Ao mean P.0 mmHg  Ao V2 VTI: 30.6 cm  JOHANNA(I,D): 2.7 cm2  JOHANNA(V,D): 2.9 cm2  Ao acc time: 0.07 sec  LV V1 max P.1 mmHg  LV V1 max: 166.5 cm/sec  LV V1 VTI: 24.5 cm  SV(LVOT): 82.9 ml  SI(LVOT): 38.8 ml/m2  PA V2 max: 145.5 cm/sec  PA max P.8 mmHg  PA acc time: 0.05 sec  AV Stephane Ratio (DI): 0.86  JOHANNA Index (cm2/m2): 1.3  E/E' av.6  Lateral E/e': 9.1  Medial E/e': 14.0  RV S Stephane: 18.5 cm/sec     ______________________________________________________________________________  Report approved by: Lula Schmitt 10/25/2023 11:14 AM

## 2023-10-25 NOTE — PLAN OF CARE
Problem: Cardiac Catheterization (Diagnostic/Interventional)  Goal: Optimal Pain Control and Function  Outcome: Not Progressing  Intervention: Prevent or Manage Pain  Recent Flowsheet Documentation  Taken 10/25/2023 0252 by Amaris Okeefe RN  Pain Management Interventions: medication (see MAR)   Goal Outcome Evaluation:    Pt c/o bad headache since midnight in the back of his neck.  Got oxy 5mg 0530 which did not help.  Gave tylenol at 0745 and ice pack.  Pt reported feeling nauseous.  Notified CVC surgery.  Received new med orders. Headache 3/10 said he felt better and the ice pack helped.  ECHO done. /87. HR tachy 114. Gave extra dose of metoprolol and losartan at 1300. HR 95 at rest. Tachy with movement Hr 114. /74. Pending ECHO results may discharge home today.

## 2023-10-25 NOTE — PLAN OF CARE
[facility] care 1900 to 0730. A&O x 4. Stand by assist. Tele is NSR. C/O back pain, PRN Tylenol given (see MAR). PRN Hydralazine given for HTN (see MAR). Up to toilet. Call light within reach, able to make needs known. Bed alarm on for safety.    Problem: Risk for Delirium  Goal: Improved Sleep  Outcome: Progressing     Problem: Cardiac Catheterization (Diagnostic/Interventional)  Goal: Absence of Vascular Access Complication  Intervention: Prevent and Manage Access Complications  Recent Flowsheet Documentation  Taken 10/25/2023 0330 by HARESH SHANNON  Activity Management:   ambulated to bathroom   ambulated in room  Head of Bed (HOB) Positioning: HOB at 20-30 degrees

## 2023-10-25 NOTE — PROGRESS NOTES
10/25/23 1030   Appointment Info   Signing Clinician's Name / Credentials (OT) Liliya Marcial OTR/L   Living Environment   People in Home spouse   Current Living Arrangements house   Stair Railings, Main Entrance railings safe and in good condition   Number of Stairs, Within Home, Primary greater than 10 stairs  (stays on main level)   Stair Railings, Within Home, Primary railings safe and in good condition   Transportation Anticipated car, drives self;family or friend will provide   Living Environment Comments lives on main level.   Self-Care   Usual Activity Tolerance excellent   Current Activity Tolerance good   Regular Exercise Yes   Activity/Exercise Type walking  (walks dog daily)   Exercise Amount/Frequency daily  (1-2 miles)   Equipment Currently Used at Home none   Fall history within last six months no   Activity/Exercise/Self-Care Comment independent with all self cares   Instrumental Activities of Daily Living (IADL)   Previous Responsibilities meal prep;housekeeping;laundry;shopping;yardwork;medication management;finances;driving   IADL Comments independent.  shares responsibilities with wife   General Information   Onset of Illness/Injury or Date of Surgery 10/24/23   Referring Physician Juliet Ventura PA-C   Patient/Family Therapy Goal Statement (OT) pt would like to go home at discharge and attend outpatient cardiac rehab   Additional Occupational Profile Info/Pertinent History of Current Problem 68 year old male who was admitted on 10/24/2023 for TAVR procedure for severe aortic stenosis, with SOB after walking 3-4 blocks.   Existing Precautions/Restrictions weight bearing  (TAVR restrictions, 10 # x 1 week)   Left Upper Extremity (Weight-bearing Status) partial weight-bearing (PWB)   Right Upper Extremity (Weight-bearing Status) partial weight-bearing (PWB)   Cognitive Status Examination   Follows Commands WNL   Strength Comprehensive (MMT)   Comment, General Manual Muscle Testing (MMT)  Assessment WNL   Bed Mobility   Bed Mobility supine-sit   Supine-Sit [place] (Bed Mobility) independent   Transfers   Transfers bed-chair transfer   Transfer Skill: Bed to Chair/Chair to Bed   Bed-Chair [place] (Transfers) independent   Balance   Balance Assessment no deficits identified   Clinical Impression   Criteria for Skilled Therapeutic Interventions Met (OT) Yes, treatment indicated   OT Diagnosis reduced functional moblity   Influenced by the following impairments TAVR   OT Problem List-Impairments impacting ADL problems related to;activity tolerance impaired;mobility;post-surgical precautions   Assessment of Occupational Performance 1-3 Performance Deficits   Identified Performance Deficits education and monitoring needs   Planned Therapy Interventions (OT) progressive activity/exercise;home program guidelines;ADL retraining   Clinical Decision Making Complexity (OT) problem focused assessment/low complexity   Risk & Benefits of therapy have been explained evaluation/treatment results reviewed;care plan/treatment goals reviewed;risks/benefits reviewed;current/potential barriers reviewed;participants voiced agreement with care plan;participants included;patient   OT Total Evaluation Time   OT Eval, Low Complexity Minutes (08102) 10   OT Goals   Therapy Frequency (OT) One time eval and treatment   OT Predicted Duration/Target Date for Goal Attainment 10/26/23   OT Goals Cardiac Phase 1   OT: Understanding of cardiac education to maximize quality of life, condition management, and health outcomes Patient;Verbalize;Demonstrate   OT: Perform aerobic activity with stable cardiovascular response intermittent;10 minutes;ambulation   OT: Functional/aerobic ambulation tolerance with stable cardiovascular response in order to return to home and community environment Independent;Within precautions;Greater than 300 feet   Cardiac Education   Education Provided Precautions;Resuming home activities;Outpatient  Cardiac Rehab   Cardiac Rehab Phase II Plan   Phase II Order Received Yes   Phase II Appointment Status Scheduled   [facility]   OT Discharge Planning   OT Plan dc home   OT Discharge Recommendation (DC Rec) home with outpatient cardiac rehab   OT Rationale for DC Rec pt has good support at home   OT Brief overview of current status independent with moblity and transfers. asymptomatic   Total Session Time   Total Session Time (sum of timed and untimed services) 10   Cardiac Rehab Discharge Summary    Reason for therapy discharge:    Discharged to home with outpatient therapy.    Progress towards therapy goal(s). See goals on Care Plan in [facility] electronic health record for goal details.  Goals met    Therapy recommendation(s):    Continued therapy is recommended.  Rationale/Recommendations:  Pt would benefit from outpatient cardiac rehab for further monitoring and education..  Continue home exercise program.

## 2023-10-25 NOTE — DISCHARGE SUMMARY
[facility]  CARDIOLOGY DISCHARGE SUMMARY     Primary Care Physician: Cipriano Fuentes  Admission Date: 10/24/2023   Discharge Provider: Juliet Ventura PA-C Discharge Date: 10/25/2023   Diet: resume previous home diet   Code Status: Full Code   Activity: No lifting > 10 lb, no driving for 7 days        Condition at Discharge: Stable      BRIEF HPI:   Billy is a year old male with PMH of severe aortic stenosis, hypertension, hyperlipidemia, coronary artery disease, multiple myeloma post palliative radiation now on systemic chemotherapy (VRD), history of PJP pneumonia, lumbosacral radiculopathy, anxiety. He was recently evaluated by cardiology for dyspnea on exertion x 6 months in the setting of work-up for stem cell transplant. On subsequent work up, his coronary angriogram was notable for moderate nonobstructive coronary artery disease. His echocardiogram demonstrated severe aortic valve stenosis. He was referred to valve for further work-up and is now s/p TAVR.    PRINCIPAL & ACTIVE DISCHARGE DIAGNOSES     Principal Problem:    Severe aortic stenosis --S/P TAVR on 10/24/23  Active Problems:    Multiple myeloma (H)    Benign essential hypertension    Hyperlipidemia      SIGNIFICANT FINDINGS (Imaging, labs):   ECHO   Interpretation Summary     The left ventricle is normal in size with mild concentric left ventricular  hypertrophy.  Left ventricular function is normal.The ejection fraction is > 65%.  Normal right ventricle size and systolic function.  Left atrium is mildly dilated.  There is well seated 26mm Lisa 3 valve Resilia in aortic position with  normal gradient (peak and mean gradients are 15 and 11 mmHg, respectively). No  paravalvular leak. Normal bioprosthetic leaflet motion.    CXR (personally reviewed and interpreted):  EXAM: XR CHEST PORT 1 VIEW  LOCATION: [facility]  DATE: 10/25/2023     INDICATION: S P Transcatheter Aortic Valve  Replacement  COMPARISON: None.                                                                      IMPRESSION:  Endovascular aortic valve appears in satisfactory position. Heart size and pulmonary vascularity within normal limits. No focal lung infiltrates. Osseous structures grossly intact. Visualized upper abdomen unremarkable.      EKG (personally reviewed and interpreted):  Sinus tachycardia with occasional PVC, LAFB, , , QRS 84, QT/Qtc 340/466    LABS or IMAGING REQUIRING FOLLOW-UP AFTER DISCHARGE:     Echocardiogram on 11/30/23 at 10 am    PROCEDURES ( this hospitalization only)      Procedure(s):  Transcatheter Aortic Valve Replacement-Femoral Approach  OR TRANSCATHETER AORTIC VALVE REPLACEMENT, FEMORAL PERCUTANEOUS APPROACH (STANDBY)    RECOMMENDATIONS TO OUTPATIENT PROVIDER FOR F/U VISIT     With Juliet Ventura PA-C on 11/2/23 at 2:10 pm    With Juliet Ventura PA-C for 1 month visit on 12/7/23 at 2:30 pm    DISPOSITION     Home    HOSPITAL COURSE BY DIAGNOSIS:      #Severe aortic stenosis, now s/p TAVR using a 26 mm PAUL Resilia  He tolerated the procedure well yesterday. There were no complications. He overall feels well this morning. There was no evidence of significant pauses or high degree AV block on telemetry. His EKG was without significant change post valve deployment    Sheath sites soft without hematoma.  Stitch was removed without difficulty.  Neuro's intact. Pt reports having a headache overnight which is now improved this morning after putting a cold pack to his forehead and some tylenol. He denies any vision changes, numbness, tingling, or weakness. He is otherwise feeling well  today and denies any SOB, GILLETTE, palpitations, dizziness, or lightheadedness . He ambulated this morning without difficulty. He is urinating without any issues.     - ASA for anti-platelet therapy  - OP Cardiac rehab  - Daily weights  - Lifelong antibiotic prophylaxis prior to all dental procedures.  -  "follow ups have been arranged as listed above    #CAD  Pre-TAVR coronary angiogram showed moderate nonobstructive coronary disease     - continue ASA daily  - continue PTA Zocor    #HTN  His BP has been elevated since procedure and tachycardic. Reduced home Losartan dose to 50mg daily and started metoprolol succinate 25 mg daily. He received 1 dose of each and remained hypertensive with >160s/80s and tachycardic in 110s. Given an additional Losartan 50 mg x1 and metoprolol succinate 25 mg x1.   - Continue PTA Losartan 100 mg daily  - Start metoprolol succinate 25 mg daily      #Multiple Myeloma post palliative radiation now on systemic chemotherapy (VRD)  - Follow-up with Dr. Ruelas with [facility]  - Continue PTA acyclovir, Bactrim       Clinically Significant Risk Factors          # Hypocalcemia: Lowest Ca = 7.9 mg/dL in last 2 days, will monitor and replace as appropriate         # Hypertension: Noted on problem list        # Obesity: Estimated body mass index is 30.32 kg/m  as calculated from the following:    Height as of this encounter: 1.778 m (5' 10\").    Weight as of this encounter: 95.8 kg (211 lb 4.8 oz)., PRESENT ON ADMISSION                   Discharge Medications with Med changes:        Review of your medicines        START taking        Dose / Directions   metoprolol succinate ER 25 MG 24 hr tablet  Commonly known as: TOPROL XL      Dose: 25 mg  Start taking on: October 26, 2023  Take 1 tablet (25 mg) by mouth daily  Quantity: 90 tablet  Refills: 3            CONTINUE these medicines which have NOT CHANGED        Dose / Directions   Acetaminophen 8 Hour 650 MG CR tablet  Generic drug: acetaminophen      Dose: 1,300 mg  Take 1,300 mg by mouth every 8 hours as needed for mild pain or fever  Refills: 0     acyclovir 400 MG tablet  Commonly known as: ZOVIRAX      Dose: 400 mg  Take 400 mg by mouth 2 times daily  Refills: 0     alendronate 70 MG tablet  Commonly known as: FOSAMAX  Used for: Osteopenia of neck " of right femur      Dose: 70 mg  Take 1 tablet (70 mg) by mouth every 7 days  Quantity: 12 tablet  Refills: 1     aspirin 81 MG EC tablet      Dose: 81 mg  Take 81 mg by mouth daily  Refills: 0     bortezomib 3.5 MG injection      Dose: 1.3 mg/m2  Inject 1.3 mg/m2 into the vein once a week  Refills: 0     Calcium-Magnesium-Zinc 333-133-5 MG Tabs per tablet      Dose: 2 tablet  Take 2 tablets by mouth daily  Refills: 0     cholecalciferol 50 MCG (2000 UT) tablet      Dose: 2,000 Units  Take 2,000 Units by mouth daily  Refills: 0     co-enzyme Q-10 100 MG Caps capsule      Dose: 100 mg  Take 100 mg by mouth daily  Refills: 0     dexAMETHasone 4 MG tablet  Commonly known as: DECADRON      Dose: 20 mg  Take 20 mg by mouth See Admin Instructions Tuesday and Wednesday  Refills: 0     Fish Oil Burp-Less 1000 MG Caps      Dose: 1 capsule  Take 1 capsule by mouth daily  Refills: 0     fluticasone 50 MCG/ACT nasal spray  Commonly known as: FLONASE      Dose: 1 spray  Spray 1 spray into both nostrils daily as needed for rhinitis or allergies  Refills: 0     lactobacillus rhamnosus (GG) capsule      Dose: 1 capsule  Take 1 capsule by mouth daily  Refills: 0     LENalidomide 25 MG Caps capsule  Commonly known as: REVLIMID      Dose: 25 mg  Take 25 mg by mouth See Admin Instructions 2 weeks on, 1 week off  Refills: 0     loperamide 2 MG capsule  Commonly known as: IMODIUM      Dose: 1 capsule  Take 1 capsule by mouth 4 times daily as needed for diarrhea  Refills: 0     loratadine 10 MG tablet  Commonly known as: CLARITIN      Dose: 10 mg  Take 10 mg by mouth daily as needed for allergies  Refills: 0     losartan 50 MG tablet  Commonly known as: COZAAR      Dose: 100 mg  Take 100 mg by mouth daily  Refills: 0     medical cannabis (Patient's own supply)      See Admin Instructions (The purpose of this order is to document that the patient reports taking medical cannabis.  This is not a prescription, and is not used to certify that  the patient has a qualifying medical condition.)  Refills: 0     mirtazapine 15 MG tablet  Commonly known as: REMERON      Dose: 15 mg  Take 15 mg by mouth At Bedtime  Refills: 0     psyllium Packet  Commonly known as: METAMUCIL/KONSYL      Dose: 1 packet  Take 1 packet by mouth daily  Refills: 0     simvastatin 40 MG tablet  Commonly known as: ZOCOR  Used for: Mixed hyperlipidemia      Dose: 40 mg  Take 1 tablet (40 mg) by mouth daily  Quantity: 90 tablet  Refills: 3     sulfamethoxazole-trimethoprim 800-160 MG tablet  Commonly known as: BACTRIM DS  Indication: pjp prophy  Used for: Acute hypoxemic respiratory failure (H)      Dose: 1 tablet  Take 1 tablet by mouth three times a week Take on Mondays, Wednesday, fridaysof week while taking Prednsione more than 20mg daily for PCP prophylaxis  Quantity: 30 tablet  Refills: 0     Turmeric 500 MG Caps      Dose: 2 capsule  Take 2 capsules by mouth daily  Refills: 0     vitamin B complex with vitamin C tablet      Dose: 1 tablet  Take 1 tablet by mouth daily  Refills: 0     zinc gluconate 50 MG tablet      Dose: 50 mg  Take 50 mg by mouth daily  Refills: 0               Where to get your medicines        These medications were sent to [facility] [address] [address]      Phone: [phone]   metoprolol succinate ER 25 MG 24 hr tablet              Rationale for medication changes:      Start metoprolol succinate 25 mg daily due to tachycardia and hyperdynamic left ventricle         Consults   Hospitalist  Cardiac rehab         Pertinent Labs          Lab Results   Component Value Date     10/25/2023    Lab Results   Component Value Date    CHLORIDE 106 10/25/2023    CHLORIDE 105 09/06/2023    Lab Results   Component Value Date    BUN 8.2 10/25/2023    BUN 17 09/06/2023      Lab Results   Component Value Date    POTASSIUM 3.7 10/25/2023    POTASSIUM 3.8 09/06/2023    Lab  Results   Component Value Date    CO2 24 10/25/2023    CO2 23 09/06/2023    Lab Results   Component Value Date    CR 0.65 10/25/2023        Lab Results   Component Value Date    WBC 4.7 10/25/2023    HGB 11.5 (L) 10/25/2023    HCT 34.4 (L) 10/25/2023    MCV 92 10/25/2023     10/25/2023     Lab Results   Component Value Date    CR 0.65 (L) 10/25/2023     Lab Results   Component Value Date     10/25/2023    POTASSIUM 3.7 10/25/2023    CHLORIDE 106 10/25/2023    CO2 24 10/25/2023     (H) 10/25/2023     Lab Results   Component Value Date    GFRESTIMATED >90 10/25/2023    GFRESTBLACK >60 02/18/2021           Discharge Orders     Discharge Procedure Orders   CARDIAC REHAB REFERRAL   Standing Status: Future   Referral Priority: Routine Referral Type: Rehab Therapy Cardiac Therapy   Number of Visits Requested: 1     Reason for your hospital stay   Order Comments: New valve     Follow-up and recommended labs and tests    Order Comments: Follow ups listed on AVS     Activity   Order Comments: Activity restrictions as per AVS     Order Specific Question Answer Comments   Is discharge order? Yes      Diet   Order Comments: Resume previous home diet     Order Specific Question Answer Comments   Is discharge order? Yes      Examination     Vital Signs in last 24 hours:   Vital signs:  Temp:  [97.4  F (36.3  C)-99.3  F (37.4  C)] 98.2  F (36.8  C)  Pulse:  [] 112  Resp:  [18-20] 20  BP: (131-173)/(64-87) 169/83  SpO2:  [93 %-96 %] 95 %   Last BP verbally from nurse was 147/54     General Appearance:   Alert, cooperative and in no acute distress   ENT/Mouth: membranes moist, no oral lesions or bleeding gums.      EYES:  no scleral icterus, normal conjunctivae   Neck: Supple without lymphadenopathy.  Thyroid not visualized   Chest/Lungs:   lungs are clear to auscultation, no rales or wheezing   Cardiovascular:   Tachycardic. Normal first and second heart sounds with no murmurs, rubs, or gallops; the  carotid, radial and posterior tibial pulses are intact, no edema bilaterally    Abdomen:  Soft and nontender. Bowel sounds are present in all quadrants   Extremities: no cyanosis or clubbing.  Puncture site is soft   Skin: no xanthelasma, warm.    Neurologic: normal gait, normal  bilateral, no tremors   Psychiatric: Normal mood and affect         Please see EMR for more detailed significant labs, imaging, consultant notes etc.    60 MINUTES SPENT BY ME on the date of service doing chart review, history, exam, documentation & further activities per the note.         Juliet Ventura PA-C  Structural Heart Program  [facility]

## 2023-10-25 NOTE — PLAN OF CARE
"  Problem: Adult Inpatient Plan of Care  Goal: Plan of Care Review  Description: The Plan of Care Review/Shift note should be completed every shift.  The Outcome Evaluation is a brief statement about your assessment that the patient is improving, declining, or no change.  This information will be displayed automatically on your shift  note.  Outcome: Adequate for Care Transition  Goal: Patient-Specific Goal (Individualized)  Description: You can add care plan individualizations to a care plan. Examples of Individualization might be:  \"Parent requests to be called daily at 9am for status\", \"I have a hard time hearing out of my right ear\", or \"Do not touch me to wake me up as it startles  me\".  Outcome: Adequate for Care Transition  Goal: Absence of Hospital-Acquired Illness or Injury  Outcome: Adequate for Care Transition  Intervention: Identify and Manage Fall Risk  Recent Flowsheet Documentation  Taken 10/25/2023 1314 by Amaris Okeefe, RN  Safety Promotion/Fall Prevention:   activity supervised   nonskid shoes/slippers when out of bed   clutter free environment maintained   room door open  Intervention: Prevent Skin Injury  Recent Flowsheet Documentation  Taken 10/25/2023 1314 by Amaris Okeefe, RN  Body Position: position changed independently  Goal: Optimal Comfort and Wellbeing  Outcome: Adequate for Care Transition  Intervention: Monitor Pain and Promote Comfort  Recent Flowsheet Documentation  Taken 10/25/2023 1300 by Amaris Okeefe, RN  Pain Management Interventions: cold applied  Taken 10/25/2023 0747 by Amaris Okeefe, RN  Pain Management Interventions: medication (see MAR)  Goal: Readiness for Transition of Care  Outcome: Adequate for Care Transition     Problem: Cardiac Catheterization (Diagnostic/Interventional)  Goal: Absence of Bleeding  Outcome: Adequate for Care Transition  Goal: Absence of Contrast-Induced Injury  Outcome: Adequate for Care Transition  Goal: Stable Heart Rate and Rhythm  Outcome: " Adequate for Care Transition  Goal: Absence of Embolism Signs and Symptoms  Outcome: Adequate for Care Transition  Goal: Anesthesia/Sedation Recovery  Outcome: Adequate for Care Transition  Intervention: Optimize Anesthesia Recovery  Recent Flowsheet Documentation  Taken 10/25/2023 1314 by Amaris Okeefe RN  Safety Promotion/Fall Prevention:   activity supervised   nonskid shoes/slippers when out of bed   clutter free environment maintained   room door open  Goal: Optimal Pain Control and Function  10/25/2023 1508 by Amaris Okeefe, RN  Outcome: Adequate for Care Transition  10/25/2023 0757 by Amaris Okeefe RN  Outcome: Not Progressing  Intervention: Prevent or Manage Pain  Recent Flowsheet Documentation  Taken 10/25/2023 1300 by Amaris Okeefe RN  Pain Management Interventions: cold applied  Taken 10/25/2023 0747 by Amaris Okeefe RN  Pain Management Interventions: medication (see MAR)  Goal: Absence of Vascular Access Complication  Outcome: Adequate for Care Transition  Intervention: Prevent and Manage Access Complications  Recent Flowsheet Documentation  Taken 10/25/2023 1314 by Amaris Okeefe, RN  Activity Management:   ambulated to bathroom   dorsiflexion/plantar flexion performed  Head of Bed (HOB) Positioning: HOB at 20-30 degrees  Taken 10/25/2023 0900 by Amaris Okeefe RN  Activity Management: ambulated to bathroom     Problem: Risk for Delirium  Goal: Optimal Coping  Outcome: Adequate for Care Transition  Goal: Improved Behavioral Control  Outcome: Adequate for Care Transition  Goal: Improved Attention and Thought Clarity  Outcome: Adequate for Care Transition  Goal: Improved Sleep  Outcome: Adequate for Care Transition   Goal Outcome Evaluation:    Went over discharge instructions with pt.  Answered all questions.

## 2023-10-30 ENCOUNTER — TELEPHONE (OUTPATIENT)
Dept: CARDIOLOGY | Facility: CLINIC | Age: 68
End: 2023-10-30
Payer: MEDICARE

## 2023-10-30 NOTE — TELEPHONE ENCOUNTER
Pt called stating they are doing well from a valve standpoint. Pt states they are having back pain and pts oncologist would like to order an MRI. Pt was informed their valve and Manta device are both MRI conditional. Pt states they have the temporary valve and manta cards to bring to appointment as well. Patient verbalizes understanding and agrees with plan. No further questions or concerns at this time.

## 2023-11-02 ENCOUNTER — OFFICE VISIT (OUTPATIENT)
Dept: CARDIOLOGY | Facility: CLINIC | Age: 68
End: 2023-11-02
Payer: MEDICARE

## 2023-11-02 ENCOUNTER — HOSPITAL ENCOUNTER (OUTPATIENT)
Dept: MRI IMAGING | Facility: HOSPITAL | Age: 68
Discharge: HOME | End: 2023-11-02
Attending: INTERNAL MEDICINE
Payer: MEDICARE

## 2023-11-02 VITALS
HEART RATE: 81 BPM | SYSTOLIC BLOOD PRESSURE: 124 MMHG | HEIGHT: 70 IN | DIASTOLIC BLOOD PRESSURE: 68 MMHG | WEIGHT: 214 LBS | BODY MASS INDEX: 30.64 KG/M2 | RESPIRATION RATE: 16 BRPM

## 2023-11-02 DIAGNOSIS — I10 BENIGN ESSENTIAL HYPERTENSION: ICD-10-CM

## 2023-11-02 DIAGNOSIS — I35.0 SEVERE AORTIC STENOSIS: Primary | ICD-10-CM

## 2023-11-02 DIAGNOSIS — C90.00 MYELOMA (H): ICD-10-CM

## 2023-11-02 DIAGNOSIS — E78.2 MIXED HYPERLIPIDEMIA: ICD-10-CM

## 2023-11-02 PROCEDURE — 72158 MRI LUMBAR SPINE W/O & W/DYE: CPT

## 2023-11-02 PROCEDURE — A9585 GADOBUTROL INJECTION: HCPCS | Mod: JZ | Performed by: INTERNAL MEDICINE

## 2023-11-02 PROCEDURE — 72157 MRI CHEST SPINE W/O & W/DYE: CPT

## 2023-11-02 PROCEDURE — 255N000002 HC RX 255 OP 636: Mod: JZ | Performed by: INTERNAL MEDICINE

## 2023-11-02 PROCEDURE — 99215 OFFICE O/P EST HI 40 MIN: CPT | Performed by: PHYSICIAN ASSISTANT

## 2023-11-02 RX ORDER — GADOBUTROL 604.72 MG/ML
10 INJECTION INTRAVENOUS ONCE
Status: COMPLETED | OUTPATIENT
Start: 2023-11-02 | End: 2023-11-02

## 2023-11-02 RX ORDER — ROSUVASTATIN CALCIUM 20 MG/1
20 TABLET, COATED ORAL DAILY
Qty: 90 TABLET | Refills: 1 | Status: SHIPPED | OUTPATIENT
Start: 2023-11-02 | End: 2024-01-23

## 2023-11-02 RX ADMIN — GADOBUTROL 10 ML: 604.72 INJECTION INTRAVENOUS at 20:55

## 2023-11-02 NOTE — LETTER
11/2/2023    Cipriano Fuentes PA-C  [address], [address]  [place] [address]    RE: Billy ROBERTS Glen       Dear Colleague,     I had the pleasure of seeing Billy Carroll in the [facility].  HEART CARE ENCOUNTER NOTE       [facility]  [phone]    Assessment/Recommendations   Assessment:  Severe aortic stenosis, now s/p TAVR using a 26 mm PAUL Resilia on 10/24/2023  Moderate nonobstructive coronary artery disease  - currently denies symptoms of angina  Hyperlipidemia - last , currently on simvastatin 40 mg daily   Hypertension - Blood pressure is controlled today. Blood pressure at home has mostly been systolic 130s-140s, and occasionally will go into the 150s  Multiple Myeloma post palliative radiation now on systemic chemotherapy (VRD) - followed by [facility], has MRI scheduled for tonight.    Plan:  Ok to resume regular activity   Attend cardiac rehab, first appointment is tomorrow  Continue taking Losartan 50 mg daily. Check blood pressure daily  Stop taking simvastatin. Start Crestor 20 mg daily    Thank you for the opportunity to participate in the care of Billy Carroll. It's been a pleasure working with him.  Please do not hesitate to call with any questions or concerns.       History of Present Illness/Subjective    I had the opportunity to see Billy Carroll at the [facility] for his 1 week post-TAVR visit.    Billy is a 68 year old male with PMH of severe aortic stenosis, hypertension, hyperlipidemia, coronary artery disease, multiple myeloma post palliative radiation now on systemic chemotherapy (VRD), history of PJP pneumonia, lumbosacral radiculopathy, anxiety. He was recently evaluated by cardiology for dyspnea on exertion x 6 months in the setting of work-up for stem cell transplant. On subsequent work up, his coronary angriogram was notable for moderate nonobstructive coronary artery disease. His echocardiogram demonstrated  severe aortic valve stenosis. He was referred to valve for further work-up and is now s/p TAVR on 10/24/23. There were no complications. He was discharged the next day. Metoprolol was started for his tachycardia and hyperdynamic left ventricle.    He reports feeling well since the procedure. He has more energy and reports his shortness of breath has resolved. He is walking his dog 1 mile twice daily. He reports he is currently taking Losartan 50 mg daily at home. His blood pressure has mostly been running 130s-140s with an occasionally pressure in the low 150s. He does report a sensation in his back that is similar to when he had his tumor. He has been followed by [facility] and has contacted them regarding this. He has an MRI scheduled for [facility].    Billy Carroll denies exertional chest discomfort, palpitations, shortness of breath at rest, PND, orthopnea, lightheadedness, dizziness, pre-syncope or syncope.  Billy Carroll also denies any recent weight loss, changes in appetite, nausea or vomiting.   ____________________________________________________________  Echo: 10/25/23  Interpretation Summary     The left ventricle is normal in size with mild concentric left ventricular  hypertrophy.  Left ventricular function is normal.The ejection fraction is > 65%.  Normal right ventricle size and systolic function.  Left atrium is mildly dilated.  There is well seated 26mm Lisa 3 valve Resilia in aortic position with  normal gradient (peak and mean gradients are 15 and 11 mmHg, respectively). No  paravalvular leak. Normal bioprosthetic leaflet motion.       Physical Examination Review of Systems   Vitals: There were no vitals taken for this visit.  BMI= There is no height or weight on file to calculate BMI.  Wt Readings from Last 3 Encounters:   10/25/23 95.8 kg (211 lb 4.8 oz)   10/23/23 97.5 kg (215 lb)   10/04/23 95.7 kg (211 lb)       General Appearance:   Alert, cooperative and in no acute distress   ENT/Mouth:  membranes moist, no oral lesions or bleeding gums.      EYES:  no scleral icterus, normal conjunctivae   Neck: Supple without lymphadenopathy.  Thyroid not visualized   Chest/Lungs:   lungs are clear to auscultation, no rales or wheezing   Cardiovascular:   Regular. Normal first and second heart sounds with no murmurs, rubs, or gallops; the carotid, radial and posterior tibial pulses are intact, trace edema bilaterally    Abdomen:  Soft and nontender. Bowel sounds are present in all quadrants   Extremities: no cyanosis or clubbing.  Puncture site is c/d/I, soft. No erythema or purulent drainage.    Skin: no xanthelasma, warm.    Neurologic: normal gait, normal  bilateral, no tremors   Psychiatric: Normal mood and affect       Please refer above for cardiac ROS details.      Medical History  Surgical History Family History Social History   Past Medical History:   Diagnosis Date    Benign essential hypertension     Hyperlipidemia     Multiple myeloma (H)     [facility]     Past Surgical History:   Procedure Laterality Date    CV CORONARY ANGIOGRAM N/A 9/7/2023    Procedure: Coronary Angiogram;  Surgeon: Domenico Sauceda MD;  Location: [facility] CV    CV TRANSCATHETER AORTIC VALVE REPLACEMENT-FEMORAL APPROACH N/A 10/24/2023    Procedure: Transcatheter Aortic Valve Replacement-Femoral Approach;  Surgeon: Charles Field MD;  Location: [facility] LAB CV    OR TRANSCATHETER AORTIC VALVE REPLACEMENT, FEMORAL PERCUTANEOUS APPROACH (STANDBY) N/A 10/24/2023    Procedure: OR TRANSCATHETER AORTIC VALVE REPLACEMENT, FEMORAL PERCUTANEOUS APPROACH (STANDBY);  Surgeon: Sahra Mosher MD;  Location: [facility] CATH LAB CV     No family history on file. Social History     Socioeconomic History    Marital status:      Spouse name: Not on file    Number of children: Not on file    Years of education: Not on file    Highest education level: Not on file   Occupational History    Not on  file   Tobacco Use    Smoking status: Never     Passive exposure: Never    Smokeless tobacco: Never   Vaping Use    Vaping Use: Never used   Substance and Sexual Activity    Alcohol use: Not on file    Drug use: Never    Sexual activity: Not on file   Other Topics Concern    Not on file   Social History Narrative    Not on file     Social Determinants of Health     Financial Resource Strain: Not on file   Food Insecurity: Not on file   Transportation Needs: Not on file   Physical Activity: Not on file   Stress: Not on file   Social Connections: Not on file   Interpersonal Safety: Not on file   Housing Stability: Not on file          Medications  Allergies   Current Outpatient Medications   Medication Sig Dispense Refill    acetaminophen (ACETAMINOPHEN 8 HOUR) 650 MG CR tablet Take 1,300 mg by mouth every 8 hours as needed for mild pain or fever      acyclovir (ZOVIRAX) 400 MG tablet Take 400 mg by mouth 2 times daily      alendronate (FOSAMAX) 70 MG tablet Take 1 tablet (70 mg) by mouth every 7 days 12 tablet 1    aspirin 81 MG EC tablet Take 81 mg by mouth daily      bortezomib 3.5 MG injection Inject 1.3 mg/m2 into the vein once a week      Calcium-Magnesium-Zinc 333-133-5 MG TABS per tablet Take 2 tablets by mouth daily      cholecalciferol 50 MCG (2000 UT) tablet Take 2,000 Units by mouth daily      co-enzyme Q-10 100 MG CAPS capsule Take 100 mg by mouth daily      dexAMETHasone (DECADRON) 4 MG tablet Take 20 mg by mouth See Admin Instructions Tuesday and Wednesday      fluticasone (FLONASE) 50 MCG/ACT nasal spray Spray 1 spray into both nostrils daily as needed for rhinitis or allergies      lactobacillus rhamnosus, GG, (CULTURELL) capsule Take 1 capsule by mouth daily      LENalidomide (REVLIMID) 25 MG CAPS capsule Take 25 mg by mouth See Admin Instructions 2 weeks on, 1 week off      loperamide (IMODIUM) 2 MG capsule Take 1 capsule by mouth 4 times daily as needed for diarrhea      loratadine (CLARITIN) 10 MG  "tablet Take 10 mg by mouth daily as needed for allergies      losartan (COZAAR) 50 MG tablet Take 100 mg by mouth daily      medical cannabis (Patient's own supply) See Admin Instructions (The purpose of this order is to document that the patient reports taking medical cannabis.  This is not a prescription, and is not used to certify that the patient has a qualifying medical condition.)      metoprolol succinate ER (TOPROL XL) 25 MG 24 hr tablet Take 1 tablet (25 mg) by mouth daily 90 tablet 3    mirtazapine (REMERON) 15 MG tablet Take 15 mg by mouth At Bedtime      Omega-3 Fatty Acids (FISH OIL BURP-LESS) 1000 MG CAPS Take 1 capsule by mouth daily      psyllium (METAMUCIL/KONSYL) Packet Take 1 packet by mouth daily      simvastatin (ZOCOR) 40 MG tablet Take 1 tablet (40 mg) by mouth daily 90 tablet 3    sulfamethoxazole-trimethoprim (BACTRIM DS) 800-160 MG tablet Take 1 tablet by mouth three times a week Take on Mondays, Wednesday, fridaysof week while taking Prednsione more than 20mg daily for PCP prophylaxis 30 tablet 0    Turmeric 500 MG CAPS Take 2 capsules by mouth daily      vitamin B complex with vitamin C (VITAMIN  B COMPLEX) tablet Take 1 tablet by mouth daily      zinc gluconate 50 MG tablet Take 50 mg by mouth daily      Allergies   Allergen Reactions    Acetaminophen-Codeine Nausea    Codeine Nausea         Lab Results    Chemistry/lipid CBC Cardiac Enzymes/BNP/TSH/INR   Recent Labs   Lab Test 09/06/23  1105   CHOL 268*   HDL 61   *   TRIG 345*     Recent Labs   Lab Test 09/06/23  1105 12/12/22  1112 02/18/21  0927   * 85 118     Recent Labs   Lab Test 10/25/23  0425      POTASSIUM 3.7   CHLORIDE 106   CO2 24   *   BUN 8.2   CR 0.65*   GFRESTIMATED >90   SESAR 8.8     Recent Labs   Lab Test 10/25/23  0425 10/24/23  1119 10/23/23  0941   CR 0.65* 0.77 0.71     No results for input(s): \"A1C\" in the last 79311 hours. Recent Labs   Lab Test 10/25/23  0425   WBC 4.7   HGB 11.5*   HCT " "34.4*   MCV 92        Recent Labs   Lab Test 10/25/23  0425 10/24/23  1119 10/23/23  0941   HGB 11.5* 11.2* 13.1*    Recent Labs   Lab Test 06/02/23  1857   TROPONINI 0.02     Recent Labs   Lab Test 10/23/23  0941 06/07/23  0049 06/02/23  1857   BNP  --  52 47   NTBNP 74  --   --      No results for input(s): \"TSH\" in the last 50645 hours.  Recent Labs   Lab Test 10/23/23  0941   INR 0.95        60 minutes spent on the date of encounter doing chart review, review of test results, interpretation with above tests, patient visit, and documentation.      Juliet Ventura PA-C  Structural Heart Program  [facility]               Thank you for allowing me to participate in the care of your patient.      Sincerely,     Juliet Ventura PA-C     [facility]  cc:   No referring provider defined for this encounter.      "

## 2023-11-02 NOTE — PATIENT INSTRUCTIONS
Billy Carroll,    It was a pleasure to see you today in the clinic regarding your 1 week visit post-TAVR.     My recommendations after this visit include:     - Continue with Losartan 50 mg daily, check your blood pressure daily   - Start Crestor 20 mg daily. Stop taking simvastatin. Re-check lipid panel in 3-4 months   - No other medication changes at this time   - Echocardiogram appointment on 11/30/23    You should followup with me at your 1 month visit on 12/7/23    **Remember you will need prophylactic antibiotics for all dental visits in the future.  You can get the Rx from your dentist, your PCP or from us.  If possible, still refrain from going to the dentist until 6 months or more after your valve replacement      If you have questions or concerns, please call using the numbers below:    Valve Clinic Phone   [phone]    After Hours/Scheduling  [phone]    Otherwise you can dial the nurse directly at:    Jud Valdovinos RN  [phone]    Yehuda Gill RN  [phone]                  Juliet Ventura PA-C  Structural Heart Program  [facility]

## 2023-11-02 NOTE — PROGRESS NOTES
HEART CARE ENCOUNTER NOTE       [facility]  [phone]    Assessment/Recommendations   Assessment:  Severe aortic stenosis, now s/p TAVR using a 26 mm PAUL Resilia on 10/24/2023  Moderate nonobstructive coronary artery disease  - currently denies symptoms of angina  Hyperlipidemia - last , currently on simvastatin 40 mg daily   Hypertension - Blood pressure is controlled today. Blood pressure at home has mostly been systolic 130s-140s, and occasionally will go into the 150s  Multiple Myeloma post palliative radiation now on systemic chemotherapy (VRD) - followed by [facility], has MRI scheduled for tonight.    Plan:  Ok to resume regular activity   Attend cardiac rehab, first appointment is tomorrow  Continue taking Losartan 50 mg daily. Check blood pressure daily  Stop taking simvastatin. Start Crestor 20 mg daily    Thank you for the opportunity to participate in the care of Billy Carroll. It's been a pleasure working with him.  Please do not hesitate to call with any questions or concerns.       History of Present Illness/Subjective    I had the opportunity to see Billy Carroll at the [facility] for his 1 week post-TAVR visit.    Billy is a 68 year old male with PMH of severe aortic stenosis, hypertension, hyperlipidemia, coronary artery disease, multiple myeloma post palliative radiation now on systemic chemotherapy (VRD), history of PJP pneumonia, lumbosacral radiculopathy, anxiety. He was recently evaluated by cardiology for dyspnea on exertion x 6 months in the setting of work-up for stem cell transplant. On subsequent work up, his coronary angriogram was notable for moderate nonobstructive coronary artery disease. His echocardiogram demonstrated severe aortic valve stenosis. He was referred to valve for further work-up and is now s/p TAVR on 10/24/23. There were no complications. He was discharged the next day. Metoprolol was started for his tachycardia  and hyperdynamic left ventricle.    He reports feeling well since the procedure. He has more energy and reports his shortness of breath has resolved. He is walking his dog 1 mile twice daily. He reports he is currently taking Losartan 50 mg daily at home. His blood pressure has mostly been running 130s-140s with an occasionally pressure in the low 150s. He does report a sensation in his back that is similar to when he had his tumor. He has been followed by [facility] and has contacted them regarding this. He has an MRI scheduled [facility].    Billy Carroll denies exertional chest discomfort, palpitations, shortness of breath at rest, PND, orthopnea, lightheadedness, dizziness, pre-syncope or syncope.  Billy Carroll also denies any recent weight loss, changes in appetite, nausea or vomiting.   ____________________________________________________________  Echo: 10/25/23  Interpretation Summary     The left ventricle is normal in size with mild concentric left ventricular  hypertrophy.  Left ventricular function is normal.The ejection fraction is > 65%.  Normal right ventricle size and systolic function.  Left atrium is mildly dilated.  There is well seated 26mm Lisa 3 valve Resilia in aortic position with  normal gradient (peak and mean gradients are 15 and 11 mmHg, respectively). No  paravalvular leak. Normal bioprosthetic leaflet motion.       Physical Examination Review of Systems   Vitals: There were no vitals taken for this visit.  BMI= There is no height or weight on file to calculate BMI.  Wt Readings from Last 3 Encounters:   10/25/23 95.8 kg (211 lb 4.8 oz)   10/23/23 97.5 kg (215 lb)   10/04/23 95.7 kg (211 lb)       General Appearance:   Alert, cooperative and in no acute distress   ENT/Mouth: membranes moist, no oral lesions or bleeding gums.      EYES:  no scleral icterus, normal conjunctivae   Neck: Supple without lymphadenopathy.  Thyroid not visualized   Chest/Lungs:   lungs are clear to  auscultation, no rales or wheezing   Cardiovascular:   Regular. Normal first and second heart sounds with no murmurs, rubs, or gallops; the carotid, radial and posterior tibial pulses are intact, trace edema bilaterally    Abdomen:  Soft and nontender. Bowel sounds are present in all quadrants   Extremities: no cyanosis or clubbing.  Puncture site is c/d/I, soft. No erythema or purulent drainage.    Skin: no xanthelasma, warm.    Neurologic: normal gait, normal  bilateral, no tremors   Psychiatric: Normal mood and affect       Please refer above for cardiac ROS details.      Medical History  Surgical History Family History Social History   Past Medical History:   Diagnosis Date    Benign essential hypertension     Hyperlipidemia     Multiple myeloma (H)     Followed by Dr. Ruelas with [facility]     Past Surgical History:   Procedure Laterality Date    CV CORONARY ANGIOGRAM N/A 9/7/2023    Procedure: Coronary Angiogram;  Surgeon: Domenico Sauceda MD;  Location: [facility] CV    CV TRANSCATHETER AORTIC VALVE REPLACEMENT-FEMORAL APPROACH N/A 10/24/2023    Procedure: Transcatheter Aortic Valve Replacement-Femoral Approach;  Surgeon: Charles Field MD;  Location: [facility] LAB CV    OR TRANSCATHETER AORTIC VALVE REPLACEMENT, FEMORAL PERCUTANEOUS APPROACH (STANDBY) N/A 10/24/2023    Procedure: OR TRANSCATHETER AORTIC VALVE REPLACEMENT, FEMORAL PERCUTANEOUS APPROACH (STANDBY);  Surgeon: Sahra Mosher MD;  Location: [facility] LAB CV     No family history on file. Social History     Socioeconomic History    Marital status:      Spouse name: Not on file    Number of children: Not on file    Years of education: Not on file    Highest education level: Not on file   Occupational History    Not on file   Tobacco Use    Smoking status: Never     Passive exposure: Never    Smokeless tobacco: Never   Vaping Use    Vaping Use: Never used   Substance and Sexual Activity    Alcohol use: Not on file     Drug use: Never    Sexual activity: Not on file   Other Topics Concern    Not on file   Social History Narrative    Not on file     Social Determinants of Health     Financial Resource Strain: Not on file   Food Insecurity: Not on file   Transportation Needs: Not on file   Physical Activity: Not on file   Stress: Not on file   Social Connections: Not on file   Interpersonal Safety: Not on file   Housing Stability: Not on file          Medications  Allergies   Current Outpatient Medications   Medication Sig Dispense Refill    acetaminophen (ACETAMINOPHEN 8 HOUR) 650 MG CR tablet Take 1,300 mg by mouth every 8 hours as needed for mild pain or fever      acyclovir (ZOVIRAX) 400 MG tablet Take 400 mg by mouth 2 times daily      alendronate (FOSAMAX) 70 MG tablet Take 1 tablet (70 mg) by mouth every 7 days 12 tablet 1    aspirin 81 MG EC tablet Take 81 mg by mouth daily      bortezomib 3.5 MG injection Inject 1.3 mg/m2 into the vein once a week      Calcium-Magnesium-Zinc 333-133-5 MG TABS per tablet Take 2 tablets by mouth daily      cholecalciferol 50 MCG (2000 UT) tablet Take 2,000 Units by mouth daily      co-enzyme Q-10 100 MG CAPS capsule Take 100 mg by mouth daily      dexAMETHasone (DECADRON) 4 MG tablet Take 20 mg by mouth See Admin Instructions Tuesday and Wednesday      fluticasone (FLONASE) 50 MCG/ACT nasal spray Spray 1 spray into both nostrils daily as needed for rhinitis or allergies      lactobacillus rhamnosus, GG, (CULTURELL) capsule Take 1 capsule by mouth daily      LENalidomide (REVLIMID) 25 MG CAPS capsule Take 25 mg by mouth See Admin Instructions 2 weeks on, 1 week off      loperamide (IMODIUM) 2 MG capsule Take 1 capsule by mouth 4 times daily as needed for diarrhea      loratadine (CLARITIN) 10 MG tablet Take 10 mg by mouth daily as needed for allergies      losartan (COZAAR) 50 MG tablet Take 100 mg by mouth daily      medical cannabis (Patient's own supply) See Admin Instructions (The purpose  "of this order is to document that the patient reports taking medical cannabis.  This is not a prescription, and is not used to certify that the patient has a qualifying medical condition.)      metoprolol succinate ER (TOPROL XL) 25 MG 24 hr tablet Take 1 tablet (25 mg) by mouth daily 90 tablet 3    mirtazapine (REMERON) 15 MG tablet Take 15 mg by mouth At Bedtime      Omega-3 Fatty Acids (FISH OIL BURP-LESS) 1000 MG CAPS Take 1 capsule by mouth daily      psyllium (METAMUCIL/KONSYL) Packet Take 1 packet by mouth daily      simvastatin (ZOCOR) 40 MG tablet Take 1 tablet (40 mg) by mouth daily 90 tablet 3    sulfamethoxazole-trimethoprim (BACTRIM DS) 800-160 MG tablet Take 1 tablet by mouth three times a week Take on Mondays, Wednesday, fridaysof week while taking Prednsione more than 20mg daily for PCP prophylaxis 30 tablet 0    Turmeric 500 MG CAPS Take 2 capsules by mouth daily      vitamin B complex with vitamin C (VITAMIN  B COMPLEX) tablet Take 1 tablet by mouth daily      zinc gluconate 50 MG tablet Take 50 mg by mouth daily      Allergies   Allergen Reactions    Acetaminophen-Codeine Nausea    Codeine Nausea         Lab Results    Chemistry/lipid CBC Cardiac Enzymes/BNP/TSH/INR   Recent Labs   Lab Test 09/06/23  1105   CHOL 268*   HDL 61   *   TRIG 345*     Recent Labs   Lab Test 09/06/23  1105 12/12/22  1112 02/18/21  0927   * 85 118     Recent Labs   Lab Test 10/25/23  0425      POTASSIUM 3.7   CHLORIDE 106   CO2 24   *   BUN 8.2   CR 0.65*   GFRESTIMATED >90   SESAR 8.8     Recent Labs   Lab Test 10/25/23  0425 10/24/23  1119 10/23/23  0941   CR 0.65* 0.77 0.71     No results for input(s): \"A1C\" in the last 05093 hours. Recent Labs   Lab Test 10/25/23  0425   WBC 4.7   HGB 11.5*   HCT 34.4*   MCV 92        Recent Labs   Lab Test 10/25/23  0425 10/24/23  1119 10/23/23  0941   HGB 11.5* 11.2* 13.1*    Recent Labs   Lab Test 06/02/23  1857   TROPONINI 0.02     Recent Labs   Lab " "Test 10/23/23  0941 06/07/23  0049 06/02/23  1857   BNP  --  52 47   NTBNP 74  --   --      No results for input(s): \"TSH\" in the last 18938 hours.  Recent Labs   Lab Test 10/23/23  0941   INR 0.95        60 minutes spent on the date of encounter doing chart review, review of test results, interpretation with above tests, patient visit, and documentation.      Juliet Ventura PA-C  Structural Heart Program  [facility]           "

## 2023-11-03 ENCOUNTER — HOSPITAL ENCOUNTER (OUTPATIENT)
Dept: CARDIAC REHAB | Facility: CLINIC | Age: 68
Discharge: HOME | End: 2023-11-03
Attending: PHYSICIAN ASSISTANT
Payer: MEDICARE

## 2023-11-03 DIAGNOSIS — Z95.2 S/P TAVR (TRANSCATHETER AORTIC VALVE REPLACEMENT): ICD-10-CM

## 2023-11-03 PROCEDURE — 93798 PHYS/QHP OP CAR RHAB W/ECG: CPT

## 2023-11-03 PROCEDURE — 93797 PHYS/QHP OP CAR RHAB WO ECG: CPT

## 2023-11-07 ENCOUNTER — OFFICE VISIT (OUTPATIENT)
Dept: FAMILY MEDICINE | Facility: CLINIC | Age: 68
End: 2023-11-07
Payer: MEDICARE

## 2023-11-07 ENCOUNTER — HOSPITAL ENCOUNTER (OUTPATIENT)
Dept: CARDIAC REHAB | Facility: CLINIC | Age: 68
Discharge: HOME | End: 2023-11-07
Attending: INTERNAL MEDICINE
Payer: MEDICARE

## 2023-11-07 VITALS
RESPIRATION RATE: 16 BRPM | TEMPERATURE: 97.8 F | WEIGHT: 211 LBS | SYSTOLIC BLOOD PRESSURE: 122 MMHG | BODY MASS INDEX: 30.21 KG/M2 | DIASTOLIC BLOOD PRESSURE: 70 MMHG | HEIGHT: 70 IN | HEART RATE: 85 BPM | OXYGEN SATURATION: 95 %

## 2023-11-07 DIAGNOSIS — C90.00 MULTIPLE MYELOMA NOT HAVING ACHIEVED REMISSION (H): ICD-10-CM

## 2023-11-07 DIAGNOSIS — Z09 HOSPITAL DISCHARGE FOLLOW-UP: Primary | ICD-10-CM

## 2023-11-07 DIAGNOSIS — I10 BENIGN ESSENTIAL HYPERTENSION: ICD-10-CM

## 2023-11-07 DIAGNOSIS — Z95.2 S/P TAVR (TRANSCATHETER AORTIC VALVE REPLACEMENT): ICD-10-CM

## 2023-11-07 DIAGNOSIS — R73.01 IMPAIRED FASTING GLUCOSE: ICD-10-CM

## 2023-11-07 DIAGNOSIS — I25.10 CORONARY ARTERY DISEASE DUE TO LIPID RICH PLAQUE: ICD-10-CM

## 2023-11-07 DIAGNOSIS — I25.83 CORONARY ARTERY DISEASE DUE TO LIPID RICH PLAQUE: ICD-10-CM

## 2023-11-07 LAB — HBA1C MFR BLD: 5.9 % (ref 0–5.6)

## 2023-11-07 PROCEDURE — 99495 TRANSJ CARE MGMT MOD F2F 14D: CPT | Performed by: PHYSICIAN ASSISTANT

## 2023-11-07 PROCEDURE — 91320 SARSCV2 VAC 30MCG TRS-SUC IM: CPT | Performed by: PHYSICIAN ASSISTANT

## 2023-11-07 PROCEDURE — 36416 COLLJ CAPILLARY BLOOD SPEC: CPT | Performed by: PHYSICIAN ASSISTANT

## 2023-11-07 PROCEDURE — 83036 HEMOGLOBIN GLYCOSYLATED A1C: CPT | Performed by: PHYSICIAN ASSISTANT

## 2023-11-07 PROCEDURE — 93798 PHYS/QHP OP CAR RHAB W/ECG: CPT

## 2023-11-07 PROCEDURE — 90480 ADMN SARSCOV2 VAC 1/ONLY CMP: CPT | Performed by: PHYSICIAN ASSISTANT

## 2023-11-07 RX ORDER — NITROGLYCERIN 0.4 MG/1
TABLET SUBLINGUAL
Qty: 25 TABLET | Refills: 2 | Status: SHIPPED | OUTPATIENT
Start: 2023-11-07 | End: 2024-03-14

## 2023-11-07 RX ORDER — RESPIRATORY SYNCYTIAL VIRUS VACCINE 120MCG/0.5
0.5 KIT INTRAMUSCULAR ONCE
Qty: 1 EACH | Refills: 0 | Status: CANCELLED | OUTPATIENT
Start: 2023-11-07 | End: 2023-11-07

## 2023-11-07 NOTE — PROGRESS NOTES
Assessment & Plan     (Z09) Hospital discharge follow-up  (primary encounter diagnosis)  Comment:   Plan:     (Z95.2) S/P TAVR (transcatheter aortic valve replacement)  Comment:   Plan: Cardiac notes reviewed, overall doing well since discharge.  He will continue with cardiac rehab and walking at home. No acute concerns    (I10) Benign essential hypertension  Comment:   Plan: Med changes on discharge are keeping BP well controlled, no concerns, continue to check BP daily and call if any elevated or low BP readings    (I25.10,  I25.83) Coronary artery disease due to lipid rich plaque  Comment:   Plan: nitroGLYcerin (NITROSTAT) 0.4 MG sublingual         tablet        Statin changed on discharge based on LDL elevations, advised rechecking lipids in 3 months fasting    (C90.00) Multiple myeloma not having achieved remission (H)  Comment:   Plan: Continues to follow with Dr Ruelas, he will now restart infusions for MM, MRI performed 11/2/23 showed stable T spine lesions    (R73.01) Impaired fasting glucose  Comment:   Plan: Hemoglobin A1c        Check A1c based on significant amount of steroid received with MM treatment           MED REC REQUIRED  Post Medication Reconciliation Status:     See Patient Instructions    Cipriano Fuentes PA-C  [facility]    Avila Cervantes is a 68 year old, presenting for the following health issues:  Hospital F/U (Would like a new prescription for nitroglycerin)      11/7/2023     8:33 AM   Additional Questions   Roomed by Elliott   Accompanied by self       HPI         Hospital Follow-up Visit:    Hospital/Nursing Home/IP Rehab Facility: [facility]  Date of Admission: 10/24/23  Date of Discharge: 10/25/23  Reason(s) for Admission: (TAVR) aortic valve replacement    Was your hospitalization related to COVID-19? No   Problems taking medications regularly:  None  Medication changes since discharge: rosuvastatin 20mg, losartan 50mg,  metoprolol 25mg  Problems adhering to non-medication therapy:  None    Summary of hospitalization:  CareEverywhere information obtained and reviewed  Diagnostic Tests/Treatments reviewed.  Follow up needed: none  Other Healthcare Providers Involved in Patient s Care:          Cardiac rehab  Update since discharge: improved, pt has had 1 session of cardiac rehab and is now walking his dog approx 3 miles per day, previously struggling to walk 1, no CP or GILLETTE            Hospital course:  #Severe aortic stenosis, now s/p TAVR using a 26 mm PAUL Resilia  He tolerated the procedure well yesterday. There were no complications. He overall feels well this morning. There was no evidence of significant pauses or high degree AV block on telemetry. His EKG was without significant change post valve deployment     Sheath sites soft without hematoma.  Stitch was removed without difficulty.  Neuro's intact. Pt reports having a headache overnight which is now improved this morning after putting a cold pack to his forehead and some tylenol. He denies any vision changes, numbness, tingling, or weakness. He is otherwise feeling well  today and denies any SOB, GILLETTE, palpitations, dizziness, or lightheadedness . He ambulated this morning without difficulty. He is urinating without any issues.     - ASA for anti-platelet therapy  - OP Cardiac rehab  - Daily weights  - Lifelong antibiotic prophylaxis prior to all dental procedures.  - follow ups have been arranged as listed above     #CAD  Pre-TAVR coronary angiogram showed moderate nonobstructive coronary disease      - continue ASA daily  - continue PTA Zocor     #HTN  His BP has been elevated since procedure and tachycardic. Reduced home Losartan dose to 50mg daily and started metoprolol succinate 25 mg daily. He received 1 dose of each and remained hypertensive with >160s/80s and tachycardic in 110s. Given an additional Losartan 50 mg x1 and metoprolol succinate 25 mg x1.   - Continue  "PTA Losartan 100 mg daily  - Start metoprolol succinate 25 mg daily        #Multiple Myeloma post palliative radiation now on systemic chemotherapy (VRD)  - Follow-up with Dr. Ruelas with [facility]  - Continue PTA acyclovir, Bactrim   Plan of care communicated with patient       Cardiac follow up 11/2/23:  Assessment/Recommendations   Assessment:  Severe aortic stenosis, now s/p TAVR using a 26 mm PAUL Resilia on 10/24/2023  Moderate nonobstructive coronary artery disease  - currently denies symptoms of angina  Hyperlipidemia - last , currently on simvastatin 40 mg daily   Hypertension - Blood pressure is controlled today. Blood pressure at home has mostly been systolic 130s-140s, and occasionally will go into the 150s  Multiple Myeloma post palliative radiation now on systemic chemotherapy (VRD) - followed by [facility], has MRI scheduled for tonight.     Plan:  Ok to resume regular activity   Attend cardiac rehab, first appointment is tomorrow  Continue taking Losartan 50 mg daily. Check blood pressure daily  Stop taking simvastatin. Start Crestor 20 mg daily                   Review of Systems         Objective    /70 (BP Location: Right arm, Patient Position: Sitting, Cuff Size: Adult Regular)   Pulse 85   Temp 97.8  F (36.6  C) (Temporal)   Resp 16   Ht 1.778 m (5' 10\")   Wt 95.7 kg (211 lb)   SpO2 95%   BMI 30.28 kg/m    Body mass index is 30.28 kg/m .  Physical Exam   GENERAL: healthy, alert and no distress  NECK: no adenopathy, no asymmetry, masses, or scars and thyroid normal to palpation  RESP: lungs clear to auscultation - no rales, rhonchi or wheezes  CV: regular rate and rhythm, normal S1 S2, no S3 or S4, no murmur, click or rub, no peripheral edema and peripheral pulses strong  ABDOMEN: soft, nontender, no hepatosplenomegaly, no masses and bowel sounds normal  MS: no gross musculoskeletal defects noted, no edema                      "

## 2023-11-14 ENCOUNTER — HOSPITAL ENCOUNTER (OUTPATIENT)
Dept: CARDIAC REHAB | Facility: CLINIC | Age: 68
Discharge: HOME | End: 2023-11-14
Attending: INTERNAL MEDICINE
Payer: MEDICARE

## 2023-11-14 PROCEDURE — 93798 PHYS/QHP OP CAR RHAB W/ECG: CPT

## 2023-11-16 ENCOUNTER — HOSPITAL ENCOUNTER (OUTPATIENT)
Dept: CARDIAC REHAB | Facility: CLINIC | Age: 68
Discharge: HOME | End: 2023-11-16
Attending: INTERNAL MEDICINE
Payer: MEDICARE

## 2023-11-16 PROCEDURE — 93798 PHYS/QHP OP CAR RHAB W/ECG: CPT | Performed by: OCCUPATIONAL THERAPIST

## 2023-11-21 ENCOUNTER — HOSPITAL ENCOUNTER (OUTPATIENT)
Dept: CARDIAC REHAB | Facility: CLINIC | Age: 68
Discharge: HOME | End: 2023-11-21
Attending: INTERNAL MEDICINE
Payer: MEDICARE

## 2023-11-21 ENCOUNTER — MEDICAL CORRESPONDENCE (OUTPATIENT)
Dept: TRANSPLANT | Facility: CLINIC | Age: 68
End: 2023-11-21
Payer: MEDICARE

## 2023-11-21 PROCEDURE — 93798 PHYS/QHP OP CAR RHAB W/ECG: CPT

## 2023-11-22 ENCOUNTER — MEDICAL CORRESPONDENCE (OUTPATIENT)
Dept: TRANSPLANT | Facility: CLINIC | Age: 68
End: 2023-11-22
Payer: MEDICARE

## 2023-11-28 ENCOUNTER — HOSPITAL ENCOUNTER (OUTPATIENT)
Dept: CARDIAC REHAB | Facility: CLINIC | Age: 68
Discharge: HOME | End: 2023-11-28
Attending: INTERNAL MEDICINE
Payer: MEDICARE

## 2023-11-28 PROCEDURE — 93798 PHYS/QHP OP CAR RHAB W/ECG: CPT

## 2023-11-30 ENCOUNTER — HOSPITAL ENCOUNTER (OUTPATIENT)
Dept: CARDIOLOGY | Facility: HOSPITAL | Age: 68
Discharge: HOME | End: 2023-11-30
Attending: INTERNAL MEDICINE | Admitting: INTERNAL MEDICINE
Payer: MEDICARE

## 2023-11-30 ENCOUNTER — HOSPITAL ENCOUNTER (OUTPATIENT)
Dept: CARDIAC REHAB | Facility: CLINIC | Age: 68
Discharge: HOME | End: 2023-11-30
Attending: INTERNAL MEDICINE
Payer: MEDICARE

## 2023-11-30 DIAGNOSIS — I35.0 NONRHEUMATIC AORTIC VALVE STENOSIS: ICD-10-CM

## 2023-11-30 LAB — LVEF ECHO: NORMAL

## 2023-11-30 PROCEDURE — 93306 TTE W/DOPPLER COMPLETE: CPT

## 2023-11-30 PROCEDURE — 93798 PHYS/QHP OP CAR RHAB W/ECG: CPT

## 2023-11-30 PROCEDURE — 93306 TTE W/DOPPLER COMPLETE: CPT | Mod: 26 | Performed by: INTERNAL MEDICINE

## 2023-12-05 ENCOUNTER — HOSPITAL ENCOUNTER (OUTPATIENT)
Dept: CARDIAC REHAB | Facility: CLINIC | Age: 68
Discharge: HOME | End: 2023-12-05
Attending: INTERNAL MEDICINE
Payer: MEDICARE

## 2023-12-05 ENCOUNTER — HOSPITAL ENCOUNTER (OUTPATIENT)
Dept: CARDIAC REHAB | Facility: CLINIC | Age: 68
Discharge: HOME | End: 2023-12-05
Attending: PHYSICIAN ASSISTANT
Payer: MEDICARE

## 2023-12-05 PROCEDURE — 93798 PHYS/QHP OP CAR RHAB W/ECG: CPT

## 2023-12-05 PROCEDURE — 93797 PHYS/QHP OP CAR RHAB WO ECG: CPT | Mod: 59

## 2023-12-06 DIAGNOSIS — C90.01 MULTIPLE MYELOMA IN REMISSION (H): Primary | ICD-10-CM

## 2023-12-06 DIAGNOSIS — Z86.2 PERSONAL HISTORY OF DISEASES OF BLOOD AND BLOOD-FORMING ORGANS: ICD-10-CM

## 2023-12-06 DIAGNOSIS — I35.0 SEVERE AORTIC STENOSIS: Primary | ICD-10-CM

## 2023-12-06 DIAGNOSIS — Z01.818 EXAMINATION PRIOR TO CHEMOTHERAPY: ICD-10-CM

## 2023-12-07 ENCOUNTER — LAB (OUTPATIENT)
Dept: CARDIOLOGY | Facility: CLINIC | Age: 68
End: 2023-12-07
Payer: MEDICARE

## 2023-12-07 ENCOUNTER — OFFICE VISIT (OUTPATIENT)
Dept: CARDIOLOGY | Facility: CLINIC | Age: 68
End: 2023-12-07
Payer: MEDICARE

## 2023-12-07 VITALS
OXYGEN SATURATION: 96 % | DIASTOLIC BLOOD PRESSURE: 64 MMHG | RESPIRATION RATE: 18 BRPM | HEART RATE: 70 BPM | WEIGHT: 217 LBS | BODY MASS INDEX: 31.14 KG/M2 | SYSTOLIC BLOOD PRESSURE: 134 MMHG

## 2023-12-07 DIAGNOSIS — Z86.2 PERSONAL HISTORY OF DISEASES OF BLOOD AND BLOOD-FORMING ORGANS: ICD-10-CM

## 2023-12-07 DIAGNOSIS — E78.5 HYPERLIPIDEMIA, UNSPECIFIED HYPERLIPIDEMIA TYPE: ICD-10-CM

## 2023-12-07 DIAGNOSIS — I35.0 SEVERE AORTIC STENOSIS: Primary | ICD-10-CM

## 2023-12-07 DIAGNOSIS — I10 BENIGN ESSENTIAL HYPERTENSION: ICD-10-CM

## 2023-12-07 DIAGNOSIS — I35.0 SEVERE AORTIC STENOSIS: ICD-10-CM

## 2023-12-07 DIAGNOSIS — C90.01 MULTIPLE MYELOMA IN REMISSION (H): ICD-10-CM

## 2023-12-07 DIAGNOSIS — Z01.818 EXAMINATION PRIOR TO CHEMOTHERAPY: ICD-10-CM

## 2023-12-07 LAB
ANION GAP SERPL CALCULATED.3IONS-SCNC: 11 MMOL/L (ref 7–15)
BASOPHILS # BLD AUTO: 0 10E3/UL (ref 0–0.2)
BASOPHILS NFR BLD AUTO: 0 %
BUN SERPL-MCNC: 17.9 MG/DL (ref 8–23)
CALCIUM SERPL-MCNC: 8.6 MG/DL (ref 8.8–10.2)
CHLORIDE SERPL-SCNC: 103 MMOL/L (ref 98–107)
CREAT SERPL-MCNC: 0.75 MG/DL (ref 0.67–1.17)
DEPRECATED HCO3 PLAS-SCNC: 24 MMOL/L (ref 22–29)
EGFRCR SERPLBLD CKD-EPI 2021: >90 ML/MIN/1.73M2
EOSINOPHIL # BLD AUTO: 0.1 10E3/UL (ref 0–0.7)
EOSINOPHIL NFR BLD AUTO: 0 %
ERYTHROCYTE [DISTWIDTH] IN BLOOD BY AUTOMATED COUNT: 13.7 % (ref 10–15)
GLUCOSE SERPL-MCNC: 149 MG/DL (ref 70–99)
HCT VFR BLD AUTO: 37.3 % (ref 40–53)
HGB BLD-MCNC: 12.3 G/DL (ref 13.3–17.7)
IMM GRANULOCYTES # BLD: 0.2 10E3/UL
IMM GRANULOCYTES NFR BLD: 1 %
LYMPHOCYTES # BLD AUTO: 0.5 10E3/UL (ref 0.8–5.3)
LYMPHOCYTES NFR BLD AUTO: 2 %
MCH RBC QN AUTO: 30.5 PG (ref 26.5–33)
MCHC RBC AUTO-ENTMCNC: 33 G/DL (ref 31.5–36.5)
MCV RBC AUTO: 93 FL (ref 78–100)
MONOCYTES # BLD AUTO: 1.7 10E3/UL (ref 0–1.3)
MONOCYTES NFR BLD AUTO: 8 %
NEUTROPHILS # BLD AUTO: 17.7 10E3/UL (ref 1.6–8.3)
NEUTROPHILS NFR BLD AUTO: 89 %
NRBC # BLD AUTO: 0 10E3/UL
NRBC BLD AUTO-RTO: 0 /100
PLATELET # BLD AUTO: 207 10E3/UL (ref 150–450)
POTASSIUM SERPL-SCNC: 4.6 MMOL/L (ref 3.4–5.3)
RBC # BLD AUTO: 4.03 10E6/UL (ref 4.4–5.9)
SODIUM SERPL-SCNC: 138 MMOL/L (ref 135–145)
WBC # BLD AUTO: 20 10E3/UL (ref 4–11)

## 2023-12-07 PROCEDURE — 99215 OFFICE O/P EST HI 40 MIN: CPT | Mod: 25 | Performed by: PHYSICIAN ASSISTANT

## 2023-12-07 PROCEDURE — 80048 BASIC METABOLIC PNL TOTAL CA: CPT

## 2023-12-07 PROCEDURE — 93000 ELECTROCARDIOGRAM COMPLETE: CPT | Mod: RTG | Performed by: INTERNAL MEDICINE

## 2023-12-07 PROCEDURE — 36415 COLL VENOUS BLD VENIPUNCTURE: CPT

## 2023-12-07 PROCEDURE — 85025 COMPLETE CBC W/AUTO DIFF WBC: CPT

## 2023-12-07 NOTE — LETTER
12/7/2023    Cipriano Fuentes PA-C  [address], [address]  [place] [address]    RE: Billy ROBERTS [place]       Dear Colleague,     I had the pleasure of seeing Billy Carroll in the [facility].  HEART CARE ENCOUNTER NOTE       [facility]  [phone]    Assessment/Recommendations   Assessment:  Severe aortic stenosis, now s/p TAVR using a 26 mm PAUL Resilia on 10/24/2023. Mean gradient 20, SVI 57 on most recent echocardiogram  Moderate nonobstructive coronary artery disease  - denies symptoms of angina  Hyperlipidemia - last , currently on simvastatin 40 mg daily   Hypertension - Blood pressure is controlled today   Multiple Myeloma post palliative radiation now on systemic chemotherapy (VRD) - followed by [facility]     Plan:  Continue ASA  Continue with cardiac rehab  Continue current doses of losartan, metoprolol  Continue statin   Repeat TTE in 3 months    Thank you for the opportunity to participate in the care of Billy Carroll. It's been a pleasure working with him.  Please do not hesitate to call with any questions or concerns.       History of Present Illness/Subjective    I had the opportunity to see Billy Carroll at the [facility] for his 1 month visit post TAVR    Billy is a 68 year old male with PMH of severe aortic stenosis, hypertension, hyperlipidemia, coronary artery disease, multiple myeloma post palliative radiation now on systemic chemotherapy (VRD), history of PJP pneumonia, lumbosacral radiculopathy, anxiety. He was recently evaluated by cardiology for dyspnea on exertion x 6 months in the setting of work-up for stem cell transplant. On subsequent work up, his coronary angriogram was notable for moderate nonobstructive coronary artery disease. His echocardiogram demonstrated severe aortic valve stenosis. He was referred to valve for further work-up and is now s/p TAVR on 10/24/23. There were no complications. He was  discharged the next day. Metoprolol was started for his tachycardia and hyperdynamic left ventricle.     He is attending cardiac rehab. He is undergoing workup for stem cell transplant with the plan for stem cell transplant at the end of January.     Billy Carroll denies exertional chest discomfort, palpitations, shortness of breath at rest, PND, orthopnea, lightheadedness, dizziness, pre-syncope or syncope.  Billy Carroll also denies any recent weight loss, changes in appetite, nausea or vomiting.   ____________________________________________________________  Echo: 11/30/23  Interpretation Summary     1.Left ventricular size, wall motion and function are normal. The ejection  fraction is > 65%.  2.Normal right ventricle size and systolic function.  3.The left atrium is mild to moderately dilated.  4.Aortic valve repaired by a 26 mm PAUL 3 Resilia valve. Functioning well  with a peak velocity of 2.9 m/s with mean gradient of 20 mmHg and acceleration  time of 40 ms without any insufficiency.  5.Ascending Aorta dilatation is present, mild.  Compared to the prior study dated 10/25/2023, there have been no changes.    EKG (personally reviewed and interpreted):  Sinus, HR 67, , QRS 92, QT/Qtc 408/431     Physical Examination Review of Systems   Vitals: There were no vitals taken for this visit.  BMI= There is no height or weight on file to calculate BMI.  Wt Readings from Last 3 Encounters:   11/07/23 95.7 kg (211 lb)   11/02/23 97.1 kg (214 lb)   10/25/23 95.8 kg (211 lb 4.8 oz)       General Appearance:   Alert, cooperative and in no acute distress   ENT/Mouth: membranes moist, no oral lesions or bleeding gums.      EYES:  no scleral icterus, normal conjunctivae   Neck: Supple without lymphadenopathy.  Thyroid not visualized   Chest/Lungs:   lungs are clear to auscultation, no rales or wheezing   Cardiovascular:   Regular. Normal first and second heart sounds with no murmurs, rubs, or gallops; the carotid,  radial and posterior tibial pulses are intact, np edema bilaterally    Abdomen:  Soft and nontender. Bowel sounds are present in all quadrants   Extremities: no cyanosis or clubbing.     Skin: no xanthelasma, warm.    Neurologic: normal gait, normal  bilateral, no tremors   Psychiatric: Normal mood and affect       Please refer above for cardiac ROS details.      Medical History  Surgical History Family History Social History   Past Medical History:   Diagnosis Date    Benign essential hypertension     Hyperlipidemia     Multiple myeloma (H)     Followed by Dr. Ruelas with [facility]     Past Surgical History:   Procedure Laterality Date    CV CORONARY ANGIOGRAM N/A 9/7/2023    Procedure: Coronary Angiogram;  Surgeon: Domenico Sauceda MD;  Location: [facility] CV    CV TRANSCATHETER AORTIC VALVE REPLACEMENT-FEMORAL APPROACH N/A 10/24/2023    Procedure: Transcatheter Aortic Valve Replacement-Femoral Approach;  Surgeon: Charles Field MD;  Location: [facility] LAB CV    OR TRANSCATHETER AORTIC VALVE REPLACEMENT, FEMORAL PERCUTANEOUS APPROACH (STANDBY) N/A 10/24/2023    Procedure: OR TRANSCATHETER AORTIC VALVE REPLACEMENT, FEMORAL PERCUTANEOUS APPROACH (STANDBY);  Surgeon: Sahra Mosher MD;  Location: [facility] LAB CV     No family history on file. Social History     Socioeconomic History    Marital status:      Spouse name: Not on file    Number of children: Not on file    Years of education: Not on file    Highest education level: Not on file   Occupational History    Not on file   Tobacco Use    Smoking status: Never     Passive exposure: Never    Smokeless tobacco: Never   Vaping Use    Vaping Use: Never used   Substance and Sexual Activity    Alcohol use: Not on file    Drug use: Never    Sexual activity: Not on file   Other Topics Concern    Not on file   Social History Narrative    Not on file     Social Determinants of Health     Financial Resource Strain: Low Risk  (11/7/2023)     Financial Resource Strain     Within the past 12 months, have you or your family members you live with been unable to get utilities (heat, electricity) when it was really needed?: No   Food Insecurity: Low Risk  (11/7/2023)    Food Insecurity     Within the past 12 months, did you worry that your food would run out before you got money to buy more?: No     Within the past 12 months, did the food you bought just not last and you didn t have money to get more?: No   Transportation Needs: Low Risk  (11/7/2023)    Transportation Needs     Within the past 12 months, has lack of transportation kept you from medical appointments, getting your medicines, non-medical meetings or appointments, work, or from getting things that you need?: No   Physical Activity: Not on file   Stress: Not on file   Social Connections: Not on file   Interpersonal Safety: Low Risk  (11/7/2023)    Interpersonal Safety     Do you feel physically and emotionally safe where you currently live?: Yes     Within the past 12 months, have you been hit, slapped, kicked or otherwise physically hurt by someone?: No     Within the past 12 months, have you been humiliated or emotionally abused in other ways by your partner or ex-partner?: No   Housing Stability: Low Risk  (11/7/2023)    Housing Stability     Do you have housing? : Yes     Are you worried about losing your housing?: No          Medications  Allergies   Current Outpatient Medications   Medication Sig Dispense Refill    acetaminophen (ACETAMINOPHEN 8 HOUR) 650 MG CR tablet Take 1,300 mg by mouth every 8 hours as needed for mild pain or fever      acyclovir (ZOVIRAX) 400 MG tablet Take 400 mg by mouth 2 times daily      alendronate (FOSAMAX) 70 MG tablet Take 1 tablet (70 mg) by mouth every 7 days 12 tablet 1    aspirin 81 MG EC tablet Take 81 mg by mouth daily      Calcium-Magnesium-Zinc 333-133-5 MG TABS per tablet Take 2 tablets by mouth daily      cholecalciferol 50 MCG (2000 UT) tablet Take  2,000 Units by mouth daily      co-enzyme Q-10 100 MG CAPS capsule Take 100 mg by mouth daily      fluticasone (FLONASE) 50 MCG/ACT nasal spray Spray 1 spray into both nostrils daily as needed for rhinitis or allergies      loratadine (CLARITIN) 10 MG tablet Take 10 mg by mouth daily as needed for allergies      losartan (COZAAR) 50 MG tablet Take 100 mg by mouth daily      metoprolol succinate ER (TOPROL XL) 25 MG 24 hr tablet Take 1 tablet (25 mg) by mouth daily 90 tablet 3    mirtazapine (REMERON) 15 MG tablet Take 15 mg by mouth At Bedtime      nitroGLYcerin (NITROSTAT) 0.4 MG sublingual tablet For chest pain place 1 tablet under the tongue every 5 minutes for 3 doses. If symptoms persist 5 minutes after 1st dose call 911. 25 tablet 2    Omega-3 Fatty Acids (FISH OIL BURP-LESS) 1000 MG CAPS Take 1 capsule by mouth daily      psyllium (METAMUCIL/KONSYL) Packet Take 1 packet by mouth daily      rosuvastatin (CRESTOR) 20 MG tablet Take 1 tablet (20 mg) by mouth daily 90 tablet 1    sulfamethoxazole-trimethoprim (BACTRIM DS) 800-160 MG tablet Take 1 tablet by mouth three times a week Take on Mondays, Wednesday, fridaysof week while taking Prednsione more than 20mg daily for PCP prophylaxis 30 tablet 0    Turmeric 500 MG CAPS Take 2 capsules by mouth daily      vitamin B complex with vitamin C (VITAMIN  B COMPLEX) tablet Take 1 tablet by mouth daily      zinc gluconate 50 MG tablet Take 50 mg by mouth daily      Allergies   Allergen Reactions    Acetaminophen-Codeine Nausea    Codeine Nausea         Lab Results    Chemistry/lipid CBC Cardiac Enzymes/BNP/TSH/INR   Recent Labs   Lab Test 09/06/23  1105   CHOL 268*   HDL 61   *   TRIG 345*     Recent Labs   Lab Test 09/06/23  1105 12/12/22  1112 02/18/21  0927   * 85 118     Recent Labs   Lab Test 10/25/23  0425      POTASSIUM 3.7   CHLORIDE 106   CO2 24   *   BUN 8.2   CR 0.65*   GFRESTIMATED >90   SESAR 8.8     Recent Labs   Lab Test  "10/25/23  0425 10/24/23  1119 10/23/23  0941   CR 0.65* 0.77 0.71     Recent Labs   Lab Test 11/07/23  0925   A1C 5.9*    Recent Labs   Lab Test 10/25/23  0425   WBC 4.7   HGB 11.5*   HCT 34.4*   MCV 92        Recent Labs   Lab Test 10/25/23  0425 10/24/23  1119 10/23/23  0941   HGB 11.5* 11.2* 13.1*    Recent Labs   Lab Test 06/02/23  1857   TROPONINI 0.02     Recent Labs   Lab Test 10/23/23  0941 06/07/23  0049 06/02/23  1857   BNP  --  52 47   NTBNP 74  --   --      No results for input(s): \"TSH\" in the last 51588 hours.  Recent Labs   Lab Test 10/23/23  0941   INR 0.95        43 minutes spent on the date of encounter doing chart review, review of outside records, review of test results, interpretation with above tests, patient visit, and documentation.      This note has been dictated using voice recognition software. Any grammatical or context distortions are unintentional and inherent to the software.    Juliet Ventura PA-C  Structural Heart Program  [facility]               Thank you for allowing me to participate in the care of your patient.      Sincerely,     Juliet Ventura PA-C     [facility]  cc:   No referring provider defined for this encounter.      "

## 2023-12-07 NOTE — PROGRESS NOTES
HEART CARE ENCOUNTER NOTE       [facility]  [phone]    Assessment/Recommendations   Assessment:  Severe aortic stenosis, now s/p TAVR using a 26 mm PAUL Resilia on 10/24/2023. Mean gradient 20, SVI 57 on most recent echocardiogram  Moderate nonobstructive coronary artery disease  - denies symptoms of angina  Hyperlipidemia - last , currently on simvastatin 40 mg daily   Hypertension - Blood pressure is controlled today   Multiple Myeloma post palliative radiation now on systemic chemotherapy (VRD) - followed by [facility]     Plan:  Continue ASA  Continue with cardiac rehab  Continue current doses of losartan, metoprolol  Continue statin   Repeat TTE in 3 months    Thank you for the opportunity to participate in the care of Billy Carroll. It's been a pleasure working with him.  Please do not hesitate to call with any questions or concerns.       History of Present Illness/Subjective    I had the opportunity to see Billy Carroll at the [facility] for his 1 month visit post TAVR    Billy is a 68 year old male with PMH of severe aortic stenosis, hypertension, hyperlipidemia, coronary artery disease, multiple myeloma post palliative radiation now on systemic chemotherapy (VRD), history of PJP pneumonia, lumbosacral radiculopathy, anxiety. He was recently evaluated by cardiology for dyspnea on exertion x 6 months in the setting of work-up for stem cell transplant. On subsequent work up, his coronary angriogram was notable for moderate nonobstructive coronary artery disease. His echocardiogram demonstrated severe aortic valve stenosis. He was referred to valve for further work-up and is now s/p TAVR on 10/24/23. There were no complications. He was discharged the next day. Metoprolol was started for his tachycardia and hyperdynamic left ventricle.     He is attending cardiac rehab. He is undergoing workup for stem cell transplant with the plan for stem cell  transplant at the end of January.     Billy Carroll denies exertional chest discomfort, palpitations, shortness of breath at rest, PND, orthopnea, lightheadedness, dizziness, pre-syncope or syncope.  Billy Carroll also denies any recent weight loss, changes in appetite, nausea or vomiting.   ____________________________________________________________  Echo: 11/30/23  Interpretation Summary     1.Left ventricular size, wall motion and function are normal. The ejection  fraction is > 65%.  2.Normal right ventricle size and systolic function.  3.The left atrium is mild to moderately dilated.  4.Aortic valve repaired by a 26 mm PAUL 3 Resilia valve. Functioning well  with a peak velocity of 2.9 m/s with mean gradient of 20 mmHg and acceleration  time of 40 ms without any insufficiency.  5.Ascending Aorta dilatation is present, mild.  Compared to the prior study dated 10/25/2023, there have been no changes.    EKG (personally reviewed and interpreted):  Sinus, HR 67, , QRS 92, QT/Qtc 408/431     Physical Examination Review of Systems   Vitals: There were no vitals taken for this visit.  BMI= There is no height or weight on file to calculate BMI.  Wt Readings from Last 3 Encounters:   11/07/23 95.7 kg (211 lb)   11/02/23 97.1 kg (214 lb)   10/25/23 95.8 kg (211 lb 4.8 oz)       General Appearance:   Alert, cooperative and in no acute distress   ENT/Mouth: membranes moist, no oral lesions or bleeding gums.      EYES:  no scleral icterus, normal conjunctivae   Neck: Supple without lymphadenopathy.  Thyroid not visualized   Chest/Lungs:   lungs are clear to auscultation, no rales or wheezing   Cardiovascular:   Regular. Normal first and second heart sounds with no murmurs, rubs, or gallops; the carotid, radial and posterior tibial pulses are intact, np edema bilaterally    Abdomen:  Soft and nontender. Bowel sounds are present in all quadrants   Extremities: no cyanosis or clubbing.     Skin: no xanthelasma, warm.     Neurologic: normal gait, normal  bilateral, no tremors   Psychiatric: Normal mood and affect       Please refer above for cardiac ROS details.      Medical History  Surgical History Family History Social History   Past Medical History:   Diagnosis Date    Benign essential hypertension     Hyperlipidemia     Multiple myeloma (H)     Followed by Dr. Ruelas with [facility]     Past Surgical History:   Procedure Laterality Date    CV CORONARY ANGIOGRAM N/A 9/7/2023    Procedure: Coronary Angiogram;  Surgeon: Domenico Sauceda MD;  Location: [facility] CV    CV TRANSCATHETER AORTIC VALVE REPLACEMENT-FEMORAL APPROACH N/A 10/24/2023    Procedure: Transcatheter Aortic Valve Replacement-Femoral Approach;  Surgeon: Charles Field MD;  Location: [facility] CATH LAB CV    OR TRANSCATHETER AORTIC VALVE REPLACEMENT, FEMORAL PERCUTANEOUS APPROACH (STANDBY) N/A 10/24/2023    Procedure: OR TRANSCATHETER AORTIC VALVE REPLACEMENT, FEMORAL PERCUTANEOUS APPROACH (STANDBY);  Surgeon: Sahra Mosher MD;  Location: [facility] CATH LAB CV     No family history on file. Social History     Socioeconomic History    Marital status:      Spouse name: Not on file    Number of children: Not on file    Years of education: Not on file    Highest education level: Not on file   Occupational History    Not on file   Tobacco Use    Smoking status: Never     Passive exposure: Never    Smokeless tobacco: Never   Vaping Use    Vaping Use: Never used   Substance and Sexual Activity    Alcohol use: Not on file    Drug use: Never    Sexual activity: Not on file   Other Topics Concern    Not on file   Social History Narrative    Not on file     Social Determinants of Health     Financial Resource Strain: Low Risk  (11/7/2023)    Financial Resource Strain     Within the past 12 months, have you or your family members you live with been unable to get utilities (heat, electricity) when it was really needed?: No   Food Insecurity: Low Risk   (11/7/2023)    Food Insecurity     Within the past 12 months, did you worry that your food would run out before you got money to buy more?: No     Within the past 12 months, did the food you bought just not last and you didn t have money to get more?: No   Transportation Needs: Low Risk  (11/7/2023)    Transportation Needs     Within the past 12 months, has lack of transportation kept you from medical appointments, getting your medicines, non-medical meetings or appointments, work, or from getting things that you need?: No   Physical Activity: Not on file   Stress: Not on file   Social Connections: Not on file   Interpersonal Safety: Low Risk  (11/7/2023)    Interpersonal Safety     Do you feel physically and emotionally safe where you currently live?: Yes     Within the past 12 months, have you been hit, slapped, kicked or otherwise physically hurt by someone?: No     Within the past 12 months, have you been humiliated or emotionally abused in other ways by your partner or ex-partner?: No   Housing Stability: Low Risk  (11/7/2023)    Housing Stability     Do you have housing? : Yes     Are you worried about losing your housing?: No          Medications  Allergies   Current Outpatient Medications   Medication Sig Dispense Refill    acetaminophen (ACETAMINOPHEN 8 HOUR) 650 MG CR tablet Take 1,300 mg by mouth every 8 hours as needed for mild pain or fever      acyclovir (ZOVIRAX) 400 MG tablet Take 400 mg by mouth 2 times daily      alendronate (FOSAMAX) 70 MG tablet Take 1 tablet (70 mg) by mouth every 7 days 12 tablet 1    aspirin 81 MG EC tablet Take 81 mg by mouth daily      Calcium-Magnesium-Zinc 333-133-5 MG TABS per tablet Take 2 tablets by mouth daily      cholecalciferol 50 MCG (2000 UT) tablet Take 2,000 Units by mouth daily      co-enzyme Q-10 100 MG CAPS capsule Take 100 mg by mouth daily      fluticasone (FLONASE) 50 MCG/ACT nasal spray Spray 1 spray into both nostrils daily as needed for rhinitis or  allergies      loratadine (CLARITIN) 10 MG tablet Take 10 mg by mouth daily as needed for allergies      losartan (COZAAR) 50 MG tablet Take 100 mg by mouth daily      metoprolol succinate ER (TOPROL XL) 25 MG 24 hr tablet Take 1 tablet (25 mg) by mouth daily 90 tablet 3    mirtazapine (REMERON) 15 MG tablet Take 15 mg by mouth At Bedtime      nitroGLYcerin (NITROSTAT) 0.4 MG sublingual tablet For chest pain place 1 tablet under the tongue every 5 minutes for 3 doses. If symptoms persist 5 minutes after 1st dose call 911. 25 tablet 2    Omega-3 Fatty Acids (FISH OIL BURP-LESS) 1000 MG CAPS Take 1 capsule by mouth daily      psyllium (METAMUCIL/KONSYL) Packet Take 1 packet by mouth daily      rosuvastatin (CRESTOR) 20 MG tablet Take 1 tablet (20 mg) by mouth daily 90 tablet 1    sulfamethoxazole-trimethoprim (BACTRIM DS) 800-160 MG tablet Take 1 tablet by mouth three times a week Take on Mondays, Wednesday, fridaysof week while taking Prednsione more than 20mg daily for PCP prophylaxis 30 tablet 0    Turmeric 500 MG CAPS Take 2 capsules by mouth daily      vitamin B complex with vitamin C (VITAMIN  B COMPLEX) tablet Take 1 tablet by mouth daily      zinc gluconate 50 MG tablet Take 50 mg by mouth daily      Allergies   Allergen Reactions    Acetaminophen-Codeine Nausea    Codeine Nausea         Lab Results    Chemistry/lipid CBC Cardiac Enzymes/BNP/TSH/INR   Recent Labs   Lab Test 09/06/23  1105   CHOL 268*   HDL 61   *   TRIG 345*     Recent Labs   Lab Test 09/06/23  1105 12/12/22  1112 02/18/21  0927   * 85 118     Recent Labs   Lab Test 10/25/23  0425      POTASSIUM 3.7   CHLORIDE 106   CO2 24   *   BUN 8.2   CR 0.65*   GFRESTIMATED >90   SESAR 8.8     Recent Labs   Lab Test 10/25/23  0425 10/24/23  1119 10/23/23  0941   CR 0.65* 0.77 0.71     Recent Labs   Lab Test 11/07/23  0925   A1C 5.9*    Recent Labs   Lab Test 10/25/23  0425   WBC 4.7   HGB 11.5*   HCT 34.4*   MCV 92     "    Recent Labs   Lab Test 10/25/23  0425 10/24/23  1119 10/23/23  0941   HGB 11.5* 11.2* 13.1*    Recent Labs   Lab Test 06/02/23  1857   TROPONINI 0.02     Recent Labs   Lab Test 10/23/23  0941 06/07/23  0049 06/02/23  1857   BNP  --  52 47   NTBNP 74  --   --      No results for input(s): \"TSH\" in the last 38104 hours.  Recent Labs   Lab Test 10/23/23  0941   INR 0.95        43 minutes spent on the date of encounter doing chart review, review of outside records, review of test results, interpretation with above tests, patient visit, and documentation.      This note has been dictated using voice recognition software. Any grammatical or context distortions are unintentional and inherent to the software.    Juliet Ventura PA-C  Structural Heart Program  [facility]           "

## 2023-12-08 ENCOUNTER — DOCUMENTATION ONLY (OUTPATIENT)
Dept: CARDIOLOGY | Facility: CLINIC | Age: 68
End: 2023-12-08
Payer: MEDICARE

## 2023-12-08 LAB
ATRIAL RATE - MUSE: 67 BPM
DIASTOLIC BLOOD PRESSURE - MUSE: NORMAL MMHG
INTERPRETATION ECG - MUSE: NORMAL
P AXIS - MUSE: 15 DEGREES
PR INTERVAL - MUSE: 146 MS
QRS DURATION - MUSE: 92 MS
QT - MUSE: 408 MS
QTC - MUSE: 431 MS
R AXIS - MUSE: -28 DEGREES
SYSTOLIC BLOOD PRESSURE - MUSE: NORMAL MMHG
T AXIS - MUSE: 25 DEGREES
VENTRICULAR RATE- MUSE: 67 BPM

## 2023-12-08 PROCEDURE — 93000 ELECTROCARDIOGRAM COMPLETE: CPT | Performed by: INTERNAL MEDICINE

## 2023-12-08 NOTE — PROGRESS NOTES
===View-only below this line===  ----- Message -----  From: Charles Field MD  Sent: 12/7/2023   4:08 PM CST  To: Domenico Sauceda MD; Juliet Ventura PA-C; *    I can't think of a specific restriction post-TAVR and think he should be able to have his procedure as planned    ----- Message -----  From: Juliet Ventura PA-C  Sent: 12/7/2023   3:50 PM CST  To: Domenico Sauceda MD; Charles Field MD; *    Hi,     I saw Billy for his 1 month visit post TAVR. Repeat TTE has been ordered for 3 months out, since his mean gradient was elevated.    He is undergoing work-up for stem cell transplant with the plan for transplant at the end of January. He is wondering if he has to wait a certain amount of time after TAVR to have this done?    I will be gone for the weekend and back Tuesday. He is ok with being updated via [facility] if we have an answer before that time.      Thank you,    Juliet

## 2023-12-11 ENCOUNTER — TELEPHONE (OUTPATIENT)
Dept: TRANSPLANT | Facility: CLINIC | Age: 68
End: 2023-12-11
Payer: MEDICARE

## 2023-12-12 ENCOUNTER — HOSPITAL ENCOUNTER (OUTPATIENT)
Dept: CARDIAC REHAB | Facility: CLINIC | Age: 68
Discharge: HOME | End: 2023-12-12
Attending: INTERNAL MEDICINE
Payer: MEDICARE

## 2023-12-12 ENCOUNTER — TRANSFERRED RECORDS (OUTPATIENT)
Dept: HEALTH INFORMATION MANAGEMENT | Facility: CLINIC | Age: 68
End: 2023-12-12

## 2023-12-12 PROCEDURE — 93798 PHYS/QHP OP CAR RHAB W/ECG: CPT

## 2023-12-13 ENCOUNTER — MEDICAL CORRESPONDENCE (OUTPATIENT)
Dept: TRANSPLANT | Facility: CLINIC | Age: 68
End: 2023-12-13
Payer: MEDICARE

## 2023-12-14 ENCOUNTER — HOSPITAL ENCOUNTER (OUTPATIENT)
Dept: CARDIAC REHAB | Facility: CLINIC | Age: 68
Discharge: HOME | End: 2023-12-14
Attending: INTERNAL MEDICINE
Payer: MEDICARE

## 2023-12-14 PROCEDURE — 93798 PHYS/QHP OP CAR RHAB W/ECG: CPT

## 2023-12-18 RX ORDER — HEPARIN SODIUM,PORCINE 10 UNIT/ML
5-20 VIAL (ML) INTRAVENOUS DAILY PRN
Status: CANCELLED | OUTPATIENT
Start: 2023-12-21

## 2023-12-18 RX ORDER — HEPARIN SODIUM (PORCINE) LOCK FLUSH IV SOLN 100 UNIT/ML 100 UNIT/ML
5 SOLUTION INTRAVENOUS
Status: CANCELLED | OUTPATIENT
Start: 2023-12-21

## 2023-12-18 RX ORDER — ALBUTEROL SULFATE 0.83 MG/ML
2.5 SOLUTION RESPIRATORY (INHALATION)
Status: CANCELLED | OUTPATIENT
Start: 2023-12-21 | End: 2023-12-21

## 2023-12-18 RX ORDER — PENTAMIDINE ISETHIONATE 300 MG/300MG
300 INHALANT RESPIRATORY (INHALATION)
Status: CANCELLED | OUTPATIENT
Start: 2023-12-21 | End: 2023-12-21

## 2023-12-19 ENCOUNTER — TRANSCRIBE ORDERS (OUTPATIENT)
Dept: OTHER | Age: 68
End: 2023-12-19

## 2023-12-19 ENCOUNTER — HOSPITAL ENCOUNTER (OUTPATIENT)
Dept: CARDIAC REHAB | Facility: CLINIC | Age: 68
Discharge: HOME | End: 2023-12-19
Attending: INTERNAL MEDICINE
Payer: MEDICARE

## 2023-12-19 PROCEDURE — 93798 PHYS/QHP OP CAR RHAB W/ECG: CPT

## 2023-12-20 LAB
ABO/RH(D): NORMAL
ANTIBODY SCREEN: NEGATIVE
SPECIMEN EXPIRATION DATE: NORMAL

## 2023-12-21 ENCOUNTER — OFFICE VISIT (OUTPATIENT)
Dept: CONSULT | Facility: CLINIC | Age: 68
End: 2023-12-21
Payer: MEDICARE

## 2023-12-21 ENCOUNTER — APPOINTMENT (OUTPATIENT)
Dept: LAB | Facility: CLINIC | Age: 68
End: 2023-12-21
Payer: MEDICARE

## 2023-12-21 ENCOUNTER — ALLIED HEALTH/NURSE VISIT (OUTPATIENT)
Dept: TRANSPLANT | Facility: CLINIC | Age: 68
End: 2023-12-21
Payer: MEDICARE

## 2023-12-21 ENCOUNTER — TELEPHONE (OUTPATIENT)
Dept: CARDIOLOGY | Facility: CLINIC | Age: 68
End: 2023-12-21

## 2023-12-21 ENCOUNTER — MEDICAL CORRESPONDENCE (OUTPATIENT)
Dept: TRANSPLANT | Facility: CLINIC | Age: 68
End: 2023-12-21

## 2023-12-21 ENCOUNTER — ONCOLOGY VISIT (OUTPATIENT)
Dept: TRANSPLANT | Facility: CLINIC | Age: 68
End: 2023-12-21
Payer: MEDICARE

## 2023-12-21 VITALS
RESPIRATION RATE: 18 BRPM | BODY MASS INDEX: 31.18 KG/M2 | DIASTOLIC BLOOD PRESSURE: 87 MMHG | WEIGHT: 217.81 LBS | OXYGEN SATURATION: 96 % | SYSTOLIC BLOOD PRESSURE: 159 MMHG | HEART RATE: 79 BPM | TEMPERATURE: 98.6 F | HEIGHT: 70 IN

## 2023-12-21 VITALS
DIASTOLIC BLOOD PRESSURE: 87 MMHG | WEIGHT: 217.8 LBS | OXYGEN SATURATION: 96 % | SYSTOLIC BLOOD PRESSURE: 159 MMHG | TEMPERATURE: 98.6 F | RESPIRATION RATE: 18 BRPM | BODY MASS INDEX: 31.18 KG/M2 | HEIGHT: 70 IN | HEART RATE: 79 BPM

## 2023-12-21 DIAGNOSIS — C90.00 MULTIPLE MYELOMA, REMISSION STATUS UNSPECIFIED (H): Primary | ICD-10-CM

## 2023-12-21 DIAGNOSIS — C90.01 MULTIPLE MYELOMA IN REMISSION (H): Primary | ICD-10-CM

## 2023-12-21 DIAGNOSIS — E78.2 MIXED HYPERLIPIDEMIA: ICD-10-CM

## 2023-12-21 DIAGNOSIS — C90.01 MULTIPLE MYELOMA IN REMISSION (H): ICD-10-CM

## 2023-12-21 DIAGNOSIS — Z86.2 PERSONAL HISTORY OF DISEASES OF BLOOD AND BLOOD-FORMING ORGANS: ICD-10-CM

## 2023-12-21 DIAGNOSIS — Z71.9 VISIT FOR COUNSELING: Primary | ICD-10-CM

## 2023-12-21 DIAGNOSIS — Z01.818 EXAMINATION PRIOR TO CHEMOTHERAPY: ICD-10-CM

## 2023-12-21 LAB
ALBUMIN SERPL BCG-MCNC: 4.5 G/DL (ref 3.5–5.2)
ALBUMIN UR-MCNC: NEGATIVE MG/DL
ALP SERPL-CCNC: 98 U/L (ref 40–150)
ALT SERPL W P-5'-P-CCNC: 86 U/L (ref 0–70)
ANION GAP SERPL CALCULATED.3IONS-SCNC: 11 MMOL/L (ref 7–15)
APPEARANCE UR: CLEAR
APTT PPP: 25 SECONDS (ref 22–38)
AST SERPL W P-5'-P-CCNC: 53 U/L (ref 0–45)
BACTERIA #/AREA URNS HPF: ABNORMAL /HPF
BASOPHILS # BLD AUTO: 0.1 10E3/UL (ref 0–0.2)
BASOPHILS NFR BLD AUTO: 1 %
BILIRUB SERPL-MCNC: 0.5 MG/DL
BILIRUB UR QL STRIP: NEGATIVE
BUN SERPL-MCNC: 12.1 MG/DL (ref 8–23)
CALCIUM SERPL-MCNC: 9.1 MG/DL (ref 8.8–10.2)
CHLORIDE SERPL-SCNC: 104 MMOL/L (ref 98–107)
CHOLEST SERPL-MCNC: 234 MG/DL
COLOR UR AUTO: ABNORMAL
CREAT SERPL-MCNC: 0.81 MG/DL (ref 0.67–1.17)
DEPRECATED HCO3 PLAS-SCNC: 24 MMOL/L (ref 22–29)
EGFRCR SERPLBLD CKD-EPI 2021: >90 ML/MIN/1.73M2
EOSINOPHIL # BLD AUTO: 0.1 10E3/UL (ref 0–0.7)
EOSINOPHIL NFR BLD AUTO: 1 %
ERYTHROCYTE [DISTWIDTH] IN BLOOD BY AUTOMATED COUNT: 13.3 % (ref 10–15)
FASTING STATUS PATIENT QL REPORTED: NO
GLUCOSE SERPL-MCNC: 103 MG/DL (ref 70–99)
GLUCOSE UR STRIP-MCNC: NEGATIVE MG/DL
HCT VFR BLD AUTO: 40.1 % (ref 40–53)
HDLC SERPL-MCNC: 53 MG/DL
HGB BLD-MCNC: 13.4 G/DL (ref 13.3–17.7)
HGB UR QL STRIP: NEGATIVE
HOLD SPECIMEN: NORMAL
IMM GRANULOCYTES # BLD: 0 10E3/UL
IMM GRANULOCYTES NFR BLD: 0 %
INR PPP: 0.95 (ref 0.85–1.15)
KETONES UR STRIP-MCNC: NEGATIVE MG/DL
LDH SERPL L TO P-CCNC: 309 U/L (ref 0–250)
LDLC SERPL CALC-MCNC: ABNORMAL MG/DL
LDLC SERPL DIRECT ASSAY-MCNC: 117 MG/DL
LEUKOCYTE ESTERASE UR QL STRIP: NEGATIVE
LYMPHOCYTES # BLD AUTO: 0.9 10E3/UL (ref 0.8–5.3)
LYMPHOCYTES NFR BLD AUTO: 16 %
MAGNESIUM SERPL-MCNC: 2.3 MG/DL (ref 1.7–2.3)
MCH RBC QN AUTO: 30.7 PG (ref 26.5–33)
MCHC RBC AUTO-ENTMCNC: 33.4 G/DL (ref 31.5–36.5)
MCV RBC AUTO: 92 FL (ref 78–100)
MONOCYTES # BLD AUTO: 0.6 10E3/UL (ref 0–1.3)
MONOCYTES NFR BLD AUTO: 11 %
MUCOUS THREADS #/AREA URNS LPF: PRESENT /LPF
NEUTROPHILS # BLD AUTO: 4 10E3/UL (ref 1.6–8.3)
NEUTROPHILS NFR BLD AUTO: 71 %
NITRATE UR QL: NEGATIVE
NONHDLC SERPL-MCNC: 181 MG/DL
NRBC # BLD AUTO: 0 10E3/UL
NRBC BLD AUTO-RTO: 0 /100
PH UR STRIP: 6 [PH] (ref 5–7)
PHOSPHATE SERPL-MCNC: 3.2 MG/DL (ref 2.5–4.5)
PLATELET # BLD AUTO: 290 10E3/UL (ref 150–450)
POTASSIUM SERPL-SCNC: 3.9 MMOL/L (ref 3.4–5.3)
PROT SERPL-MCNC: 7.3 G/DL (ref 6.4–8.3)
RBC # BLD AUTO: 4.37 10E6/UL (ref 4.4–5.9)
RBC URINE: <1 /HPF
SODIUM SERPL-SCNC: 139 MMOL/L (ref 135–145)
SP GR UR STRIP: 1.01 (ref 1–1.03)
TOTAL PROTEIN SERUM FOR ELP: 7.1 G/DL (ref 6.4–8.3)
TRIGL SERPL-MCNC: 430 MG/DL
URATE SERPL-MCNC: 6.8 MG/DL (ref 3.4–7)
UROBILINOGEN UR STRIP-MCNC: NORMAL MG/DL
WBC # BLD AUTO: 5.7 10E3/UL (ref 4–11)
WBC URINE: <1 /HPF

## 2023-12-21 PROCEDURE — 85610 PROTHROMBIN TIME: CPT

## 2023-12-21 PROCEDURE — 86334 IMMUNOFIX E-PHORESIS SERUM: CPT | Mod: 26 | Performed by: PATHOLOGY

## 2023-12-21 PROCEDURE — 84550 ASSAY OF BLOOD/URIC ACID: CPT

## 2023-12-21 PROCEDURE — 86665 EPSTEIN-BARR CAPSID VCA: CPT

## 2023-12-21 PROCEDURE — 93010 ELECTROCARDIOGRAM REPORT: CPT | Performed by: INTERNAL MEDICINE

## 2023-12-21 PROCEDURE — 83735 ASSAY OF MAGNESIUM: CPT

## 2023-12-21 PROCEDURE — 84165 PROTEIN E-PHORESIS SERUM: CPT | Mod: TC | Performed by: PATHOLOGY

## 2023-12-21 PROCEDURE — 80061 LIPID PANEL: CPT

## 2023-12-21 PROCEDURE — 80053 COMPREHEN METABOLIC PANEL: CPT

## 2023-12-21 PROCEDURE — 84165 PROTEIN E-PHORESIS SERUM: CPT | Mod: 26 | Performed by: PATHOLOGY

## 2023-12-21 PROCEDURE — 86335 IMMUNFIX E-PHORSIS/URINE/CSF: CPT | Mod: 26 | Performed by: PATHOLOGY

## 2023-12-21 PROCEDURE — 93005 ELECTROCARDIOGRAM TRACING: CPT

## 2023-12-21 PROCEDURE — 82784 ASSAY IGA/IGD/IGG/IGM EACH: CPT

## 2023-12-21 PROCEDURE — 87521 HEPATITIS C PROBE&RVRS TRNSC: CPT

## 2023-12-21 PROCEDURE — 86777 TOXOPLASMA ANTIBODY: CPT

## 2023-12-21 PROCEDURE — 82785 ASSAY OF IGE: CPT

## 2023-12-21 PROCEDURE — 86778 TOXOPLASMA ANTIBODY IGM: CPT

## 2023-12-21 PROCEDURE — 86334 IMMUNOFIX E-PHORESIS SERUM: CPT | Performed by: PATHOLOGY

## 2023-12-21 PROCEDURE — 86803 HEPATITIS C AB TEST: CPT

## 2023-12-21 PROCEDURE — 86335 IMMUNFIX E-PHORSIS/URINE/CSF: CPT | Performed by: PATHOLOGY

## 2023-12-21 PROCEDURE — 85730 THROMBOPLASTIN TIME PARTIAL: CPT

## 2023-12-21 PROCEDURE — 83521 IG LIGHT CHAINS FREE EACH: CPT | Mod: 59

## 2023-12-21 PROCEDURE — 83721 ASSAY OF BLOOD LIPOPROTEIN: CPT

## 2023-12-21 PROCEDURE — 82232 ASSAY OF BETA-2 PROTEIN: CPT

## 2023-12-21 PROCEDURE — 84100 ASSAY OF PHOSPHORUS: CPT

## 2023-12-21 PROCEDURE — 86780 TREPONEMA PALLIDUM: CPT

## 2023-12-21 PROCEDURE — 36415 COLL VENOUS BLD VENIPUNCTURE: CPT

## 2023-12-21 PROCEDURE — 86901 BLOOD TYPING SEROLOGIC RH(D): CPT

## 2023-12-21 PROCEDURE — 86696 HERPES SIMPLEX TYPE 2 TEST: CPT

## 2023-12-21 PROCEDURE — 99215 OFFICE O/P EST HI 40 MIN: CPT

## 2023-12-21 PROCEDURE — 88240 CELL CRYOPRESERVE/STORAGE: CPT | Mod: GZ

## 2023-12-21 PROCEDURE — G0463 HOSPITAL OUTPT CLINIC VISIT: HCPCS

## 2023-12-21 PROCEDURE — 86850 RBC ANTIBODY SCREEN: CPT

## 2023-12-21 PROCEDURE — 86687 HTLV-I ANTIBODY: CPT

## 2023-12-21 PROCEDURE — 83020 HEMOGLOBIN ELECTROPHORESIS: CPT

## 2023-12-21 PROCEDURE — 83615 LACTATE (LD) (LDH) ENZYME: CPT

## 2023-12-21 PROCEDURE — 82306 VITAMIN D 25 HYDROXY: CPT

## 2023-12-21 PROCEDURE — 85025 COMPLETE CBC W/AUTO DIFF WBC: CPT

## 2023-12-21 PROCEDURE — 84155 ASSAY OF PROTEIN SERUM: CPT

## 2023-12-21 PROCEDURE — 81001 URINALYSIS AUTO W/SCOPE: CPT

## 2023-12-21 RX ORDER — PLERIXAFOR 24 MG/1.2ML
0.24 SOLUTION SUBCUTANEOUS DAILY PRN
Status: CANCELLED
Start: 2023-12-30

## 2023-12-21 ASSESSMENT — ANXIETY QUESTIONNAIRES
GAD7 TOTAL SCORE: 6
5. BEING SO RESTLESS THAT IT IS HARD TO SIT STILL: SEVERAL DAYS
6. BECOMING EASILY ANNOYED OR IRRITABLE: SEVERAL DAYS
7. FEELING AFRAID AS IF SOMETHING AWFUL MIGHT HAPPEN: NOT AT ALL
IF YOU CHECKED OFF ANY PROBLEMS ON THIS QUESTIONNAIRE, HOW DIFFICULT HAVE THESE PROBLEMS MADE IT FOR YOU TO DO YOUR WORK, TAKE CARE OF THINGS AT HOME, OR GET ALONG WITH OTHER PEOPLE: NOT DIFFICULT AT ALL
1. FEELING NERVOUS, ANXIOUS, OR ON EDGE: MORE THAN HALF THE DAYS
2. NOT BEING ABLE TO STOP OR CONTROL WORRYING: NOT AT ALL
GAD7 TOTAL SCORE: 6
3. WORRYING TOO MUCH ABOUT DIFFERENT THINGS: NOT AT ALL

## 2023-12-21 ASSESSMENT — PAIN SCALES - GENERAL
PAINLEVEL: NO PAIN (0)
PAINLEVEL: NO PAIN (0)

## 2023-12-21 ASSESSMENT — PATIENT HEALTH QUESTIONNAIRE - PHQ9
5. POOR APPETITE OR OVEREATING: MORE THAN HALF THE DAYS
SUM OF ALL RESPONSES TO PHQ QUESTIONS 1-9: 5

## 2023-12-21 NOTE — PROGRESS NOTES
Blood and Marrow Transplant   Psychosocial Assessment with   Clinical     Assessment completed on 12/21/23 of Pt's living situation, support system, financial status, functional status, coping, stressors, need for resources and social work intervention provided as needed.  Information for this assessment was provided by Pt and dtr's report in addition to medical chart review and consultation with medical team.     Present at Assessment:   Patient: Billy Carroll  Daughter: Radha Martinez  Spouse: Britney Carroll (via phone)  : MICKEY Wu LICSW    Diagnosis: Multiple Myeloma     Date of Diagnosis: 4/2023    Transplant type: Autologous    Donor: Autologous     Physician: Jerri Frey MD    Work-Up Nurse Coordinator: Bozena Ritter RN    Long-term Nurse Coordinator: Bozena Ritter RN    : MICKEY Wu LICSW    Permanent Address:   [address] [place] [address]    [place] [address]     Living Situation: Pt and his wife live in [place], [place] w/ their 3 y/o black lab.     Local Address: Pt lives in [place], [place] [facility] the required distance of the hospital. Pt does not need to relocate.     Contact Information:  Cell Phone: [phone]  Wife's Cell Phone: [phone]  Email: [email or web address]    Presenting Information:  Billy Carroll is a 68 year old male diagnosed with MM who presents for evaluation for an OP autologous transplant at the [facility] ([facility]). Pt was accompanied to today's visit by his dtr Radha.     Decision Making: Self    Health Care Directive: Yes. Pt has a health care directive and will bring it when they return to the BMT program.     Relationship Status: . Pt and pt's spouse have been  for 48 years. Pt described relationship as stable/supportive.     Special Needs: Wife Britney utilizes a walker and struggles w/ long-distances.    Family/Support System: Pt  endorsed a strong support system including family and close friends who will be available to support pt throughout transplant process.     Family Information:  Spouse: Britney Carroll  Parents:   Siblings: 2 brothers & 1 sister  Children: 3 total - Hood, Radha, & Valentine (all live local)  Grandchildren: 5 total  Friends:  Pt endorsed a good friend support system.    Caregiver: SW discussed with pt and pt's family the caregiver role and expectation at length. Pt is agreeable to having a full time caregiver for a minimum of 30 days until cleared by the BMT physician. Pt and pt's family confirmed understanding of the caregiver requirement. Pt's primary caregiver will be his wife Britney with his adult children and siblings as a secondary or back-up to assist as needed. Pt reviewed and signed the caregiver contract which will be scanned into the EMR. Caregiver education and resources provided. No caregiver concerns identified.     Caregiver Contact Information:  Britney Carroll (wife) - [phone]  Hood Carroll (son) - [phone]  Valentine Efrain (dtr) - [phone]  Radha Michelle (dtr) - [phone]    Transportation Mode: Personal Vehicle. Pt is aware of driving restrictions post-BMT and the need for the caregiver is to drive until cleared to drive by the BMT physician. SW provided information on parking info and monthly parking pass options.     Insurance: Pt has Medicare + BCBS health insurance. Pt denied specific insurance concerns at this time. SW reiterated information about the BMT Financial  should specific insurance questions arise as pt moves through transplant process.     Sources of Income: No financial concerns identified at this time. SW asked pt to let SW know if they have financial concerns in the future.     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     Spouse/Partner  "Employed: No; retired  Occupation: Teacher    Mental Health: Pt denied a history of mental health concerns, specific diagnoses or medications at this time. Pt shared that palliative care started him on remeron for sleep this past year. Pt and spouse did share that Pt has felt \"more on edge\" this year than he has prior.     We discussed how many patients may see an increase in feelings of anxiety or depression while hospitalized for extended periods of time and pursue  isolation precautions post-BMT. Encouraged Pt and family to let us know if they are noticing an increase in symptoms. Pt believes he would be able to identify symptoms of depression/anxiety throughout the transplant process. We talked about the variety of modalities available to use as coping mechanisms (including but not limited to guided imagery, relaxation techniques, progressive muscle relaxation, counseling/talk therapy and medication).    PHQ-9:  Pt scored a 5 which indicates \"mild\" on the depression severity scale. Pt endorses this is an accurate reflection of his emotional state.    GAD7:  Pt scored a 6 which indicates a mild sign of anxiety on the Generalized Anxiety Disorder Questionnaire. Pt endorses this is an accurate reflection of his emotional state.    Chemical Use:   Tobacco: No hx  Alcohol: Typical use of several drinks/day. Pt denies concern abstaining during BMT process.  Marijuana: Pt shared he is registered w/ state of [place] for gummies to assist w/ back pain, but he hasn't been using it in the last several months as his pain is managed w/ tylonel.  Other Drugs: No hx  Based on the information provided, there appear to be no specific risks or concerns identified at this time.     Trauma/Loss/Abuse History: Multiple losses associated with cancer diagnosis and treatment, including health,  changes to physical appearance, etc.     Spirituality: Pt identified as Jain. SW explained that there are Chaplains on the unit and pt can " "request to meet with a  at anytime.     Coping: Pt noted that he is currently feeling \"positive, fearful, sad, hopeful, worried, prepared, nervous, and ready to begin\". Pt shared that his main coping mechanisms are talking w/ friends/family, reading books, exercise, taking naps, and working on his hobbies. Pt noted that he enjoys audio books, spending time with his family, and [place]. SW and pt discussed additional positive coping mechanisms that pt can utilize while in the hospital.     Caregiver Coping: Pt's wife noted that she is feeling \"positive, fearful, hopeful, worried, nervous, focused, and unprepared\" at this time. Pt's wife noted that she sedrick by talking w/ friends/family, prayer/spiritual practices, positive self-talk, use of alcohol, and working on her hobbies. Pt's wife shared she can be best supported as a caregiver by her family. Caregiver resources offered/reviewed.     Education Provided: Transplant process expectations, Caregiver requirements, Caregiver self-care, Financial issues related to transplant, Financial resources/grants available, Common psychosocial stressors pre/post transplant, Support group(s) available, Hospital resources available, Web site information, Social Work role and Resources for children/siblings    Interventions Provided: Psychosocial Support and Education     Assessment and Recommendations for Team:  Pt is a 68 year old male diagnosed with MM who is here undergoing preparation for a planned autologous transplant.     Pt is pleasant, calm and able to articulate concerns/coping mechanisms in an appropriate manner. During our meeting pt was alert, he was interactive, affect was full, he displayed appropriate eye contact, memory and thought processes. Pt feels comfortable communicating with the medical team. Pt has a strong supportive network of family and friends who are involved. Pt has developed strong coping mechanisms.     Pt will benefit from " ongoing psychosocial support in regards to coping with the adjustment to the BMT process. CSW has discussed  psychosocial support options in regards to coping with the adjustment to the BMT process and support groups opportunities.      Pt has a strong support system and a confirmed caregiver plan. Pt verbalizes understanding of the transplant process and wanting to proceed. SW provided contact information and encouraged pt to contact SW with questions, concerns, resources and for support.    Per this assessment, SW did not identify any barriers to this patient moving forward with transplant.    Important Information:   - Pt's primary caregiver has some mobility limitations; Pt has a large secondary caregiver network of support.     Follow up Planned:   Psychosocial support  Healthcare Directive - will bring in a copy    MICKEY Wu, [facility]  Adult Blood & Marrow Transplant   Phone: [phone]  Pager: [phone]

## 2023-12-21 NOTE — NURSING NOTE
Chief Complaint   Patient presents with    Blood Draw     Labs drawn via  by RN.     Labs drawn with  by RN. Vitals taken. Urine collected. Patient checked into next appointment.    Valentine De León RN

## 2023-12-21 NOTE — PROGRESS NOTES
Blood and Marrow Transplant - Workup Calendar Review    Billy Carroll is a 68 year old male  The encounter diagnosis was Multiple myeloma, remission status unspecified (H).    Teaching Topic: work up routine  Person(s) involved: Patient, Spouse,     Patient demonstrates understanding of the following:  - Reason for the appointment, diagnosis and treatment plan: Yes    Reviewed calendar and locations of procedures. Patient understands to check in for appointments at the  and to contact the BMT Office [phone] if there are schedule questions.    24 hr urine collection needed? Yes   If yes: Patient instructed to  collection container in lab. Patient will drop off container on 12/26 and will have corresponding lab appointment scheduled on 12/26.    Instructional Materials Used/Given: copy of calendar, map of campus    Specific Concerns: No    Time spent with patient: 10 minutes     BMT Teaching Flowsheet    Billy Carroll is a 68 year old male  The encounter diagnosis was Multiple myeloma, remission status unspecified (H).    Teaching Topic: 2016-35    Person(s) involved in teaching: Patient, Spouse, and Daughter    Motivation Level  Asks Questions: Yes  Eager to Learn: Yes  Cooperative: Yes  Receptive (willing/able to accept information): Yes  Any cultural factors/Nondenominational beliefs that may influence understanding or compliance? No    Patient, Family, and Caregiver demonstrates understanding of the following:   Reason for the appointment, diagnosis and treatment plan: Yes  Knowledge of proper use of medications and conditions for which they are ordered (with special attention to potential side effects or drug interactions): Yes  Which situations necessitate calling provider and whom to contact: Yes  Proper use and care of (medical equipment, care aids, etc.) Yes  Pain management techniques: Yes  How and/when to access community resources: Yes    Teaching/ learning concerns addressed: None at this  "time.     Infection Control:  Patient, Family, and Caregiver instructed on hand hygiene: Yes  Signs and symptoms of infection taught: Yes    Instructional Materials Used/Given:   Patient was given and reviewed BMT Auto Transplant Teaching Binder, including: medication pamphlets, sample treatment calendars, consents, contact information for \"When to Call for Help\" (including after-hours contact), post-transplant precautions and guidelines.  Patient was encouraged to call with any additional questions.      Patient was provided with handouts for caring for their central venous catheter (proper hand hygiene, changing end caps, flushing line, changing CVC dressing). Yes    Patient was encouraged to view step-by-step instructional videos for central venous catheter care and report any questions or concerns. Yes .    Time spent with patient: 60 minutes.  Specific Concerns: No, explain: None at this time.    Bozena Ritter RN, MSN  BMT Nurse Coordinator  Phone: [phone]    "

## 2023-12-21 NOTE — PROGRESS NOTES
[facility]  BMTCT OPEN VISIT    December 21, 2023      Billy Carroll is a 68 year old male undergoing evaluation prior to hematopoietic cell transplant or immune effector cell therapy.    Reason for BMTCT: Multiple Myeloma     Recent chemotherapy: VRD on 12/5/23     Recent infections: No    Blood thinner use? If yes, why? No, low dose ASA     Treatment for diabetes? No    Today, the patient notes the following symptoms:  Review Of Systems  Skin: negative, rash, bruising, lumps or bumps  Eyes: positive glassess. negative, visual blurring, eye pain, photophobia  Ears/Nose/Throat: positive hearing aids and tinnitus. negative, nasal congestion, vertigo, epistaxis, persistent sore throat, bleeding gums  Respiratory: No shortness of breath, dyspnea on exertion, cough, or hemoptysis  Cardiovascular: negative, palpitations, chest pain, dyspnea on exertion, orthopnea, and lower extremity edema  Gastrointestinal: negative, dysphagia, vomiting, heartburn, abdominal pain, hemorrhoids, melena, hematochezia, and diarrhea  Genitourinary: negative, nocturia, frequency, urgency, hesitancy, and hematuria  Musculoskeletal: Positive low back pain, stiff joints. negative and neck pain  Neurologic: positive numbness and tingling to toes. negative, migraine headaches, headaches, syncope, stroke, seizures, and numbness or tingling of hands  Psychiatric: negative, anxiety, and depression  Hematologic/Lymphatic/Immunologic: negative, night sweats, and weight loss  Endocrine: negative, cold intolerance, heat intolerance, hot flashes, and night sweats      Billy Carroll's History    Past Medical History:   Diagnosis Date    Benign essential hypertension     Hyperlipidemia     Multiple myeloma (H)     Followed by Dr. Ruelas with [facility]   - Aortic stenosis s/p TAVR     Past Surgical History:   Procedure Laterality Date    CV CORONARY ANGIOGRAM N/A 9/7/2023    Procedure: Coronary Angiogram;  Surgeon: Domenico Sauceda MD;  Location:  [facility] CATH LAB CV    CV TRANSCATHETER AORTIC VALVE REPLACEMENT-FEMORAL APPROACH N/A 10/24/2023    Procedure: Transcatheter Aortic Valve Replacement-Femoral Approach;  Surgeon: Charles Field MD;  Location: [facility] CATH LAB CV    OR TRANSCATHETER AORTIC VALVE REPLACEMENT, FEMORAL PERCUTANEOUS APPROACH (STANDBY) N/A 10/24/2023    Procedure: OR TRANSCATHETER AORTIC VALVE REPLACEMENT, FEMORAL PERCUTANEOUS APPROACH (STANDBY);  Surgeon: Sahra Mosher MD;  Location: [facility] CATH LAB CV       No family history on file.  - Paternal grandfather: diabetes mellitus  - Father: Multiple Myeloma   - Mother: CHF, breast cancer, heart valve replacement   - Sister: Breast cancer   - Brother: healthy   - Brother: healthy   - 3 children no significant medical history     Social History     Tobacco Use    Smoking status: Never     Passive exposure: Never    Smokeless tobacco: Never   Substance Use Topics    Alcohol use: 1-2 day            Billy Carroll's Medications and Allergies    Current Outpatient Medications   Medication    acetaminophen (ACETAMINOPHEN 8 HOUR) 650 MG CR tablet    acyclovir (ZOVIRAX) 400 MG tablet    alendronate (FOSAMAX) 70 MG tablet    aspirin 81 MG EC tablet    Calcium-Magnesium-Zinc 333-133-5 MG TABS per tablet    cholecalciferol 50 MCG (2000 UT) tablet    co-enzyme Q-10 100 MG CAPS capsule    fluticasone (FLONASE) 50 MCG/ACT nasal spray    loratadine (CLARITIN) 10 MG tablet    losartan (COZAAR) 50 MG tablet    metoprolol succinate ER (TOPROL XL) 25 MG 24 hr tablet    mirtazapine (REMERON) 15 MG tablet    nitroGLYcerin (NITROSTAT) 0.4 MG sublingual tablet    Omega-3 Fatty Acids (FISH OIL BURP-LESS) 1000 MG CAPS    psyllium (METAMUCIL/KONSYL) Packet    rosuvastatin (CRESTOR) 20 MG tablet    sulfamethoxazole-trimethoprim (BACTRIM DS) 800-160 MG tablet    Turmeric 500 MG CAPS    vitamin B complex with vitamin C (VITAMIN  B COMPLEX) tablet    zinc gluconate 50 MG tablet     No current facility-administered  medications for this visit.     Facility-Administered Medications Ordered in Other Visits   Medication    fentaNYL (PF) (SUBLIMAZE) injection    lidocaine (viscous) (XYLOCAINE) 2 % solution    lidocaine 1 % injection    lidocaine 4 % injection    midazolam (VERSED) injection    sodium chloride 0.9% infusion          Allergies   Allergen Reactions    Acetaminophen-Codeine Nausea    Codeine Nausea           Physical Examination    BP (!) 159/87 (BP Location: Right arm, Patient Position: Sitting, Cuff Size: Adult Large)   Pulse 79   Temp 98.6  F (37  C) (Oral)   Resp 18   Wt 98.8 kg (217 lb 12.8 oz)   SpO2 96%   BMI 31.25 kg/m      Exam:  Constitutional: healthy, alert, and no distress  Head: Normocephalic. No masses, lesions, tenderness or abnormalities  ENT: ENT exam normal, no neck nodes   Cardiovascular: RRR. No murmurs, clicks gallops or rub  Respiratory: Lungs clear  Gastrointestinal: Abdomen soft, non-tender. BS normal. No masses, organomegaly  : Deferred  Musculoskeletal: extremities normal- no gross deformities noted, gait normal, and normal muscle tone  Skin: no suspicious lesions or rashes  Neurologic: Gait normal. Reflexes normal and symmetric. Sensation grossly WNL.  Psychiatric: mentation appears normal and affect normal/bright  Hematologic/Lymphatic/Immunologic: Normal cervical lymph nodes      Frailty Screening    BMT Fried Frailty          12/21/2023    12:20   Fried Frailty   Lost>10 pounds unintenionally last year N   Exhaustion Score 0   Weakness/ Strength Score 1   Low Activity Level Score 1   Final Score Not Frail   Final Score Number 2   Sit Stand Assessment   Patient able to perform 5 chair stands Y   Chair Stands in seconds 11.58   Patient is able to perform stand with Feet Side by Side? Y   First attemp (in seconds): 10   Patient is able to perform Semi-Tandem Stand? Y   First attemp (in seconds): 10   Patient is able to perform Tandem Stand? Y   First attemp (in seconds): 10        Overall Assessment    I have reviewed the diagnostic data, medications, frailty screening, and general processes prior to BMTCT.  I have notified the Primary BMT Physician/and or Attending Physician in the clinic of any issues. We also discussed in detail the database and biorepository research for which Billy Carroll is eligible. We discussed the potential risks and potential benefits of each of these protocols individually. We explained potential alternatives to the protocols discussed. We explained to the patient that participation is voluntary and that consent may be withdrawn at any time.       Consents Signed:   Blood transfusion consent form  Ethnicity form  [facility]  [facility] B[facility] Database    Copies of the signed consent forms will be provided to the patient on admission. No procedures specific to any studies were performed prior to the patient signing the consent form.    Billy Carroll had the opportunity to ask questions, and I answered all of the questions to the best of my ability.      Jimi Zuniga PA-C

## 2023-12-21 NOTE — NURSING NOTE
"Oncology Rooming Note    December 21, 2023 12:20 PM   Billy Carroll is a 68 year old male who presents for:    Chief Complaint   Patient presents with    Blood Draw     Labs drawn via  by RN.    Oncology Clinic Visit     [facility] RETURN - MULTIPLE MYELOMA     Initial Vitals: BP (!) 159/87 (BP Location: Right arm, Patient Position: Sitting, Cuff Size: Adult Large)   Pulse 79   Temp 98.6  F (37  C) (Oral)   Resp 18   Ht 1.778 m (5' 10\")   Wt 98.8 kg (217 lb 12.8 oz)   SpO2 96%   BMI 31.25 kg/m   Estimated body mass index is 31.25 kg/m  as calculated from the following:    Height as of this encounter: 1.778 m (5' 10\").    Weight as of this encounter: 98.8 kg (217 lb 12.8 oz). Body surface area is 2.21 meters squared.  No Pain (0) Comment: Data Unavailable   No LMP for male patient.  Allergies reviewed: Yes  Medications reviewed: Yes    Medications: Medication refills not needed today.  Pharmacy name entered into [facility]:    [id number] [place], [place] - [address] [facility] [place], [place] - [address] [facility]    Frailty Screening:   Is the patient here for a new oncology consult visit in cancer care? 2. No      Martin Sinha LPN              "

## 2023-12-22 ENCOUNTER — APPOINTMENT (OUTPATIENT)
Dept: LAB | Facility: CLINIC | Age: 68
End: 2023-12-22
Attending: PHYSICIAN ASSISTANT
Payer: MEDICARE

## 2023-12-22 ENCOUNTER — OFFICE VISIT (OUTPATIENT)
Dept: PULMONOLOGY | Facility: CLINIC | Age: 68
End: 2023-12-22
Payer: MEDICARE

## 2023-12-22 ENCOUNTER — HOSPITAL ENCOUNTER (OUTPATIENT)
Dept: LAB | Facility: CLINIC | Age: 68
Discharge: HOME | End: 2023-12-22
Attending: PHYSICIAN ASSISTANT
Payer: MEDICARE

## 2023-12-22 ENCOUNTER — OFFICE VISIT (OUTPATIENT)
Dept: TRANSPLANT | Facility: CLINIC | Age: 68
End: 2023-12-22
Attending: PHYSICIAN ASSISTANT
Payer: MEDICARE

## 2023-12-22 VITALS
DIASTOLIC BLOOD PRESSURE: 91 MMHG | SYSTOLIC BLOOD PRESSURE: 154 MMHG | HEART RATE: 73 BPM | HEIGHT: 70 IN | BODY MASS INDEX: 31.18 KG/M2 | RESPIRATION RATE: 16 BRPM | WEIGHT: 217.81 LBS | TEMPERATURE: 98.7 F

## 2023-12-22 VITALS
OXYGEN SATURATION: 99 % | HEART RATE: 61 BPM | BODY MASS INDEX: 31.61 KG/M2 | RESPIRATION RATE: 18 BRPM | TEMPERATURE: 98.5 F | DIASTOLIC BLOOD PRESSURE: 84 MMHG | SYSTOLIC BLOOD PRESSURE: 158 MMHG | WEIGHT: 220.3 LBS

## 2023-12-22 DIAGNOSIS — C90.00 MULTIPLE MYELOMA, REMISSION STATUS UNSPECIFIED (H): Primary | ICD-10-CM

## 2023-12-22 DIAGNOSIS — C90.01 MULTIPLE MYELOMA IN REMISSION (H): Primary | ICD-10-CM

## 2023-12-22 DIAGNOSIS — Z86.2 PERSONAL HISTORY OF DISEASES OF BLOOD AND BLOOD-FORMING ORGANS: ICD-10-CM

## 2023-12-22 DIAGNOSIS — Z01.818 EXAMINATION PRIOR TO CHEMOTHERAPY: ICD-10-CM

## 2023-12-22 DIAGNOSIS — C90.01 MULTIPLE MYELOMA IN REMISSION (H): ICD-10-CM

## 2023-12-22 DIAGNOSIS — Z52.011 AUTOLOGOUS DONOR OF STEM CELLS: Primary | ICD-10-CM

## 2023-12-22 LAB
ALBUMIN SERPL ELPH-MCNC: 4.5 G/DL (ref 3.7–5.1)
ALPHA1 GLOB SERPL ELPH-MCNC: 0.3 G/DL (ref 0.2–0.4)
ALPHA2 GLOB SERPL ELPH-MCNC: 0.7 G/DL (ref 0.5–0.9)
ATRIAL RATE - MUSE: 77 BPM
B-GLOBULIN SERPL ELPH-MCNC: 0.9 G/DL (ref 0.6–1)
B2 MICROGLOB TUMOR MARKER SER-MCNC: 1.7 MG/L
BASOPHILS # BLD AUTO: 0.1 10E3/UL (ref 0–0.2)
BASOPHILS NFR BLD AUTO: 2 %
DIASTOLIC BLOOD PRESSURE - MUSE: NORMAL MMHG
EBV VCA IGG SER IA-ACNC: 40.4 U/ML
EBV VCA IGG SER IA-ACNC: POSITIVE
EOSINOPHIL # BLD AUTO: 0.1 10E3/UL (ref 0–0.7)
EOSINOPHIL NFR BLD AUTO: 2 %
ERYTHROCYTE [DISTWIDTH] IN BLOOD BY AUTOMATED COUNT: 13.2 % (ref 10–15)
GAMMA GLOB SERPL ELPH-MCNC: 0.7 G/DL (ref 0.7–1.6)
HCT VFR BLD AUTO: 40.1 % (ref 40–53)
HGB BLD-MCNC: 13.3 G/DL (ref 13.3–17.7)
HGB S BLD QL: NEGATIVE
HSV1 IGG SERPL QL IA: 0.16 INDEX
HSV1 IGG SERPL QL IA: NORMAL
HSV2 IGG SERPL QL IA: 0.11 INDEX
HSV2 IGG SERPL QL IA: NORMAL
IGA SERPL-MCNC: 25 MG/DL (ref 84–499)
IGE SERPL-ACNC: <2 KU/L (ref 0–114)
IGG SERPL-MCNC: 702 MG/DL (ref 610–1616)
IGM SERPL-MCNC: 19 MG/DL (ref 35–242)
IMM GRANULOCYTES # BLD: 0 10E3/UL
IMM GRANULOCYTES NFR BLD: 0 %
INTERPRETATION ECG - MUSE: NORMAL
KAPPA LC FREE SER-MCNC: 1.05 MG/DL (ref 0.33–1.94)
KAPPA LC FREE/LAMBDA FREE SER NEPH: 2.63 {RATIO} (ref 0.26–1.65)
LAMBDA LC FREE SERPL-MCNC: 0.4 MG/DL (ref 0.57–2.63)
LYMPHOCYTES # BLD AUTO: 0.9 10E3/UL (ref 0.8–5.3)
LYMPHOCYTES NFR BLD AUTO: 25 %
M PROTEIN SERPL ELPH-MCNC: 0.2 G/DL
MCH RBC QN AUTO: 30.6 PG (ref 26.5–33)
MCHC RBC AUTO-ENTMCNC: 33.2 G/DL (ref 31.5–36.5)
MCV RBC AUTO: 92 FL (ref 78–100)
MONOCYTES # BLD AUTO: 0.4 10E3/UL (ref 0–1.3)
MONOCYTES NFR BLD AUTO: 10 %
NEUTROPHILS # BLD AUTO: 2.1 10E3/UL (ref 1.6–8.3)
NEUTROPHILS NFR BLD AUTO: 61 %
NRBC # BLD AUTO: 0 10E3/UL
NRBC BLD AUTO-RTO: 0 /100
P AXIS - MUSE: 40 DEGREES
PLATELET # BLD AUTO: 264 10E3/UL (ref 150–450)
PR INTERVAL - MUSE: 158 MS
PROT ELPH PNL UR ELPH: NORMAL
PROT PATTERN SERPL ELPH-IMP: ABNORMAL
PROT PATTERN SERPL IFE-IMP: NORMAL
QRS DURATION - MUSE: 88 MS
QT - MUSE: 416 MS
QTC - MUSE: 470 MS
R AXIS - MUSE: -43 DEGREES
RBC # BLD AUTO: 4.35 10E6/UL (ref 4.4–5.9)
SYSTOLIC BLOOD PRESSURE - MUSE: NORMAL MMHG
T AXIS - MUSE: 39 DEGREES
T GONDII IGG SER-ACNC: <3 IU/ML
T GONDII IGM SER-ACNC: <3 AU/ML
VENTRICULAR RATE- MUSE: 77 BPM
WBC # BLD AUTO: 3.5 10E3/UL (ref 4–11)

## 2023-12-22 PROCEDURE — 88369 M/PHMTRC ALYSISHQUANT/SEMIQ: CPT | Mod: 26 | Performed by: MEDICAL GENETICS

## 2023-12-22 PROCEDURE — 88311 DECALCIFY TISSUE: CPT | Mod: 26 | Performed by: STUDENT IN AN ORGANIZED HEALTH CARE EDUCATION/TRAINING PROGRAM

## 2023-12-22 PROCEDURE — 84999 UNLISTED CHEMISTRY PROCEDURE: CPT

## 2023-12-22 PROCEDURE — 88237 TISSUE CULTURE BONE MARROW: CPT

## 2023-12-22 PROCEDURE — 36415 COLL VENOUS BLD VENIPUNCTURE: CPT

## 2023-12-22 PROCEDURE — 88264 CHROMOSOME ANALYSIS 20-25: CPT

## 2023-12-22 PROCEDURE — 88184 FLOWCYTOMETRY/ TC 1 MARKER: CPT

## 2023-12-22 PROCEDURE — 88341 IMHCHEM/IMCYTCHM EA ADD ANTB: CPT | Mod: 26 | Performed by: STUDENT IN AN ORGANIZED HEALTH CARE EDUCATION/TRAINING PROGRAM

## 2023-12-22 PROCEDURE — 85025 COMPLETE CBC W/AUTO DIFF WBC: CPT

## 2023-12-22 PROCEDURE — 88311 DECALCIFY TISSUE: CPT | Mod: TC

## 2023-12-22 PROCEDURE — 88188 FLOWCYTOMETRY/READ 9-15: CPT | Mod: GC | Performed by: STUDENT IN AN ORGANIZED HEALTH CARE EDUCATION/TRAINING PROGRAM

## 2023-12-22 PROCEDURE — 38222 DX BONE MARROW BX & ASPIR: CPT | Mod: LT | Performed by: PHYSICIAN ASSISTANT

## 2023-12-22 PROCEDURE — 94726 PLETHYSMOGRAPHY LUNG VOLUMES: CPT | Performed by: INTERNAL MEDICINE

## 2023-12-22 PROCEDURE — 38222 DX BONE MARROW BX & ASPIR: CPT | Performed by: PHYSICIAN ASSISTANT

## 2023-12-22 PROCEDURE — G0463 HOSPITAL OUTPT CLINIC VISIT: HCPCS | Mod: 25

## 2023-12-22 PROCEDURE — 94729 DIFFUSING CAPACITY: CPT | Performed by: INTERNAL MEDICINE

## 2023-12-22 PROCEDURE — 94375 RESPIRATORY FLOW VOLUME LOOP: CPT | Performed by: INTERNAL MEDICINE

## 2023-12-22 PROCEDURE — 99207 CHROMOSOME ANALYSIS, BONE MARROW, DIAGNOSIS/RELAPSE: CPT

## 2023-12-22 PROCEDURE — 88185 FLOWCYTOMETRY/TC ADD-ON: CPT

## 2023-12-22 PROCEDURE — 85097 BONE MARROW INTERPRETATION: CPT | Mod: GC | Performed by: STUDENT IN AN ORGANIZED HEALTH CARE EDUCATION/TRAINING PROGRAM

## 2023-12-22 PROCEDURE — 88342 IMHCHEM/IMCYTCHM 1ST ANTB: CPT | Mod: 26 | Performed by: STUDENT IN AN ORGANIZED HEALTH CARE EDUCATION/TRAINING PROGRAM

## 2023-12-22 PROCEDURE — 88313 SPECIAL STAINS GROUP 2: CPT | Mod: 26 | Performed by: STUDENT IN AN ORGANIZED HEALTH CARE EDUCATION/TRAINING PROGRAM

## 2023-12-22 PROCEDURE — 88275 CYTOGENETICS 100-300: CPT | Mod: 59

## 2023-12-22 PROCEDURE — 88305 TISSUE EXAM BY PATHOLOGIST: CPT | Mod: 26 | Performed by: STUDENT IN AN ORGANIZED HEALTH CARE EDUCATION/TRAINING PROGRAM

## 2023-12-22 PROCEDURE — 250N000011 HC RX IP 250 OP 636: Performed by: PHYSICIAN ASSISTANT

## 2023-12-22 PROCEDURE — 88368 INSITU HYBRIDIZATION MANUAL: CPT | Mod: 26 | Performed by: MEDICAL GENETICS

## 2023-12-22 RX ADMIN — MIDAZOLAM 1.5 MG: 1 INJECTION INTRAMUSCULAR; INTRAVENOUS at 11:32

## 2023-12-22 ASSESSMENT — PAIN SCALES - GENERAL: PAINLEVEL: MILD PAIN (2)

## 2023-12-22 NOTE — CONSULTS
Transfusion Medicine Consultation    Billy Carroll [id number]   YOB: 1955 Age: 68 year old   Date of Consult: 12/22/2023     Reason for consult: Autologous Mononuclear Cell (MNC)  Collection           Assessment and Plan:   68 year old male presents for consultation for autologous MNC collection for multiple myeloma.  The plan is to collect for 1 to 3 days or until the target goal is met.   A central line will be placed and will be used for access for the procedure.          Chief Complaint:   Transfusion medicine consultation.         History of Present Illness:   68 year old male presents for consultation for autologous  MNC  collection.  His past medical history includes aortic stenosis, coronary artery disease and lumbosacral radiculopathy.  He is currently well.  The patient denies any back pain that would prevent him from tolerating the procedure.  The patient confirms a recent flu vaccination.  No significant travel history. The patient has no identifiable risk factors for infectious disease. The procedure, risks/benefits were discussed with the patient and all of his questions were addressed at this time. Informed consent was signed by the patient for the procedure and the possible side effects             Past Medical History:     Past Medical History:   Diagnosis Date    Benign essential hypertension     Hyperlipidemia     Multiple myeloma (H)     Followed by Dr. Ruelas with [facility]             Past Surgical History:     Past Surgical History:   Procedure Laterality Date    CV CORONARY ANGIOGRAM N/A 9/7/2023    Procedure: Coronary Angiogram;  Surgeon: Domenico Sauceda MD;  Location: [facility] CV    CV TRANSCATHETER AORTIC VALVE REPLACEMENT-FEMORAL APPROACH N/A 10/24/2023    Procedure: Transcatheter Aortic Valve Replacement-Femoral Approach;  Surgeon: Charles Field MD;  Location: [facility] CV    OR TRANSCATHETER AORTIC VALVE REPLACEMENT, FEMORAL PERCUTANEOUS  APPROACH (STANDBY) N/A 10/24/2023    Procedure: OR TRANSCATHETER AORTIC VALVE REPLACEMENT, FEMORAL PERCUTANEOUS APPROACH (STANDBY);  Surgeon: Sahra Mosher MD;  Location: [facility] CV              Social History:     Social History     Tobacco Use    Smoking status: Never     Passive exposure: Never    Smokeless tobacco: Never   Substance Use Topics    Alcohol use: Not on file             Family History:   No family history on file.          Immunizations:     Immunization History   Administered Date(s) Administered    COVID-19 12+ (2023-24) (Pfizer) 11/07/2023    COVID-19 Bivalent 18+ (Moderna) 11/29/2022    COVID-19 Monovalent 18+ (Moderna) 02/12/2021, 03/14/2021, 11/09/2021, 04/15/2022    Influenza (IIV3) PF 09/28/2010, 09/19/2011, 10/15/2012, 10/10/2013    Influenza Vaccine 65+ (FLUAD) 10/27/2020, 10/02/2021, 10/13/2022, 09/14/2023    Influenza Vaccine, 6+MO IM (QUADRIVALENT W/PRESERVATIVES) 10/23/2014, 09/30/2015, 01/10/2017, 10/16/2018, 11/25/2019    Influenza, seasonal, injectable, PF 11/28/2007    Pneumo Conj 13-V (2010&after) 12/19/2018    Pneumococcal 23 valent 12/23/2019    TD,PF 7+ (Tenivac) 09/07/2004, 11/25/2019    TDAP (Adacel,Boostrix) 10/06/2010    Zoster recombinant adjuvanted (SHINGRIX) 02/05/2020, 10/02/2021             Allergies:     Allergies   Allergen Reactions    Acetaminophen-Codeine Nausea    Codeine Nausea             Medications:     Current Outpatient Medications   Medication Sig    acetaminophen (ACETAMINOPHEN 8 HOUR) 650 MG CR tablet Take 1,300 mg by mouth every 8 hours as needed for mild pain or fever    acyclovir (ZOVIRAX) 400 MG tablet Take 400 mg by mouth 2 times daily    alendronate (FOSAMAX) 70 MG tablet Take 1 tablet (70 mg) by mouth every 7 days (Patient taking differently: Take 70 mg by mouth every 7 days Takes on Mondays)    aspirin 81 MG EC tablet Take 81 mg by mouth daily    Calcium-Magnesium-Zinc 333-133-5 MG TABS per tablet Take 2 tablets by mouth daily     cholecalciferol 50 MCG (2000 UT) tablet Take 2,000 Units by mouth daily    co-enzyme Q-10 100 MG CAPS capsule Take 100 mg by mouth daily    fluticasone (FLONASE) 50 MCG/ACT nasal spray Spray 1 spray into both nostrils daily as needed for rhinitis or allergies    loratadine (CLARITIN) 10 MG tablet Take 10 mg by mouth daily as needed for allergies    losartan (COZAAR) 50 MG tablet Take 50 mg by mouth daily    metoprolol succinate ER (TOPROL XL) 25 MG 24 hr tablet Take 1 tablet (25 mg) by mouth daily    mirtazapine (REMERON) 15 MG tablet Take 15 mg by mouth At Bedtime    nitroGLYcerin (NITROSTAT) 0.4 MG sublingual tablet For chest pain place 1 tablet under the tongue every 5 minutes for 3 doses. If symptoms persist 5 minutes after 1st dose call 911.    Omega-3 Fatty Acids (FISH OIL BURP-LESS) 1000 MG CAPS Take 1 capsule by mouth daily    psyllium (METAMUCIL/KONSYL) Packet Take 1 packet by mouth as needed    rosuvastatin (CRESTOR) 20 MG tablet Take 1 tablet (20 mg) by mouth daily    sulfamethoxazole-trimethoprim (BACTRIM DS) 800-160 MG tablet Take 1 tablet by mouth three times a week Take on Mondays, Wednesday, fridaysof week while taking Prednsione more than 20mg daily for PCP prophylaxis    Turmeric 500 MG CAPS Take 2 capsules by mouth daily    vitamin B complex with vitamin C (VITAMIN  B COMPLEX) tablet Take 1 tablet by mouth daily    zinc gluconate 50 MG tablet Take 50 mg by mouth daily     No current facility-administered medications for this encounter.     Facility-Administered Medications Ordered in Other Encounters   Medication    fentaNYL (PF) (SUBLIMAZE) injection    lidocaine (viscous) (XYLOCAINE) 2 % solution    lidocaine 1 % injection    lidocaine 4 % injection    midazolam (VERSED) injection    sodium chloride 0.9% infusion             Review of Systems:     CONSTITUTIONAL: NEGATIVE for fever, chills, change in weight  ENT/MOUTH: NEGATIVE for ear, mouth and throat problems  RESP: NEGATIVE for significant  "cough or SOB  CV: NEGATIVE for chest pain, palpitations or peripheral edema           Vital Signs:   BP (!) 154/91   Pulse 73   Temp 98.7  F (37.1  C) (Oral)   Resp 16   Ht 1.778 m (5' 10\")   Wt 98.8 kg (217 lb 13 oz)   BMI 31.25 kg/m              Data:     CBC RESULTS:   Recent Labs   Lab Test 12/22/23  0754   WBC 3.5*   RBC 4.35*   HGB 13.3   HCT 40.1   MCV 92   MCH 30.6   MCHC 33.2   RDW 13.2        ABO/RH(D)   Date Value Ref Range Status   12/21/2023 O POS  Final     Antibody Screen   Date Value Ref Range Status   12/21/2023 Negative Negative Final       Vicente Scruggs MD, Pathology resident  T:[phone]    "

## 2023-12-22 NOTE — PROGRESS NOTES
"Pharmacy Assessment - Pre-Stem Cell Transplant    Assessments & Recommendations:  1)  PJP pneumonia history.  Recommend to hold Bactrim ppx prior to collections and consider a dose of inhaled pentamidine to prophylax for PJP pneumonia until Bactrim can be restarted.  Will message Dr Frey with this recommendation.   2) Hold ASA and fish oil when plts<50k.   3) Recommend to hold turmeric khushboo-melphalan to avoid potential of drug interactions and added GI toxicity.   4) Pt has dexamethsone 4mg tablets at home.  Doesn't need Rx filled for Day 0, 1.    If this patient is admitted under observation, the patient may bring in their own supply of the following medication for use in the hospital:  1) none  -Per \"Medications Not Supplied by Pharmacy\" policy (available on [email or web address])    History of Present Illness:  Billy Carroll is a 68 year old year old male diagnosed with multiple myeloma.  he has been treated with RVd.  he is now being work up for autologous Stem Cell Transplant on protocol 2016-35, which utilizes melphalan as a conditioning regimen.    Pertinent labs/tests:  Viral Testing:  HSV(-) / EBV(+)  Ejection Fraction: >65% (11/30/23)  QTc: 470msec (12/21/23)    Weights:   Wt Readings from Last 3 Encounters:   12/22/23 99.9 kg (220 lb 4.8 oz)   12/22/23 98.8 kg (217 lb 13 oz)   12/21/23 98.8 kg (217 lb 12.8 oz)   Ideal body weight: 73 kg (160 lb 15 oz)  Adjusted ideal body weight: 83.8 kg (184 lb 10.9 oz)  % IBW:  135%  There is no height or weight on file to calculate BMI.    Primary BMT Physician: Dr Frey  BMT RN Coordinator:  Bozena Ritter    Past Medical History:  Past Medical History:   Diagnosis Date    Benign essential hypertension     Hyperlipidemia     Multiple myeloma (H)     Followed by Dr. Ruelas with [facility]       Medication Allergies:  Allergies   Allergen Reactions    Acetaminophen-Codeine Nausea    Codeine Nausea       Current Medications (pre-admit):  Current Outpatient Medications "   Medication Sig Dispense Refill    acetaminophen (ACETAMINOPHEN 8 HOUR) 650 MG CR tablet Take 1,300 mg by mouth every 8 hours as needed for mild pain or fever      acyclovir (ZOVIRAX) 400 MG tablet Take 400 mg by mouth 2 times daily      alendronate (FOSAMAX) 70 MG tablet Take 1 tablet (70 mg) by mouth every 7 days (Patient taking differently: Take 70 mg by mouth every 7 days Takes on Mondays) 12 tablet 1    aspirin 81 MG EC tablet Take 81 mg by mouth daily      Calcium-Magnesium-Zinc 333-133-5 MG TABS per tablet Take 2 tablets by mouth daily      cholecalciferol 50 MCG (2000 UT) tablet Take 2,000 Units by mouth daily      co-enzyme Q-10 100 MG CAPS capsule Take 100 mg by mouth daily      fluticasone (FLONASE) 50 MCG/ACT nasal spray Spray 1 spray into both nostrils daily as needed for rhinitis or allergies      loratadine (CLARITIN) 10 MG tablet Take 10 mg by mouth daily as needed for allergies      losartan (COZAAR) 50 MG tablet Take 50 mg by mouth daily      metoprolol succinate ER (TOPROL XL) 25 MG 24 hr tablet Take 1 tablet (25 mg) by mouth daily 90 tablet 3    mirtazapine (REMERON) 15 MG tablet Take 15 mg by mouth At Bedtime      nitroGLYcerin (NITROSTAT) 0.4 MG sublingual tablet For chest pain place 1 tablet under the tongue every 5 minutes for 3 doses. If symptoms persist 5 minutes after 1st dose call 911. 25 tablet 2    Omega-3 Fatty Acids (FISH OIL BURP-LESS) 1000 MG CAPS Take 1 capsule by mouth daily      psyllium (METAMUCIL/KONSYL) Packet Take 1 packet by mouth as needed      rosuvastatin (CRESTOR) 20 MG tablet Take 1 tablet (20 mg) by mouth daily 90 tablet 1    sulfamethoxazole-trimethoprim (BACTRIM DS) 800-160 MG tablet Take 1 tablet by mouth three times a week Take on Mondays, Wednesday, fridaysof week while taking Prednsione more than 20mg daily for PCP prophylaxis 30 tablet 0    Turmeric 500 MG CAPS Take 2 capsules by mouth daily      vitamin B complex with vitamin C (VITAMIN  B COMPLEX) tablet Take  1 tablet by mouth daily      zinc gluconate 50 MG tablet Take 50 mg by mouth daily         Herbal Medication/Nutritional Supplements: Ca++/Mg++/Zn product, vitD3, Coenzyme Q10, fish oil, turmeric, Zn    Smoking/Past Drug Use: Didn't discuss.     Nausea/Vomiting, Pain, or other issues: No issues noted.     Summary:  I met with Billy Carroll for approximately 35 minutes.  We discussed allergies, home medications, filgrastim priming, melphalan, anti-emetics, mucositis management, anti-infective prophy (acv, fluc, levo, Bactrim/pentamidine), allopurinol, vaccines, med box/pharmacy expectations.    Andres May, PharmD

## 2023-12-22 NOTE — NURSING NOTE
Chief Complaint   Patient presents with    Blood Draw     Labs drawn with piv start by rn.  VS taken.     Labs drawn with PIV start by rn.  Pt tolerated well.  VS taken and pt checked in for next appt.    Layla Coles RN

## 2023-12-22 NOTE — NURSING NOTE
BMBX Teaching and Assessment       Teaching concerns addressed: Bone marrow biopsy and infection prevention.     Person(s) involved in teaching: Patient  Motivation Level  Asks Questions: Yes  Eager to Learn: Yes  Cooperative: Yes  Receptive (willing/able to accept information): Yes    Patient demonstrates understanding of the following:     Reason for the appointment, diagnosis and treatment plan: Yes  Knowledge of proper use of medications and conditions for which they are ordered (with special attention to potential side effects or drug interactions): Yes  Which situations necessitate calling provider and whom to contact: Yes    Teaching concerns addressed:   Reviewed activity restrictions if received premeds, potential for bleeding and actions to take if develops any of the issues below    Pain management techniques: Yes  Patient instructed on hand hygiene: Yes  How and/when to access community resources: Yes    Infection Control:  Patient demonstrates understanding of the following:   Bone marrow procedure site care taught: Yes  Signs and symptoms of infection taught: Yes       Instructional Materials Used/Given: Pt instructed to keep bmbx site clean and dry for 24hrs. Pt educated to monitor site for signs of infection such as redness, rash, oozing, puss, bleeding, pain, and elevated temp. Pt instructed to go to call the [facility] triage line or go to the ER if any signs of infection should occur. Pt educated to not operate machinery if receiving versed. Pt verbalize understanding.     Pre-procedure labs drawn via PIV start in lab. VSS. Meds and allergies reviewed.     Provider order received to administer Versed 1.5 mg IVP as premed for BMBX. Procedural consent discussed and pt's signature obtained.  Allergies reviewed.  PT currently alert and oriented to plan of care.  Pt lying prone in stretcher.  Call light w/in reach.  Provider and  at bedside.    Post procedure: Patient vital signs stable, ambulating,  site is clean, dry and intact prior to discharge and line removed. Pt discharged with wife as .      Xin Dejesus RN

## 2023-12-22 NOTE — NURSING NOTE
"Oncology Rooming Note    December 22, 2023 11:12 AM   Billy Carroll is a 68 year old male who presents for:    Chief Complaint   Patient presents with    Blood Draw     Labs drawn with piv start by rn.  VS taken.    Bone Marrow Biopsy     Bmbx; hx MM      Initial Vitals: BP (!) 177/89 (BP Location: Right arm, Patient Position: Sitting, Cuff Size: Adult Regular)   Pulse 74   Temp 98.5  F (36.9  C) (Oral)   Resp 16   Wt 99.9 kg (220 lb 4.8 oz)   SpO2 98%   BMI 31.61 kg/m   Estimated body mass index is 31.61 kg/m  as calculated from the following:    Height as of 12/21/23: 1.778 m (5' 10\").    Weight as of this encounter: 99.9 kg (220 lb 4.8 oz). Body surface area is 2.22 meters squared.  Mild Pain (2) Comment: Data Unavailable   No LMP for male patient.  Allergies reviewed: Yes  Medications reviewed: Yes    Medications: Medication refills not needed today.  Pharmacy name entered into [facility]:    [id number] [place], [place] - [address] [facility] [place], [place] - [address] [facility]    Frailty Screening:   Is the patient here for a new oncology consult visit in cancer care? 2. No      Clinical concerns: None.     Xin Dejesus RN              "

## 2023-12-22 NOTE — LETTER
"    12/22/2023         RE: Billy Carroll  [address] [place] [address]        Dear Colleague,    Thank you for referring your patient, Billy Carroll, to the [facility] [place]. Please see a copy of my visit note below.    Pharmacy Assessment - Pre-Stem Cell Transplant    Assessments & Recommendations:  1)  PJP pneumonia history.  Recommend to hold Bactrim ppx prior to collections and consider a dose of inhaled pentamidine to prophylax for PJP pneumonia until Bactrim can be restarted.  Will message Dr Frey with this recommendation.   2) Hold ASA and fish oil when plts<50k.   3) Recommend to hold turmeric khushboo-melphalan to avoid potential of drug interactions and added GI toxicity.   4) Pt has dexamethsone 4mg tablets at home.  Doesn't need Rx filled for Day 0, 1.    If this patient is admitted under observation, the patient may bring in their own supply of the following medication for use in the hospital:  1) none  -Per \"Medications Not Supplied by Pharmacy\" policy (available on [employer])    History of Present Illness:  Billy Carroll is a 68 year old year old male diagnosed with multiple myeloma.  he has been treated with RVd.  he is now being work up for autologous Stem Cell Transplant on protocol 2016-35, which utilizes melphalan as a conditioning regimen.    Pertinent labs/tests:  Viral Testing:  HSV(-) / EBV(+)  Ejection Fraction: >65% (11/30/23)  QTc: 470msec (12/21/23)    Weights:   Wt Readings from Last 3 Encounters:   12/22/23 99.9 kg (220 lb 4.8 oz)   12/22/23 98.8 kg (217 lb 13 oz)   12/21/23 98.8 kg (217 lb 12.8 oz)   Ideal body weight: 73 kg (160 lb 15 oz)  Adjusted ideal body weight: 83.8 kg (184 lb 10.9 oz)  % IBW:  135%  There is no height or weight on file to calculate BMI.    Primary BMT Physician: Dr Frey  BMT RN Coordinator:  Bozena Ritter    Past Medical History:  Past Medical History:   Diagnosis Date    Benign essential " hypertension     Hyperlipidemia     Multiple myeloma (H)     Followed by Dr. Ruelas with [facility]       Medication Allergies:  Allergies   Allergen Reactions    Acetaminophen-Codeine Nausea    Codeine Nausea       Current Medications (pre-admit):  Current Outpatient Medications   Medication Sig Dispense Refill    acetaminophen (ACETAMINOPHEN 8 HOUR) 650 MG CR tablet Take 1,300 mg by mouth every 8 hours as needed for mild pain or fever      acyclovir (ZOVIRAX) 400 MG tablet Take 400 mg by mouth 2 times daily      alendronate (FOSAMAX) 70 MG tablet Take 1 tablet (70 mg) by mouth every 7 days (Patient taking differently: Take 70 mg by mouth every 7 days Takes on Mondays) 12 tablet 1    aspirin 81 MG EC tablet Take 81 mg by mouth daily      Calcium-Magnesium-Zinc 333-133-5 MG TABS per tablet Take 2 tablets by mouth daily      cholecalciferol 50 MCG (2000 UT) tablet Take 2,000 Units by mouth daily      co-enzyme Q-10 100 MG CAPS capsule Take 100 mg by mouth daily      fluticasone (FLONASE) 50 MCG/ACT nasal spray Spray 1 spray into both nostrils daily as needed for rhinitis or allergies      loratadine (CLARITIN) 10 MG tablet Take 10 mg by mouth daily as needed for allergies      losartan (COZAAR) 50 MG tablet Take 50 mg by mouth daily      metoprolol succinate ER (TOPROL XL) 25 MG 24 hr tablet Take 1 tablet (25 mg) by mouth daily 90 tablet 3    mirtazapine (REMERON) 15 MG tablet Take 15 mg by mouth At Bedtime      nitroGLYcerin (NITROSTAT) 0.4 MG sublingual tablet For chest pain place 1 tablet under the tongue every 5 minutes for 3 doses. If symptoms persist 5 minutes after 1st dose call 911. 25 tablet 2    Omega-3 Fatty Acids (FISH OIL BURP-LESS) 1000 MG CAPS Take 1 capsule by mouth daily      psyllium (METAMUCIL/KONSYL) Packet Take 1 packet by mouth as needed      rosuvastatin (CRESTOR) 20 MG tablet Take 1 tablet (20 mg) by mouth daily 90 tablet 1    sulfamethoxazole-trimethoprim (BACTRIM DS) 800-160 MG tablet Take  1 tablet by mouth three times a week Take on Mondays, Wednesday, fridaysof week while taking Prednsione more than 20mg daily for PCP prophylaxis 30 tablet 0    Turmeric 500 MG CAPS Take 2 capsules by mouth daily      vitamin B complex with vitamin C (VITAMIN  B COMPLEX) tablet Take 1 tablet by mouth daily      zinc gluconate 50 MG tablet Take 50 mg by mouth daily         Herbal Medication/Nutritional Supplements: Ca++/Mg++/Zn product, vitD3, Coenzyme Q10, fish oil, turmeric, Zn    Smoking/Past Drug Use: Didn't discuss.     Nausea/Vomiting, Pain, or other issues: No issues noted.     Summary:  I met with Billy Carroll for approximately 35 minutes.  We discussed allergies, home medications, filgrastim priming, melphalan, anti-emetics, mucositis management, anti-infective prophy (acv, fluc, levo, Bactrim/pentamidine), allopurinol, vaccines, med box/pharmacy expectations.    Andres May, MarianoD

## 2023-12-22 NOTE — CONSULTS
"APHERESIS INITIAL CONSULT CHECKLIST    Current Encounter Information  Current Encounter Information: Reason for Visit, Allergies and Current Meds  Procedure Requested: MNC/PBSC Collection  History of: (Reason for Apheresis): MULTIPLE MYELOMA    Access Assessment  Access Assessment  Vein Assessment:  Veins are adequate: No  Needs a catheter placed for Apheresis?: Yes, transfusion medicine physician informed.    Vital Signs  Vital Signs  BP: (!) 154/91  Pulse: 73  Temp: 98.7  F (37.1  C)  Temp src: Oral  Resp: 16  Height: 177.8 cm (5' 10\")  Weight: 98.8 kg (217 lb 13 oz)    Reviewed   Review With Patient  Have you read the brochure Getting ready for Apheresis?: Yes  Have you had any invasive procedures, surgery, biopsy, bleeding in the last month?: No  Review medications and allergies: Yes  Have you ever been transfused?: Yes (red cells in June '23)  Do you require pre-medication for blood products?: No  Patient given tour of the unit:  (done in consult rm)    Additional Information  Notes, needs and time spent with patient  Explain procedure, side effects or reactions, instructions: Yes  Patient has special need?: No  Time spent: 30 mins face to face time spent with pt ( accompanied by his daughter). Procedure, preparation and expectation discussed with pt and daughter. Pt understands the need to keep himself hydrated and plan a low fat meal diet prior to and during the days of collection. Pt and daughter was seen by Dr. Roy for consent.        "

## 2023-12-22 NOTE — LETTER
12/22/2023         RE: Billy Carroll  [address] [place] [address]        Dear Colleague,    Thank you for referring your patient, Billy Carroll, to the [facility] BLOOD AND MARROW TRANSPLANT [facility] [place]. Please see a copy of my visit note below.    BMT ONC Adult Bone Marrow Biopsy Procedure Note  December 22, 2023  BP (!) 158/84 (BP Location: Right arm)   Pulse 61   Temp 98.5  F (36.9  C) (Oral)   Resp 18   Wt 99.9 kg (220 lb 4.8 oz)   SpO2 99%   BMI 31.61 kg/m       Learning needs assessment complete within 12 months? YES    DIAGNOSIS: multiple myeloma     PROCEDURE: Unilateral Bone Marrow Biopsy and Unilateral Aspirate    LOCATION: [facility] [place] [facility]nd [facility]    Patient s identification was positively verified by verbal identification and invasive procedure safety checklist was completed. Informed consent was obtained. Following the administration of Midazolam 1.5mg IV as pre-medication, patient was placed in the prone position and prepped and draped in a sterile manner. Approximately 10 cc of 1% Lidocaine was used over the left posterior iliac spine. Following this a 3 mm incision was made. Trephine bone marrow core(s) was (were) obtained from the IC. Bone marrow aspirates were obtained from the [facility]. Aspirates were sent for morphology, immunophenotyping, and cytogenetics. A total of approximately 15 ml of marrow was aspirated. Following this procedure a sterile dressing was applied to the bone marrow biopsy site(s). The patient was placed in the supine position to maintain pressure on the biopsy site. Post-procedure wound care instructions were given.     Complications: NO    Pre-procedural pain: 0 out of 10 on the numeric pain rating scale.     Procedural pain: 7 out of 10 on the numeric pain rating scale.     Post-procedural pain assessment: 0 out of 10 on the numeric pain rating scale.     Interventions: NO    Length of procedure:20 minutes or less      Procedure performed by: Denise  RAMIRO Goldman PA-C

## 2023-12-22 NOTE — PROGRESS NOTES
BMT ONC Adult Bone Marrow Biopsy Procedure Note  December 22, 2023  BP (!) 158/84 (BP Location: Right arm)   Pulse 61   Temp 98.5  F (36.9  C) (Oral)   Resp 18   Wt 99.9 kg (220 lb 4.8 oz)   SpO2 99%   BMI 31.61 kg/m       Learning needs assessment complete within 12 months? YES    DIAGNOSIS: multiple myeloma     PROCEDURE: Unilateral Bone Marrow Biopsy and Unilateral Aspirate    [place] [facility] [place] [facility]    Patient s identification was positively verified by verbal identification and invasive procedure safety checklist was completed. Informed consent was obtained. Following the administration of Midazolam 1.5mg IV as pre-medication, patient was placed in the prone position and prepped and draped in a sterile manner. Approximately 10 cc of 1% Lidocaine was used over the left posterior iliac spine. Following this a 3 mm incision was made. Trephine bone marrow core(s) was (were) obtained from the [facility]. Bone marrow aspirates were obtained from the [facility]. Aspirates were sent for morphology, immunophenotyping, and cytogenetics. A total of approximately 15 ml of marrow was aspirated. Following this procedure a sterile dressing was applied to the bone marrow biopsy site(s). The patient was placed in the supine position to maintain pressure on the biopsy site. Post-procedure wound care instructions were given.     Complications: NO    Pre-procedural pain: 0 out of 10 on the numeric pain rating scale.     Procedural pain: 7 out of 10 on the numeric pain rating scale.     Post-procedural pain assessment: 0 out of 10 on the numeric pain rating scale.     Interventions: NO    Length of procedure:20 minutes or less      Procedure performed by: Denise Goldman PA-C

## 2023-12-23 LAB — IGD SER-MCNC: <1.3 MG/DL

## 2023-12-26 ENCOUNTER — LAB (OUTPATIENT)
Dept: LAB | Facility: CLINIC | Age: 68
End: 2023-12-26
Payer: MEDICARE

## 2023-12-26 ENCOUNTER — HOSPITAL ENCOUNTER (OUTPATIENT)
Dept: CARDIOLOGY | Facility: CLINIC | Age: 68
Discharge: HOME | End: 2023-12-26
Payer: MEDICARE

## 2023-12-26 ENCOUNTER — HOSPITAL ENCOUNTER (OUTPATIENT)
Dept: PET IMAGING | Facility: CLINIC | Age: 68
Discharge: HOME | End: 2023-12-26
Payer: MEDICARE

## 2023-12-26 ENCOUNTER — HOSPITAL ENCOUNTER (OUTPATIENT)
Dept: GENERAL RADIOLOGY | Facility: CLINIC | Age: 68
Discharge: HOME | End: 2023-12-26
Payer: MEDICARE

## 2023-12-26 ENCOUNTER — OFFICE VISIT (OUTPATIENT)
Dept: LAB | Facility: CLINIC | Age: 68
End: 2023-12-26
Payer: MEDICARE

## 2023-12-26 DIAGNOSIS — M48.9 MASS OF THORACIC VERTEBRA: Primary | ICD-10-CM

## 2023-12-26 DIAGNOSIS — Z86.2 PERSONAL HISTORY OF DISEASES OF BLOOD AND BLOOD-FORMING ORGANS: ICD-10-CM

## 2023-12-26 DIAGNOSIS — C90.01 MULTIPLE MYELOMA IN REMISSION (H): ICD-10-CM

## 2023-12-26 DIAGNOSIS — Z01.818 EXAMINATION PRIOR TO CHEMOTHERAPY: ICD-10-CM

## 2023-12-26 DIAGNOSIS — C90.00 PLASMA CELL MYELOMA (H): Primary | ICD-10-CM

## 2023-12-26 LAB
ALBUMIN MFR UR ELPH: 10.1 MG/DL
COLLECT DURATION TIME UR: 22 H
COLLECT DURATION TIME UR: 22 H
CREAT 24H UR-MRATE: 1.84 G/SPEC (ref 0.98–2.2)
CREAT CL 24H UR+SERPL-VRATE: 160 ML/MIN (ref 66–143)
CREAT CL/1.73 SQ M 24H UR+SERPL-ARVRAT: 115 ML/MIN/1.7M2 (ref 110–180)
CREAT SERPL-MCNC: 0.8 MG/DL (ref 0.67–1.17)
CREAT SERPL-MCNC: 0.8 MG/DL (ref 0.67–1.17)
CREAT UR-MCNC: 151 MG/DL
DLCOCOR-%PRED-PRE: 92 %
DLCOCOR-PRE: 23.95 ML/MIN/MMHG
DLCOUNC-%PRED-PRE: 89 %
DLCOUNC-PRE: 23.1 ML/MIN/MMHG
DLCOUNC-PRED: 25.84 ML/MIN/MMHG
DONOR CYTOMEGALOVIRUS ABY: NEGATIVE
DONOR HEP B CORE ABY: NORMAL
DONOR HEP B SURF AGN: NORMAL
DONOR HEPATITIS C ABY: NORMAL
DONOR HTLV 1&2 ANTIBODY: NORMAL
DONOR TREPONEMA PAL ABY: NORMAL
EGFRCR SERPLBLD CKD-EPI 2021: >90 ML/MIN/1.73M2
ERV-%PRED-PRE: 23 %
ERV-PRE: 0.34 L
ERV-PRED: 1.46 L
EXPTIME-PRE: 7.2 SEC
FEF2575-%PRED-PRE: 71 %
FEF2575-PRE: 1.66 L/SEC
FEF2575-PRED: 2.31 L/SEC
FEFMAX-%PRED-PRE: 115 %
FEFMAX-PRE: 9.64 L/SEC
FEFMAX-PRED: 8.33 L/SEC
FEV1-%PRED-PRE: 84 %
FEV1-PRE: 2.51 L
FEV1FEV6-PRE: 73 %
FEV1FEV6-PRED: 78 %
FEV1FVC-PRE: 72 %
FEV1FVC-PRED: 77 %
FEV1SVC-PRE: 71 %
FEV1SVC-PRED: 71 %
FIFMAX-PRE: 5.26 L/SEC
FRCPLETH-%PRED-PRE: 69 %
FRCPLETH-PRE: 2.56 L
FRCPLETH-PRED: 3.7 L
FVC-%PRED-PRE: 89 %
FVC-PRE: 3.48 L
FVC-PRED: 3.9 L
HBV DNA SERPL QL NAA+PROBE: NORMAL
HCV RNA SERPL QL NAA+PROBE: NORMAL
HIV1+2 AB SERPL QL IA: NORMAL
HIV1+2 RNA SERPL QL NAA+PROBE: NORMAL
IC-%PRED-PRE: 109 %
IC-PRE: 3.19 L
IC-PRED: 2.91 L
LVEF ECHO: NORMAL
PATH REPORT.COMMENTS IMP SPEC: ABNORMAL
PATH REPORT.COMMENTS IMP SPEC: YES
PATH REPORT.FINAL DX SPEC: ABNORMAL
PATH REPORT.MICROSCOPIC SPEC OTHER STN: ABNORMAL
PATH REPORT.RELEVANT HX SPEC: ABNORMAL
PROT 24H UR-MRATE: 122.96 MG/SPECIMEN
RVPLETH-%PRED-PRE: 87 %
RVPLETH-PRE: 2.22 L
RVPLETH-PRED: 2.53 L
SPECIMEN VOL UR: 1116 ML
SPECIMEN VOL UR: 1116 ML
TLCPLETH-%PRED-PRE: 80 %
TLCPLETH-PRE: 5.74 L
TLCPLETH-PRED: 7.13 L
TRYPANOSOMA CRUZI: NORMAL
VA-%PRED-PRE: 80 %
VA-PRE: 5.15 L
VC-%PRED-PRE: 84 %
VC-PRE: 3.52 L
VC-PRED: 4.19 L
WNV RNA SERPL DONR QL NAA+PROBE: NORMAL

## 2023-12-26 PROCEDURE — 84166 PROTEIN E-PHORESIS/URINE/CSF: CPT | Mod: 26 | Performed by: PATHOLOGY

## 2023-12-26 PROCEDURE — G1010 CDSM STANSON: HCPCS | Mod: PI

## 2023-12-26 PROCEDURE — 71046 X-RAY EXAM CHEST 2 VIEWS: CPT | Mod: 26 | Performed by: RADIOLOGY

## 2023-12-26 PROCEDURE — 93356 MYOCRD STRAIN IMG SPCKL TRCK: CPT | Performed by: INTERNAL MEDICINE

## 2023-12-26 PROCEDURE — 36415 COLL VENOUS BLD VENIPUNCTURE: CPT

## 2023-12-26 PROCEDURE — 93306 TTE W/DOPPLER COMPLETE: CPT | Mod: 26 | Performed by: INTERNAL MEDICINE

## 2023-12-26 PROCEDURE — G1010 CDSM STANSON: HCPCS | Performed by: RADIOLOGY

## 2023-12-26 PROCEDURE — 82565 ASSAY OF CREATININE: CPT | Mod: XU

## 2023-12-26 PROCEDURE — 81050 URINALYSIS VOLUME MEASURE: CPT

## 2023-12-26 PROCEDURE — 84156 ASSAY OF PROTEIN URINE: CPT

## 2023-12-26 PROCEDURE — 343N000001 HC RX 343

## 2023-12-26 PROCEDURE — 78816 PET IMAGE W/CT FULL BODY: CPT | Mod: 26 | Performed by: RADIOLOGY

## 2023-12-26 PROCEDURE — 93356 MYOCRD STRAIN IMG SPCKL TRCK: CPT

## 2023-12-26 PROCEDURE — 84166 PROTEIN E-PHORESIS/URINE/CSF: CPT | Performed by: PATHOLOGY

## 2023-12-26 PROCEDURE — 81050 URINALYSIS VOLUME MEASURE: CPT | Performed by: PATHOLOGY

## 2023-12-26 PROCEDURE — 82575 CREATININE CLEARANCE TEST: CPT

## 2023-12-26 PROCEDURE — A9552 F18 FDG: HCPCS

## 2023-12-26 PROCEDURE — 88321 CONSLTJ&REPRT SLD PREP ELSWR: CPT | Performed by: STUDENT IN AN ORGANIZED HEALTH CARE EDUCATION/TRAINING PROGRAM

## 2023-12-26 PROCEDURE — 71046 X-RAY EXAM CHEST 2 VIEWS: CPT

## 2023-12-26 RX ADMIN — FLUDEOXYGLUCOSE F-18 12.86 MILLICURIE: 500 INJECTION, SOLUTION INTRAVENOUS at 07:53

## 2023-12-26 NOTE — LETTER
12/26/2023         RE: Billy Carroll  [address] [place] [address]        Dear Colleague,    Thank you for referring your patient, Billy Carroll, to the [facility]. Please see a copy of my visit note below.    No notes on file    Again, thank you for allowing me to participate in the care of your patient.        Sincerely,        [facility] Draw

## 2023-12-26 NOTE — NURSING NOTE
Chief Complaint   Patient presents with    Labs Only     Labs collected from venipuncture by RN.     Labs collected from venipuncture by RN. Vitals taken. Checked in for appointment(s).     Mellisa Brennan RN

## 2023-12-27 ENCOUNTER — HOSPITAL ENCOUNTER (OUTPATIENT)
Dept: CARDIAC REHAB | Facility: CLINIC | Age: 68
Discharge: HOME | End: 2023-12-27
Attending: INTERNAL MEDICINE
Payer: MEDICARE

## 2023-12-27 DIAGNOSIS — C90.01 MULTIPLE MYELOMA IN REMISSION (H): Primary | ICD-10-CM

## 2023-12-27 LAB
LOCATION OF TASK: NORMAL
PATH REPORT.COMMENTS IMP SPEC: ABNORMAL
PATH REPORT.COMMENTS IMP SPEC: ABNORMAL
PATH REPORT.COMMENTS IMP SPEC: YES
PATH REPORT.FINAL DX SPEC: ABNORMAL
PATH REPORT.GROSS SPEC: ABNORMAL
PATH REPORT.MICROSCOPIC SPEC OTHER STN: ABNORMAL
PATH REPORT.MICROSCOPIC SPEC OTHER STN: ABNORMAL
PATH REPORT.RELEVANT HX SPEC: ABNORMAL
PROT PATTERN UR ELPH-IMP: NORMAL

## 2023-12-27 PROCEDURE — 93798 PHYS/QHP OP CAR RHAB W/ECG: CPT

## 2023-12-27 PROCEDURE — 93797 PHYS/QHP OP CAR RHAB WO ECG: CPT

## 2023-12-28 ENCOUNTER — OFFICE VISIT (OUTPATIENT)
Dept: TRANSPLANT | Facility: CLINIC | Age: 68
End: 2023-12-28
Payer: MEDICARE

## 2023-12-28 VITALS
OXYGEN SATURATION: 98 % | RESPIRATION RATE: 16 BRPM | DIASTOLIC BLOOD PRESSURE: 77 MMHG | TEMPERATURE: 98.4 F | SYSTOLIC BLOOD PRESSURE: 152 MMHG | WEIGHT: 221 LBS | HEART RATE: 89 BPM | BODY MASS INDEX: 31.71 KG/M2

## 2023-12-28 DIAGNOSIS — C90.01 MULTIPLE MYELOMA IN REMISSION (H): Primary | ICD-10-CM

## 2023-12-28 DIAGNOSIS — Z52.011 AUTOLOGOUS DONOR OF STEM CELLS: ICD-10-CM

## 2023-12-28 DIAGNOSIS — C90.00 MULTIPLE MYELOMA, REMISSION STATUS UNSPECIFIED (H): Primary | ICD-10-CM

## 2023-12-28 DIAGNOSIS — C90.00 MULTIPLE MYELOMA NOT HAVING ACHIEVED REMISSION (H): Primary | ICD-10-CM

## 2023-12-28 DIAGNOSIS — C90.00 MULTIPLE MYELOMA (H): Primary | ICD-10-CM

## 2023-12-28 DIAGNOSIS — I10 BENIGN ESSENTIAL HYPERTENSION: ICD-10-CM

## 2023-12-28 PROCEDURE — G0463 HOSPITAL OUTPT CLINIC VISIT: HCPCS

## 2023-12-28 PROCEDURE — 99417 PROLNG OP E/M EACH 15 MIN: CPT

## 2023-12-28 PROCEDURE — 94642 AEROSOL INHALATION TREATMENT: CPT | Performed by: INTERNAL MEDICINE

## 2023-12-28 PROCEDURE — 99215 OFFICE O/P EST HI 40 MIN: CPT | Mod: GC

## 2023-12-28 PROCEDURE — 94640 AIRWAY INHALATION TREATMENT: CPT | Performed by: INTERNAL MEDICINE

## 2023-12-28 RX ORDER — ALBUTEROL SULFATE 0.83 MG/ML
2.5 SOLUTION RESPIRATORY (INHALATION)
Status: CANCELLED | OUTPATIENT
Start: 2024-01-06 | End: 2024-01-06

## 2023-12-28 RX ORDER — PENTAMIDINE ISETHIONATE 300 MG/300MG
300 INHALANT RESPIRATORY (INHALATION)
Status: CANCELLED | OUTPATIENT
Start: 2024-01-06 | End: 2024-01-06

## 2023-12-28 RX ORDER — HEPARIN SODIUM (PORCINE) LOCK FLUSH IV SOLN 100 UNIT/ML 100 UNIT/ML
5 SOLUTION INTRAVENOUS
Status: CANCELLED | OUTPATIENT
Start: 2024-01-06

## 2023-12-28 RX ORDER — HEPARIN SODIUM,PORCINE 10 UNIT/ML
5-20 VIAL (ML) INTRAVENOUS DAILY PRN
Status: CANCELLED | OUTPATIENT
Start: 2024-01-06

## 2023-12-28 RX ORDER — PENTAMIDINE ISETHIONATE 300 MG/300MG
300 INHALANT RESPIRATORY (INHALATION)
Status: COMPLETED | OUTPATIENT
Start: 2023-12-28 | End: 2023-12-28

## 2023-12-28 RX ORDER — PLERIXAFOR 24 MG/1.2ML
0.24 SOLUTION SUBCUTANEOUS DAILY PRN
Status: CANCELLED
Start: 2024-01-06

## 2023-12-28 RX ADMIN — PENTAMIDINE ISETHIONATE 300 MG: 300 INHALANT RESPIRATORY (INHALATION) at 09:21

## 2023-12-28 ASSESSMENT — PAIN SCALES - GENERAL: PAINLEVEL: NO PAIN (0)

## 2023-12-28 NOTE — LETTER
12/28/2023         RE: Billy Carroll  [address] [place] [address]        Dear Colleague,    Thank you for referring your patient, Billy Carroll, to the [facility] BLOOD AND MARROW TRANSPLANT PROGRAM [place]. Please see a copy of my visit note below.         BMT / Cell Therapy Consultation    Billy Carroll is a 68 year old male referred by Dr. Ruelas for evaluation of autologous hematopoietic cell transplantation for IgG-kappa MM.    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 Incidentally found to have elevated serum protein, monoclonal peak of 1.4 g/dL with immunofixation revealed monoclonal IgG kappa. BMBx showed 5% plasma cell neoplasm. FISH showed trisomy 5, 9, and 15 suggesting hyperdiploidy. No evidence of end-organ damage and was on surveillance at that time.    In Apr 2023, patient was found to have a diffuse lytic lesions with large (4.6 cm) soft-tissue mass at right T5 costovertebral junction with compression deformity and extension into spinal canal.    Date Treatment Name Response Side Effects / Toxicities   May 2023 IMRT to T3-T5 (2000 cGy) with boost to T5 mass (2500 cGy) NA None   May-Dec 2023 VRd x8 cycles CA PJP pneumonia (Jun 2023)        ASSESSMENT AND PLAN:  Billy Carroll is a 68 year old male with PMHx significant for CAD, severe aortic stenosis s/p TAVR (10/24/2023) currently ongoing cardiac rehabilitation, HTN, and HLD.    IgG kappa MM with extramedullary disease  Hematologic history as summarized above. Here for close work-up for autologous HCT. Pre-transplant disease status:  consistent with PR1   - SPEP 0.2 g.dL, IgG kappa M-protein. FLC ratio 2.63 (normal kappa LC level). Normal IgG, low IgA & IgM.   - UPEP negative   - BMBx 5-7% kappa-monotypic plasma cells. Chromosomal analysis and FISH are pending   - PET/CT (12/26/2023) - Mild diffuse FDG uptake within T4 and T5 vertebral bodies  lytic-destructive lesion with associated soft tissue component, likely representing small volume viable myeloma versus postradiation inflammation.  Associated paraspinal soft tissue measures approximately 4.2 x 2.3 cm in maximum axial dimensions (4/124), previously 5.4 x 3.2 cm on 8/2/2023 CT. Similar-appearing T5 vertebral body pathologic fracture with extension into the right costovertebral joints and proximal right fifth rib. Multiple additional osteolytic lesions involving the skeleton most pronounced in the bony pelvis are non-FDG avid.    Infectious work-up negative. Normal PFTs. Patient had TAVR Oct 2023, appears to have adequate cardiac function. Has established care with cardiology. No cardiac symptoms, no angina, edema, or GILLETTE. Patient currently taking ASA which will need to be hold during transplant in anticipation of severe thrombocytopenia. Also need to optimize antihypertensive medications. Will discuss with cardiology.    Patient will received pentamidine treatment today. Discontinue Bactrim. Continue acyclovir prophylaxis.      BMT and Cell Therapy Informed Consent Discussion     In today's visit, we discussed in detail the research for which Billy Carroll is eligible. We discussed the potential risks and potential benefits of each protocol individually. We explained potential alternatives to the protocols discussed. We explained to the patient that participation is voluntary and that consent may be withdrawn at any time.     We discussed:  The rationale for our approach to the disease treatment  The eligibility requirements for treatment in the context of clinical trials  The need for caregiver support and the caregiver's role in recovery  The importance of adherence to the treatment plan and appropriate follow up  The requirements for contraception while undergoing treatment  The potential risks of morbidity and mortality related to this treatment  The requirements for supportive care to reduce the  risk of infection and other complications  The role of the dietician and PT/OT to reduce the risk of muscle loss/sarcopenia  Support that is available through our social workers and care team to mitigate distress  The desired outcomes/goals of treatment, including the possibility of long-term disease control    The patient completed the last round of treatment on 12/202023        HCT-CI score: 4.  We counseled the patient about the impact of this on the risk of treatment related and overall mortality. The score fit within treatment protocol eligibility criteria.    Karnofsky performance score: 100%       Active infections:  none.     Reproductive status: What methods of birth control does the patient plan to use during the treatment period beginning with conditioning and ending with the discontinuation of immune suppression (indicate with an X all that apply):  __ The patient is confirmed to be sterile or post-menopausal  __ Sexual abstinence  _x_ Condoms  __ Implants  __ Injectables  __ Oral contraceptives  __ Intrauterine devices (IUD)  __ Other (describe)    The patient received appropriate reproductive counseling and agreed with the need for effective contraception during the treatment procedures.    Dental health suitable to proceed: Yes    Transplants for multiple myeloma  The patient has Standard risk MM, and the collection goal is 8 million CD34/kg for 2 transplants..  The day for admission to begin melphalan conditioning is Day -1 for melphalan 200 mg/m2 (not frail, creatinine clearance 30+). .    After our detailed discussion above, the patient signed the following consents for treatment and protocols:  autoHSCT for MM protocol SOC     Known issues that I take into account for medical decisions, with salient changes to the plan considering these complexities noted above.    Patient Active Problem List   Diagnosis    Pneumonia of both lungs due to infectious organism, unspecified part of lung    Multiple  myeloma (H)    Mass of thoracic vertebra    Immunosuppressed due to chemotherapy     Lumbosacral radiculopathy at L5    Pneumonitis    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    Anxiety    Organic insomnia    Chest pain due to myocardial ischemia, unspecified ischemic chest pain type    Severe aortic stenosis --S/P TAVR on 10/24/23    Benign essential hypertension    Hyperlipidemia    Autologous donor of stem cells         I spent 90 minutes in the care of this patient today, which included time necessary for preparation for the visit, obtaining history, ordering medications/tests/procedures as medically indicated, review of pertinent medical literature, counseling of the patient, communication of recommendations to the care team, and documentation time.    Jerri Frey MD      BMT and Cell Therapy Informed Consent Discussion     In today's visit, we discussed in detail the research for which Billy Carroll is eligible. We discussed the potential risks and potential benefits of each protocol individually. We explained potential alternatives to the protocols discussed. We explained to the patient that participation is voluntary and that consent may be withdrawn at any time.     We discussed:  The rationale for our approach to the disease treatment  The eligibility requirements for treatment in the context of clinical trials  The need for caregiver support and the caregiver's role in recovery  The importance of adherence to the treatment plan and appropriate follow up  The requirements for contraception while undergoing treatment  The potential risks of morbidity and mortality related to this treatment  The requirements for supportive care to reduce the risk of infection and other complications  The role of the dietician and PT/OT to reduce the risk of muscle loss/sarcopenia  Support that is available through our social workers and care team to mitigate distress  The desired outcomes/goals of treatment,  including the possibility of long-term disease control    The patient completed the last round of treatment on 12/5/2023.    HCT-CI score: 4 (valvular heart disease, CAD). We counseled the patient about the impact of this on the risk of treatment related and overall mortality. The score fit within treatment protocol eligibility criteria.    Karnofsky performance score: 90      ECOG: (required for CAR-T): 0    Active infections: None.  Prior infections that require additional special prophylaxis considerations: History of PJP pneumonia (Jun 2023). I reviewed and discussed infectious disease evaluation with the patient and the management plan during treatment.    Reproductive status: What methods of birth control does the patient plan to use during the treatment period beginning with conditioning and ending with the discontinuation of immune suppression (indicate with an X all that apply):  __ The patient is confirmed to be sterile or post-menopausal   x  Sexual abstinence  __ Condoms  __ Implants  __ Injectables  __ Oral contraceptives  __ Intrauterine devices (IUD)  __ Other (describe)    The patient received appropriate reproductive counseling and agreed with the need for effective contraception during the treatment procedures.    Dental health suitable to proceed: Yes    Transplants for multiple myeloma:  The patient has Standard risk MM, and the collection goal is 8 million CD34/kg for 2 transplants. The day for admission to begin melphalan conditioning is Day -1 for melphalan 200 mg/m2 (not frail, creatinine clearance 30+). .    After our detailed discussion above, the patient signed the following consents for treatment and protocols:  [id number]     Known issues that I take into account for medical decisions, with salient changes to the plan considering these complexities noted above.    Patient Active Problem List   Diagnosis    Pneumonia of both lungs due to infectious organism, unspecified part of lung     Multiple myeloma (H)    Mass of thoracic vertebra    Immunosuppressed due to chemotherapy     Lumbosacral radiculopathy at L5    Pneumonitis    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    Anxiety    Organic insomnia    Chest pain due to myocardial ischemia, unspecified ischemic chest pain type    Severe aortic stenosis --S/P TAVR on 10/24/23    Benign essential hypertension    Hyperlipidemia    Autologous donor of stem cells     Patient was seen and plan of care was discussed with attending physician Dr. Frey.    Ang Nice MD  Hematology/Medical Oncology/BMT (PGY-5)  P: [phone]  ---------------------------------------------------------------------------------------------------------------------------------    History of Present Illness:     HPI:  Please see my entry above for hematologic history.    Billy Carroll is a 68 year old male with PMHx significant for CAD, severe aortic stenosis s/p TAVR (10/24/2023) currently ongoing cardiac rehabilitation, HTN, and HLD.    Patient reports feeling today. Denies worsening SOB/GILELTTE, PND/orthopnea, no exertional chest pain. Exercises regularly, walks about 3-4 miles a day. Reports that his blood pressure were slightly high recently, around 150s/70s. No new or uncontrolled pain apart from his chronic back, shoulder, and hip pain. Has mild residual neuropathy related to treatment.     ROS: Comprehensive ROS were negative other than the symptoms noted above in the HPI.    Past Medical History:   Diagnosis Date    Benign essential hypertension     Hyperlipidemia     Multiple myeloma (H)     [facility]       Past Surgical History:   Procedure Laterality Date    CV CORONARY ANGIOGRAM N/A 9/7/2023    Procedure: Coronary Angiogram;  Surgeon: Domenico Sauceda MD;  Location: [facility] CATH LAB CV    CV TRANSCATHETER AORTIC VALVE REPLACEMENT-FEMORAL APPROACH N/A 10/24/2023    Procedure: Transcatheter Aortic Valve  Replacement-Femoral Approach;  Surgeon: Charles Field MD;  Location: [facility] CV    OR TRANSCATHETER AORTIC VALVE REPLACEMENT, FEMORAL PERCUTANEOUS APPROACH (STANDBY) N/A 10/24/2023    Procedure: OR TRANSCATHETER AORTIC VALVE REPLACEMENT, FEMORAL PERCUTANEOUS APPROACH (STANDBY);  Surgeon: Sahra Mosher MD;  Location: [facility] CV       Family history:  - Father with history of MM,  at age 89 from MM  - Mother with history of breast cancer,  at age 77  - Sister with history of breast cancer    Social History     Tobacco Use    Smoking status: Never     Passive exposure: Never    Smokeless tobacco: Never   Vaping Use    Vaping Use: Never used   Substance Use Topics    Drug use: Never       Medications and allergies reviewed    Current Outpatient Medications   Medication Sig Dispense Refill    acetaminophen (ACETAMINOPHEN 8 HOUR) 650 MG CR tablet Take 1,300 mg by mouth every 8 hours as needed for mild pain or fever      acyclovir (ZOVIRAX) 400 MG tablet Take 400 mg by mouth 2 times daily      alendronate (FOSAMAX) 70 MG tablet Take 1 tablet (70 mg) by mouth every 7 days (Patient taking differently: Take 70 mg by mouth every 7 days Takes on ) 12 tablet 1    aspirin 81 MG EC tablet Take 81 mg by mouth daily      Calcium-Magnesium-Zinc 333-133-5 MG TABS per tablet Take 2 tablets by mouth daily      cholecalciferol 50 MCG (2000 UT) tablet Take 2,000 Units by mouth daily      co-enzyme Q-10 100 MG CAPS capsule Take 100 mg by mouth daily      fluticasone (FLONASE) 50 MCG/ACT nasal spray Spray 1 spray into both nostrils daily as needed for rhinitis or allergies      loratadine (CLARITIN) 10 MG tablet Take 10 mg by mouth daily as needed for allergies      losartan (COZAAR) 50 MG tablet Take 50 mg by mouth daily      metoprolol succinate ER (TOPROL XL) 25 MG 24 hr tablet Take 1 tablet (25 mg) by mouth daily 90 tablet 3    mirtazapine (REMERON) 15 MG tablet Take 15 mg by mouth At Bedtime       nitroGLYcerin (NITROSTAT) 0.4 MG sublingual tablet For chest pain place 1 tablet under the tongue every 5 minutes for 3 doses. If symptoms persist 5 minutes after 1st dose call 911. 25 tablet 2    Omega-3 Fatty Acids (FISH OIL BURP-LESS) 1000 MG CAPS Take 1 capsule by mouth daily      psyllium (METAMUCIL/KONSYL) Packet Take 1 packet by mouth as needed      rosuvastatin (CRESTOR) 20 MG tablet Take 1 tablet (20 mg) by mouth daily 90 tablet 1    sulfamethoxazole-trimethoprim (BACTRIM DS) 800-160 MG tablet Take 1 tablet by mouth three times a week Take on Mondays, Wednesday, fridaysof week while taking Prednsione more than 20mg daily for PCP prophylaxis 30 tablet 0    Turmeric 500 MG CAPS Take 2 capsules by mouth daily      vitamin B complex with vitamin C (VITAMIN  B COMPLEX) tablet Take 1 tablet by mouth daily      zinc gluconate 50 MG tablet Take 50 mg by mouth daily         Allergies   Allergen Reactions    Acetaminophen-Codeine Nausea    Codeine Nausea       Physical Examination:     Vital Signs: BP (!) 152/77 (BP Location: Right arm, Patient Position: Sitting, Cuff Size: Adult Regular)   Pulse 89   Temp 98.4  F (36.9  C) (Oral)   Resp 16   Wt 100.2 kg (221 lb)   SpO2 98%   BMI 31.71 kg/m      KPS: 90  General Appearance: Alert, and no distress  Eyes: PERRL, conjunctiva and lids normal, sclera nonicteric  Neck: No adenopathy  Cardio/Vascular: Regular rate and rhythm, normal S1 and S2, no murmur  Resp Effort And Auscultation: Normal - Clear to auscultation without rales, rhonchi, or wheezing.  GI: Soft, nontender, bowel sounds present in all four quadrants, no hepatosplenomegaly  Lymphatics: No significant enlargement of lymph nodes  Musculoskeletal: No point of tenderness  Edema: None  Neurologic: Gait normal. Sensation grossly WNL.  Psych/Affect: Mood and affect are appropriate.      Blood Counts     Recent Labs   Lab Test 12/22/23  0754 12/21/23  1120 12/07/23  1454   HGB 13.3 13.4 12.3*   HCT 40.1 40.1  37.3*   WBC 3.5* 5.7 20.0*   ANEUTAUTO 2.1 4.0 17.7*   ALYMPAUTO 0.9 0.9 0.5*   AMONOAUTO 0.4 0.6 1.7*   AEOSAUTO 0.1 0.1 0.1   ABSBASO 0.1 0.1 0.0   NRBCMAN 0.0 0.0 0.0    290 207     ABO/RH  Recent Labs   Lab Test 12/21/23  1120   ABORH O POS       Chemistries     Basic Panel  Recent Labs   Lab Test 12/26/23  1130 12/26/23  1129 12/21/23  1120 12/07/23  1454 10/25/23  0425   NA  --   --  139 138 139   POTASSIUM  --   --  3.9 4.6 3.7   CHLORIDE  --   --  104 103 106   CO2  --   --  24 24 24   BUN  --   --  12.1 17.9 8.2   CR 0.80 0.80 0.81 0.75 0.65*   GLC  --   --  103* 149* 122*      Calcium, Magnesium, Phosphorus  Recent Labs   Lab Test 12/21/23  1120 12/07/23  1454 10/25/23  0425 10/24/23  1119   SESAR 9.1 8.6* 8.8 7.9*   MAG 2.3  --  2.0 2.0   PHOS 3.2  --   --   --       LFTs  Recent Labs   Lab Test 12/21/23  1120 10/24/23  1119 10/23/23  0941   BILITOTAL 0.5 0.5 0.6   ALKPHOS 98 86 110   AST 53* 39 49*   ALT 86* 77* 106*   ALBUMIN 4.5 3.9 4.5     LDH  Recent Labs   Lab Test 12/21/23  1120   *     B2-Microglobulin  Recent Labs   Lab Test 12/21/23  1120   [id number] 1.7       Immunoglobulins     Recent Labs   Lab Test 12/21/23  1120        Recent Labs   Lab Test 12/21/23  1120   IGA 25*     Recent Labs   Lab Test 12/21/23  1120   IGM 19*       Monocloncal Protein Studies     M spike  Recent Labs   Lab Test 12/21/23  1120 04/18/18  1543   ELPM 0.2* 1.4     Freeburn FLC  Recent Labs   Lab Test 12/21/23  1120   KFLCA 1.05     Lambda FLC  Recent Labs   Lab Test 12/21/23  1120   LFLCA 0.40*     FLC Ratio  Recent Labs   Lab Test 12/21/23  1120   KLRA 2.63*       Infectious Disease Markers     [facility] IDM  Recent Labs   Lab Test 12/21/23  1120   DCMIG Negative   DHBSAG Non-reactive   DHBCAB Non-reactive   DHIVAB Non-reactive   DHCVAB Non-reactive   DHTLVA Non-reactive   TCRUZI Non-reactive   DTRPAB Non-reactive     Hepatitis and HIV  No lab results found.    CMV  No lab results  found.    EBV  Recent Labs   Lab Test 12/21/23  1120   EBVCAG Positive*       Bone Marrow Biopsy     Morphology  Results for orders placed or performed in visit on 12/22/23 (from the past 8760 hour(s))   Bone marrow biopsy   Result Value    Final Diagnosis      Bone marrow, posterior iliac crest, left decalcified trephine biopsy and touch imprint; left aspirate clot, direct aspirate smear, and concentrated aspirate smear; and peripheral blood smear:    Recurrent/persistent plasma cell neoplasm with the following features:  - Normocellular marrow (cellularity estimated at 30-40% in a small, suboptimal biopsy) with trilineage hematopoiesis, no overt dyplasia, no increase in blasts, and about 5-7% kappa-monotypic plasma cells  - Peripheral blood showing slight normochromic normocytic anemia; slight leukopenia with normal white blood cell differential  - See comment        Comment      Flow cytometry ([id number]) showed kappa-monotypic plasma cells.    Concurrent ancillary studies are in progress and will be reported separately. Correlation with the results of ancillary tests and clinical findings is recommended.      Clinical Information      Per Epic electronic medical records; 68 year old man with history of IgG kappa multiple myeloma      Peripheral Hematologic Data      CBC WITH DIFFERENTIAL (12/22/2023 08:08 AM CST):     RESULT VALUE REF. RANGE UNITS    WBC Count   Hemoglobin  Hematocrit  Platelet Count   RBC Count   MCV  MCH  MCHC  RDW  3.5  ( L )    13.3 (NORMAL)     40.1 (NORMAL)  264 (NORMAL)   4.35  ( L )       92 (NORMAL)     30.6 (NORMAL)     33.2 (NORMAL)     13.2 (NORMAL) 4.0-11.0  13.3-17.7  40.0-53.0  150-450  4.40-5.90    26.5-33.0  31.5-36.5  10.0-15.0 10e3/uL  g/dL  %  10e3/uL  10e6/uL  fL  pg  g/dL  %   % Neutrophils  % Lymphocytes  % Monocytes  % Eosinophils  % Basophils  % Immature Granulocytes  Absolute Neutrophils  Absolute Lymphocytes  Absolute Monocytes  Absolute Eosinophils  Absolute  Basophils  Absolute Immature Granulocytes  NRBCs per 100 WBC  Absolute NRBCs 61  25  10  2  2  0  2.1 (NORMAL)  0.9 (NORMAL)  0.4 (NORMAL)  0.1 (NORMAL)  0.1 (NORMAL)  0.0 (NORMAL)  0 (NORMAL)  0.0 () N/A  N/A  N/A  N/A  N/A  N/A  1.6-8.3  0.8-5.3  0.0-1.3  0.0-0.7  0.0-0.2  <=0.4  <1  <=0.0 %  %  %  %  %  %  10e3/uL  10e3/uL  10e3/uL  10e3/uL  10e3/uL  10e3/uL  /100  10e3/uL           Microscopic Description      PERIPHERAL BLOOD SMEAR MORPHOLOGY:  The red blood cells appear normochromic. Poikilocytosis includes occasional echinocytes. Polychromasia is present, but not increased. Rouleaux formation is not increased. The morphology of the platelets is normal and small platelet clumps are observed. Lymphocyte morphology is polymorphous. Neutrophils display predominantly normal cytoplasmic granularity and unremarkable nuclear morphology.    Bone marrow aspirates and bone marrow trephine core biopsy touch imprints are reviewed.    BONE MARROW DIFFERENTIAL (500 cells on direct aspirate smears)  Percent Cell (reference range)  1.2 Blasts (0 - 1)  4.6 Neutrophil promyelocytes (2 - 4)  65.8 Neutrophils and precursors (54 - 63)  17.8 Erythroid precursors (18 - 24)  2.0 Monocytes (1 - 1.5)  2.4 Eosinophils (1 - 3)  0.2 Basophils (0 - 1)  5.8 Lymphocytes (8 - 12)  0.2 Plasma cells (0 - 1.5)    The aspirate smears are adequate for evaluation.    Granulocytes are adequate in number with and slight left-shift is present; very rare forms show subtle hypogranulation and/or hyposegmented nuclei. Erythroid precursors are adequate in number with progressive and complete maturation; rare terminal forms show irregular nuclear contours and nuclear blebbing (<5%). Megakaryocytes exhibit an appropriate spectrum of morphologies and sizes.    IRON STAINS:   Dacie iron stain on concentrate smears: Iron stains show 4% sideroblasts. No ring sideroblasts are seen.  Prussian blue stain on particle crush: Marrow iron stores are diminished to  absent; however, only a single small marrow particle is present for evaluation.    CLOT SECTIONS:  The clot sections show blood with small fragments of marrow.    TREPHINE SECTIONS:  Hematoxylin and eosin stains are reviewed. The core biopsy is suboptimal due to small size and crush artifact, with only about 2-3 mm^2 of intact/evaluable marrow space. In this small area, the marrow cellularity is estimated at 30-40%. The cellular composition reflects the marrow aspirate differential. Megakaryocytes are present.    IMMUNOHISTOCHEMISTRY:  Immunohistochemical stains are performed on the paraffin-embedded trephine core with appropriate controls.    Stains for  and kappa and lambda immunoglobulin light chains show scattered kappa-monotypic plasma cells comprising about 5-7% of marrow cellularity.    Note: These immunohistochemical stains are deemed medically necessary. Some of the antigens may also be evaluated by flow cytometry. Concurrent evaluation by immunohistochemistry on clot and/or trephine sections is indicated in this case in order to correlate immunophenotype with cell morphology and determine extent of involvement, spatial pattern, and focality of potential disease distribution.    Case was reviewed by the following:  Pathology Fellow: Reanna Bruce MD  A resident or fellow in a training program was involved in the initial review, preparation, and/or interpretation of this case.  I, as the senior physician, attest that I have personally reviewed all specimens and or slides, including the listed special stains, and used them with my medical judgement to determine the final diagnosis.              Gross Description      Procedure/Gross Description   Aspirate(s) and trephine(s) procured by ROMEL Mack    Specimen sent for Special Studies:         Flow Cytometry: left aspirate        Cytogenetics: left aspirate        Biopsy aspiration site: left posterior iliac crest                                                       (Reference Range)          Amount of aspirate           2.2   mL        Fat and P.V. cell layer        3    %               (1 - 3)        Particles                             0   %        Myeloid-erythroid layer    2    %               (5 - 8)          Clot Section: yes    Trephine biopsy site: left posterior iliac crest    Designated left posterior iliac crest is 1 cylinder of gritty tissue, labeled with the patient's name and hospital number, obtained with 11 gauge needle and a length of 10 mm; entirely submitted in 1 cassette; acetic zinc formalin fixed, decalcified, processed, and stained for hematoxylin and eosin per laboratory protocol.        MCRS Yes (A)    Performing Labs      The technical component of this testing was completed at [facility]         Echocardiogram     Results for orders placed during the hospital encounter of 23    ECHOCARDIOGRAM COMPLETE    Status: Normal 2023    Narrative  [id number]  [id number]  [id number]  [id number]^VIVIENNE^ASHANTI    [facility],[place]  Echocardiography Laboratory  [address] [place] [id number]    Name: JOSE HILL  MRN: [id number]  : 1955  Study Date: 2023 09:32 AM  Age: 68 yrs  Gender: Male  Patient Location: [facility]  Reason For Study: Multiple myeloma in remission (H), Examination prior to  chemothe  Ordering Physician: ASHANTI PALAFOX  Referring Physician: ASHANTI PALAFOX  Performed By: Vandana Shepard    BSA: 2.2 m2  Height: 70 in  Weight: 217 lb  BP: 134/64 mmHg  ______________________________________________________________________________  Procedure  Echocardiogram with two-dimensional, color and spectral Doppler performed.  ______________________________________________________________________________  Interpretation Summary  Global and regional left ventricular function is normal with an  EF of 60-65%.  Global right ventricular function is normal.  There is well seated 26mm Lisa 3 valve Resilia in aortic position with  normal gradient (peak and mean gradients are 2.0 m/s and 9 mmHg,  respectively). There is mild paravalvular leak.  Ascending aorta 4.1 cm.  Estimated mean right atrial pressure is normal.  No pericardial effusion is present.  ______________________________________________________________________________  Left Ventricle  Left ventricular size is normal. Left ventricular wall thickness is normal.  Global and regional left ventricular function is normal with an EF of 60-65%.  No regional wall motion abnormalities are seen.    Right Ventricle  The right ventricle is normal size. Global right ventricular function is  normal.    Atria  Both atria appear normal.    Mitral Valve  The mitral valve is normal.    Aortic Valve  Aortic valve is normal in structure and function. There is well seated 26mm  Lisa 3 valve Resilia in aortic position with normal gradient (peak and mean  gradients are 2.0 m/s and 9 mmHg, respectively). There is mild paravalvular  leak.    Tricuspid Valve  The tricuspid valve is normal. Trace to mild tricuspid insufficiency is  present. The peak velocity of the tricuspid regurgitant jet is not obtainable.  Pulmonary artery systolic pressure cannot be assessed.    Pulmonic Valve  The pulmonic valve is normal.    Vessels  The aorta root is normal. Ascending aorta 4.1 cm. Estimated mean right atrial  pressure is normal.    Pericardium  No pericardial effusion is present.    Compared to Previous Study  This study was compared with the study from 11.30.23 . Biventricular function  is stable. Mild paravalvular regurgitation is seen today.  ______________________________________________________________________________  MMode/2D Measurements & Calculations    IVSd: 1.0 cm  LVIDd: 5.4 cm  LVIDs: 3.7 cm  LVPWd: 1.1 cm  FS: 31.8 %  LV mass(C)d: 227.7 grams  LV mass(C)dI: 105.3  grams/m2  asc Aorta Diam: 4.1 cm  LVOT diam: 2.0 cm  LVOT area: 3.1 cm2  Asc Ao diam index BSA (cm/m2): 1.9  Asc Ao diam index Ht(cm/m): 2.3  LA Volume (BP): 42.7 ml  LA Volume Index (BP): 19.8 ml/m2    RWT: 0.41  TAPSE: 2.2 cm    Doppler Measurements & Calculations  MV E max stephane: 64.7 cm/sec  MV A max stephane: 83.1 cm/sec  MV E/A: 0.78  MV dec time: 0.20 sec  Ao V2 max: 211.0 cm/sec  Ao max P.8 mmHg  Ao V2 mean: 136.2 cm/sec  Ao mean P.6 mmHg  Ao V2 VTI: 42.8 cm  JOHANNA(I,D): 1.8 cm2  JOHANNA(V,D): 1.6 cm2  AI P1/2t: 621.4 msec  LV V1 max P.1 mmHg  LV V1 max: 112.5 cm/sec  LV V1 VTI: 24.6 cm  SV(LVOT): 75.2 ml  SI(LVOT): 34.8 ml/m2  PA acc time: 0.12 sec  AV Stephane Ratio (DI): 0.53  JOHANNA Index (cm2/m2): 0.81  E/E' av.2  Lateral E/e': 11.4  Medial E/e': 11.0    RV S Stephane: 17.9 cm/sec    Measurements from [facility]  LV GLS Endo Peak A2C (AS): -21.3 %  LV GLS Endo Peak A3C (AS): -21.5 %  LV GLS Endo Peak A4C (AS): -20.1 %  LV GLS Endo Peak Avg (AS): -21.0 %  ______________________________________________________________________________  Report approved by: Lual Mccord 2023 10:39 AM      PET Scan     Results for orders placed during the hospital encounter of 23    PET ONCOLOGY WHOLE BODY    Status: Normal 2023    Narrative  Combined Report of: PET and CT on  2023 9:13 AM:    1. PET of the neck, chest, abdomen, and pelvis.  2. PET CT Fusion for Attenuation Correction and Anatomical  Localization:  3. 3D MIP and PET-CT fused images were processed on an independent  workstation and archived to PACS and reviewed by a radiologist.    Technique:    1. PET: The patient received 12.86 mCi of F-18-FDG; the serum glucose  was 136 mg/dL prior to administration, body weight was 98 kg. Images  were evaluated in the axial, sagittal, and coronal planes as well as  the rotational whole body MIP. Images were acquired from the Vertex to  the Feet.    UPTAKE WAS MEASURED AT 60 MINUTES.    BACKGROUND:  Liver SUV  max= 3.3,   Aorta Blood SUV max= 2.6.    2. CT: CT only obtained for attenuation correction and not diagnostic  purposes.    INDICATION: Multiple myeloma in remission (H); Examination prior to  chemotherapy; Personal history of diseases of blood and blood-forming  organs    ADDITIONAL INFORMATION OBTAINED FROM EMR: 68-year-old-man with  multiple myeloma status post radiation to T3-T5 5/9/2023 and  chemotherapy VRD. Undergoing autologous?Stem Cell Transplant  evaluation. Left iliac bone marrow biopsy performed on 12/22/2023.  PET/CT for staging.    COMPARISON: MR 11/2/2023; chest CTA 9/19/2023; chest CT 8/2/2023,  6/2/2023.    FINDINGS:    HEAD/NECK:  No abnormal uptake.    Mucosal thickening in the left maxillary sinus.    CHEST:  No abnormal uptake.    Similar-appearing bandlike atelectasis/scarring involving the right  upper and right lower lobes medially, likely postradiation fibrosis.  Mild bilateral dependent atelectasis. Stable 0.6 cm right middle lobe  nodule (4/147). Small cyst in left upper lung lobe.    TAVR. Coronary calcifications. Atherosclerotic calcifications of the  thoracic aorta and its branches. Small sliding hiatal hernia.    ABDOMEN AND PELVIS:  Mild focal uptake within the prostatic apex is nonspecific (4/297),  possibly urinary activity; correlate with serum prostate-specific  antigen levels. Prostatomegaly with parenchymal calcifications.    Hepatic steatosis. Colonic diverticulosis. Few scattered renal cysts,  including 0.9 cm partially exophytic right renal cortical cyst can be  further evaluated with ultrasound (4/197). Atherosclerotic  calcifications of the abdominal aorta and its branches. Tiny  fat-containing umbilical and left inguinal hernias.    LOWER EXTREMITIES:  There is a hypermetabolic 0.6 cm subcutaneous nodule within the right  proximal anterior leg, SUV max of 4 (4/474).    BONES:  Low-level uptake associated with the expansile osteolytic lesion  involving the right aspect of  T4 and T5 vertebral bodies with  associated T5 vertebral body pathologic fracture and extension into  the right costovertebral joints and proximal right fifth rib (4/121);  SUV max 2.9. Associated paraspinal soft tissue measures approximately  4.2 x 2.3 cm in maximum axial dimensions (4/124), previously 5.4 x 3.2  cm on 8/2/2023 CT.    Mild diffuse uptake along the left posterior iliac bone and soft  tissues, likely secondary to recent bone marrow biopsy. Subtle uptake  within the right scapula without focal lesions is likely artifactual  (4/138).    Additional multiple non-FDG avid osteolytic lesions involving the  axial and appendicular skeleton most pronounced in the bony pelvis.  Mild superior compression fracture involving L5 vertebral body.    Impression  IMPRESSION:    1. Mild diffuse FDG uptake within T4 and T5 vertebral bodies  lytic-destructive lesion with associated soft tissue component, likely  representing small volume viable myeloma versus postradiation  inflammation. Similar-appearing T5 vertebral body pathologic fracture  with extension into the right costovertebral joints and proximal right  fifth rib.    2. No other foci of abnormal FDG uptake in the skeleton. Multiple  additional osteolytic lesions involving the skeleton most pronounced  in the bony pelvis are non-FDG avid.    3. Hypermetabolic 0.6 cm subcutaneous nodule within the right proximal  anterior leg warrants correlation with clinical inspection.    I have personally reviewed the examination and initial interpretation  and I agree with the findings.    ANIBAL RAMESH MD      SYSTEM ID:  [id number]      understood the above assessment and recommendations. Multiple questions answered. No barriers to learning identified.    Known issues that I take into account for medical decisions, with salient changes to the plan considering these complexities noted above.    Patient Active Problem List   Diagnosis    Pneumonia of both lungs due  to infectious organism, unspecified part of lung    Multiple myeloma (H)    Mass of thoracic vertebra    Immunosuppressed due to chemotherapy     Lumbosacral radiculopathy at L5    Pneumonitis    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    Anxiety    Organic insomnia    Chest pain due to myocardial ischemia, unspecified ischemic chest pain type    Severe aortic stenosis --S/P TAVR on 10/24/23    Benign essential hypertension    Hyperlipidemia    Autologous donor of stem cells     ------------------------------------------------------------------------------------------------------------------------------------------------    Patient Care Team         Relationship Specialty Notifications Start End    Cipriano Fuentes PA-C PCP - General Physician Assistant  8/21/23     Phone: [phone] Fax: [phone] [address] [facility] [address] [place] [address]    Arcelia Sheldon Consulting Physician Radiation Oncology  4/25/23     Phone: [phone] Fax: [phone]         [facility] [address] [facility] [address]    Adlaberto Nelson DO Physician Cardiovascular Disease  7/20/23     Phone: [phone] Fax: [phone]         [address] [facility] [address]    Miguel Harrell MD Assigned PCP   8/16/23     Phone: [phone] Fax: [phone] [address] [place] [address]    Catalina Perez MD Assigned Heart and Vascular Provider   10/7/23     Phone: [phone] Fax: [phone]         [address] [facility] [address] [facility] [address]    Bozena Ritter, RN BMT Nurse Coordinator Transplant Admissions 12/11/23     Dr. Frey's Nurse Coordinator    Jerri Frey MD BMT Physician Transplant  12/21/23     Phone: [phone] Fax: [phone]         [address] [place] [address]    Nikole Gatica, [facility]   Admissions 12/21/23     Phone: [phone] Pager: [phone]                Attestation signed by  Jerri Frey MD at 12/28/2023  4:58 PM:  Today, I  saw and examined the patient independently in clinic and discussed the recommendations. I reviewed the case with the fellow and agree with the note as above.   Pt will proceed with G-CSF mobilization.  Plan to 10mil CD34 cells/kg for 2 transplants  We asked to follow-up with cardiology - re elevated BP and we will notify about need to hold ASA.  Plan to stop ASA next Fri.  Plan to increase Metoprolol to 25mg po BID.        Jerri Frey MD  Professor of Medicine

## 2023-12-28 NOTE — PROGRESS NOTES
Billy Carroll was seen today for a Pentamidine nebulizer tx ordered by Dr. Frey.    Patient was first given 4 puffs of albuterol MDI, after which Pentamidine 300 mg (Lot # [id number]) mixed with 6cc Sterile H20 was administered through a filtered nebulizer.    Pre-treatment: SpO2 = 96%   HR = 79   BBS = Clear   Post-treatment: SpO2 = 96%  HR = 85  BBS = Clear    No adverse side effects noted by the patient.    This service today was provided under Dr. Purcell, who was available if needed.     Procedure was completed by Diallo Finch.

## 2023-12-28 NOTE — NURSING NOTE
"Oncology Rooming Note    December 28, 2023 7:56 AM   Billy Carroll is a 68 year old male who presents for:    Chief Complaint   Patient presents with    Oncology Clinic Visit     Multiple myeloma in remission     Initial Vitals: BP (!) 152/77 (BP Location: Right arm, Patient Position: Sitting, Cuff Size: Adult Regular)   Pulse 89   Temp 98.4  F (36.9  C) (Oral)   Resp 16   Wt 100.2 kg (221 lb)   SpO2 98%   BMI 31.71 kg/m   Estimated body mass index is 31.71 kg/m  as calculated from the following:    Height as of 12/22/23: 1.778 m (5' 10\").    Weight as of this encounter: 100.2 kg (221 lb). Body surface area is 2.22 meters squared.  No Pain (0) Comment: Data Unavailable   No LMP for male patient.  Allergies reviewed: Yes  Medications reviewed: Yes    Medications: Medication refills not needed today.  Pharmacy name entered into [employer]:    [id number] [place], [place] - [address] [facility] [place], [place] - [address] [facility]    Frailty Screening:   Is the patient here for a new oncology consult visit in cancer care? 2. No      Clinical concerns: none.       Sebastián Ross"

## 2023-12-28 NOTE — PROGRESS NOTES
BMT / Cell Therapy Consultation    Billy Carroll is a 68 year old male referred by Dr. Ruelas for evaluation of autologous hematopoietic cell transplantation for IgG-kappa MM.    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 Incidentally found to have elevated serum protein, monoclonal peak of 1.4 g/dL with immunofixation revealed monoclonal IgG kappa. BMBx showed 5% plasma cell neoplasm. FISH showed trisomy 5, 9, and 15 suggesting hyperdiploidy. No evidence of end-organ damage and was on surveillance at that time.    In Apr 2023, patient was found to have a diffuse lytic lesions with large (4.6 cm) soft-tissue mass at right T5 costovertebral junction with compression deformity and extension into spinal canal.    Date Treatment Name Response Side Effects / Toxicities   May 2023 IMRT to T3-T5 (2000 cGy) with boost to T5 mass (2500 cGy) NA None   May-Dec 2023 VRd x8 cycles NC PJP pneumonia (Jun 2023)        ASSESSMENT AND PLAN:  Billy Carroll is a 68 year old male with PMHx significant for CAD, severe aortic stenosis s/p TAVR (10/24/2023) currently ongoing cardiac rehabilitation, HTN, and HLD.    IgG kappa MM with extramedullary disease  Hematologic history as summarized above. Here for close work-up for autologous HCT. Pre-transplant disease status:  consistent with PR1   - SPEP 0.2 g.dL, IgG kappa M-protein. FLC ratio 2.63 (normal kappa LC level). Normal IgG, low IgA & IgM.   - UPEP negative   - BMBx 5-7% kappa-monotypic plasma cells. Chromosomal analysis and FISH are pending   - PET/CT (12/26/2023) - Mild diffuse FDG uptake within T4 and T5 vertebral bodies lytic-destructive lesion with associated soft tissue component, likely representing small volume viable myeloma versus postradiation inflammation.  Associated paraspinal soft tissue measures approximately 4.2 x 2.3 cm in maximum axial dimensions (4/124), previously 5.4 x 3.2 cm on  8/2/2023 CT. Similar-appearing T5 vertebral body pathologic fracture with extension into the right costovertebral joints and proximal right fifth rib. Multiple additional osteolytic lesions involving the skeleton most pronounced in the bony pelvis are non-FDG avid.    Infectious work-up negative. Normal PFTs. Patient had TAVR Oct 2023, appears to have adequate cardiac function. Has established care with cardiology. No cardiac symptoms, no angina, edema, or GILLETTE. Patient currently taking ASA which will need to be hold during transplant in anticipation of severe thrombocytopenia. Also need to optimize antihypertensive medications. Will discuss with cardiology.    Patient will received pentamidine treatment today. Discontinue Bactrim. Continue acyclovir prophylaxis.      BMT and Cell Therapy Informed Consent Discussion     In today's visit, we discussed in detail the research for which Billy Carroll is eligible. We discussed the potential risks and potential benefits of each protocol individually. We explained potential alternatives to the protocols discussed. We explained to the patient that participation is voluntary and that consent may be withdrawn at any time.     We discussed:  The rationale for our approach to the disease treatment  The eligibility requirements for treatment in the context of clinical trials  The need for caregiver support and the caregiver's role in recovery  The importance of adherence to the treatment plan and appropriate follow up  The requirements for contraception while undergoing treatment  The potential risks of morbidity and mortality related to this treatment  The requirements for supportive care to reduce the risk of infection and other complications  The role of the dietician and PT/OT to reduce the risk of muscle loss/sarcopenia  Support that is available through our social workers and care team to mitigate distress  The desired outcomes/goals of treatment, including the possibility  of long-term disease control    The patient completed the last round of treatment on 12/202023        HCT-CI score: 4.  We counseled the patient about the impact of this on the risk of treatment related and overall mortality. The score fit within treatment protocol eligibility criteria.    Karnofsky performance score: 100%       Active infections:  none.     Reproductive status: What methods of birth control does the patient plan to use during the treatment period beginning with conditioning and ending with the discontinuation of immune suppression (indicate with an X all that apply):  __ The patient is confirmed to be sterile or post-menopausal  __ Sexual abstinence  _x_ Condoms  __ Implants  __ Injectables  __ Oral contraceptives  __ Intrauterine devices (IUD)  __ Other (describe)    The patient received appropriate reproductive counseling and agreed with the need for effective contraception during the treatment procedures.    Dental health suitable to proceed: Yes    Transplants for multiple myeloma  The patient has Standard risk MM, and the collection goal is 8 million CD34/kg for 2 transplants..  The day for admission to begin melphalan conditioning is Day -1 for melphalan 200 mg/m2 (not frail, creatinine clearance 30+). .    After our detailed discussion above, the patient signed the following consents for treatment and protocols:  autoHSCT for MM protocol SOC     Known issues that I take into account for medical decisions, with salient changes to the plan considering these complexities noted above.    Patient Active Problem List   Diagnosis    Pneumonia of both lungs due to infectious organism, unspecified part of lung    Multiple myeloma (H)    Mass of thoracic vertebra    Immunosuppressed due to chemotherapy     Lumbosacral radiculopathy at L5    Pneumonitis    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    Anxiety    Organic insomnia    Chest pain due to myocardial ischemia, unspecified ischemic chest  pain type    Severe aortic stenosis --S/P TAVR on 10/24/23    Benign essential hypertension    Hyperlipidemia    Autologous donor of stem cells         I spent 90 minutes in the care of this patient today, which included time necessary for preparation for the visit, obtaining history, ordering medications/tests/procedures as medically indicated, review of pertinent medical literature, counseling of the patient, communication of recommendations to the care team, and documentation time.    Jerri Frey MD      BMT and Cell Therapy Informed Consent Discussion     In today's visit, we discussed in detail the research for which Billy Carroll is eligible. We discussed the potential risks and potential benefits of each protocol individually. We explained potential alternatives to the protocols discussed. We explained to the patient that participation is voluntary and that consent may be withdrawn at any time.     We discussed:  The rationale for our approach to the disease treatment  The eligibility requirements for treatment in the context of clinical trials  The need for caregiver support and the caregiver's role in recovery  The importance of adherence to the treatment plan and appropriate follow up  The requirements for contraception while undergoing treatment  The potential risks of morbidity and mortality related to this treatment  The requirements for supportive care to reduce the risk of infection and other complications  The role of the dietician and PT/OT to reduce the risk of muscle loss/sarcopenia  Support that is available through our social workers and care team to mitigate distress  The desired outcomes/goals of treatment, including the possibility of long-term disease control    The patient completed the last round of treatment on 12/5/2023.    HCT-CI score: 4 (valvular heart disease, CAD). We counseled the patient about the impact of this on the risk of treatment related and overall mortality. The  score fit within treatment protocol eligibility criteria.    Karnofsky performance score: 90      ECOG: (required for CAR-T): 0    Active infections: None.  Prior infections that require additional special prophylaxis considerations: History of PJP pneumonia (Jun 2023). I reviewed and discussed infectious disease evaluation with the patient and the management plan during treatment.    Reproductive status: What methods of birth control does the patient plan to use during the treatment period beginning with conditioning and ending with the discontinuation of immune suppression (indicate with an X all that apply):  __ The patient is confirmed to be sterile or post-menopausal   x  Sexual abstinence  __ Condoms  __ Implants  __ Injectables  __ Oral contraceptives  __ Intrauterine devices (IUD)  __ Other (describe)    The patient received appropriate reproductive counseling and agreed with the need for effective contraception during the treatment procedures.    Dental health suitable to proceed: Yes    Transplants for multiple myeloma:  The patient has Standard risk MM, and the collection goal is 8 million CD34/kg for 2 transplants. The day for admission to begin melphalan conditioning is Day -1 for melphalan 200 mg/m2 (not frail, creatinine clearance 30+). .    After our detailed discussion above, the patient signed the following consents for treatment and protocols:  [id number]     Known issues that I take into account for medical decisions, with salient changes to the plan considering these complexities noted above.    Patient Active Problem List   Diagnosis    Pneumonia of both lungs due to infectious organism, unspecified part of lung    Multiple myeloma (H)    Mass of thoracic vertebra    Immunosuppressed due to chemotherapy     Lumbosacral radiculopathy at L5    Pneumonitis    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    Anxiety    Organic insomnia    Chest pain due to myocardial ischemia, unspecified  ischemic chest pain type    Severe aortic stenosis --S/P TAVR on 10/24/23    Benign essential hypertension    Hyperlipidemia    Autologous donor of stem cells     Patient was seen and plan of care was discussed with attending physician Dr. Frey.    Ang Nice MD  Hematology/Medical Oncology/BMT (PGY-5)  P: [phone]  ---------------------------------------------------------------------------------------------------------------------------------    History of Present Illness:     HPI:  Please see my entry above for hematologic history.    Billy Carroll is a 68 year old male with PMHx significant for CAD, severe aortic stenosis s/p TAVR (10/24/2023) currently ongoing cardiac rehabilitation, HTN, and HLD.    Patient reports feeling today. Denies worsening SOB/GILLETTE, PND/orthopnea, no exertional chest pain. Exercises regularly, walks about 3-4 miles a day. Reports that his blood pressure were slightly high recently, around 150s/70s. No new or uncontrolled pain apart from his chronic back, shoulder, and hip pain. Has mild residual neuropathy related to treatment.     ROS: Comprehensive ROS were negative other than the symptoms noted above in the HPI.    Past Medical History:   Diagnosis Date    Benign essential hypertension     Hyperlipidemia     Multiple myeloma (H)     Followed by Dr. Ruelas with [facility]       Past Surgical History:   Procedure Laterality Date    CV CORONARY ANGIOGRAM N/A 9/7/2023    Procedure: Coronary Angiogram;  Surgeon: Domenico Sauceda MD;  Location: [facility] CV    CV TRANSCATHETER AORTIC VALVE REPLACEMENT-FEMORAL APPROACH N/A 10/24/2023    Procedure: Transcatheter Aortic Valve Replacement-Femoral Approach;  Surgeon: Charles Field MD;  Location: [facility] LAB CV    OR TRANSCATHETER AORTIC VALVE REPLACEMENT, FEMORAL PERCUTANEOUS APPROACH (STANDBY) N/A 10/24/2023    Procedure: OR TRANSCATHETER AORTIC VALVE REPLACEMENT, FEMORAL PERCUTANEOUS APPROACH (STANDBY);   Surgeon: Sahra Mosher MD;  Location: [facility] CV       Family history:  - Father with history of MM,  at age 89 from MM  - Mother with history of breast cancer,  at age 77  - Sister with history of breast cancer    Social History     Tobacco Use    Smoking status: Never     Passive exposure: Never    Smokeless tobacco: Never   Vaping Use    Vaping Use: Never used   Substance Use Topics    Drug use: Never       Medications and allergies reviewed    Current Outpatient Medications   Medication Sig Dispense Refill    acetaminophen (ACETAMINOPHEN 8 HOUR) 650 MG CR tablet Take 1,300 mg by mouth every 8 hours as needed for mild pain or fever      acyclovir (ZOVIRAX) 400 MG tablet Take 400 mg by mouth 2 times daily      alendronate (FOSAMAX) 70 MG tablet Take 1 tablet (70 mg) by mouth every 7 days (Patient taking differently: Take 70 mg by mouth every 7 days Takes on ) 12 tablet 1    aspirin 81 MG EC tablet Take 81 mg by mouth daily      Calcium-Magnesium-Zinc 333-133-5 MG TABS per tablet Take 2 tablets by mouth daily      cholecalciferol 50 MCG (2000 UT) tablet Take 2,000 Units by mouth daily      co-enzyme Q-10 100 MG CAPS capsule Take 100 mg by mouth daily      fluticasone (FLONASE) 50 MCG/ACT nasal spray Spray 1 spray into both nostrils daily as needed for rhinitis or allergies      loratadine (CLARITIN) 10 MG tablet Take 10 mg by mouth daily as needed for allergies      losartan (COZAAR) 50 MG tablet Take 50 mg by mouth daily      metoprolol succinate ER (TOPROL XL) 25 MG 24 hr tablet Take 1 tablet (25 mg) by mouth daily 90 tablet 3    mirtazapine (REMERON) 15 MG tablet Take 15 mg by mouth At Bedtime      nitroGLYcerin (NITROSTAT) 0.4 MG sublingual tablet For chest pain place 1 tablet under the tongue every 5 minutes for 3 doses. If symptoms persist 5 minutes after 1st dose call 911. 25 tablet 2    Omega-3 Fatty Acids (FISH OIL BURP-LESS) 1000 MG CAPS Take 1 capsule by mouth daily      psyllium  (METAMUCIL/KONSYL) Packet Take 1 packet by mouth as needed      rosuvastatin (CRESTOR) 20 MG tablet Take 1 tablet (20 mg) by mouth daily 90 tablet 1    sulfamethoxazole-trimethoprim (BACTRIM DS) 800-160 MG tablet Take 1 tablet by mouth three times a week Take on Mondays, Wednesday, fridaysof week while taking Prednsione more than 20mg daily for PCP prophylaxis 30 tablet 0    Turmeric 500 MG CAPS Take 2 capsules by mouth daily      vitamin B complex with vitamin C (VITAMIN  B COMPLEX) tablet Take 1 tablet by mouth daily      zinc gluconate 50 MG tablet Take 50 mg by mouth daily         Allergies   Allergen Reactions    Acetaminophen-Codeine Nausea    Codeine Nausea       Physical Examination:     Vital Signs: BP (!) 152/77 (BP Location: Right arm, Patient Position: Sitting, Cuff Size: Adult Regular)   Pulse 89   Temp 98.4  F (36.9  C) (Oral)   Resp 16   Wt 100.2 kg (221 lb)   SpO2 98%   BMI 31.71 kg/m      KPS: 90  General Appearance: Alert, and no distress  Eyes: PERRL, conjunctiva and lids normal, sclera nonicteric  Neck: No adenopathy  Cardio/Vascular: Regular rate and rhythm, normal S1 and S2, no murmur  Resp Effort And Auscultation: Normal - Clear to auscultation without rales, rhonchi, or wheezing.  GI: Soft, nontender, bowel sounds present in all four quadrants, no hepatosplenomegaly  Lymphatics: No significant enlargement of lymph nodes  Musculoskeletal: No point of tenderness  Edema: None  Neurologic: Gait normal. Sensation grossly WNL.  Psych/Affect: Mood and affect are appropriate.      Blood Counts     Recent Labs   Lab Test 12/22/23  0754 12/21/23  1120 12/07/23  1454   HGB 13.3 13.4 12.3*   HCT 40.1 40.1 37.3*   WBC 3.5* 5.7 20.0*   ANEUTAUTO 2.1 4.0 17.7*   ALYMPAUTO 0.9 0.9 0.5*   AMONOAUTO 0.4 0.6 1.7*   AEOSAUTO 0.1 0.1 0.1   ABSBASO 0.1 0.1 0.0   NRBCMAN 0.0 0.0 0.0    290 207     ABO/RH  Recent Labs   Lab Test 12/21/23  1120   ABORH O POS       Chemistries     Basic Panel  Recent Labs    Lab Test 12/26/23  1130 12/26/23  1129 12/21/23  1120 12/07/23  1454 10/25/23  0425   NA  --   --  139 138 139   POTASSIUM  --   --  3.9 4.6 3.7   CHLORIDE  --   --  104 103 106   CO2  --   --  24 24 24   BUN  --   --  12.1 17.9 8.2   CR 0.80 0.80 0.81 0.75 0.65*   GLC  --   --  103* 149* 122*      Calcium, Magnesium, Phosphorus  Recent Labs   Lab Test 12/21/23  1120 12/07/23  1454 10/25/23  0425 10/24/23  1119   SESAR 9.1 8.6* 8.8 7.9*   MAG 2.3  --  2.0 2.0   PHOS 3.2  --   --   --       LFTs  Recent Labs   Lab Test 12/21/23  1120 10/24/23  1119 10/23/23  0941   BILITOTAL 0.5 0.5 0.6   ALKPHOS 98 86 110   AST 53* 39 49*   ALT 86* 77* 106*   ALBUMIN 4.5 3.9 4.5     LDH  Recent Labs   Lab Test 12/21/23  1120   *     B2-Microglobulin  Recent Labs   Lab Test 12/21/23  1120   [id number] 1.7       Immunoglobulins     Recent Labs   Lab Test 12/21/23  1120        Recent Labs   Lab Test 12/21/23  1120   IGA 25*     Recent Labs   Lab Test 12/21/23  1120   IGM 19*       Monocloncal Protein Studies     M spike  Recent Labs   Lab Test 12/21/23  1120 04/18/18  1543   ELPM 0.2* 1.4     [place] FLC  Recent Labs   Lab Test 12/21/23  1120   KFLCA 1.05     Lambda FLC  Recent Labs   Lab Test 12/21/23  1120   LFLCA 0.40*     FLC Ratio  Recent Labs   Lab Test 12/21/23  1120   KLRA 2.63*       Infectious Disease Markers     [facility] IDM  Recent Labs   Lab Test 12/21/23  1120   DCMIG Negative   DHBSAG Non-reactive   DHBCAB Non-reactive   DHIVAB Non-reactive   DHCVAB Non-reactive   DHTLVA Non-reactive   TCRUZI Non-reactive   DTRPAB Non-reactive     Hepatitis and HIV  No lab results found.    CMV  No lab results found.    EBV  Recent Labs   Lab Test 12/21/23  1120   EBVCAG Positive*       Bone Marrow Biopsy     Morphology  Results for orders placed or performed in visit on 12/22/23 (from the past 8760 hour(s))   Bone marrow biopsy   Result Value    Final Diagnosis      Bone marrow, posterior iliac crest, left  decalcified trephine biopsy and touch imprint; left aspirate clot, direct aspirate smear, and concentrated aspirate smear; and peripheral blood smear:    Recurrent/persistent plasma cell neoplasm with the following features:  - Normocellular marrow (cellularity estimated at 30-40% in a small, suboptimal biopsy) with trilineage hematopoiesis, no overt dyplasia, no increase in blasts, and about 5-7% kappa-monotypic plasma cells  - Peripheral blood showing slight normochromic normocytic anemia; slight leukopenia with normal white blood cell differential  - See comment        Comment      Flow cytometry ([id number]) showed kappa-monotypic plasma cells.    Concurrent ancillary studies are in progress and will be reported separately. Correlation with the results of ancillary tests and clinical findings is recommended.      Clinical Information      Per Epic electronic medical records; 68 year old man with history of IgG kappa multiple myeloma      Peripheral Hematologic Data      CBC WITH DIFFERENTIAL (12/22/2023 08:08 AM CST):     RESULT VALUE REF. RANGE UNITS    WBC Count   Hemoglobin  Hematocrit  Platelet Count   RBC Count   MCV  MCH  MCHC  RDW  3.5  ( L )    13.3 (NORMAL)     40.1 (NORMAL)  264 (NORMAL)   4.35  ( L )       92 (NORMAL)     30.6 (NORMAL)     33.2 (NORMAL)     13.2 (NORMAL) 4.0-11.0  13.3-17.7  40.0-53.0  150-450  4.40-5.90    26.5-33.0  31.5-36.5  10.0-15.0 10e3/uL  g/dL  %  10e3/uL  10e6/uL  fL  pg  g/dL  %   % Neutrophils  % Lymphocytes  % Monocytes  % Eosinophils  % Basophils  % Immature Granulocytes  Absolute Neutrophils  Absolute Lymphocytes  Absolute Monocytes  Absolute Eosinophils  Absolute Basophils  Absolute Immature Granulocytes  NRBCs per 100 WBC  Absolute NRBCs 61  25  10  2  2  0  2.1 (NORMAL)  0.9 (NORMAL)  0.4 (NORMAL)  0.1 (NORMAL)  0.1 (NORMAL)  0.0 (NORMAL)  0 (NORMAL)  0.0 () N/A  N/A  N/A  N/A  N/A  N/A  1.6-8.3  0.8-5.3  0.0-1.3  0.0-0.7  0.0-0.2  <=0.4  <1  <=0.0  %  %  %  %  %  %  10e3/uL  10e3/uL  10e3/uL  10e3/uL  10e3/uL  10e3/uL  /100  10e3/uL           Microscopic Description      PERIPHERAL BLOOD SMEAR MORPHOLOGY:  The red blood cells appear normochromic. Poikilocytosis includes occasional echinocytes. Polychromasia is present, but not increased. Rouleaux formation is not increased. The morphology of the platelets is normal and small platelet clumps are observed. Lymphocyte morphology is polymorphous. Neutrophils display predominantly normal cytoplasmic granularity and unremarkable nuclear morphology.    Bone marrow aspirates and bone marrow trephine core biopsy touch imprints are reviewed.    BONE MARROW DIFFERENTIAL (500 cells on direct aspirate smears)  Percent Cell (reference range)  1.2 Blasts (0 - 1)  4.6 Neutrophil promyelocytes (2 - 4)  65.8 Neutrophils and precursors (54 - 63)  17.8 Erythroid precursors (18 - 24)  2.0 Monocytes (1 - 1.5)  2.4 Eosinophils (1 - 3)  0.2 Basophils (0 - 1)  5.8 Lymphocytes (8 - 12)  0.2 Plasma cells (0 - 1.5)    The aspirate smears are adequate for evaluation.    Granulocytes are adequate in number with and slight left-shift is present; very rare forms show subtle hypogranulation and/or hyposegmented nuclei. Erythroid precursors are adequate in number with progressive and complete maturation; rare terminal forms show irregular nuclear contours and nuclear blebbing (<5%). Megakaryocytes exhibit an appropriate spectrum of morphologies and sizes.    IRON STAINS:   Dacie iron stain on concentrate smears: Iron stains show 4% sideroblasts. No ring sideroblasts are seen.  Prussian blue stain on particle crush: Marrow iron stores are diminished to absent; however, only a single small marrow particle is present for evaluation.    CLOT SECTIONS:  The clot sections show blood with small fragments of marrow.    TREPHINE SECTIONS:  Hematoxylin and eosin stains are reviewed. The core biopsy is suboptimal due to small size and crush artifact,  with only about 2-3 mm^2 of intact/evaluable marrow space. In this small area, the marrow cellularity is estimated at 30-40%. The cellular composition reflects the marrow aspirate differential. Megakaryocytes are present.    IMMUNOHISTOCHEMISTRY:  Immunohistochemical stains are performed on the paraffin-embedded trephine core with appropriate controls.    Stains for  and kappa and lambda immunoglobulin light chains show scattered kappa-monotypic plasma cells comprising about 5-7% of marrow cellularity.    Note: These immunohistochemical stains are deemed medically necessary. Some of the antigens may also be evaluated by flow cytometry. Concurrent evaluation by immunohistochemistry on clot and/or trephine sections is indicated in this case in order to correlate immunophenotype with cell morphology and determine extent of involvement, spatial pattern, and focality of potential disease distribution.    Case was reviewed by the following:  Pathology Fellow: Reanna Bruce MD  A resident or fellow in a training program was involved in the initial review, preparation, and/or interpretation of this case.  I, as the senior physician, attest that I have personally reviewed all specimens and or slides, including the listed special stains, and used them with my medical judgement to determine the final diagnosis.              Gross Description      Procedure/Gross Description   Aspirate(s) and trephine(s) procured by ROMEL Mack    Specimen sent for Special Studies:         Flow Cytometry: left aspirate        Cytogenetics: left aspirate        Biopsy aspiration site: left posterior iliac crest                                                      (Reference Range)          Amount of aspirate           2.2   mL        Fat and P.V. cell layer        3    %               (1 - 3)        Particles                             0   %        Myeloid-erythroid layer    2    %               (5 - 8)          Clot  Section: yes    Trephine biopsy site: left posterior iliac crest    Designated left posterior iliac crest is 1 cylinder of gritty tissue, labeled with the patient's name and hospital number, obtained with 11 gauge needle and a length of 10 mm; entirely submitted in 1 cassette; acetic zinc formalin fixed, decalcified, processed, and stained for hematoxylin and eosin per laboratory protocol.        MCRS Yes (A)    Performing Labs      The technical component of this testing was completed at [facility]         Echocardiogram     Results for orders placed during the hospital encounter of 23    ECHOCARDIOGRAM COMPLETE    Status: Normal 2023    Narrative  [id number]  [id number]  [id number]  [id number]^VIVIENNE^ASHANTI    [facility],[place]  Echocardiography Laboratory  [address] [place] [address]    Name: JOSE HILL  MRN: [id number]  : 1955  Study Date: 2023 09:32 AM  Age: 68 yrs  Gender: Male  Patient Location: [facility]  Reason For Study: Multiple myeloma in remission (H), Examination prior to  chemothe  Ordering Physician: ASHANTI PALAFOX  Referring Physician: ASHANTI PALAFOX  Performed By: Vandana Shepard    BSA: 2.2 m2  Height: 70 in  Weight: 217 lb  BP: 134/64 mmHg  ______________________________________________________________________________  Procedure  Echocardiogram with two-dimensional, color and spectral Doppler performed.  ______________________________________________________________________________  Interpretation Summary  Global and regional left ventricular function is normal with an EF of 60-65%.  Global right ventricular function is normal.  There is well seated 26mm Lisa 3 valve Resilia in aortic position with  normal gradient (peak and mean gradients are 2.0 m/s and 9 mmHg,  respectively). There is mild paravalvular leak.  Ascending aorta 4.1 cm.  Estimated mean  right atrial pressure is normal.  No pericardial effusion is present.  ______________________________________________________________________________  Left Ventricle  Left ventricular size is normal. Left ventricular wall thickness is normal.  Global and regional left ventricular function is normal with an EF of 60-65%.  No regional wall motion abnormalities are seen.    Right Ventricle  The right ventricle is normal size. Global right ventricular function is  normal.    Atria  Both atria appear normal.    Mitral Valve  The mitral valve is normal.    Aortic Valve  Aortic valve is normal in structure and function. There is well seated 26mm  Lisa 3 valve Resilia in aortic position with normal gradient (peak and mean  gradients are 2.0 m/s and 9 mmHg, respectively). There is mild paravalvular  leak.    Tricuspid Valve  The tricuspid valve is normal. Trace to mild tricuspid insufficiency is  present. The peak velocity of the tricuspid regurgitant jet is not obtainable.  Pulmonary artery systolic pressure cannot be assessed.    Pulmonic Valve  The pulmonic valve is normal.    Vessels  The aorta root is normal. Ascending aorta 4.1 cm. Estimated mean right atrial  pressure is normal.    Pericardium  No pericardial effusion is present.    Compared to Previous Study  This study was compared with the study from 11.30.23 . Biventricular function  is stable. Mild paravalvular regurgitation is seen today.  ______________________________________________________________________________  MMode/2D Measurements & Calculations    IVSd: 1.0 cm  LVIDd: 5.4 cm  LVIDs: 3.7 cm  LVPWd: 1.1 cm  FS: 31.8 %  LV mass(C)d: 227.7 grams  LV mass(C)dI: 105.3 grams/m2  asc Aorta Diam: 4.1 cm  LVOT diam: 2.0 cm  LVOT area: 3.1 cm2  Asc Ao diam index BSA (cm/m2): 1.9  Asc Ao diam index Ht(cm/m): 2.3  LA Volume (BP): 42.7 ml  LA Volume Index (BP): 19.8 ml/m2    RWT: 0.41  TAPSE: 2.2 cm    Doppler Measurements & Calculations  MV E max kishor: 64.7  cm/sec  MV A max stephane: 83.1 cm/sec  MV E/A: 0.78  MV dec time: 0.20 sec  Ao V2 max: 211.0 cm/sec  Ao max P.8 mmHg  Ao V2 mean: 136.2 cm/sec  Ao mean P.6 mmHg  Ao V2 VTI: 42.8 cm  JOHANNA(I,D): 1.8 cm2  JOHANNA(V,D): 1.6 cm2  AI P1/2t: 621.4 msec  LV V1 max P.1 mmHg  LV V1 max: 112.5 cm/sec  LV V1 VTI: 24.6 cm  SV(LVOT): 75.2 ml  SI(LVOT): 34.8 ml/m2  PA acc time: 0.12 sec  AV Stephane Ratio (DI): 0.53  JOHANNA Index (cm2/m2): 0.81  E/E' av.2  Lateral E/e': 11.4  Medial E/e': 11.0    RV S Stephane: 17.9 cm/sec    Measurements from QLAB  LV GLS Endo Peak A2C (AS): -21.3 %  LV GLS Endo Peak A3C (AS): -21.5 %  LV GLS Endo Peak A4C (AS): -20.1 %  LV GLS Endo Peak Avg (AS): -21.0 %  ______________________________________________________________________________  Report approved by: Lula Mccord 2023 10:39 AM      PET Scan     Results for orders placed during the hospital encounter of 23    PET ONCOLOGY WHOLE BODY    Status: Normal 2023    Narrative  Combined Report of: PET and CT on  2023 9:13 AM:    1. PET of the neck, chest, abdomen, and pelvis.  2. PET CT Fusion for Attenuation Correction and Anatomical  Localization:  3. 3D MIP and PET-CT fused images were processed on an independent  workstation and archived to PACS and reviewed by a radiologist.    Technique:    1. PET: The patient received 12.86 mCi of F-18-FDG; the serum glucose  was 136 mg/dL prior to administration, body weight was 98 kg. Images  were evaluated in the axial, sagittal, and coronal planes as well as  the rotational whole body MIP. Images were acquired from the Vertex to  the Feet.    UPTAKE WAS MEASURED AT 60 MINUTES.    BACKGROUND:  Liver SUV max= 3.3,   Aorta Blood SUV max= 2.6.    2. CT: CT only obtained for attenuation correction and not diagnostic  purposes.    INDICATION: Multiple myeloma in remission (H); Examination prior to  chemotherapy; Personal history of diseases of blood and blood-forming  organs    ADDITIONAL  INFORMATION OBTAINED FROM EMR: 68-year-old-man with  multiple myeloma status post radiation to T3-T5 5/9/2023 and  chemotherapy VRD. Undergoing autologous?Stem Cell Transplant  evaluation. Left iliac bone marrow biopsy performed on 12/22/2023.  PET/CT for staging.    COMPARISON: MR 11/2/2023; chest CTA 9/19/2023; chest CT 8/2/2023,  6/2/2023.    FINDINGS:    HEAD/NECK:  No abnormal uptake.    Mucosal thickening in the left maxillary sinus.    CHEST:  No abnormal uptake.    Similar-appearing bandlike atelectasis/scarring involving the right  upper and right lower lobes medially, likely postradiation fibrosis.  Mild bilateral dependent atelectasis. Stable 0.6 cm right middle lobe  nodule (4/147). Small cyst in left upper lung lobe.    TAVR. Coronary calcifications. Atherosclerotic calcifications of the  thoracic aorta and its branches. Small sliding hiatal hernia.    ABDOMEN AND PELVIS:  Mild focal uptake within the prostatic apex is nonspecific (4/297),  possibly urinary activity; correlate with serum prostate-specific  antigen levels. Prostatomegaly with parenchymal calcifications.    Hepatic steatosis. Colonic diverticulosis. Few scattered renal cysts,  including 0.9 cm partially exophytic right renal cortical cyst can be  further evaluated with ultrasound (4/197). Atherosclerotic  calcifications of the abdominal aorta and its branches. Tiny  fat-containing umbilical and left inguinal hernias.    LOWER EXTREMITIES:  There is a hypermetabolic 0.6 cm subcutaneous nodule within the right  proximal anterior leg, SUV max of 4 (4/474).    BONES:  Low-level uptake associated with the expansile osteolytic lesion  involving the right aspect of T4 and T5 vertebral bodies with  associated T5 vertebral body pathologic fracture and extension into  the right costovertebral joints and proximal right fifth rib (4/121);  SUV max 2.9. Associated paraspinal soft tissue measures approximately  4.2 x 2.3 cm in maximum axial dimensions  (4/124), previously 5.4 x 3.2  cm on 8/2/2023 CT.    Mild diffuse uptake along the left posterior iliac bone and soft  tissues, likely secondary to recent bone marrow biopsy. Subtle uptake  within the right scapula without focal lesions is likely artifactual  (4/138).    Additional multiple non-FDG avid osteolytic lesions involving the  axial and appendicular skeleton most pronounced in the bony pelvis.  Mild superior compression fracture involving L5 vertebral body.    Impression  IMPRESSION:    1. Mild diffuse FDG uptake within T4 and T5 vertebral bodies  lytic-destructive lesion with associated soft tissue component, likely  representing small volume viable myeloma versus postradiation  inflammation. Similar-appearing T5 vertebral body pathologic fracture  with extension into the right costovertebral joints and proximal right  fifth rib.    2. No other foci of abnormal FDG uptake in the skeleton. Multiple  additional osteolytic lesions involving the skeleton most pronounced  in the bony pelvis are non-FDG avid.    3. Hypermetabolic 0.6 cm subcutaneous nodule within the right proximal  anterior leg warrants correlation with clinical inspection.    I have personally reviewed the examination and initial interpretation  and I agree with the findings.    ANIBAL RAMESH MD      SYSTEM ID:  [id number]      understood the above assessment and recommendations. Multiple questions answered. No barriers to learning identified.    Known issues that I take into account for medical decisions, with salient changes to the plan considering these complexities noted above.    Patient Active Problem List   Diagnosis    Pneumonia of both lungs due to infectious organism, unspecified part of lung    Multiple myeloma (H)    Mass of thoracic vertebra    Immunosuppressed due to chemotherapy     Lumbosacral radiculopathy at L5    Pneumonitis    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    Anxiety    Organic insomnia    Chest  pain due to myocardial ischemia, unspecified ischemic chest pain type    Severe aortic stenosis --S/P TAVR on 10/24/23    Benign essential hypertension    Hyperlipidemia    Autologous donor of stem cells     ------------------------------------------------------------------------------------------------------------------------------------------------    Patient Care Team         Relationship Specialty Notifications Start End    Cipriano Fuentes PA-C PCP - General Physician Assistant  8/21/23     Phone: [phone] Fax: [phone] [address] [facility], [facility] [address] [place] [address]    Arcelia Sheldon Consulting Physician Radiation Oncology  4/25/23     Phone: [phone] Fax: [phone]         [facility] [address] [facility] [address]    Adalberto Nelson DO Physician Cardiovascular Disease  7/20/23     Phone: [phone] Fax: [phone]         [address] [facility] [address]    Miguel Harrell MD Assigned PCP   8/16/23     Phone: [phone] Fax: [phone] [address]  [facility] [address]    Catalina Perez MD Assigned Heart and Vascular Provider   10/7/23     Phone: [phone] Fax: [phone]         [address] [facility] [address] [facility] [address]    Bozena Ritter, RN BMT Nurse Coordinator Transplant Admissions 12/11/23     Dr. Frey's Nurse Coordinator    Jerri Frey MD BMT Physician Transplant  12/21/23     Phone: [phone] Fax: [phone]         [address] [place] [address] [address] [facility] [address]    Nikole Gatica, [facility]   Admissions 12/21/23     Phone: [phone] Pager: [phone]

## 2023-12-28 NOTE — PROGRESS NOTES
Reviewed upcoming appointments with patient. Scheduled as follows:    12/28 - 1030 Pentamidine (nothing the week of 1/1, that works with his schedule)   12/30 - 730 GCSF   12/31 - 730 GCSF   1/1 - 730 GCSF     1/2 - 700 Labs, 900 SHAHANA, 1200 check in for a 100 line placement [address] [facility]     1/3 - 800 Collections, 1100 SHAHANA   1/4 - 800 Collections, 830 SHAHANA   1/5 - 800 Collections, 900 SHAHANA     Reviewed NPO status and Hiblicens scrub for line placement. Encouraged to call with any questions or concerns.   O-T Advancement Flap Text: The defect edges were debeveled with a #15c scalpel blade.  Given the location of the defect, shape of the defect and the proximity to free margins an O-T advancement flap was deemed most appropriate.  Using a sterile surgical marker, an appropriate advancement flap was drawn incorporating the defect and placing the expected incisions within the relaxed skin tension lines where possible.    The area thus outlined was incised deep to adipose tissue with a #15 scalpel blade.  The skin margins were undermined to an appropriate distance in all directions utilizing iris scissors.  Following this, the designed flap was advanced and carried over into the primary defect and sutured into place.

## 2023-12-29 ENCOUNTER — TELEPHONE (OUTPATIENT)
Dept: CARDIOLOGY | Facility: CLINIC | Age: 68
End: 2023-12-29
Payer: MEDICARE

## 2023-12-29 ENCOUNTER — DOCUMENTATION ONLY (OUTPATIENT)
Dept: OTHER | Facility: CLINIC | Age: 68
End: 2023-12-29
Payer: MEDICARE

## 2023-12-29 DIAGNOSIS — I10 BENIGN ESSENTIAL HYPERTENSION: Primary | ICD-10-CM

## 2023-12-29 DIAGNOSIS — Z95.2 S/P TAVR (TRANSCATHETER AORTIC VALVE REPLACEMENT): ICD-10-CM

## 2023-12-29 DIAGNOSIS — I10 BENIGN ESSENTIAL HYPERTENSION: ICD-10-CM

## 2023-12-29 RX ORDER — METOPROLOL SUCCINATE 50 MG/1
50 TABLET, EXTENDED RELEASE ORAL DAILY
Qty: 90 TABLET | Refills: 3 | Status: SHIPPED | OUTPATIENT
Start: 2023-12-29 | End: 2024-01-19

## 2023-12-29 RX ORDER — METOPROLOL SUCCINATE 25 MG/1
50 TABLET, EXTENDED RELEASE ORAL DAILY
Start: 2023-12-29 | End: 2023-12-29 | Stop reason: DRUGHIGH

## 2023-12-29 NOTE — TELEPHONE ENCOUNTER
===View-only below this line===  ----- Message -----  From: Juliet Ventura PA-C  Sent: 12/28/2023   4:37 PM CST  To: Shanell Gill RN; Jerri Frey MD; *  Subject: FW: ASA and anti-hypertensive                    Hi,    Thank you for reaching out. I have Cc'd one of our valve nurses to the message to help. We can trial increasing his metoprolol to 50 mg daily. He should monitor his blood pressure daily at home and keep a log. If he is still persistently elevated I would recommend increasing his Losartan.    Coming off the aspirin is not ideal but ok to do if needed temporarily.    Please let me know if you have any other questions or concerns.    Thank you,    Juliet    ----- Message -----  From: Ang Nice MD  Sent: 12/28/2023   3:07 PM CST  To: Juliet Ventura PA-C; Jerri Frey MD  Subject: ASA and anti-hypertensive                        PAULA Oneal and Dr. Frey saw the patient today. He is cleared for autologous stem cell transplant which we plan to proceed in the beginning of January.    He reports high BP recently, mostly 150s/70s but can be in 170s as well. Want to get you input of adjustment of the medication since you are following (we anticipate worsening hypertension with steroids and G-CSF treatment).    Also, we are planning to hold-off on aspirin during the time of transplantation, about 5 days prior and potentially up to 2 weeks post-transplant (anticipate severe thrombocytopenia during that time). Just want to touch base if that would be okay from a cardiology standpoint.    Thank you,  Chantel Nice MD  Hematology/Medical Oncology/BMT (PGY-5)  P: [phone]

## 2023-12-29 NOTE — TELEPHONE ENCOUNTER
[employer] message sent to patient with instructions. Medication list updated. Will contact patient in 1-2 weeks after increased dose to reassess BP.     Shanell Gill RN on 12/29/2023 at 8:24 AM

## 2023-12-30 ENCOUNTER — INFUSION THERAPY VISIT (OUTPATIENT)
Dept: TRANSPLANT | Facility: CLINIC | Age: 68
End: 2023-12-30
Payer: MEDICARE

## 2023-12-30 VITALS
HEART RATE: 75 BPM | RESPIRATION RATE: 18 BRPM | SYSTOLIC BLOOD PRESSURE: 146 MMHG | DIASTOLIC BLOOD PRESSURE: 76 MMHG | OXYGEN SATURATION: 95 % | TEMPERATURE: 98.1 F

## 2023-12-30 DIAGNOSIS — C90.00 MULTIPLE MYELOMA, REMISSION STATUS UNSPECIFIED (H): Primary | ICD-10-CM

## 2023-12-30 DIAGNOSIS — Z52.011 AUTOLOGOUS DONOR OF STEM CELLS: ICD-10-CM

## 2023-12-30 LAB
DEPRECATED CALCIDIOL+CALCIFEROL SERPL-MC: <47 UG/L (ref 20–75)
VITAMIN D2 SERPL-MCNC: <5 UG/L
VITAMIN D3 SERPL-MCNC: 42 UG/L

## 2023-12-30 PROCEDURE — 250N000011 HC RX IP 250 OP 636: Mod: JZ,JB

## 2023-12-30 PROCEDURE — 96372 THER/PROPH/DIAG INJ SC/IM: CPT

## 2023-12-30 RX ORDER — ALBUTEROL SULFATE 0.83 MG/ML
2.5 SOLUTION RESPIRATORY (INHALATION)
Status: CANCELLED | OUTPATIENT
Start: 2024-01-06 | End: 2024-01-06

## 2023-12-30 RX ORDER — PLERIXAFOR 24 MG/1.2ML
0.24 SOLUTION SUBCUTANEOUS DAILY PRN
Status: CANCELLED
Start: 2024-01-06

## 2023-12-30 RX ORDER — PENTAMIDINE ISETHIONATE 300 MG/300MG
300 INHALANT RESPIRATORY (INHALATION)
Status: CANCELLED | OUTPATIENT
Start: 2024-01-06 | End: 2024-01-06

## 2023-12-30 RX ORDER — HEPARIN SODIUM (PORCINE) LOCK FLUSH IV SOLN 100 UNIT/ML 100 UNIT/ML
5 SOLUTION INTRAVENOUS
Status: CANCELLED | OUTPATIENT
Start: 2024-01-06

## 2023-12-30 RX ORDER — HEPARIN SODIUM,PORCINE 10 UNIT/ML
5-20 VIAL (ML) INTRAVENOUS DAILY PRN
Status: CANCELLED | OUTPATIENT
Start: 2024-01-06

## 2023-12-30 RX ADMIN — FILGRASTIM-AAFI 1080 MCG: 480 INJECTION, SOLUTION INTRAVENOUS; SUBCUTANEOUS at 07:45

## 2023-12-30 ASSESSMENT — PAIN SCALES - GENERAL: PAINLEVEL: MILD PAIN (2)

## 2023-12-30 NOTE — PROGRESS NOTES
First dose GCSF Nursing Note:  Billy Carroll presents today for first dose GCSF.    Patient seen by provider today: No   present during visit today: Not Applicable.    Pregnancy Status: Pregnancy Status: Not Applicable - male patient    Note: Pt here today for first dose GCSF prior to collections. Meds and allergies reviewed. VSS. Pt reports feeling well today and denies pain, fever/chills, bleeding, n/v/d, or respiratory symptoms. See assessment flowsheet for details. Pt was educated on medication side effects/symptoms. All questions answered and pt verbalized understanding. Injection was administered in left lower abdomen. Pt was monitored for 15 minutes post injection.     Post Injection Assessment:  Patient tolerated injection without incident.  Site patent and intact, free from redness, edema or discomfort.     Discharge Plan:   Patient discharged in stable condition accompanied by: self.  Departure Mode: Ambulatory.      Xin Dejesus RN

## 2023-12-31 ENCOUNTER — INFUSION THERAPY VISIT (OUTPATIENT)
Dept: TRANSPLANT | Facility: CLINIC | Age: 68
End: 2023-12-31
Payer: MEDICARE

## 2023-12-31 ENCOUNTER — TELEPHONE (OUTPATIENT)
Dept: ONCOLOGY | Facility: CLINIC | Age: 68
End: 2023-12-31

## 2023-12-31 VITALS
OXYGEN SATURATION: 98 % | HEART RATE: 76 BPM | SYSTOLIC BLOOD PRESSURE: 156 MMHG | DIASTOLIC BLOOD PRESSURE: 83 MMHG | RESPIRATION RATE: 18 BRPM | TEMPERATURE: 98.8 F

## 2023-12-31 DIAGNOSIS — C90.00 MULTIPLE MYELOMA, REMISSION STATUS UNSPECIFIED (H): Primary | ICD-10-CM

## 2023-12-31 DIAGNOSIS — Z52.011 AUTOLOGOUS DONOR OF STEM CELLS: ICD-10-CM

## 2023-12-31 PROCEDURE — 250N000011 HC RX IP 250 OP 636: Mod: JZ

## 2023-12-31 PROCEDURE — 96372 THER/PROPH/DIAG INJ SC/IM: CPT

## 2023-12-31 RX ORDER — HEPARIN SODIUM (PORCINE) LOCK FLUSH IV SOLN 100 UNIT/ML 100 UNIT/ML
5 SOLUTION INTRAVENOUS
Status: CANCELLED | OUTPATIENT
Start: 2024-01-06

## 2023-12-31 RX ORDER — HEPARIN SODIUM,PORCINE 10 UNIT/ML
5-20 VIAL (ML) INTRAVENOUS DAILY PRN
Status: CANCELLED | OUTPATIENT
Start: 2024-01-06

## 2023-12-31 RX ORDER — PENTAMIDINE ISETHIONATE 300 MG/300MG
300 INHALANT RESPIRATORY (INHALATION)
Status: CANCELLED | OUTPATIENT
Start: 2024-01-06 | End: 2024-01-06

## 2023-12-31 RX ORDER — ALBUTEROL SULFATE 0.83 MG/ML
2.5 SOLUTION RESPIRATORY (INHALATION)
Status: CANCELLED | OUTPATIENT
Start: 2024-01-06 | End: 2024-01-06

## 2023-12-31 RX ORDER — PLERIXAFOR 24 MG/1.2ML
0.24 SOLUTION SUBCUTANEOUS DAILY PRN
Status: CANCELLED
Start: 2024-01-06

## 2023-12-31 RX ADMIN — FILGRASTIM-AAFI 1080 MCG: 480 INJECTION, SOLUTION INTRAVENOUS; SUBCUTANEOUS at 07:37

## 2023-12-31 ASSESSMENT — PAIN SCALES - GENERAL: PAINLEVEL: MODERATE PAIN (4)

## 2023-12-31 NOTE — PROGRESS NOTES
Infusion Nursing Note:  Billy Carroll presents today for scheduled injection.    Patient seen by provider today: No   present during visit today: Not Applicable.    Note: Patient received GCSF per therapy plan.      Intravenous Access:  No Intravenous access/labs at this visit.    Treatment Conditions:  Not Applicable.      Post Infusion Assessment:  Patient tolerated injection without incident.       Discharge Plan:   Patient and/or family verbalized understanding of discharge instructions and all questions answered.      Hellen Burton RN

## 2024-01-01 ENCOUNTER — INFUSION THERAPY VISIT (OUTPATIENT)
Dept: TRANSPLANT | Facility: CLINIC | Age: 69
End: 2024-01-01
Payer: MEDICARE

## 2024-01-01 ENCOUNTER — ONCOLOGY VISIT (OUTPATIENT)
Dept: TRANSPLANT | Facility: CLINIC | Age: 69
End: 2024-01-01
Payer: MEDICARE

## 2024-01-01 VITALS
OXYGEN SATURATION: 94 % | RESPIRATION RATE: 16 BRPM | SYSTOLIC BLOOD PRESSURE: 188 MMHG | HEART RATE: 91 BPM | DIASTOLIC BLOOD PRESSURE: 91 MMHG

## 2024-01-01 DIAGNOSIS — C90.01 MULTIPLE MYELOMA IN REMISSION (H): Primary | ICD-10-CM

## 2024-01-01 DIAGNOSIS — Z52.011 AUTOLOGOUS DONOR OF STEM CELLS: ICD-10-CM

## 2024-01-01 DIAGNOSIS — C90.00 MULTIPLE MYELOMA, REMISSION STATUS UNSPECIFIED (H): Primary | ICD-10-CM

## 2024-01-01 PROCEDURE — 99213 OFFICE O/P EST LOW 20 MIN: CPT

## 2024-01-01 PROCEDURE — G0463 HOSPITAL OUTPT CLINIC VISIT: HCPCS

## 2024-01-01 PROCEDURE — 96372 THER/PROPH/DIAG INJ SC/IM: CPT

## 2024-01-01 PROCEDURE — 250N000011 HC RX IP 250 OP 636: Mod: JZ,JB

## 2024-01-01 PROCEDURE — G0463 HOSPITAL OUTPT CLINIC VISIT: HCPCS | Mod: 25

## 2024-01-01 RX ORDER — HEPARIN SODIUM (PORCINE) LOCK FLUSH IV SOLN 100 UNIT/ML 100 UNIT/ML
5 SOLUTION INTRAVENOUS
Status: CANCELLED | OUTPATIENT
Start: 2024-01-07

## 2024-01-01 RX ORDER — ALBUTEROL SULFATE 0.83 MG/ML
2.5 SOLUTION RESPIRATORY (INHALATION)
Status: CANCELLED | OUTPATIENT
Start: 2024-01-07 | End: 2024-01-07

## 2024-01-01 RX ORDER — PLERIXAFOR 24 MG/1.2ML
0.24 SOLUTION SUBCUTANEOUS DAILY PRN
Status: CANCELLED
Start: 2024-01-07

## 2024-01-01 RX ORDER — PENTAMIDINE ISETHIONATE 300 MG/300MG
300 INHALANT RESPIRATORY (INHALATION)
Status: CANCELLED | OUTPATIENT
Start: 2024-01-07 | End: 2024-01-07

## 2024-01-01 RX ORDER — HEPARIN SODIUM,PORCINE 10 UNIT/ML
5-20 VIAL (ML) INTRAVENOUS DAILY PRN
Status: CANCELLED | OUTPATIENT
Start: 2024-01-07

## 2024-01-01 RX ADMIN — FILGRASTIM-AAFI 1080 MCG: 480 INJECTION, SOLUTION INTRAVENOUS; SUBCUTANEOUS at 08:17

## 2024-01-01 ASSESSMENT — PAIN SCALES - GENERAL: PAINLEVEL: MILD PAIN (2)

## 2024-01-01 NOTE — CONFIDENTIAL NOTE
Brief Hematology-Oncology Note    68-year-old man with Ig kappa multiple myeloma currently undergoing G-CSF mobilization in anticipation of auto transplantation. He most recently received a dose of filgrastim 12/30 and 12/31.    He calls in because of bony pain, and some associated muscle spasms (he has a history of muscle spasms as well).    He has no fever, coughing, nausea/vomiting, diarrhea, skin/joint changes.    Pain may be related to mobilization.    Patient plans to try some cyclobenzaprine, which he has at home for muscle spasm control. Trial of loratadine is also an option, which patient is willing to do.      He has in-clinic follow up on 01/02/2024.      Discussed with Dr Capmos.    Rodo Freitas  PGY4  Hematology, Oncology, and Transplantation  [phone]

## 2024-01-01 NOTE — NURSING NOTE
"Oncology Rooming Note    January 1, 2024 8:04 AM   Billy Carroll is a 68 year old male who presents for:    Chief Complaint   Patient presents with    Oncology Clinic Visit     Pre bmt for MM here for 3rd  GCSF inj     Initial Vitals: BP (!) 188/91   Pulse 91   Resp 16   SpO2 94%  Estimated body mass index is 31.71 kg/m  as calculated from the following:    Height as of 12/22/23: 1.778 m (5' 10\").    Weight as of 12/28/23: 100.2 kg (221 lb). There is no height or weight on file to calculate BSA.  Mild Pain (2) Comment: Data Unavailable   No LMP for male patient.  Allergies reviewed: Yes  Medications reviewed: Yes    Medications: Medication refills not needed today.  Pharmacy name entered into [employer]:    [id number] [place], [place] - [address] [facility] [place], [place] - [address]    Frailty Screening:   Is the patient here for a new oncology consult visit in cancer care? 2. No      Clinical concerns: pt reports excruciating muscle pain and  a'panic attack\" reaction yesterday after 2nd gcsf inj.  States he took claritin, called BMT fellow on call, and took cyclobenzaprine Pt ppt was mere able to sleep at 4:30 am Dr Bull notified      Corine Gray RN              "

## 2024-01-01 NOTE — PROGRESS NOTES
Infusion Nursing Note:  Billy Carroll presents today for   Chief Complaint   Patient presents with    Oncology Clinic Visit     Pre bmt for MM here for 3rd  GCSF inj    .    Patient seen by provider today: Yes: add on Dr Arredondo appt   present during visit today: Not Applicable.    Note: pt received nivestym inj today.      Intravenous Access:  No Intravenous access/labs at this visit.    Treatment Conditions:  Not Applicable.      Post Infusion Assessment:  Patient tolerated injection without incident.       Discharge Plan:   Patient and/or family verbalized understanding of discharge instructions and all questions answered.      Corine Gray RN

## 2024-01-01 NOTE — PROGRESS NOTES
BMT Progress Note    Interval history:  Mr. Carroll is seen today as an add-on after he had significant diffuse bone pain and muscle spasms yesterday.  He has had back pain and muscle spasms in the past but not recently.  Pain is improved currently but he is aware that GCSF can cause these kinds of issues.  He wonders how to manage if it recurs with his next dose today.  He denies numbness/tingling in his extremities, weakness, loss of bowel/bladder control, chest pain, SOB or other associated symptoms.  He feels well currently but would like advice on symptom management if symptoms recur as he feels very anxious.  He was not able to sleep much last night.    Vitals reviewed in flowsheets, notable for elevated BP.  General Appearance: Sitting up in chair, no acute distress  CV: RRR  Skin: No rash, no jaundice  Musculoskeletal: Full ROM in torso and bilateral upper/lower extremities, gait normal, normal strength, no overt bony abnormalities    Assessment:  68 year old man with multiple myeloma, currently undergoing mobilization for HSCT.  Diffuse pain is likely secondary to GCSF and exam is reassuring today.  BP is elevated in clinic, likely secondary to discomfort with GCSF and anxiety, recheck today and again tomorrow.    Plan:  - Continue claritin scheduled BID  - Schedule acetaminophen   - Ok to use oxycodone for bone pain; he has a supply of this already from his myeloma diagnosis which he does not regularly use so does not want a prescription today  - Ok to use cyclobenzaprine if muscle spasms recur  - Call if pain is unmanageable at home    I spent 20 minutes on the date of the encounter doing chart review, history and  exam, documentation and further activities as noted above.    Diallo Mascorro MD

## 2024-01-01 NOTE — LETTER
1/1/2024         RE: Billy Carroll  [address] [place] [address]        Dear Colleague,    Thank you for referring your patient, Billy Carroll, to the [facility] BLOOD AND MARROW TRANSPLANT PROGRAM [place]. Please see a copy of my visit note below.    BMT Progress Note    Interval history:  Mr. Carroll is seen today as an add-on after he had significant diffuse bone pain and muscle spasms yesterday.  He has had back pain and muscle spasms in the past but not recently.  Pain is improved currently but he is aware that GCSF can cause these kinds of issues.  He wonders how to manage if it recurs with his next dose today.  He denies numbness/tingling in his extremities, weakness, loss of bowel/bladder control, chest pain, SOB or other associated symptoms.  He feels well currently but would like advice on symptom management if symptoms recur as he feels very anxious.  He was not able to sleep much last night.    Vitals reviewed in flowsheets, notable for elevated BP.  General Appearance: Sitting up in chair, no acute distress  CV: RRR  Skin: No rash, no jaundice  Musculoskeletal: Full ROM in torso and bilateral upper/lower extremities, gait normal, normal strength, no overt bony abnormalities    Assessment:  68 year old man with multiple myeloma, currently undergoing mobilization for HSCT.  Diffuse pain is likely secondary to GCSF and exam is reassuring today.  BP is elevated in clinic, likely secondary to discomfort with GCSF and anxiety, recheck today and again tomorrow.    Plan:  - Continue claritin scheduled BID  - Schedule acetaminophen   - Ok to use oxycodone for bone pain; he has a supply of this already from his myeloma diagnosis which he does not regularly use so does not want a prescription today  - Ok to use cyclobenzaprine if muscle spasms recur  - Call if pain is unmanageable at home    I spent 20 minutes on the date of the encounter doing chart review, history and  exam,  documentation and further activities as noted above.    Diallo Mascorro MD

## 2024-01-02 ENCOUNTER — HOME INFUSION (PRE-WILLOW HOME INFUSION) (OUTPATIENT)
Dept: PHARMACY | Facility: CLINIC | Age: 69
End: 2024-01-02

## 2024-01-02 ENCOUNTER — APPOINTMENT (OUTPATIENT)
Dept: LAB | Facility: CLINIC | Age: 69
End: 2024-01-02
Attending: PHYSICIAN ASSISTANT
Payer: MEDICARE

## 2024-01-02 ENCOUNTER — ONCOLOGY VISIT (OUTPATIENT)
Dept: TRANSPLANT | Facility: CLINIC | Age: 69
End: 2024-01-02
Attending: PHYSICIAN ASSISTANT
Payer: MEDICARE

## 2024-01-02 ENCOUNTER — HOSPITAL ENCOUNTER (OUTPATIENT)
Dept: INTERVENTIONAL RADIOLOGY/VASCULAR | Facility: CLINIC | Age: 69
Discharge: HOME | End: 2024-01-02
Payer: MEDICARE

## 2024-01-02 VITALS
RESPIRATION RATE: 16 BRPM | OXYGEN SATURATION: 94 % | SYSTOLIC BLOOD PRESSURE: 159 MMHG | DIASTOLIC BLOOD PRESSURE: 84 MMHG | HEART RATE: 80 BPM | WEIGHT: 218.5 LBS | BODY MASS INDEX: 31.35 KG/M2 | TEMPERATURE: 98.2 F

## 2024-01-02 VITALS
RESPIRATION RATE: 23 BRPM | DIASTOLIC BLOOD PRESSURE: 80 MMHG | SYSTOLIC BLOOD PRESSURE: 148 MMHG | OXYGEN SATURATION: 91 % | HEART RATE: 75 BPM

## 2024-01-02 DIAGNOSIS — C90.00 MULTIPLE MYELOMA, REMISSION STATUS UNSPECIFIED (H): Primary | ICD-10-CM

## 2024-01-02 DIAGNOSIS — C90.00 MULTIPLE MYELOMA, REMISSION STATUS UNSPECIFIED (H): ICD-10-CM

## 2024-01-02 DIAGNOSIS — Z52.011 AUTOLOGOUS DONOR OF STEM CELLS: ICD-10-CM

## 2024-01-02 DIAGNOSIS — C90.01 MULTIPLE MYELOMA IN REMISSION (H): ICD-10-CM

## 2024-01-02 LAB
BASOPHILS # BLD AUTO: ABNORMAL 10*3/UL
BASOPHILS # BLD MANUAL: 0 10E3/UL (ref 0–0.2)
BASOPHILS NFR BLD AUTO: ABNORMAL %
BASOPHILS NFR BLD MANUAL: 0 %
CD34 ABSOLUTE COUNT COMMENT: NORMAL
CD34 CELLS # SPEC: 36 CELLS/UL
CD34 CELLS NFR SPEC: 0.11 %
CULTURE HARVEST COMPLETE DATE: NORMAL
CULTURE HARVEST COMPLETE DATE: NORMAL
EOSINOPHIL # BLD AUTO: ABNORMAL 10*3/UL
EOSINOPHIL # BLD MANUAL: 1 10E3/UL (ref 0–0.7)
EOSINOPHIL NFR BLD AUTO: ABNORMAL %
EOSINOPHIL NFR BLD MANUAL: 3 %
ERYTHROCYTE [DISTWIDTH] IN BLOOD BY AUTOMATED COUNT: 13.6 % (ref 10–15)
HCT VFR BLD AUTO: 39.6 % (ref 40–53)
HGB BLD-MCNC: 13.1 G/DL (ref 13.3–17.7)
IMM GRANULOCYTES # BLD: ABNORMAL 10*3/UL
IMM GRANULOCYTES NFR BLD: ABNORMAL %
INTERPRETATION: NORMAL
LYMPHOCYTES # BLD AUTO: ABNORMAL 10*3/UL
LYMPHOCYTES # BLD MANUAL: 1.4 10E3/UL (ref 0.8–5.3)
LYMPHOCYTES NFR BLD AUTO: ABNORMAL %
LYMPHOCYTES NFR BLD MANUAL: 4 %
Lab: NORMAL
MCH RBC QN AUTO: 30.5 PG (ref 26.5–33)
MCHC RBC AUTO-ENTMCNC: 33.1 G/DL (ref 31.5–36.5)
MCV RBC AUTO: 92 FL (ref 78–100)
MONOCYTES # BLD AUTO: ABNORMAL 10*3/UL
MONOCYTES # BLD MANUAL: 1.7 10E3/UL (ref 0–1.3)
MONOCYTES NFR BLD AUTO: ABNORMAL %
MONOCYTES NFR BLD MANUAL: 5 %
MYELOCYTES # BLD MANUAL: 0.3 10E3/UL
MYELOCYTES NFR BLD MANUAL: 1 %
NEUTROPHILS # BLD AUTO: ABNORMAL 10*3/UL
NEUTROPHILS # BLD MANUAL: 29.4 10E3/UL (ref 1.6–8.3)
NEUTROPHILS NFR BLD AUTO: ABNORMAL %
NEUTROPHILS NFR BLD MANUAL: 87 %
NRBC # BLD AUTO: 0.2 10E3/UL
NRBC # BLD AUTO: 0.3 10E3/UL
NRBC BLD AUTO-RTO: 1 /100
NRBC BLD MANUAL-RTO: 1 %
PATH REPORT.COMMENTS IMP SPEC: ABNORMAL
PATH REPORT.COMMENTS IMP SPEC: YES
PATH REPORT.FINAL DX SPEC: ABNORMAL
PATH REPORT.GROSS SPEC: ABNORMAL
PATH REPORT.MICROSCOPIC SPEC OTHER STN: ABNORMAL
PATH REPORT.RELEVANT HX SPEC: ABNORMAL
PATH REPORT.RELEVANT HX SPEC: ABNORMAL
PATH REPORT.SITE OF ORIGIN SPEC: ABNORMAL
PERFORMING LABORATORY: NORMAL
PLAT MORPH BLD: ABNORMAL
PLATELET # BLD AUTO: 176 10E3/UL (ref 150–450)
PRODUCT NUMBER FLOW CYTOMETRY: NORMAL
RBC # BLD AUTO: 4.29 10E6/UL (ref 4.4–5.9)
RBC MORPH BLD: ABNORMAL
SPECIMEN STATUS: NORMAL
TEST NAME: NORMAL
VIABLE CD34 CELLS NFR FLD: 93.96 %
WBC # BLD AUTO: 33.8 10E3/UL (ref 4–11)

## 2024-01-02 PROCEDURE — 99215 OFFICE O/P EST HI 40 MIN: CPT

## 2024-01-02 PROCEDURE — G0463 HOSPITAL OUTPT CLINIC VISIT: HCPCS | Mod: 25

## 2024-01-02 PROCEDURE — 85027 COMPLETE CBC AUTOMATED: CPT

## 2024-01-02 PROCEDURE — 85007 BL SMEAR W/DIFF WBC COUNT: CPT

## 2024-01-02 PROCEDURE — 96372 THER/PROPH/DIAG INJ SC/IM: CPT

## 2024-01-02 PROCEDURE — 250N000009 HC RX 250: Performed by: NURSE PRACTITIONER

## 2024-01-02 PROCEDURE — 250N000011 HC RX IP 250 OP 636: Performed by: RADIOLOGY

## 2024-01-02 PROCEDURE — 250N000011 HC RX IP 250 OP 636: Mod: JZ

## 2024-01-02 PROCEDURE — 250N000009 HC RX 250: Performed by: RADIOLOGY

## 2024-01-02 PROCEDURE — 99152 MOD SED SAME PHYS/QHP 5/>YRS: CPT

## 2024-01-02 PROCEDURE — 77001 FLUOROGUIDE FOR VEIN DEVICE: CPT

## 2024-01-02 PROCEDURE — 86367 STEM CELLS TOTAL COUNT: CPT

## 2024-01-02 PROCEDURE — 36415 COLL VENOUS BLD VENIPUNCTURE: CPT

## 2024-01-02 PROCEDURE — 250N000011 HC RX IP 250 OP 636: Performed by: NURSE PRACTITIONER

## 2024-01-02 PROCEDURE — C1750 CATH, HEMODIALYSIS,LONG-TERM: HCPCS

## 2024-01-02 PROCEDURE — 272N000500 HC NEEDLE CR2

## 2024-01-02 PROCEDURE — C1769 GUIDE WIRE: HCPCS

## 2024-01-02 RX ORDER — SODIUM CHLORIDE 9 MG/ML
INJECTION, SOLUTION INTRAVENOUS CONTINUOUS
Status: DISCONTINUED | OUTPATIENT
Start: 2024-01-02 | End: 2024-01-03 | Stop reason: HOSPADM

## 2024-01-02 RX ORDER — FLUMAZENIL 0.1 MG/ML
0.2 INJECTION, SOLUTION INTRAVENOUS
Status: DISCONTINUED | OUTPATIENT
Start: 2024-01-02 | End: 2024-01-03 | Stop reason: HOSPADM

## 2024-01-02 RX ORDER — ONDANSETRON 2 MG/ML
4 INJECTION INTRAMUSCULAR; INTRAVENOUS
Status: DISCONTINUED | OUTPATIENT
Start: 2024-01-02 | End: 2024-01-03 | Stop reason: HOSPADM

## 2024-01-02 RX ORDER — CEFAZOLIN SODIUM/WATER 2 G/20 ML
2 SYRINGE (ML) INTRAVENOUS
Status: COMPLETED | OUTPATIENT
Start: 2024-01-02 | End: 2024-01-02

## 2024-01-02 RX ORDER — NALOXONE HYDROCHLORIDE 0.4 MG/ML
0.2 INJECTION, SOLUTION INTRAMUSCULAR; INTRAVENOUS; SUBCUTANEOUS
Status: DISCONTINUED | OUTPATIENT
Start: 2024-01-02 | End: 2024-01-03 | Stop reason: HOSPADM

## 2024-01-02 RX ORDER — FENTANYL CITRATE 50 UG/ML
25-50 INJECTION, SOLUTION INTRAMUSCULAR; INTRAVENOUS EVERY 5 MIN PRN
Status: DISCONTINUED | OUTPATIENT
Start: 2024-01-02 | End: 2024-01-03 | Stop reason: HOSPADM

## 2024-01-02 RX ORDER — NALOXONE HYDROCHLORIDE 0.4 MG/ML
0.4 INJECTION, SOLUTION INTRAMUSCULAR; INTRAVENOUS; SUBCUTANEOUS
Status: DISCONTINUED | OUTPATIENT
Start: 2024-01-02 | End: 2024-01-03 | Stop reason: HOSPADM

## 2024-01-02 RX ORDER — ALBUTEROL SULFATE 0.83 MG/ML
2.5 SOLUTION RESPIRATORY (INHALATION)
Status: CANCELLED | OUTPATIENT
Start: 2024-01-08 | End: 2024-01-08

## 2024-01-02 RX ORDER — HEPARIN SODIUM 1000 [USP'U]/ML
4500 INJECTION, SOLUTION INTRAVENOUS; SUBCUTANEOUS ONCE
Status: COMPLETED | OUTPATIENT
Start: 2024-01-02 | End: 2024-01-02

## 2024-01-02 RX ORDER — LIDOCAINE 40 MG/G
CREAM TOPICAL
Status: DISCONTINUED | OUTPATIENT
Start: 2024-01-02 | End: 2024-01-03 | Stop reason: HOSPADM

## 2024-01-02 RX ORDER — HEPARIN SODIUM,PORCINE 10 UNIT/ML
5-20 VIAL (ML) INTRAVENOUS DAILY PRN
Status: CANCELLED | OUTPATIENT
Start: 2024-01-08

## 2024-01-02 RX ORDER — PENTAMIDINE ISETHIONATE 300 MG/300MG
300 INHALANT RESPIRATORY (INHALATION)
Status: CANCELLED | OUTPATIENT
Start: 2024-01-08 | End: 2024-01-08

## 2024-01-02 RX ORDER — PLERIXAFOR 24 MG/1.2ML
0.24 SOLUTION SUBCUTANEOUS DAILY PRN
Status: CANCELLED
Start: 2024-01-08

## 2024-01-02 RX ORDER — HEPARIN SODIUM (PORCINE) LOCK FLUSH IV SOLN 100 UNIT/ML 100 UNIT/ML
5 SOLUTION INTRAVENOUS
Status: CANCELLED | OUTPATIENT
Start: 2024-01-08

## 2024-01-02 RX ADMIN — FILGRASTIM-AAFI 1080 MCG: 480 INJECTION, SOLUTION INTRAVENOUS; SUBCUTANEOUS at 09:43

## 2024-01-02 RX ADMIN — HEPARIN SODIUM 4500 UNITS: 1000 INJECTION INTRAVENOUS; SUBCUTANEOUS at 13:32

## 2024-01-02 RX ADMIN — FENTANYL CITRATE 50 MCG: 50 INJECTION, SOLUTION INTRAMUSCULAR; INTRAVENOUS at 13:25

## 2024-01-02 RX ADMIN — MIDAZOLAM 1 MG: 1 INJECTION INTRAMUSCULAR; INTRAVENOUS at 13:25

## 2024-01-02 RX ADMIN — LIDOCAINE HYDROCHLORIDE 10 ML: 10 INJECTION, SOLUTION INFILTRATION; PERINEURAL at 13:28

## 2024-01-02 RX ADMIN — Medication 2 G: at 13:18

## 2024-01-02 RX ADMIN — MIDAZOLAM 1 MG: 1 INJECTION INTRAMUSCULAR; INTRAVENOUS at 13:30

## 2024-01-02 RX ADMIN — FENTANYL CITRATE 50 MCG: 50 INJECTION, SOLUTION INTRAMUSCULAR; INTRAVENOUS at 13:30

## 2024-01-02 RX ADMIN — SODIUM CHLORIDE 500 ML: 9 INJECTION, SOLUTION INTRAVENOUS at 13:36

## 2024-01-02 ASSESSMENT — PAIN SCALES - GENERAL: PAINLEVEL: MODERATE PAIN (5)

## 2024-01-02 NOTE — H&P
"  Interventional Radiology - Pre Procedure Chart Review  [facility]  01/02/2024       Tunneled apheresis/dialysis catheter placement procedure requested by Jerri Frey MD .    History and Physical Reviewed: H&P documented within 30 days (by Ang Nice MD on 12/28/23).  I have personally reviewed the patient's medical history and have updated the medical record as necessary.    HPI: Patient with multiple myeloma. Presenting today for tunneled apheresis catheter placement for planned apheresis initiation tomorrow.    Clinical notes reviewed    Pertinent medications reviewed:   Anticoagulation: none   Antibiotic: ancef signed and held for procedure.    Allergies   Allergen Reactions    Acetaminophen-Codeine Nausea    Codeine Nausea       EXAM:  There were no vitals taken for this visit.  Not assessed    LABS:  INR (no units)   Date Value   12/21/2023 0.95       Lab Results   Component Value Date    WBC 33.8 (H) 01/02/2024    HGB 13.1 (L) 01/02/2024     01/02/2024       No results found for: \"CREATININE\"    No components found for: \"K\"      PLAN:  Planning for tunneled apheresis catheter placement with sedation in IR.     Radiologist to further review procedure with patient.    EMR reviewed. No formal assessment completed.     Total time: 15 minutes       Shelly Jose PA-C  Interventional Radiology    "

## 2024-01-02 NOTE — NURSING NOTE
Chief Complaint   Patient presents with    Blood Draw     Labs drawn via  by RN in lab     Labs collected from venipuncture by RN. Vitals taken. Checked in for appointment(s).     Rosa Fox RN

## 2024-01-02 NOTE — LETTER
1/2/2024         RE: Billy Carroll  [address] [place] [address]        Dear Colleague,    Thank you for referring your patient, Billy Carroll, to the [facility] BLOOD AND MARROW TRANSPLANT PROGRAM [place]. Please see a copy of my visit note below.    BMT Progress Note    ID: Mr. Carroll is a 68 year old man Day 4 GCSF priming prior to planned auto PBSCT for MM    HPI: Significant bone pain Sunday night. Better today. Taking Claritin, Tylenol, prn muscle relaxant. No n/v/d/c. No fevers or infectious sx. Sometimes feels short of breath when waking, or sitting in lobby; not positional. He questions anxiety.    ROS: 10 point ROS neg other than the symptoms noted above in the HPI.    Exam:    General Appearance: Sitting up in chair, no acute distress  CV: RRR  Lungs: CTAB  Lymph: trace LE edema  Skin: No rash, no jaundice  No central access    Labs:  Lab Results   Component Value Date    WBC 33.8 (H) 01/02/2024    ANEU 29.4 (H) 01/02/2024    HGB 13.1 (L) 01/02/2024    HCT 39.6 (L) 01/02/2024     01/02/2024     12/21/2023    POTASSIUM 3.9 12/21/2023    CHLORIDE 104 12/21/2023    CO2 24 12/21/2023     (H) 12/21/2023    BUN 12.1 12/21/2023    CR 0.80 12/26/2023    MAG 2.3 12/21/2023    INR 0.95 12/21/2023    BILITOTAL 0.5 12/21/2023    AST 53 (H) 12/21/2023    ALT 86 (H) 12/21/2023    ALKPHOS 98 12/21/2023    PROTTOTAL 7.3 12/21/2023    ALBUMIN 4.5 12/21/2023         A/P:  Billy Carroll is a 68 year old man with IgG kappa MM with extramedullary disease, currently Day 4 GCSF priming prior to planned auto PBSCT   - PMHx significant for CAD, severe aortic stenosis s/p TAVR (10/24/2023) currently ongoing cardiac rehabilitation, HTN, and HLD.     Priming/BMT/MM  - GCSF priming  - D4 CD34: 36; proceed w collections tomorrow.  - collection goal 8 x10^6 CD34/kg (standard risk)    HEME/COAG  Risk of cytopenias due to chemotherapy and radiation  Transfusion parameters: hemoglobin <7,  platelets <10    IMMUNOCOMPROMISED  - prophy ACV, Pentamidine (12/28/23)    #hx PJP pneumonia: resume Bactrim D28 post BMT    CARDIOVASCULAR  #HTN: cont losarten, metoprolol  #Severe aortic stenosis, now s/p TAVR 10/24/2023.   #Moderate nonobstructive coronary artery disease  - denies symptoms of angina  #Hyperlipidemia - on Crestor    MUSCULOSKELETAL/FRAILTY  - bone pain from GCSF: cont Claritin with prn Tylenol, oxycodone, cyclobenzaprine (latter 2 meds are previous Rx)  Baseline Frailty Score: 2  Patient with substantial risk of sarcopenia  Cancer Rehab as needed outpatient      Summary:  GCSF  Line placement at [facility]  RTC tomorrow for 1st day apheresis    Known issues that I take into account for medical decisions, with salient changes to the plan considering these complexities noted above.    Patient Active Problem List   Diagnosis    Pneumonia of both lungs due to infectious organism, unspecified part of lung    Multiple myeloma (H)    Mass of thoracic vertebra    Immunosuppressed due to chemotherapy     Lumbosacral radiculopathy at L5    Pneumonitis    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    Anxiety    Organic insomnia    Chest pain due to myocardial ischemia, unspecified ischemic chest pain type    Severe aortic stenosis --S/P TAVR on 10/24/23    Benign essential hypertension    Hyperlipidemia    Autologous donor of stem cells       I spent 40 minutes in the care of this patient today, which included time necessary for preparation for the visit, obtaining history, ordering medications/tests/procedures as medically indicated, review of pertinent medical literature, counseling of the patient, communication of recommendations to the care team, and documentation time.    Liliya Degroot PA-C

## 2024-01-02 NOTE — PROGRESS NOTES
Therapy: line care  Insurance: Medicare/BCBS Supplement     Patient does not have coverage for line care with their Medicare and BCBS Supplement and will be self pay. Cost is $30 per week for line care supplies.    [facility] BMT in reference to 01/02/2024 benefit check for line care.    Please contact Intake with any questions, [phone] or In Basket pool, [facility] ([phone]).

## 2024-01-02 NOTE — DISCHARGE INSTRUCTIONS
Tunneled Central Catheter Discharge Instructions:  You had a tunneled central access device placed. Part of the device is outside of your body and part of the device runs under the skin into a vein. Your nurses and doctors will use the tunneled catheter for your future treatments.    Care Instructions:   - If you received sedation for your procedure, do not drive or operate heavy machinery for the rest of the day.  - Keep dialysis catheter site dry. Do not get wet.  - Never immerse your catheter in water; no swimming, hot tubs, or tub baths.  - If you experience significant bleeding at site, apply pressure with hands above the clavicle bone, sit upright.     If the catheter (tubing) breaks or leaks:  - Place the blue emergency clamp between the break and your insertion site on your skin  - If you don't have a clamp, fold or pinch off the tubing above the hole or break in the catheter (closest to your skin)    Seek medical assistance for any of the following:   - Uncontrolled bleeding.  - You have a fever (greater than 101 F (38.3C)).  - Purulent (yellow/green/foul smelling) drainage from catheter insertion site.  - Increasing redness at catheter site.  - Increasing pain at catheter site.  - Increasing swelling at catheter site.  - If you have chest pain, shortness of breath, or feel faint:  -Make sure end caps are securely tightened  -Look for a hole or leaking in the catheter  -Put the blue emergency clamp on the catheter (tubing) above the hole or break in the catheter as close to the skin as you can  -Remain calm and call 911. Explain your symptoms and say that you have a central line tunnel catheter in place  -Lie on your left side

## 2024-01-02 NOTE — PROGRESS NOTES
BMT Progress Note    ID: Mr. Carroll is a 68 year old man Day 4 GCSF priming prior to planned auto PBSCT for MM    HPI: Significant bone pain Sunday night. Better today. Taking Claritin, Tylenol, prn muscle relaxant. No n/v/d/c. No fevers or infectious sx. Sometimes feels short of breath when waking, or sitting in lobby; not positional. He questions anxiety.    ROS: 10 point ROS neg other than the symptoms noted above in the HPI.    Exam:    General Appearance: Sitting up in chair, no acute distress  CV: RRR  Lungs: CTAB  Lymph: trace LE edema  Skin: No rash, no jaundice  No central access    Labs:  Lab Results   Component Value Date    WBC 33.8 (H) 01/02/2024    ANEU 29.4 (H) 01/02/2024    HGB 13.1 (L) 01/02/2024    HCT 39.6 (L) 01/02/2024     01/02/2024     12/21/2023    POTASSIUM 3.9 12/21/2023    CHLORIDE 104 12/21/2023    CO2 24 12/21/2023     (H) 12/21/2023    BUN 12.1 12/21/2023    CR 0.80 12/26/2023    MAG 2.3 12/21/2023    INR 0.95 12/21/2023    BILITOTAL 0.5 12/21/2023    AST 53 (H) 12/21/2023    ALT 86 (H) 12/21/2023    ALKPHOS 98 12/21/2023    PROTTOTAL 7.3 12/21/2023    ALBUMIN 4.5 12/21/2023         A/P:  Billy Carroll is a 68 year old man with IgG kappa MM with extramedullary disease, currently Day 4 GCSF priming prior to planned auto PBSCT   - PMHx significant for CAD, severe aortic stenosis s/p TAVR (10/24/2023) currently ongoing cardiac rehabilitation, HTN, and HLD.     Priming/BMT/MM  - GCSF priming  - D4 CD34: 36; proceed w collections tomorrow.  - collection goal 8 x10^6 CD34/kg (standard risk)    HEME/COAG  Risk of cytopenias due to chemotherapy and radiation  Transfusion parameters: hemoglobin <7, platelets <10    IMMUNOCOMPROMISED  - prophy ACV, Pentamidine (12/28/23)    #hx PJP pneumonia: resume Bactrim D28 post BMT    CARDIOVASCULAR  #HTN: cont losarten, metoprolol  #Severe aortic stenosis, now s/p TAVR 10/24/2023.   #Moderate nonobstructive coronary artery disease  -  denies symptoms of angina  #Hyperlipidemia - on Crestor    MUSCULOSKELETAL/FRAILTY  - bone pain from GCSF: cont Claritin with prn Tylenol, oxycodone, cyclobenzaprine (latter 2 meds are previous Rx)  Baseline Frailty Score: 2  Patient with substantial risk of sarcopenia  Cancer Rehab as needed outpatient      Summary:  GCSF  Line placement at [facility] today  RTC tomorrow for 1st day apheresis    Known issues that I take into account for medical decisions, with salient changes to the plan considering these complexities noted above.    Patient Active Problem List   Diagnosis    Pneumonia of both lungs due to infectious organism, unspecified part of lung    Multiple myeloma (H)    Mass of thoracic vertebra    Immunosuppressed due to chemotherapy     Lumbosacral radiculopathy at L5    Pneumonitis    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    Anxiety    Organic insomnia    Chest pain due to myocardial ischemia, unspecified ischemic chest pain type    Severe aortic stenosis --S/P TAVR on 10/24/23    Benign essential hypertension    Hyperlipidemia    Autologous donor of stem cells       I spent 40 minutes in the care of this patient today, which included time necessary for preparation for the visit, obtaining history, ordering medications/tests/procedures as medically indicated, review of pertinent medical literature, counseling of the patient, communication of recommendations to the care team, and documentation time.    Liliya Degroot PA-C

## 2024-01-02 NOTE — PRE-PROCEDURE
GENERAL PRE-PROCEDURE:   Procedure:  Tunneled dialysis catheter placement for planned apharesis  Date/Time:  1/2/2024 1:15 PM    Written consent obtained?: Yes    Risks and benefits: Risks, benefits and alternatives were discussed    Consent given by:  Patient  Patient states understanding of procedure being performed: Yes    Patient's understanding of procedure matches consent: Yes    Procedure consent matches procedure scheduled: Yes    Expected level of sedation:  Moderate  Appropriately NPO:  Yes  Mallampati  :  Grade 1- soft palate, uvula, tonsillar pillars, and posterior pharyngeal wall visible  Lungs:  Lungs clear with good breath sounds bilaterally  Heart:  Normal heart sounds and rate  History & Physical reviewed:  History and physical reviewed and no updates needed  Statement of review:  I have reviewed the lab findings, diagnostic data, medications, and the plan for sedation

## 2024-01-02 NOTE — PROGRESS NOTES
Patient Name: Billy Carroll  Medical Record Number: [id number]  Today's Date: 1/2/2024    Procedure: Image Guided Right Tunneled Central Venous Catheter with moderate sedation  Proceduralist: Dr. Suleiman Mooney  Pathology present: n/a    Procedure Start: 1325  Procedure end: 1340  Sedation medications administered: Fentanyl 100 mcg, Versed 2 mg     Report given to: LATONYA Salgado IR  : n/a    Other Notes: Pt arrived to IR room #1 from IR Pre / Post 3. Consent reviewed. Pt denies any questions or concerns regarding procedure. Pt positioned supine and monitored per protocol. Pt tolerated procedure without any noted complications. Pt transferred back to IR Pre / Post 3.    Dr. Mooney placed 15.5 fr x 23 cm [employer] DuraFlow 2 Apheresis-capable line in the right internal jugular vein and confirmed placement of tip of catheter in atriocaval junction. Approved for immediate use.

## 2024-01-02 NOTE — NURSING NOTE
"Oncology Rooming Note    January 2, 2024 8:42 AM   Billy Carroll is a 68 year old male who presents for:    Chief Complaint   Patient presents with    Blood Draw     Labs drawn via  by RN in lab    Oncology Clinic Visit     Multiple myeloma      Initial Vitals: BP (!) 159/84 (BP Location: Right arm, Patient Position: Sitting, Cuff Size: Adult Regular)   Pulse 80   Temp 98.2  F (36.8  C) (Oral)   Resp 16   Wt 99.1 kg (218 lb 8 oz)   SpO2 94%   BMI 31.35 kg/m   Estimated body mass index is 31.35 kg/m  as calculated from the following:    Height as of 12/22/23: 1.778 m (5' 10\").    Weight as of this encounter: 99.1 kg (218 lb 8 oz). Body surface area is 2.21 meters squared.  Moderate Pain (5) Comment: Data Unavailable   No LMP for male patient.  Allergies reviewed: Yes  Medications reviewed: Yes    Medications: Medication refills not needed today.  Pharmacy name entered into [facility]:    [id number] [place], [place] - [address] [facility] [place], [place] - [address]    Frailty Screening:   Is the patient here for a new oncology consult visit in cancer care? 2. No      Clinical concerns:        Radha Jha              "

## 2024-01-03 ENCOUNTER — ONCOLOGY VISIT (OUTPATIENT)
Dept: TRANSPLANT | Facility: CLINIC | Age: 69
End: 2024-01-03
Payer: MEDICARE

## 2024-01-03 ENCOUNTER — MEDICAL CORRESPONDENCE (OUTPATIENT)
Dept: HEALTH INFORMATION MANAGEMENT | Facility: CLINIC | Age: 69
End: 2024-01-03

## 2024-01-03 ENCOUNTER — HOSPITAL ENCOUNTER (OUTPATIENT)
Dept: LAB | Facility: CLINIC | Age: 69
Discharge: HOME | End: 2024-01-03
Payer: MEDICARE

## 2024-01-03 VITALS
HEART RATE: 71 BPM | DIASTOLIC BLOOD PRESSURE: 84 MMHG | TEMPERATURE: 98.7 F | RESPIRATION RATE: 18 BRPM | SYSTOLIC BLOOD PRESSURE: 189 MMHG

## 2024-01-03 DIAGNOSIS — C90.01 MULTIPLE MYELOMA IN REMISSION (H): ICD-10-CM

## 2024-01-03 DIAGNOSIS — C90.00 MULTIPLE MYELOMA, REMISSION STATUS UNSPECIFIED (H): Primary | ICD-10-CM

## 2024-01-03 DIAGNOSIS — Z52.011 AUTOLOGOUS DONOR OF STEM CELLS: ICD-10-CM

## 2024-01-03 DIAGNOSIS — C90.01 MULTIPLE MYELOMA IN REMISSION (H): Primary | ICD-10-CM

## 2024-01-03 DIAGNOSIS — Z52.011 AUTOLOGOUS DONOR OF STEM CELLS: Primary | ICD-10-CM

## 2024-01-03 LAB
BASOPHILS # BLD AUTO: ABNORMAL 10*3/UL
BASOPHILS # BLD MANUAL: 0 10E3/UL (ref 0–0.2)
BASOPHILS NFR BLD AUTO: ABNORMAL %
BASOPHILS NFR BLD MANUAL: 0 %
BILL ONLY AUTO PBPC FREEZE: NORMAL
CD34 ABSOLUTE COUNT COMMENT: NORMAL
CD34 CELLS # SPEC: 54 CELLS/UL
CD34 CELLS NFR SPEC: 0.14 %
EOSINOPHIL # BLD AUTO: ABNORMAL 10*3/UL
EOSINOPHIL # BLD MANUAL: 0.8 10E3/UL (ref 0–0.7)
EOSINOPHIL NFR BLD AUTO: ABNORMAL %
EOSINOPHIL NFR BLD MANUAL: 2 %
ERYTHROCYTE [DISTWIDTH] IN BLOOD BY AUTOMATED COUNT: 13.5 % (ref 10–15)
HCT VFR BLD AUTO: 37.2 % (ref 40–53)
HGB BLD-MCNC: 12.4 G/DL (ref 13.3–17.7)
IMM GRANULOCYTES # BLD: ABNORMAL 10*3/UL
IMM GRANULOCYTES NFR BLD: ABNORMAL %
LYMPHOCYTES # BLD AUTO: ABNORMAL 10*3/UL
LYMPHOCYTES # BLD MANUAL: 2.7 10E3/UL (ref 0.8–5.3)
LYMPHOCYTES NFR BLD AUTO: ABNORMAL %
LYMPHOCYTES NFR BLD MANUAL: 7 %
MCH RBC QN AUTO: 31 PG (ref 26.5–33)
MCHC RBC AUTO-ENTMCNC: 33.3 G/DL (ref 31.5–36.5)
MCV RBC AUTO: 93 FL (ref 78–100)
METAMYELOCYTES # BLD MANUAL: 1.2 10E3/UL
METAMYELOCYTES NFR BLD MANUAL: 3 %
MONOCYTES # BLD AUTO: ABNORMAL 10*3/UL
MONOCYTES # BLD MANUAL: 1.6 10E3/UL (ref 0–1.3)
MONOCYTES NFR BLD AUTO: ABNORMAL %
MONOCYTES NFR BLD MANUAL: 4 %
MYELOCYTES # BLD MANUAL: 0.8 10E3/UL
MYELOCYTES NFR BLD MANUAL: 2 %
NEUTROPHILS # BLD AUTO: ABNORMAL 10*3/UL
NEUTROPHILS # BLD MANUAL: 31.8 10E3/UL (ref 1.6–8.3)
NEUTROPHILS NFR BLD AUTO: ABNORMAL %
NEUTROPHILS NFR BLD MANUAL: 82 %
NRBC # BLD AUTO: 0.3 10E3/UL
NRBC # BLD AUTO: 0.8 10E3/UL
NRBC BLD AUTO-RTO: 1 /100
NRBC BLD MANUAL-RTO: 2 %
PLAT MORPH BLD: ABNORMAL
PLATELET # BLD AUTO: 164 10E3/UL (ref 150–450)
PRODUCT NUMBER FLOW CYTOMETRY: NORMAL
RBC # BLD AUTO: 4 10E6/UL (ref 4.4–5.9)
RBC MORPH BLD: ABNORMAL
VIABLE CD34 CELLS NFR FLD: 98.31 %
WBC # BLD AUTO: 38.8 10E3/UL (ref 4–11)

## 2024-01-03 PROCEDURE — 250N000009 HC RX 250

## 2024-01-03 PROCEDURE — 250N000013 HC RX MED GY IP 250 OP 250 PS 637

## 2024-01-03 PROCEDURE — 38206 HARVEST AUTO STEM CELLS: CPT

## 2024-01-03 PROCEDURE — 85007 BL SMEAR W/DIFF WBC COUNT: CPT

## 2024-01-03 PROCEDURE — 85014 HEMATOCRIT: CPT

## 2024-01-03 PROCEDURE — 250N000011 HC RX IP 250 OP 636: Mod: JZ,JB

## 2024-01-03 PROCEDURE — 38207 CRYOPRESERVE STEM CELLS: CPT

## 2024-01-03 PROCEDURE — 36592 COLLECT BLOOD FROM PICC: CPT

## 2024-01-03 PROCEDURE — 86367 STEM CELLS TOTAL COUNT: CPT

## 2024-01-03 PROCEDURE — 96372 THER/PROPH/DIAG INJ SC/IM: CPT | Mod: XS

## 2024-01-03 PROCEDURE — 250N000011 HC RX IP 250 OP 636

## 2024-01-03 RX ORDER — HEPARIN SODIUM,PORCINE 10 UNIT/ML
5 VIAL (ML) INTRAVENOUS SEE ADMIN INSTRUCTIONS
Qty: 100 ML | Refills: 4 | Status: SHIPPED | OUTPATIENT
Start: 2024-01-03 | End: 2024-04-01

## 2024-01-03 RX ORDER — HEPARIN SODIUM (PORCINE) LOCK FLUSH IV SOLN 100 UNIT/ML 100 UNIT/ML
5 SOLUTION INTRAVENOUS
Status: CANCELLED | OUTPATIENT
Start: 2024-01-09

## 2024-01-03 RX ORDER — PLERIXAFOR 24 MG/1.2ML
0.24 SOLUTION SUBCUTANEOUS DAILY PRN
Status: CANCELLED
Start: 2024-01-09

## 2024-01-03 RX ORDER — HEPARIN SODIUM (PORCINE) LOCK FLUSH IV SOLN 100 UNIT/ML 100 UNIT/ML
3 SOLUTION INTRAVENOUS ONCE
Status: CANCELLED | OUTPATIENT
Start: 2024-01-03 | End: 2024-01-03

## 2024-01-03 RX ORDER — LORAZEPAM 0.5 MG/1
1 TABLET ORAL ONCE
Status: COMPLETED | OUTPATIENT
Start: 2024-01-03 | End: 2024-01-03

## 2024-01-03 RX ORDER — LORAZEPAM 2 MG/ML
1 INJECTION INTRAMUSCULAR ONCE
Status: DISCONTINUED | OUTPATIENT
Start: 2024-01-03 | End: 2024-01-03

## 2024-01-03 RX ORDER — PENTAMIDINE ISETHIONATE 300 MG/300MG
300 INHALANT RESPIRATORY (INHALATION)
Status: CANCELLED | OUTPATIENT
Start: 2024-01-09 | End: 2024-01-09

## 2024-01-03 RX ORDER — LORAZEPAM 0.5 MG/1
1 TABLET ORAL ONCE
Status: CANCELLED | OUTPATIENT
Start: 2024-01-03

## 2024-01-03 RX ORDER — HEPARIN SODIUM (PORCINE) LOCK FLUSH IV SOLN 100 UNIT/ML 100 UNIT/ML
3 SOLUTION INTRAVENOUS ONCE
Status: COMPLETED | OUTPATIENT
Start: 2024-01-03 | End: 2024-01-03

## 2024-01-03 RX ORDER — ALBUTEROL SULFATE 0.83 MG/ML
2.5 SOLUTION RESPIRATORY (INHALATION)
Status: CANCELLED | OUTPATIENT
Start: 2024-01-09 | End: 2024-01-09

## 2024-01-03 RX ORDER — HEPARIN SODIUM,PORCINE 10 UNIT/ML
5-20 VIAL (ML) INTRAVENOUS DAILY PRN
Status: CANCELLED | OUTPATIENT
Start: 2024-01-09

## 2024-01-03 RX ADMIN — LORAZEPAM 1 MG: 0.5 TABLET ORAL at 09:42

## 2024-01-03 RX ADMIN — CALCIUM GLUCONATE 1982 MG/HR: 98 INJECTION, SOLUTION INTRAVENOUS at 09:30

## 2024-01-03 RX ADMIN — ANTICOAGULANT CITRATE DEXTROSE SOLUTION FORMULA A 1349 ML: 12.25; 11; 3.65 SOLUTION INTRAVENOUS at 09:30

## 2024-01-03 RX ADMIN — FILGRASTIM-AAFI 1080 MCG: 480 INJECTION, SOLUTION INTRAVENOUS; SUBCUTANEOUS at 09:14

## 2024-01-03 RX ADMIN — Medication 3 ML: at 14:38

## 2024-01-03 RX ADMIN — Medication 3 ML: at 14:37

## 2024-01-03 NOTE — PROGRESS NOTES
BMT Progress Note    ID: Mr. Carroll is a 68 year old man Day 5 GCSF priming prior to planned auto PBSCT for MM    HPI: Pt seen by Dr. Chen in apheresis earlier today as he was feeling restless and anxious. Better after Ativan and was resting most of the day. He will arrange for a ride home. Line placed at [facility] yesterday afternoon    ROS: not done today    Exam:  Vitals reviewed.  CV and lung exam wnl per report, by Dr. Chen      Labs:  Lab Results   Component Value Date    WBC 38.8 (H) 01/03/2024    ANEU 31.8 (H) 01/03/2024    HGB 12.4 (L) 01/03/2024    HCT 37.2 (L) 01/03/2024     01/03/2024     12/21/2023    POTASSIUM 3.9 12/21/2023    CHLORIDE 104 12/21/2023    CO2 24 12/21/2023     (H) 12/21/2023    BUN 12.1 12/21/2023    CR 0.80 12/26/2023    MAG 2.3 12/21/2023    INR 0.95 12/21/2023    BILITOTAL 0.5 12/21/2023    AST 53 (H) 12/21/2023    ALT 86 (H) 12/21/2023    ALKPHOS 98 12/21/2023    PROTTOTAL 7.3 12/21/2023    ALBUMIN 4.5 12/21/2023           A/P:  Billy Carroll is a 68 year old man with IgG kappa MM with extramedullary disease, currently Day 5 GCSF priming prior to planned auto PBSCT   - PMHx significant for CAD, severe aortic stenosis s/p TAVR (10/24/2023) currently ongoing cardiac rehabilitation, HTN, and HLD.     Priming/BMT/MM  - GCSF priming  - D4 CD34: 36; No Mozobil  - D5 CD34: 54 - 1st day collections  - collection goal 8 x10^6 CD34/kg (standard risk)    HEME/COAG  Leukocytosis 2/2 GCSF/Mozobil  Transfusion parameters: hemoglobin <7, platelets <10    IMMUNOCOMPROMISED  - prophy ACV, Pentamidine (12/28/23)    #hx PJP pneumonia: resume Bactrim D28 post BMT    CARDIOVASCULAR  #HTN: cont losarten, metoprolol  #Severe aortic stenosis, now s/p TAVR 10/24/2023.   #Moderate nonobstructive coronary artery disease  - denies symptoms of angina  #Hyperlipidemia - on Crestor    MUSCULOSKELETAL/FRAILTY  - bone pain from GCSF: cont Claritin with prn Tylenol, oxycodone,  cyclobenzaprine (latter 2 meds are previous Rx)  Baseline Frailty Score: 2  Patient with substantial risk of sarcopenia  Cancer Rehab as needed outpatient      Summary:  GCSF, 1st day collections  RTC tomorrow for possible GCSF/2nd day collections    Known issues that I take into account for medical decisions, with salient changes to the plan considering these complexities noted above.    Patient Active Problem List   Diagnosis    Pneumonia of both lungs due to infectious organism, unspecified part of lung    Multiple myeloma (H)    Mass of thoracic vertebra    Immunosuppressed due to chemotherapy     Lumbosacral radiculopathy at L5    Pneumonitis    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    Anxiety    Organic insomnia    Chest pain due to myocardial ischemia, unspecified ischemic chest pain type    Severe aortic stenosis --S/P TAVR on 10/24/23    Benign essential hypertension    Hyperlipidemia    Autologous donor of stem cells       10 minutes spent on the date of the encounter doing chart review, review of test results, documentation, and discussion with other provider(s)   Dr. Chen saw pt earlier and gave ativan 1mg po.    Liliya Degroot PA-C

## 2024-01-03 NOTE — DISCHARGE INSTRUCTIONS
Autologous HPC/MNC Collection:   In most cases, the cell dose report will be available tomorrow morning.   The Bone Marrow Transplant (BMT) clinic staff looks at your report, and a decision is made if you will need another collection.   Remember it is important to follow a low fat diet during the collection process. Sometimes following the procedure, your blood platelet count may be low.  If you are told your platelet count is low, you need to avoid taking aspirin/aspirin containing products and avoid heavy physical activity and activities that may result in bruising or traumatic injury.  To contact the BMT fellow or attending physician after 5 p.m. call [phone].    Apheresis Blood Donor Center Post Instructions  You may feel tired after your procedure today.   Please call your doctor if you have:  bleeding that doesn t stop, fever, pain where a needle or tube (catheter) was placed, seizures, trouble breathing, red urine, nausea or vomiting, other health concerns.     If your symptoms are severe, call [phone].  If you have a Central Venous Catheter:  Notify your doctor if you have had a fever, chills, shaking  or redness, warmth, swelling, drainage at the exit-site.  This could be a sign of infection.    The Apheresis/Blood Donor Center is open Monday-Friday 7:30 a.m. to 5 p.m.  The phone number is [phone].  A Transfusion Medicine physician can be reached after 5:00 p.m. weekdays and on weekends /Holidays by calling [phone], and asking for the physician on call.

## 2024-01-03 NOTE — PROCEDURES
Transfusion Medicine/Apheresis Procedure    Billy Carroll [id number]   YOB: 1955 Age: 68 year old     Reason for consult: Autologous hematopoietic progenitor cell (HPC) collection           Assessment and Plan:   The patient is a 68 year old male with multiple myeloma who presents for autologous HPC collection, day #1.  The plan is to collect for 1 to 3 days or until the target goal is met.   A central line was placed by IR and is being used for access for the procedure.  CD34+ cell enumeration at 54/microliter this AM.  The patient was feeling anxious on arrival but was seen by Dr. Chen and was given some ativan.  He is doing well now.  No concerns/complaints related to the collection.  Continue with plan as per BMT.         Chief Complaint:   Feeling anxious on arrival, now better.         History of Present Illness:   The patient is a 68 year old male with multiple myeloma who presents for autologous HPC collection.  His past medical history includes aortic stenosis, coronary artery disease and lumbosacral radiculopathy.  He is currently well.               Past Medical History:     Past Medical History:   Diagnosis Date     Benign essential hypertension      Hyperlipidemia      Multiple myeloma (H)     Followed by Dr. Ruelas with [facility]             Past Surgical History:     Past Surgical History:   Procedure Laterality Date     CV CORONARY ANGIOGRAM N/A 9/7/2023    Procedure: Coronary Angiogram;  Surgeon: Domenico Sauceda MD;  Location: [facility] CV     CV TRANSCATHETER AORTIC VALVE REPLACEMENT-FEMORAL APPROACH N/A 10/24/2023    Procedure: Transcatheter Aortic Valve Replacement-Femoral Approach;  Surgeon: Charles Field MD;  Location: [facility] CV     IR CVC TUNNEL PLACEMENT > 5 YRS OF AGE  1/2/2024     OR TRANSCATHETER AORTIC VALVE REPLACEMENT, FEMORAL PERCUTANEOUS APPROACH (STANDBY) N/A 10/24/2023    Procedure: OR TRANSCATHETER AORTIC VALVE REPLACEMENT, FEMORAL  PERCUTANEOUS APPROACH (STANDBY);  Surgeon: Sahra Mosher MD;  Location: [facility] CV              Social History:     Social History     Tobacco Use     Smoking status: Never     Passive exposure: Never     Smokeless tobacco: Never   Substance Use Topics     Alcohol use: Not on file             Family History:   No family history on file.          Immunizations:     Immunization History   Administered Date(s) Administered     COVID-19 12+ (2023-24) (Pfizer) 11/07/2023     COVID-19 Bivalent 18+ (Moderna) 11/29/2022     COVID-19 Monovalent 18+ (Moderna) 02/12/2021, 03/14/2021, 11/09/2021, 04/15/2022     Influenza (IIV3) PF 09/28/2010, 09/19/2011, 10/15/2012, 10/10/2013     Influenza Vaccine 65+ (FLUAD) 10/27/2020, 10/02/2021, 10/13/2022, 09/14/2023     Influenza Vaccine, 6+MO IM (QUADRIVALENT W/PRESERVATIVES) 10/23/2014, 09/30/2015, 01/10/2017, 10/16/2018, 11/25/2019     Influenza, seasonal, injectable, PF 11/28/2007     Pneumo Conj 13-V (2010&after) 12/19/2018     Pneumococcal 23 valent 12/23/2019     TD,PF 7+ (Tenivac) 09/07/2004, 11/25/2019     TDAP (Adacel,Boostrix) 10/06/2010     Zoster recombinant adjuvanted (SHINGRIX) 02/05/2020, 10/02/2021             Allergies:     Allergies   Allergen Reactions     Acetaminophen-Codeine Nausea     Codeine Nausea             Medications:     Current Outpatient Medications   Medication Sig     acetaminophen (ACETAMINOPHEN 8 HOUR) 650 MG CR tablet Take 1,300 mg by mouth every 8 hours as needed for mild pain or fever     acyclovir (ZOVIRAX) 400 MG tablet Take 400 mg by mouth 2 times daily     alendronate (FOSAMAX) 70 MG tablet Take 1 tablet (70 mg) by mouth every 7 days (Patient taking differently: Take 70 mg by mouth every 7 days Takes on Mondays)     aspirin 81 MG EC tablet Take 81 mg by mouth daily     Calcium-Magnesium-Zinc 333-133-5 MG TABS per tablet Take 2 tablets by mouth daily     cholecalciferol 50 MCG (2000 UT) tablet Take 2,000 Units by mouth daily      co-enzyme Q-10 100 MG CAPS capsule Take 100 mg by mouth daily     fluticasone (FLONASE) 50 MCG/ACT nasal spray Spray 1 spray into both nostrils daily as needed for rhinitis or allergies     loratadine (CLARITIN) 10 MG tablet Take 10 mg by mouth daily as needed for allergies     losartan (COZAAR) 50 MG tablet Take 50 mg by mouth daily     metoprolol succinate ER (TOPROL XL) 50 MG 24 hr tablet Take 1 tablet (50 mg) by mouth daily     mirtazapine (REMERON) 15 MG tablet Take 15 mg by mouth At Bedtime     nitroGLYcerin (NITROSTAT) 0.4 MG sublingual tablet For chest pain place 1 tablet under the tongue every 5 minutes for 3 doses. If symptoms persist 5 minutes after 1st dose call 911.     Omega-3 Fatty Acids (FISH OIL BURP-LESS) 1000 MG CAPS Take 1 capsule by mouth daily     psyllium (METAMUCIL/KONSYL) Packet Take 1 packet by mouth as needed     rosuvastatin (CRESTOR) 20 MG tablet Take 1 tablet (20 mg) by mouth daily     sulfamethoxazole-trimethoprim (BACTRIM DS) 800-160 MG tablet Take 1 tablet by mouth three times a week Take on Mondays, Wednesday, fridaysof week while taking Prednsione more than 20mg daily for PCP prophylaxis     Turmeric 500 MG CAPS Take 2 capsules by mouth daily     vitamin B complex with vitamin C (VITAMIN  B COMPLEX) tablet Take 1 tablet by mouth daily     zinc gluconate 50 MG tablet Take 50 mg by mouth daily     Current Facility-Administered Medications   Medication     filgrastim-aafi (NIVESTYM) 1,080 mcg for subcutaneous injection     Facility-Administered Medications Ordered in Other Encounters   Medication     fentaNYL (PF) (SUBLIMAZE) injection     lidocaine (viscous) (XYLOCAINE) 2 % solution     lidocaine 1 % injection     lidocaine 4 % injection     midazolam (VERSED) injection     sodium chloride 0.9% infusion             Review of Systems:     CONSTITUTIONAL: NEGATIVE for fever, chills, change in weight  ENT/MOUTH: NEGATIVE for ear, mouth and throat problems  RESP: NEGATIVE for significant  cough or SOB  CV: NEGATIVE for chest pain, palpitations or peripheral edema           Vital Signs:     Vitals:    01/03/24 0805 01/03/24 0900 01/03/24 0943   BP: (!) 163/80 (!) 187/95    Pulse: 87 89 75   Resp: 24 22 22   Temp: 98.8  F (37.1  C)     TempSrc: Oral                 Data:       CBCRecent Labs   Lab 01/03/24  0930 01/02/24  0722   WBC 38.8* 33.8*   RBC 4.00* 4.29*   HGB 12.4* 13.1*   HCT 37.2* 39.6*   MCV 93 92   MCH 31.0 30.5   MCHC 33.3 33.1   RDW 13.5 13.6    176     CD34+ cell enumeration = 54/microliter (1/3/24)    Attestation:  During the HPC collection the patient was directly seen and evaluated by me, Rodo Bang M.D..    Rodo Bang M.D.  Professor, Transfusion Medicine  Laboratory Medicine & Pathology  Pager: [phone]

## 2024-01-03 NOTE — PROGRESS NOTES
Ordered line care supplies. Sent to [facility].     Bozena Ritter, RN, MSN  BMT Nurse Coordinator  Phone: [phone]

## 2024-01-03 NOTE — LETTER
1/3/2024         RE: Billy Carroll  [address] [place] [address]        Dear Colleague,    Thank you for referring your patient, Billy Carroll, to the [facility] BLOOD AND MARROW TRANSPLANT PROGRAM [place]. Please see a copy of my visit note below.    BMT Progress Note    ID: Mr. Carroll is a 68 year old man Day 5 GCSF priming prior to planned auto PBSCT for MM    HPI: Pt seen by Dr. Chen in apheresis earlier today as he was feeling restless and anxious. Better after Ativan and was resting most of the day. He will arrange for a ride home. Line placed at [facility] yesterday afternoon    ROS: not done today    Exam:  Vitals reviewed.  CV and lung exam wnl per report, by Dr. Chen      Labs:  Lab Results   Component Value Date    WBC 38.8 (H) 01/03/2024    ANEU 31.8 (H) 01/03/2024    HGB 12.4 (L) 01/03/2024    HCT 37.2 (L) 01/03/2024     01/03/2024     12/21/2023    POTASSIUM 3.9 12/21/2023    CHLORIDE 104 12/21/2023    CO2 24 12/21/2023     (H) 12/21/2023    BUN 12.1 12/21/2023    CR 0.80 12/26/2023    MAG 2.3 12/21/2023    INR 0.95 12/21/2023    BILITOTAL 0.5 12/21/2023    AST 53 (H) 12/21/2023    ALT 86 (H) 12/21/2023    ALKPHOS 98 12/21/2023    PROTTOTAL 7.3 12/21/2023    ALBUMIN 4.5 12/21/2023           A/P:  Billy Carroll is a 68 year old man with IgG kappa MM with extramedullary disease, currently Day 5 GCSF priming prior to planned auto PBSCT   - PMHx significant for CAD, severe aortic stenosis s/p TAVR (10/24/2023) currently ongoing cardiac rehabilitation, HTN, and HLD.     Priming/BMT/MM  - GCSF priming  - D4 CD34: 36; No Mozobil  - D5 CD34: 54 - 1st day collections  - collection goal 8 x10^6 CD34/kg (standard risk)    HEME/COAG  Leukocytosis 2/2 GCSF/Mozobil  Transfusion parameters: hemoglobin <7, platelets <10    IMMUNOCOMPROMISED  - prophy ACV, Pentamidine (12/28/23)    #hx PJP pneumonia: resume Bactrim D28 post BMT    CARDIOVASCULAR  #HTN: cont losarten,  metoprolol  #Severe aortic stenosis, now s/p TAVR 10/24/2023.   #Moderate nonobstructive coronary artery disease  - denies symptoms of angina  #Hyperlipidemia - on Crestor    MUSCULOSKELETAL/FRAILTY  - bone pain from GCSF: cont Claritin with prn Tylenol, oxycodone, cyclobenzaprine (latter 2 meds are previous Rx)  Baseline Frailty Score: 2  Patient with substantial risk of sarcopenia  Cancer Rehab as needed outpatient      Summary:  GCSF, 1st day collections  RTC tomorrow for possible GCSF/2nd day collections    Known issues that I take into account for medical decisions, with salient changes to the plan considering these complexities noted above.    Patient Active Problem List   Diagnosis    Pneumonia of both lungs due to infectious organism, unspecified part of lung    Multiple myeloma (H)    Mass of thoracic vertebra    Immunosuppressed due to chemotherapy     Lumbosacral radiculopathy at L5    Pneumonitis    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    Anxiety    Organic insomnia    Chest pain due to myocardial ischemia, unspecified ischemic chest pain type    Severe aortic stenosis --S/P TAVR on 10/24/23    Benign essential hypertension    Hyperlipidemia    Autologous donor of stem cells       10 minutes spent on the date of the encounter doing chart review, review of test results, documentation, and discussion with other provider(s)   Dr. Chen saw pt earlier and gave ativan 1mg po.    Liliya Degroot PA-C

## 2024-01-04 ENCOUNTER — HOSPITAL ENCOUNTER (OUTPATIENT)
Dept: LAB | Facility: CLINIC | Age: 69
Discharge: HOME | End: 2024-01-04
Payer: MEDICARE

## 2024-01-04 ENCOUNTER — ONCOLOGY VISIT (OUTPATIENT)
Dept: TRANSPLANT | Facility: CLINIC | Age: 69
End: 2024-01-04
Payer: MEDICARE

## 2024-01-04 VITALS
DIASTOLIC BLOOD PRESSURE: 77 MMHG | TEMPERATURE: 98.3 F | HEART RATE: 72 BPM | SYSTOLIC BLOOD PRESSURE: 180 MMHG | RESPIRATION RATE: 20 BRPM

## 2024-01-04 DIAGNOSIS — Z52.011 AUTOLOGOUS DONOR OF STEM CELLS: ICD-10-CM

## 2024-01-04 DIAGNOSIS — C90.01 MULTIPLE MYELOMA IN REMISSION (H): Primary | ICD-10-CM

## 2024-01-04 DIAGNOSIS — C90.01 MULTIPLE MYELOMA IN REMISSION (H): ICD-10-CM

## 2024-01-04 DIAGNOSIS — C90.00 MULTIPLE MYELOMA, REMISSION STATUS UNSPECIFIED (H): Primary | ICD-10-CM

## 2024-01-04 LAB
ALBUMIN SERPL BCG-MCNC: 4 G/DL (ref 3.5–5.2)
ALP SERPL-CCNC: 301 U/L (ref 40–150)
ALT SERPL W P-5'-P-CCNC: 38 U/L (ref 0–70)
ANION GAP SERPL CALCULATED.3IONS-SCNC: 11 MMOL/L (ref 7–15)
AST SERPL W P-5'-P-CCNC: 36 U/L (ref 0–45)
BASOPHILS # BLD AUTO: ABNORMAL 10*3/UL
BASOPHILS # BLD MANUAL: 0 10E3/UL (ref 0–0.2)
BASOPHILS NFR BLD AUTO: ABNORMAL %
BASOPHILS NFR BLD MANUAL: 0 %
BILIRUB SERPL-MCNC: 0.5 MG/DL
BILL ONLY AUTO PBPC FREEZE: NORMAL
BUN SERPL-MCNC: 8.9 MG/DL (ref 8–23)
CALCIUM SERPL-MCNC: 9.3 MG/DL (ref 8.8–10.2)
CD34 ABSOLUTE COUNT COMMENT: NORMAL
CD34 CELLS # SPEC: 61 CELLS/UL
CD34 CELLS NFR SPEC: 0.13 %
CHLORIDE SERPL-SCNC: 103 MMOL/L (ref 98–107)
CREAT SERPL-MCNC: 0.79 MG/DL (ref 0.67–1.17)
DEPRECATED HCO3 PLAS-SCNC: 27 MMOL/L (ref 22–29)
EGFRCR SERPLBLD CKD-EPI 2021: >90 ML/MIN/1.73M2
EOSINOPHIL # BLD AUTO: ABNORMAL 10*3/UL
EOSINOPHIL # BLD MANUAL: 1.3 10E3/UL (ref 0–0.7)
EOSINOPHIL NFR BLD AUTO: ABNORMAL %
EOSINOPHIL NFR BLD MANUAL: 3 %
ERYTHROCYTE [DISTWIDTH] IN BLOOD BY AUTOMATED COUNT: 13.6 % (ref 10–15)
GLUCOSE SERPL-MCNC: 151 MG/DL (ref 70–99)
HCT VFR BLD AUTO: 35.6 % (ref 40–53)
HGB BLD-MCNC: 11.7 G/DL (ref 13.3–17.7)
IMM GRANULOCYTES # BLD: ABNORMAL 10*3/UL
IMM GRANULOCYTES NFR BLD: ABNORMAL %
LYMPHOCYTES # BLD AUTO: ABNORMAL 10*3/UL
LYMPHOCYTES # BLD MANUAL: 0.4 10E3/UL (ref 0.8–5.3)
LYMPHOCYTES NFR BLD AUTO: ABNORMAL %
LYMPHOCYTES NFR BLD MANUAL: 1 %
MAGNESIUM SERPL-MCNC: 1.9 MG/DL (ref 1.7–2.3)
MCH RBC QN AUTO: 30.3 PG (ref 26.5–33)
MCHC RBC AUTO-ENTMCNC: 32.9 G/DL (ref 31.5–36.5)
MCV RBC AUTO: 92 FL (ref 78–100)
METAMYELOCYTES # BLD MANUAL: 0.8 10E3/UL
METAMYELOCYTES NFR BLD MANUAL: 2 %
MONOCYTES # BLD AUTO: ABNORMAL 10*3/UL
MONOCYTES # BLD MANUAL: 1.7 10E3/UL (ref 0–1.3)
MONOCYTES NFR BLD AUTO: ABNORMAL %
MONOCYTES NFR BLD MANUAL: 4 %
MYELOCYTES # BLD MANUAL: 1.3 10E3/UL
MYELOCYTES NFR BLD MANUAL: 3 %
NEUTROPHILS # BLD AUTO: ABNORMAL 10*3/UL
NEUTROPHILS # BLD MANUAL: 36.6 10E3/UL (ref 1.6–8.3)
NEUTROPHILS NFR BLD AUTO: ABNORMAL %
NEUTROPHILS NFR BLD MANUAL: 87 %
NRBC # BLD AUTO: 0.2 10E3/UL
NRBC BLD AUTO-RTO: 1 /100
PLAT MORPH BLD: ABNORMAL
PLATELET # BLD AUTO: 112 10E3/UL (ref 150–450)
POTASSIUM SERPL-SCNC: 3.5 MMOL/L (ref 3.4–5.3)
PRODUCT NUMBER FLOW CYTOMETRY: NORMAL
PROT SERPL-MCNC: 6.4 G/DL (ref 6.4–8.3)
RBC # BLD AUTO: 3.86 10E6/UL (ref 4.4–5.9)
RBC MORPH BLD: ABNORMAL
SODIUM SERPL-SCNC: 141 MMOL/L (ref 135–145)
VIABLE CD34 CELLS NFR FLD: 98.2 %
WBC # BLD AUTO: 42.1 10E3/UL (ref 4–11)

## 2024-01-04 PROCEDURE — 83735 ASSAY OF MAGNESIUM: CPT

## 2024-01-04 PROCEDURE — 36592 COLLECT BLOOD FROM PICC: CPT

## 2024-01-04 PROCEDURE — 250N000011 HC RX IP 250 OP 636: Mod: JZ

## 2024-01-04 PROCEDURE — 38207 CRYOPRESERVE STEM CELLS: CPT

## 2024-01-04 PROCEDURE — 85014 HEMATOCRIT: CPT

## 2024-01-04 PROCEDURE — 96372 THER/PROPH/DIAG INJ SC/IM: CPT

## 2024-01-04 PROCEDURE — 250N000009 HC RX 250

## 2024-01-04 PROCEDURE — 99213 OFFICE O/P EST LOW 20 MIN: CPT

## 2024-01-04 PROCEDURE — 80053 COMPREHEN METABOLIC PANEL: CPT

## 2024-01-04 PROCEDURE — 85007 BL SMEAR W/DIFF WBC COUNT: CPT

## 2024-01-04 PROCEDURE — 250N000011 HC RX IP 250 OP 636

## 2024-01-04 PROCEDURE — 86367 STEM CELLS TOTAL COUNT: CPT

## 2024-01-04 PROCEDURE — 38206 HARVEST AUTO STEM CELLS: CPT

## 2024-01-04 RX ORDER — HEPARIN SODIUM (PORCINE) LOCK FLUSH IV SOLN 100 UNIT/ML 100 UNIT/ML
3 SOLUTION INTRAVENOUS ONCE
Status: CANCELLED | OUTPATIENT
Start: 2024-01-04 | End: 2024-01-04

## 2024-01-04 RX ORDER — HEPARIN SODIUM (PORCINE) LOCK FLUSH IV SOLN 100 UNIT/ML 100 UNIT/ML
3 SOLUTION INTRAVENOUS ONCE
Status: COMPLETED | OUTPATIENT
Start: 2024-01-04 | End: 2024-01-04

## 2024-01-04 RX ORDER — PLERIXAFOR 24 MG/1.2ML
0.24 SOLUTION SUBCUTANEOUS DAILY PRN
Status: CANCELLED
Start: 2024-01-09

## 2024-01-04 RX ORDER — PENTAMIDINE ISETHIONATE 300 MG/300MG
300 INHALANT RESPIRATORY (INHALATION)
Status: CANCELLED | OUTPATIENT
Start: 2024-01-09 | End: 2024-01-09

## 2024-01-04 RX ORDER — ALBUTEROL SULFATE 0.83 MG/ML
2.5 SOLUTION RESPIRATORY (INHALATION)
Status: CANCELLED | OUTPATIENT
Start: 2024-01-09 | End: 2024-01-09

## 2024-01-04 RX ORDER — HEPARIN SODIUM,PORCINE 10 UNIT/ML
5-20 VIAL (ML) INTRAVENOUS DAILY PRN
Status: CANCELLED | OUTPATIENT
Start: 2024-01-09

## 2024-01-04 RX ORDER — HEPARIN SODIUM (PORCINE) LOCK FLUSH IV SOLN 100 UNIT/ML 100 UNIT/ML
5 SOLUTION INTRAVENOUS
Status: CANCELLED | OUTPATIENT
Start: 2024-01-09

## 2024-01-04 RX ADMIN — CALCIUM GLUCONATE 1982 MG/HR: 98 INJECTION, SOLUTION INTRAVENOUS at 08:28

## 2024-01-04 RX ADMIN — ANTICOAGULANT CITRATE DEXTROSE SOLUTION FORMULA A 1529 ML: 12.25; 11; 3.65 SOLUTION INTRAVENOUS at 08:28

## 2024-01-04 RX ADMIN — Medication 3 ML: at 14:17

## 2024-01-04 RX ADMIN — Medication 3 ML: at 14:16

## 2024-01-04 RX ADMIN — FILGRASTIM-AAFI 1080 MCG: 480 INJECTION, SOLUTION INTRAVENOUS; SUBCUTANEOUS at 08:12

## 2024-01-04 NOTE — LETTER
1/4/2024         RE: Billy Carroll  [address] [place] [address]        Dear Colleague,    Thank you for referring your patient, Billy Carroll, to the [facility] BLOOD AND MARROW TRANSPLANT PROGRAM [place]. Please see a copy of my visit note below.    BMT Progress Note    ID: Mr. Carroll is a 68 year old man Day 5 GCSF priming prior to planned auto PBSCT for MM    HPI: Billy has been having some difficulty sleeping, some muscle cramping. He took a second dose mirtazapine last night to sleep, flexeril yesterday as well. He is aware to space these out by a few hours. He has not yet started the oxycodone he was given. I also suggested he choose either the flexeril or the oxycodone to avoid over sedating affects. He has some lower back and hip pain since starting gcsf.   He  denies any fevers or cold symptoms. Eating well without n/v/d.     ROS: 8 point review with pertinent positive and negatives in HPI    Exam:  Vitals reviewed in apheresis    GEN: NAD, lying in apheresis bed  HEENT: sclera anicteric, normocephalic  Pulm: CTA anterior exam  Abd: soft, non tender  LE: no edema  Access: CVC c/d/i    Labs:  Lab Results   Component Value Date    WBC 42.1 (H) 01/04/2024    ANEU 36.6 (H) 01/04/2024    HGB 11.7 (L) 01/04/2024    HCT 35.6 (L) 01/04/2024     (L) 01/04/2024     01/04/2024    POTASSIUM 3.5 01/04/2024    CHLORIDE 103 01/04/2024    CO2 27 01/04/2024     (H) 01/04/2024    BUN 8.9 01/04/2024    CR 0.79 01/04/2024    MAG 1.9 01/04/2024    INR 0.95 12/21/2023    BILITOTAL 0.5 01/04/2024    AST 36 01/04/2024    ALT 38 01/04/2024    ALKPHOS 301 (H) 01/04/2024    PROTTOTAL 6.4 01/04/2024    ALBUMIN 4.0 01/04/2024           A/P:  Billy Carroll is a 68 year old man with IgG kappa MM with extramedullary disease, currently Day 5 GCSF priming prior to planned auto PBSCT   - PMHx significant for CAD, severe aortic stenosis s/p TAVR (10/24/2023) currently ongoing cardiac  rehabilitation, HTN, and HLD.     Priming/BMT/MM  - GCSF priming  - D4 CD34: 36; No Mozobil  - D5 CD34: 54 - 1st day collections = 2.69 x10^6   - D6 CD34: 61 - 2nd day collections pending    - collection goal 8 x10^6 CD34/kg (standard risk)    HEME/COAG  Leukocytosis 2/2 GCSF/Mozobil  Transfusion parameters: hemoglobin <7, platelets <10    IMMUNOCOMPROMISED  - prophy ACV, Pentamidine (12/28/23)  #hx PJP pneumonia: resume Bactrim D28 post BMT    CARDIOVASCULAR  #HTN: cont losarten, metoprolol. BP improved compared to yesterday, hydralazine prn not needed. Likely fluid up with apheresis, will avoid changing PTA meds unless persistently elevated  #Severe aortic stenosis, now s/p TAVR 10/24/2023.   #Moderate nonobstructive coronary artery disease  - denies symptoms of angina  #Hyperlipidemia - on Crestor    MUSCULOSKELETAL/FRAILTY  - bone pain from GCSF: cont Claritin with prn Tylenol, oxycodone, cyclobenzaprine (latter 2 meds are previous Rx)  Baseline Frailty Score: 2  Patient with substantial risk of sarcopenia  Cancer Rehab as needed outpatient      Summary:  GCSF, 2nd day collections  Will collect tomorrow (unless total >7 million when reviewed 1/5)  Discussed caution with multiple meds that have decreased muscle tone, decreased blood pressure or respiratory drive, patient is aware    RTC tomorrow is likely final day collections    Known issues that I take into account for medical decisions, with salient changes to the plan considering these complexities noted above.    Patient Active Problem List   Diagnosis    Pneumonia of both lungs due to infectious organism, unspecified part of lung    Multiple myeloma (H)    Mass of thoracic vertebra    Immunosuppressed due to chemotherapy     Lumbosacral radiculopathy at L5    Pneumonitis    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    Anxiety    Organic insomnia    Chest pain due to myocardial ischemia, unspecified ischemic chest pain type    Severe aortic stenosis  --S/P TAVR on 10/24/23    Benign essential hypertension    Hyperlipidemia    Autologous donor of stem cells       20 minutes spent on the date of the encounter doing chart review, review of test results, patient visit, and documentation     Juana He PA-C

## 2024-01-04 NOTE — PROGRESS NOTES
BMT Progress Note    ID: Mr. Carroll is a 68 year old man Day 5 GCSF priming prior to planned auto PBSCT for MM    HPI: Billy has been having some difficulty sleeping, some muscle cramping. He took a second dose mirtazapine last night to sleep, flexeril yesterday as well. He is aware to space these out by a few hours. He has not yet started the oxycodone he was given. I also suggested he choose either the flexeril or the oxycodone to avoid over sedating affects. He has some lower back and hip pain since starting gcsf.   He  denies any fevers or cold symptoms. Eating well without n/v/d.     ROS: 8 point review with pertinent positive and negatives in HPI    Exam:  Vitals reviewed in apheresis    GEN: NAD, lying in apheresis bed  HEENT: sclera anicteric, normocephalic  Pulm: CTA anterior exam  Abd: soft, non tender  LE: no edema  Access: CVC c/d/i    Labs:  Lab Results   Component Value Date    WBC 42.1 (H) 01/04/2024    ANEU 36.6 (H) 01/04/2024    HGB 11.7 (L) 01/04/2024    HCT 35.6 (L) 01/04/2024     (L) 01/04/2024     01/04/2024    POTASSIUM 3.5 01/04/2024    CHLORIDE 103 01/04/2024    CO2 27 01/04/2024     (H) 01/04/2024    BUN 8.9 01/04/2024    CR 0.79 01/04/2024    MAG 1.9 01/04/2024    INR 0.95 12/21/2023    BILITOTAL 0.5 01/04/2024    AST 36 01/04/2024    ALT 38 01/04/2024    ALKPHOS 301 (H) 01/04/2024    PROTTOTAL 6.4 01/04/2024    ALBUMIN 4.0 01/04/2024           A/P:  Billy Carroll is a 68 year old man with IgG kappa MM with extramedullary disease, currently Day 5 GCSF priming prior to planned auto PBSCT   - PMHx significant for CAD, severe aortic stenosis s/p TAVR (10/24/2023) currently ongoing cardiac rehabilitation, HTN, and HLD.     Priming/BMT/MM  - GCSF priming  - D4 CD34: 36; No Mozobil  - D5 CD34: 54 - 1st day collections = 2.69 x10^6   - D6 CD34: 61 - 2nd day collections pending    - collection goal 8 x10^6 CD34/kg (standard risk)    HEME/COAG  Leukocytosis 2/2  GCSF/Mozobil  Transfusion parameters: hemoglobin <7, platelets <10    IMMUNOCOMPROMISED  - prophy ACV, Pentamidine (12/28/23)  #hx PJP pneumonia: resume Bactrim D28 post BMT    CARDIOVASCULAR  #HTN: cont losarten, metoprolol. BP improved compared to yesterday, hydralazine prn not needed. Likely fluid up with apheresis, will avoid changing PTA meds unless persistently elevated  #Severe aortic stenosis, now s/p TAVR 10/24/2023.   #Moderate nonobstructive coronary artery disease  - denies symptoms of angina  #Hyperlipidemia - on Crestor    MUSCULOSKELETAL/FRAILTY  - bone pain from GCSF: cont Claritin with prn Tylenol, oxycodone, cyclobenzaprine (latter 2 meds are previous Rx)  Baseline Frailty Score: 2  Patient with substantial risk of sarcopenia  Cancer Rehab as needed outpatient      Summary:  GCSF, 2nd day collections  Will collect tomorrow (unless total >7 million when reviewed 1/5)  Discussed caution with multiple meds that have decreased muscle tone, decreased blood pressure or respiratory drive, patient is aware    RTC tomorrow is likely final day collections    Known issues that I take into account for medical decisions, with salient changes to the plan considering these complexities noted above.    Patient Active Problem List   Diagnosis    Pneumonia of both lungs due to infectious organism, unspecified part of lung    Multiple myeloma (H)    Mass of thoracic vertebra    Immunosuppressed due to chemotherapy     Lumbosacral radiculopathy at L5    Pneumonitis    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    Anxiety    Organic insomnia    Chest pain due to myocardial ischemia, unspecified ischemic chest pain type    Severe aortic stenosis --S/P TAVR on 10/24/23    Benign essential hypertension    Hyperlipidemia    Autologous donor of stem cells       20 minutes spent on the date of the encounter doing chart review, review of test results, patient visit, and documentation     Juana He,  JIM

## 2024-01-04 NOTE — PROCEDURES
Transfusion Medicine/Apheresis Procedure    Billy Carroll [id number]   YOB: 1955 Age: 68 year old     Reason for consult: Autologous hematopoietic progenitor cell (HPC) collection           Assessment and Plan:   The patient is a 68 year old male with multiple myeloma who presents for autologous HPC collection, day #2.  Target dose for collection is 8 million CD34+ cells/kg.  Day #1 collection yield was 2.69 CD34+ cells/kg.   Central line placed by IR continues to work well for access for the procedure.  CD34+ cell enumeration at 61/microliter this AM.  No concerns/complaints related to the collection.  Continue with plan as per BMT.         Chief Complaint:   Some hip pain but manageable         History of Present Illness:   The patient is a 68 year old male with multiple myeloma who presents for autologous HPC collection.  His past medical history includes aortic stenosis, coronary artery disease and lumbosacral radiculopathy.  He is currently well.               Past Medical History:     Past Medical History:   Diagnosis Date     Benign essential hypertension      Hyperlipidemia      Multiple myeloma (H)     Followed by Dr. Ruelas with [facility]             Past Surgical History:     Past Surgical History:   Procedure Laterality Date     CV CORONARY ANGIOGRAM N/A 9/7/2023    Procedure: Coronary Angiogram;  Surgeon: Domenico Sauceda MD;  [facility]     CV TRANSCATHETER AORTIC VALVE REPLACEMENT-FEMORAL APPROACH N/A 10/24/2023    Procedure: Transcatheter Aortic Valve Replacement-Femoral Approach;  Surgeon: Charles Field MD;  Location: [facility] CV     IR CVC TUNNEL PLACEMENT > 5 YRS OF AGE  1/2/2024     OR TRANSCATHETER AORTIC VALVE REPLACEMENT, FEMORAL PERCUTANEOUS APPROACH (STANDBY) N/A 10/24/2023    Procedure: OR TRANSCATHETER AORTIC VALVE REPLACEMENT, FEMORAL PERCUTANEOUS APPROACH (STANDBY);  Surgeon: Sahra Mosher MD;  Location: [facility] CV               Social History:     Social History     Tobacco Use     Smoking status: Never     Passive exposure: Never     Smokeless tobacco: Never   Substance Use Topics     Alcohol use: Not on file             Family History:   No family history on file.          Immunizations:     Immunization History   Administered Date(s) Administered     COVID-19 12+ (2023-24) (Pfizer) 11/07/2023     COVID-19 Bivalent 18+ (Moderna) 11/29/2022     COVID-19 Monovalent 18+ (Moderna) 02/12/2021, 03/14/2021, 11/09/2021, 04/15/2022     Influenza (IIV3) PF 09/28/2010, 09/19/2011, 10/15/2012, 10/10/2013     Influenza Vaccine 65+ (FLUAD) 10/27/2020, 10/02/2021, 10/13/2022, 09/14/2023     Influenza Vaccine, 6+MO IM (QUADRIVALENT W/PRESERVATIVES) 10/23/2014, 09/30/2015, 01/10/2017, 10/16/2018, 11/25/2019     Influenza, seasonal, injectable, PF 11/28/2007     Pneumo Conj 13-V (2010&after) 12/19/2018     Pneumococcal 23 valent 12/23/2019     TD,PF 7+ (Tenivac) 09/07/2004, 11/25/2019     TDAP (Adacel,Boostrix) 10/06/2010     Zoster recombinant adjuvanted (SHINGRIX) 02/05/2020, 10/02/2021             Allergies:     Allergies   Allergen Reactions     Acetaminophen-Codeine Nausea     Codeine Nausea             Medications:     Current Outpatient Medications   Medication Sig     acetaminophen (ACETAMINOPHEN 8 HOUR) 650 MG CR tablet Take 1,300 mg by mouth every 8 hours as needed for mild pain or fever     acyclovir (ZOVIRAX) 400 MG tablet Take 400 mg by mouth 2 times daily     alendronate (FOSAMAX) 70 MG tablet Take 1 tablet (70 mg) by mouth every 7 days (Patient taking differently: Take 70 mg by mouth every 7 days Takes on Mondays)     aspirin 81 MG EC tablet Take 81 mg by mouth daily     Calcium-Magnesium-Zinc 333-133-5 MG TABS per tablet Take 2 tablets by mouth daily     cholecalciferol 50 MCG (2000 UT) tablet Take 2,000 Units by mouth daily     co-enzyme Q-10 100 MG CAPS capsule Take 100 mg by mouth daily     fluticasone (FLONASE) 50 MCG/ACT nasal  spray Spray 1 spray into both nostrils daily as needed for rhinitis or allergies     Heparin Sod, Pork, Lock Flush 10 UNIT/ML SOLN 5 mLs by Intravenous Central line route See Admin Instructions     loratadine (CLARITIN) 10 MG tablet Take 10 mg by mouth daily as needed for allergies     losartan (COZAAR) 50 MG tablet Take 50 mg by mouth daily     metoprolol succinate ER (TOPROL XL) 50 MG 24 hr tablet Take 1 tablet (50 mg) by mouth daily     mirtazapine (REMERON) 15 MG tablet Take 15 mg by mouth At Bedtime     nitroGLYcerin (NITROSTAT) 0.4 MG sublingual tablet For chest pain place 1 tablet under the tongue every 5 minutes for 3 doses. If symptoms persist 5 minutes after 1st dose call 911.     Omega-3 Fatty Acids (FISH OIL BURP-LESS) 1000 MG CAPS Take 1 capsule by mouth daily     psyllium (METAMUCIL/KONSYL) Packet Take 1 packet by mouth as needed     rosuvastatin (CRESTOR) 20 MG tablet Take 1 tablet (20 mg) by mouth daily     sulfamethoxazole-trimethoprim (BACTRIM DS) 800-160 MG tablet Take 1 tablet by mouth three times a week Take on Mondays, Wednesday, fridaysof week while taking Prednsione more than 20mg daily for PCP prophylaxis     Turmeric 500 MG CAPS Take 2 capsules by mouth daily     vitamin B complex with vitamin C (VITAMIN  B COMPLEX) tablet Take 1 tablet by mouth daily     zinc gluconate 50 MG tablet Take 50 mg by mouth daily     Current Facility-Administered Medications   Medication     filgrastim-aafi (NIVESTYM) injection 1,080 mcg     Facility-Administered Medications Ordered in Other Encounters   Medication     fentaNYL (PF) (SUBLIMAZE) injection     lidocaine (viscous) (XYLOCAINE) 2 % solution     lidocaine 1 % injection     lidocaine 4 % injection     midazolam (VERSED) injection     sodium chloride 0.9% infusion            Vital Signs:     Vitals:    01/04/24 0810   BP: (!) 158/75   Pulse: 79   Resp: 20   Temp: 98.5  F (36.9  C)   TempSrc: Oral               Data:       CBC  Recent Labs   Lab  01/04/24  0825 01/03/24  0930 01/02/24  0722   WBC 42.1* 38.8* 33.8*   RBC 3.86* 4.00* 4.29*   HGB 11.7* 12.4* 13.1*   HCT 35.6* 37.2* 39.6*   MCV 92 93 92   MCH 30.3 31.0 30.5   MCHC 32.9 33.3 33.1   RDW 13.6 13.5 13.6   * 164 176     CD34+ cell enumeration = 54/microliter (1/3/24)  CD34+ cell enumeration = 61/microliter (1/4/24)    Attestation:  During the HPC collection the patient was directly seen and evaluated by me, Rodo Bang M.D..    Rodo Bang M.D.  Professor, Transfusion Medicine  Laboratory Medicine & Pathology  Pager: [phone]

## 2024-01-04 NOTE — DISCHARGE INSTRUCTIONS
Autologous HPC/MNC Collection:   In most cases, the cell dose report will be available tomorrow morning.   The Bone Marrow Transplant (BMT) clinic staff looks at your report, and a decision is made if you will need another collection.   Remember it is important to follow a low fat diet during the collection process. Sometimes following the procedure, your blood platelet count may be low.  If you are told your platelet count is low, you need to avoid taking aspirin/aspirin containing products and avoid heavy physical activity and activities that may result in bruising or traumatic injury.  To contact the BMT fellow or attending physician after 5 p.m. call [phone].    Apheresis Blood Donor Center Post Instructions  You may feel tired after your procedure today.   Please call your doctor if you have:  bleeding that doesn t stop, fever, pain where a needle or tube (catheter) was placed, seizures, trouble breathing, red urine, nausea or vomiting, other health concerns.     If your symptoms are severe, call [phone].  If you have a Central Venous Catheter:  Notify your doctor if you have had a fever, chills, shaking  or redness, warmth, swelling, drainage at the exit-site.  This could be a sign of infection.    The Apheresis/Blood Donor Center is open Monday-Friday 7:30 a.m. to 5 p.m.  The phone number is [phone].  A Transfusion Medicine physician can be reached after 5:00 p.m. weekdays and on weekends /Holidays by calling [phone], and asking for the physician on call.

## 2024-01-05 ENCOUNTER — ALLIED HEALTH/NURSE VISIT (OUTPATIENT)
Dept: TRANSPLANT | Facility: CLINIC | Age: 69
End: 2024-01-05
Payer: MEDICARE

## 2024-01-05 ENCOUNTER — TELEPHONE (OUTPATIENT)
Dept: TRANSPLANT | Facility: CLINIC | Age: 69
End: 2024-01-05
Payer: MEDICARE

## 2024-01-05 ENCOUNTER — ONCOLOGY VISIT (OUTPATIENT)
Dept: TRANSPLANT | Facility: CLINIC | Age: 69
End: 2024-01-05
Payer: MEDICARE

## 2024-01-05 ENCOUNTER — TELEPHONE (OUTPATIENT)
Dept: CARDIOLOGY | Facility: CLINIC | Age: 69
End: 2024-01-05

## 2024-01-05 VITALS — DIASTOLIC BLOOD PRESSURE: 92 MMHG | SYSTOLIC BLOOD PRESSURE: 163 MMHG

## 2024-01-05 DIAGNOSIS — C90.00 MULTIPLE MYELOMA, REMISSION STATUS UNSPECIFIED (H): Primary | ICD-10-CM

## 2024-01-05 DIAGNOSIS — Z52.011 AUTOLOGOUS DONOR OF STEM CELLS: ICD-10-CM

## 2024-01-05 DIAGNOSIS — C90.01 MULTIPLE MYELOMA IN REMISSION (H): Primary | ICD-10-CM

## 2024-01-05 LAB — MISCELLANEOUS TEST 1 (ARUP): NORMAL

## 2024-01-05 PROCEDURE — 99214 OFFICE O/P EST MOD 30 MIN: CPT

## 2024-01-05 PROCEDURE — 250N000011 HC RX IP 250 OP 636

## 2024-01-05 RX ORDER — PENTAMIDINE ISETHIONATE 300 MG/300MG
300 INHALANT RESPIRATORY (INHALATION)
Status: CANCELLED | OUTPATIENT
Start: 2024-01-09 | End: 2024-01-09

## 2024-01-05 RX ORDER — ALBUTEROL SULFATE 0.83 MG/ML
2.5 SOLUTION RESPIRATORY (INHALATION)
Status: CANCELLED | OUTPATIENT
Start: 2024-01-09 | End: 2024-01-09

## 2024-01-05 RX ORDER — LORAZEPAM 0.5 MG/1
0.5 TABLET ORAL EVERY 6 HOURS PRN
Qty: 6 TABLET | Refills: 0 | Status: SHIPPED | OUTPATIENT
Start: 2024-01-05 | End: 2024-01-11

## 2024-01-05 RX ORDER — PLERIXAFOR 24 MG/1.2ML
0.24 SOLUTION SUBCUTANEOUS DAILY PRN
Status: CANCELLED
Start: 2024-01-09

## 2024-01-05 RX ORDER — HEPARIN SODIUM,PORCINE 10 UNIT/ML
5-20 VIAL (ML) INTRAVENOUS DAILY PRN
Status: CANCELLED | OUTPATIENT
Start: 2024-01-09

## 2024-01-05 RX ORDER — HEPARIN SODIUM (PORCINE) LOCK FLUSH IV SOLN 100 UNIT/ML 100 UNIT/ML
5 SOLUTION INTRAVENOUS
Status: CANCELLED | OUTPATIENT
Start: 2024-01-09

## 2024-01-05 RX ORDER — HEPARIN SODIUM,PORCINE 10 UNIT/ML
5-20 VIAL (ML) INTRAVENOUS DAILY PRN
Status: DISCONTINUED | OUTPATIENT
Start: 2024-01-05 | End: 2024-01-13 | Stop reason: HOSPADM

## 2024-01-05 RX ADMIN — Medication 10 ML: at 09:26

## 2024-01-05 NOTE — PATIENT INSTRUCTIONS
BMT Contact Information  For issues including fevers 100.5 or more:  Please call during the week: Monday through Friday between hours of 8:00 am and 4:30 pm- Call BMT office- [phone]  After hours/Weekends: Please call [facility]  and ask for BMT physician on call and the  will have the BMT Fellow Physician call you back: [phone]     Advice for Patients on COVID19:  a. Avoid contact with individuals:   i. Who are sick or have recently been sick  ii. Have traveled to high risk areas (per CDC guidelines) or have been on a cruise in the last 14 days  iii. Who were or could have been exposed to COVID-19   b. If experiencing symptoms such as: Fever, cough or shortness of breath contact BMT at [phone] Mon-Fri 8am-4:30pm or After Hours at [phone] (ask to speak to a BMT Fellow) for guidance on need for clinical assessment  c. Avoid all non- essential travel at this time; if traveling is necessary use mask (N-95)   d. Wear a mask when in public areas  d. Avoid crowded places, if possible  f. Follow CDC advice https://www.cdc.gov/coronavirus/2019-ncov/index.html and travel guidelines https://www.cdc.gov/coronavirus/2019-ncov/travelers/index.html

## 2024-01-05 NOTE — PROGRESS NOTES
Called Billy to go over appointments for upcoming outpatient auto transplant (d-1 on 1/9/2024). He is aware of his appointments and is viewing them in [employer]. Billy is feeling well and has no further questions or concerns at this time.      Bozena Ritter, RN, MSN  BMT Nurse Coordinator  Phone: [phone]

## 2024-01-05 NOTE — PROGRESS NOTES
Pt presented to clinic for CVC dressing change d/t bleeding. Site assessed and no active bleeding noted. Pt reports he bumped his CVC site last evening and noticed the bleeding after that. Dressing changed by EMT and writer educated patient to continue monitoring site for bleeding and to call if he has more bleeding. Also instructed patient to be cautious of line when moving around. Both lumens flushed with heparin.     Pt concerned about his BP being high the last few days. Upon chart review pt has been 180s/80s in apheresis the last few days. BP checked and was 163/92. Pt reports he checks BP at home as well and writer reinforced to continue doing so and to call/seek evaluation if he develops chest pain, severe headache, or blurry double vision. Pt expressed understanding and stated that he has been having shortness of breath for the last week or so, especially with activity or when laying flat. He reports he has always had to elevate HOB when sleeping, but lately he has had to sit up even further. Nathen Ann PA-C notified and will see patient.     Xin Dejesus RN

## 2024-01-05 NOTE — LETTER
"    1/5/2024         RE: Billy Carroll  [address] [place] [address]        Dear Colleague,    Thank you for referring your patient, Billy Carroll, to the [facility] BLOOD AND MARROW TRANSPLANT PROGRAM [place]. Please see a copy of my visit note below.    BMT Progress Note    ID: Mr. Carroll is a 68 year old man for dressing change after collections prior to planned auto PBSCT for MM.    HPI:     Billy comes in today for a dressing change after completing collecting yesterday. He was seen by the nurse, his dressing looked well but he continued to complain of shortness of breath and difficulty sleeping flat over the last week. He had similar symptoms on his first day of collections, which Dr. Chen gave him a dose of ativan which helped him sit through the long day of collections. He was feeling more comfortable on day of collections and did not require anything though he thinks he did take some flexeril last night for his back pain. He notes that over the last 2 nights, his sleeping has been somewhat better but he feels like he is required to sleep more propped up than previously. He does often prop his bed up while sleeping to prevent snoring so this is not entirely new. He denies any new cough, lower extremity edema, chest pain, or headaches. He describes his symptoms as \"whoozy feeling\" -- not really lightheaded or dizzy. He otherwise has no new infectious symptoms. He is feeling quite anxious about this process which he agrees is likely what his symptoms are related to.    He was hypertensive in apheresis over the last couple days but not high enough that they held off collections. His BP is better today on recheck. He is taking his BP meds as prescribed.    ROS: Review of systems with pertinent positive and negatives in HPI    Exam:  There were no vitals taken for this visit. Reviewed the vitals in flowsheets.    Repeated in room with the patient. 158/95; 70 hr; 96-98%    GEN: NAD, appears " "mildly anxious  HEENT: sclera anicteric, normocephalic  Pulm: Clear lung sounds throughout all lung fields. Breathing comfortably on room air. Does not appear dyspneic - able to speak in full sentences.  Cardiac: RRR. No Murmurs.  Abd: soft, non tender  LE: no edema  Access: CVC, NT, small amount of blood soaked into the dressing.    A/P:  Billy Carroll is a 68 year old man with IgG kappa MM with extramedullary disease, currently Day 5 GCSF priming prior to planned auto PBSCT   - PMHx significant for CAD, severe aortic stenosis s/p TAVR (10/24/2023) currently ongoing cardiac rehabilitation, HTN, and HLD.     Priming/BMT/MM  Completed collecting after 2 days -- total collections: 7.43 x 10^6  - Sent message to CC that patient is done collecting. He has line flush supplies at home.    HEME/COAG  Leukocytosis 2/2 GCSF/Mozobil  Transfusion parameters: hemoglobin <7, platelets <10    IMMUNOCOMPROMISED  - prophy ACV, Pentamidine (12/28/23)  #hx PJP pneumonia: resume Bactrim D28 post BMT    CARDIOVASCULAR  #HTN: cont losarten, metoprolol. BP improved today. Cont PTA meds.  #Severe aortic stenosis, now s/p TAVR 10/24/2023.   #Moderate nonobstructive coronary artery disease  - denies symptoms of angina  #Hyperlipidemia - on Crestor    MUSCULOSKELETAL/FRAILTY  - bone pain from GCSF: cont Claritin with prn Tylenol, oxycodone, cyclobenzaprine (latter 2 meds are previous Rx). Patient will avoid the use of oxy and cyclobenzaprine  Baseline Frailty Score: 2  Patient with substantial risk of sarcopenia  Cancer Rehab as needed outpatient    PSCYH:  # Anxiety about upcoming transplant process: Ativan given on day 1 of collections, did not make him drowsy but made the day of collections much more tolerable. He did not require any on day 2 of collections. He completed collections and came in for a dressing change. He continued to complain of \"shortness of breath and whoozy feeling\" After discussion of symptoms, I do not feel this is " cardiac or pulmonary related rather his underlying anxiety about the next steps in this process. Rx for Ativan 0.5 mg PRN q 6hrs. #6 tablets only      Summary:  Ativan - 6 tablets -- for symptoms of anxiety  Gave patient phone numbers to call this weekend if anything changes  RNCC will call to update patient on next steps  Done collecting.  No additional labs collected today    30 minutes spent on the date of the encounter doing chart review, review of test results, patient visit, and documentation     Nathen Ann PA-C

## 2024-01-05 NOTE — TELEPHONE ENCOUNTER
Spoke to patient that patients needs a 6 month follow up per TAVR 10/24/23. Patient sees Dr. Nelson but provider doesn't have any openings  in April or May. Will call back to schedule him when  has openings.

## 2024-01-05 NOTE — PROGRESS NOTES
"BMT Progress Note    ID: Mr. Carroll is a 68 year old man for dressing change after collections prior to planned auto PBSCT for MM.    HPI:     Billy comes in today for a dressing change after completing collecting yesterday. He was seen by the nurse, his dressing looked well but he continued to complain of shortness of breath and difficulty sleeping flat over the last week. He had similar symptoms on his first day of collections, which Dr. Chen gave him a dose of ativan which helped him sit through the long day of collections. He was feeling more comfortable on day of collections and did not require anything though he thinks he did take some flexeril last night for his back pain. He notes that over the last 2 nights, his sleeping has been somewhat better but he feels like he is required to sleep more propped up than previously. He does often prop his bed up while sleeping to prevent snoring so this is not entirely new. He denies any new cough, lower extremity edema, chest pain, or headaches. He describes his symptoms as \"whoozy feeling\" -- not really lightheaded or dizzy. He otherwise has no new infectious symptoms. He is feeling quite anxious about this process which he agrees is likely what his symptoms are related to.    He was hypertensive in apheresis over the last couple days but not high enough that they held off collections. His BP is better today on recheck. He is taking his BP meds as prescribed.    ROS: Review of systems with pertinent positive and negatives in HPI    Exam:  There were no vitals taken for this visit. Reviewed the vitals in flowsheets.    Repeated in room with the patient. 158/95; 70 hr; 96-98%    GEN: NAD, appears mildly anxious  HEENT: sclera anicteric, normocephalic  Pulm: Clear lung sounds throughout all lung fields. Breathing comfortably on room air. Does not appear dyspneic - able to speak in full sentences.  Cardiac: RRR. No Murmurs.  Abd: soft, non tender  LE: no edema  Access: " "CVC, NT, small amount of blood soaked into the dressing.    A/P:  Billy Carroll is a 68 year old man with IgG kappa MM with extramedullary disease, currently Day 5 GCSF priming prior to planned auto PBSCT   - PMHx significant for CAD, severe aortic stenosis s/p TAVR (10/24/2023) currently ongoing cardiac rehabilitation, HTN, and HLD.     Priming/BMT/MM  Completed collecting after 2 days -- total collections: 7.43 x 10^6  - Sent message to [facility] that patient is done collecting. He has line flush supplies at home.    HEME/COAG  Leukocytosis 2/2 GCSF/Mozobil  Transfusion parameters: hemoglobin <7, platelets <10    IMMUNOCOMPROMISED  - prophy ACV, Pentamidine (12/28/23)  #hx PJP pneumonia: resume Bactrim D28 post BMT    CARDIOVASCULAR  #HTN: cont losarten, metoprolol. BP improved today. Cont PTA meds.  #Severe aortic stenosis, now s/p TAVR 10/24/2023.   #Moderate nonobstructive coronary artery disease  - denies symptoms of angina  #Hyperlipidemia - on Crestor    MUSCULOSKELETAL/FRAILTY  - bone pain from GCSF: cont Claritin with prn Tylenol, oxycodone, cyclobenzaprine (latter 2 meds are previous Rx). Patient will avoid the use of oxy and cyclobenzaprine  Baseline Frailty Score: 2  Patient with substantial risk of sarcopenia  Cancer Rehab as needed outpatient    PSCYH:  # Anxiety about upcoming transplant process: Ativan given on day 1 of collections, did not make him drowsy but made the day of collections much more tolerable. He did not require any on day 2 of collections. He completed collections and came in for a dressing change. He continued to complain of \"shortness of breath and whoozy feeling\" After discussion of symptoms, I do not feel this is cardiac or pulmonary related rather his underlying anxiety about the next steps in this process. Rx for Ativan 0.5 mg PRN q 6hrs. #6 tablets only      Summary:  Ativan - 6 tablets -- for symptoms of anxiety  Gave patient phone numbers to call this weekend if anything " changes  RNCC will call to update patient on next steps  Done collecting.  No additional labs collected today    30 minutes spent on the date of the encounter doing chart review, review of test results, patient visit, and documentation     Nathen Ann PA-C

## 2024-01-05 NOTE — TELEPHONE ENCOUNTER
Spoke with Billy,    He is done collecting after 2 days.    He would like to still come in for a dressing change - as his line bled a little over night. He has line flush supplies already at home.    Nathen Ann PA-C

## 2024-01-08 ENCOUNTER — THERAPY VISIT (OUTPATIENT)
Dept: PHYSICAL THERAPY | Facility: CLINIC | Age: 69
End: 2024-01-08
Payer: MEDICARE

## 2024-01-08 DIAGNOSIS — C90.01 MULTIPLE MYELOMA IN REMISSION (H): ICD-10-CM

## 2024-01-08 DIAGNOSIS — R53.81 PHYSICAL DECONDITIONING: Primary | ICD-10-CM

## 2024-01-08 PROCEDURE — 97110 THERAPEUTIC EXERCISES: CPT | Mod: GP

## 2024-01-08 PROCEDURE — 97161 PT EVAL LOW COMPLEX 20 MIN: CPT | Mod: GP

## 2024-01-08 PROCEDURE — 97112 NEUROMUSCULAR REEDUCATION: CPT | Mod: GP

## 2024-01-08 NOTE — PROGRESS NOTES
01/08/24 0800   Signing Clinician's Name / Credentials   Signing clinician's name / credentials Annalise Castro, PT, DPT, NCS   Functional Gait Assessment (MAY Barron, PATRICIA Knight, et al. (2004))   1. GAIT LEVEL SURFACE 3   2. CHANGE IN GAIT SPEED 3   3. GAIT WITH HORIZONTAL HEAD TURNS 3   4. GAIT WITH VERTICAL HEAD TURNS 3   5. GAIT AND PIVOT TURN 3   6. STEP OVER OBSTACLE 3   7. GAIT WITH NARROW BASE OF SUPPORT 2   8. GAIT WITH EYES CLOSED 2   9. AMBULATING BACKWARDS 3   10. STEPS 3   Total Functional Gait Assessment Score   TOTAL SCORE: (MAXIMUM SCORE 30) 28       Functional Gait Assessment (FGA): The FGA assesses postural stability during various walking tasks.   Gait assistive device used: none      Patient Score: 28/30 - Pt not at risk for falling. Will continued dynamic balance challenges with HEP.    Scores of <22/30 have been correlated with predicting falls in community-dwelling older adults according to Anderson & Jone 2010.   Scores of <18/30 have been correlated with increased risk for falls in patients with Parkinsons Disease according to Giovany Gallagher, Ortiz et al 2014.  Minimal Detectable Change for patients with acute/chronic stroke = 4.2 according to Thicarlie & Ritschel 2009  Minimal Detectable Change for patients with vestibular disorder = 8 according to Anderson & Jone 2010     Assessment (rationale for performing, application to patient s function & care plan): s/p cancer rehabilitation treatment with demonstrated balance impairments, repeated as has demonstrated improved gait, balance and overall functional mobility since initial assessment.

## 2024-01-08 NOTE — PROGRESS NOTES
"PHYSICAL THERAPY EVALUATION  Type of Visit: Evaluation    See electronic medical record for Abuse and Falls Screening details.    Subjective       Presenting condition or subjective complaint:    Pt is a 67 y/o M, presenting to PT for evaluation of functional mobility in presence of active chemotherapy and stem-cell replacement treatment for multiple myeloma (MM). He reports generalized weakness and fatigue in his lower extremity muscles. He has a history of fx of L4-5 vertebrae in 05/2023, pneumonia in 06/2023, and TAVR in 01/2023; feels these diagnoses/events have limited his participation in enjoyable physical activities like hunting, fishing, and golfing. He'd like to improve his strength, energy, and exercise regimen to feel more well and to get back to those activities. He is currently walking for 1-1.5 miles/day on trails with his dog. Reports slight shortness of breath and fatigue since starting the stem-cell procedure (last 2-3 weeks). He is expecting to hit a symptomatic period in about 1-2 weeks, once the chemotherapy \"kicks-in.\" He feels anxious about what is to come.     Date of onset: 01/05/24 (Order date.)      Relevant medical history:     Past Medical History:   Diagnosis Date    Benign essential hypertension     Hyperlipidemia     Multiple myeloma (H)     Followed by Dr. Ruelas with [facility]     Dates & types of surgery:    Past Surgical History:   Procedure Laterality Date    CV CORONARY ANGIOGRAM N/A 9/7/2023    Procedure: Coronary Angiogram;  Surgeon: Domenico Sauceda MD;  Location: [facility] CV    CV TRANSCATHETER AORTIC VALVE REPLACEMENT-FEMORAL APPROACH N/A 10/24/2023    Procedure: Transcatheter Aortic Valve Replacement-Femoral Approach;  Surgeon: Charles Field MD;  Location: [facility] CV    IR CVC TUNNEL PLACEMENT > 5 YRS OF AGE  1/2/2024    OR TRANSCATHETER AORTIC VALVE REPLACEMENT, FEMORAL PERCUTANEOUS APPROACH (STANDBY) N/A 10/24/2023    Procedure: OR " TRANSCATHETER AORTIC VALVE REPLACEMENT, FEMORAL PERCUTANEOUS APPROACH (STANDBY);  Surgeon: Sahra Mosher MD;  Location: [facility] CATH LAB CV     Prior diagnostic imaging/testing results:     Yes, numerous scans 2' MM. See imaging.     Prior therapy history for the same diagnosis, illness or injury:    No     Prior Level of Function  Pt IND with all transfers, ambulation, and ADLs/IADLs at baseline.     Living Environment  Social support: With a significant other or spouse   Type of home: House; 1 level   Stairs to enter the home: No       Ramp: No   Stairs inside the home: Yes 12 (Doesn't need to complete regularly.) Is there a railing: Yes   Help at home: None  Equipment owned:   None  Employment: No    Hobbies/Interests:  See subjective.     Patient goals for therapy:   See subjective.    Pain assessment: Pain denied     Objective      Cognitive Status Examination  Patient with no cognitive impairment.     OBSERVATION: Pt ambulating well from lobby to PT treatment room.     PALPATION: N/A    RANGE OF MOTION: LE ROM WFL  UE ROM WFL    STRENGTH:  Pt demonstrating 4+/5 strength in the UE/LE during functional mobility evaluation. See 5xSTS.     BED MOBILITY: Independent    TRANSFERS: Independent    WHEELCHAIR MOBILITY: N/A    GAIT:   Level of [place]: Independent  Assistive Device(s): None  Gait Deviations: WNL  Gait Distance: See 6MWT for distance during evaluation.   Stairs: Pt reporting use of HR on stairs at home for safety. Pt visibly unsteady with SBAx1 for stairs without HR.     BALANCE: Standing Balance (static):Good  Standing Balance (dynamic):Fair    SPECIAL TESTS  Functional Gait Assessment (FGA) TOTAL SCORE: (MAXIMUM SCORE 30): 28    10 Meter Walk Test (Comfortable)   1.42m/s with no assistive device. Typical adult ambulator; not at risk for falling.    10 Meter Walk Test (Fast)   1.8m/s with no assistive device. Typical adult ambulator; not at risk for falling.    6 Minute Walk Test (6MWT)        Did  not require standing rest break, 437 meters.  Slight SOB to follow.   5 Times Sit-to-Stand (5TSTS)  12.0s; pt with typical strength of a healthy adult.      Modified CTSIB Conditions (sec) Cond 1: 30s  Cond 2: 30s  Cond 4: 30s  Cond 5 : 30s (slight inc sway)       SENSATION: UE Sensation WNL, Pt reporting numbness in the right thigh and the bottom of the foot - though reports he has continued feeling.    COORDINATION: Deferred today.     Assessment & Plan   CLINICAL IMPRESSIONS  Medical Diagnosis: Multiple myeloma in remission    Treatment Diagnosis: physical deconditioning; imbalance   Impression/Assessment: Patient is a 68 year old male with weakness, imbalance, and reduced tolerance to physical activity complaints.  The following significant findings have been identified: Decreased ROM/flexibility, Decreased strength, Impaired balance, Impaired sensation, Impaired gait, Decreased activity tolerance, and Instability. These impairments interfere with their ability to perform self care tasks, recreational activities, household chores, driving , household mobility, and community mobility as compared to previous level of function.     Clinical Decision Making (Complexity):  Clinical Presentation: Stable/Uncomplicated  Clinical Presentation Rationale: based on medical and personal factors listed in PT evaluation  Clinical Decision Making (Complexity): Low complexity    PLAN OF CARE  Treatment Interventions:  Interventions: Gait Training, Manual Therapy, Neuromuscular Re-education, Therapeutic Activity, Therapeutic Exercise, Self-Care/Home Management    Long Term Goals     PT Goal 1  Goal Identifier: HEP  Goal Description: Pt will demonstrate IND with strength, balance, and muscular and aerobic endurance HEP, while working to improve capacity for functional mobility.  Goal Progress: initiated 1/8/2024  Target Date: 03/07/24  PT Goal 2  Goal Identifier: 6MWT  Goal Description: Pt will complete 6 Minute Walk Test,  ambulating an additional 50 meters (the MCID for 6MWT), to demonstrate improved tolerance for cardiorespiratory endurance and physical activity.  Goal Progress: baseline: 437m  Target Date: 03/07/24  PT Goal 3  Goal Identifier: FGA  Goal Description: Pt will score 30pts on FGA, to demonstrate improved dynamic balance and postural control with ambulation, and decreased risk for falling with functional mobility.  Goal Progress: baseline: 28/30  Target Date: 03/07/24  PT Goal 4  Goal Identifier: mCTSIB  Goal Description: Pt will complete 30s for all conditions of mCTSIB to demonstrate improvement of sensory integration, in presence of ALS/high level balance difficulty.  Goal Progress: baseline: 30s, 30s, 30s, 30s (though significant sway on condition #5)  Target Date: 03/07/24      Frequency of Treatment: 1x/week  Duration of Treatment: 60 days    Education Assessment:   Learner/Method: Patient;Demonstration;Pictures/Video;Listening    Risks and benefits of evaluation/treatment have been explained.   Patient/Family/caregiver agrees with Plan of Care.     Evaluation Time:     PT Eval, Low Complexity Minutes (58647): 22     Signing Clinician: Annalise Castro, PT, DPT, NCS      [facility]                                                                                   OUTPATIENT PHYSICAL THERAPY      PLAN OF TREATMENT FOR OUTPATIENT REHABILITATION   Patient's Last Name, First Name, MARSHA CarrollBilly ROBERTS YOB: 1955   Provider's Name   [facility]   Medical Record No.  [id number]     Onset Date: 01/05/24 (Order date.)  Start of Care Date: 01/08/24     Medical Diagnosis:  Multiple myeloma in remission      PT Treatment Diagnosis:  physical deconditioning; imbalance Plan of Treatment  Frequency/Duration: 1x/week/ 60 days    Certification date from 01/08/24 to 03/07/24         See note for plan of treatment details and functional goals     Annalise  Gloria, PT, DPT, NCS                         I CERTIFY THE NEED FOR THESE SERVICES FURNISHED UNDER        THIS PLAN OF TREATMENT AND WHILE UNDER MY CARE     (Physician attestation of this document indicates review and certification of the therapy plan).              Referring Provider:  Pt referred by Jerri Frey MD. Sending to pt's PCP for insurance certification.     Initial Assessment  See Epic Evaluation- Start of Care Date: 01/08/24

## 2024-01-09 ENCOUNTER — INFUSION THERAPY VISIT (OUTPATIENT)
Dept: TRANSPLANT | Facility: CLINIC | Age: 69
End: 2024-01-09
Payer: MEDICARE

## 2024-01-09 ENCOUNTER — OFFICE VISIT (OUTPATIENT)
Dept: TRANSPLANT | Facility: CLINIC | Age: 69
End: 2024-01-09
Payer: MEDICARE

## 2024-01-09 ENCOUNTER — ONCOLOGY VISIT (OUTPATIENT)
Dept: TRANSPLANT | Facility: CLINIC | Age: 69
End: 2024-01-09
Payer: MEDICARE

## 2024-01-09 ENCOUNTER — OFFICE VISIT (OUTPATIENT)
Dept: CONSULT | Facility: CLINIC | Age: 69
End: 2024-01-09
Payer: MEDICARE

## 2024-01-09 ENCOUNTER — TELEPHONE (OUTPATIENT)
Dept: CARDIOLOGY | Facility: CLINIC | Age: 69
End: 2024-01-09

## 2024-01-09 ENCOUNTER — APPOINTMENT (OUTPATIENT)
Dept: LAB | Facility: CLINIC | Age: 69
End: 2024-01-09
Payer: MEDICARE

## 2024-01-09 VITALS
DIASTOLIC BLOOD PRESSURE: 84 MMHG | BODY MASS INDEX: 30.56 KG/M2 | RESPIRATION RATE: 16 BRPM | WEIGHT: 213 LBS | TEMPERATURE: 98.2 F | SYSTOLIC BLOOD PRESSURE: 153 MMHG | HEART RATE: 91 BPM | OXYGEN SATURATION: 97 %

## 2024-01-09 DIAGNOSIS — D84.821 IMMUNOSUPPRESSED DUE TO CHEMOTHERAPY (H): ICD-10-CM

## 2024-01-09 DIAGNOSIS — T45.1X5A IMMUNOSUPPRESSED DUE TO CHEMOTHERAPY (H): ICD-10-CM

## 2024-01-09 DIAGNOSIS — Z01.818 EXAMINATION PRIOR TO CHEMOTHERAPY: ICD-10-CM

## 2024-01-09 DIAGNOSIS — C90.00 MULTIPLE MYELOMA NOT HAVING ACHIEVED REMISSION (H): Primary | ICD-10-CM

## 2024-01-09 DIAGNOSIS — Z79.899 IMMUNOSUPPRESSED DUE TO CHEMOTHERAPY (H): ICD-10-CM

## 2024-01-09 DIAGNOSIS — C90.01 MULTIPLE MYELOMA IN REMISSION (H): Primary | ICD-10-CM

## 2024-01-09 DIAGNOSIS — E78.5 HYPERLIPIDEMIA, UNSPECIFIED HYPERLIPIDEMIA TYPE: ICD-10-CM

## 2024-01-09 DIAGNOSIS — I25.10 CORONARY ARTERY DISEASE INVOLVING NATIVE CORONARY ARTERY OF NATIVE HEART WITHOUT ANGINA PECTORIS: ICD-10-CM

## 2024-01-09 DIAGNOSIS — C90.01 MULTIPLE MYELOMA IN REMISSION (H): ICD-10-CM

## 2024-01-09 DIAGNOSIS — Z52.011 AUTOLOGOUS DONOR OF STEM CELLS: ICD-10-CM

## 2024-01-09 DIAGNOSIS — C90.00 MULTIPLE MYELOMA, REMISSION STATUS UNSPECIFIED (H): Primary | ICD-10-CM

## 2024-01-09 DIAGNOSIS — I35.0 SEVERE AORTIC STENOSIS: ICD-10-CM

## 2024-01-09 LAB
ALBUMIN SERPL BCG-MCNC: 4.3 G/DL (ref 3.5–5.2)
ALP SERPL-CCNC: 171 U/L (ref 40–150)
ALT SERPL W P-5'-P-CCNC: 62 U/L (ref 0–70)
ANION GAP SERPL CALCULATED.3IONS-SCNC: 10 MMOL/L (ref 7–15)
AST SERPL W P-5'-P-CCNC: 48 U/L (ref 0–45)
BASOPHILS # BLD AUTO: 0 10E3/UL (ref 0–0.2)
BASOPHILS NFR BLD AUTO: 0 %
BILIRUB SERPL-MCNC: 0.4 MG/DL
BUN SERPL-MCNC: 17.4 MG/DL (ref 8–23)
CALCIUM SERPL-MCNC: 9.3 MG/DL (ref 8.8–10.2)
CHLORIDE SERPL-SCNC: 105 MMOL/L (ref 98–107)
CREAT SERPL-MCNC: 0.74 MG/DL (ref 0.67–1.17)
DEPRECATED HCO3 PLAS-SCNC: 25 MMOL/L (ref 22–29)
EGFRCR SERPLBLD CKD-EPI 2021: >90 ML/MIN/1.73M2
EOSINOPHIL # BLD AUTO: 0 10E3/UL (ref 0–0.7)
EOSINOPHIL NFR BLD AUTO: 1 %
ERYTHROCYTE [DISTWIDTH] IN BLOOD BY AUTOMATED COUNT: 13.3 % (ref 10–15)
GLUCOSE SERPL-MCNC: 102 MG/DL (ref 70–99)
HCT VFR BLD AUTO: 40.1 % (ref 40–53)
HGB BLD-MCNC: 13.3 G/DL (ref 13.3–17.7)
IMM GRANULOCYTES # BLD: 0.1 10E3/UL
IMM GRANULOCYTES NFR BLD: 2 %
LYMPHOCYTES # BLD AUTO: 0.7 10E3/UL (ref 0.8–5.3)
LYMPHOCYTES NFR BLD AUTO: 10 %
MAGNESIUM SERPL-MCNC: 2.2 MG/DL (ref 1.7–2.3)
MCH RBC QN AUTO: 30.3 PG (ref 26.5–33)
MCHC RBC AUTO-ENTMCNC: 33.2 G/DL (ref 31.5–36.5)
MCV RBC AUTO: 91 FL (ref 78–100)
MONOCYTES # BLD AUTO: 0.7 10E3/UL (ref 0–1.3)
MONOCYTES NFR BLD AUTO: 10 %
NEUTROPHILS # BLD AUTO: 5.5 10E3/UL (ref 1.6–8.3)
NEUTROPHILS NFR BLD AUTO: 77 %
NRBC # BLD AUTO: 0 10E3/UL
NRBC BLD AUTO-RTO: 1 /100
PLATELET # BLD AUTO: 131 10E3/UL (ref 150–450)
POTASSIUM SERPL-SCNC: 4.3 MMOL/L (ref 3.4–5.3)
PROT SERPL-MCNC: 7 G/DL (ref 6.4–8.3)
RBC # BLD AUTO: 4.39 10E6/UL (ref 4.4–5.9)
SODIUM SERPL-SCNC: 140 MMOL/L (ref 135–145)
URATE SERPL-MCNC: 7.5 MG/DL (ref 3.4–7)
WBC # BLD AUTO: 7 10E3/UL (ref 4–11)

## 2024-01-09 PROCEDURE — 250N000011 HC RX IP 250 OP 636

## 2024-01-09 PROCEDURE — 258N000003 HC RX IP 258 OP 636

## 2024-01-09 PROCEDURE — 85025 COMPLETE CBC W/AUTO DIFF WBC: CPT

## 2024-01-09 PROCEDURE — 83735 ASSAY OF MAGNESIUM: CPT

## 2024-01-09 PROCEDURE — 36592 COLLECT BLOOD FROM PICC: CPT

## 2024-01-09 PROCEDURE — 96367 TX/PROPH/DG ADDL SEQ IV INF: CPT

## 2024-01-09 PROCEDURE — 96413 CHEMO IV INFUSION 1 HR: CPT

## 2024-01-09 PROCEDURE — 84550 ASSAY OF BLOOD/URIC ACID: CPT

## 2024-01-09 PROCEDURE — G0463 HOSPITAL OUTPT CLINIC VISIT: HCPCS | Mod: 25

## 2024-01-09 PROCEDURE — 96361 HYDRATE IV INFUSION ADD-ON: CPT

## 2024-01-09 PROCEDURE — 80053 COMPREHEN METABOLIC PANEL: CPT

## 2024-01-09 PROCEDURE — 250N000011 HC RX IP 250 OP 636: Performed by: STUDENT IN AN ORGANIZED HEALTH CARE EDUCATION/TRAINING PROGRAM

## 2024-01-09 PROCEDURE — 96375 TX/PRO/DX INJ NEW DRUG ADDON: CPT

## 2024-01-09 PROCEDURE — 99215 OFFICE O/P EST HI 40 MIN: CPT

## 2024-01-09 RX ORDER — PALONOSETRON 0.05 MG/ML
0.25 INJECTION, SOLUTION INTRAVENOUS ONCE
Status: COMPLETED | OUTPATIENT
Start: 2024-01-09 | End: 2024-01-09

## 2024-01-09 RX ORDER — DIPHENHYDRAMINE HCL 25 MG
25 CAPSULE ORAL ONCE
Status: CANCELLED
Start: 2024-01-10 | End: 2024-01-10

## 2024-01-09 RX ORDER — DEXAMETHASONE 4 MG/1
8 TABLET ORAL DAILY
Status: CANCELLED
Start: 2024-01-10

## 2024-01-09 RX ORDER — PANTOPRAZOLE SODIUM 40 MG/1
40 TABLET, DELAYED RELEASE ORAL DAILY
Qty: 30 TABLET | Refills: 1 | Status: SHIPPED | OUTPATIENT
Start: 2024-01-09 | End: 2024-04-01

## 2024-01-09 RX ORDER — METHYLPREDNISOLONE SODIUM SUCCINATE 125 MG/2ML
125 INJECTION, POWDER, LYOPHILIZED, FOR SOLUTION INTRAMUSCULAR; INTRAVENOUS
Status: CANCELLED
Start: 2024-01-09

## 2024-01-09 RX ORDER — MEPERIDINE HYDROCHLORIDE 25 MG/ML
25 INJECTION INTRAMUSCULAR; INTRAVENOUS; SUBCUTANEOUS EVERY 30 MIN PRN
Status: CANCELLED | OUTPATIENT
Start: 2024-01-09

## 2024-01-09 RX ORDER — HEPARIN SODIUM (PORCINE) LOCK FLUSH IV SOLN 100 UNIT/ML 100 UNIT/ML
5 SOLUTION INTRAVENOUS
Status: CANCELLED | OUTPATIENT
Start: 2024-01-09

## 2024-01-09 RX ORDER — DEXTROSE MONOHYDRATE 50 MG/ML
10-20 INJECTION, SOLUTION INTRAVENOUS
Status: CANCELLED | OUTPATIENT
Start: 2024-01-15

## 2024-01-09 RX ORDER — ALLOPURINOL 300 MG/1
300 TABLET ORAL DAILY
Qty: 7 TABLET | Refills: 0 | Status: SHIPPED | OUTPATIENT
Start: 2024-01-09 | End: 2024-01-17

## 2024-01-09 RX ORDER — DIPHENHYDRAMINE HYDROCHLORIDE 50 MG/ML
50 INJECTION INTRAMUSCULAR; INTRAVENOUS
Status: CANCELLED
Start: 2024-01-09

## 2024-01-09 RX ORDER — PROCHLORPERAZINE MALEATE 5 MG
5-10 TABLET ORAL EVERY 6 HOURS PRN
Qty: 30 TABLET | Refills: 1 | Status: SHIPPED | OUTPATIENT
Start: 2024-01-09 | End: 2024-02-08

## 2024-01-09 RX ORDER — LORAZEPAM 2 MG/ML
0.5 INJECTION INTRAMUSCULAR EVERY 4 HOURS PRN
Status: CANCELLED | OUTPATIENT
Start: 2024-01-09

## 2024-01-09 RX ORDER — ACETAMINOPHEN 325 MG/1
650 TABLET ORAL ONCE
Status: CANCELLED
Start: 2024-01-10 | End: 2024-01-10

## 2024-01-09 RX ORDER — LABETALOL 20 MG/4 ML (5 MG/ML) INTRAVENOUS SYRINGE
10-20
Status: CANCELLED | OUTPATIENT
Start: 2024-01-10

## 2024-01-09 RX ORDER — ALBUTEROL SULFATE 0.83 MG/ML
2.5 SOLUTION RESPIRATORY (INHALATION)
Status: CANCELLED | OUTPATIENT
Start: 2024-01-09

## 2024-01-09 RX ORDER — ONDANSETRON 8 MG/1
8 TABLET, FILM COATED ORAL EVERY 8 HOURS PRN
Qty: 30 TABLET | Refills: 0 | Status: SHIPPED | OUTPATIENT
Start: 2024-01-09 | End: 2024-03-14

## 2024-01-09 RX ORDER — LEVOFLOXACIN 250 MG/1
250 TABLET, FILM COATED ORAL DAILY
Qty: 30 TABLET | Refills: 0 | Status: SHIPPED | OUTPATIENT
Start: 2024-01-09 | End: 2024-01-30

## 2024-01-09 RX ORDER — ALLOPURINOL 300 MG/1
300 TABLET ORAL ONCE
Status: CANCELLED
Start: 2024-01-09 | End: 2024-01-09

## 2024-01-09 RX ORDER — OLANZAPINE 2.5 MG/1
2.5 TABLET, FILM COATED ORAL 2 TIMES DAILY PRN
Qty: 30 TABLET | Refills: 0 | Status: SHIPPED | OUTPATIENT
Start: 2024-01-09 | End: 2024-03-14

## 2024-01-09 RX ORDER — DEXAMETHASONE 4 MG/1
8 TABLET ORAL
Qty: 4 TABLET | Refills: 0 | Status: SHIPPED | OUTPATIENT
Start: 2024-01-09 | End: 2024-01-13

## 2024-01-09 RX ORDER — ONDANSETRON 4 MG/1
8 TABLET, FILM COATED ORAL EVERY 8 HOURS
Status: CANCELLED
Start: 2024-01-09

## 2024-01-09 RX ORDER — EPINEPHRINE 1 MG/ML
0.3 INJECTION, SOLUTION INTRAMUSCULAR; SUBCUTANEOUS EVERY 5 MIN PRN
Status: CANCELLED | OUTPATIENT
Start: 2024-01-09

## 2024-01-09 RX ORDER — HEPARIN SODIUM (PORCINE) LOCK FLUSH IV SOLN 100 UNIT/ML 100 UNIT/ML
5 SOLUTION INTRAVENOUS
Status: CANCELLED | OUTPATIENT
Start: 2024-01-10

## 2024-01-09 RX ORDER — PALONOSETRON 0.05 MG/ML
0.25 INJECTION, SOLUTION INTRAVENOUS ONCE
Status: CANCELLED
Start: 2024-01-09

## 2024-01-09 RX ORDER — FLUCONAZOLE 200 MG/1
200 TABLET ORAL DAILY
Qty: 30 TABLET | Refills: 0 | Status: SHIPPED | OUTPATIENT
Start: 2024-01-09 | End: 2024-02-08

## 2024-01-09 RX ORDER — MEPERIDINE HYDROCHLORIDE 25 MG/ML
25-50 INJECTION INTRAMUSCULAR; INTRAVENOUS; SUBCUTANEOUS
Status: CANCELLED | OUTPATIENT
Start: 2024-01-10 | End: 2024-01-11

## 2024-01-09 RX ORDER — ACYCLOVIR 800 MG/1
800 TABLET ORAL 2 TIMES DAILY
Qty: 60 TABLET | Refills: 11 | Status: SHIPPED | OUTPATIENT
Start: 2024-01-09

## 2024-01-09 RX ORDER — HEPARIN SODIUM,PORCINE 10 UNIT/ML
5-20 VIAL (ML) INTRAVENOUS DAILY PRN
Status: CANCELLED | OUTPATIENT
Start: 2024-01-09

## 2024-01-09 RX ORDER — HEPARIN SODIUM,PORCINE 10 UNIT/ML
5 VIAL (ML) INTRAVENOUS ONCE
Status: COMPLETED | OUTPATIENT
Start: 2024-01-09 | End: 2024-01-09

## 2024-01-09 RX ORDER — HEPARIN SODIUM,PORCINE 10 UNIT/ML
5-20 VIAL (ML) INTRAVENOUS DAILY PRN
Status: CANCELLED | OUTPATIENT
Start: 2024-01-10

## 2024-01-09 RX ORDER — ALBUTEROL SULFATE 90 UG/1
1-2 AEROSOL, METERED RESPIRATORY (INHALATION)
Status: CANCELLED
Start: 2024-01-09

## 2024-01-09 RX ADMIN — DEXAMETHASONE SODIUM PHOSPHATE 12 MG: 10 INJECTION, SOLUTION INTRAMUSCULAR; INTRAVENOUS at 11:41

## 2024-01-09 RX ADMIN — Medication 5 ML: at 10:54

## 2024-01-09 RX ADMIN — PALONOSETRON HYDROCHLORIDE 0.25 MG: 0.25 INJECTION INTRAVENOUS at 12:12

## 2024-01-09 RX ADMIN — SODIUM CHLORIDE 1000 ML: 9 INJECTION, SOLUTION INTRAVENOUS at 11:10

## 2024-01-09 RX ADMIN — MELPHALAN HYDROCHLORIDE 450 MG: KIT at 12:54

## 2024-01-09 ASSESSMENT — PAIN SCALES - GENERAL: PAINLEVEL: NO PAIN (0)

## 2024-01-09 NOTE — TELEPHONE ENCOUNTER
===View-only below this line===  ----- Message -----  From: Shanell Gill RN  Sent: 1/9/2024  12:00 AM CST  To: [facility]ve [facility] Pool - e    Check on patients BP- increased metoprolol to 50 mg daily. Need to see if it has improved otherwise message sincere to discuss increasing his losartan dose.

## 2024-01-09 NOTE — NURSING NOTE
Chief Complaint   Patient presents with    Blood Draw     CVC blood draw with heparin flush by lab RN. Vitals taken and appointment arrived     Cindy Melara RN

## 2024-01-09 NOTE — PROGRESS NOTES
Met with Billy and Hood, patient's son, to go over post-transplant guidelines and numbers to call for symptoms. Patient and caregiver were reminded of caregiver requirement, N95 mask guidelines, and expectations for follow up. Billy had no further questions at this time.    Patient was given copies of signed consent forms ([id number], [id number], & [id number]).     Bozena Ritter RN, MSN  BMT Nurse Coordinator  Phone: [phone]

## 2024-01-09 NOTE — TELEPHONE ENCOUNTER
Valve Clinic RN Phone Call:  Call made on 1/9/2024 at 9:15 AM by Shanell Gill RN    Reason for call: follow up on blood pressure after medication change     Summary of conversation: Spoke to patient who indicated his BP yesterday was 155/83 which is about where he has been sitting since we adjusted his metoprolol dose. Patient reports that the BMT team is following closely to monitor his blood pressure. He was told by their team that with upcoming appointments and point he is at with his treatment that his blood pressure will likely naturally decrease in the coming weeks. We discussed that their team has been in touch with us for recommendations and if needed Juliet Ventura PA-C would recommend increasing losartan next if needed. We discussed that their team has already been in touch with us and can contact us with further concerns or questions if needed.     Additional comments/Actions: IRIS sent to provider Juliet Ventura PA-C     Instructions given to patient: Please continue on current regimen for the time being. The BMT team or you can reach out to us at anytime if there are questions or concerns regarding BP management or current medications. Please let us know if there is anything else we can help with.     Shanell Gill, LATONYA on 1/9/2024 at 9:24 AM

## 2024-01-09 NOTE — PROGRESS NOTES
Infusion Nursing Note:  Billy Carroll presents today for Day -1 Melphalan chemotherapy infusion.    Patient seen by provider today: Yes: Dr. Mascorro    present during visit today: Not Applicable.    Note: Lab results monitored. Pt received  Day -1 Melphalan infusion today and tolerated without issue. Prior to infusion pt was given Aloxi, Decadron, and a preflush of NS. Pt was also given a post flush of NS per infusion plan. Pt educated on managing side effects, neutropenic precautions, and the utilization of cryotherapy during and after the infusion. Pt verbalized understanding. Pt discharged with no further clinical concerns at this time.      Intravenous Access:  Mitchell.    Treatment Conditions:  Lab Results   Component Value Date    HGB 13.3 01/09/2024    WBC 7.0 01/09/2024    ANEU 36.6 (H) 01/04/2024    ANEUTAUTO 5.5 01/09/2024     (L) 01/09/2024        Lab Results   Component Value Date     01/09/2024    POTASSIUM 4.3 01/09/2024    MAG 2.2 01/09/2024    CR 0.74 01/09/2024    SESAR 9.3 01/09/2024    BILITOTAL 0.4 01/09/2024    ALBUMIN 4.3 01/09/2024    ALT 62 01/09/2024    AST 48 (H) 01/09/2024       Results reviewed, labs MET treatment parameters, ok to proceed with treatment.      Post Infusion Assessment:  Patient tolerated infusion without incident.  Blood return noted pre and post infusion.       Discharge Plan:   Patient and/or family verbalized understanding of discharge instructions and all questions answered.  Patient discharged in stable condition accompanied by: son.      Francesca Vásquez, RN

## 2024-01-09 NOTE — LETTER
1/9/2024         RE: Billy Carroll  [address] [place] [address]        Dear Colleague,    Thank you for referring your patient, Billy Carroll, to the [facility] BLOOD AND MARROW TRANSPLANT PROGRAM [place]. Please see a copy of my visit note below.    Teaching/Med review regarding OP Autologous SCT with HD Melphalan.     I reconciled Billy Carroll home medications including the transplant medications, which were filled [place] [facility], and added to his provided medication box. he did not have any home medications prescribed that needed adding to the med box.     I reviewed the conditioning chemotherapy with Melphalan, including ice cryotherapy for 2 -4 hours around the infusion and at least 2-4 hours following the infusion. I reviewed the antiemetics including the palonosetron and to  avoiding using ondansetron until 3 days after the infusion to avoid possible additive side effects. I reviewed the emend/dexamethasone infusion as well. Breakthrough nausea/vomiting can be covered with prochlorperazine as needed, Additional medications include allopurinol daily x 7 days.     I reviewed the prophylactic antibiotics including levofloxacin, acyclovir, fluconazole which start today and bactrim which starts in 1 month.      Filgrastim was added to the BMT support plan to start day +5 and continue until ANC > 2500 x 2 consecutive days.       CURRENT MEDICATIONS:   Current Outpatient Medications   Medication Sig Dispense Refill    acetaminophen (ACETAMINOPHEN 8 HOUR) 650 MG CR tablet Take 1,300 mg by mouth every 8 hours as needed for mild pain or fever      acyclovir (ZOVIRAX) 800 MG tablet Take 1 tablet (800 mg) by mouth 2 times daily Start Day -1 60 tablet 11    alendronate (FOSAMAX) 70 MG tablet Take 1 tablet (70 mg) by mouth every 7 days (Patient not taking: Reported on 1/9/2024) 12 tablet 1    allopurinol (ZYLOPRIM) 300 MG tablet Take 1 tablet (300 mg) by mouth daily Start Day -1 7 tablet  0    aspirin 81 MG EC tablet Take 81 mg by mouth daily (Patient not taking: Reported on 1/9/2024)      Calcium-Magnesium-Zinc 333-133-5 MG TABS per tablet Take 2 tablets by mouth daily      cholecalciferol 50 MCG (2000 UT) tablet Take 2,000 Units by mouth daily      co-enzyme Q-10 100 MG CAPS capsule Take 100 mg by mouth daily      dexAMETHasone (DECADRON) 4 MG tablet Take 2 tablets (8 mg) by mouth daily (with breakfast) for 2 days Start Day 0.  To prevent delayed nausea/vomiting. 4 tablet 0    fluconazole (DIFLUCAN) 200 MG tablet Take 1 tablet (200 mg) by mouth daily Start Day -1 30 tablet 0    fluticasone (FLONASE) 50 MCG/ACT nasal spray Spray 1 spray into both nostrils daily as needed for rhinitis or allergies      Heparin Sod, Pork, Lock Flush 10 UNIT/ML SOLN 5 mLs by Intravenous Central line route See Admin Instructions 100 mL 4    levofloxacin (LEVAQUIN) 250 MG tablet Take 1 tablet (250 mg) by mouth daily Start Day -1 30 tablet 0    loratadine (CLARITIN) 10 MG tablet Take 10 mg by mouth daily as needed for allergies      LORazepam (ATIVAN) 0.5 MG tablet Take 1 tablet (0.5 mg) by mouth every 6 hours as needed for anxiety (Patient not taking: Reported on 1/9/2024) 6 tablet 0    losartan (COZAAR) 50 MG tablet Take 50 mg by mouth daily      metoprolol succinate ER (TOPROL XL) 50 MG 24 hr tablet Take 1 tablet (50 mg) by mouth daily 90 tablet 3    mirtazapine (REMERON) 15 MG tablet Take 15 mg by mouth At Bedtime      nitroGLYcerin (NITROSTAT) 0.4 MG sublingual tablet For chest pain place 1 tablet under the tongue every 5 minutes for 3 doses. If symptoms persist 5 minutes after 1st dose call 911. 25 tablet 2    OLANZapine (ZYPREXA) 2.5 MG tablet Take 1 tablet (2.5 mg) by mouth 2 times daily as needed (For nausea or vomiting) (Patient not taking: Reported on 1/9/2024) 30 tablet 0    Omega-3 Fatty Acids (FISH OIL BURP-LESS) 1000 MG CAPS Take 1 capsule by mouth daily (Patient not taking: Reported on 1/9/2024)       ondansetron (ZOFRAN) 8 MG tablet Take 1 tablet (8 mg) by mouth every 8 hours as needed (for nausea / vomiting) 30 tablet 0    pantoprazole (PROTONIX) 40 MG EC tablet Take 1 tablet (40 mg) by mouth daily Start Day -1 30 tablet 1    prochlorperazine (COMPAZINE) 5 MG tablet Take 1-2 tablets (5-10 mg) by mouth every 6 hours as needed for nausea or vomiting (Patient not taking: Reported on 1/9/2024) 30 tablet 1    psyllium (METAMUCIL/KONSYL) Packet Take 1 packet by mouth as needed      rosuvastatin (CRESTOR) 20 MG tablet Take 1 tablet (20 mg) by mouth daily (Patient not taking: Reported on 1/9/2024) 90 tablet 1    sulfamethoxazole-trimethoprim (BACTRIM DS) 800-160 MG tablet Take 1 tablet by mouth three times a week Take on Mondays, Wednesday, fridaysof week while taking Prednsione more than 20mg daily for PCP prophylaxis 30 tablet 0    Turmeric 500 MG CAPS Take 2 capsules by mouth daily (Patient not taking: Reported on 1/9/2024)      vitamin B complex with vitamin C (VITAMIN  B COMPLEX) tablet Take 1 tablet by mouth daily      zinc gluconate 50 MG tablet Take 50 mg by mouth daily         Pertinent labs considered today:  Lab Results   Component Value Date     01/09/2024     Lab Results   Component Value Date    POTASSIUM 4.3 01/09/2024    POTASSIUM 3.8 09/06/2023     Lab Results   Component Value Date    CHLORIDE 105 01/09/2024    CHLORIDE 105 09/06/2023     Lab Results   Component Value Date    CO2 25 01/09/2024    CO2 23 09/06/2023     Lab Results   Component Value Date    BUN 17.4 01/09/2024    BUN 17 09/06/2023     Lab Results   Component Value Date     01/09/2024     09/06/2023     Lab Results   Component Value Date    CR 0.74 01/09/2024     Lab Results   Component Value Date    SESAR 9.3 01/09/2024     Lab Results   Component Value Date    BILITOTAL 0.4 01/09/2024     Lab Results   Component Value Date    PROTTOTAL 7.0 01/09/2024     Lab Results   Component Value Date    ALBUMIN 4.3 01/09/2024     ALBUMIN 2.6 06/04/2023     Lab Results   Component Value Date    ALKPHOS 171 01/09/2024     Lab Results   Component Value Date    ALT 62 01/09/2024     Lab Results   Component Value Date    AST 48 01/09/2024     Lab Results   Component Value Date    HGB 13.3 01/09/2024     Lab Results   Component Value Date    WBC 7.0 01/09/2024     Lab Results   Component Value Date     01/09/2024     Estimated Creatinine Clearance: 108.3 mL/min (based on SCr of 0.74 mg/dL).       Time spent in this activity: 30 minutes     Bc [facility]

## 2024-01-09 NOTE — PROGRESS NOTES
Teaching/Med review regarding OP Autologous SCT with HD Melphalan.     I reconciled Billy Carroll home medications including the transplant medications, which were filled by the [facility] pharmacy, and added to his provided medication box. he did not have any home medications prescribed that needed adding to the med box.     I reviewed the conditioning chemotherapy with Melphalan, including ice cryotherapy for 2 -4 hours around the infusion and at least 2-4 hours following the infusion. I reviewed the antiemetics including the palonosetron and to  avoiding using ondansetron until 3 days after the infusion to avoid possible additive side effects. I reviewed the emend/dexamethasone infusion as well. Breakthrough nausea/vomiting can be covered with prochlorperazine as needed, Additional medications include allopurinol daily x 7 days.     I reviewed the prophylactic antibiotics including levofloxacin, acyclovir, fluconazole which start today and bactrim which starts in 1 month.      Filgrastim was added to the BMT support plan to start day +5 and continue until ANC > 2500 x 2 consecutive days.       CURRENT MEDICATIONS:   Current Outpatient Medications   Medication Sig Dispense Refill    acetaminophen (ACETAMINOPHEN 8 HOUR) 650 MG CR tablet Take 1,300 mg by mouth every 8 hours as needed for mild pain or fever      acyclovir (ZOVIRAX) 800 MG tablet Take 1 tablet (800 mg) by mouth 2 times daily Start Day -1 60 tablet 11    alendronate (FOSAMAX) 70 MG tablet Take 1 tablet (70 mg) by mouth every 7 days (Patient not taking: Reported on 1/9/2024) 12 tablet 1    allopurinol (ZYLOPRIM) 300 MG tablet Take 1 tablet (300 mg) by mouth daily Start Day -1 7 tablet 0    aspirin 81 MG EC tablet Take 81 mg by mouth daily (Patient not taking: Reported on 1/9/2024)      Calcium-Magnesium-Zinc 333-133-5 MG TABS per tablet Take 2 tablets by mouth daily      cholecalciferol 50 MCG (2000 UT) tablet Take 2,000 Units by mouth daily      co-enzyme  Q-10 100 MG CAPS capsule Take 100 mg by mouth daily      dexAMETHasone (DECADRON) 4 MG tablet Take 2 tablets (8 mg) by mouth daily (with breakfast) for 2 days Start Day 0.  To prevent delayed nausea/vomiting. 4 tablet 0    fluconazole (DIFLUCAN) 200 MG tablet Take 1 tablet (200 mg) by mouth daily Start Day -1 30 tablet 0    fluticasone (FLONASE) 50 MCG/ACT nasal spray Spray 1 spray into both nostrils daily as needed for rhinitis or allergies      Heparin Sod, Pork, Lock Flush 10 UNIT/ML SOLN 5 mLs by Intravenous Central line route See Admin Instructions 100 mL 4    levofloxacin (LEVAQUIN) 250 MG tablet Take 1 tablet (250 mg) by mouth daily Start Day -1 30 tablet 0    loratadine (CLARITIN) 10 MG tablet Take 10 mg by mouth daily as needed for allergies      LORazepam (ATIVAN) 0.5 MG tablet Take 1 tablet (0.5 mg) by mouth every 6 hours as needed for anxiety (Patient not taking: Reported on 1/9/2024) 6 tablet 0    losartan (COZAAR) 50 MG tablet Take 50 mg by mouth daily      metoprolol succinate ER (TOPROL XL) 50 MG 24 hr tablet Take 1 tablet (50 mg) by mouth daily 90 tablet 3    mirtazapine (REMERON) 15 MG tablet Take 15 mg by mouth At Bedtime      nitroGLYcerin (NITROSTAT) 0.4 MG sublingual tablet For chest pain place 1 tablet under the tongue every 5 minutes for 3 doses. If symptoms persist 5 minutes after 1st dose call 911. 25 tablet 2    OLANZapine (ZYPREXA) 2.5 MG tablet Take 1 tablet (2.5 mg) by mouth 2 times daily as needed (For nausea or vomiting) (Patient not taking: Reported on 1/9/2024) 30 tablet 0    Omega-3 Fatty Acids (FISH OIL BURP-LESS) 1000 MG CAPS Take 1 capsule by mouth daily (Patient not taking: Reported on 1/9/2024)      ondansetron (ZOFRAN) 8 MG tablet Take 1 tablet (8 mg) by mouth every 8 hours as needed (for nausea / vomiting) 30 tablet 0    pantoprazole (PROTONIX) 40 MG EC tablet Take 1 tablet (40 mg) by mouth daily Start Day -1 30 tablet 1    prochlorperazine (COMPAZINE) 5 MG tablet Take 1-2  tablets (5-10 mg) by mouth every 6 hours as needed for nausea or vomiting (Patient not taking: Reported on 1/9/2024) 30 tablet 1    psyllium (METAMUCIL/KONSYL) Packet Take 1 packet by mouth as needed      rosuvastatin (CRESTOR) 20 MG tablet Take 1 tablet (20 mg) by mouth daily (Patient not taking: Reported on 1/9/2024) 90 tablet 1    sulfamethoxazole-trimethoprim (BACTRIM DS) 800-160 MG tablet Take 1 tablet by mouth three times a week Take on Mondays, Wednesday, fridaysof week while taking Prednsione more than 20mg daily for PCP prophylaxis 30 tablet 0    Turmeric 500 MG CAPS Take 2 capsules by mouth daily (Patient not taking: Reported on 1/9/2024)      vitamin B complex with vitamin C (VITAMIN  B COMPLEX) tablet Take 1 tablet by mouth daily      zinc gluconate 50 MG tablet Take 50 mg by mouth daily         Pertinent labs considered today:  Lab Results   Component Value Date     01/09/2024     Lab Results   Component Value Date    POTASSIUM 4.3 01/09/2024    POTASSIUM 3.8 09/06/2023     Lab Results   Component Value Date    CHLORIDE 105 01/09/2024    CHLORIDE 105 09/06/2023     Lab Results   Component Value Date    CO2 25 01/09/2024    CO2 23 09/06/2023     Lab Results   Component Value Date    BUN 17.4 01/09/2024    BUN 17 09/06/2023     Lab Results   Component Value Date     01/09/2024     09/06/2023     Lab Results   Component Value Date    CR 0.74 01/09/2024     Lab Results   Component Value Date    SESAR 9.3 01/09/2024     Lab Results   Component Value Date    BILITOTAL 0.4 01/09/2024     Lab Results   Component Value Date    PROTTOTAL 7.0 01/09/2024     Lab Results   Component Value Date    ALBUMIN 4.3 01/09/2024    ALBUMIN 2.6 06/04/2023     Lab Results   Component Value Date    ALKPHOS 171 01/09/2024     Lab Results   Component Value Date    ALT 62 01/09/2024     Lab Results   Component Value Date    AST 48 01/09/2024     Lab Results   Component Value Date    HGB 13.3 01/09/2024     Lab  Results   Component Value Date    WBC 7.0 01/09/2024     Lab Results   Component Value Date     01/09/2024     Estimated Creatinine Clearance: 108.3 mL/min (based on SCr of 0.74 mg/dL).       Time spent in this activity: 30 minutes     Bc Anderson [facility]

## 2024-01-09 NOTE — LETTER
1/9/2024         RE: Billy Carroll  [address] [place] [address]        Dear Colleague,    Thank you for referring your patient, Billy Carroll, to the [facility] BLOOD AND MARROW TRANSPLANT PROGRAM [place]. Please see a copy of my visit note below.    BMT Progress Note    ID: Mr. Carroll is a 68 year old man D -1 for standard risk IgG lambda kappa multiple myeloma.      HPI:   Billy is seen in infusion today and reports he is generally feeling well.  He notes he had a lot of anxiety last week but has taken the PRN ativan a few times and felt much more relaxed over the weekend.  He has not taken ativan today but still feels ok.  He denies chest pain, SOB, leg swelling, nausea/vomiting, fevers/chills, or other acute concerns.    ROS: Review of systems with pertinent positive and negatives in HPI    Exam:  BP (!) 153/84   Pulse 91   Temp 98.2  F (36.8  C) (Oral)   Resp 16   Wt 96.6 kg (213 lb)   SpO2 97%   BMI 30.56 kg/m       GEN: NAD, no acute distress  HEENT: sclera anicteric, normocephalic  Pulm: CTAB  Cardiac: RRR. No murmurs.  No LE edema  Abd: soft, non tender  MSK: No bony   Access: CVC, NT, small amount of blood soaked into the dressing.    A/P:  Billy Carroll is a 68 year old man with IgG kappa MM with extramedullary disease, D+ -1 auto.  Reviewed transplant process and anticipated course over the next several weeks.  Reviewed side effects of melphalan in detail; he was already familiar with these from his prior counseling.  Answered appropriate questions.      - PMHx significant for CAD, severe aortic stenosis s/p TAVR (10/24/2023) currently ongoing cardiac rehabilitation, HTN, and HLD.     BMT/MM  Completed collecting after 2 days -- total collections: 7.43 x 10^6    HEME/COAG  Leukocytosis 2/2 GCSF/Mozobil  Transfusion parameters: hemoglobin <7, platelets <10    IMMUNOCOMPROMISED  - prophy ACV, Pentamidine (12/28/23)  #hx PJP pneumonia: resume Bactrim D28 post  BMT    CARDIOVASCULAR  #HTN: cont losartan, metoprolol.  May hold in coming days depending on clinical status  #Severe aortic stenosis, now s/p TAVR 10/24/2023.   #Moderate nonobstructive coronary artery disease   #Hyperlipidemia - hold statin khushboo-transplant    MUSCULOSKELETAL/FRAILTY  - bone pain from GCSF: resolved  Baseline Frailty Score: 2  Patient with substantial risk of sarcopenia  Cancer Rehab as needed outpatient    PSCYH:  # Anxiety: improved after PRN ativan, not currently using.      Summary:  Ok to proceed with melphalan today  Hold statin, alendronate, aspirin, vitamin supplements  Monitor volume status carefully given cardiac history    RTC tomorrow for transplant    I spent 40 minutes on the date of the encounter doing chart review, history and  exam, documentation and further activities as noted above.    Diallo Mascorro MD

## 2024-01-09 NOTE — PROGRESS NOTES
BMT Progress Note    ID: Mr. Carroll is a 68 year old man D -1 for standard risk IgG lambda kappa multiple myeloma.      HPI:   Billy is seen in infusion today and reports he is generally feeling well.  He notes he had a lot of anxiety last week but has taken the PRN ativan a few times and felt much more relaxed over the weekend.  He has not taken ativan today but still feels ok.  He denies chest pain, SOB, leg swelling, nausea/vomiting, fevers/chills, or other acute concerns.    ROS: Review of systems with pertinent positive and negatives in HPI    Exam:  BP (!) 153/84   Pulse 91   Temp 98.2  F (36.8  C) (Oral)   Resp 16   Wt 96.6 kg (213 lb)   SpO2 97%   BMI 30.56 kg/m       GEN: NAD, no acute distress  HEENT: sclera anicteric, normocephalic  Pulm: CTAB  Cardiac: RRR. No murmurs.  No LE edema  Abd: soft, non tender  MSK: No bony   Access: CVC, NT, small amount of blood soaked into the dressing.    A/P:  Billy Carroll is a 68 year old man with IgG kappa MM with extramedullary disease, D+ -1 auto.  Reviewed transplant process and anticipated course over the next several weeks.  Reviewed side effects of melphalan in detail; he was already familiar with these from his prior counseling.  Answered appropriate questions.      - PMHx significant for CAD, severe aortic stenosis s/p TAVR (10/24/2023) currently ongoing cardiac rehabilitation, HTN, and HLD.     BMT/MM  Completed collecting after 2 days -- total collections: 7.43 x 10^6    HEME/COAG  Leukocytosis 2/2 GCSF/Mozobil  Transfusion parameters: hemoglobin <7, platelets <10    IMMUNOCOMPROMISED  - prophy ACV, Pentamidine (12/28/23)  #hx PJP pneumonia: resume Bactrim D28 post BMT    CARDIOVASCULAR  #HTN: cont losartan, metoprolol.  May hold in coming days depending on clinical status  #Severe aortic stenosis, now s/p TAVR 10/24/2023.   #Moderate nonobstructive coronary artery disease   #Hyperlipidemia - hold statin khushboo-transplant    MUSCULOSKELETAL/FRAILTY  - bone  pain from GCSF: resolved  Baseline Frailty Score: 2  Patient with substantial risk of sarcopenia  Cancer Rehab as needed outpatient    PSCYH:  # Anxiety: improved after PRN ativan, not currently using.      Summary:  Ok to proceed with melphalan today  Hold statin, alendronate, aspirin, vitamin supplements  Monitor volume status carefully given cardiac history    RTC tomorrow for transplant    I spent 40 minutes on the date of the encounter doing chart review, history and  exam, documentation and further activities as noted above.    Diallo Mascorro MD

## 2024-01-10 ENCOUNTER — APPOINTMENT (OUTPATIENT)
Dept: LAB | Facility: CLINIC | Age: 69
End: 2024-01-10
Payer: MEDICARE

## 2024-01-10 ENCOUNTER — INFUSION THERAPY VISIT (OUTPATIENT)
Dept: TRANSPLANT | Facility: CLINIC | Age: 69
End: 2024-01-10
Payer: MEDICARE

## 2024-01-10 ENCOUNTER — ONCOLOGY VISIT (OUTPATIENT)
Dept: TRANSPLANT | Facility: CLINIC | Age: 69
End: 2024-01-10
Payer: MEDICARE

## 2024-01-10 VITALS
TEMPERATURE: 97.8 F | OXYGEN SATURATION: 98 % | HEART RATE: 91 BPM | DIASTOLIC BLOOD PRESSURE: 78 MMHG | SYSTOLIC BLOOD PRESSURE: 152 MMHG | RESPIRATION RATE: 16 BRPM

## 2024-01-10 VITALS
BODY MASS INDEX: 30.92 KG/M2 | SYSTOLIC BLOOD PRESSURE: 158 MMHG | HEART RATE: 80 BPM | OXYGEN SATURATION: 97 % | WEIGHT: 215.5 LBS | TEMPERATURE: 98.3 F | DIASTOLIC BLOOD PRESSURE: 80 MMHG | RESPIRATION RATE: 16 BRPM

## 2024-01-10 DIAGNOSIS — C90.00 MULTIPLE MYELOMA NOT HAVING ACHIEVED REMISSION (H): Primary | ICD-10-CM

## 2024-01-10 DIAGNOSIS — C90.01 MULTIPLE MYELOMA IN REMISSION (H): ICD-10-CM

## 2024-01-10 LAB
ANION GAP SERPL CALCULATED.3IONS-SCNC: 11 MMOL/L (ref 7–15)
BASOPHILS # BLD AUTO: 0 10E3/UL (ref 0–0.2)
BASOPHILS NFR BLD AUTO: 0 %
BILL ONLY STEM CELL INFUSION: NORMAL
BUN SERPL-MCNC: 18.5 MG/DL (ref 8–23)
CALCIUM SERPL-MCNC: 9.1 MG/DL (ref 8.8–10.2)
CHLORIDE SERPL-SCNC: 104 MMOL/L (ref 98–107)
CREAT SERPL-MCNC: 0.72 MG/DL (ref 0.67–1.17)
DEPRECATED HCO3 PLAS-SCNC: 24 MMOL/L (ref 22–29)
EGFRCR SERPLBLD CKD-EPI 2021: >90 ML/MIN/1.73M2
EOSINOPHIL # BLD AUTO: 0 10E3/UL (ref 0–0.7)
EOSINOPHIL NFR BLD AUTO: 0 %
ERYTHROCYTE [DISTWIDTH] IN BLOOD BY AUTOMATED COUNT: 13.4 % (ref 10–15)
GLUCOSE SERPL-MCNC: 157 MG/DL (ref 70–99)
HCT VFR BLD AUTO: 36.1 % (ref 40–53)
HGB BLD-MCNC: 11.9 G/DL (ref 13.3–17.7)
IMM GRANULOCYTES # BLD: 0.1 10E3/UL
IMM GRANULOCYTES NFR BLD: 1 %
INR PPP: 1.05 (ref 0.85–1.15)
LYMPHOCYTES # BLD AUTO: 0.3 10E3/UL (ref 0.8–5.3)
LYMPHOCYTES NFR BLD AUTO: 5 %
MCH RBC QN AUTO: 30.2 PG (ref 26.5–33)
MCHC RBC AUTO-ENTMCNC: 33 G/DL (ref 31.5–36.5)
MCV RBC AUTO: 92 FL (ref 78–100)
MONOCYTES # BLD AUTO: 0.2 10E3/UL (ref 0–1.3)
MONOCYTES NFR BLD AUTO: 2 %
NEUTROPHILS # BLD AUTO: 6.7 10E3/UL (ref 1.6–8.3)
NEUTROPHILS NFR BLD AUTO: 92 %
NRBC # BLD AUTO: 0 10E3/UL
NRBC BLD AUTO-RTO: 0 /100
PLATELET # BLD AUTO: 179 10E3/UL (ref 150–450)
POTASSIUM SERPL-SCNC: 4.4 MMOL/L (ref 3.4–5.3)
RBC # BLD AUTO: 3.94 10E6/UL (ref 4.4–5.9)
SODIUM SERPL-SCNC: 139 MMOL/L (ref 135–145)
WBC # BLD AUTO: 7.3 10E3/UL (ref 4–11)

## 2024-01-10 PROCEDURE — 85610 PROTHROMBIN TIME: CPT

## 2024-01-10 PROCEDURE — 38208 THAW PRESERVED STEM CELLS: CPT

## 2024-01-10 PROCEDURE — 250N000013 HC RX MED GY IP 250 OP 250 PS 637

## 2024-01-10 PROCEDURE — 999N000022 HC STATISTIC AUTOLOGOUS BM-INITIAL INFUSION

## 2024-01-10 PROCEDURE — 80048 BASIC METABOLIC PNL TOTAL CA: CPT

## 2024-01-10 PROCEDURE — 38241 TRANSPLT AUTOL HCT/DONOR: CPT | Performed by: PHYSICIAN ASSISTANT

## 2024-01-10 PROCEDURE — 85025 COMPLETE CBC W/AUTO DIFF WBC: CPT

## 2024-01-10 PROCEDURE — G0463 HOSPITAL OUTPT CLINIC VISIT: HCPCS | Mod: 25

## 2024-01-10 PROCEDURE — 250N000011 HC RX IP 250 OP 636

## 2024-01-10 RX ORDER — DIPHENHYDRAMINE HCL 25 MG
25 CAPSULE ORAL ONCE
Status: COMPLETED | OUTPATIENT
Start: 2024-01-10 | End: 2024-01-10

## 2024-01-10 RX ORDER — ACETAMINOPHEN 325 MG/1
650 TABLET ORAL ONCE
Status: DISCONTINUED | OUTPATIENT
Start: 2024-01-10 | End: 2024-01-10 | Stop reason: HOSPADM

## 2024-01-10 RX ORDER — HEPARIN SODIUM,PORCINE 10 UNIT/ML
5 VIAL (ML) INTRAVENOUS ONCE
Status: COMPLETED | OUTPATIENT
Start: 2024-01-10 | End: 2024-01-10

## 2024-01-10 RX ORDER — CYCLOBENZAPRINE HCL 5 MG
5 TABLET ORAL 3 TIMES DAILY PRN
COMMUNITY
End: 2024-01-23

## 2024-01-10 RX ADMIN — DIPHENHYDRAMINE HYDROCHLORIDE 25 MG: 25 CAPSULE ORAL at 13:05

## 2024-01-10 RX ADMIN — Medication 5 ML: at 13:04

## 2024-01-10 ASSESSMENT — PAIN SCALES - GENERAL: PAINLEVEL: NO PAIN (0)

## 2024-01-10 NOTE — PROGRESS NOTES
BMT Progress Note    ID: Mr. Carroll is a 68 year old man D 0 for standard risk IgG lambda kappa multiple myeloma.      HPI:   Transplant today.   No acute issues.     ROS: Review of systems with pertinent positive and negatives in HPI    Exam:  BP (!) 158/80   Pulse 80   Temp 98.3  F (36.8  C) (Oral)   Resp 16   Wt 97.8 kg (215 lb 8 oz)   SpO2 97%   BMI 30.92 kg/m       GEN: NAD, no acute distress  HEENT: sclera anicteric, normocephalic  Pulm: Breathing comfortably on RA  Cardiac: RRR. No murmurs.  No LE edema  MSK: No bony   Access: CVC    A/P:  Billy Carroll is a 68 year old man with IgG kappa MM with extramedullary disease, D+ 0 auto.  Reviewed transplant process and anticipated course over the next several weeks.  Reviewed side effects of melphalan in detail; he was already familiar with these from his prior counseling.  Answered appropriate questions.      - PMHx significant for CAD, severe aortic stenosis s/p TAVR (10/24/2023) currently ongoing cardiac rehabilitation, HTN, and HLD.     BMT/MM  Completed collecting after 2 days -- total collections: 7.43 x 10^6  On allopurinol.   Infusion Procedure Note    Type: Autologous transplant  Diagnosis: MM  Indication for Infusion: Reconstitution of hematopoiesis (transplant graft)  Reviewed and Confirmed for the Infusion: Consent previously obtained  Donor:  self  Product Characteristics, Cell Dose and Volume: as described on the infusion form (004343).  Patient was Premedicated as Ordered: Yes  Complications: See infusion form    I was present at the beginning of the infusion and available on the floor/unit/clinic through the entire infusion.    Amy Hernandez PA-C         HEME/COAG  Leukocytosis 2/2 GCSF/Mozobil  Transfusion parameters: hemoglobin <7, platelets <10    IMMUNOCOMPROMISED  - prophy ACV, fluc, lev, Pentamidine (12/28/23)  #hx PJP pneumonia: resume Bactrim D28 post BMT    CARDIOVASCULAR  #HTN: cont losartan, metoprolol.   #Severe aortic  stenosis, now s/p TAVR 10/24/2023.   #Moderate nonobstructive coronary artery disease   #Hyperlipidemia - hold statin khushboo-transplant  Hold ASA     GI:  On protonix GI prophy  N/V: none currently. Got emend and on still on dex. Has PRN's at home no zofran for 3 days.     MUSCULOSKELETAL/FRAILTY  Baseline Frailty Score: 2  Patient with substantial risk of sarcopenia  Cancer Rehab as needed outpatient    PSCYH:  # Anxiety: improved after PRN ativan, not currently using.        Hold statin, alendronate, aspirin, vitamin supplements  Monitor volume status carefully given cardiac history    RTC tomorrow     I spent 40 minutes on the date of the encounter doing chart review, history and  exam, documentation and further activities as noted above.    Amy Hernandez PA-C  [phone]

## 2024-01-10 NOTE — NURSING NOTE
"Oncology Rooming Note    January 10, 2024 1:21 PM   Billy Carroll is a 68 year old male who presents for:    Chief Complaint   Patient presents with    Oncology Clinic Visit     Multiple myeloma not having achieved remission     Blood Draw     Labs drawn via CVC by RN in lab.  VS taken     Initial Vitals: BP (!) 158/80   Pulse 80   Temp 98.3  F (36.8  C) (Oral)   Resp 16   Wt 97.8 kg (215 lb 8 oz)   SpO2 97%   BMI 30.92 kg/m   Estimated body mass index is 30.92 kg/m  as calculated from the following:    Height as of 12/22/23: 1.778 m (5' 10\").    Weight as of this encounter: 97.8 kg (215 lb 8 oz). Body surface area is 2.2 meters squared.  No Pain (0) Comment: Data Unavailable   No LMP for male patient.  Allergies reviewed: Yes  Medications reviewed: Yes    Medications: Medication refills not needed today.  Pharmacy name entered into [employer]:    [id number] [place], [place] - [address] [facility] [place], [place] - [address]    Frailty Screening:   Is the patient here for a new oncology consult visit in cancer care? 2. No      Clinical concerns: N/A      Hellen Burton RN             "

## 2024-01-10 NOTE — PROGRESS NOTES
Type of transplant: Donor: Autologous  Product:   BMT INFUSION DOCUMENTATION (last 48 hours)       BMT/Cellular Product Infusion       Row Name 01/10/24 1000                Product 01/10/24 1235 HPC, Apheresis    Product Details Product Release Date: 01/10/24  -LL Product Release Time: 1235  -LL Product Type: HPC, Apheresis  -LL DIN: [id number]  -LL Product Description Code: [id number]  -LL Volume Dispensed (mL): 655 mL  -LL    Checked by (Patient RN) Hellen Burton RN  -AS       Checked by (Witness) Sharmin Arredondo RN  -       Product Volume Infused (mL) 655 mL  -AS       Flush Volume (mL) 60 mL  -AS       Volume Dispensed (mL) --                 User Key  (r) = Recorded By, (t) = Taken By, (c) = Cosigned By      Initials Name Effective Dates    AS Hellen Burton RN 10/10/18 -     Sharmin Yang RN 02/11/21 -      Soha Tucker 07/11/21 - 01/07/24                  Preparation: RN Documentation  Patient was premedicated as ordered: yes (pt took 1300 mg PO tylenol at 0900 prior to clinic vist; 25 mg PO benadryl given)  Line Type: central line, right  Patient Stable Prior to Infusion: yes  Time Infusion Started: 1355  Teaching: side effects/monitoring  Tolerated/Reaction: Patient tolerance of product infusion  Immediate suspected transfusion reaction to the product: none  Did patient have prior history of similar signs/symptoms during this hospitalization?: NA  Symptoms during/after infusion: other (comment) (N/A)  Did the patient tolerate the infusion well: yes  Medications and treatment for symptoms: N/A  Did the symptoms resolve?:  (N/A)  Enter comments if clots, leaks, broken bag, infusion delays, other issues with bag/infusion: N/A

## 2024-01-10 NOTE — NURSING NOTE
Chief Complaint   Patient presents with    Oncology Clinic Visit     Multiple myeloma not having achieved remission     Blood Draw     Labs drawn via CVC by RN in lab.  VS taken       Labs collected from CVC by RN, line flushed with saline and heparin.  Vitals taken. Pt checked in for appointment(s).     Sharonda Chavez RN

## 2024-01-10 NOTE — PROGRESS NOTES
BMT/Cellular Autologous Product Infusion         Patient Vitals for the past 24 hrs:   Temp Temp src Pulse Resp BP   01/10/24 1249 98.3  F (36.8  C) Oral 80 16 (!) 158/80      BMT INFUSION DOCUMENTATION (last 48 hours)       BMT/Cellular Product Infusion    No documentation.                                 Baseline Pre-Infusion Evaluation (to be completed by Provider):   Dyspnea: Grade 0 - none  Hypoxia: Grade 0 - not present  Fever: Grade 0 - afebrile  Chills: Grade 0 - none  Febrile Neutropenia: Grade 0 - not present  Sinus Bradycardia: Grade 0 - none  Hypertension: Grade 2 - stage 1 hypertension (systolic -159 mm Hg or diastolic BP 90-99 mm Hg); medical intervention indicated; recurrent or persistent (>/ 24 hours); symptomatic increase by >/ 20 mm Hg (diastolic) or to > 140/90 mm Hg if previously WNL; monotherapy indicated  Hypotension: Grade 0 - none  Chest Pain: Grade 0 - none  Bronchospasm: Grade 0 - none  Pain: Grade 0 - none  Rash: Grade 0 - None  Neurologic Specify: none    If adverse reactions, events or complications occur (fever greater than 2 degrees fahrenheit increase, and severe reactions of the following types: chills, dyspnea, bronchospasm, hyper/hypotension, hypoxia, bradycardia, chest pain, back/flank pain, hypoxia, and any other reaction deemed severe or life threatening; any instance of product bag breakage or unusual product appearance)    Any other events that are >= grade 3, then immediately contact the BMT Attending physician, the Cell Therapy Laboratory Medical Director (pager [phone]) and the Cell Therapy Laboratory ([phone]).  After midnight, holidays & weekends contact the [facility] Blood Bank on the appropriate campus ([facility] [place]: [phone]; [facility] [place]: [phone]).    Amy Hernandez PA-C

## 2024-01-10 NOTE — LETTER
1/10/2024         RE: Billy Carroll  [address] [place] [address]        Dear Colleague,    Thank you for referring your patient, Billy Carroll, to the [facility] BLOOD AND MARROW TRANSPLANT PROGRAM [place]. Please see a copy of my visit note below.    BMT Progress Note    ID: Mr. Carroll is a 68 year old man D 0 for standard risk IgG lambda kappa multiple myeloma.      HPI:   Transplant today.   No acute issues.     ROS: Review of systems with pertinent positive and negatives in HPI    Exam:  BP (!) 158/80   Pulse 80   Temp 98.3  F (36.8  C) (Oral)   Resp 16   Wt 97.8 kg (215 lb 8 oz)   SpO2 97%   BMI 30.92 kg/m       GEN: NAD, no acute distress  HEENT: sclera anicteric, normocephalic  Pulm: Breathing comfortably on RA  Cardiac: RRR. No murmurs.  No LE edema  MSK: No bony   Access: CVC    A/P:  Billy Carroll is a 68 year old man with IgG kappa MM with extramedullary disease, D+ 0 auto.  Reviewed transplant process and anticipated course over the next several weeks.  Reviewed side effects of melphalan in detail; he was already familiar with these from his prior counseling.  Answered appropriate questions.      - PMHx significant for CAD, severe aortic stenosis s/p TAVR (10/24/2023) currently ongoing cardiac rehabilitation, HTN, and HLD.     BMT/MM  Completed collecting after 2 days -- total collections: 7.43 x 10^6  On allopurinol.   Infusion Procedure Note    Type: Autologous transplant  Diagnosis: MM  Indication for Infusion: Reconstitution of hematopoiesis (transplant graft)  Reviewed and Confirmed for the Infusion: Consent previously obtained  Donor:  self  Product Characteristics, Cell Dose and Volume: as described on the infusion form ([id number]).  Patient was Premedicated as Ordered: Yes  Complications: See infusion form    I was present at the beginning of the infusion and available on the floor/unit/clinic through the entire infusion.    Amy Hernandez PA-C          HEME/COAG  Leukocytosis 2/2 GCSF/Mozobil  Transfusion parameters: hemoglobin <7, platelets <10    IMMUNOCOMPROMISED  - prophy ACV, fluc, lev, Pentamidine (12/28/23)  #hx PJP pneumonia: resume Bactrim D28 post BMT    CARDIOVASCULAR  #HTN: cont losartan, metoprolol.   #Severe aortic stenosis, now s/p TAVR 10/24/2023.   #Moderate nonobstructive coronary artery disease   #Hyperlipidemia - hold statin khushboo-transplant  Hold ASA     GI:  On protonix GI prophy  N/V: none currently. Got emend and on still on dex. Has PRN's at home no zofran for 3 days.     MUSCULOSKELETAL/FRAILTY  Baseline Frailty Score: 2  Patient with substantial risk of sarcopenia  Cancer Rehab as needed outpatient    PSCYH:  # Anxiety: improved after PRN ativan, not currently using.        Hold statin, alendronate, aspirin, vitamin supplements  Monitor volume status carefully given cardiac history    RTC tomorrow     I spent 40 minutes on the date of the encounter doing chart review, history and  exam, documentation and further activities as noted above.    Amy Hernandez PA-C  [phone]          BMT/Cellular Autologous Product Infusion         Patient Vitals for the past 24 hrs:   Temp Temp src Pulse Resp BP   01/10/24 1249 98.3  F (36.8  C) Oral 80 16 (!) 158/80      BMT INFUSION DOCUMENTATION (last 48 hours)       BMT/Cellular Product Infusion    No documentation.                                 Baseline Pre-Infusion Evaluation (to be completed by Provider):   Dyspnea: Grade 0 - none  Hypoxia: Grade 0 - not present  Fever: Grade 0 - afebrile  Chills: Grade 0 - none  Febrile Neutropenia: Grade 0 - not present  Sinus Bradycardia: Grade 0 - none  Hypertension: Grade 2 - stage 1 hypertension (systolic -159 mm Hg or diastolic BP 90-99 mm Hg); medical intervention indicated; recurrent or persistent (>/ 24 hours); symptomatic increase by >/ 20 mm Hg (diastolic) or to > 140/90 mm Hg if previously WNL; monotherapy indicated  Hypotension: Grade 0 -  none  Chest Pain: Grade 0 - none  Bronchospasm: Grade 0 - none  Pain: Grade 0 - none  Rash: Grade 0 - None  Neurologic Specify: none    If adverse reactions, events or complications occur (fever greater than 2 degrees fahrenheit increase, and severe reactions of the following types: chills, dyspnea, bronchospasm, hyper/hypotension, hypoxia, bradycardia, chest pain, back/flank pain, hypoxia, and any other reaction deemed severe or life threatening; any instance of product bag breakage or unusual product appearance)    Any other events that are >= grade 3, then immediately contact the BMT Attending physician, the Cell Therapy Laboratory Medical Director (pager [phone]) and the Cell Therapy Laboratory ([phone]).  After midnight, holidays & weekends contact the [facility] Blood Bank on the appropriate campus ([facility] [place]: [phone]; [facility] [place]: [phone]).    Amy Hernandez PA-C      BMT Post Infusion Documentation    Data  Patient Vitals for the past 72 hrs:   Temp Temp src Pulse Resp BP   01/10/24 1249 98.3  F (36.8  C) Oral 80 16 (!) 158/80     BMT INFUSION DOCUMENTATION (last 24 hours)       BMT/Cellular Product Infusion    No documentation.                     Post-Infusion Evaluation:   Infusion Related Reaction: Grade 0 - none  Dyspnea: Grade 0 - none  Hypoxia: Grade 0 - not present  Fever: Grade 0 - afebrile  Chills: Grade 0 - none  Febrile Neutropenia: Grade 0 - not present  Sinus Bradycardia: Grade 0 - none  Hypertension: Grade 2 - stage 1 hypertension (systolic -159 mm Hg or diastolic BP 90-99 mm Hg); medical intervention indicated; recurrent or persistent (>/ 24 hours); symptomatic increase by >/ 20 mm Hg (diastolic) or to > 140/90 mm Hg if previously WNL; monotherapy indicated  Hypotension: Grade 0 - none  Chest Pain: Grade 0 - none  Bronchospasm: Grade 0 - none  Pain: Grade 0 - none  Rash: Grade 0 - None  Neurologic Specify:  none    Amy Hernandez PA-C

## 2024-01-10 NOTE — PROGRESS NOTES
BMT Post Infusion Documentation    Data   Patient Vitals for the past 72 hrs:   Temp Temp src Pulse Resp BP   01/10/24 1249 98.3  F (36.8  C) Oral 80 16 (!) 158/80     BMT INFUSION DOCUMENTATION (last 24 hours)       BMT/Cellular Product Infusion    No documentation.                     Post-Infusion Evaluation:   Infusion Related Reaction: Grade 0 - none  Dyspnea: Grade 0 - none  Hypoxia: Grade 0 - not present  Fever: Grade 0 - afebrile  Chills: Grade 0 - none  Febrile Neutropenia: Grade 0 - not present  Sinus Bradycardia: Grade 0 - none  Hypertension: Grade 2 - stage 1 hypertension (systolic -159 mm Hg or diastolic BP 90-99 mm Hg); medical intervention indicated; recurrent or persistent (>/ 24 hours); symptomatic increase by >/ 20 mm Hg (diastolic) or to > 140/90 mm Hg if previously WNL; monotherapy indicated  Hypotension: Grade 0 - none  Chest Pain: Grade 0 - none  Bronchospasm: Grade 0 - none  Pain: Grade 0 - none  Rash: Grade 0 - None  Neurologic Specify: none    Amy Hernandez PA-C

## 2024-01-11 ENCOUNTER — ALLIED HEALTH/NURSE VISIT (OUTPATIENT)
Dept: TRANSPLANT | Facility: CLINIC | Age: 69
End: 2024-01-11
Attending: PHYSICIAN ASSISTANT
Payer: MEDICARE

## 2024-01-11 ENCOUNTER — CARE COORDINATION (OUTPATIENT)
Dept: TRANSPLANT | Facility: CLINIC | Age: 69
End: 2024-01-11

## 2024-01-11 ENCOUNTER — APPOINTMENT (OUTPATIENT)
Dept: LAB | Facility: CLINIC | Age: 69
End: 2024-01-11
Attending: PHYSICIAN ASSISTANT
Payer: MEDICARE

## 2024-01-11 VITALS
SYSTOLIC BLOOD PRESSURE: 135 MMHG | OXYGEN SATURATION: 96 % | RESPIRATION RATE: 16 BRPM | WEIGHT: 216.5 LBS | DIASTOLIC BLOOD PRESSURE: 78 MMHG | HEIGHT: 70 IN | HEART RATE: 76 BPM | BODY MASS INDEX: 30.99 KG/M2 | TEMPERATURE: 98.2 F

## 2024-01-11 DIAGNOSIS — Z71.9 VISIT FOR COUNSELING: Primary | ICD-10-CM

## 2024-01-11 DIAGNOSIS — C90.00 MULTIPLE MYELOMA NOT HAVING ACHIEVED REMISSION (H): ICD-10-CM

## 2024-01-11 DIAGNOSIS — C90.00 MULTIPLE MYELOMA NOT HAVING ACHIEVED REMISSION (H): Primary | ICD-10-CM

## 2024-01-11 LAB
BASOPHILS # BLD AUTO: 0 10E3/UL (ref 0–0.2)
BASOPHILS NFR BLD AUTO: 0 %
EOSINOPHIL # BLD AUTO: 0 10E3/UL (ref 0–0.7)
EOSINOPHIL NFR BLD AUTO: 0 %
ERYTHROCYTE [DISTWIDTH] IN BLOOD BY AUTOMATED COUNT: 13.8 % (ref 10–15)
HCT VFR BLD AUTO: 33.3 % (ref 40–53)
HGB BLD-MCNC: 11.1 G/DL (ref 13.3–17.7)
IMM GRANULOCYTES # BLD: 0 10E3/UL
IMM GRANULOCYTES NFR BLD: 1 %
LYMPHOCYTES # BLD AUTO: 0.1 10E3/UL (ref 0.8–5.3)
LYMPHOCYTES NFR BLD AUTO: 2 %
MCH RBC QN AUTO: 30.7 PG (ref 26.5–33)
MCHC RBC AUTO-ENTMCNC: 33.3 G/DL (ref 31.5–36.5)
MCV RBC AUTO: 92 FL (ref 78–100)
MONOCYTES # BLD AUTO: 0.1 10E3/UL (ref 0–1.3)
MONOCYTES NFR BLD AUTO: 2 %
NEUTROPHILS # BLD AUTO: 4.6 10E3/UL (ref 1.6–8.3)
NEUTROPHILS NFR BLD AUTO: 95 %
NRBC # BLD AUTO: 0 10E3/UL
NRBC BLD AUTO-RTO: 0 /100
PLATELET # BLD AUTO: 194 10E3/UL (ref 150–450)
RBC # BLD AUTO: 3.61 10E6/UL (ref 4.4–5.9)
WBC # BLD AUTO: 4.8 10E3/UL (ref 4–11)

## 2024-01-11 PROCEDURE — 99214 OFFICE O/P EST MOD 30 MIN: CPT

## 2024-01-11 PROCEDURE — G0463 HOSPITAL OUTPT CLINIC VISIT: HCPCS

## 2024-01-11 PROCEDURE — 36592 COLLECT BLOOD FROM PICC: CPT

## 2024-01-11 PROCEDURE — 85004 AUTOMATED DIFF WBC COUNT: CPT

## 2024-01-11 PROCEDURE — 250N000011 HC RX IP 250 OP 636: Performed by: PHYSICIAN ASSISTANT

## 2024-01-11 RX ORDER — HEPARIN SODIUM,PORCINE 10 UNIT/ML
5 VIAL (ML) INTRAVENOUS ONCE
Status: COMPLETED | OUTPATIENT
Start: 2024-01-11 | End: 2024-01-11

## 2024-01-11 RX ORDER — LORAZEPAM 0.5 MG/1
0.5 TABLET ORAL EVERY 6 HOURS PRN
Qty: 20 TABLET | Refills: 0 | Status: SHIPPED | OUTPATIENT
Start: 2024-01-11 | End: 2024-01-25

## 2024-01-11 RX ADMIN — Medication 5 ML: at 14:12

## 2024-01-11 ASSESSMENT — PAIN SCALES - GENERAL: PAINLEVEL: NO PAIN (0)

## 2024-01-11 NOTE — LETTER
"    1/11/2024         RE: Billy Carroll  [address] [place] [address]        Dear Colleague,    Thank you for referring your patient, Billy Carroll, to the [facility] BLOOD AND MARROW TRANSPLANT PROGRAM [place]. Please see a copy of my visit note below.    BMT Progress Note    ID: Mr. Carroll is a 68 year old man D 1 for standard risk IgG lambda kappa multiple myeloma.      HPI:      No acute issues.   Transplant went well.   No acute symptoms.     ROS: Review of systems with pertinent positive and negatives in HPI    Exam:  /78   Pulse 76   Temp 98.2  F (36.8  C) (Oral)   Resp 16   Ht 1.778 m (5' 10\")   Wt 98.2 kg (216 lb 8 oz)   SpO2 96%   BMI 31.06 kg/m       GEN: NAD  HEENT: sclera anicteric, normocephalic  Pulm: CTA anteriorly   Cardiac: RRR. No murmurs. No LE edema  Access: CVC    A/P:  Billy Carroll is a 68 year old man with IgG kappa MM with extramedullary disease, D+ 1 auto.    - PMHx significant for CAD, severe aortic stenosis s/p TAVR (10/24/2023) currently ongoing cardiac rehabilitation, HTN, and HLD.     BMT/MM  Completed collecting after 2 days -- total collections: 7.43 x 10^6  On allopurinol.      HEME/COAG  Transfusion parameters: hemoglobin <7, platelets <10    IMMUNOCOMPROMISED  - prophy ACV, fluc, lev, Pentamidine (12/28/23)  #hx PJP pneumonia: resume Bactrim D28 post BMT    CARDIOVASCULAR  #HTN: cont losartan, metoprolol.   #Severe aortic stenosis, now s/p TAVR 10/24/2023.   #Moderate nonobstructive coronary artery disease   #Hyperlipidemia - hold statin khushboo-transplant  Hold ASA     GI:  On protonix GI prophy  N/V: none currently. Got emend and on still on dex. Has PRN's at home no zofran for 3 days.     MUSCULOSKELETAL/FRAILTY  Baseline Frailty Score: 2  Patient with substantial risk of sarcopenia  Cancer Rehab as needed outpatient    PSCYH:  # Anxiety: improved after PRN ativan, not currently using.      Hold statin, alendronate, aspirin, vitamin " supplements  Monitor volume status carefully given cardiac history    RTC daily starting Saturday x 14 days requested     I spent 30 minutes on the date of the encounter doing chart review, history and  exam, documentation and further activities as noted above.    Amy Hernandez PA-C  [phone]

## 2024-01-11 NOTE — NURSING NOTE
Chief Complaint   Patient presents with    Blood Draw     CVC blood draw with heparin flush by lab RN. Vitals taken and appointment arrived     iCndy Melara RN

## 2024-01-11 NOTE — NURSING NOTE
"Oncology Rooming Note    January 11, 2024 2:18 PM   Billy Carroll is a 68 year old male who presents for:    Chief Complaint   Patient presents with    Blood Draw     CVC blood draw with heparin flush by lab RN. Vitals taken and appointment arrived    Oncology Clinic Visit     [facility] RETURN - MULTIPLE MYELOMA     Initial Vitals: /78   Pulse 76   Temp 98.2  F (36.8  C) (Oral)   Resp 16   Ht 1.778 m (5' 10\")   Wt 98.2 kg (216 lb 8 oz)   SpO2 96%   BMI 31.06 kg/m   Estimated body mass index is 31.06 kg/m  as calculated from the following:    Height as of this encounter: 1.778 m (5' 10\").    Weight as of this encounter: 98.2 kg (216 lb 8 oz). Body surface area is 2.2 meters squared.  No Pain (0) Comment: Data Unavailable   No LMP for male patient.  Allergies reviewed: Yes  Medications reviewed: Yes    Medications: Medication refills not needed today.  Pharmacy name entered into [facility]:    [id number] [place], [place] - [address] [facility] [place], [place] - [address]    Frailty Screening:   Is the patient here for a new oncology consult visit in cancer care? 2. No      Martin Sinha LPN              "

## 2024-01-11 NOTE — PROGRESS NOTES
"OUTPATIENT AUTOLOGOUS BMT  BMT SOCIAL WORK POST-TRANSPLANT NOTE    Focus: Discharge Plan    Data: Pt is a 68 year old male who is Day + 1 s/p OP Autologous PBSCT for diagnosis of Multiple Myeloma.    Interventions: SW met with pt in clinic to assess coping, provide psychosocial support and provide packet with post-transplant resources for patient and caregiver. Pt presented alone to today's visit and shared that his caregiver dropped him off today and will pick him up. Pt states transplant went well yesterday with \"no issues.\" He states he feels confident and comfortable with his caregiver plan and family support. His biggest concern is \"with what to fill [his] days with\" during this post-BMT period. SW provided empathetic listening, validation of concerns, and encouragement. Pt denied questions or concerns. SW encouraged pt to contact SW for support, questions and/or resources.     Education Provided: Post-Transplant Resource Packet, Caregiver Self-Care Education, Expected Emotional Responses to Post-Transplant.    Assessment: Pt presented as pleasant, calm, and engaged.  Pt appears to be coping appropriately. Pt continues to be supported by his wife and children.    Plan: Pt to return home from clinic with his wife and adult children as primary caregivers. SW will continue to provide psychosocial support and assistance with resources as needed. SW will continue to collaborate with multidisciplinary team regarding pt's plan of care.       MICKEY Bella, [facility]  Adult Blood & Marrow Transplant   Phone: [phone]  Pager: [phone]    "

## 2024-01-11 NOTE — PROGRESS NOTES
"BMT Progress Note    ID: Mr. Carroll is a 68 year old man D 1 for standard risk IgG lambda kappa multiple myeloma.      HPI:      No acute issues.   Transplant went well.   No acute symptoms.     ROS: Review of systems with pertinent positive and negatives in HPI    Exam:  /78   Pulse 76   Temp 98.2  F (36.8  C) (Oral)   Resp 16   Ht 1.778 m (5' 10\")   Wt 98.2 kg (216 lb 8 oz)   SpO2 96%   BMI 31.06 kg/m       GEN: NAD  HEENT: sclera anicteric, normocephalic  Pulm: CTA anteriorly   Cardiac: RRR. No murmurs. No LE edema  Access: CVC    A/P:  Billy Carroll is a 68 year old man with IgG kappa MM with extramedullary disease, D+ 1 auto.    - PMHx significant for CAD, severe aortic stenosis s/p TAVR (10/24/2023) currently ongoing cardiac rehabilitation, HTN, and HLD.     BMT/MM  Completed collecting after 2 days -- total collections: 7.43 x 10^6  On allopurinol.      HEME/COAG  Transfusion parameters: hemoglobin <7, platelets <10    IMMUNOCOMPROMISED  - prophy ACV, fluc, lev, Pentamidine (12/28/23)  #hx PJP pneumonia: resume Bactrim D28 post BMT    CARDIOVASCULAR  #HTN: cont losartan, metoprolol.   #Severe aortic stenosis, now s/p TAVR 10/24/2023.   #Moderate nonobstructive coronary artery disease   #Hyperlipidemia - hold statin khushboo-transplant  Hold ASA     GI:  On protonix GI prophy  N/V: none currently. Got emend and on still on dex. Has PRN's at home no zofran for 3 days.     MUSCULOSKELETAL/FRAILTY  Baseline Frailty Score: 2  Patient with substantial risk of sarcopenia  Cancer Rehab as needed outpatient    PSCYH:  # Anxiety: improved after PRN ativan, not currently using.      Hold statin, alendronate, aspirin, vitamin supplements  Monitor volume status carefully given cardiac history    RTC daily starting Saturday x 14 days requested     I spent 30 minutes on the date of the encounter doing chart review, history and  exam, documentation and further activities as noted above.    Amy Hernandez, " JIM  [phone]

## 2024-01-12 ENCOUNTER — TELEPHONE (OUTPATIENT)
Dept: TRANSPLANT | Facility: CLINIC | Age: 69
End: 2024-01-12

## 2024-01-12 PROBLEM — C90.00 MULTIPLE MYELOMA (H): Status: ACTIVE | Noted: 2023-06-04

## 2024-01-12 NOTE — TELEPHONE ENCOUNTER
----- Message from Bozena Ritter RN sent at 1/10/2024  2:51 PM CST -----  Regarding: Outpatient Transplant Notification  Arias Cervantes received their outpatient auto BMT in clinic on 1/10/2024 (today) and is ready to schedule for BANs.    Patient needs sedated marrows for BANS: Yes    ClonoSEQ testing needed? Yes    Line Company:  Self pay (no insurance coverage for line care and supplies)    Pharmacy concerns:  None    D/c location:  Home    Other info/concerns:  None at this time    Caregiver:  Hood - patient's son    Long-term BMT MD:  Tk  Long-term BMT NC:  Bozena  BMT :  Nikole     Thanks!    Bozena Ritter RN, MSN  BMT Nurse Coordinator  Phone: [phone]

## 2024-01-13 ENCOUNTER — TELEPHONE (OUTPATIENT)
Dept: ONCOLOGY | Facility: CLINIC | Age: 69
End: 2024-01-13

## 2024-01-13 ENCOUNTER — APPOINTMENT (OUTPATIENT)
Dept: LAB | Facility: CLINIC | Age: 69
End: 2024-01-13
Payer: MEDICARE

## 2024-01-13 ENCOUNTER — ONCOLOGY VISIT (OUTPATIENT)
Dept: TRANSPLANT | Facility: CLINIC | Age: 69
End: 2024-01-13
Payer: MEDICARE

## 2024-01-13 VITALS
BODY MASS INDEX: 30.96 KG/M2 | SYSTOLIC BLOOD PRESSURE: 134 MMHG | DIASTOLIC BLOOD PRESSURE: 74 MMHG | WEIGHT: 215.8 LBS | HEART RATE: 87 BPM | TEMPERATURE: 98.5 F | OXYGEN SATURATION: 97 % | RESPIRATION RATE: 16 BRPM

## 2024-01-13 DIAGNOSIS — C90.01 MULTIPLE MYELOMA IN REMISSION (H): ICD-10-CM

## 2024-01-13 DIAGNOSIS — C90.00 MULTIPLE MYELOMA NOT HAVING ACHIEVED REMISSION (H): ICD-10-CM

## 2024-01-13 LAB
ANION GAP SERPL CALCULATED.3IONS-SCNC: 9 MMOL/L (ref 7–15)
BASOPHILS # BLD AUTO: ABNORMAL 10*3/UL
BASOPHILS # BLD MANUAL: 0 10E3/UL (ref 0–0.2)
BASOPHILS NFR BLD AUTO: ABNORMAL %
BASOPHILS NFR BLD MANUAL: 0 %
BUN SERPL-MCNC: 19.2 MG/DL (ref 8–23)
CALCIUM SERPL-MCNC: 8.5 MG/DL (ref 8.8–10.2)
CHLORIDE SERPL-SCNC: 105 MMOL/L (ref 98–107)
CREAT SERPL-MCNC: 0.73 MG/DL (ref 0.67–1.17)
DEPRECATED HCO3 PLAS-SCNC: 25 MMOL/L (ref 22–29)
EGFRCR SERPLBLD CKD-EPI 2021: >90 ML/MIN/1.73M2
EOSINOPHIL # BLD AUTO: ABNORMAL 10*3/UL
EOSINOPHIL # BLD MANUAL: 0 10E3/UL (ref 0–0.7)
EOSINOPHIL NFR BLD AUTO: ABNORMAL %
EOSINOPHIL NFR BLD MANUAL: 0 %
ERYTHROCYTE [DISTWIDTH] IN BLOOD BY AUTOMATED COUNT: 13.4 % (ref 10–15)
GLUCOSE SERPL-MCNC: 137 MG/DL (ref 70–99)
HCT VFR BLD AUTO: 33.4 % (ref 40–53)
HGB BLD-MCNC: 11.4 G/DL (ref 13.3–17.7)
IMM GRANULOCYTES # BLD: ABNORMAL 10*3/UL
IMM GRANULOCYTES NFR BLD: ABNORMAL %
LYMPHOCYTES # BLD AUTO: ABNORMAL 10*3/UL
LYMPHOCYTES # BLD MANUAL: 0.1 10E3/UL (ref 0.8–5.3)
LYMPHOCYTES NFR BLD AUTO: ABNORMAL %
LYMPHOCYTES NFR BLD MANUAL: 9 %
MCH RBC QN AUTO: 31.4 PG (ref 26.5–33)
MCHC RBC AUTO-ENTMCNC: 34.1 G/DL (ref 31.5–36.5)
MCV RBC AUTO: 92 FL (ref 78–100)
MONOCYTES # BLD AUTO: ABNORMAL 10*3/UL
MONOCYTES # BLD MANUAL: 0 10E3/UL (ref 0–1.3)
MONOCYTES NFR BLD AUTO: ABNORMAL %
MONOCYTES NFR BLD MANUAL: 1 %
NEUTROPHILS # BLD AUTO: ABNORMAL 10*3/UL
NEUTROPHILS # BLD MANUAL: 0.5 10E3/UL (ref 1.6–8.3)
NEUTROPHILS NFR BLD AUTO: ABNORMAL %
NEUTROPHILS NFR BLD MANUAL: 90 %
NRBC # BLD AUTO: 0 10E3/UL
NRBC BLD AUTO-RTO: 0 /100
PLAT MORPH BLD: ABNORMAL
PLATELET # BLD AUTO: 193 10E3/UL (ref 150–450)
POTASSIUM SERPL-SCNC: 3.8 MMOL/L (ref 3.4–5.3)
RBC # BLD AUTO: 3.63 10E6/UL (ref 4.4–5.9)
RBC MORPH BLD: ABNORMAL
SODIUM SERPL-SCNC: 139 MMOL/L (ref 135–145)
WBC # BLD AUTO: 0.6 10E3/UL (ref 4–11)

## 2024-01-13 PROCEDURE — G0463 HOSPITAL OUTPT CLINIC VISIT: HCPCS

## 2024-01-13 PROCEDURE — 80048 BASIC METABOLIC PNL TOTAL CA: CPT

## 2024-01-13 PROCEDURE — 99214 OFFICE O/P EST MOD 30 MIN: CPT | Mod: GC

## 2024-01-13 PROCEDURE — 85007 BL SMEAR W/DIFF WBC COUNT: CPT

## 2024-01-13 PROCEDURE — 85041 AUTOMATED RBC COUNT: CPT

## 2024-01-13 PROCEDURE — 36592 COLLECT BLOOD FROM PICC: CPT

## 2024-01-13 PROCEDURE — 250N000011 HC RX IP 250 OP 636

## 2024-01-13 RX ORDER — HEPARIN SODIUM,PORCINE 10 UNIT/ML
5 VIAL (ML) INTRAVENOUS ONCE
Status: COMPLETED | OUTPATIENT
Start: 2024-01-13 | End: 2024-01-13

## 2024-01-13 RX ADMIN — Medication 5 ML: at 08:32

## 2024-01-13 ASSESSMENT — PAIN SCALES - GENERAL: PAINLEVEL: NO PAIN (0)

## 2024-01-13 NOTE — PROGRESS NOTES
BMT Progress Note    ID: Mr. Carroll is a 68 year old man D 3 for standard risk IgG lambda kappa multiple myeloma.      HPI:      He's doing well today. He mentioned     ROS: Review of systems with pertinent positive and negatives in HPI    Exam:  /74 (BP Location: Left arm, Patient Position: Sitting, Cuff Size: Adult Large)   Pulse 87   Temp 98.5  F (36.9  C) (Oral)   Resp 16   Wt 97.9 kg (215 lb 12.8 oz)   SpO2 97%   BMI 30.96 kg/m       GEN: NAD  HEENT: sclera anicteric, normocephalic  Pulm: CTA anteriorly   Cardiac: RRR. No murmurs. No LE edema  Access: CVC    A/P:  Billy Carroll is a 68 year old man with IgG kappa MM with extramedullary disease, D+ 3 auto.    - PMHx significant for CAD, severe aortic stenosis s/p TAVR (10/24/2023) currently ongoing cardiac rehabilitation, HTN, and HLD.     BMT/MM  Completed collecting after 2 days -- total collections: 7.43 x 10^6  On allopurinol.      HEME/COAG  Transfusion parameters: hemoglobin <7, platelets <10    IMMUNOCOMPROMISED  - prophy ACV, fluc, lev, Pentamidine (12/28/23)  #hx PJP pneumonia: resume Bactrim D28 post BMT    CARDIOVASCULAR  #HTN: cont losartan, metoprolol.   #Severe aortic stenosis, now s/p TAVR 10/24/2023.   #Moderate nonobstructive coronary artery disease   #Hyperlipidemia - hold statin khushboo-transplant  Hold ASA     GI:  On protonix GI prophy  N/V: none currently. Got emend and on still on dex. Has PRN's at home no zofran for 3 days.     MUSCULOSKELETAL/FRAILTY  Baseline Frailty Score: 2  Patient with substantial risk of sarcopenia  Cancer Rehab as needed outpatient    PSCYH:  # Anxiety: improved after PRN ativan, not currently using.      Hold statin, alendronate, aspirin, vitamin supplements  Monitor volume status carefully given cardiac history    RTC daily starting Saturday x 14 days requested     I spent 30 minutes on the date of the encounter doing chart review, history and  exam, documentation and further activities as noted  above.    Amy Hernandez PA-C  [phone]

## 2024-01-13 NOTE — NURSING NOTE
Chief Complaint   Patient presents with    Blood Draw     Labs drawn via CVC by RN. VS taken.     Labs collected from CVC by RN, line flushed with saline and heparin.  Vitals taken. Pt checked in for appointment(s).     Marita Vargas RN

## 2024-01-13 NOTE — NURSING NOTE
"Oncology Rooming Note    January 13, 2024 8:33 AM   Billy Carroll is a 68 year old male who presents for:    Chief Complaint   Patient presents with    Blood Draw     Labs drawn via CVC by RN. VS taken.     Initial Vitals: /74 (BP Location: Left arm, Patient Position: Sitting, Cuff Size: Adult Large)   Pulse 87   Temp 98.5  F (36.9  C) (Oral)   Resp 16   Wt 97.9 kg (215 lb 12.8 oz)   SpO2 97%   BMI 30.96 kg/m   Estimated body mass index is 30.96 kg/m  as calculated from the following:    Height as of 1/11/24: 1.778 m (5' 10\").    Weight as of this encounter: 97.9 kg (215 lb 12.8 oz). Body surface area is 2.2 meters squared.  No Pain (0) Comment: Data Unavailable   No LMP for male patient.  Allergies reviewed: Yes  Medications reviewed: Yes    Medications: Medication refills not needed today.  Pharmacy name entered into [facility]:    [id number] [place], [place] - [address] [facility] [place], [place] - [address]    Frailty Screening:   Is the patient here for a new oncology consult visit in cancer care? 2. No      Clinical concerns: none       Sharmin Victor RN             "

## 2024-01-13 NOTE — NURSING NOTE
DATE/TIME OF CALL RECEIVED FROM LAB:  01/13/24 at 9:08 AM   LAB TEST:  WBC 0.6  PROVIDER NOTIFIED?: No (Please explain why the provider was not notified): result is expected, pt is day +3 s/p Melphalan auto transplant

## 2024-01-13 NOTE — LETTER
1/13/2024         RE: Billy Carroll  [address] [place] [address]        Dear Colleague,    Thank you for referring your patient, Billy Carroll, to the [facility] BLOOD AND MARROW TRANSPLANT PROGRAM [place]. Please see a copy of my visit note below.    BMT Progress Note    ID: Mr. Carroll is a 68 year old man D 3 for standard risk IgG lambda kappa multiple myeloma.      HPI:      He's doing well today. He mentioned     ROS: Review of systems with pertinent positive and negatives in HPI    Exam:  /74 (BP Location: Left arm, Patient Position: Sitting, Cuff Size: Adult Large)   Pulse 87   Temp 98.5  F (36.9  C) (Oral)   Resp 16   Wt 97.9 kg (215 lb 12.8 oz)   SpO2 97%   BMI 30.96 kg/m       GEN: NAD  HEENT: sclera anicteric, normocephalic  Pulm: CTA anteriorly   Cardiac: RRR. No murmurs. No LE edema  Access: CVC    A/P:  Billy Carroll is a 68 year old man with IgG kappa MM with extramedullary disease, D+ 3 auto.    - PMHx significant for CAD, severe aortic stenosis s/p TAVR (10/24/2023) currently ongoing cardiac rehabilitation, HTN, and HLD.     BMT/MM  Completed collecting after 2 days -- total collections: 7.43 x 10^6  On allopurinol.      HEME/COAG  Transfusion parameters: hemoglobin <7, platelets <10    IMMUNOCOMPROMISED  - prophy ACV, fluc, lev, Pentamidine (12/28/23)  #hx PJP pneumonia: resume Bactrim D28 post BMT    CARDIOVASCULAR  #HTN: cont losartan, metoprolol.   #Severe aortic stenosis, now s/p TAVR 10/24/2023.   #Moderate nonobstructive coronary artery disease   #Hyperlipidemia - hold statin khushboo-transplant  Hold ASA     GI:  On protonix GI prophy  N/V: none currently. Got emend and on still on dex. Has PRN's at home no zofran for 3 days.     MUSCULOSKELETAL/FRAILTY  Baseline Frailty Score: 2  Patient with substantial risk of sarcopenia  Cancer Rehab as needed outpatient    PSCYH:  # Anxiety: improved after PRN ativan, not currently using.      Hold statin, alendronate,  aspirin, vitamin supplements  Monitor volume status carefully given cardiac history    RTC daily starting Saturday x 14 days requested     I spent 30 minutes on the date of the encounter doing chart review, history and  exam, documentation and further activities as noted above.    Amy Hernandez PA-C  [phone]          Attestation signed by Carolina Emery MD at 1/13/2024  9:17 AM:  Physician Attestation  I, Carolina Emery MD, saw this patient and agree with the findings and plan of care as documented in the note.      Items personally reviewed/procedural attestation: vitals, labs,  and agree with the interpretation documented in the note. WBC is starting to fall. Pt feeling well and is on levaquin. See him again tomorrow    Carolina Emery MD

## 2024-01-14 ENCOUNTER — APPOINTMENT (OUTPATIENT)
Dept: LAB | Facility: CLINIC | Age: 69
End: 2024-01-14
Payer: MEDICARE

## 2024-01-14 ENCOUNTER — ONCOLOGY VISIT (OUTPATIENT)
Dept: TRANSPLANT | Facility: CLINIC | Age: 69
End: 2024-01-14
Payer: MEDICARE

## 2024-01-14 VITALS
HEART RATE: 91 BPM | SYSTOLIC BLOOD PRESSURE: 115 MMHG | WEIGHT: 212.8 LBS | TEMPERATURE: 98 F | RESPIRATION RATE: 16 BRPM | BODY MASS INDEX: 30.53 KG/M2 | DIASTOLIC BLOOD PRESSURE: 76 MMHG | OXYGEN SATURATION: 97 %

## 2024-01-14 DIAGNOSIS — C90.00 MULTIPLE MYELOMA NOT HAVING ACHIEVED REMISSION (H): Primary | ICD-10-CM

## 2024-01-14 LAB
ANION GAP SERPL CALCULATED.3IONS-SCNC: 10 MMOL/L (ref 7–15)
BASOPHILS # BLD AUTO: ABNORMAL 10*3/UL
BASOPHILS # BLD MANUAL: 0 10E3/UL (ref 0–0.2)
BASOPHILS NFR BLD AUTO: ABNORMAL %
BASOPHILS NFR BLD MANUAL: 0 %
BUN SERPL-MCNC: 20.9 MG/DL (ref 8–23)
CALCIUM SERPL-MCNC: 8.7 MG/DL (ref 8.8–10.2)
CHLORIDE SERPL-SCNC: 104 MMOL/L (ref 98–107)
CREAT SERPL-MCNC: 0.72 MG/DL (ref 0.67–1.17)
DEPRECATED HCO3 PLAS-SCNC: 26 MMOL/L (ref 22–29)
EGFRCR SERPLBLD CKD-EPI 2021: >90 ML/MIN/1.73M2
EOSINOPHIL # BLD AUTO: ABNORMAL 10*3/UL
EOSINOPHIL # BLD MANUAL: 0 10E3/UL (ref 0–0.7)
EOSINOPHIL NFR BLD AUTO: ABNORMAL %
EOSINOPHIL NFR BLD MANUAL: 0 %
ERYTHROCYTE [DISTWIDTH] IN BLOOD BY AUTOMATED COUNT: 13.5 % (ref 10–15)
GLUCOSE SERPL-MCNC: 144 MG/DL (ref 70–99)
HCT VFR BLD AUTO: 35.5 % (ref 40–53)
HGB BLD-MCNC: 12 G/DL (ref 13.3–17.7)
IMM GRANULOCYTES # BLD: ABNORMAL 10*3/UL
IMM GRANULOCYTES NFR BLD: ABNORMAL %
LYMPHOCYTES # BLD AUTO: ABNORMAL 10*3/UL
LYMPHOCYTES # BLD MANUAL: 0.1 10E3/UL (ref 0.8–5.3)
LYMPHOCYTES NFR BLD AUTO: ABNORMAL %
LYMPHOCYTES NFR BLD MANUAL: 10 %
MCH RBC QN AUTO: 31 PG (ref 26.5–33)
MCHC RBC AUTO-ENTMCNC: 33.8 G/DL (ref 31.5–36.5)
MCV RBC AUTO: 92 FL (ref 78–100)
MONOCYTES # BLD AUTO: ABNORMAL 10*3/UL
MONOCYTES # BLD MANUAL: 0 10E3/UL (ref 0–1.3)
MONOCYTES NFR BLD AUTO: ABNORMAL %
MONOCYTES NFR BLD MANUAL: 3 %
NEUTROPHILS # BLD AUTO: ABNORMAL 10*3/UL
NEUTROPHILS # BLD MANUAL: 0.4 10E3/UL (ref 1.6–8.3)
NEUTROPHILS NFR BLD AUTO: ABNORMAL %
NEUTROPHILS NFR BLD MANUAL: 87 %
NRBC # BLD AUTO: 0 10E3/UL
NRBC BLD AUTO-RTO: 0 /100
PLAT MORPH BLD: ABNORMAL
PLATELET # BLD AUTO: 187 10E3/UL (ref 150–450)
POTASSIUM SERPL-SCNC: 3.8 MMOL/L (ref 3.4–5.3)
RBC # BLD AUTO: 3.87 10E6/UL (ref 4.4–5.9)
RBC MORPH BLD: ABNORMAL
SODIUM SERPL-SCNC: 140 MMOL/L (ref 135–145)
WBC # BLD AUTO: 0.5 10E3/UL (ref 4–11)

## 2024-01-14 PROCEDURE — 80048 BASIC METABOLIC PNL TOTAL CA: CPT

## 2024-01-14 PROCEDURE — 250N000011 HC RX IP 250 OP 636

## 2024-01-14 PROCEDURE — 36592 COLLECT BLOOD FROM PICC: CPT

## 2024-01-14 PROCEDURE — 85007 BL SMEAR W/DIFF WBC COUNT: CPT | Performed by: STUDENT IN AN ORGANIZED HEALTH CARE EDUCATION/TRAINING PROGRAM

## 2024-01-14 PROCEDURE — G0463 HOSPITAL OUTPT CLINIC VISIT: HCPCS

## 2024-01-14 PROCEDURE — 85027 COMPLETE CBC AUTOMATED: CPT | Performed by: STUDENT IN AN ORGANIZED HEALTH CARE EDUCATION/TRAINING PROGRAM

## 2024-01-14 PROCEDURE — 99214 OFFICE O/P EST MOD 30 MIN: CPT

## 2024-01-14 RX ORDER — HEPARIN SODIUM,PORCINE 10 UNIT/ML
5 VIAL (ML) INTRAVENOUS ONCE
Status: COMPLETED | OUTPATIENT
Start: 2024-01-14 | End: 2024-01-14

## 2024-01-14 RX ORDER — LOPERAMIDE HCL 2 MG
2 CAPSULE ORAL 4 TIMES DAILY PRN
COMMUNITY

## 2024-01-14 RX ADMIN — Medication 5 ML: at 08:41

## 2024-01-14 ASSESSMENT — PAIN SCALES - GENERAL: PAINLEVEL: NO PAIN (0)

## 2024-01-14 NOTE — LETTER
1/14/2024         RE: Billy Carroll  [address] [place] [address]        Dear Colleague,    Thank you for referring your patient, Billy Carroll, to the [facility] BLOOD AND MARROW TRANSPLANT PROGRAM [place]. Please see a copy of my visit note below.    BMT Progress Note    ID: Mr. Carroll is a 68 year old man D 4 for standard risk IgG lambda kappa multiple myeloma.      HPI: He had a loose stool but no abdominal pain. Appetite remains good. No fevers, chills, nor N/V.    ROS: Review of systems with pertinent positive and negatives in HPI    Exam:  /76 (BP Location: Left arm, Patient Position: Sitting, Cuff Size: Adult Large)   Pulse 91   Temp 98  F (36.7  C) (Oral)   Resp 16   Wt 96.5 kg (212 lb 12.8 oz)   SpO2 97%   BMI 30.53 kg/m       Constitutional: NAD  Eyes: no scleral icterus  Pulm: CTAB  CV: RRR, no m/r/g  GI: bowel sounds present, soft and nontender to palpation  Neuro: alert, conversant, normal gait  Psych: appropriate mood and affect  Central line dressing clean and dry    A/P:  Billy Carroll is a 68 year old man with IgG kappa MM with extramedullary disease, D+ 4 auto.    - PMHx significant for CAD, severe aortic stenosis s/p TAVR (10/24/2023) currently ongoing cardiac rehabilitation, HTN, and HLD.     BMT/MM  Completed collecting after 2 days -- total collections: 7.43 x 10^6  On allopurinol through day 7     HEME/COAG  Transfusion parameters: hemoglobin <7, platelets <10    IMMUNOCOMPROMISED  - prophy ACV, fluc, lev, Pentamidine (12/28/23)  #hx PJP pneumonia: resume Bactrim D28 post BMT    CARDIOVASCULAR  #HTN: cont losartan, metoprolol.   #Severe aortic stenosis, now s/p TAVR 10/24/2023.   #Moderate nonobstructive coronary artery disease   #Hyperlipidemia - hold statin khushboo-transplant  Hold ASA     GI:  On protonix GI prophy  N/V: none currently. Got emend and on still on dex. Has PRN zofran and compazine at home    MUSCULOSKELETAL/FRAILTY  Baseline Frailty Score:  2  Patient with substantial risk of sarcopenia  Cancer Rehab as needed outpatient    PSCYH:  # Anxiety: improved after PRN ativan, not currently using.      Hold statin, alendronate, aspirin, vitamin supplements  Monitor volume status carefully given cardiac history  G-CSF will start tomorrow    Carolina Emery MD PhD

## 2024-01-14 NOTE — NURSING NOTE
"Oncology Rooming Note    January 14, 2024 8:42 AM   Billy Carroll is a 68 year old male who presents for:    Chief Complaint   Patient presents with    Blood Draw     Labs drawn via CVC by RN. VS taken.    Oncology Clinic Visit     Rtn hx MM     Initial Vitals: /76 (BP Location: Left arm, Patient Position: Sitting, Cuff Size: Adult Large)   Pulse 91   Temp 98  F (36.7  C) (Oral)   Resp 16   Wt 96.5 kg (212 lb 12.8 oz)   SpO2 97%   BMI 30.53 kg/m   Estimated body mass index is 30.53 kg/m  as calculated from the following:    Height as of 1/11/24: 1.778 m (5' 10\").    Weight as of this encounter: 96.5 kg (212 lb 12.8 oz). Body surface area is 2.18 meters squared.  No Pain (0) Comment: Data Unavailable   No LMP for male patient.  Allergies reviewed: Yes  Medications reviewed: Yes    Medications: Medication refills not needed today.  Pharmacy name entered into [employer]:    [facility] #[id number] [place], [place] - [address] [facility] [place], [place] - [address]    Frailty Screening:   Is the patient here for a new oncology consult visit in cancer care? 2. No      Clinical concerns: none       Sharmin Victor RN             "

## 2024-01-14 NOTE — PROGRESS NOTES
BMT Progress Note    ID: Mr. Carroll is a 68 year old man D 4 for standard risk IgG lambda kappa multiple myeloma.      HPI: He had a loose stool but no abdominal pain. Appetite remains good. No fevers, chills, nor N/V.    ROS: Review of systems with pertinent positive and negatives in HPI    Exam:  /76 (BP Location: Left arm, Patient Position: Sitting, Cuff Size: Adult Large)   Pulse 91   Temp 98  F (36.7  C) (Oral)   Resp 16   Wt 96.5 kg (212 lb 12.8 oz)   SpO2 97%   BMI 30.53 kg/m       Constitutional: NAD  Eyes: no scleral icterus  Pulm: CTAB  CV: RRR, no m/r/g  GI: bowel sounds present, soft and nontender to palpation  Neuro: alert, conversant, normal gait  Psych: appropriate mood and affect  Central line dressing clean and dry    A/P:  Billy Carroll is a 68 year old man with IgG kappa MM with extramedullary disease, D+ 4 auto.    - PMHx significant for CAD, severe aortic stenosis s/p TAVR (10/24/2023) currently ongoing cardiac rehabilitation, HTN, and HLD.     BMT/MM  Completed collecting after 2 days -- total collections: 7.43 x 10^6  On allopurinol through day 7     HEME/COAG  Transfusion parameters: hemoglobin <7, platelets <10    IMMUNOCOMPROMISED  - prophy ACV, fluc, lev, Pentamidine (12/28/23)  #hx PJP pneumonia: resume Bactrim D28 post BMT    CARDIOVASCULAR  #HTN: cont losartan, metoprolol.   #Severe aortic stenosis, now s/p TAVR 10/24/2023.   #Moderate nonobstructive coronary artery disease   #Hyperlipidemia - hold statin khushboo-transplant  Hold ASA     GI:  On protonix GI prophy  N/V: none currently. Got emend and on still on dex. Has PRN zofran and compazine at home    MUSCULOSKELETAL/FRAILTY  Baseline Frailty Score: 2  Patient with substantial risk of sarcopenia  Cancer Rehab as needed outpatient    PSCYH:  # Anxiety: improved after PRN ativan, not currently using.      Hold statin, alendronate, aspirin, vitamin supplements  Monitor volume status carefully given cardiac history  G-CSF will  start tomorrow    Carolina Emery MD PhD

## 2024-01-15 ENCOUNTER — APPOINTMENT (OUTPATIENT)
Dept: LAB | Facility: CLINIC | Age: 69
End: 2024-01-15
Payer: MEDICARE

## 2024-01-15 ENCOUNTER — ONCOLOGY VISIT (OUTPATIENT)
Dept: TRANSPLANT | Facility: CLINIC | Age: 69
End: 2024-01-15
Payer: MEDICARE

## 2024-01-15 VITALS
SYSTOLIC BLOOD PRESSURE: 144 MMHG | OXYGEN SATURATION: 98 % | RESPIRATION RATE: 16 BRPM | HEART RATE: 64 BPM | DIASTOLIC BLOOD PRESSURE: 76 MMHG | BODY MASS INDEX: 30.28 KG/M2 | WEIGHT: 211 LBS | TEMPERATURE: 98 F

## 2024-01-15 DIAGNOSIS — C90.00 MULTIPLE MYELOMA, REMISSION STATUS UNSPECIFIED (H): ICD-10-CM

## 2024-01-15 DIAGNOSIS — Z52.011 AUTOLOGOUS DONOR OF STEM CELLS: ICD-10-CM

## 2024-01-15 DIAGNOSIS — C90.00 MULTIPLE MYELOMA NOT HAVING ACHIEVED REMISSION (H): Primary | ICD-10-CM

## 2024-01-15 LAB
ANION GAP SERPL CALCULATED.3IONS-SCNC: 8 MMOL/L (ref 7–15)
BASOPHILS # BLD AUTO: ABNORMAL 10*3/UL
BASOPHILS # BLD MANUAL: 0 10E3/UL (ref 0–0.2)
BASOPHILS NFR BLD AUTO: ABNORMAL %
BASOPHILS NFR BLD MANUAL: 0 %
BUN SERPL-MCNC: 14.3 MG/DL (ref 8–23)
CALCIUM SERPL-MCNC: 8.5 MG/DL (ref 8.8–10.2)
CHLORIDE SERPL-SCNC: 104 MMOL/L (ref 98–107)
CREAT SERPL-MCNC: 0.61 MG/DL (ref 0.67–1.17)
DACRYOCYTES BLD QL SMEAR: SLIGHT
DEPRECATED HCO3 PLAS-SCNC: 27 MMOL/L (ref 22–29)
EGFRCR SERPLBLD CKD-EPI 2021: >90 ML/MIN/1.73M2
EOSINOPHIL # BLD AUTO: ABNORMAL 10*3/UL
EOSINOPHIL # BLD MANUAL: 0 10E3/UL (ref 0–0.7)
EOSINOPHIL NFR BLD AUTO: ABNORMAL %
EOSINOPHIL NFR BLD MANUAL: 1 %
ERYTHROCYTE [DISTWIDTH] IN BLOOD BY AUTOMATED COUNT: 13.3 % (ref 10–15)
GLUCOSE SERPL-MCNC: 170 MG/DL (ref 70–99)
HCT VFR BLD AUTO: 34.3 % (ref 40–53)
HGB BLD-MCNC: 11.4 G/DL (ref 13.3–17.7)
IMM GRANULOCYTES # BLD: ABNORMAL 10*3/UL
IMM GRANULOCYTES NFR BLD: ABNORMAL %
LYMPHOCYTES # BLD AUTO: ABNORMAL 10*3/UL
LYMPHOCYTES # BLD MANUAL: 0 10E3/UL (ref 0.8–5.3)
LYMPHOCYTES NFR BLD AUTO: ABNORMAL %
LYMPHOCYTES NFR BLD MANUAL: 8 %
MCH RBC QN AUTO: 30.4 PG (ref 26.5–33)
MCHC RBC AUTO-ENTMCNC: 33.2 G/DL (ref 31.5–36.5)
MCV RBC AUTO: 92 FL (ref 78–100)
MONOCYTES # BLD AUTO: ABNORMAL 10*3/UL
MONOCYTES # BLD MANUAL: 0 10E3/UL (ref 0–1.3)
MONOCYTES NFR BLD AUTO: ABNORMAL %
MONOCYTES NFR BLD MANUAL: 1 %
NEUTROPHILS # BLD AUTO: ABNORMAL 10*3/UL
NEUTROPHILS # BLD MANUAL: 0.5 10E3/UL (ref 1.6–8.3)
NEUTROPHILS NFR BLD AUTO: ABNORMAL %
NEUTROPHILS NFR BLD MANUAL: 90 %
NRBC # BLD AUTO: 0 10E3/UL
NRBC BLD AUTO-RTO: 0 /100
PLAT MORPH BLD: ABNORMAL
PLATELET # BLD AUTO: 139 10E3/UL (ref 150–450)
POTASSIUM SERPL-SCNC: 3.8 MMOL/L (ref 3.4–5.3)
RBC # BLD AUTO: 3.75 10E6/UL (ref 4.4–5.9)
RBC MORPH BLD: ABNORMAL
SODIUM SERPL-SCNC: 139 MMOL/L (ref 135–145)
WBC # BLD AUTO: 0.6 10E3/UL (ref 4–11)

## 2024-01-15 PROCEDURE — 250N000011 HC RX IP 250 OP 636: Mod: JZ | Performed by: PHYSICIAN ASSISTANT

## 2024-01-15 PROCEDURE — 82374 ASSAY BLOOD CARBON DIOXIDE: CPT

## 2024-01-15 PROCEDURE — 250N000011 HC RX IP 250 OP 636

## 2024-01-15 PROCEDURE — 85027 COMPLETE CBC AUTOMATED: CPT

## 2024-01-15 PROCEDURE — 96372 THER/PROPH/DIAG INJ SC/IM: CPT | Performed by: PHYSICIAN ASSISTANT

## 2024-01-15 PROCEDURE — 36592 COLLECT BLOOD FROM PICC: CPT

## 2024-01-15 PROCEDURE — G0463 HOSPITAL OUTPT CLINIC VISIT: HCPCS | Mod: 25

## 2024-01-15 PROCEDURE — 82565 ASSAY OF CREATININE: CPT

## 2024-01-15 PROCEDURE — 85007 BL SMEAR W/DIFF WBC COUNT: CPT

## 2024-01-15 PROCEDURE — 99214 OFFICE O/P EST MOD 30 MIN: CPT

## 2024-01-15 RX ORDER — HEPARIN SODIUM (PORCINE) LOCK FLUSH IV SOLN 100 UNIT/ML 100 UNIT/ML
5 SOLUTION INTRAVENOUS
Status: CANCELLED | OUTPATIENT
Start: 2024-01-16

## 2024-01-15 RX ORDER — ALBUTEROL SULFATE 0.83 MG/ML
2.5 SOLUTION RESPIRATORY (INHALATION)
Status: CANCELLED | OUTPATIENT
Start: 2024-01-16 | End: 2024-01-16

## 2024-01-15 RX ORDER — PENTAMIDINE ISETHIONATE 300 MG/300MG
300 INHALANT RESPIRATORY (INHALATION)
Status: CANCELLED | OUTPATIENT
Start: 2024-01-16 | End: 2024-01-16

## 2024-01-15 RX ORDER — HEPARIN SODIUM,PORCINE 10 UNIT/ML
5 VIAL (ML) INTRAVENOUS ONCE
Status: COMPLETED | OUTPATIENT
Start: 2024-01-15 | End: 2024-01-15

## 2024-01-15 RX ORDER — HEPARIN SODIUM,PORCINE 10 UNIT/ML
5-20 VIAL (ML) INTRAVENOUS DAILY PRN
Status: CANCELLED | OUTPATIENT
Start: 2024-01-16

## 2024-01-15 RX ADMIN — FILGRASTIM-AAFI 480 MCG: 480 INJECTION, SOLUTION INTRAVENOUS; SUBCUTANEOUS at 15:16

## 2024-01-15 RX ADMIN — Medication 5 ML: at 14:39

## 2024-01-15 ASSESSMENT — PAIN SCALES - GENERAL: PAINLEVEL: NO PAIN (0)

## 2024-01-15 NOTE — PROGRESS NOTES
BMT Progress Note  01/15/2024     ID: Mr. Carroll is a 68 year old man D 5 for standard risk IgG lambda kappa multiple myeloma.      HPI:   Billy is feeling well today - he had a little nausea this AM but it went away without any treatment. He is curious now what medications he should have lined up if he does become more nauseated. No vomiting or abdominal pain. He did have loose stools over the weekend but this improved with imodium. His last bowel movement was about 24 hours ago. No fevers or chills.    ROS: Review of systems with pertinent positive and negatives in HPI    Exam:  Wt Readings from Last 4 Encounters:   01/15/24 95.7 kg (211 lb)   01/14/24 96.5 kg (212 lb 12.8 oz)   01/13/24 97.9 kg (215 lb 12.8 oz)   01/11/24 98.2 kg (216 lb 8 oz)      BP (!) 144/76   Pulse 64   Temp 98  F (36.7  C)   Resp 16   Wt 95.7 kg (211 lb)   SpO2 98%   BMI 30.28 kg/m       Constitutional: NAD  Eyes: no scleral icterus  Pulm: CTAB  CV: RRR, no m/r/g  GI: bowel sounds present, soft and nontender to palpation  Neuro: alert, conversant, normal gait  Psych: appropriate mood and affect  Central line dressing clean and dry    A/P:  Billy Carroll is a 68 year old man with IgG kappa MM with extramedullary disease, D+ 5 auto.    - PMHx significant for CAD, severe aortic stenosis s/p TAVR (10/24/2023) currently ongoing cardiac rehabilitation, HTN, and HLD.     BMT/MM  Total collections: 7.43 x 10^6  Cell dose thawed: 3.72 x 10^6 -- infused cell dose total 1.57 x 10^6  **Called cell processing about this loss of cells during thaw/wash phase on 1/15 -- 42% of cells recovered with thaw/wash, notified Dr. Bang. Sent IB to Dr. Frey.  On allopurinol through day 7     HEME/COAG  - Transfusion parameters: hemoglobin <7, platelets <10  - Start GCSF on Day 5 (1/15), continue until ANC >2.5 x 2 consecutive days.    IMMUNOCOMPROMISED  - prophy ACV, fluc, lev, Pentamidine (12/28/23)  #hx PJP pneumonia: resume Bactrim D28 post  BMT    CARDIOVASCULAR - Monitor volume status carefully given cardiac history  #HTN: cont losartan, metoprolol.   #Severe aortic stenosis, now s/p TAVR 10/24/2023.   #Moderate nonobstructive coronary artery disease   #Hyperlipidemia - hold statin khushboo-transplant  Hold ASA     GI:  Hold vitamin supplements + alendronate  On protonix GI prophy  N/V: none currently. Got emend and on still on dex. Has PRN zofran and compazine at home    MUSCULOSKELETAL/FRAILTY  Baseline Frailty Score: 2  Patient with substantial risk of sarcopenia  Cancer Rehab as needed outpatient    PSCYH:  # Anxiety: improved after PRN ativan, not currently using.    Today's Summary:  Start GCSF today  Discontinue supplements  Zofran as first option for anti nausea then add in compazine, provided med list with instructions  Takes Ativan at bedtime now - will not start zyprexa    RTC: Daily appts    I spent 30 minutes in the care of this patient today, which included time necessary for preparation for the visit, obtaining history, ordering medications/tests/procedures as medically indicated, review of pertinent medical literature, counseling of the patient, communication of recommendations to the care team, and documentation time.      Nathen Ann PA-C   *[phone]

## 2024-01-15 NOTE — LETTER
1/15/2024         RE: Billy Carroll  [address] [place] [address]        Dear Colleague,    Thank you for referring your patient, Billy Carroll, to the [facility] BLOOD AND MARROW TRANSPLANT PROGRAM [place]. Please see a copy of my visit note below.    BMT Progress Note  01/15/2024     ID: Mr. Carroll is a 68 year old man D 5 for standard risk IgG lambda kappa multiple myeloma.      HPI:   Billy is feeling well today - he had a little nausea this AM but it went away without any treatment. He is curious now what medications he should have lined up if he does become more nauseated. No vomiting or abdominal pain. He did have loose stools over the weekend but this improved with imodium. His last bowel movement was about 24 hours ago. No fevers or chills.    ROS: Review of systems with pertinent positive and negatives in HPI    Exam:  Wt Readings from Last 4 Encounters:   01/15/24 95.7 kg (211 lb)   01/14/24 96.5 kg (212 lb 12.8 oz)   01/13/24 97.9 kg (215 lb 12.8 oz)   01/11/24 98.2 kg (216 lb 8 oz)      BP (!) 144/76   Pulse 64   Temp 98  F (36.7  C)   Resp 16   Wt 95.7 kg (211 lb)   SpO2 98%   BMI 30.28 kg/m       Constitutional: NAD  Eyes: no scleral icterus  Pulm: CTAB  CV: RRR, no m/r/g  GI: bowel sounds present, soft and nontender to palpation  Neuro: alert, conversant, normal gait  Psych: appropriate mood and affect  Central line dressing clean and dry    A/P:  Billy Carroll is a 68 year old man with IgG kappa MM with extramedullary disease, D+ 5 auto.    - PMHx significant for CAD, severe aortic stenosis s/p TAVR (10/24/2023) currently ongoing cardiac rehabilitation, HTN, and HLD.     BMT/MM  Total collections: 7.43 x 10^6  Cell dose thawed: 3.72 x 10^6 -- infused cell dose total 1.57 x 10^6  **Called cell processing about this loss of cells during thaw/wash phase on 1/15 -- 42% of cells recovered with thaw/wash, notified Dr. Bang. Sent IB to Dr. Frey.  On allopurinol  through day 7     HEME/COAG  - Transfusion parameters: hemoglobin <7, platelets <10  - Start GCSF on Day 5 (1/15), continue until ANC >2.5 x 2 consecutive days.    IMMUNOCOMPROMISED  - prophy ACV, fluc, lev, Pentamidine (12/28/23)  #hx PJP pneumonia: resume Bactrim D28 post BMT    CARDIOVASCULAR - Monitor volume status carefully given cardiac history  #HTN: cont losartan, metoprolol.   #Severe aortic stenosis, now s/p TAVR 10/24/2023.   #Moderate nonobstructive coronary artery disease   #Hyperlipidemia - hold statin khushboo-transplant  Hold ASA     GI:  Hold vitamin supplements + alendronate  On protonix GI prophy  N/V: none currently. Got emend and on still on dex. Has PRN zofran and compazine at home    MUSCULOSKELETAL/FRAILTY  Baseline Frailty Score: 2  Patient with substantial risk of sarcopenia  Cancer Rehab as needed outpatient    PSCYH:  # Anxiety: improved after PRN ativan, not currently using.    Today's Summary:  Start GCSF today  Discontinue supplements  Zofran as first option for anti nausea then add in compazine, provided med list with instructions  Takes Ativan at bedtime now - will not start zyprexa    RTC: Daily appts    I spent 30 minutes in the care of this patient today, which included time necessary for preparation for the visit, obtaining history, ordering medications/tests/procedures as medically indicated, review of pertinent medical literature, counseling of the patient, communication of recommendations to the care team, and documentation time.      Nathen Ann PA-C   *[phone]

## 2024-01-15 NOTE — NURSING NOTE
"Oncology Rooming Note    January 15, 2024 2:44 PM   Billy Carroll is a 68 year old male who presents for:    Chief Complaint   Patient presents with    Oncology Clinic Visit     Multiple myeloma     Blood Draw     Labs collected from CVC by RN, line flushed with saline and heparin.  Vitals taken. Pt checked in for appointment(s).      Initial Vitals: BP (!) 144/76   Pulse 64   Temp 98  F (36.7  C)   Resp 16   Wt 95.7 kg (211 lb)   SpO2 98%   BMI 30.28 kg/m   Estimated body mass index is 30.28 kg/m  as calculated from the following:    Height as of 1/11/24: 1.778 m (5' 10\").    Weight as of this encounter: 95.7 kg (211 lb). Body surface area is 2.17 meters squared.  No Pain (0) Comment: Data Unavailable   No LMP for male patient.  Allergies reviewed: Yes  Medications reviewed: Yes    Medications: Medication refills not needed today.  Pharmacy name entered into [employer]:    [facility] #[id number] [place], [place] - [address] [facility] [place], [place] - [address]    Frailty Screening:   Is the patient here for a new oncology consult visit in cancer care? 2. No      Clinical concerns: Pt states some nausea would like to know what to do when it happens.   Nathen was notified.      Radha Jha              "

## 2024-01-15 NOTE — NURSING NOTE
Chief Complaint   Patient presents with    Oncology Clinic Visit     Multiple myeloma     Blood Draw     Labs collected from CVC by RN, line flushed with saline and heparin.  Vitals taken. Pt checked in for appointment(s).      Labs collected from CVC by RN, line flushed with saline and heparin.  Vitals taken. Pt checked in for appointment(s).    Mellisa Brennan RN

## 2024-01-16 ENCOUNTER — ONCOLOGY VISIT (OUTPATIENT)
Dept: TRANSPLANT | Facility: CLINIC | Age: 69
End: 2024-01-16
Payer: MEDICARE

## 2024-01-16 ENCOUNTER — APPOINTMENT (OUTPATIENT)
Dept: LAB | Facility: CLINIC | Age: 69
End: 2024-01-16
Payer: MEDICARE

## 2024-01-16 VITALS
HEART RATE: 82 BPM | SYSTOLIC BLOOD PRESSURE: 125 MMHG | TEMPERATURE: 98 F | WEIGHT: 211 LBS | DIASTOLIC BLOOD PRESSURE: 90 MMHG | OXYGEN SATURATION: 98 % | RESPIRATION RATE: 16 BRPM | BODY MASS INDEX: 30.28 KG/M2

## 2024-01-16 DIAGNOSIS — Z52.011 AUTOLOGOUS DONOR OF STEM CELLS: ICD-10-CM

## 2024-01-16 DIAGNOSIS — C90.01 MULTIPLE MYELOMA IN REMISSION (H): ICD-10-CM

## 2024-01-16 DIAGNOSIS — C90.00 MULTIPLE MYELOMA NOT HAVING ACHIEVED REMISSION (H): ICD-10-CM

## 2024-01-16 DIAGNOSIS — C90.00 MULTIPLE MYELOMA, REMISSION STATUS UNSPECIFIED (H): Primary | ICD-10-CM

## 2024-01-16 LAB
ACANTHOCYTES BLD QL SMEAR: NORMAL
ANION GAP SERPL CALCULATED.3IONS-SCNC: 9 MMOL/L (ref 7–15)
AUER BODIES BLD QL SMEAR: NORMAL
BASO STIPL BLD QL SMEAR: NORMAL
BASOPHILS # BLD AUTO: 0 10E3/UL (ref 0–0.2)
BASOPHILS NFR BLD AUTO: 0 %
BITE CELLS BLD QL SMEAR: NORMAL
BLISTER CELLS BLD QL SMEAR: NORMAL
BUN SERPL-MCNC: 12.3 MG/DL (ref 8–23)
BURR CELLS BLD QL SMEAR: NORMAL
CALCIUM SERPL-MCNC: 8.5 MG/DL (ref 8.8–10.2)
CD34 ABSOLUTE COUNT COMMENT: NORMAL
CHLORIDE SERPL-SCNC: 104 MMOL/L (ref 98–107)
CREAT SERPL-MCNC: 0.61 MG/DL (ref 0.67–1.17)
CULTURE HARVEST COMPLETE DATE: NORMAL
DACRYOCYTES BLD QL SMEAR: NORMAL
DEPRECATED HCO3 PLAS-SCNC: 26 MMOL/L (ref 22–29)
EGFRCR SERPLBLD CKD-EPI 2021: >90 ML/MIN/1.73M2
ELLIPTOCYTES BLD QL SMEAR: NORMAL
EOSINOPHIL # BLD AUTO: 0 10E3/UL (ref 0–0.7)
EOSINOPHIL NFR BLD AUTO: 0 %
ERYTHROCYTE [DISTWIDTH] IN BLOOD BY AUTOMATED COUNT: 13.5 % (ref 10–15)
FRAGMENTS BLD QL SMEAR: NORMAL
GLUCOSE SERPL-MCNC: 111 MG/DL (ref 70–99)
HCT VFR BLD AUTO: 34.2 % (ref 40–53)
HGB BLD-MCNC: 11.4 G/DL (ref 13.3–17.7)
HGB C CRYSTALS: NORMAL
HOWELL-JOLLY BOD BLD QL SMEAR: NORMAL
IMM GRANULOCYTES # BLD: 0 10E3/UL
IMM GRANULOCYTES NFR BLD: 2 %
LYMPHOCYTES # BLD AUTO: 0 10E3/UL (ref 0.8–5.3)
LYMPHOCYTES NFR BLD AUTO: 4 %
MCH RBC QN AUTO: 30.2 PG (ref 26.5–33)
MCHC RBC AUTO-ENTMCNC: 33.3 G/DL (ref 31.5–36.5)
MCV RBC AUTO: 91 FL (ref 78–100)
MONOCYTES # BLD AUTO: 0 10E3/UL (ref 0–1.3)
MONOCYTES NFR BLD AUTO: 1 %
NEUTROPHILS # BLD AUTO: 0.9 10E3/UL (ref 1.6–8.3)
NEUTROPHILS NFR BLD AUTO: 93 %
NEUTS HYPERSEG BLD QL SMEAR: NORMAL
NRBC # BLD AUTO: 0 10E3/UL
NRBC BLD AUTO-RTO: 0 /100
PLAT MORPH BLD: NORMAL
PLATELET # BLD AUTO: 110 10E3/UL (ref 150–450)
POLYCHROMASIA BLD QL SMEAR: NORMAL
POTASSIUM SERPL-SCNC: 4 MMOL/L (ref 3.4–5.3)
PRODUCT NUMBER FLOW CYTOMETRY: NORMAL
RBC # BLD AUTO: 3.77 10E6/UL (ref 4.4–5.9)
RBC AGGLUT BLD QL: NORMAL
RBC MORPH BLD: NORMAL
ROULEAUX BLD QL SMEAR: NORMAL
SICKLE CELLS BLD QL SMEAR: NORMAL
SMUDGE CELLS BLD QL SMEAR: NORMAL
SODIUM SERPL-SCNC: 139 MMOL/L (ref 135–145)
SPHEROCYTES BLD QL SMEAR: NORMAL
STOMATOCYTES BLD QL SMEAR: NORMAL
TARGETS BLD QL SMEAR: NORMAL
TOXIC GRANULES BLD QL SMEAR: NORMAL
VARIANT LYMPHS BLD QL SMEAR: NORMAL
WBC # BLD AUTO: 1 10E3/UL (ref 4–11)

## 2024-01-16 PROCEDURE — 85025 COMPLETE CBC W/AUTO DIFF WBC: CPT

## 2024-01-16 PROCEDURE — 80048 BASIC METABOLIC PNL TOTAL CA: CPT

## 2024-01-16 PROCEDURE — 96372 THER/PROPH/DIAG INJ SC/IM: CPT | Performed by: PHYSICIAN ASSISTANT

## 2024-01-16 PROCEDURE — 86367 STEM CELLS TOTAL COUNT: CPT

## 2024-01-16 PROCEDURE — 36592 COLLECT BLOOD FROM PICC: CPT

## 2024-01-16 PROCEDURE — G0463 HOSPITAL OUTPT CLINIC VISIT: HCPCS | Mod: 25

## 2024-01-16 PROCEDURE — 250N000011 HC RX IP 250 OP 636: Mod: JZ | Performed by: PHYSICIAN ASSISTANT

## 2024-01-16 PROCEDURE — 99214 OFFICE O/P EST MOD 30 MIN: CPT

## 2024-01-16 PROCEDURE — 250N000011 HC RX IP 250 OP 636

## 2024-01-16 RX ORDER — HEPARIN SODIUM (PORCINE) LOCK FLUSH IV SOLN 100 UNIT/ML 100 UNIT/ML
5 SOLUTION INTRAVENOUS
Status: CANCELLED | OUTPATIENT
Start: 2024-01-17

## 2024-01-16 RX ORDER — PENTAMIDINE ISETHIONATE 300 MG/300MG
300 INHALANT RESPIRATORY (INHALATION)
Status: CANCELLED | OUTPATIENT
Start: 2024-01-17 | End: 2024-01-17

## 2024-01-16 RX ORDER — ALBUTEROL SULFATE 0.83 MG/ML
2.5 SOLUTION RESPIRATORY (INHALATION)
Status: CANCELLED | OUTPATIENT
Start: 2024-01-17 | End: 2024-01-17

## 2024-01-16 RX ORDER — HEPARIN SODIUM,PORCINE 10 UNIT/ML
5 VIAL (ML) INTRAVENOUS ONCE
Status: COMPLETED | OUTPATIENT
Start: 2024-01-16 | End: 2024-01-16

## 2024-01-16 RX ORDER — HEPARIN SODIUM,PORCINE 10 UNIT/ML
5-20 VIAL (ML) INTRAVENOUS DAILY PRN
Status: CANCELLED | OUTPATIENT
Start: 2024-01-17

## 2024-01-16 RX ADMIN — FILGRASTIM-AAFI 480 MCG: 480 INJECTION, SOLUTION INTRAVENOUS; SUBCUTANEOUS at 15:51

## 2024-01-16 RX ADMIN — Medication 5 ML: at 15:16

## 2024-01-16 RX ADMIN — Medication 5 ML: at 15:15

## 2024-01-16 ASSESSMENT — PAIN SCALES - GENERAL: PAINLEVEL: NO PAIN (0)

## 2024-01-16 NOTE — PROGRESS NOTES
BMT Progress Note  01/16/2024     ID: Mr. Carroll is a 68 year old man D 6 for standard risk IgG lambda kappa multiple myeloma.      HPI:   Billy is feeling well today - he had a little nausea this AM again but took an antinausea med this morning and has felt better since. Looser stools, 2 yesterday and 4 today. No completely water. No fevers. No mouth sores. Still eating okay and drinking more now.     ROS: Review of systems with pertinent positive and negatives in HPI    Exam:  Wt Readings from Last 4 Encounters:   01/16/24 95.7 kg (211 lb)   01/15/24 95.7 kg (211 lb)   01/14/24 96.5 kg (212 lb 12.8 oz)   01/13/24 97.9 kg (215 lb 12.8 oz)      BP (!) 125/90   Pulse 82   Temp 98  F (36.7  C)   Resp 16   Wt 95.7 kg (211 lb)   SpO2 98%   BMI 30.28 kg/m       Constitutional: NAD  Eyes: no scleral icterus  Pulm: CTAB  CV: RRR, no m/r/g  GI: bowel sounds present, soft and nontender to palpation  Neuro: alert, conversant, normal gait  Psych: appropriate mood and affect  Central line dressing clean and dry    A/P:  Billy Carroll is a 68 year old man with IgG kappa MM with extramedullary disease, D+ 6 auto.    - PMHx significant for CAD, severe aortic stenosis s/p TAVR (10/24/2023) currently ongoing cardiac rehabilitation, HTN, and HLD.     BMT/MM  Total collections: 7.43 x 10^6  Cell dose thawed: 3.72 x 10^6 -- infused cell dose total 1.57 x 10^6  **Called cell processing about this loss of cells during thaw/wash phase on 1/15 -- 42% of cells recovered with thaw/wash, notified Dr. Bang. SHAHANA sent IB to Dr. Frey.  On allopurinol through day 7     HEME/COAG  - Transfusion parameters: hemoglobin <7, platelets <10  - Start GCSF on Day 5 (1/15), continue until ANC >2.5 x 2 consecutive days.    IMMUNOCOMPROMISED  - prophy ACV, fluc, lev, Pentamidine (12/28/23)  #hx PJP pneumonia: resume Bactrim D28 post BMT    CARDIOVASCULAR - Monitor volume status carefully given cardiac history  #HTN: cont losartan, metoprolol.    #Severe aortic stenosis, now s/p TAVR 10/24/2023.   #Moderate nonobstructive coronary artery disease   #Hyperlipidemia - hold statin khushboo-transplant  Hold ASA     GI:  Hold vitamin supplements + alendronate  On protonix GI prophy  N/V:   Got emend and on still on dex. Has PRN zofran and compazine at home. Takes Ativan at bedtime    MUSCULOSKELETAL/FRAILTY  Baseline Frailty Score: 2  Patient with substantial risk of sarcopenia  Cancer Rehab as needed outpatient    PSCYH:  # Anxiety: improved after PRN ativan, not currently using.    Today's Summary:   GCSF today   Dressing change      RTC: Daily appts    I spent 30 minutes in the care of this patient today, which included time necessary for preparation for the visit, obtaining history, ordering medications/tests/procedures as medically indicated, review of pertinent medical literature, counseling of the patient, communication of recommendations to the care team, and documentation time.      Rylie Metz, APRN CNP   *[phone]

## 2024-01-16 NOTE — NURSING NOTE
Chief Complaint   Patient presents with    Blood Draw     Labs collected from CVC by RN, line flushed with saline and heparin.  Vitals taken. Pt checked in for appointment(s).      Labs collected from CVC by RN, line flushed with saline and heparin.  Vitals taken. Pt checked in for appointment(s).     Mellisa Brennan RN

## 2024-01-16 NOTE — LETTER
1/16/2024         RE: Billy Carroll  [address] [place] [address]        Dear Colleague,    Thank you for referring your patient, Billy Carroll, to the [facility] BLOOD AND MARROW TRANSPLANT PROGRAM [place]. Please see a copy of my visit note below.    BMT Progress Note  01/16/2024     ID: Mr. Carroll is a 68 year old man D 6 for standard risk IgG lambda kappa multiple myeloma.      HPI:   Billy is feeling well today - he had a little nausea this AM again but took an antinausea med this morning and has felt better since. Looser stools, 2 yesterday and 4 today. No completely water. No fevers. No mouth sores. Still eating okay and drinking more now.     ROS: Review of systems with pertinent positive and negatives in HPI    Exam:  Wt Readings from Last 4 Encounters:   01/16/24 95.7 kg (211 lb)   01/15/24 95.7 kg (211 lb)   01/14/24 96.5 kg (212 lb 12.8 oz)   01/13/24 97.9 kg (215 lb 12.8 oz)      BP (!) 125/90   Pulse 82   Temp 98  F (36.7  C)   Resp 16   Wt 95.7 kg (211 lb)   SpO2 98%   BMI 30.28 kg/m       Constitutional: NAD  Eyes: no scleral icterus  Pulm: CTAB  CV: RRR, no m/r/g  GI: bowel sounds present, soft and nontender to palpation  Neuro: alert, conversant, normal gait  Psych: appropriate mood and affect  Central line dressing clean and dry    A/P:  Billy Carroll is a 68 year old man with IgG kappa MM with extramedullary disease, D+ 6 auto.    - PMHx significant for CAD, severe aortic stenosis s/p TAVR (10/24/2023) currently ongoing cardiac rehabilitation, HTN, and HLD.     BMT/MM  Total collections: 7.43 x 10^6  Cell dose thawed: 3.72 x 10^6 -- infused cell dose total 1.57 x 10^6  **Called cell processing about this loss of cells during thaw/wash phase on 1/15 -- 42% of cells recovered with thaw/wash, notified Dr. Bang. SHAHANA sent IB to Dr. Frey.  On allopurinol through day 7     HEME/COAG  - Transfusion parameters: hemoglobin <7, platelets <10  - Start GCSF on Day 5  (1/15), continue until ANC >2.5 x 2 consecutive days.    IMMUNOCOMPROMISED  - prophy ACV, fluc, lev, Pentamidine (12/28/23)  #hx PJP pneumonia: resume Bactrim D28 post BMT    CARDIOVASCULAR - Monitor volume status carefully given cardiac history  #HTN: cont losartan, metoprolol.   #Severe aortic stenosis, now s/p TAVR 10/24/2023.   #Moderate nonobstructive coronary artery disease   #Hyperlipidemia - hold statin khushboo-transplant  Hold ASA     GI:  Hold vitamin supplements + alendronate  On protonix GI prophy  N/V:   Got emend and on still on dex. Has PRN zofran and compazine at home. Takes Ativan at bedtime    MUSCULOSKELETAL/FRAILTY  Baseline Frailty Score: 2  Patient with substantial risk of sarcopenia  Cancer Rehab as needed outpatient    PSCYH:  # Anxiety: improved after PRN ativan, not currently using.    Today's Summary:   GCSF today   Dressing change      RTC: Daily appts    I spent 30 minutes in the care of this patient today, which included time necessary for preparation for the visit, obtaining history, ordering medications/tests/procedures as medically indicated, review of pertinent medical literature, counseling of the patient, communication of recommendations to the care team, and documentation time.      Rylie Metz, APRN CNP   *[phone]

## 2024-01-16 NOTE — NURSING NOTE
"Oncology Rooming Note    January 16, 2024 3:22 PM   Billy Carroll is a 68 year old male who presents for:    Chief Complaint   Patient presents with    Blood Draw     Labs collected from CVC by RN, line flushed with saline and heparin.  Vitals taken. Pt checked in for appointment(s).     Oncology Clinic Visit     RTN for S/P BMT for MM     Initial Vitals: BP (!) 125/90   Pulse 82   Temp 98  F (36.7  C)   Resp 16   Wt 95.7 kg (211 lb)   SpO2 98%   BMI 30.28 kg/m   Estimated body mass index is 30.28 kg/m  as calculated from the following:    Height as of 1/11/24: 1.778 m (5' 10\").    Weight as of this encounter: 95.7 kg (211 lb). Body surface area is 2.17 meters squared.  No Pain (0) Comment: Data Unavailable   No LMP for male patient.  Allergies reviewed: Yes  Medications reviewed: Yes    Medications: Medication refills not needed today.  Pharmacy name entered into [facility]:    [facility] #[id number] [place], [place] - [address] [facility] [place], [place] - [address] [facility]    Frailty Screening:   Is the patient here for a new oncology consult visit in cancer care? 2. No      Clinical concerns: diarrhea has started       Leslie Espinosa MA             "

## 2024-01-17 ENCOUNTER — ONCOLOGY VISIT (OUTPATIENT)
Dept: TRANSPLANT | Facility: CLINIC | Age: 69
End: 2024-01-17
Payer: MEDICARE

## 2024-01-17 ENCOUNTER — APPOINTMENT (OUTPATIENT)
Dept: LAB | Facility: CLINIC | Age: 69
End: 2024-01-17
Payer: MEDICARE

## 2024-01-17 VITALS
BODY MASS INDEX: 30 KG/M2 | SYSTOLIC BLOOD PRESSURE: 121 MMHG | OXYGEN SATURATION: 98 % | TEMPERATURE: 98 F | HEART RATE: 83 BPM | WEIGHT: 209.1 LBS | DIASTOLIC BLOOD PRESSURE: 83 MMHG | RESPIRATION RATE: 16 BRPM

## 2024-01-17 DIAGNOSIS — C90.00 MULTIPLE MYELOMA, REMISSION STATUS UNSPECIFIED (H): Primary | ICD-10-CM

## 2024-01-17 DIAGNOSIS — C90.01 MULTIPLE MYELOMA IN REMISSION (H): ICD-10-CM

## 2024-01-17 DIAGNOSIS — Z52.011 AUTOLOGOUS DONOR OF STEM CELLS: ICD-10-CM

## 2024-01-17 LAB
ANION GAP SERPL CALCULATED.3IONS-SCNC: 9 MMOL/L (ref 7–15)
BASOPHILS # BLD AUTO: ABNORMAL 10*3/UL
BASOPHILS NFR BLD AUTO: ABNORMAL %
BUN SERPL-MCNC: 11.2 MG/DL (ref 8–23)
CALCIUM SERPL-MCNC: 8.6 MG/DL (ref 8.8–10.2)
CHLORIDE SERPL-SCNC: 105 MMOL/L (ref 98–107)
CREAT SERPL-MCNC: 0.61 MG/DL (ref 0.67–1.17)
DEPRECATED HCO3 PLAS-SCNC: 25 MMOL/L (ref 22–29)
EGFRCR SERPLBLD CKD-EPI 2021: >90 ML/MIN/1.73M2
EOSINOPHIL # BLD AUTO: ABNORMAL 10*3/UL
EOSINOPHIL NFR BLD AUTO: ABNORMAL %
ERYTHROCYTE [DISTWIDTH] IN BLOOD BY AUTOMATED COUNT: 13.3 % (ref 10–15)
GLUCOSE SERPL-MCNC: 103 MG/DL (ref 70–99)
HCT VFR BLD AUTO: 34.4 % (ref 40–53)
HGB BLD-MCNC: 11.7 G/DL (ref 13.3–17.7)
IMM GRANULOCYTES # BLD: ABNORMAL 10*3/UL
IMM GRANULOCYTES NFR BLD: ABNORMAL %
LYMPHOCYTES # BLD AUTO: ABNORMAL 10*3/UL
LYMPHOCYTES NFR BLD AUTO: ABNORMAL %
MCH RBC QN AUTO: 30.8 PG (ref 26.5–33)
MCHC RBC AUTO-ENTMCNC: 34 G/DL (ref 31.5–36.5)
MCV RBC AUTO: 91 FL (ref 78–100)
MONOCYTES # BLD AUTO: ABNORMAL 10*3/UL
MONOCYTES NFR BLD AUTO: ABNORMAL %
NEUTROPHILS # BLD AUTO: ABNORMAL 10*3/UL
NEUTROPHILS NFR BLD AUTO: ABNORMAL %
PLATELET # BLD AUTO: 68 10E3/UL (ref 150–450)
POTASSIUM SERPL-SCNC: 4 MMOL/L (ref 3.4–5.3)
RBC # BLD AUTO: 3.8 10E6/UL (ref 4.4–5.9)
SODIUM SERPL-SCNC: 139 MMOL/L (ref 135–145)
URATE SERPL-MCNC: 4.3 MG/DL (ref 3.4–7)
WBC # BLD AUTO: 0.1 10E3/UL (ref 4–11)

## 2024-01-17 PROCEDURE — 250N000011 HC RX IP 250 OP 636

## 2024-01-17 PROCEDURE — 99214 OFFICE O/P EST MOD 30 MIN: CPT

## 2024-01-17 PROCEDURE — 84550 ASSAY OF BLOOD/URIC ACID: CPT

## 2024-01-17 PROCEDURE — 85027 COMPLETE CBC AUTOMATED: CPT

## 2024-01-17 PROCEDURE — 96372 THER/PROPH/DIAG INJ SC/IM: CPT | Performed by: PHYSICIAN ASSISTANT

## 2024-01-17 PROCEDURE — 36592 COLLECT BLOOD FROM PICC: CPT

## 2024-01-17 PROCEDURE — 250N000011 HC RX IP 250 OP 636: Mod: JZ,JB | Performed by: PHYSICIAN ASSISTANT

## 2024-01-17 PROCEDURE — 80048 BASIC METABOLIC PNL TOTAL CA: CPT

## 2024-01-17 PROCEDURE — G0463 HOSPITAL OUTPT CLINIC VISIT: HCPCS

## 2024-01-17 RX ORDER — HEPARIN SODIUM,PORCINE 10 UNIT/ML
5-20 VIAL (ML) INTRAVENOUS DAILY PRN
Status: CANCELLED | OUTPATIENT
Start: 2024-01-18

## 2024-01-17 RX ORDER — HEPARIN SODIUM (PORCINE) LOCK FLUSH IV SOLN 100 UNIT/ML 100 UNIT/ML
5 SOLUTION INTRAVENOUS
Status: CANCELLED | OUTPATIENT
Start: 2024-01-18

## 2024-01-17 RX ORDER — ALBUTEROL SULFATE 0.83 MG/ML
2.5 SOLUTION RESPIRATORY (INHALATION)
Status: CANCELLED | OUTPATIENT
Start: 2024-01-18 | End: 2024-01-18

## 2024-01-17 RX ORDER — PENTAMIDINE ISETHIONATE 300 MG/300MG
300 INHALANT RESPIRATORY (INHALATION)
Status: CANCELLED | OUTPATIENT
Start: 2024-01-18 | End: 2024-01-18

## 2024-01-17 RX ORDER — HEPARIN SODIUM,PORCINE 10 UNIT/ML
5 VIAL (ML) INTRAVENOUS ONCE
Status: COMPLETED | OUTPATIENT
Start: 2024-01-17 | End: 2024-01-17

## 2024-01-17 RX ADMIN — FILGRASTIM-AAFI 480 MCG: 480 INJECTION, SOLUTION INTRAVENOUS; SUBCUTANEOUS at 14:51

## 2024-01-17 RX ADMIN — Medication 5 ML: at 14:19

## 2024-01-17 RX ADMIN — Medication 5 ML: at 14:18

## 2024-01-17 ASSESSMENT — PAIN SCALES - GENERAL: PAINLEVEL: NO PAIN (0)

## 2024-01-17 NOTE — PROGRESS NOTES
BMT Progress Note  01/17/2024     ID: Mr. Carroll is a 68 year old man D 7 for standard risk IgG lambda kappa multiple myeloma.    HPI:   Overall doing okay.  No nausea today.  No diarrhea today after imodium/metamucil.  Eating/drinking okay but not much appetite and things don't taste great.     ROS: Review of systems with pertinent positive and negatives in HPI    Exam:  Wt Readings from Last 4 Encounters:   01/17/24 94.8 kg (209 lb 1.6 oz)   01/16/24 95.7 kg (211 lb)   01/15/24 95.7 kg (211 lb)   01/14/24 96.5 kg (212 lb 12.8 oz)      /83 (BP Location: Left arm, Patient Position: Sitting, Cuff Size: Adult Regular)   Pulse 83   Temp 98  F (36.7  C) (Oral)   Resp 16   Wt 94.8 kg (209 lb 1.6 oz)   SpO2 98%   BMI 30.00 kg/m       Constitutional: NAD  Eyes: no scleral icterus  Pulm: CTA anteriorly. Breathing comfortably on RA.  CV: RRR, no m/r/g  Neuro: alert, conversant, normal gait  Psych: appropriate mood and affect  CVC intact    A/P:  Billy Carroll is a 68 year old man with IgG kappa MM with extramedullary disease, D+ 7 auto.    - PMHx significant for CAD, severe aortic stenosis s/p TAVR (10/24/2023) currently ongoing cardiac rehabilitation, HTN, and HLD.     BMT/MM  Total collections: 7.43 x 10^6  Cell dose thawed: 3.72 x 10^6 -- infused cell dose total 1.57 x 10^6  **cell processing notified about this loss of cells during thaw/wash phase on 1/15 -- 42% of cells recovered with thaw/wash, notified Dr. Bang. Message sent to Dr. Frey.  - Allopurinol now complete.      HEME/COAG  - Transfusion parameters: hemoglobin <7, platelets <10  - given his cardiac hx would maybe keep hgb>8.  - Started GCSF on Day 5 continue until ANC >2.5 x 2 consecutive days.    IMMUNOCOMPROMISED  - prophy ACV, fluc, lev, Pentamidine (12/28/23)  #hx PJP pneumonia: resume Bactrim D28 post BMT    CARDIOVASCULAR - Monitor volume status carefully given cardiac history  #HTN: cont losartan, metoprolol.   #Severe aortic  stenosis, now s/p TAVR 10/24/2023.   #Moderate nonobstructive coronary artery disease   #Hyperlipidemia - hold statin khushboo-transplant  Hold ASA     GI:  Hold vitamin supplements + alendronate  On protonix GI prophy  N/V:  Got emend/dex. Has PRN zofran and compazine at home. Takes Ativan at bedtime    MUSCULOSKELETAL/FRAILTY  Baseline Frailty Score: 2  Patient with substantial risk of sarcopenia  Cancer Rehab as needed outpatient    PSCYH:  # Anxiety: improved after PRN ativan.     RTC: Daily appts    I spent 30 minutes in the care of this patient today, which included time necessary for preparation for the visit, obtaining history, ordering medications/tests/procedures as medically indicated, review of pertinent medical literature, counseling of the patient, communication of recommendations to the care team, and documentation time.      Amy Hernandez, PA-C   [phone]

## 2024-01-17 NOTE — NURSING NOTE
"Oncology Rooming Note    January 17, 2024 2:29 PM   Billy Carroll is a 68 year old male who presents for:    Chief Complaint   Patient presents with    Labs Only     Labs drawn via PICC by RN, VS done    Oncology Clinic Visit     Multiple myeloma      Initial Vitals: /83 (BP Location: Left arm, Patient Position: Sitting, Cuff Size: Adult Regular)   Pulse 83   Temp 98  F (36.7  C) (Oral)   Resp 16   Wt 94.8 kg (209 lb 1.6 oz)   SpO2 98%   BMI 30.00 kg/m   Estimated body mass index is 30 kg/m  as calculated from the following:    Height as of 1/11/24: 1.778 m (5' 10\").    Weight as of this encounter: 94.8 kg (209 lb 1.6 oz). Body surface area is 2.16 meters squared.  No Pain (0) Comment: Data Unavailable   No LMP for male patient.  Allergies reviewed: Yes  Medications reviewed: Yes    Medications: Medication refills not needed today.  Pharmacy name entered into [facility]:    [id number] [place], [place] - [address] [facility] [place], [place] [facility] [address]    Frailty Screening:   Is the patient here for a new oncology consult visit in cancer care? 2. No      Clinical concerns:        Radha Jha              "

## 2024-01-17 NOTE — NURSING NOTE
Chief Complaint   Patient presents with    Labs Only     Labs drawn via PICC by RN, VS done     Labs collected from PICC.  Line flushed with saline and heparin locked.  Vitals taken and pt checked in for appt.

## 2024-01-17 NOTE — LETTER
1/17/2024         RE: Billy Carroll  [address] [place] [address]        Dear Colleague,    Thank you for referring your patient, Billy Carroll, to the [facility] BLOOD AND MARROW TRANSPLANT PROGRAM [place]. Please see a copy of my visit note below.    BMT Progress Note  01/17/2024     ID: Mr. Carroll is a 68 year old man D 7 for standard risk IgG lambda kappa multiple myeloma.    HPI:   Overall doing okay.  No nausea today.  No diarrhea today after imodium/metamucil.  Eating/drinking okay but not much appetite and things don't taste great.     ROS: Review of systems with pertinent positive and negatives in HPI    Exam:  Wt Readings from Last 4 Encounters:   01/17/24 94.8 kg (209 lb 1.6 oz)   01/16/24 95.7 kg (211 lb)   01/15/24 95.7 kg (211 lb)   01/14/24 96.5 kg (212 lb 12.8 oz)      /83 (BP Location: Left arm, Patient Position: Sitting, Cuff Size: Adult Regular)   Pulse 83   Temp 98  F (36.7  C) (Oral)   Resp 16   Wt 94.8 kg (209 lb 1.6 oz)   SpO2 98%   BMI 30.00 kg/m       Constitutional: NAD  Eyes: no scleral icterus  Pulm: CTA anteriorly. Breathing comfortably on RA.  CV: RRR, no m/r/g  Neuro: alert, conversant, normal gait  Psych: appropriate mood and affect  CVC intact    A/P:  Billy Carroll is a 68 year old man with IgG kappa MM with extramedullary disease, D+ 7 auto.    - PMHx significant for CAD, severe aortic stenosis s/p TAVR (10/24/2023) currently ongoing cardiac rehabilitation, HTN, and HLD.     BMT/MM  Total collections: 7.43 x 10^6  Cell dose thawed: 3.72 x 10^6 -- infused cell dose total 1.57 x 10^6  **cell processing notified about this loss of cells during thaw/wash phase on 1/15 -- 42% of cells recovered with thaw/wash, notified Dr. Bang. Message sent to Dr. Frey.  - Allopurinol now complete.      HEME/COAG  - Transfusion parameters: hemoglobin <7, platelets <10  - given his cardiac hx would maybe keep hgb>8.  - Started GCSF on Day 5 continue until  ANC >2.5 x 2 consecutive days.    IMMUNOCOMPROMISED  - prophy ACV, fluc, lev, Pentamidine (12/28/23)  #hx PJP pneumonia: resume Bactrim D28 post BMT    CARDIOVASCULAR - Monitor volume status carefully given cardiac history  #HTN: cont losartan, metoprolol.   #Severe aortic stenosis, now s/p TAVR 10/24/2023.   #Moderate nonobstructive coronary artery disease   #Hyperlipidemia - hold statin khushboo-transplant  Hold ASA     GI:  Hold vitamin supplements + alendronate  On protonix GI prophy  N/V:  Got emend/dex. Has PRN zofran and compazine at home. Takes Ativan at bedtime    MUSCULOSKELETAL/FRAILTY  Baseline Frailty Score: 2  Patient with substantial risk of sarcopenia  Cancer Rehab as needed outpatient    PSCYH:  # Anxiety: improved after PRN ativan.     RTC: Daily appts    I spent 30 minutes in the care of this patient today, which included time necessary for preparation for the visit, obtaining history, ordering medications/tests/procedures as medically indicated, review of pertinent medical literature, counseling of the patient, communication of recommendations to the care team, and documentation time.      Amy Hernandez PA-C   [phone]

## 2024-01-18 ENCOUNTER — APPOINTMENT (OUTPATIENT)
Dept: LAB | Facility: CLINIC | Age: 69
End: 2024-01-18
Payer: MEDICARE

## 2024-01-18 ENCOUNTER — ONCOLOGY VISIT (OUTPATIENT)
Dept: TRANSPLANT | Facility: CLINIC | Age: 69
End: 2024-01-18
Payer: MEDICARE

## 2024-01-18 VITALS
TEMPERATURE: 97.5 F | BODY MASS INDEX: 29.97 KG/M2 | WEIGHT: 208.9 LBS | HEART RATE: 90 BPM | OXYGEN SATURATION: 95 % | DIASTOLIC BLOOD PRESSURE: 76 MMHG | SYSTOLIC BLOOD PRESSURE: 118 MMHG

## 2024-01-18 DIAGNOSIS — Z52.011 AUTOLOGOUS DONOR OF STEM CELLS: ICD-10-CM

## 2024-01-18 DIAGNOSIS — C90.00 MULTIPLE MYELOMA, REMISSION STATUS UNSPECIFIED (H): ICD-10-CM

## 2024-01-18 DIAGNOSIS — C90.00 MULTIPLE MYELOMA NOT HAVING ACHIEVED REMISSION (H): Primary | ICD-10-CM

## 2024-01-18 LAB
ANION GAP SERPL CALCULATED.3IONS-SCNC: 11 MMOL/L (ref 7–15)
BASOPHILS # BLD AUTO: ABNORMAL 10*3/UL
BASOPHILS NFR BLD AUTO: ABNORMAL %
BUN SERPL-MCNC: 8.8 MG/DL (ref 8–23)
CALCIUM SERPL-MCNC: 8.6 MG/DL (ref 8.8–10.2)
CHLORIDE SERPL-SCNC: 104 MMOL/L (ref 98–107)
CREAT SERPL-MCNC: 0.65 MG/DL (ref 0.67–1.17)
DEPRECATED HCO3 PLAS-SCNC: 24 MMOL/L (ref 22–29)
EGFRCR SERPLBLD CKD-EPI 2021: >90 ML/MIN/1.73M2
EOSINOPHIL # BLD AUTO: ABNORMAL 10*3/UL
EOSINOPHIL NFR BLD AUTO: ABNORMAL %
ERYTHROCYTE [DISTWIDTH] IN BLOOD BY AUTOMATED COUNT: 13.3 % (ref 10–15)
GLUCOSE SERPL-MCNC: 136 MG/DL (ref 70–99)
HCT VFR BLD AUTO: 33.9 % (ref 40–53)
HGB BLD-MCNC: 11.5 G/DL (ref 13.3–17.7)
IMM GRANULOCYTES # BLD: ABNORMAL 10*3/UL
IMM GRANULOCYTES NFR BLD: ABNORMAL %
INTERPRETATION: NORMAL
LYMPHOCYTES # BLD AUTO: ABNORMAL 10*3/UL
LYMPHOCYTES NFR BLD AUTO: ABNORMAL %
MCH RBC QN AUTO: 30.7 PG (ref 26.5–33)
MCHC RBC AUTO-ENTMCNC: 33.9 G/DL (ref 31.5–36.5)
MCV RBC AUTO: 90 FL (ref 78–100)
MONOCYTES # BLD AUTO: ABNORMAL 10*3/UL
MONOCYTES NFR BLD AUTO: ABNORMAL %
NEUTROPHILS # BLD AUTO: ABNORMAL 10*3/UL
NEUTROPHILS NFR BLD AUTO: ABNORMAL %
PLATELET # BLD AUTO: 41 10E3/UL (ref 150–450)
POTASSIUM SERPL-SCNC: 3.8 MMOL/L (ref 3.4–5.3)
RBC # BLD AUTO: 3.75 10E6/UL (ref 4.4–5.9)
SODIUM SERPL-SCNC: 139 MMOL/L (ref 135–145)
WBC # BLD AUTO: <0.1 10E3/UL (ref 4–11)

## 2024-01-18 PROCEDURE — 80048 BASIC METABOLIC PNL TOTAL CA: CPT

## 2024-01-18 PROCEDURE — G0463 HOSPITAL OUTPT CLINIC VISIT: HCPCS

## 2024-01-18 PROCEDURE — 96372 THER/PROPH/DIAG INJ SC/IM: CPT | Performed by: PHYSICIAN ASSISTANT

## 2024-01-18 PROCEDURE — 250N000011 HC RX IP 250 OP 636

## 2024-01-18 PROCEDURE — 85027 COMPLETE CBC AUTOMATED: CPT

## 2024-01-18 PROCEDURE — 250N000011 HC RX IP 250 OP 636: Mod: JZ | Performed by: PHYSICIAN ASSISTANT

## 2024-01-18 PROCEDURE — 99214 OFFICE O/P EST MOD 30 MIN: CPT

## 2024-01-18 PROCEDURE — 36592 COLLECT BLOOD FROM PICC: CPT

## 2024-01-18 RX ORDER — HEPARIN SODIUM,PORCINE 10 UNIT/ML
5 VIAL (ML) INTRAVENOUS ONCE
Status: COMPLETED | OUTPATIENT
Start: 2024-01-18 | End: 2024-01-18

## 2024-01-18 RX ORDER — HEPARIN SODIUM,PORCINE 10 UNIT/ML
5-20 VIAL (ML) INTRAVENOUS DAILY PRN
Status: CANCELLED | OUTPATIENT
Start: 2024-01-19

## 2024-01-18 RX ORDER — PENTAMIDINE ISETHIONATE 300 MG/300MG
300 INHALANT RESPIRATORY (INHALATION)
Status: CANCELLED | OUTPATIENT
Start: 2024-01-19 | End: 2024-01-19

## 2024-01-18 RX ORDER — HEPARIN SODIUM (PORCINE) LOCK FLUSH IV SOLN 100 UNIT/ML 100 UNIT/ML
5 SOLUTION INTRAVENOUS
Status: CANCELLED | OUTPATIENT
Start: 2024-01-19

## 2024-01-18 RX ORDER — ALBUTEROL SULFATE 0.83 MG/ML
2.5 SOLUTION RESPIRATORY (INHALATION)
Status: CANCELLED | OUTPATIENT
Start: 2024-01-19 | End: 2024-01-19

## 2024-01-18 RX ADMIN — Medication 5 ML: at 08:06

## 2024-01-18 RX ADMIN — FILGRASTIM-AAFI 480 MCG: 480 INJECTION, SOLUTION INTRAVENOUS; SUBCUTANEOUS at 08:42

## 2024-01-18 ASSESSMENT — PAIN SCALES - GENERAL: PAINLEVEL: NO PAIN (0)

## 2024-01-18 NOTE — NURSING NOTE
"Oncology Rooming Note    January 18, 2024 8:23 AM   Billy Carroll is a 68 year old male who presents for:    Chief Complaint   Patient presents with    Blood Draw     Labs drawn via cvc by RN in lab. VS Taken.     Oncology Clinic Visit     Multiple myeloma      Initial Vitals: /76 (BP Location: Right arm, Patient Position: Sitting, Cuff Size: Adult Regular)   Pulse 90   Temp 97.5  F (36.4  C) (Oral)   Wt 94.8 kg (208 lb 14.4 oz)   SpO2 95%   BMI 29.97 kg/m   Estimated body mass index is 29.97 kg/m  as calculated from the following:    Height as of 1/11/24: 1.778 m (5' 10\").    Weight as of this encounter: 94.8 kg (208 lb 14.4 oz). Body surface area is 2.16 meters squared.  No Pain (0) Comment: Data Unavailable   No LMP for male patient.  Allergies reviewed: No. Pt denied to review allergies today.   Medications reviewed: No. Pt denied to review medications today.     Medications: Medication refills not needed today.  Pharmacy name entered into [facility]:    [id number] [place], [place] - [address] [facility] [place], [place] - [address]    Frailty Screening:   Is the patient here for a new oncology consult visit in cancer care? 2. No      Clinical concerns: no other complaints     Carter Dewey"

## 2024-01-18 NOTE — NURSING NOTE
Chief Complaint   Patient presents with    Blood Draw     Labs drawn via cvc by RN in lab. VS Taken.      Labs collected from CVC by RN. Vitals taken. Pt checked in for appointment(s).    Marva Hopper RN

## 2024-01-18 NOTE — PROGRESS NOTES
BMT Progress Note  01/18/2024     ID: Mr. Carroll is a 68 year old man D 8 for standard risk IgG lambda kappa multiple myeloma.    HPI:   Overall doing okay.  Mild nausea this AM.  No vomiting.  Still with diarrhea about 3x/day.  Still eating some.  Had toast last night.  No fever or bleeding.  No new complaints.      ROS: Review of systems with pertinent positive and negatives in HPI    Exam:  Wt Readings from Last 4 Encounters:   01/18/24 94.8 kg (208 lb 14.4 oz)   01/17/24 94.8 kg (209 lb 1.6 oz)   01/16/24 95.7 kg (211 lb)   01/15/24 95.7 kg (211 lb)      /76 (BP Location: Right arm, Patient Position: Sitting, Cuff Size: Adult Regular)   Pulse 90   Temp 97.5  F (36.4  C) (Oral)   Wt 94.8 kg (208 lb 14.4 oz)   SpO2 95%   BMI 29.97 kg/m       Constitutional: NAD  Eyes: no scleral icterus  Pulm: CTA anteriorly. Breathing comfortably on RA.  CV: RRR, no m/r/g  Neuro: alert, conversant, normal gait  Psych: appropriate mood and affect  CVC intact    A/P:  Billy Carroll is a 68 year old man with IgG kappa MM with extramedullary disease, D+ 8 auto.    - PMHx significant for CAD, severe aortic stenosis s/p TAVR (10/24/2023) currently ongoing cardiac rehabilitation, HTN, and HLD.     BMT/MM  Total collections: 7.43 x 10^6  Cell dose thawed: 3.72 x 10^6 -- infused cell dose total 1.57 x 10^6  **cell processing notified about this loss of cells during thaw/wash phase on 1/15 -- 42% of cells recovered with thaw/wash, notified Dr. Bang. Message sent to Dr. Frey.  - Allopurinol now complete.      HEME/COAG  - Transfusion parameters: hemoglobin <7, platelets <10  - given his cardiac hx would maybe keep hgb>8.  - Started GCSF on Day 5 continue until ANC >2.5 x 2 consecutive days.    IMMUNOCOMPROMISED  - prophy ACV, fluc, lev, Pentamidine (12/28/23)  #hx PJP pneumonia: resume Bactrim D28 post BMT    CARDIOVASCULAR - Monitor volume status carefully given cardiac history  #HTN: cont losartan, metoprolol.   #Severe  aortic stenosis, now s/p TAVR 10/24/2023.   #Moderate nonobstructive coronary artery disease   #Hyperlipidemia - hold statin khushboo-transplant  Hold ASA     GI:  Hold vitamin supplements + alendronate  On protonix GI prophy  N/V:  Got emend/dex. Has PRN zofran and compazine at home. Takes Ativan at bedtime    MUSCULOSKELETAL/FRAILTY  Baseline Frailty Score: 2  Patient with substantial risk of sarcopenia  Cancer Rehab as needed outpatient    PSCYH:  # Anxiety: improved after PRN ativan.     RTC: Daily appts (has them through 1/25)    I spent 30 minutes in the care of this patient today, which included time necessary for preparation for the visit, obtaining history, ordering medications/tests/procedures as medically indicated, review of pertinent medical literature, counseling of the patient, communication of recommendations to the care team, and documentation time.      Kassandra Sims PA-C

## 2024-01-18 NOTE — LETTER
1/18/2024         RE: Billy Carroll  [address] [place] [address]        Dear Colleague,    Thank you for referring your patient, Blily Carroll, to the [facility] BLOOD AND MARROW TRANSPLANT PROGRAM [place]. Please see a copy of my visit note below.    BMT Progress Note  01/18/2024     ID: Mr. Carroll is a 68 year old man D 8 for standard risk IgG lambda kappa multiple myeloma.    HPI:   Overall doing okay.  Mild nausea this AM.  No vomiting.  Still with diarrhea about 3x/day.  Still eating some.  Had toast last night.  No fever or bleeding.  No new complaints.      ROS: Review of systems with pertinent positive and negatives in HPI    Exam:  Wt Readings from Last 4 Encounters:   01/18/24 94.8 kg (208 lb 14.4 oz)   01/17/24 94.8 kg (209 lb 1.6 oz)   01/16/24 95.7 kg (211 lb)   01/15/24 95.7 kg (211 lb)      /76 (BP Location: Right arm, Patient Position: Sitting, Cuff Size: Adult Regular)   Pulse 90   Temp 97.5  F (36.4  C) (Oral)   Wt 94.8 kg (208 lb 14.4 oz)   SpO2 95%   BMI 29.97 kg/m       Constitutional: NAD  Eyes: no scleral icterus  Pulm: CTA anteriorly. Breathing comfortably on RA.  CV: RRR, no m/r/g  Neuro: alert, conversant, normal gait  Psych: appropriate mood and affect  CVC intact    A/P:  Billy Carroll is a 68 year old man with IgG kappa MM with extramedullary disease, D+ 8 auto.    - PMHx significant for CAD, severe aortic stenosis s/p TAVR (10/24/2023) currently ongoing cardiac rehabilitation, HTN, and HLD.     BMT/MM  Total collections: 7.43 x 10^6  Cell dose thawed: 3.72 x 10^6 -- infused cell dose total 1.57 x 10^6  **cell processing notified about this loss of cells during thaw/wash phase on 1/15 -- 42% of cells recovered with thaw/wash, notified Dr. Bang. Message sent to Dr. Frey.  - Allopurinol now complete.      HEME/COAG  - Transfusion parameters: hemoglobin <7, platelets <10  - given his cardiac hx would maybe keep hgb>8.  - Started GCSF on Day 5  continue until ANC >2.5 x 2 consecutive days.    IMMUNOCOMPROMISED  - prophy ACV, fluc, lev, Pentamidine (12/28/23)  #hx PJP pneumonia: resume Bactrim D28 post BMT    CARDIOVASCULAR - Monitor volume status carefully given cardiac history  #HTN: cont losartan, metoprolol.   #Severe aortic stenosis, now s/p TAVR 10/24/2023.   #Moderate nonobstructive coronary artery disease   #Hyperlipidemia - hold statin khushboo-transplant  Hold ASA     GI:  Hold vitamin supplements + alendronate  On protonix GI prophy  N/V:  Got emend/dex. Has PRN zofran and compazine at home. Takes Ativan at bedtime    MUSCULOSKELETAL/FRAILTY  Baseline Frailty Score: 2  Patient with substantial risk of sarcopenia  Cancer Rehab as needed outpatient    PSCYH:  # Anxiety: improved after PRN ativan.     RTC: Daily appts (has them through 1/25)    I spent 30 minutes in the care of this patient today, which included time necessary for preparation for the visit, obtaining history, ordering medications/tests/procedures as medically indicated, review of pertinent medical literature, counseling of the patient, communication of recommendations to the care team, and documentation time.      Kassandra Sims PA-C

## 2024-01-19 ENCOUNTER — INFUSION THERAPY VISIT (OUTPATIENT)
Dept: TRANSPLANT | Facility: CLINIC | Age: 69
End: 2024-01-19
Payer: MEDICARE

## 2024-01-19 ENCOUNTER — ONCOLOGY VISIT (OUTPATIENT)
Dept: TRANSPLANT | Facility: CLINIC | Age: 69
End: 2024-01-19
Payer: MEDICARE

## 2024-01-19 ENCOUNTER — APPOINTMENT (OUTPATIENT)
Dept: LAB | Facility: CLINIC | Age: 69
End: 2024-01-19
Payer: MEDICARE

## 2024-01-19 VITALS
TEMPERATURE: 98.6 F | SYSTOLIC BLOOD PRESSURE: 108 MMHG | RESPIRATION RATE: 16 BRPM | WEIGHT: 208.8 LBS | BODY MASS INDEX: 29.96 KG/M2 | OXYGEN SATURATION: 97 % | DIASTOLIC BLOOD PRESSURE: 72 MMHG | HEART RATE: 98 BPM

## 2024-01-19 DIAGNOSIS — Z52.011 AUTOLOGOUS DONOR OF STEM CELLS: ICD-10-CM

## 2024-01-19 DIAGNOSIS — Z95.2 S/P TAVR (TRANSCATHETER AORTIC VALVE REPLACEMENT): ICD-10-CM

## 2024-01-19 DIAGNOSIS — C90.00 MULTIPLE MYELOMA, REMISSION STATUS UNSPECIFIED (H): Primary | ICD-10-CM

## 2024-01-19 DIAGNOSIS — I10 BENIGN ESSENTIAL HYPERTENSION: ICD-10-CM

## 2024-01-19 DIAGNOSIS — C90.00 MULTIPLE MYELOMA, REMISSION STATUS UNSPECIFIED (H): ICD-10-CM

## 2024-01-19 LAB
ACANTHOCYTES BLD QL SMEAR: ABNORMAL
ANION GAP SERPL CALCULATED.3IONS-SCNC: 10 MMOL/L (ref 7–15)
AUER BODIES BLD QL SMEAR: ABNORMAL
BASO STIPL BLD QL SMEAR: ABNORMAL
BASOPHILS # BLD AUTO: ABNORMAL 10*3/UL
BASOPHILS NFR BLD AUTO: ABNORMAL %
BITE CELLS BLD QL SMEAR: ABNORMAL
BLD PROD TYP BPU: NORMAL
BLISTER CELLS BLD QL SMEAR: ABNORMAL
BLOOD COMPONENT TYPE: NORMAL
BUN SERPL-MCNC: 9.3 MG/DL (ref 8–23)
BURR CELLS BLD QL SMEAR: ABNORMAL
CALCIUM SERPL-MCNC: 8.7 MG/DL (ref 8.8–10.2)
CHLORIDE SERPL-SCNC: 103 MMOL/L (ref 98–107)
CODING SYSTEM: NORMAL
CREAT SERPL-MCNC: 0.72 MG/DL (ref 0.67–1.17)
DACRYOCYTES BLD QL SMEAR: ABNORMAL
DEPRECATED HCO3 PLAS-SCNC: 24 MMOL/L (ref 22–29)
EGFRCR SERPLBLD CKD-EPI 2021: >90 ML/MIN/1.73M2
ELLIPTOCYTES BLD QL SMEAR: ABNORMAL
EOSINOPHIL # BLD AUTO: ABNORMAL 10*3/UL
EOSINOPHIL NFR BLD AUTO: ABNORMAL %
ERYTHROCYTE [DISTWIDTH] IN BLOOD BY AUTOMATED COUNT: 13.2 % (ref 10–15)
FRAGMENTS BLD QL SMEAR: ABNORMAL
GLUCOSE SERPL-MCNC: 163 MG/DL (ref 70–99)
HCT VFR BLD AUTO: 33.2 % (ref 40–53)
HGB BLD-MCNC: 11.2 G/DL (ref 13.3–17.7)
HGB C CRYSTALS: ABNORMAL
HOWELL-JOLLY BOD BLD QL SMEAR: ABNORMAL
IMM GRANULOCYTES # BLD: ABNORMAL 10*3/UL
IMM GRANULOCYTES NFR BLD: ABNORMAL %
ISSUE DATE AND TIME: NORMAL
LYMPHOCYTES # BLD AUTO: ABNORMAL 10*3/UL
LYMPHOCYTES NFR BLD AUTO: ABNORMAL %
MCH RBC QN AUTO: 30.5 PG (ref 26.5–33)
MCHC RBC AUTO-ENTMCNC: 33.7 G/DL (ref 31.5–36.5)
MCV RBC AUTO: 91 FL (ref 78–100)
MONOCYTES # BLD AUTO: ABNORMAL 10*3/UL
MONOCYTES NFR BLD AUTO: ABNORMAL %
NEUTROPHILS # BLD AUTO: ABNORMAL 10*3/UL
NEUTROPHILS NFR BLD AUTO: ABNORMAL %
NEUTS HYPERSEG BLD QL SMEAR: ABNORMAL
PLAT MORPH BLD: ABNORMAL
PLATELET # BLD AUTO: 17 10E3/UL (ref 150–450)
POLYCHROMASIA BLD QL SMEAR: ABNORMAL
POTASSIUM SERPL-SCNC: 3.8 MMOL/L (ref 3.4–5.3)
RBC # BLD AUTO: 3.67 10E6/UL (ref 4.4–5.9)
RBC AGGLUT BLD QL: ABNORMAL
RBC MORPH BLD: ABNORMAL
ROULEAUX BLD QL SMEAR: ABNORMAL
SICKLE CELLS BLD QL SMEAR: ABNORMAL
SMUDGE CELLS BLD QL SMEAR: ABNORMAL
SODIUM SERPL-SCNC: 137 MMOL/L (ref 135–145)
SPHEROCYTES BLD QL SMEAR: ABNORMAL
STOMATOCYTES BLD QL SMEAR: ABNORMAL
TARGETS BLD QL SMEAR: ABNORMAL
TOXIC GRANULES BLD QL SMEAR: ABNORMAL
UNIT ABO/RH: NORMAL
UNIT NUMBER: NORMAL
UNIT STATUS: NORMAL
UNIT TYPE ISBT: 5100
VARIANT LYMPHS BLD QL SMEAR: ABNORMAL
WBC # BLD AUTO: <0.1 10E3/UL (ref 4–11)

## 2024-01-19 PROCEDURE — 250N000011 HC RX IP 250 OP 636

## 2024-01-19 PROCEDURE — 258N000003 HC RX IP 258 OP 636: Performed by: PHYSICIAN ASSISTANT

## 2024-01-19 PROCEDURE — 80048 BASIC METABOLIC PNL TOTAL CA: CPT

## 2024-01-19 PROCEDURE — 96360 HYDRATION IV INFUSION INIT: CPT

## 2024-01-19 PROCEDURE — 99214 OFFICE O/P EST MOD 30 MIN: CPT

## 2024-01-19 PROCEDURE — 250N000011 HC RX IP 250 OP 636: Mod: JZ | Performed by: PHYSICIAN ASSISTANT

## 2024-01-19 PROCEDURE — G0463 HOSPITAL OUTPT CLINIC VISIT: HCPCS | Mod: 25

## 2024-01-19 PROCEDURE — 96372 THER/PROPH/DIAG INJ SC/IM: CPT | Performed by: PHYSICIAN ASSISTANT

## 2024-01-19 PROCEDURE — 85027 COMPLETE CBC AUTOMATED: CPT

## 2024-01-19 PROCEDURE — 36591 DRAW BLOOD OFF VENOUS DEVICE: CPT

## 2024-01-19 RX ORDER — METOPROLOL SUCCINATE 50 MG/1
50 TABLET, EXTENDED RELEASE ORAL DAILY
Qty: 90 TABLET | Refills: 3 | Status: SHIPPED | OUTPATIENT
Start: 2024-01-19 | End: 2024-01-24

## 2024-01-19 RX ORDER — DIPHENHYDRAMINE HYDROCHLORIDE 50 MG/ML
50 INJECTION INTRAMUSCULAR; INTRAVENOUS
Status: CANCELLED
Start: 2024-01-19

## 2024-01-19 RX ORDER — HEPARIN SODIUM,PORCINE 10 UNIT/ML
5-20 VIAL (ML) INTRAVENOUS DAILY PRN
Status: CANCELLED | OUTPATIENT
Start: 2024-01-20

## 2024-01-19 RX ORDER — EPINEPHRINE 1 MG/ML
0.3 INJECTION, SOLUTION INTRAMUSCULAR; SUBCUTANEOUS EVERY 5 MIN PRN
Status: CANCELLED | OUTPATIENT
Start: 2024-01-19

## 2024-01-19 RX ORDER — HEPARIN SODIUM,PORCINE 10 UNIT/ML
5-20 VIAL (ML) INTRAVENOUS DAILY PRN
Status: CANCELLED | OUTPATIENT
Start: 2024-01-19

## 2024-01-19 RX ORDER — PENTAMIDINE ISETHIONATE 300 MG/300MG
300 INHALANT RESPIRATORY (INHALATION)
Status: CANCELLED | OUTPATIENT
Start: 2024-01-20 | End: 2024-01-20

## 2024-01-19 RX ORDER — LOSARTAN POTASSIUM 50 MG/1
50 TABLET ORAL DAILY
COMMUNITY
Start: 2024-01-19 | End: 2024-01-23

## 2024-01-19 RX ORDER — HEPARIN SODIUM (PORCINE) LOCK FLUSH IV SOLN 100 UNIT/ML 100 UNIT/ML
5 SOLUTION INTRAVENOUS
Status: CANCELLED | OUTPATIENT
Start: 2024-01-20

## 2024-01-19 RX ORDER — ALBUTEROL SULFATE 0.83 MG/ML
2.5 SOLUTION RESPIRATORY (INHALATION)
Status: CANCELLED | OUTPATIENT
Start: 2024-01-20 | End: 2024-01-20

## 2024-01-19 RX ORDER — HEPARIN SODIUM,PORCINE 10 UNIT/ML
5 VIAL (ML) INTRAVENOUS ONCE
Status: COMPLETED | OUTPATIENT
Start: 2024-01-19 | End: 2024-01-19

## 2024-01-19 RX ORDER — HEPARIN SODIUM (PORCINE) LOCK FLUSH IV SOLN 100 UNIT/ML 100 UNIT/ML
5 SOLUTION INTRAVENOUS
Status: CANCELLED | OUTPATIENT
Start: 2024-01-19

## 2024-01-19 RX ORDER — SENNOSIDES 8.6 MG
650 CAPSULE ORAL EVERY 8 HOURS PRN
COMMUNITY
Start: 2024-01-19

## 2024-01-19 RX ADMIN — SODIUM CHLORIDE 1000 ML: 9 INJECTION, SOLUTION INTRAVENOUS at 09:52

## 2024-01-19 RX ADMIN — Medication 5 ML: at 09:17

## 2024-01-19 RX ADMIN — FILGRASTIM-AAFI 480 MCG: 480 INJECTION, SOLUTION INTRAVENOUS; SUBCUTANEOUS at 10:40

## 2024-01-19 ASSESSMENT — PAIN SCALES - GENERAL: PAINLEVEL: NO PAIN (0)

## 2024-01-19 NOTE — NURSING NOTE
"Oncology Rooming Note    January 19, 2024 9:33 AM   Billy Carroll is a 68 year old male who presents for:    Chief Complaint   Patient presents with    Blood Draw     Labs drawn via CVC    Oncology Clinic Visit     Multiple myeloma      Initial Vitals: /72   Pulse 98   Temp 98.6  F (37  C) (Oral)   Resp 16   Wt 94.7 kg (208 lb 12.8 oz)   SpO2 97%   BMI 29.96 kg/m   Estimated body mass index is 29.96 kg/m  as calculated from the following:    Height as of 1/11/24: 1.778 m (5' 10\").    Weight as of this encounter: 94.7 kg (208 lb 12.8 oz). Body surface area is 2.16 meters squared.  No Pain (0) Comment: Data Unavailable   No LMP for male patient.  Allergies reviewed: Yes  Medications reviewed: Yes    Medications: Medication refills not needed today.  Pharmacy name entered into [facility]:    [id number] [place], [place] - [address] [facility] [place], [place] - [address]    Frailty Screening:   Is the patient here for a new oncology consult visit in cancer care? 2. No      Clinical concerns: pt states BILLY Cannon  was notified.      Radha Jha              "

## 2024-01-19 NOTE — NURSING NOTE
Chief Complaint   Patient presents with    Blood Draw     Labs drawn via CVC     Labs collected from CVC by RN, line flushed with saline and heparin.  Vitals taken. Pt checked in for appointment(s).    Lizbeth RUBIO RN PHN BSN  BMT/Oncology Lab

## 2024-01-19 NOTE — PROGRESS NOTES
BMT Progress Note  01/19/2024     ID: Mr. Carroll is a 68 year old man D 9 for standard risk IgG lambda kappa multiple myeloma.    HPI:   He feels poorly today.  He has GILLETTE and significant fatigue.  He is drinking about 24 oz/day.  No n/v.  Stool is loose.  No bleeding or fever.      ROS: Review of systems with pertinent positive and negatives in HPI    Exam:  Wt Readings from Last 4 Encounters:   01/19/24 94.7 kg (208 lb 12.8 oz)   01/18/24 94.8 kg (208 lb 14.4 oz)   01/17/24 94.8 kg (209 lb 1.6 oz)   01/16/24 95.7 kg (211 lb)      /72   Pulse 98   Temp 98.6  F (37  C) (Oral)   Resp 16   Wt 94.7 kg (208 lb 12.8 oz)   SpO2 97%   BMI 29.96 kg/m       Constitutional: NAD  Eyes: no scleral icterus  Pulm: CTA b/l.  Breathing comfortably on RA.  CV: RRR, no m/r/g  Neuro: alert, conversant, normal gait  Psych: appropriate mood and affect  CVC intact    A/P:  Billy Carroll is a 68 year old man with IgG kappa MM with extramedullary disease, D+ 9 auto.    - PMHx significant for CAD, severe aortic stenosis s/p TAVR (10/24/2023) currently ongoing cardiac rehabilitation, HTN, and HLD.     BMT/MM  Total collections: 7.43 x 10^6  Cell dose thawed: 3.72 x 10^6 -- infused cell dose total 1.57 x 10^6  **cell processing notified about this loss of cells during thaw/wash phase on 1/15 -- 42% of cells recovered with thaw/wash, notified Dr. Bang. Message sent to Dr. Frey.  - Allopurinol now complete.      HEME/COAG  - Transfusion parameters: hemoglobin <8, platelets <10  - given his cardiac hx consider keeping hgb>8g/dL (changed today)  - Started GCSF on Day 5 continue until ANC >2.5 x 2 consecutive days.    IMMUNOCOMPROMISED  - prophy ACV, fluc, lev, Pentamidine (12/28/23)  # hx PJP pneumonia: resume Bactrim D28 post BMT    CARDIOVASCULAR - Monitor volume status carefully given cardiac history  #HTN: cont losartan, metoprolol--asked him to hold both of these for tomorrow's visit (already took today).   #Severe aortic  stenosis, now s/p TAVR 10/24/2023.   #Moderate nonobstructive coronary artery disease   #Hyperlipidemia - hold statin khushboo-transplant  Hold ASA     GI:  Hold vitamin supplements + alendronate  On protonix GI prophy  N/V:  Got emend/dex. Has PRN zofran and compazine at home. Takes Ativan at bedtime    MUSCULOSKELETAL/FRAILTY  Baseline Frailty Score: 2  Patient with substantial risk of sarcopenia  Cancer Rehab as needed outpatient    PSCYH:  # Anxiety: improved after PRN ativan.     RTC: Daily appts (has them through 1/25); add infusion appts  - 1 L IVF today  - GCSF  - pre order plt for tomorrow  - hold losartan, metoprolol--address daily    I spent 30 minutes in the care of this patient today, which included time necessary for preparation for the visit, obtaining history, ordering medications/tests/procedures as medically indicated, review of pertinent medical literature, counseling of the patient, communication of recommendations to the care team, and documentation time.      Kassandra Sims PA-C

## 2024-01-19 NOTE — PROGRESS NOTES
Infusion Nursing Note:  Billy Carroll presents today for add-on IVF.    Patient seen by provider today: Yes: Kassandra Sims PA-C   present during visit today: Not Applicable.    Note: Pt assessed by provider prior to infusion. TORB 1/19/24 at 0952 Kassandra Sims PA-C/iXn Dejesus RN- Infuse 1L 0.9% NS. Pt received 1L 0.9% NS over 1 hour and 480 mcg Nivestym subcutaneously into right lower abdomen.      Intravenous Access:  [place].    Treatment Conditions:  Not Applicable.      Post Infusion Assessment:  Patient tolerated infusion without incident.  Patient tolerated injection without incident.  Blood return noted pre and post infusion.  Site patent and intact, free from redness, edema or discomfort.  No evidence of extravasations.       Discharge Plan:   Patient discharged in stable condition accompanied by: wife.  Departure Mode: Ambulatory.      Xin Dejesus RN

## 2024-01-19 NOTE — LETTER
1/19/2024         RE: Billy Carroll  [address] [place] [address]        Dear Colleague,    Thank you for referring your patient, Billy Carroll, to the [facility] BLOOD AND MARROW TRANSPLANT PROGRAM [place]. Please see a copy of my visit note below.    BMT Progress Note  01/19/2024     ID: Mr. Carroll is a 68 year old man D 9 for standard risk IgG lambda kappa multiple myeloma.    HPI:   He feels poorly today.  He has GILLETTE and significant fatigue.  He is drinking about 24 oz/day.  No n/v.  Stool is loose.  No bleeding or fever.      ROS: Review of systems with pertinent positive and negatives in HPI    Exam:  Wt Readings from Last 4 Encounters:   01/19/24 94.7 kg (208 lb 12.8 oz)   01/18/24 94.8 kg (208 lb 14.4 oz)   01/17/24 94.8 kg (209 lb 1.6 oz)   01/16/24 95.7 kg (211 lb)      /72   Pulse 98   Temp 98.6  F (37  C) (Oral)   Resp 16   Wt 94.7 kg (208 lb 12.8 oz)   SpO2 97%   BMI 29.96 kg/m       Constitutional: NAD  Eyes: no scleral icterus  Pulm: CTA b/l.  Breathing comfortably on RA.  CV: RRR, no m/r/g  Neuro: alert, conversant, normal gait  Psych: appropriate mood and affect  CVC intact    A/P:  Billy Carroll is a 68 year old man with IgG kappa MM with extramedullary disease, D+ 9 auto.    - PMHx significant for CAD, severe aortic stenosis s/p TAVR (10/24/2023) currently ongoing cardiac rehabilitation, HTN, and HLD.     BMT/MM  Total collections: 7.43 x 10^6  Cell dose thawed: 3.72 x 10^6 -- infused cell dose total 1.57 x 10^6  **cell processing notified about this loss of cells during thaw/wash phase on 1/15 -- 42% of cells recovered with thaw/wash, notified Dr. Bang. Message sent to Dr. Frey.  - Allopurinol now complete.      HEME/COAG  - Transfusion parameters: hemoglobin <8, platelets <10  - given his cardiac hx consider keeping hgb>8g/dL (changed today)  - Started GCSF on Day 5 continue until ANC >2.5 x 2 consecutive days.    IMMUNOCOMPROMISED  - prophy ACV,  fluc, lev, Pentamidine (12/28/23)  # hx PJP pneumonia: resume Bactrim D28 post BMT    CARDIOVASCULAR - Monitor volume status carefully given cardiac history  #HTN: cont losartan, metoprolol--asked him to hold both of these for tomorrow's visit (already took today).   #Severe aortic stenosis, now s/p TAVR 10/24/2023.   #Moderate nonobstructive coronary artery disease   #Hyperlipidemia - hold statin khushboo-transplant  Hold ASA     GI:  Hold vitamin supplements + alendronate  On protonix GI prophy  N/V:  Got emend/dex. Has PRN zofran and compazine at home. Takes Ativan at bedtime    MUSCULOSKELETAL/FRAILTY  Baseline Frailty Score: 2  Patient with substantial risk of sarcopenia  Cancer Rehab as needed outpatient    PSCYH:  # Anxiety: improved after PRN ativan.     RTC: Daily appts (has them through 1/25); add infusion appts  - 1 L IVF today  - GCSF  - pre order plt for tomorrow  - hold losartan, metoprolol--address daily    I spent 30 minutes in the care of this patient today, which included time necessary for preparation for the visit, obtaining history, ordering medications/tests/procedures as medically indicated, review of pertinent medical literature, counseling of the patient, communication of recommendations to the care team, and documentation time.      Kassandra Sims PA-C

## 2024-01-20 ENCOUNTER — TELEPHONE (OUTPATIENT)
Dept: FAMILY MEDICINE | Facility: CLINIC | Age: 69
End: 2024-01-20

## 2024-01-20 ENCOUNTER — APPOINTMENT (OUTPATIENT)
Dept: LAB | Facility: CLINIC | Age: 69
End: 2024-01-20
Payer: MEDICARE

## 2024-01-20 ENCOUNTER — INFUSION THERAPY VISIT (OUTPATIENT)
Dept: TRANSPLANT | Facility: CLINIC | Age: 69
End: 2024-01-20
Payer: MEDICARE

## 2024-01-20 ENCOUNTER — ONCOLOGY VISIT (OUTPATIENT)
Dept: TRANSPLANT | Facility: CLINIC | Age: 69
End: 2024-01-20
Payer: MEDICARE

## 2024-01-20 VITALS
OXYGEN SATURATION: 98 % | HEART RATE: 101 BPM | DIASTOLIC BLOOD PRESSURE: 77 MMHG | RESPIRATION RATE: 16 BRPM | SYSTOLIC BLOOD PRESSURE: 131 MMHG | TEMPERATURE: 99.3 F

## 2024-01-20 VITALS
TEMPERATURE: 98.2 F | BODY MASS INDEX: 30.19 KG/M2 | WEIGHT: 210.4 LBS | DIASTOLIC BLOOD PRESSURE: 81 MMHG | OXYGEN SATURATION: 96 % | HEART RATE: 109 BPM | SYSTOLIC BLOOD PRESSURE: 122 MMHG | RESPIRATION RATE: 16 BRPM

## 2024-01-20 DIAGNOSIS — T45.1X5A ANTINEOPLASTIC CHEMOTHERAPY INDUCED PANCYTOPENIA (H): ICD-10-CM

## 2024-01-20 DIAGNOSIS — I35.0 NONRHEUMATIC AORTIC VALVE STENOSIS: ICD-10-CM

## 2024-01-20 DIAGNOSIS — D61.810 ANTINEOPLASTIC CHEMOTHERAPY INDUCED PANCYTOPENIA (H): ICD-10-CM

## 2024-01-20 DIAGNOSIS — C90.00 MULTIPLE MYELOMA, REMISSION STATUS UNSPECIFIED (H): Primary | ICD-10-CM

## 2024-01-20 DIAGNOSIS — C90.01 MULTIPLE MYELOMA IN REMISSION (H): Primary | ICD-10-CM

## 2024-01-20 DIAGNOSIS — Z52.011 AUTOLOGOUS DONOR OF STEM CELLS: ICD-10-CM

## 2024-01-20 DIAGNOSIS — Z94.81 STATUS POST BONE MARROW TRANSPLANT (H): ICD-10-CM

## 2024-01-20 DIAGNOSIS — I10 ESSENTIAL HYPERTENSION: ICD-10-CM

## 2024-01-20 LAB
ACANTHOCYTES BLD QL SMEAR: NORMAL
ANION GAP SERPL CALCULATED.3IONS-SCNC: 9 MMOL/L (ref 7–15)
AUER BODIES BLD QL SMEAR: NORMAL
BASO STIPL BLD QL SMEAR: NORMAL
BASOPHILS # BLD AUTO: ABNORMAL 10*3/UL
BASOPHILS NFR BLD AUTO: ABNORMAL %
BITE CELLS BLD QL SMEAR: NORMAL
BLD PROD TYP BPU: NORMAL
BLISTER CELLS BLD QL SMEAR: NORMAL
BLOOD COMPONENT TYPE: NORMAL
BUN SERPL-MCNC: 7 MG/DL (ref 8–23)
BURR CELLS BLD QL SMEAR: NORMAL
CALCIUM SERPL-MCNC: 8.4 MG/DL (ref 8.8–10.2)
CHLORIDE SERPL-SCNC: 104 MMOL/L (ref 98–107)
CODING SYSTEM: NORMAL
CREAT SERPL-MCNC: 0.69 MG/DL (ref 0.67–1.17)
DACRYOCYTES BLD QL SMEAR: NORMAL
DEPRECATED HCO3 PLAS-SCNC: 24 MMOL/L (ref 22–29)
EGFRCR SERPLBLD CKD-EPI 2021: >90 ML/MIN/1.73M2
ELLIPTOCYTES BLD QL SMEAR: NORMAL
EOSINOPHIL # BLD AUTO: ABNORMAL 10*3/UL
EOSINOPHIL NFR BLD AUTO: ABNORMAL %
ERYTHROCYTE [DISTWIDTH] IN BLOOD BY AUTOMATED COUNT: 13.2 % (ref 10–15)
FRAGMENTS BLD QL SMEAR: NORMAL
GLUCOSE SERPL-MCNC: 165 MG/DL (ref 70–99)
HCT VFR BLD AUTO: 31.2 % (ref 40–53)
HGB BLD-MCNC: 10.8 G/DL (ref 13.3–17.7)
HGB C CRYSTALS: NORMAL
HOWELL-JOLLY BOD BLD QL SMEAR: NORMAL
IMM GRANULOCYTES # BLD: ABNORMAL 10*3/UL
IMM GRANULOCYTES NFR BLD: ABNORMAL %
LYMPHOCYTES # BLD AUTO: ABNORMAL 10*3/UL
LYMPHOCYTES NFR BLD AUTO: ABNORMAL %
MCH RBC QN AUTO: 31 PG (ref 26.5–33)
MCHC RBC AUTO-ENTMCNC: 34.6 G/DL (ref 31.5–36.5)
MCV RBC AUTO: 90 FL (ref 78–100)
MONOCYTES # BLD AUTO: ABNORMAL 10*3/UL
MONOCYTES NFR BLD AUTO: ABNORMAL %
NEUTROPHILS # BLD AUTO: ABNORMAL 10*3/UL
NEUTROPHILS NFR BLD AUTO: ABNORMAL %
NEUTS HYPERSEG BLD QL SMEAR: NORMAL
PLAT MORPH BLD: NORMAL
PLATELET # BLD AUTO: 9 10E3/UL (ref 150–450)
POLYCHROMASIA BLD QL SMEAR: NORMAL
POTASSIUM SERPL-SCNC: 3.6 MMOL/L (ref 3.4–5.3)
RBC # BLD AUTO: 3.48 10E6/UL (ref 4.4–5.9)
RBC AGGLUT BLD QL: NORMAL
RBC MORPH BLD: NORMAL
ROULEAUX BLD QL SMEAR: NORMAL
SICKLE CELLS BLD QL SMEAR: NORMAL
SMUDGE CELLS BLD QL SMEAR: NORMAL
SODIUM SERPL-SCNC: 137 MMOL/L (ref 135–145)
SPHEROCYTES BLD QL SMEAR: NORMAL
STOMATOCYTES BLD QL SMEAR: NORMAL
TARGETS BLD QL SMEAR: NORMAL
TOXIC GRANULES BLD QL SMEAR: NORMAL
UNIT ABO/RH: NORMAL
UNIT NUMBER: NORMAL
UNIT STATUS: NORMAL
UNIT TYPE ISBT: 5100
VARIANT LYMPHS BLD QL SMEAR: NORMAL
WBC # BLD AUTO: <0.1 10E3/UL (ref 4–11)

## 2024-01-20 PROCEDURE — 80048 BASIC METABOLIC PNL TOTAL CA: CPT

## 2024-01-20 PROCEDURE — 258N000003 HC RX IP 258 OP 636: Performed by: STUDENT IN AN ORGANIZED HEALTH CARE EDUCATION/TRAINING PROGRAM

## 2024-01-20 PROCEDURE — 99214 OFFICE O/P EST MOD 30 MIN: CPT

## 2024-01-20 PROCEDURE — G2211 COMPLEX E/M VISIT ADD ON: HCPCS

## 2024-01-20 PROCEDURE — G0463 HOSPITAL OUTPT CLINIC VISIT: HCPCS | Mod: 25

## 2024-01-20 PROCEDURE — 96372 THER/PROPH/DIAG INJ SC/IM: CPT | Mod: XS | Performed by: PHYSICIAN ASSISTANT

## 2024-01-20 PROCEDURE — 250N000011 HC RX IP 250 OP 636

## 2024-01-20 PROCEDURE — 250N000011 HC RX IP 250 OP 636: Mod: JZ | Performed by: PHYSICIAN ASSISTANT

## 2024-01-20 PROCEDURE — P9037 PLATE PHERES LEUKOREDU IRRAD: HCPCS | Performed by: PHYSICIAN ASSISTANT

## 2024-01-20 PROCEDURE — 36430 TRANSFUSION BLD/BLD COMPNT: CPT

## 2024-01-20 PROCEDURE — 250N000011 HC RX IP 250 OP 636: Performed by: PHYSICIAN ASSISTANT

## 2024-01-20 PROCEDURE — 85027 COMPLETE CBC AUTOMATED: CPT

## 2024-01-20 PROCEDURE — 36592 COLLECT BLOOD FROM PICC: CPT

## 2024-01-20 RX ORDER — HEPARIN SODIUM,PORCINE 10 UNIT/ML
5 VIAL (ML) INTRAVENOUS ONCE
Status: COMPLETED | OUTPATIENT
Start: 2024-01-20 | End: 2024-01-20

## 2024-01-20 RX ORDER — HEPARIN SODIUM (PORCINE) LOCK FLUSH IV SOLN 100 UNIT/ML 100 UNIT/ML
5 SOLUTION INTRAVENOUS
Status: CANCELLED | OUTPATIENT
Start: 2024-01-21

## 2024-01-20 RX ORDER — HEPARIN SODIUM,PORCINE 10 UNIT/ML
5-20 VIAL (ML) INTRAVENOUS DAILY PRN
Status: CANCELLED | OUTPATIENT
Start: 2024-01-20

## 2024-01-20 RX ORDER — EPINEPHRINE 1 MG/ML
0.3 INJECTION, SOLUTION INTRAMUSCULAR; SUBCUTANEOUS EVERY 5 MIN PRN
Status: CANCELLED | OUTPATIENT
Start: 2024-01-20

## 2024-01-20 RX ORDER — ALBUTEROL SULFATE 0.83 MG/ML
2.5 SOLUTION RESPIRATORY (INHALATION)
Status: CANCELLED | OUTPATIENT
Start: 2024-01-21 | End: 2024-01-21

## 2024-01-20 RX ORDER — DIPHENHYDRAMINE HYDROCHLORIDE 50 MG/ML
50 INJECTION INTRAMUSCULAR; INTRAVENOUS
Status: CANCELLED
Start: 2024-01-20

## 2024-01-20 RX ORDER — HEPARIN SODIUM,PORCINE 10 UNIT/ML
5-20 VIAL (ML) INTRAVENOUS DAILY PRN
Status: DISCONTINUED | OUTPATIENT
Start: 2024-01-20 | End: 2024-01-20 | Stop reason: HOSPADM

## 2024-01-20 RX ORDER — HEPARIN SODIUM (PORCINE) LOCK FLUSH IV SOLN 100 UNIT/ML 100 UNIT/ML
5 SOLUTION INTRAVENOUS
Status: CANCELLED | OUTPATIENT
Start: 2024-01-20

## 2024-01-20 RX ORDER — HEPARIN SODIUM,PORCINE 10 UNIT/ML
5-20 VIAL (ML) INTRAVENOUS DAILY PRN
Status: CANCELLED | OUTPATIENT
Start: 2024-01-21

## 2024-01-20 RX ORDER — PENTAMIDINE ISETHIONATE 300 MG/300MG
300 INHALANT RESPIRATORY (INHALATION)
Status: CANCELLED | OUTPATIENT
Start: 2024-01-21 | End: 2024-01-21

## 2024-01-20 RX ADMIN — Medication 5 ML: at 09:21

## 2024-01-20 RX ADMIN — Medication 5 ML: at 07:45

## 2024-01-20 RX ADMIN — FILGRASTIM-AAFI 480 MCG: 480 INJECTION, SOLUTION INTRAVENOUS; SUBCUTANEOUS at 07:52

## 2024-01-20 RX ADMIN — SODIUM CHLORIDE 1000 ML: 9 INJECTION, SOLUTION INTRAVENOUS at 07:45

## 2024-01-20 RX ADMIN — Medication 5 ML: at 09:22

## 2024-01-20 ASSESSMENT — PAIN SCALES - GENERAL: PAINLEVEL: NO PAIN (0)

## 2024-01-20 NOTE — PROGRESS NOTES
BMT Progress Note  01/20/2024     ID: Mr. Carroll is a 68 year old man D 10 for standard risk IgG lambda kappa multiple myeloma.    HPI:   Billy is seen in infusion today and reports he continues to do relatively well.  He does have some fatigue and does not find sleep very refreshing.  He denies nausea/vomiting, fevers/chills, SOB, or other acute concerns.  Working on eating/drinking but fluid intake has been poor.    ROS: Review of systems with pertinent positive and negatives in HPI    Exam:  Wt Readings from Last 4 Encounters:   01/20/24 95.4 kg (210 lb 6.4 oz)   01/19/24 94.7 kg (208 lb 12.8 oz)   01/18/24 94.8 kg (208 lb 14.4 oz)   01/17/24 94.8 kg (209 lb 1.6 oz)      /81   Pulse 109   Temp 98.2  F (36.8  C) (Oral)   Resp 16   Wt 95.4 kg (210 lb 6.4 oz)   SpO2 96%   BMI 30.19 kg/m       Constitutional: NAD  Eyes: no scleral icterus  Pulm: CTA b/l.  Breathing comfortably on RA.  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    - PMHx significant for CAD, severe aortic stenosis s/p TAVR (10/24/2023) currently ongoing cardiac rehabilitation, HTN, and HLD.     BMT/MM  Total collections: 7.43 x 10^6  Cell dose thawed: 3.72 x 10^6 -- infused cell dose total 1.57 x 10^6  **cell processing notified about this loss of cells during thaw/wash phase on 1/15 -- 42% of cells recovered with thaw/wash  - Allopurinol now complete.      HEME/COAG  - Transfusion parameters: hemoglobin <8, platelets <10  - Started GCSF on Day 5 continue until ANC >2.5 x 2 consecutive days.    IMMUNOCOMPROMISED  - prophy ACV, fluc, lev, Pentamidine (12/28/23)  # hx PJP pneumonia: resume Bactrim D28 post BMT    CARDIOVASCULAR - Monitor volume status carefully given cardiac history  #HTN: Hold losartan, metoprolol - likely resume once engrafted  #Severe aortic stenosis, now s/p  TAVR 10/24/2023.   #Moderate nonobstructive coronary artery disease   #Hyperlipidemia - hold statin khushboo-transplant  Hold ASA     GI:  Hold vitamin supplements + alendronate  On protonix GI prophy  N/V:  Got emend/dex. Has PRN zofran and compazine at home. Takes Ativan at bedtime    MUSCULOSKELETAL/FRAILTY  Baseline Frailty Score: 2  Patient with substantial risk of sarcopenia  Cancer Rehab as needed outpatient    PSCYH:  # Anxiety: improved after PRN ativan.     RTC: Daily appts (has them through 1/25); add infusion appts  - 1 L IVF today  - GCSF  - Plt today  - hold losartan, metoprolol--likely resume once engrafted    I spent 30 minutes in the care of this patient today, which included time necessary for preparation for the visit, obtaining history, ordering medications/tests/procedures as medically indicated, review of pertinent medical literature, counseling of the patient, communication of recommendations to the care team, and documentation time.    The longitudinal plan of care for multiple myeloma was addressed during this visit. Due to the added complexity in care, I will continue to support Billy in the subsequent management of this condition(s) and with the ongoing continuity of care of this condition(s).    Diallo Mascorro MD

## 2024-01-20 NOTE — NURSING NOTE
Chief Complaint   Patient presents with    Blood Draw     Labs drawn via CVC by RN in lab.  VS taken       Labs collected from CVC by RN, line flushed with saline and heparin.  Vitals taken. Pt checked in for appointment(s).    Sharonda Chavez RN

## 2024-01-20 NOTE — LETTER
1/20/2024         RE: Billy Carroll  [address] [place] [address]        Dear Colleague,    Thank you for referring your patient, Billy Carroll, to the [facility]PL[facility] PRO[facility] [place]. Please see a copy of my visit note below.    BMT Progress Note  01/20/2024     ID: Mr. Carroll is a 68 year old man D 10 for standard risk IgG lambda kappa multiple myeloma.    HPI:   Billy is seen in infusion today and reports he continues to do relatively well.  He does have some fatigue and does not find sleep very refreshing.  He denies nausea/vomiting, fevers/chills, SOB, or other acute concerns.  Working on eating/drinking but fluid intake has been poor.    ROS: Review of systems with pertinent positive and negatives in HPI    Exam:  Wt Readings from Last 4 Encounters:   01/20/24 95.4 kg (210 lb 6.4 oz)   01/19/24 94.7 kg (208 lb 12.8 oz)   01/18/24 94.8 kg (208 lb 14.4 oz)   01/17/24 94.8 kg (209 lb 1.6 oz)      /81   Pulse 109   Temp 98.2  F (36.8  C) (Oral)   Resp 16   Wt 95.4 kg (210 lb 6.4 oz)   SpO2 96%   BMI 30.19 kg/m       Constitutional: NAD  Eyes: no scleral icterus  Pulm: CTA b/l.  Breathing comfortably on RA.  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    - PMHx significant for CAD, severe aortic stenosis s/p TAVR (10/24/2023) currently ongoing cardiac rehabilitation, HTN, and HLD.     BMT/MM  Total collections: 7.43 x 10^6  Cell dose thawed: 3.72 x 10^6 -- infused cell dose total 1.57 x 10^6  **cell processing notified about this loss of cells during thaw/wash phase on 1/15 -- 42% of cells recovered with thaw/wash  - Allopurinol now complete.      HEME/COAG  - Transfusion parameters: hemoglobin <8, platelets <10  - Started GCSF on Day 5 continue until ANC >2.5 x 2 consecutive  days.    IMMUNOCOMPROMISED  - prophy ACV, fluc, lev, Pentamidine (12/28/23)  # hx PJP pneumonia: resume Bactrim D28 post BMT    CARDIOVASCULAR - Monitor volume status carefully given cardiac history  #HTN: Hold losartan, metoprolol - likely resume once engrafted  #Severe aortic stenosis, now s/p TAVR 10/24/2023.   #Moderate nonobstructive coronary artery disease   #Hyperlipidemia - hold statin khushboo-transplant  Hold ASA     GI:  Hold vitamin supplements + alendronate  On protonix GI prophy  N/V:  Got emend/dex. Has PRN zofran and compazine at home. Takes Ativan at bedtime    MUSCULOSKELETAL/FRAILTY  Baseline Frailty Score: 2  Patient with substantial risk of sarcopenia  Cancer Rehab as needed outpatient    PSCYH:  # Anxiety: improved after PRN ativan.     RTC: Daily appts (has them through 1/25); add infusion appts  - 1 L IVF today  - GCSF  - Plt today  - hold losartan, metoprolol--likely resume once engrafted    I spent 30 minutes in the care of this patient today, which included time necessary for preparation for the visit, obtaining history, ordering medications/tests/procedures as medically indicated, review of pertinent medical literature, counseling of the patient, communication of recommendations to the care team, and documentation time.    The longitudinal plan of care for multiple myeloma was addressed during this visit. Due to the added complexity in care, I will continue to support Billy in the subsequent management of this condition(s) and with the ongoing continuity of care of this condition(s).    Diallo Mascorro MD

## 2024-01-20 NOTE — PROGRESS NOTES
Infusion Nursing Note:  Billy Carroll presents today for IV fluids, g-csf, platelets.    Patient seen by provider today: Yes: Dr Mascorro   present during visit today: Not Applicable.    Note:   Pt received 480 mcg nivestym by subcutaneous route to left lower abdomen.    Pt received a liter of 0.9NS over an hour.    Pt received a unit of platelets over an hour for a platelet count of 9.      Intravenous Access:  Mitchell.    Treatment Conditions:  Lab Results   Component Value Date    HGB 10.8 (L) 01/20/2024    WBC <0.1 (LL) 01/20/2024    ANEU 0.5 (L) 01/15/2024    ANEUTAUTO 0.9 (L) 01/16/2024    PLT 9 (LL) 01/20/2024        Lab Results   Component Value Date     01/20/2024    POTASSIUM 3.6 01/20/2024    MAG 2.2 01/09/2024    CR 0.69 01/20/2024    SESAR 8.4 (L) 01/20/2024    BILITOTAL 0.4 01/09/2024    ALBUMIN 4.3 01/09/2024    ALT 62 01/09/2024    AST 48 (H) 01/09/2024       Labs were monitored. Pt met parameters for platelet transfusion today.      Post Infusion Assessment:  Patient tolerated infusion without incident.  Patient tolerated injection without incident.  Blood return noted pre and post infusion.  Site patent and intact, free from redness, edema or discomfort.  No evidence of extravasations.  Access discontinued per protocol.       Discharge Plan:   Discharge instructions reviewed with: Patient.  Patient and/or family verbalized understanding of discharge instructions and all questions answered.  Patient discharged in stable condition accompanied by: self.  Departure Mode: Ambulatory.      Dania Chavis RN

## 2024-01-20 NOTE — NURSING NOTE
Chief Complaint   Patient presents with    Infusion     Add on infusion appointment for IV fluids, scheduled g-csf. Hx multiple myeloma.    Blood Transfusion     Add on platelet transfusion.      Jazzy Chavis RN

## 2024-01-20 NOTE — NURSING NOTE
"Oncology Rooming Note    January 20, 2024 8:04 AM   Billy Carroll is a 68 year old male who presents for:    Chief Complaint   Patient presents with    Blood Draw     Labs drawn via CVC by RN in lab.  VS taken    Oncology Clinic Visit     Scheduled provider appointment. History of multiple myeloma.     Initial Vitals: /81   Pulse 109   Temp 98.2  F (36.8  C) (Oral)   Resp 16   Wt 95.4 kg (210 lb 6.4 oz)   SpO2 96%   BMI 30.19 kg/m   Estimated body mass index is 30.19 kg/m  as calculated from the following:    Height as of 1/11/24: 1.778 m (5' 10\").    Weight as of this encounter: 95.4 kg (210 lb 6.4 oz). Body surface area is 2.17 meters squared.  No Pain (0) Comment: Data Unavailable   No LMP for male patient.  Allergies reviewed: Yes  Medications reviewed: Yes    Medications: Medication refills not needed today.  Pharmacy name entered into [employer]:    [id number] [place], [place] - [address] [facility] [address] [facility] [place], [place] - [address]    Frailty Screening:   Is the patient here for a new oncology consult visit in cancer care? 2. No      Clinical concerns: none       Dania Chavis RN              "

## 2024-01-21 ENCOUNTER — INFUSION THERAPY VISIT (OUTPATIENT)
Dept: TRANSPLANT | Facility: CLINIC | Age: 69
End: 2024-01-21
Payer: MEDICARE

## 2024-01-21 ENCOUNTER — ONCOLOGY VISIT (OUTPATIENT)
Dept: TRANSPLANT | Facility: CLINIC | Age: 69
End: 2024-01-21
Payer: MEDICARE

## 2024-01-21 ENCOUNTER — APPOINTMENT (OUTPATIENT)
Dept: LAB | Facility: CLINIC | Age: 69
End: 2024-01-21
Payer: MEDICARE

## 2024-01-21 VITALS
SYSTOLIC BLOOD PRESSURE: 152 MMHG | TEMPERATURE: 98.2 F | OXYGEN SATURATION: 98 % | HEART RATE: 127 BPM | DIASTOLIC BLOOD PRESSURE: 89 MMHG | RESPIRATION RATE: 16 BRPM | BODY MASS INDEX: 30.39 KG/M2 | WEIGHT: 211.8 LBS

## 2024-01-21 DIAGNOSIS — C90.00 MULTIPLE MYELOMA, REMISSION STATUS UNSPECIFIED (H): Primary | ICD-10-CM

## 2024-01-21 DIAGNOSIS — T45.1X5A ANTINEOPLASTIC CHEMOTHERAPY INDUCED PANCYTOPENIA (H): ICD-10-CM

## 2024-01-21 DIAGNOSIS — Z95.2 S/P TAVR (TRANSCATHETER AORTIC VALVE REPLACEMENT): ICD-10-CM

## 2024-01-21 DIAGNOSIS — D61.810 ANTINEOPLASTIC CHEMOTHERAPY INDUCED PANCYTOPENIA (H): ICD-10-CM

## 2024-01-21 DIAGNOSIS — C91.01 ACUTE LYMPHOBLASTIC LEUKEMIA (ALL) IN REMISSION (H): Primary | ICD-10-CM

## 2024-01-21 DIAGNOSIS — C90.00 MULTIPLE MYELOMA, REMISSION STATUS UNSPECIFIED (H): ICD-10-CM

## 2024-01-21 DIAGNOSIS — I10 BENIGN ESSENTIAL HYPERTENSION: ICD-10-CM

## 2024-01-21 DIAGNOSIS — Z94.81 STATUS POST BONE MARROW TRANSPLANT (H): ICD-10-CM

## 2024-01-21 LAB
ACANTHOCYTES BLD QL SMEAR: NORMAL
ANION GAP SERPL CALCULATED.3IONS-SCNC: 10 MMOL/L (ref 7–15)
AUER BODIES BLD QL SMEAR: NORMAL
BASO STIPL BLD QL SMEAR: NORMAL
BASOPHILS # BLD AUTO: ABNORMAL 10*3/UL
BASOPHILS NFR BLD AUTO: ABNORMAL %
BITE CELLS BLD QL SMEAR: NORMAL
BLISTER CELLS BLD QL SMEAR: NORMAL
BUN SERPL-MCNC: 4.1 MG/DL (ref 8–23)
BURR CELLS BLD QL SMEAR: NORMAL
CALCIUM SERPL-MCNC: 8.5 MG/DL (ref 8.8–10.2)
CHLORIDE SERPL-SCNC: 104 MMOL/L (ref 98–107)
CREAT SERPL-MCNC: 0.65 MG/DL (ref 0.67–1.17)
DACRYOCYTES BLD QL SMEAR: NORMAL
DEPRECATED HCO3 PLAS-SCNC: 24 MMOL/L (ref 22–29)
EGFRCR SERPLBLD CKD-EPI 2021: >90 ML/MIN/1.73M2
ELLIPTOCYTES BLD QL SMEAR: NORMAL
EOSINOPHIL # BLD AUTO: ABNORMAL 10*3/UL
EOSINOPHIL NFR BLD AUTO: ABNORMAL %
ERYTHROCYTE [DISTWIDTH] IN BLOOD BY AUTOMATED COUNT: 13.4 % (ref 10–15)
FRAGMENTS BLD QL SMEAR: NORMAL
GLUCOSE SERPL-MCNC: 166 MG/DL (ref 70–99)
HCT VFR BLD AUTO: 28.6 % (ref 40–53)
HGB BLD-MCNC: 9.7 G/DL (ref 13.3–17.7)
HGB C CRYSTALS: NORMAL
HOWELL-JOLLY BOD BLD QL SMEAR: NORMAL
IMM GRANULOCYTES # BLD: ABNORMAL 10*3/UL
IMM GRANULOCYTES NFR BLD: ABNORMAL %
LYMPHOCYTES # BLD AUTO: ABNORMAL 10*3/UL
LYMPHOCYTES NFR BLD AUTO: ABNORMAL %
MCH RBC QN AUTO: 30.2 PG (ref 26.5–33)
MCHC RBC AUTO-ENTMCNC: 33.9 G/DL (ref 31.5–36.5)
MCV RBC AUTO: 89 FL (ref 78–100)
MONOCYTES # BLD AUTO: ABNORMAL 10*3/UL
MONOCYTES NFR BLD AUTO: ABNORMAL %
NEUTROPHILS # BLD AUTO: ABNORMAL 10*3/UL
NEUTROPHILS NFR BLD AUTO: ABNORMAL %
NEUTS HYPERSEG BLD QL SMEAR: NORMAL
PLAT MORPH BLD: NORMAL
PLATELET # BLD AUTO: 14 10E3/UL (ref 150–450)
POLYCHROMASIA BLD QL SMEAR: NORMAL
POTASSIUM SERPL-SCNC: 3.4 MMOL/L (ref 3.4–5.3)
RBC # BLD AUTO: 3.21 10E6/UL (ref 4.4–5.9)
RBC AGGLUT BLD QL: NORMAL
RBC MORPH BLD: NORMAL
ROULEAUX BLD QL SMEAR: NORMAL
SICKLE CELLS BLD QL SMEAR: NORMAL
SMUDGE CELLS BLD QL SMEAR: NORMAL
SODIUM SERPL-SCNC: 138 MMOL/L (ref 135–145)
SPHEROCYTES BLD QL SMEAR: NORMAL
STOMATOCYTES BLD QL SMEAR: NORMAL
TARGETS BLD QL SMEAR: NORMAL
TOXIC GRANULES BLD QL SMEAR: NORMAL
VARIANT LYMPHS BLD QL SMEAR: NORMAL
WBC # BLD AUTO: 0.1 10E3/UL (ref 4–11)

## 2024-01-21 PROCEDURE — 36592 COLLECT BLOOD FROM PICC: CPT

## 2024-01-21 PROCEDURE — 96372 THER/PROPH/DIAG INJ SC/IM: CPT | Performed by: PHYSICIAN ASSISTANT

## 2024-01-21 PROCEDURE — 96360 HYDRATION IV INFUSION INIT: CPT

## 2024-01-21 PROCEDURE — 99214 OFFICE O/P EST MOD 30 MIN: CPT

## 2024-01-21 PROCEDURE — 250N000011 HC RX IP 250 OP 636: Mod: JZ | Performed by: PHYSICIAN ASSISTANT

## 2024-01-21 PROCEDURE — 85027 COMPLETE CBC AUTOMATED: CPT

## 2024-01-21 PROCEDURE — 250N000011 HC RX IP 250 OP 636

## 2024-01-21 PROCEDURE — 258N000003 HC RX IP 258 OP 636

## 2024-01-21 PROCEDURE — 80048 BASIC METABOLIC PNL TOTAL CA: CPT

## 2024-01-21 PROCEDURE — G0463 HOSPITAL OUTPT CLINIC VISIT: HCPCS

## 2024-01-21 PROCEDURE — G2211 COMPLEX E/M VISIT ADD ON: HCPCS

## 2024-01-21 RX ORDER — HEPARIN SODIUM (PORCINE) LOCK FLUSH IV SOLN 100 UNIT/ML 100 UNIT/ML
5 SOLUTION INTRAVENOUS
Status: CANCELLED | OUTPATIENT
Start: 2024-01-21

## 2024-01-21 RX ORDER — ALBUTEROL SULFATE 0.83 MG/ML
2.5 SOLUTION RESPIRATORY (INHALATION)
Status: CANCELLED | OUTPATIENT
Start: 2024-01-22 | End: 2024-01-22

## 2024-01-21 RX ORDER — PENTAMIDINE ISETHIONATE 300 MG/300MG
300 INHALANT RESPIRATORY (INHALATION)
Status: CANCELLED | OUTPATIENT
Start: 2024-01-22 | End: 2024-01-22

## 2024-01-21 RX ORDER — HEPARIN SODIUM,PORCINE 10 UNIT/ML
5 VIAL (ML) INTRAVENOUS ONCE
Status: COMPLETED | OUTPATIENT
Start: 2024-01-21 | End: 2024-01-21

## 2024-01-21 RX ORDER — HEPARIN SODIUM (PORCINE) LOCK FLUSH IV SOLN 100 UNIT/ML 100 UNIT/ML
5 SOLUTION INTRAVENOUS
Status: CANCELLED | OUTPATIENT
Start: 2024-01-22

## 2024-01-21 RX ORDER — EPINEPHRINE 1 MG/ML
0.3 INJECTION, SOLUTION INTRAMUSCULAR; SUBCUTANEOUS EVERY 5 MIN PRN
Status: CANCELLED | OUTPATIENT
Start: 2024-01-21

## 2024-01-21 RX ORDER — HEPARIN SODIUM,PORCINE 10 UNIT/ML
5-20 VIAL (ML) INTRAVENOUS DAILY PRN
Status: CANCELLED | OUTPATIENT
Start: 2024-01-22

## 2024-01-21 RX ORDER — HEPARIN SODIUM,PORCINE 10 UNIT/ML
5-20 VIAL (ML) INTRAVENOUS DAILY PRN
Status: CANCELLED | OUTPATIENT
Start: 2024-01-21

## 2024-01-21 RX ORDER — DIPHENHYDRAMINE HYDROCHLORIDE 50 MG/ML
50 INJECTION INTRAMUSCULAR; INTRAVENOUS
Status: CANCELLED
Start: 2024-01-21

## 2024-01-21 RX ADMIN — Medication 5 ML: at 08:20

## 2024-01-21 RX ADMIN — Medication 5 ML: at 08:21

## 2024-01-21 RX ADMIN — FILGRASTIM-AAFI 480 MCG: 480 INJECTION, SOLUTION INTRAVENOUS; SUBCUTANEOUS at 08:28

## 2024-01-21 RX ADMIN — SODIUM CHLORIDE 1000 ML: 9 INJECTION, SOLUTION INTRAVENOUS at 08:24

## 2024-01-21 ASSESSMENT — PAIN SCALES - GENERAL: PAINLEVEL: NO PAIN (0)

## 2024-01-21 NOTE — PROGRESS NOTES
BMT Progress Note  01/21/2024     ID: Mr. Carroll is a 68 year old man D 11 for standard risk IgG lambda kappa multiple myeloma.    HPI:   Billy is seen in infusion today accompanied by his son.  Billy reports more fatigue today but otherwise feels he is doing well.  He thought he might have a fever last night, temp was 99F and he talked with the fellow.  No further concerns regarding fever today.    ROS: Review of systems with pertinent positive and negatives in HPI    Exam:  Wt Readings from Last 4 Encounters:   01/21/24 96.1 kg (211 lb 12.8 oz)   01/20/24 95.4 kg (210 lb 6.4 oz)   01/19/24 94.7 kg (208 lb 12.8 oz)   01/18/24 94.8 kg (208 lb 14.4 oz)      BP (!) 152/89   Pulse (!) 127   Temp 98.2  F (36.8  C) (Oral)   Resp 16   Wt 96.1 kg (211 lb 12.8 oz)   SpO2 98%   BMI 30.39 kg/m       Constitutional: NAD  HEENT: New small round lesion at back of oropharynx, no drainage, no redness, non-tender (per patient).  No scleral icterus  Pulm: CTA b/l.  Breathing comfortably on RA.  CV: RRR, no m/r/g  Neuro: alert, conversant, normal gait  Psych: appropriate mood and affect  CVC intact    A/P:  Billy Carroll is a 68 year old man with IgG kappa MM with extramedullary disease, D+ 11 auto.    - PMHx significant for CAD, severe aortic stenosis s/p TAVR (10/24/2023) currently ongoing cardiac rehabilitation, HTN, and HLD.     BMT/MM  Total collections: 7.43 x 10^6  Cell dose thawed: 3.72 x 10^6 -- infused cell dose total 1.57 x 10^6  **cell processing notified about this loss of cells during thaw/wash phase on 1/15 -- 42% of cells recovered with thaw/wash     HEME/COAG  - Transfusion parameters: hemoglobin <8, platelets <10  - Started GCSF on Day 5 continue until ANC >2.5 x 2 consecutive days.    IMMUNOCOMPROMISED  - prophy ACV, fluc, lev, Pentamidine (12/28/23)  # hx PJP pneumonia: resume Bactrim D28 post BMT    CARDIOVASCULAR - Monitor volume status carefully given cardiac history  #HTN: Hold losartan, metoprolol -  likely resume once engrafted  #Severe aortic stenosis, now s/p TAVR 10/24/2023.   #Moderate nonobstructive coronary artery disease   #Hyperlipidemia - hold statin khushboo-transplant  Hold ASA     GI:  Hold vitamin supplements + alendronate  On protonix GI prophy  N/V:  Got emend/dex. Has PRN zofran and compazine at home. Takes Ativan at bedtime    MUSCULOSKELETAL/FRAILTY  Baseline Frailty Score: 2  Patient with substantial risk of sarcopenia  Cancer Rehab as needed outpatient    PSCYH:  # Anxiety: improved after PRN ativan.     RTC: Daily appts (has them through 1/25)  - Monitor new lesion in posterior oropharynx  - 1 L IVF today  - GCSF  - hold losartan, metoprolol--likely resume once engrafted    I spent 30 minutes in the care of this patient today, which included time necessary for preparation for the visit, obtaining history, ordering medications/tests/procedures as medically indicated, review of pertinent medical literature, counseling of the patient, communication of recommendations to the care team, and documentation time.    The longitudinal plan of care for multiple myeloma was addressed during this visit. Due to the added complexity in care, I will continue to support Billy in the subsequent management of this condition(s) and with the ongoing continuity of care of this condition(s).    Diallo Mascorro MD

## 2024-01-21 NOTE — NURSING NOTE
"Oncology Rooming Note    January 21, 2024 9:26 AM   Billy Carroll is a 68 year old male who presents for:    Chief Complaint   Patient presents with    Blood Draw     Labs drawn via CVC by RN in lab.  VS taken    Oncology Clinic Visit     Post bmt for MM here for labs, md visit and gcsf inj     Initial Vitals: BP (!) 152/89   Pulse (!) 127   Temp 98.2  F (36.8  C) (Oral)   Resp 16   Wt 96.1 kg (211 lb 12.8 oz)   SpO2 98%   BMI 30.39 kg/m   Estimated body mass index is 30.39 kg/m  as calculated from the following:    Height as of 1/11/24: 1.778 m (5' 10\").    Weight as of this encounter: 96.1 kg (211 lb 12.8 oz). Body surface area is 2.18 meters squared.  No Pain (0) Comment: Data Unavailable   No LMP for male patient.  Allergies reviewed: Yes  Medications reviewed: Yes    Medications: Medication refills not needed today.  Pharmacy name entered into [facility]:    [id number] [place], [place] - [address] [facility] [address] [facility] [place], [place] - [address]    Frailty Screening:   Is the patient here for a new oncology consult visit in cancer care? 2. No      Clinical concerns: davonte Gray RN              "

## 2024-01-21 NOTE — TELEPHONE ENCOUNTER
Heme/Onc Note     Patient called to discuss temp of 99F. He states overall feeling similar to prior, no new symptoms. No shortness of breath or new cough.     Advised to come in for ED evaluation if new fevers (T >100.4) or concerning symptoms like worsening cough, shortness of breath, persistent diarrhea or new fevers (T >100.4). He states he will monitor fever curve and come in for ED evaluation if needed.     Alexandra Butler MD  Hematology/Oncology Fellow

## 2024-01-21 NOTE — PROGRESS NOTES
Infusion Nursing Note:  Billy Carroll presents today for   Chief Complaint   Patient presents with    Infusion     Post bmt for MM here for possible transfusions/infusions     .    Patient seen by provider today: Yes: Arnulfo   present during visit today: Not Applicable.    Note: pt received 1 liter NS IV today.      Intravenous Access:  Mitchell.    Treatment Conditions:  Results reviewed, labs MET treatment parameters, ok to proceed with treatment.      Post Infusion Assessment:  Patient tolerated infusion without incident.       Discharge Plan:   Patient and/or family verbalized understanding of discharge instructions and all questions answered.      Corine Gray RN

## 2024-01-21 NOTE — LETTER
1/21/2024         RE: Billy Carroll  [address] [place] [address]        Dear Colleague,    Thank you for referring your patient, Billy Carroll, to the [facility] BLOOD AND MARROW TRANSPLANT PROGRAM [place]. Please see a copy of my visit note below.    BMT Progress Note  01/21/2024     ID: Mr. Carroll is a 68 year old man D 11 for standard risk IgG lambda kappa multiple myeloma.    HPI:   Billy is seen in infusion today accompanied by his son.  Billy reports more fatigue today but otherwise feels he is doing well.  He thought he might have a fever last night, temp was 99F and he talked with the fellow.  No further concerns regarding fever today.    ROS: Review of systems with pertinent positive and negatives in HPI    Exam:  Wt Readings from Last 4 Encounters:   01/21/24 96.1 kg (211 lb 12.8 oz)   01/20/24 95.4 kg (210 lb 6.4 oz)   01/19/24 94.7 kg (208 lb 12.8 oz)   01/18/24 94.8 kg (208 lb 14.4 oz)      BP (!) 152/89   Pulse (!) 127   Temp 98.2  F (36.8  C) (Oral)   Resp 16   Wt 96.1 kg (211 lb 12.8 oz)   SpO2 98%   BMI 30.39 kg/m       Constitutional: NAD  HEENT: New small round lesion at back of oropharynx, no drainage, no redness, non-tender (per patient).  No scleral icterus  Pulm: CTA b/l.  Breathing comfortably on RA.  CV: RRR, no m/r/g  Neuro: alert, conversant, normal gait  Psych: appropriate mood and affect  CVC intact    A/P:  Billy Carroll is a 68 year old man with IgG kappa MM with extramedullary disease, D+ 11 auto.    - PMHx significant for CAD, severe aortic stenosis s/p TAVR (10/24/2023) currently ongoing cardiac rehabilitation, HTN, and HLD.     BMT/MM  Total collections: 7.43 x 10^6  Cell dose thawed: 3.72 x 10^6 -- infused cell dose total 1.57 x 10^6  **cell processing notified about this loss of cells during thaw/wash phase on 1/15 -- 42% of cells recovered with thaw/wash     HEME/COAG  - Transfusion parameters: hemoglobin <8, platelets <10  - Started GCSF on Day 5  continue until ANC >2.5 x 2 consecutive days.    IMMUNOCOMPROMISED  - prophy ACV, fluc, lev, Pentamidine (12/28/23)  # hx PJP pneumonia: resume Bactrim D28 post BMT    CARDIOVASCULAR - Monitor volume status carefully given cardiac history  #HTN: Hold losartan, metoprolol - likely resume once engrafted  #Severe aortic stenosis, now s/p TAVR 10/24/2023.   #Moderate nonobstructive coronary artery disease   #Hyperlipidemia - hold statin khushboo-transplant  Hold ASA     GI:  Hold vitamin supplements + alendronate  On protonix GI prophy  N/V:  Got emend/dex. Has PRN zofran and compazine at home. Takes Ativan at bedtime    MUSCULOSKELETAL/FRAILTY  Baseline Frailty Score: 2  Patient with substantial risk of sarcopenia  Cancer Rehab as needed outpatient    PSCYH:  # Anxiety: improved after PRN ativan.     RTC: Daily appts (has them through 1/25)  - Monitor new lesion in posterior oropharynx  - 1 L IVF today  - GCSF  - hold losartan, metoprolol--likely resume once engrafted    I spent 30 minutes in the care of this patient today, which included time necessary for preparation for the visit, obtaining history, ordering medications/tests/procedures as medically indicated, review of pertinent medical literature, counseling of the patient, communication of recommendations to the care team, and documentation time.    The longitudinal plan of care for multiple myeloma was addressed during this visit. Due to the added complexity in care, I will continue to support Billy in the subsequent management of this condition(s) and with the ongoing continuity of care of this condition(s).    Diallo Mascorro MD

## 2024-01-22 ENCOUNTER — INFUSION THERAPY VISIT (OUTPATIENT)
Dept: TRANSPLANT | Facility: CLINIC | Age: 69
End: 2024-01-22
Payer: MEDICARE

## 2024-01-22 ENCOUNTER — TELEPHONE (OUTPATIENT)
Dept: ONCOLOGY | Facility: CLINIC | Age: 69
End: 2024-01-22

## 2024-01-22 ENCOUNTER — ONCOLOGY VISIT (OUTPATIENT)
Dept: TRANSPLANT | Facility: CLINIC | Age: 69
End: 2024-01-22
Payer: MEDICARE

## 2024-01-22 ENCOUNTER — APPOINTMENT (OUTPATIENT)
Dept: LAB | Facility: CLINIC | Age: 69
End: 2024-01-22
Payer: MEDICARE

## 2024-01-22 VITALS
TEMPERATURE: 99.5 F | DIASTOLIC BLOOD PRESSURE: 79 MMHG | HEART RATE: 107 BPM | OXYGEN SATURATION: 96 % | SYSTOLIC BLOOD PRESSURE: 154 MMHG

## 2024-01-22 VITALS
DIASTOLIC BLOOD PRESSURE: 85 MMHG | BODY MASS INDEX: 30.25 KG/M2 | SYSTOLIC BLOOD PRESSURE: 156 MMHG | RESPIRATION RATE: 16 BRPM | HEART RATE: 125 BPM | OXYGEN SATURATION: 95 % | TEMPERATURE: 98.3 F | WEIGHT: 210.8 LBS

## 2024-01-22 DIAGNOSIS — C91.01 ACUTE LYMPHOBLASTIC LEUKEMIA (ALL) IN REMISSION (H): Primary | ICD-10-CM

## 2024-01-22 DIAGNOSIS — C90.00 MULTIPLE MYELOMA, REMISSION STATUS UNSPECIFIED (H): Primary | ICD-10-CM

## 2024-01-22 DIAGNOSIS — Z52.011 AUTOLOGOUS DONOR OF STEM CELLS: ICD-10-CM

## 2024-01-22 DIAGNOSIS — C90.00 MULTIPLE MYELOMA, REMISSION STATUS UNSPECIFIED (H): ICD-10-CM

## 2024-01-22 LAB
ACANTHOCYTES BLD QL SMEAR: NORMAL
ANION GAP SERPL CALCULATED.3IONS-SCNC: 10 MMOL/L (ref 7–15)
AUER BODIES BLD QL SMEAR: NORMAL
BASO STIPL BLD QL SMEAR: NORMAL
BASOPHILS # BLD AUTO: ABNORMAL 10*3/UL
BASOPHILS NFR BLD AUTO: ABNORMAL %
BITE CELLS BLD QL SMEAR: NORMAL
BLISTER CELLS BLD QL SMEAR: NORMAL
BUN SERPL-MCNC: 5.1 MG/DL (ref 8–23)
BURR CELLS BLD QL SMEAR: NORMAL
CALCIUM SERPL-MCNC: 8.6 MG/DL (ref 8.8–10.2)
CHLORIDE SERPL-SCNC: 104 MMOL/L (ref 98–107)
CREAT SERPL-MCNC: 0.59 MG/DL (ref 0.67–1.17)
DACRYOCYTES BLD QL SMEAR: NORMAL
DEPRECATED HCO3 PLAS-SCNC: 25 MMOL/L (ref 22–29)
EGFRCR SERPLBLD CKD-EPI 2021: >90 ML/MIN/1.73M2
ELLIPTOCYTES BLD QL SMEAR: NORMAL
EOSINOPHIL # BLD AUTO: ABNORMAL 10*3/UL
EOSINOPHIL NFR BLD AUTO: ABNORMAL %
ERYTHROCYTE [DISTWIDTH] IN BLOOD BY AUTOMATED COUNT: 13.4 % (ref 10–15)
FRAGMENTS BLD QL SMEAR: NORMAL
GLUCOSE SERPL-MCNC: 151 MG/DL (ref 70–99)
HCT VFR BLD AUTO: 27.6 % (ref 40–53)
HGB BLD-MCNC: 9.7 G/DL (ref 13.3–17.7)
HGB C CRYSTALS: NORMAL
HOWELL-JOLLY BOD BLD QL SMEAR: NORMAL
IMM GRANULOCYTES # BLD: ABNORMAL 10*3/UL
IMM GRANULOCYTES NFR BLD: ABNORMAL %
LYMPHOCYTES # BLD AUTO: ABNORMAL 10*3/UL
LYMPHOCYTES NFR BLD AUTO: ABNORMAL %
MCH RBC QN AUTO: 30.8 PG (ref 26.5–33)
MCHC RBC AUTO-ENTMCNC: 35.1 G/DL (ref 31.5–36.5)
MCV RBC AUTO: 88 FL (ref 78–100)
MONOCYTES # BLD AUTO: ABNORMAL 10*3/UL
MONOCYTES NFR BLD AUTO: ABNORMAL %
NEUTROPHILS # BLD AUTO: ABNORMAL 10*3/UL
NEUTROPHILS NFR BLD AUTO: ABNORMAL %
NEUTS HYPERSEG BLD QL SMEAR: NORMAL
PLAT MORPH BLD: NORMAL
PLATELET # BLD AUTO: 13 10E3/UL (ref 150–450)
POLYCHROMASIA BLD QL SMEAR: NORMAL
POTASSIUM SERPL-SCNC: 3.2 MMOL/L (ref 3.4–5.3)
RBC # BLD AUTO: 3.15 10E6/UL (ref 4.4–5.9)
RBC AGGLUT BLD QL: NORMAL
RBC MORPH BLD: NORMAL
ROULEAUX BLD QL SMEAR: NORMAL
SICKLE CELLS BLD QL SMEAR: NORMAL
SMUDGE CELLS BLD QL SMEAR: NORMAL
SODIUM SERPL-SCNC: 139 MMOL/L (ref 135–145)
SPHEROCYTES BLD QL SMEAR: NORMAL
STOMATOCYTES BLD QL SMEAR: NORMAL
TARGETS BLD QL SMEAR: NORMAL
TOXIC GRANULES BLD QL SMEAR: NORMAL
VARIANT LYMPHS BLD QL SMEAR: NORMAL
WBC # BLD AUTO: 0.3 10E3/UL (ref 4–11)

## 2024-01-22 PROCEDURE — 250N000013 HC RX MED GY IP 250 OP 250 PS 637: Performed by: STUDENT IN AN ORGANIZED HEALTH CARE EDUCATION/TRAINING PROGRAM

## 2024-01-22 PROCEDURE — 85014 HEMATOCRIT: CPT

## 2024-01-22 PROCEDURE — 96372 THER/PROPH/DIAG INJ SC/IM: CPT | Mod: XS | Performed by: PHYSICIAN ASSISTANT

## 2024-01-22 PROCEDURE — 36592 COLLECT BLOOD FROM PICC: CPT

## 2024-01-22 PROCEDURE — 96366 THER/PROPH/DIAG IV INF ADDON: CPT

## 2024-01-22 PROCEDURE — 250N000011 HC RX IP 250 OP 636

## 2024-01-22 PROCEDURE — 80048 BASIC METABOLIC PNL TOTAL CA: CPT

## 2024-01-22 PROCEDURE — 96365 THER/PROPH/DIAG IV INF INIT: CPT

## 2024-01-22 PROCEDURE — 250N000011 HC RX IP 250 OP 636: Performed by: STUDENT IN AN ORGANIZED HEALTH CARE EDUCATION/TRAINING PROGRAM

## 2024-01-22 PROCEDURE — 99214 OFFICE O/P EST MOD 30 MIN: CPT

## 2024-01-22 PROCEDURE — 250N000011 HC RX IP 250 OP 636: Mod: JZ | Performed by: PHYSICIAN ASSISTANT

## 2024-01-22 PROCEDURE — G0463 HOSPITAL OUTPT CLINIC VISIT: HCPCS | Mod: 25

## 2024-01-22 PROCEDURE — 258N000003 HC RX IP 258 OP 636: Performed by: STUDENT IN AN ORGANIZED HEALTH CARE EDUCATION/TRAINING PROGRAM

## 2024-01-22 RX ORDER — HEPARIN SODIUM,PORCINE 10 UNIT/ML
5-20 VIAL (ML) INTRAVENOUS DAILY PRN
Status: CANCELLED | OUTPATIENT
Start: 2024-01-23

## 2024-01-22 RX ORDER — HEPARIN SODIUM (PORCINE) LOCK FLUSH IV SOLN 100 UNIT/ML 100 UNIT/ML
5 SOLUTION INTRAVENOUS
Status: CANCELLED | OUTPATIENT
Start: 2024-01-23

## 2024-01-22 RX ORDER — POTASSIUM CHLORIDE 29.8 MG/ML
20 INJECTION INTRAVENOUS ONCE
Status: COMPLETED | OUTPATIENT
Start: 2024-01-22 | End: 2024-01-22

## 2024-01-22 RX ORDER — HEPARIN SODIUM,PORCINE 10 UNIT/ML
5 VIAL (ML) INTRAVENOUS ONCE
Status: COMPLETED | OUTPATIENT
Start: 2024-01-22 | End: 2024-01-22

## 2024-01-22 RX ORDER — ALBUTEROL SULFATE 0.83 MG/ML
2.5 SOLUTION RESPIRATORY (INHALATION)
Status: CANCELLED | OUTPATIENT
Start: 2024-01-23 | End: 2024-01-23

## 2024-01-22 RX ORDER — PENTAMIDINE ISETHIONATE 300 MG/300MG
300 INHALANT RESPIRATORY (INHALATION)
Status: CANCELLED | OUTPATIENT
Start: 2024-01-23 | End: 2024-01-23

## 2024-01-22 RX ORDER — ACETAMINOPHEN 325 MG/1
975 TABLET ORAL ONCE
Status: COMPLETED | OUTPATIENT
Start: 2024-01-22 | End: 2024-01-22

## 2024-01-22 RX ADMIN — ACETAMINOPHEN 975 MG: 325 TABLET, FILM COATED ORAL at 11:34

## 2024-01-22 RX ADMIN — Medication 5 ML: at 10:39

## 2024-01-22 RX ADMIN — SODIUM CHLORIDE 1000 ML: 9 INJECTION, SOLUTION INTRAVENOUS at 11:46

## 2024-01-22 RX ADMIN — FILGRASTIM-AAFI 480 MCG: 480 INJECTION, SOLUTION INTRAVENOUS; SUBCUTANEOUS at 11:51

## 2024-01-22 RX ADMIN — POTASSIUM CHLORIDE 20 MEQ: 29.8 INJECTION, SOLUTION INTRAVENOUS at 11:47

## 2024-01-22 RX ADMIN — POTASSIUM CHLORIDE 20 MEQ: 29.8 INJECTION, SOLUTION INTRAVENOUS at 12:38

## 2024-01-22 ASSESSMENT — PAIN SCALES - GENERAL: PAINLEVEL: NO PAIN (1)

## 2024-01-22 NOTE — PROGRESS NOTES
BMT Progress Note  01/22/2024     ID: Mr. Carroll is a 68 year old man D 12 for standard risk IgG lambda kappa multiple myeloma.    HPI:   Mr Carroll returns for follow up. Feeling poorly. Aching all over, including a headache. No vision changes, no pinpoint sharp pain. No n/v, however poor appetite. Ate Spaghetti and yogurt yesterday. Diarrhea x1-2 yesterday, not large volume. Notes blood tinged nasal discharge yesterday x1 after he blew his nose. He denies any dripping blood, no active bleeding from nose.   Held BP meds as directed.      ROS: Review of systems with pertinent positive and negatives in HPI    Exam:  Wt Readings from Last 4 Encounters:   01/22/24 95.6 kg (210 lb 12.8 oz)   01/21/24 96.1 kg (211 lb 12.8 oz)   01/20/24 95.4 kg (210 lb 6.4 oz)   01/19/24 94.7 kg (208 lb 12.8 oz)      BP (!) 156/85   Pulse (!) 125   Temp 98.3  F (36.8  C) (Oral)   Resp 16   Wt 95.6 kg (210 lb 12.8 oz)   SpO2 95%   BMI 30.25 kg/m       Constitutional: NAD  HEENT: OP clear, non erythematous MMM  Pulm: CTA b/l.  Breathing comfortably on RA.  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    - PMHx significant for CAD, severe aortic stenosis s/p TAVR (10/24/2023) currently ongoing cardiac rehabilitation, HTN, and HLD.     BMT/MM  Total collections: 7.43 x 10^6  Cell dose thawed: 3.72 x 10^6 -- infused cell dose total 1.57 x 10^6  **cell processing notified about this loss of cells during thaw/wash phase on 1/15 -- 42% of cells recovered with thaw/wash. WBC up today to 0.3!     HEME/COAG  - Transfusion parameters: hemoglobin <8, platelets <10  - Started GCSF on Day 5 continue until ANC >2.5 x 2 consecutive days.    IMMUNOCOMPROMISED  - prophy ACV, fluc, lev, Pentamidine (12/28/23)  # hx PJP pneumonia: resume Bactrim D28 post BMT    CARDIOVASCULAR -  Monitor volume status carefully given cardiac history  #HTN: Hold losartan, metoprolol - likely resume once engrafted. Take losartan 50mg 1/22 at home, hold 1/23 until after vital signs reviewed in clinic.  1/22: If BP increases while in clinic, hydralazine prn  #Severe aortic stenosis, now s/p TAVR 10/24/2023.   #Moderate nonobstructive coronary artery disease   #Hyperlipidemia - hold statin khushboo-transplant  Hold ASA     GI:  Hold vitamin supplements + alendronate  On protonix GI prophy  N/V:  Got emend/dex. Has PRN zofran and compazine at home. Takes Ativan at bedtime    FEN/Renal:  HypoK replace with IV today 40meq   Poor intake 1 L NS over 2 hours    MUSCULOSKELETAL/FRAILTY  Baseline Frailty Score: 2  Patient with substantial risk of sarcopenia  Cancer Rehab as needed outpatient    PSCYH:  # Anxiety: improved after PRN ativan.     RTC: Daily appts (has them through 1/25)  - 1 L IVF today with IV potassium  - GCSF  - Take losartan x1 today, then hold 1/23 until after vital signs reviewed in clinic.  - Possible plt tomorrow    I spent 30 minutes in the care of this patient today, which included time necessary for preparation for the visit, obtaining history, ordering medications/tests/procedures as medically indicated, review of pertinent medical literature, counseling of the patient, communication of recommendations to the care team, and documentation time.      LINO BrinkC   [phone]

## 2024-01-22 NOTE — LETTER
1/22/2024         RE: Billy Carroll  [address] [place] [address]        Dear Colleague,    Thank you for referring your patient, Billy Carroll, to the [facility] BLOOD AND MARROW TRANSPLANT PROGRAM [place]. Please see a copy of my visit note below.    BMT Progress Note  01/22/2024     ID: Mr. Carroll is a 68 year old man D 12 for standard risk IgG lambda kappa multiple myeloma.    HPI:   Mr Carroll returns for follow up. Feeling poorly. Aching all over, including a headache. No vision changes, no pinpoint sharp pain. No n/v, however poor appetite. Ate Spaghetti and yogurt yesterday. Diarrhea x1-2 yesterday, not large volume. Notes blood tinged nasal discharge yesterday x1 after he blew his nose. He denies any dripping blood, no active bleeding from nose.   Held BP meds as directed.      ROS: Review of systems with pertinent positive and negatives in HPI    Exam:  Wt Readings from Last 4 Encounters:   01/22/24 95.6 kg (210 lb 12.8 oz)   01/21/24 96.1 kg (211 lb 12.8 oz)   01/20/24 95.4 kg (210 lb 6.4 oz)   01/19/24 94.7 kg (208 lb 12.8 oz)      BP (!) 156/85   Pulse (!) 125   Temp 98.3  F (36.8  C) (Oral)   Resp 16   Wt 95.6 kg (210 lb 12.8 oz)   SpO2 95%   BMI 30.25 kg/m       Constitutional: NAD  HEENT: OP clear, non erythematous MMM  Pulm: CTA b/l.  Breathing comfortably on RA.  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    - PMHx significant for CAD, severe aortic stenosis s/p TAVR (10/24/2023) currently ongoing cardiac rehabilitation, HTN, and HLD.     BMT/MM  Total collections: 7.43 x 10^6  Cell dose thawed: 3.72 x 10^6 -- infused cell dose total 1.57 x 10^6  **cell processing notified about this loss of cells during thaw/wash phase on 1/15 -- 42% of cells recovered with thaw/wash. WBC up today to 0.3!      HEME/COAG  - Transfusion parameters: hemoglobin <8, platelets <10  - Started GCSF on Day 5 continue until ANC >2.5 x 2 consecutive days.    IMMUNOCOMPROMISED  - prophy ACV, fluc, lev, Pentamidine (12/28/23)  # hx PJP pneumonia: resume Bactrim D28 post BMT    CARDIOVASCULAR - Monitor volume status carefully given cardiac history  #HTN: Hold losartan, metoprolol - likely resume once engrafted. Take losartan 50mg 1/22 at home, hold 1/23 until after vital signs reviewed in clinic.  1/22: If BP increases while in clinic, hydralazine prn  #Severe aortic stenosis, now s/p TAVR 10/24/2023.   #Moderate nonobstructive coronary artery disease   #Hyperlipidemia - hold statin khushboo-transplant  Hold ASA     GI:  Hold vitamin supplements + alendronate  On protonix GI prophy  N/V:  Got emend/dex. Has PRN zofran and compazine at home. Takes Ativan at bedtime    FEN/Renal:  HypoK replace with IV today 40meq   Poor intake 1 L NS over 2 hours    MUSCULOSKELETAL/FRAILTY  Baseline Frailty Score: 2  Patient with substantial risk of sarcopenia  Cancer Rehab as needed outpatient    PSCYH:  # Anxiety: improved after PRN ativan.     RTC: Daily appts (has them through 1/25)  - 1 L IVF today with IV potassium  - GCSF  - Take losartan x1 today, then hold 1/23 until after vital signs reviewed in clinic.  - Possible plt tomorrow    I spent 30 minutes in the care of this patient today, which included time necessary for preparation for the visit, obtaining history, ordering medications/tests/procedures as medically indicated, review of pertinent medical literature, counseling of the patient, communication of recommendations to the care team, and documentation time.      LINO BrinkC   [phone]

## 2024-01-22 NOTE — PROGRESS NOTES
Infusion Nursing Note:  Billy Carroll presents today for IVF and electrolytes.    Patient seen by provider today: Yes: Juana QUINTERO    present during visit today: Not Applicable.    Note: Labs monitored, pt states he feels crummy with elevated b/p. Pt received 1L NS infusion over 2hrs and 40meq IV potassium over 2hrs. Growth factor administered in left abd. B/p maintained 150s systolic. Pt will start taking home losartan.       Intravenous Access:  Mitchell.    Treatment Conditions:  Lab Results   Component Value Date     01/22/2024    POTASSIUM 3.2 (L) 01/22/2024    MAG 2.2 01/09/2024    CR 0.59 (L) 01/22/2024    SESAR 8.6 (L) 01/22/2024    BILITOTAL 0.4 01/09/2024    ALBUMIN 4.3 01/09/2024    ALT 62 01/09/2024    AST 48 (H) 01/09/2024       Results reviewed, labs MET treatment parameters, ok to proceed with treatment.      Post Infusion Assessment:  Patient tolerated infusion without incident.  Patient tolerated injection without incident.       Discharge Plan:   [facility]  Patient will return tomorrow for next appointment.   Patient discharged in stable condition accompanied by: self.  Departure Mode: Ambulatory.      Marbin Davis RN

## 2024-01-22 NOTE — NURSING NOTE
"Oncology Rooming Note    January 22, 2024 11:23 AM   Billy Carroll is a 68 year old male who presents for:    Chief Complaint   Patient presents with    Blood Draw     Labs drawn via CVC by RN in lab.  VS taken    Oncology Clinic Visit     Return; hx MM s/p BMT     Initial Vitals: BP (!) 156/85   Pulse (!) 125   Temp 98.3  F (36.8  C) (Oral)   Resp 16   Wt 95.6 kg (210 lb 12.8 oz)   SpO2 95%   BMI 30.25 kg/m   Estimated body mass index is 30.25 kg/m  as calculated from the following:    Height as of 1/11/24: 1.778 m (5' 10\").    Weight as of this encounter: 95.6 kg (210 lb 12.8 oz). Body surface area is 2.17 meters squared.  No Pain (1) Comment: Data Unavailable   No LMP for male patient.  Allergies reviewed: Yes  Medications reviewed: Yes    Medications: Medication refills not needed today.  Pharmacy name entered into [facility]:    [id number] [place], [place] - [address] [facility] [place], [place] - [address]    Frailty Screening:   Is the patient here for a new oncology consult visit in cancer care? 2. No      Clinical concerns: Having lots of body pain and headache, generally feeling unwell. Has questions about taking Tylenol for headache.   Juana He PA-C was notified.      Xin Dejesus RN              "

## 2024-01-23 ENCOUNTER — ONCOLOGY VISIT (OUTPATIENT)
Dept: TRANSPLANT | Facility: CLINIC | Age: 69
End: 2024-01-23
Payer: MEDICARE

## 2024-01-23 ENCOUNTER — TELEPHONE (OUTPATIENT)
Dept: TRANSPLANT | Facility: CLINIC | Age: 69
End: 2024-01-23

## 2024-01-23 ENCOUNTER — APPOINTMENT (OUTPATIENT)
Dept: LAB | Facility: CLINIC | Age: 69
End: 2024-01-23
Payer: MEDICARE

## 2024-01-23 VITALS
TEMPERATURE: 97.6 F | RESPIRATION RATE: 16 BRPM | BODY MASS INDEX: 30.58 KG/M2 | SYSTOLIC BLOOD PRESSURE: 158 MMHG | DIASTOLIC BLOOD PRESSURE: 84 MMHG | OXYGEN SATURATION: 96 % | HEART RATE: 107 BPM | WEIGHT: 213.1 LBS

## 2024-01-23 DIAGNOSIS — C90.00 MULTIPLE MYELOMA, REMISSION STATUS UNSPECIFIED (H): Primary | ICD-10-CM

## 2024-01-23 DIAGNOSIS — Z52.011 AUTOLOGOUS DONOR OF STEM CELLS: ICD-10-CM

## 2024-01-23 DIAGNOSIS — F41.9 ANXIETY: ICD-10-CM

## 2024-01-23 LAB
ALBUMIN SERPL BCG-MCNC: 3.6 G/DL (ref 3.5–5.2)
ALP SERPL-CCNC: 195 U/L (ref 40–150)
ALT SERPL W P-5'-P-CCNC: 58 U/L (ref 0–70)
ANION GAP SERPL CALCULATED.3IONS-SCNC: 9 MMOL/L (ref 7–15)
AST SERPL W P-5'-P-CCNC: 23 U/L (ref 0–45)
BASOPHILS # BLD AUTO: ABNORMAL 10*3/UL
BASOPHILS # BLD MANUAL: 0 10E3/UL (ref 0–0.2)
BASOPHILS NFR BLD AUTO: ABNORMAL %
BASOPHILS NFR BLD MANUAL: 1 %
BILIRUB SERPL-MCNC: 0.5 MG/DL
BUN SERPL-MCNC: 4.3 MG/DL (ref 8–23)
CALCIUM SERPL-MCNC: 8.8 MG/DL (ref 8.8–10.2)
CHLORIDE SERPL-SCNC: 106 MMOL/L (ref 98–107)
CREAT SERPL-MCNC: 0.55 MG/DL (ref 0.67–1.17)
DEPRECATED HCO3 PLAS-SCNC: 25 MMOL/L (ref 22–29)
EGFRCR SERPLBLD CKD-EPI 2021: >90 ML/MIN/1.73M2
EOSINOPHIL # BLD AUTO: ABNORMAL 10*3/UL
EOSINOPHIL # BLD MANUAL: 0 10E3/UL (ref 0–0.7)
EOSINOPHIL NFR BLD AUTO: ABNORMAL %
EOSINOPHIL NFR BLD MANUAL: 0 %
ERYTHROCYTE [DISTWIDTH] IN BLOOD BY AUTOMATED COUNT: 13.7 % (ref 10–15)
GLUCOSE SERPL-MCNC: 112 MG/DL (ref 70–99)
HCT VFR BLD AUTO: 26.8 % (ref 40–53)
HGB BLD-MCNC: 9.2 G/DL (ref 13.3–17.7)
IMM GRANULOCYTES # BLD: ABNORMAL 10*3/UL
IMM GRANULOCYTES NFR BLD: ABNORMAL %
INR PPP: 1.12 (ref 0.85–1.15)
LYMPHOCYTES # BLD AUTO: ABNORMAL 10*3/UL
LYMPHOCYTES # BLD MANUAL: 0.1 10E3/UL (ref 0.8–5.3)
LYMPHOCYTES NFR BLD AUTO: ABNORMAL %
LYMPHOCYTES NFR BLD MANUAL: 14 %
MCH RBC QN AUTO: 30.8 PG (ref 26.5–33)
MCHC RBC AUTO-ENTMCNC: 34.3 G/DL (ref 31.5–36.5)
MCV RBC AUTO: 90 FL (ref 78–100)
MONOCYTES # BLD AUTO: ABNORMAL 10*3/UL
MONOCYTES # BLD MANUAL: 0 10E3/UL (ref 0–1.3)
MONOCYTES NFR BLD AUTO: ABNORMAL %
MONOCYTES NFR BLD MANUAL: 2 %
NEUTROPHILS # BLD AUTO: ABNORMAL 10*3/UL
NEUTROPHILS # BLD MANUAL: 0.5 10E3/UL (ref 1.6–8.3)
NEUTROPHILS NFR BLD AUTO: ABNORMAL %
NEUTROPHILS NFR BLD MANUAL: 83 %
NRBC # BLD AUTO: 0 10E3/UL
NRBC BLD AUTO-RTO: 0 /100
PLAT MORPH BLD: ABNORMAL
PLATELET # BLD AUTO: 13 10E3/UL (ref 150–450)
POTASSIUM SERPL-SCNC: 3.2 MMOL/L (ref 3.4–5.3)
PROT SERPL-MCNC: 6.3 G/DL (ref 6.4–8.3)
RBC # BLD AUTO: 2.99 10E6/UL (ref 4.4–5.9)
RBC MORPH BLD: ABNORMAL
SODIUM SERPL-SCNC: 140 MMOL/L (ref 135–145)
URATE SERPL-MCNC: 3.6 MG/DL (ref 3.4–7)
WBC # BLD AUTO: 0.6 10E3/UL (ref 4–11)

## 2024-01-23 PROCEDURE — 99215 OFFICE O/P EST HI 40 MIN: CPT

## 2024-01-23 PROCEDURE — 84550 ASSAY OF BLOOD/URIC ACID: CPT

## 2024-01-23 PROCEDURE — 85027 COMPLETE CBC AUTOMATED: CPT

## 2024-01-23 PROCEDURE — 85610 PROTHROMBIN TIME: CPT

## 2024-01-23 PROCEDURE — 80053 COMPREHEN METABOLIC PANEL: CPT

## 2024-01-23 PROCEDURE — 85007 BL SMEAR W/DIFF WBC COUNT: CPT

## 2024-01-23 PROCEDURE — 250N000011 HC RX IP 250 OP 636

## 2024-01-23 PROCEDURE — G0463 HOSPITAL OUTPT CLINIC VISIT: HCPCS

## 2024-01-23 PROCEDURE — 250N000011 HC RX IP 250 OP 636: Mod: JZ | Performed by: PHYSICIAN ASSISTANT

## 2024-01-23 PROCEDURE — 96372 THER/PROPH/DIAG INJ SC/IM: CPT | Performed by: PHYSICIAN ASSISTANT

## 2024-01-23 PROCEDURE — 36592 COLLECT BLOOD FROM PICC: CPT

## 2024-01-23 RX ORDER — HYDROXYZINE HYDROCHLORIDE 25 MG/1
TABLET, FILM COATED ORAL
Qty: 15 TABLET | Refills: 0 | Status: SHIPPED | OUTPATIENT
Start: 2024-01-23 | End: 2024-02-22

## 2024-01-23 RX ORDER — HEPARIN SODIUM,PORCINE 10 UNIT/ML
5-20 VIAL (ML) INTRAVENOUS DAILY PRN
Status: CANCELLED | OUTPATIENT
Start: 2024-01-24

## 2024-01-23 RX ORDER — SERTRALINE HYDROCHLORIDE 25 MG/1
25 TABLET, FILM COATED ORAL DAILY
Qty: 30 TABLET | Refills: 0 | Status: SHIPPED | OUTPATIENT
Start: 2024-01-23 | End: 2024-01-23

## 2024-01-23 RX ORDER — ALBUTEROL SULFATE 0.83 MG/ML
2.5 SOLUTION RESPIRATORY (INHALATION)
Status: CANCELLED | OUTPATIENT
Start: 2024-01-24 | End: 2024-01-24

## 2024-01-23 RX ORDER — ACETAMINOPHEN 325 MG/1
TABLET ORAL
COMMUNITY
Start: 2024-01-23 | End: 2024-01-23

## 2024-01-23 RX ORDER — HEPARIN SODIUM,PORCINE 10 UNIT/ML
5 VIAL (ML) INTRAVENOUS ONCE
Status: COMPLETED | OUTPATIENT
Start: 2024-01-23 | End: 2024-01-23

## 2024-01-23 RX ORDER — HYDROXYZINE HYDROCHLORIDE 25 MG/1
TABLET, FILM COATED ORAL
Qty: 15 TABLET | Refills: 0 | Status: SHIPPED | OUTPATIENT
Start: 2024-01-23 | End: 2024-01-23

## 2024-01-23 RX ORDER — PENTAMIDINE ISETHIONATE 300 MG/300MG
300 INHALANT RESPIRATORY (INHALATION)
Status: CANCELLED | OUTPATIENT
Start: 2024-01-24 | End: 2024-01-24

## 2024-01-23 RX ORDER — LOSARTAN POTASSIUM 50 MG/1
50 TABLET ORAL DAILY
COMMUNITY
Start: 2024-01-23 | End: 2024-03-14

## 2024-01-23 RX ORDER — SERTRALINE HYDROCHLORIDE 25 MG/1
25 TABLET, FILM COATED ORAL DAILY
Qty: 30 TABLET | Refills: 0 | Status: SHIPPED | OUTPATIENT
Start: 2024-01-23 | End: 2024-01-25

## 2024-01-23 RX ORDER — POTASSIUM CHLORIDE 1500 MG/1
TABLET, EXTENDED RELEASE ORAL
Qty: 14 TABLET | Refills: 0 | Status: SHIPPED | OUTPATIENT
Start: 2024-01-23 | End: 2024-03-14

## 2024-01-23 RX ORDER — HEPARIN SODIUM (PORCINE) LOCK FLUSH IV SOLN 100 UNIT/ML 100 UNIT/ML
5 SOLUTION INTRAVENOUS
Status: CANCELLED | OUTPATIENT
Start: 2024-01-24

## 2024-01-23 RX ADMIN — Medication 5 ML: at 12:58

## 2024-01-23 RX ADMIN — FILGRASTIM-AAFI 480 MCG: 480 INJECTION, SOLUTION INTRAVENOUS; SUBCUTANEOUS at 13:48

## 2024-01-23 RX ADMIN — Medication 5 ML: at 12:56

## 2024-01-23 ASSESSMENT — PAIN SCALES - GENERAL: PAINLEVEL: NO PAIN (0)

## 2024-01-23 NOTE — NURSING NOTE
"Oncology Rooming Note    January 23, 2024 1:13 PM   Billy Carroll is a 68 year old male who presents for:    Chief Complaint   Patient presents with    Oncology Clinic Visit     Multiple Myeloma      Initial Vitals: BP (!) 158/84 (BP Location: Right arm, Patient Position: Sitting, Cuff Size: Adult Regular)   Pulse 107   Temp 97.6  F (36.4  C) (Oral)   Resp 16   Wt 96.7 kg (213 lb 1.6 oz)   SpO2 96%   BMI 30.58 kg/m   Estimated body mass index is 30.58 kg/m  as calculated from the following:    Height as of 1/11/24: 1.778 m (5' 10\").    Weight as of this encounter: 96.7 kg (213 lb 1.6 oz). Body surface area is 2.19 meters squared.  No Pain (0) Comment: Data Unavailable   No LMP for male patient.  Allergies reviewed: Yes  Medications reviewed: Yes    Medications: Medication refills not needed today.  Pharmacy name entered into [employer]:    [id number] [place], [place] - [address] [facility] [place], [place] - [address]    Frailty Screening:   Is the patient here for a new oncology consult visit in cancer care? 2. No      Clinical concerns: Wanting to go over medication  Severe headaches last two days, chills fevers 100.4  Feeling some anxiety -short of breath     Clary Gillespie              "

## 2024-01-23 NOTE — TELEPHONE ENCOUNTER
BMT Nurse Triage - Generic/Other Symptoms     Treating Provider:   Tk    Date of last office visit: 1/22/2024    Onset of symptoms: This morning     Duration of symptoms: 1 day    Brief description of symptoms: Billy describes SOB since waking up this morning around 8. He denies other symptoms. Had low grade temp yesterday - 100.3 - but normal temp this AM. He is able to have a conversation on the phone. Billy said he feels this has been building over a couple of days. RNCC asked if this could be panic related and he said it could. RNCC advised him to take an ativan that he is prescribed. He hasn't taken any since yesterday afternoon.    Billy is scheduled for an appointment today at 1230pm. Will page [facility] line to see if they have further guidance.       ADDENDUM: RNCC spoke with ROMEL Lovell. Per Liliya, if Billy wants to come in earlier than his scheduled appointment today at 1230, they will fit him in. RNCC called Billy back and he said he is feeling better after taking his meds and ativan. He declined coming in earlier and said that things have 'calmed down'. He did ask about taking his losartan. RNCC reviewed Juana's note from yesterday - she wants him to hold dose until vital signs reviewed in clinic. Billy aware to hold losartan but bring to clinic.

## 2024-01-23 NOTE — LETTER
1/23/2024         RE: Billy Carroll  [address] [place] [address]        Dear Colleague,    Thank you for referring your patient, Billy Carroll, to the [facility] BLOOD AND MARROW TRANSPLANT PROGRAM [place]. Please see a copy of my visit note below.    BMT Progress Note  01/23/2024     ID: Mr. Carroll is a 68 year old man D 13 for standard risk IgG lambda kappa multiple myeloma.    HPI:   Billy returns for follow up. He continues to feel quite anxious with associated shortness of breath at times in the afternoon after his clinic visits. He is using Ativan every 6 hrs with initially could relief but wanting to take something additionally around 4-5 hrs after the Ativan. He has occasionally been taking oxy in the evenings to help with falling asleep and his anxiety. He also continues to struggle with daily headaches which seems to be related to the GCSF. He otherwise is eating small meals throughout the day. No diarrhea, nausea or vomiting.    FYI - son tested positive for COVID. Patient is asymptomatic - we may consider testing him in 3-5 days or sooner if he becomes symptomatic.    ROS: Review of systems with pertinent positive and negatives in HPI    Exam:  Wt Readings from Last 4 Encounters:   01/23/24 96.7 kg (213 lb 1.6 oz)   01/22/24 95.6 kg (210 lb 12.8 oz)   01/21/24 96.1 kg (211 lb 12.8 oz)   01/20/24 95.4 kg (210 lb 6.4 oz)      BP (!) 158/84 (BP Location: Right arm, Patient Position: Sitting, Cuff Size: Adult Regular)   Pulse 107   Temp 97.6  F (36.4  C) (Oral)   Resp 16   Wt 96.7 kg (213 lb 1.6 oz)   SpO2 96%   BMI 30.58 kg/m       Constitutional: NAD  HEENT: OP clear, non erythematous MMM  Pulm: CTA b/l.  Breathing comfortably on RA.  CV: RRR, no m/r/g  Ext: no edema  Neuro: alert, conversant, normal gait  Psych: appropriate mood and affect  CVC intact    A/P:  Billy Carroll is a 68 year old man with IgG kappa MM with extramedullary disease, D+ 13 auto.    - PMHx significant  for CAD, severe aortic stenosis s/p TAVR (10/24/2023) currently ongoing cardiac rehabilitation, HTN, and HLD.     BMT/MM  Total collections: 7.43 x 10^6  Cell dose thawed: 3.72 x 10^6 -- infused cell dose total 1.57 x 10^6  **cell processing notified about this loss of cells during thaw/wash phase on 1/15 -- 42% of cells recovered with thaw/wash. WBC up today to 0.3!     HEME/COAG  - Transfusion parameters: hemoglobin <8, platelets <10  - Started GCSF on Day 5 continue until ANC >2.5 x 2 consecutive days.    IMMUNOCOMPROMISED  - prophy ACV, fluc, lev, Pentamidine (12/28/23)  # hx PJP pneumonia: resume Bactrim D28 post BMT    CARDIOVASCULAR - Monitor volume status carefully given cardiac history  #HTN: Hold losartan, metoprolol - likely resume once engrafted. Take losartan 50mg 1/22 at home, hold 1/23 until after vital signs reviewed in clinic.  1/22: If BP increases while in clinic, hydralazine prn  #Severe aortic stenosis, now s/p TAVR 10/24/2023.   #Moderate nonobstructive coronary artery disease   #Hyperlipidemia - hold statin khushboo-transplant  Hold ASA     GI:  Hold vitamin supplements + alendronate  Ulcer prophy: protonix   N/V: Day -1: aloxi/dex. Has PRN zofran and compazine at home. Taking ativan q 6hrs for anxiety, not complaining of nausea at this time.    FEN/Renal:  HypoK: given 40 mEq PO K+ rx -- given additional tablets for future use    MUSCULOSKELETAL/FRAILTY  Baseline Frailty Score: 2  Patient with substantial risk of sarcopenia  Cancer Rehab as needed outpatient    PSCYH:  # Anxiety: improved after PRN ativan q6hrs. (1/23) New rx for hydroxyzine 25 mg 4x daily IN. Added on Zoloft 25 mg. Referral to Mental Health    Today's Summary:  - sent Rx for 40 mEq K+ today (given additional pills in case we need to start daily supplement)  - GCSF  - Take losartan again, PM dose only, will consider increasing to BID over the next couple days if BP remains elevated   - Possible plt tomorrow  - Cont ativan q 6hrs  for anxiety + new rx for hydroxyzine to use up to 4x daily  - Start zoloft 25 mg and referred to mental health    RTC: Daily appts (has them through 1/25)    I spent 60 minutes in the care of this patient today, which included time necessary for preparation for the visit, obtaining history, ordering medications/tests/procedures as medically indicated, review of pertinent medical literature, counseling of the patient, communication of recommendations to the care team, and documentation time.      LINO MontemayorC   [phone]

## 2024-01-23 NOTE — TELEPHONE ENCOUNTER
On call Hematology/oncology fellow note.     I received a call from the pt regarding fever.     Pt is a 69 yo M with PMH of high risk D+12 auto 2/2 MM, who is calling for possible fever.      He was seen in the clinic today.    Labs 1/22/24  WBC 0.3, Hb 9.7, plt 13    He c/o fatigued, unchanged from earlier today. He c/o mild SOB, but no cough, sputum production. The patient does not report any CP, abd pain/n/v/d, dysuria, joint pain, rash.     He had a one time temp of 101 but he thinks that he thermometer was not reading well, and his temp on recheck was 99s. No other readings over 100.4 otherwise, his temp has been in the 99s. The pt is asking if he can take the APAP, saying that he can sleep better with it. I explained that APAP is a fever reducing med and that he needs to take into account his measured temp he has taken APAP.     #Neutropenia  #D+12 auto 2/2 MM    Plan)  -pt to watch his symptoms and temp  (the one time temp of 101 seems to have been an error per pt report)  -if persistent temp over 100.4, pt to come to the ED.   -If he starts to have any new symptoms, pt to come to the ED (worsening SOB, new cough, other resp symptoms, n/v/d, rash, confusion, etc)  -Pt lives with his wife. He will also notify his wife of these criteria.     Go Mary  Hem/onc fellow  Pager [phone]

## 2024-01-23 NOTE — NURSING NOTE
Pt received 480 mcg Nivestym subcutaneously into right lower abdomen per order. Pt tolerated injection without incident.     Xin Dejesus RN

## 2024-01-23 NOTE — PROGRESS NOTES
BMT Progress Note  01/23/2024     ID: Mr. Carroll is a 68 year old man D 13 for standard risk IgG lambda kappa multiple myeloma.    HPI:   Billy returns for follow up. He continues to feel quite anxious with associated shortness of breath at times in the afternoon after his clinic visits. He is using Ativan every 6 hrs with initially could relief but wanting to take something additionally around 4-5 hrs after the Ativan. He has occasionally been taking oxy in the evenings to help with falling asleep and his anxiety. He also continues to struggle with daily headaches which seems to be related to the GCSF. He otherwise is eating small meals throughout the day. No diarrhea, nausea or vomiting.    FYI - son tested positive for COVID. Patient is asymptomatic - we may consider testing him in 3-5 days or sooner if he becomes symptomatic.    ROS: Review of systems with pertinent positive and negatives in HPI    Exam:  Wt Readings from Last 4 Encounters:   01/23/24 96.7 kg (213 lb 1.6 oz)   01/22/24 95.6 kg (210 lb 12.8 oz)   01/21/24 96.1 kg (211 lb 12.8 oz)   01/20/24 95.4 kg (210 lb 6.4 oz)      BP (!) 158/84 (BP Location: Right arm, Patient Position: Sitting, Cuff Size: Adult Regular)   Pulse 107   Temp 97.6  F (36.4  C) (Oral)   Resp 16   Wt 96.7 kg (213 lb 1.6 oz)   SpO2 96%   BMI 30.58 kg/m       Constitutional: NAD  HEENT: OP clear, non erythematous MMM  Pulm: CTA b/l.  Breathing comfortably on RA.  CV: RRR, no m/r/g  Ext: no edema  Neuro: alert, conversant, normal gait  Psych: appropriate mood and affect  CVC intact    A/P:  Billy Carroll is a 68 year old man with IgG kappa MM with extramedullary disease, D+ 13 auto.    - PMHx significant for CAD, severe aortic stenosis s/p TAVR (10/24/2023) currently ongoing cardiac rehabilitation, HTN, and HLD.     BMT/MM  Total collections: 7.43 x 10^6  Cell dose thawed: 3.72 x 10^6 -- infused cell dose total 1.57 x 10^6  **cell processing notified about this loss of cells  during thaw/wash phase on 1/15 -- 42% of cells recovered with thaw/wash. WBC up today to 0.3!     HEME/COAG  - Transfusion parameters: hemoglobin <8, platelets <10  - Started GCSF on Day 5 continue until ANC >2.5 x 2 consecutive days.    IMMUNOCOMPROMISED  - prophy ACV, fluc, lev, Pentamidine (12/28/23)  # hx PJP pneumonia: resume Bactrim D28 post BMT    CARDIOVASCULAR - Monitor volume status carefully given cardiac history  #HTN: Hold losartan, metoprolol - likely resume once engrafted. Take losartan 50mg 1/22 at home, hold 1/23 until after vital signs reviewed in clinic.  1/22: If BP increases while in clinic, hydralazine prn  #Severe aortic stenosis, now s/p TAVR 10/24/2023.   #Moderate nonobstructive coronary artery disease   #Hyperlipidemia - hold statin khushboo-transplant  Hold ASA     GI:  Hold vitamin supplements + alendronate  Ulcer prophy: protonix   N/V: Day -1: aloxi/dex. Has PRN zofran and compazine at home. Taking ativan q 6hrs for anxiety, not complaining of nausea at this time.    FEN/Renal:  HypoK: given 40 mEq PO K+ rx -- given additional tablets for future use    MUSCULOSKELETAL/FRAILTY  Baseline Frailty Score: 2  Patient with substantial risk of sarcopenia  Cancer Rehab as needed outpatient    PSCYH:  # Anxiety: improved after PRN ativan q6hrs. (1/23) New rx for hydroxyzine 25 mg 4x daily DC. Added on Zoloft 25 mg. Referral to Mental Health    Today's Summary:  - sent Rx for 40 mEq K+ today (given additional pills in case we need to start daily supplement)  - GCSF  - Take losartan again, PM dose only, will consider increasing to BID over the next couple days if BP remains elevated   - Possible plt tomorrow  - Cont ativan q 6hrs for anxiety + new rx for hydroxyzine to use up to 4x daily  - Start zoloft 25 mg and referred to mental health    RTC: Daily appts (has them through 1/25)    I spent 60 minutes in the care of this patient today, which included time necessary for preparation for the visit,  obtaining history, ordering medications/tests/procedures as medically indicated, review of pertinent medical literature, counseling of the patient, communication of recommendations to the care team, and documentation time.      Nathen Ann PA-C   [phone]

## 2024-01-24 ENCOUNTER — ONCOLOGY VISIT (OUTPATIENT)
Dept: TRANSPLANT | Facility: CLINIC | Age: 69
End: 2024-01-24
Payer: MEDICARE

## 2024-01-24 ENCOUNTER — LAB (OUTPATIENT)
Dept: LAB | Facility: CLINIC | Age: 69
End: 2024-01-24
Payer: MEDICARE

## 2024-01-24 VITALS
WEIGHT: 210.3 LBS | RESPIRATION RATE: 20 BRPM | TEMPERATURE: 98.1 F | BODY MASS INDEX: 30.17 KG/M2 | HEART RATE: 117 BPM | SYSTOLIC BLOOD PRESSURE: 156 MMHG | OXYGEN SATURATION: 98 % | DIASTOLIC BLOOD PRESSURE: 82 MMHG

## 2024-01-24 DIAGNOSIS — C90.00 MULTIPLE MYELOMA, REMISSION STATUS UNSPECIFIED (H): ICD-10-CM

## 2024-01-24 DIAGNOSIS — Z52.011 AUTOLOGOUS DONOR OF STEM CELLS: Primary | ICD-10-CM

## 2024-01-24 DIAGNOSIS — I10 BENIGN ESSENTIAL HYPERTENSION: ICD-10-CM

## 2024-01-24 DIAGNOSIS — C90.00 MULTIPLE MYELOMA NOT HAVING ACHIEVED REMISSION (H): Primary | ICD-10-CM

## 2024-01-24 DIAGNOSIS — Z95.2 S/P TAVR (TRANSCATHETER AORTIC VALVE REPLACEMENT): ICD-10-CM

## 2024-01-24 LAB
ANION GAP SERPL CALCULATED.3IONS-SCNC: 10 MMOL/L (ref 7–15)
BASOPHILS # BLD AUTO: ABNORMAL 10*3/UL
BASOPHILS # BLD MANUAL: 0 10E3/UL (ref 0–0.2)
BASOPHILS NFR BLD AUTO: ABNORMAL %
BASOPHILS NFR BLD MANUAL: 0 %
BUN SERPL-MCNC: 5.2 MG/DL (ref 8–23)
CALCIUM SERPL-MCNC: 8.9 MG/DL (ref 8.8–10.2)
CHLORIDE SERPL-SCNC: 106 MMOL/L (ref 98–107)
CREAT SERPL-MCNC: 0.58 MG/DL (ref 0.67–1.17)
DEPRECATED HCO3 PLAS-SCNC: 24 MMOL/L (ref 22–29)
EGFRCR SERPLBLD CKD-EPI 2021: >90 ML/MIN/1.73M2
EOSINOPHIL # BLD AUTO: ABNORMAL 10*3/UL
EOSINOPHIL # BLD MANUAL: 0 10E3/UL (ref 0–0.7)
EOSINOPHIL NFR BLD AUTO: ABNORMAL %
EOSINOPHIL NFR BLD MANUAL: 0 %
ERYTHROCYTE [DISTWIDTH] IN BLOOD BY AUTOMATED COUNT: 14 % (ref 10–15)
GLUCOSE SERPL-MCNC: 126 MG/DL (ref 70–99)
HCT VFR BLD AUTO: 27.2 % (ref 40–53)
HGB BLD-MCNC: 9.1 G/DL (ref 13.3–17.7)
IMM GRANULOCYTES # BLD: ABNORMAL 10*3/UL
IMM GRANULOCYTES NFR BLD: ABNORMAL %
LYMPHOCYTES # BLD AUTO: ABNORMAL 10*3/UL
LYMPHOCYTES # BLD MANUAL: 0.1 10E3/UL (ref 0.8–5.3)
LYMPHOCYTES NFR BLD AUTO: ABNORMAL %
LYMPHOCYTES NFR BLD MANUAL: 13 %
MCH RBC QN AUTO: 30.1 PG (ref 26.5–33)
MCHC RBC AUTO-ENTMCNC: 33.5 G/DL (ref 31.5–36.5)
MCV RBC AUTO: 90 FL (ref 78–100)
MONOCYTES # BLD AUTO: ABNORMAL 10*3/UL
MONOCYTES # BLD MANUAL: 0 10E3/UL (ref 0–1.3)
MONOCYTES NFR BLD AUTO: ABNORMAL %
MONOCYTES NFR BLD MANUAL: 4 %
NEUTROPHILS # BLD AUTO: ABNORMAL 10*3/UL
NEUTROPHILS # BLD MANUAL: 0.7 10E3/UL (ref 1.6–8.3)
NEUTROPHILS NFR BLD AUTO: ABNORMAL %
NEUTROPHILS NFR BLD MANUAL: 83 %
NRBC # BLD AUTO: 0 10E3/UL
NRBC # BLD AUTO: 0 10E3/UL
NRBC BLD AUTO-RTO: 0 /100
NRBC BLD MANUAL-RTO: 1 %
PLAT MORPH BLD: ABNORMAL
PLATELET # BLD AUTO: 15 10E3/UL (ref 150–450)
POTASSIUM SERPL-SCNC: 3.6 MMOL/L (ref 3.4–5.3)
RBC # BLD AUTO: 3.02 10E6/UL (ref 4.4–5.9)
RBC MORPH BLD: ABNORMAL
SODIUM SERPL-SCNC: 140 MMOL/L (ref 135–145)
WBC # BLD AUTO: 0.9 10E3/UL (ref 4–11)

## 2024-01-24 PROCEDURE — 250N000011 HC RX IP 250 OP 636: Mod: JZ,JB | Performed by: PHYSICIAN ASSISTANT

## 2024-01-24 PROCEDURE — 96372 THER/PROPH/DIAG INJ SC/IM: CPT | Performed by: PHYSICIAN ASSISTANT

## 2024-01-24 PROCEDURE — 99214 OFFICE O/P EST MOD 30 MIN: CPT

## 2024-01-24 PROCEDURE — G0463 HOSPITAL OUTPT CLINIC VISIT: HCPCS

## 2024-01-24 PROCEDURE — 85007 BL SMEAR W/DIFF WBC COUNT: CPT

## 2024-01-24 PROCEDURE — 250N000011 HC RX IP 250 OP 636

## 2024-01-24 PROCEDURE — 85027 COMPLETE CBC AUTOMATED: CPT

## 2024-01-24 PROCEDURE — 80048 BASIC METABOLIC PNL TOTAL CA: CPT

## 2024-01-24 PROCEDURE — 36592 COLLECT BLOOD FROM PICC: CPT

## 2024-01-24 RX ORDER — METOPROLOL SUCCINATE 50 MG/1
50 TABLET, EXTENDED RELEASE ORAL DAILY
COMMUNITY
Start: 2024-01-24

## 2024-01-24 RX ORDER — ALBUTEROL SULFATE 0.83 MG/ML
2.5 SOLUTION RESPIRATORY (INHALATION)
Status: CANCELLED | OUTPATIENT
Start: 2024-01-25 | End: 2024-01-25

## 2024-01-24 RX ORDER — HEPARIN SODIUM (PORCINE) LOCK FLUSH IV SOLN 100 UNIT/ML 100 UNIT/ML
5 SOLUTION INTRAVENOUS
Status: CANCELLED | OUTPATIENT
Start: 2024-01-25

## 2024-01-24 RX ORDER — PENTAMIDINE ISETHIONATE 300 MG/300MG
300 INHALANT RESPIRATORY (INHALATION)
Status: CANCELLED | OUTPATIENT
Start: 2024-01-25 | End: 2024-01-25

## 2024-01-24 RX ORDER — HEPARIN SODIUM,PORCINE 10 UNIT/ML
5 VIAL (ML) INTRAVENOUS
Status: DISCONTINUED | OUTPATIENT
Start: 2024-01-24 | End: 2024-01-24 | Stop reason: HOSPADM

## 2024-01-24 RX ORDER — HEPARIN SODIUM,PORCINE 10 UNIT/ML
5-20 VIAL (ML) INTRAVENOUS DAILY PRN
Status: CANCELLED | OUTPATIENT
Start: 2024-01-25

## 2024-01-24 RX ADMIN — Medication 5 ML: at 09:14

## 2024-01-24 RX ADMIN — FILGRASTIM-AAFI 480 MCG: 480 INJECTION, SOLUTION INTRAVENOUS; SUBCUTANEOUS at 10:06

## 2024-01-24 RX ADMIN — Medication 5 ML: at 09:13

## 2024-01-24 ASSESSMENT — PAIN SCALES - GENERAL: PAINLEVEL: NO PAIN (0)

## 2024-01-24 NOTE — PROGRESS NOTES
BMT Progress Note  01/24/2024     ID: Mr. Carroll is a 68 year old man D 14 for standard risk IgG lambda kappa multiple myeloma.    HPI:   Billy returns for follow up.   Has been struggling with anxiety.  Decreased appetite. Some diarrhea--took imodium.  Started zoloft yesterday. Didn't use atarax and has been using PRN imodium.  He doesn't think his symptoms are cardiac in nature he thinks it's more anxiety/panic. Ativan helpful.     ROS: Review of systems with pertinent positive and negatives in HPI    Exam:  Wt Readings from Last 4 Encounters:   01/24/24 95.4 kg (210 lb 4.8 oz)   01/23/24 96.7 kg (213 lb 1.6 oz)   01/22/24 95.6 kg (210 lb 12.8 oz)   01/21/24 96.1 kg (211 lb 12.8 oz)      BP (!) 156/82   Pulse 117   Temp 98.1  F (36.7  C) (Oral)   Resp 20   Wt 95.4 kg (210 lb 4.8 oz)   SpO2 98%   BMI 30.17 kg/m       Constitutional: NAD  HEENT: masked  Pulm: CTA b/l.  Breathing comfortably on RA.  CV: +little tachy but regular.   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    - PMHx significant for CAD, severe aortic stenosis s/p TAVR (10/24/2023) currently ongoing cardiac rehabilitation, HTN, and HLD.     BMT/MM  Total collections: 7.43 x 10^6  Cell dose thawed: 3.72 x 10^6 -- infused cell dose total 1.57 x 10^6  **cell processing notified about this loss of cells during thaw/wash phase on 1/15 -- 42% of cells recovered with thaw/wash.      HEME/COAG  - Transfusion parameters: hemoglobin <8, platelets <10  - Started GCSF on Day 5 continue until ANC >2.5 x 2 consecutive days.  - wbc recovering. No transfusions today.     IMMUNOCOMPROMISED  - prophy ACV, fluc, lev, Pentamidine (12/28/23)  # hx PJP pneumonia: resume Bactrim D28 post BMT    CARDIOVASCULAR - Monitor volume status carefully given cardiac history  #HTN: resume losartan 50mg daily and  metoprolol XL 50mg daily. I can't figure why these were ever held he has not had any hypotension. He tells me he did take losartan yesterday but not yet today as he thinks he was told to wait for us to check his BP. I told him take his losartan and metop daily.   #Severe aortic stenosis, now s/p TAVR 10/24/2023.   #Moderate nonobstructive coronary artery disease   #Hyperlipidemia - hold statin khushboo-transplant  Hold ASA     GI:  Hold vitamin supplements + alendronate  Ulcer prophy: protonix   N/V: Day -1: aloxi/dex. Has PRN zofran, ativan, and compazine at home.     FEN/Renal:  HypoK: corrected after 40meq yest. He has tabs at home if needs supplementing.     MUSCULOSKELETAL/FRAILTY  Baseline Frailty Score: 2  Patient with substantial risk of sarcopenia  Cancer Rehab as needed outpatient    PSCYH:  # Anxiety: improved after PRN ativan q6hrs. (1/23) New rx for hydroxyzine 25 mg 4x daily PRN. Added on Zoloft 25 mg. Referral to Mental Health.    RTC: Daily appts    I spent 30 minutes in the care of this patient today, which included time necessary for preparation for the visit, obtaining history, ordering medications/tests/procedures as medically indicated, review of pertinent medical literature, counseling of the patient, communication of recommendations to the care team, and documentation time.      Amy Hernandez PA-C   [phone]

## 2024-01-24 NOTE — NURSING NOTE
"      Oncology Rooming Note    January 24, 2024 9:34 AM   Billy Carroll is a 68 year old male who presents for:    Chief Complaint   Patient presents with    Blood Draw     Labs drawn from cvc by rn.  VS taken.    Oncology Clinic Visit     Multiple Myeloma     Initial Vitals: BP (!) 156/82   Pulse 117   Temp 98.1  F (36.7  C) (Oral)   Resp 20   Wt 95.4 kg (210 lb 4.8 oz)   SpO2 98%   BMI 30.17 kg/m   Estimated body mass index is 30.17 kg/m  as calculated from the following:    Height as of 1/11/24: 1.778 m (5' 10\").    Weight as of this encounter: 95.4 kg (210 lb 4.8 oz). Body surface area is 2.17 meters squared.  No Pain (0) Comment: Data Unavailable   No LMP for male patient.  Allergies reviewed: Yes  Medications reviewed: Pt would like to review medications with provider.    Medications: Medication refills not needed today.  Pharmacy name entered into [employer]:    [id number] [place], [place] - [address] [facility] [place], [place] - [address]    Frailty Screening:   Is the patient here for a new oncology consult visit in cancer care? 2. No      Clinical concerns: Med Review       Oanh Machuca LPN  1/24/2024                "

## 2024-01-24 NOTE — LETTER
1/24/2024         RE: Billy Carroll  [address] [place] [address]        Dear Colleague,    Thank you for referring your patient, Billy Carroll, to the [facility] BLOOD AND MARROW TRANSPLANT PROGRAM [place]. Please see a copy of my visit note below.    BMT Progress Note  01/24/2024     ID: Mr. Carroll is a 68 year old man D 14 for standard risk IgG lambda kappa multiple myeloma.    HPI:   Billy returns for follow up.   Has been struggling with anxiety.  Decreased appetite. Some diarrhea--took imodium.  Started zoloft yesterday. Didn't use atarax and has been using PRN imodium.  He doesn't think his symptoms are cardiac in nature he thinks it's more anxiety/panic. Ativan helpful.     ROS: Review of systems with pertinent positive and negatives in HPI    Exam:  Wt Readings from Last 4 Encounters:   01/24/24 95.4 kg (210 lb 4.8 oz)   01/23/24 96.7 kg (213 lb 1.6 oz)   01/22/24 95.6 kg (210 lb 12.8 oz)   01/21/24 96.1 kg (211 lb 12.8 oz)      BP (!) 156/82   Pulse 117   Temp 98.1  F (36.7  C) (Oral)   Resp 20   Wt 95.4 kg (210 lb 4.8 oz)   SpO2 98%   BMI 30.17 kg/m       Constitutional: NAD  HEENT: masked  Pulm: CTA b/l.  Breathing comfortably on RA.  CV: +little tachy but regular.   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    - PMHx significant for CAD, severe aortic stenosis s/p TAVR (10/24/2023) currently ongoing cardiac rehabilitation, HTN, and HLD.     BMT/MM  Total collections: 7.43 x 10^6  Cell dose thawed: 3.72 x 10^6 -- infused cell dose total 1.57 x 10^6  **cell processing notified about this loss of cells during thaw/wash phase on 1/15 -- 42% of cells recovered with thaw/wash.      HEME/COAG  - Transfusion parameters: hemoglobin <8, platelets <10  - Started GCSF on Day 5 continue until ANC >2.5 x 2 consecutive days.  -  wbc recovering. No transfusions today.     IMMUNOCOMPROMISED  - prophy ACV, fluc, lev, Pentamidine (12/28/23)  # hx PJP pneumonia: resume Bactrim D28 post BMT    CARDIOVASCULAR - Monitor volume status carefully given cardiac history  #HTN: resume losartan 50mg daily and metoprolol XL 50mg daily. I can't figure why these were ever held he has not had any hypotension. He tells me he did take losartan yesterday but not yet today as he thinks he was told to wait for us to check his BP. I told him take his losartan and metop daily.   #Severe aortic stenosis, now s/p TAVR 10/24/2023.   #Moderate nonobstructive coronary artery disease   #Hyperlipidemia - hold statin khushboo-transplant  Hold ASA     GI:  Hold vitamin supplements + alendronate  Ulcer prophy: protonix   N/V: Day -1: aloxi/dex. Has PRN zofran, ativan, and compazine at home.     FEN/Renal:  HypoK: corrected after 40meq yest. He has tabs at home if needs supplementing.     MUSCULOSKELETAL/FRAILTY  Baseline Frailty Score: 2  Patient with substantial risk of sarcopenia  Cancer Rehab as needed outpatient    PSCYH:  # Anxiety: improved after PRN ativan q6hrs. (1/23) New rx for hydroxyzine 25 mg 4x daily PRN. Added on Zoloft 25 mg. Referral to Mental Health.    RTC: Daily appts    I spent 30 minutes in the care of this patient today, which included time necessary for preparation for the visit, obtaining history, ordering medications/tests/procedures as medically indicated, review of pertinent medical literature, counseling of the patient, communication of recommendations to the care team, and documentation time.      Amy Hernandez PA-C   [phone]

## 2024-01-25 ENCOUNTER — ONCOLOGY VISIT (OUTPATIENT)
Dept: TRANSPLANT | Facility: CLINIC | Age: 69
End: 2024-01-25
Attending: PHYSICIAN ASSISTANT
Payer: MEDICARE

## 2024-01-25 ENCOUNTER — APPOINTMENT (OUTPATIENT)
Dept: LAB | Facility: CLINIC | Age: 69
End: 2024-01-25
Payer: MEDICARE

## 2024-01-25 VITALS
SYSTOLIC BLOOD PRESSURE: 145 MMHG | RESPIRATION RATE: 20 BRPM | TEMPERATURE: 98 F | DIASTOLIC BLOOD PRESSURE: 78 MMHG | WEIGHT: 208 LBS | OXYGEN SATURATION: 98 % | BODY MASS INDEX: 29.84 KG/M2 | HEART RATE: 88 BPM

## 2024-01-25 DIAGNOSIS — C90.01 MULTIPLE MYELOMA IN REMISSION (H): ICD-10-CM

## 2024-01-25 DIAGNOSIS — C90.00 MULTIPLE MYELOMA, REMISSION STATUS UNSPECIFIED (H): Primary | ICD-10-CM

## 2024-01-25 DIAGNOSIS — Z52.011 AUTOLOGOUS DONOR OF STEM CELLS: ICD-10-CM

## 2024-01-25 PROCEDURE — 36592 COLLECT BLOOD FROM PICC: CPT

## 2024-01-25 PROCEDURE — 250N000011 HC RX IP 250 OP 636: Mod: JZ,JB | Performed by: PHYSICIAN ASSISTANT

## 2024-01-25 PROCEDURE — 96372 THER/PROPH/DIAG INJ SC/IM: CPT | Performed by: PHYSICIAN ASSISTANT

## 2024-01-25 PROCEDURE — 85007 BL SMEAR W/DIFF WBC COUNT: CPT

## 2024-01-25 PROCEDURE — 250N000011 HC RX IP 250 OP 636: Performed by: PHYSICIAN ASSISTANT

## 2024-01-25 PROCEDURE — 99214 OFFICE O/P EST MOD 30 MIN: CPT

## 2024-01-25 PROCEDURE — 80048 BASIC METABOLIC PNL TOTAL CA: CPT

## 2024-01-25 PROCEDURE — 85027 COMPLETE CBC AUTOMATED: CPT

## 2024-01-25 PROCEDURE — G0463 HOSPITAL OUTPT CLINIC VISIT: HCPCS | Mod: 25

## 2024-01-25 RX ORDER — ALBUTEROL SULFATE 0.83 MG/ML
2.5 SOLUTION RESPIRATORY (INHALATION)
Status: CANCELLED | OUTPATIENT
Start: 2024-01-26 | End: 2024-01-26

## 2024-01-25 RX ORDER — LORAZEPAM 0.5 MG/1
0.5 TABLET ORAL EVERY 6 HOURS PRN
Qty: 30 TABLET | Refills: 0 | Status: SHIPPED | OUTPATIENT
Start: 2024-01-25 | End: 2024-04-16

## 2024-01-25 RX ORDER — HEPARIN SODIUM,PORCINE 10 UNIT/ML
5-20 VIAL (ML) INTRAVENOUS DAILY PRN
Status: CANCELLED | OUTPATIENT
Start: 2024-01-26

## 2024-01-25 RX ORDER — HEPARIN SODIUM,PORCINE 10 UNIT/ML
5 VIAL (ML) INTRAVENOUS ONCE
Status: COMPLETED | OUTPATIENT
Start: 2024-01-25 | End: 2024-01-25

## 2024-01-25 RX ORDER — PENTAMIDINE ISETHIONATE 300 MG/300MG
300 INHALANT RESPIRATORY (INHALATION)
Status: DISCONTINUED | OUTPATIENT
Start: 2024-01-25 | End: 2024-01-25

## 2024-01-25 RX ORDER — ALBUTEROL SULFATE 0.83 MG/ML
2.5 SOLUTION RESPIRATORY (INHALATION)
Status: DISCONTINUED | OUTPATIENT
Start: 2024-01-25 | End: 2024-01-25

## 2024-01-25 RX ORDER — PENTAMIDINE ISETHIONATE 300 MG/300MG
300 INHALANT RESPIRATORY (INHALATION)
Status: CANCELLED | OUTPATIENT
Start: 2024-01-26 | End: 2024-01-26

## 2024-01-25 RX ORDER — HEPARIN SODIUM (PORCINE) LOCK FLUSH IV SOLN 100 UNIT/ML 100 UNIT/ML
5 SOLUTION INTRAVENOUS
Status: CANCELLED | OUTPATIENT
Start: 2024-01-26

## 2024-01-25 RX ADMIN — FILGRASTIM-AAFI 480 MCG: 480 INJECTION, SOLUTION INTRAVENOUS; SUBCUTANEOUS at 10:51

## 2024-01-25 RX ADMIN — Medication 5 ML: at 10:37

## 2024-01-25 ASSESSMENT — PAIN SCALES - GENERAL: PAINLEVEL: NO PAIN (0)

## 2024-01-25 NOTE — NURSING NOTE
"Oncology Rooming Note    January 25, 2024 10:39 AM   Billy Carroll is a 68 year old male who presents for:    Chief Complaint   Patient presents with    Oncology Clinic Visit     RTN for MM S/P BMT     Blood Draw     Labs collected from CVC by RN, line flushed with saline and heparin.  Vitals taken. Pt checked in for appointment(s).      Initial Vitals: BP (!) 145/78   Pulse 88   Temp 98  F (36.7  C)   Resp 20   Wt 94.3 kg (208 lb)   SpO2 98%   BMI 29.84 kg/m   Estimated body mass index is 29.84 kg/m  as calculated from the following:    Height as of 1/11/24: 1.778 m (5' 10\").    Weight as of this encounter: 94.3 kg (208 lb). Body surface area is 2.16 meters squared.  No Pain (0) Comment: Data Unavailable   No LMP for male patient.  Allergies reviewed: Yes  Medications reviewed: Yes    Medications: Medication refills not needed today.  Pharmacy name entered into [facility]:    [id number] [place], [place] - [address] [facility] [place], [place] - [address]    Frailty Screening:   Is the patient here for a new oncology consult visit in cancer care? 2. No      Clinical concerns: none       Leslie Espinosa MA             "

## 2024-01-25 NOTE — NURSING NOTE
Chief Complaint   Patient presents with    Oncology Clinic Visit     RTN for MM S/P BMT     Blood Draw     Labs collected from CVC by RN, line flushed with saline and heparin.  Vitals taken. Pt checked in for appointment(s).      Labs collected from CVC by RN, line flushed with saline and heparin.  Vitals taken. Pt checked in for appointment(s).     Mellisa Brennan RN

## 2024-01-25 NOTE — LETTER
1/25/2024         RE: Billy Carroll  [address] [place] [address]        Dear Colleague,    Thank you for referring your patient, Billy Carroll, to the [facility] BLOOD AND MARROW TRANSPLANT PROGRAM [place]. Please see a copy of my visit note below.    BMT Progress Note  01/25/2024     ID: Mr. Carroll is a 68 year old man D 15 for standard risk IgG lambda kappa multiple myeloma.    HPI:   Billy returns for follow up. He feels so much better today.  Still with fatigue but a bit bettter.  He tells me he doesn't have a hx of anxiety but has had experienced some over the last couple days.  He take ativan for this with relief.    Decreased appetite. No diarrhea.      ROS: Review of systems with pertinent positive and negatives in HPI    Exam:  Wt Readings from Last 4 Encounters:   01/25/24 94.3 kg (208 lb)   01/24/24 95.4 kg (210 lb 4.8 oz)   01/23/24 96.7 kg (213 lb 1.6 oz)   01/22/24 95.6 kg (210 lb 12.8 oz)      BP (!) 145/78   Pulse 88   Temp 98  F (36.7  C)   Resp 20   Wt 94.3 kg (208 lb)   SpO2 98%   BMI 29.84 kg/m       Constitutional: NAD  HEENT: masked  Pulm: CTA b/l.  Breathing comfortably on RA.  CV: +little tachy but regular.   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    - PMHx significant for CAD, severe aortic stenosis s/p TAVR (10/24/2023) currently ongoing cardiac rehabilitation, HTN, and HLD.     BMT/MM  Total collections: 7.43 x 10^6  Cell dose thawed: 3.72 x 10^6 -- infused cell dose total 1.57 x 10^6  **cell processing notified about this loss of cells during thaw/wash phase on 1/15 -- 42% of cells recovered with thaw/wash.      HEME/COAG  - Transfusion parameters: hemoglobin <8, platelets <10  - Started GCSF on Day 5 continue until ANC >2.5 x 2 consecutive days.  - wbc recovering. No transfusions today.      IMMUNOCOMPROMISED  - prophy ACV, fluc, lev, Pentamidine (12/28/23)  # hx PJP pneumonia: resume Bactrim D28 post BMT    CARDIOVASCULAR - Monitor volume status carefully given cardiac history  #HTN: losartan 50mg daily and metoprolol XL 50mg daily.   #Severe aortic stenosis, now s/p TAVR 10/24/2023.   #Moderate nonobstructive coronary artery disease   #Hyperlipidemia - hold statin khushboo-transplant  Hold ASA     GI:  Hold vitamin supplements + alendronate  Ulcer prophy: protonix   N/V: Day -1: aloxi/dex. Has PRN zofran, ativan, and compazine at home.     FEN/Renal:  HypoK: trying to increase K in diet.  He has tabs at home if needs supplementing.     MUSCULOSKELETAL/FRAILTY  Baseline Frailty Score: 2  Patient with substantial risk of sarcopenia  Cancer Rehab as needed outpatient    PSCYH:  # Anxiety: likely situational.  improved after PRN ativan q6hrs--really just taking 1-2 x day. (1/23) New rx for hydroxyzine 25 mg 4x daily PRN. Added on Zoloft 25 mg--pt does not think he needs this. Referral to Mental Health.    RTC: Daily appts--has through 1/29    I spent 30 minutes in the care of this patient today, which included time necessary for preparation for the visit, obtaining history, ordering medications/tests/procedures as medically indicated, review of pertinent medical literature, counseling of the patient, communication of recommendations to the care team, and documentation time.      Kassandra Sims PA-C

## 2024-01-25 NOTE — PROGRESS NOTES
BMT Progress Note  01/25/2024     ID: Mr. Carroll is a 68 year old man D 15 for standard risk IgG lambda kappa multiple myeloma.    HPI:   Billy returns for follow up. He feels so much better today.  Still with fatigue but a bit bettter.  He tells me he doesn't have a hx of anxiety but has had experienced some over the last couple days.  He take ativan for this with relief.    Decreased appetite. No diarrhea.      ROS: Review of systems with pertinent positive and negatives in HPI    Exam:  Wt Readings from Last 4 Encounters:   01/25/24 94.3 kg (208 lb)   01/24/24 95.4 kg (210 lb 4.8 oz)   01/23/24 96.7 kg (213 lb 1.6 oz)   01/22/24 95.6 kg (210 lb 12.8 oz)      BP (!) 145/78   Pulse 88   Temp 98  F (36.7  C)   Resp 20   Wt 94.3 kg (208 lb)   SpO2 98%   BMI 29.84 kg/m       Constitutional: NAD  HEENT: masked  Pulm: CTA b/l.  Breathing comfortably on RA.  CV: +little tachy but regular.   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    - PMHx significant for CAD, severe aortic stenosis s/p TAVR (10/24/2023) currently ongoing cardiac rehabilitation, HTN, and HLD.     BMT/MM  Total collections: 7.43 x 10^6  Cell dose thawed: 3.72 x 10^6 -- infused cell dose total 1.57 x 10^6  **cell processing notified about this loss of cells during thaw/wash phase on 1/15 -- 42% of cells recovered with thaw/wash.      HEME/COAG  - Transfusion parameters: hemoglobin <8, platelets <10  - Started GCSF on Day 5 continue until ANC >2.5 x 2 consecutive days.  - wbc recovering. No transfusions today.     IMMUNOCOMPROMISED  - prophy ACV, fluc, lev, Pentamidine (12/28/23)  # hx PJP pneumonia: resume Bactrim D28 post BMT    CARDIOVASCULAR - Monitor volume status carefully given cardiac history  #HTN: losartan 50mg daily and metoprolol XL 50mg daily.   #Severe aortic stenosis, now s/p  TAVR 10/24/2023.   #Moderate nonobstructive coronary artery disease   #Hyperlipidemia - hold statin khushboo-transplant  Hold ASA     GI:  Hold vitamin supplements + alendronate  Ulcer prophy: protonix   N/V: Day -1: aloxi/dex. Has PRN zofran, ativan, and compazine at home.     FEN/Renal:  HypoK: trying to increase K in diet.  He has tabs at home if needs supplementing.     MUSCULOSKELETAL/FRAILTY  Baseline Frailty Score: 2  Patient with substantial risk of sarcopenia  Cancer Rehab as needed outpatient    PSCYH:  # Anxiety: likely situational.  improved after PRN ativan q6hrs--really just taking 1-2 x day. (1/23) New rx for hydroxyzine 25 mg 4x daily PRN. Added on Zoloft 25 mg--pt does not think he needs this. Referral to Mental Health.    RTC: Daily appts--has through 1/29    I spent 30 minutes in the care of this patient today, which included time necessary for preparation for the visit, obtaining history, ordering medications/tests/procedures as medically indicated, review of pertinent medical literature, counseling of the patient, communication of recommendations to the care team, and documentation time.      Kassandra Sims PA-C

## 2024-01-26 ENCOUNTER — ONCOLOGY VISIT (OUTPATIENT)
Dept: TRANSPLANT | Facility: CLINIC | Age: 69
End: 2024-01-26
Payer: MEDICARE

## 2024-01-26 ENCOUNTER — APPOINTMENT (OUTPATIENT)
Dept: LAB | Facility: CLINIC | Age: 69
End: 2024-01-26
Payer: MEDICARE

## 2024-01-26 VITALS
BODY MASS INDEX: 29.79 KG/M2 | TEMPERATURE: 97.7 F | OXYGEN SATURATION: 96 % | DIASTOLIC BLOOD PRESSURE: 77 MMHG | RESPIRATION RATE: 19 BRPM | SYSTOLIC BLOOD PRESSURE: 131 MMHG | WEIGHT: 207.6 LBS | HEART RATE: 79 BPM

## 2024-01-26 DIAGNOSIS — Z52.011 AUTOLOGOUS DONOR OF STEM CELLS: ICD-10-CM

## 2024-01-26 DIAGNOSIS — C90.00 MULTIPLE MYELOMA, REMISSION STATUS UNSPECIFIED (H): Primary | ICD-10-CM

## 2024-01-26 PROCEDURE — G0463 HOSPITAL OUTPT CLINIC VISIT: HCPCS | Mod: 25

## 2024-01-26 PROCEDURE — 99214 OFFICE O/P EST MOD 30 MIN: CPT

## 2024-01-26 PROCEDURE — 96372 THER/PROPH/DIAG INJ SC/IM: CPT | Performed by: PHYSICIAN ASSISTANT

## 2024-01-26 PROCEDURE — 250N000011 HC RX IP 250 OP 636

## 2024-01-26 PROCEDURE — 250N000011 HC RX IP 250 OP 636: Mod: JZ | Performed by: PHYSICIAN ASSISTANT

## 2024-01-26 RX ORDER — HEPARIN SODIUM,PORCINE 10 UNIT/ML
5-20 VIAL (ML) INTRAVENOUS DAILY PRN
Status: CANCELLED | OUTPATIENT
Start: 2024-01-27

## 2024-01-26 RX ORDER — HEPARIN SODIUM,PORCINE 10 UNIT/ML
5 VIAL (ML) INTRAVENOUS
Status: DISCONTINUED | OUTPATIENT
Start: 2024-01-26 | End: 2024-01-26 | Stop reason: HOSPADM

## 2024-01-26 RX ORDER — HEPARIN SODIUM (PORCINE) LOCK FLUSH IV SOLN 100 UNIT/ML 100 UNIT/ML
5 SOLUTION INTRAVENOUS
Status: CANCELLED | OUTPATIENT
Start: 2024-01-27

## 2024-01-26 RX ORDER — PENTAMIDINE ISETHIONATE 300 MG/300MG
300 INHALANT RESPIRATORY (INHALATION)
Status: CANCELLED | OUTPATIENT
Start: 2024-01-27 | End: 2024-01-27

## 2024-01-26 RX ORDER — ALBUTEROL SULFATE 0.83 MG/ML
2.5 SOLUTION RESPIRATORY (INHALATION)
Status: CANCELLED | OUTPATIENT
Start: 2024-01-27 | End: 2024-01-27

## 2024-01-26 RX ADMIN — Medication 5 ML: at 11:09

## 2024-01-26 RX ADMIN — FILGRASTIM-AAFI 480 MCG: 480 INJECTION, SOLUTION INTRAVENOUS; SUBCUTANEOUS at 12:15

## 2024-01-26 ASSESSMENT — PAIN SCALES - GENERAL: PAINLEVEL: NO PAIN (0)

## 2024-01-26 NOTE — NURSING NOTE
Chief Complaint   Patient presents with    Blood Draw     Labs drawn via CVC by RN.      Labs drawn via CVC by RN. Flushed with saline and heparin. Pt tolerated well. Vitals taken. Pt checked into next appointment.    Valentine De León RN

## 2024-01-26 NOTE — LETTER
1/26/2024         RE: Billy Carroll  [address] [place] [address]        Dear Colleague,    Thank you for referring your patient, Billy Carroll, to the [facility] BLOOD AND MARROW TRANSPLANT PROGRAM [place]. Please see a copy of my visit note below.    BMT Progress Note  01/26/2024     ID: Mr. Carroll is a 68 year old man D 16 for standard risk IgG lambda kappa multiple myeloma.    HPI: Here for follow-up. He feels like he's turning the corner. Appetite improving. No n/v. Stools are soft, taking Imodium occasionally and metamucil daily. Doesn't feel like he needs Zoloft. Taking Ativan maybe twice daily.     ROS: Review of systems with pertinent positive and negatives in HPI    Exam:  Blood pressure 131/77, pulse 79, temperature 97.7  F (36.5  C), temperature source Oral, resp. rate 19, weight 94.2 kg (207 lb 9.6 oz), SpO2 96%.  Wt Readings from Last 4 Encounters:   01/26/24 94.2 kg (207 lb 9.6 oz)   01/25/24 94.3 kg (208 lb)   01/24/24 95.4 kg (210 lb 4.8 oz)   01/23/24 96.7 kg (213 lb 1.6 oz)     Constitutional: NAD  HEENT: sclera anicteric. OP moist without lesions  Pulm: CTAB  CV: RRR  Ext: no edema  Neuro: alert, conversant. Non-focal  Psych: appropriate mood and affect  Access: CVC R chest NT, dressing cdi    A/P:  Billy Carroll is a 68 year old man with IgG kappa MM with extramedullary disease, D+ 16 s/p oupt auto PBSCT.    - PMHx significant for CAD, severe aortic stenosis s/p TAVR (10/24/2023) currently ongoing cardiac rehabilitation, HTN, and HLD.     BMT/MM  Total collections: 7.43 x 10^6  Cell dose thawed: 3.72 x 10^6 -- infused cell dose total 1.57 x 10^6  **cell processing notified about this loss of cells during thaw/wash phase on 1/15 -- 42% of cells recovered with thaw/wash.      HEME/COAG  - Transfusion parameters: hemoglobin <8, platelets <10  - Started GCSF on Day 5 continue until ANC >2.5 x 2 consecutive days. ANC up to 1.8 today.     IMMUNOCOMPROMISED  - prophy ACV,  fluc, lev, Pentamidine (12/28/23)  # hx PJP pneumonia: resume Bactrim D28 post BMT.    CARDIOVASCULAR - Monitor volume status carefully given cardiac history  #HTN: losartan 50mg daily and metoprolol XL 50mg daily.   #Severe aortic stenosis, now s/p TAVR 10/24/2023.   #Moderate nonobstructive coronary artery disease   #Hyperlipidemia - hold statin khushboo-transplant  Hold ASA     GI:  Hold vitamin supplements + alendronate  Ulcer prophy: protonix   N/V: Day -1: aloxi/dex. Has PRN zofran, ativan, and compazine at home.     FEN/Renal:  - creat, lytes wnl    MUSCULOSKELETAL/FRAILTY  Baseline Frailty Score: 2  Cancer Rehab as needed outpatient    PSCYH:  # Anxiety: likely situational. Ativan prn.   Tried Zofran x2 days but had bad dreams one night. Also has Atarax prn and referral to Mental Health.    Summary:  GCSF  RTC daily through engraftment; requested thru next Wed    I spent 40 minutes in the care of this patient today, which included time necessary for preparation for the visit, obtaining history, ordering medications/tests/procedures as medically indicated, review of pertinent medical literature, counseling of the patient, communication of recommendations to the care team, and documentation time.      Liliya Degroot PA-C

## 2024-01-26 NOTE — NURSING NOTE
"Oncology Rooming Note    January 26, 2024 11:40 AM   Billy Carroll is a 68 year old male who presents for:    Chief Complaint   Patient presents with    Blood Draw     Labs drawn via CVC by RN.     Oncology Clinic Visit     Multiple Myeloma     Initial Vitals: /77 (BP Location: Right arm, Patient Position: Sitting, Cuff Size: Adult Regular)   Pulse 79   Temp 97.7  F (36.5  C) (Oral)   Resp 19   Wt 94.2 kg (207 lb 9.6 oz)   SpO2 96%   BMI 29.79 kg/m   Estimated body mass index is 29.79 kg/m  as calculated from the following:    Height as of 1/11/24: 1.778 m (5' 10\").    Weight as of this encounter: 94.2 kg (207 lb 9.6 oz). Body surface area is 2.16 meters squared.  No Pain (0) Comment: Data Unavailable   No LMP for male patient.  Allergies reviewed: Yes  Medications reviewed: Yes    Medications: Medication refills not needed today.  Pharmacy name entered into [facility]:    [id number] [place], [place] - [address] [facility] [place], [place] - [address]    Frailty Screening:   Is the patient here for a new oncology consult visit in cancer care? 2. No      Clinical concerns: None       Oanh Machuca LPN  1/26/2024              "

## 2024-01-26 NOTE — PROGRESS NOTES
BMT Progress Note  01/26/2024     ID: Mr. Carroll is a 68 year old man D 16 for standard risk IgG lambda kappa multiple myeloma.    HPI: Here for follow-up. He feels like he's turning the corner. Appetite improving. No n/v. Stools are soft, taking Imodium occasionally and metamucil daily. Doesn't feel like he needs Zoloft. Taking Ativan maybe twice daily.     ROS: Review of systems with pertinent positive and negatives in HPI    Exam:  Blood pressure 131/77, pulse 79, temperature 97.7  F (36.5  C), temperature source Oral, resp. rate 19, weight 94.2 kg (207 lb 9.6 oz), SpO2 96%.  Wt Readings from Last 4 Encounters:   01/26/24 94.2 kg (207 lb 9.6 oz)   01/25/24 94.3 kg (208 lb)   01/24/24 95.4 kg (210 lb 4.8 oz)   01/23/24 96.7 kg (213 lb 1.6 oz)     Constitutional: NAD  HEENT: sclera anicteric. OP moist without lesions  Pulm: CTAB  CV: RRR  Ext: no edema  Neuro: alert, conversant. Non-focal  Psych: appropriate mood and affect  Access: CVC R chest NT, dressing cdi    A/P:  Billy Carroll is a 68 year old man with IgG kappa MM with extramedullary disease, D+ 16 s/p oupt auto PBSCT.    - PMHx significant for CAD, severe aortic stenosis s/p TAVR (10/24/2023) currently ongoing cardiac rehabilitation, HTN, and HLD.     BMT/MM  Total collections: 7.43 x 10^6  Cell dose thawed: 3.72 x 10^6 -- infused cell dose total 1.57 x 10^6  **cell processing notified about this loss of cells during thaw/wash phase on 1/15 -- 42% of cells recovered with thaw/wash.      HEME/COAG  - Transfusion parameters: hemoglobin <8, platelets <10  - Started GCSF on Day 5 continue until ANC >2.5 x 2 consecutive days. ANC up to 1.8 today.     IMMUNOCOMPROMISED  - prophy ACV, fluc, lev, Pentamidine (12/28/23)  # hx PJP pneumonia: resume Bactrim D28 post BMT.    CARDIOVASCULAR - Monitor volume status carefully given cardiac history  #HTN: losartan 50mg daily and metoprolol XL 50mg daily.   #Severe aortic stenosis, now s/p TAVR 10/24/2023.   #Moderate  nonobstructive coronary artery disease   #Hyperlipidemia - hold statin khushboo-transplant  Hold ASA     GI:  Hold vitamin supplements + alendronate  Ulcer prophy: protonix   N/V: Day -1: aloxi/dex. Has PRN zofran, ativan, and compazine at home.     FEN/Renal:  - creat, lytes wnl    MUSCULOSKELETAL/FRAILTY  Baseline Frailty Score: 2  Cancer Rehab as needed outpatient    PSCYH:  # Anxiety: likely situational. Ativan prn.   Tried Zofran x2 days but had bad dreams one night. Also has Atarax prn and referral to Mental Health.    Summary:  GCSF  RTC daily through engraftment; requested thru next Wed    I spent 40 minutes in the care of this patient today, which included time necessary for preparation for the visit, obtaining history, ordering medications/tests/procedures as medically indicated, review of pertinent medical literature, counseling of the patient, communication of recommendations to the care team, and documentation time.      Liliya Degroot PA-C

## 2024-01-27 ENCOUNTER — ONCOLOGY VISIT (OUTPATIENT)
Dept: TRANSPLANT | Facility: CLINIC | Age: 69
End: 2024-01-27
Payer: MEDICARE

## 2024-01-27 ENCOUNTER — APPOINTMENT (OUTPATIENT)
Dept: LAB | Facility: CLINIC | Age: 69
End: 2024-01-27
Payer: MEDICARE

## 2024-01-27 DIAGNOSIS — Z52.011 AUTOLOGOUS DONOR OF STEM CELLS: ICD-10-CM

## 2024-01-27 DIAGNOSIS — C90.00 MULTIPLE MYELOMA, REMISSION STATUS UNSPECIFIED (H): Primary | ICD-10-CM

## 2024-01-27 LAB
BLD PROD TYP BPU: NORMAL
BLOOD COMPONENT TYPE: NORMAL
CODING SYSTEM: NORMAL
UNIT ABO/RH: NORMAL
UNIT NUMBER: NORMAL
UNIT STATUS: NORMAL
UNIT TYPE ISBT: 5100

## 2024-01-27 PROCEDURE — G0463 HOSPITAL OUTPT CLINIC VISIT: HCPCS

## 2024-01-27 PROCEDURE — 96372 THER/PROPH/DIAG INJ SC/IM: CPT | Performed by: PHYSICIAN ASSISTANT

## 2024-01-27 PROCEDURE — 99214 OFFICE O/P EST MOD 30 MIN: CPT

## 2024-01-27 PROCEDURE — 250N000011 HC RX IP 250 OP 636: Mod: JZ | Performed by: PHYSICIAN ASSISTANT

## 2024-01-27 RX ORDER — HEPARIN SODIUM (PORCINE) LOCK FLUSH IV SOLN 100 UNIT/ML 100 UNIT/ML
5 SOLUTION INTRAVENOUS
OUTPATIENT
Start: 2024-01-28

## 2024-01-27 RX ORDER — EPINEPHRINE 1 MG/ML
0.3 INJECTION, SOLUTION INTRAMUSCULAR; SUBCUTANEOUS EVERY 5 MIN PRN
OUTPATIENT
Start: 2024-01-27

## 2024-01-27 RX ORDER — HEPARIN SODIUM,PORCINE 10 UNIT/ML
5-20 VIAL (ML) INTRAVENOUS DAILY PRN
OUTPATIENT
Start: 2024-01-28

## 2024-01-27 RX ORDER — HEPARIN SODIUM (PORCINE) LOCK FLUSH IV SOLN 100 UNIT/ML 100 UNIT/ML
5 SOLUTION INTRAVENOUS
OUTPATIENT
Start: 2024-01-27

## 2024-01-27 RX ORDER — ALBUTEROL SULFATE 0.83 MG/ML
2.5 SOLUTION RESPIRATORY (INHALATION)
OUTPATIENT
Start: 2024-01-28 | End: 2024-01-28

## 2024-01-27 RX ORDER — HEPARIN SODIUM,PORCINE 10 UNIT/ML
5-20 VIAL (ML) INTRAVENOUS DAILY PRN
OUTPATIENT
Start: 2024-01-27

## 2024-01-27 RX ORDER — PENTAMIDINE ISETHIONATE 300 MG/300MG
300 INHALANT RESPIRATORY (INHALATION)
OUTPATIENT
Start: 2024-01-28 | End: 2024-01-28

## 2024-01-27 RX ORDER — DIPHENHYDRAMINE HYDROCHLORIDE 50 MG/ML
50 INJECTION INTRAMUSCULAR; INTRAVENOUS
Start: 2024-01-27

## 2024-01-27 RX ADMIN — FILGRASTIM-AAFI 480 MCG: 480 INJECTION, SOLUTION INTRAVENOUS; SUBCUTANEOUS at 07:50

## 2024-01-27 NOTE — PROGRESS NOTES
BMT Progress Note  01/27/2024     ID: Mr. Carroll is a 68 year old man D 17 for standard risk IgG lambda kappa multiple myeloma.    HPI: Here for follow-up. Billy is better, some looser BM but eating more and feels better. No n/v. Stools are soft, taking Imodium occasionally and metamucil daily.  Taking Ativan maybe twice daily.     ROS: Review of systems with pertinent positive and negatives in HPI    Exam:  There were no vitals taken for this visit.  Wt Readings from Last 4 Encounters:   01/27/24 93.5 kg (206 lb 3.2 oz)   01/26/24 94.2 kg (207 lb 9.6 oz)   01/25/24 94.3 kg (208 lb)   01/24/24 95.4 kg (210 lb 4.8 oz)     Constitutional: NAD, in good spirits  HEENT: sclera anicteric. OP moist without lesions  Pulm: CTAB  CV: RRR  Ext: no edema  Neuro: alert, conversant. 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    - PMHx significant for CAD, severe aortic stenosis s/p TAVR (10/24/2023) currently ongoing cardiac rehabilitation, HTN, and HLD.     BMT/MM   Total collections: 7.43 x 10^6  Cell dose thawed: 3.72 x 10^6 -- infused cell dose total 1.57 x 10^6  **cell processing notified about this loss of cells during thaw/wash phase on 1/15 -- 42% of cells recovered with thaw/wash.   Restaging at day 28  Start maintenance at day 60      HEME/COAG - engrafting -  no more transfusions now.  - Transfusion parameters: hemoglobin <8, platelets <10  -  cont GCSF on Day 5 continue until ANC >2.5 x 2 consecutive days. ANC up to 2.3 today.     IMMUNOCOMPROMISED - no active infections now.   - prophy ACV, fluc, lev, Pentamidine (12/28/23)  # hx PJP pneumonia: resume Bactrim D28 post BMT.    CARDIOVASCULAR - Monitor volume status carefully given cardiac history  #HTN: losartan 50mg daily and metoprolol XL 50mg daily.   #Severe aortic stenosis, now s/p TAVR 10/24/2023.   #Moderate nonobstructive coronary artery disease   #Hyperlipidemia - hold statin khushboo-transplant  Hold ASA     GI:  Hold vitamin supplements + alendronate  Ulcer prophy: protonix   N/V: Day -1: aloxi/dex. Has PRN zofran, ativan, and compazine at home.     FEN/Renal:all fine   - creat, lytes wnl    MUSCULOSKELETAL/FRAILTY  Baseline Frailty Score: 2  Cancer Rehab as needed outpatient    PSCYH:  # Anxiety: likely situational. Ativan prn.   Tried Zofran x2 days but had bad dreams one night. Also has Atarax prn and referral to Mental Health.    Summary:  GCSF today and tomorrow.   RTC daily through engraftment; requested thru next Wed    I spent 40 minutes in the care of this patient today, which included time necessary for preparation for the visit, obtaining history, ordering medications/tests/procedures as medically indicated, review of pertinent medical literature, counseling of the patient, communication of recommendations to the care team, and documentation time.      Jerri Frey MD

## 2024-01-27 NOTE — NURSING NOTE
"Oncology Rooming Note    January 27, 2024 7:31 AM   Billy Carroll is a 68 year old male who presents for:    Chief Complaint   Patient presents with    Oncology Clinic Visit     Return; hx MM s/p BMT     Initial Vitals: There were no vitals taken for this visit. Estimated body mass index is 29.59 kg/m  as calculated from the following:    Height as of 1/11/24: 1.778 m (5' 10\").    Weight as of an earlier encounter on 1/27/24: 93.5 kg (206 lb 3.2 oz). There is no height or weight on file to calculate BSA.  Data Unavailable Comment: Data Unavailable   No LMP for male patient.  Allergies reviewed: Yes  Medications reviewed: Yes    Medications: Medication refills not needed today.  Pharmacy name entered into [facility]:    [id number] [place], [place] - [address] [facility] [place], [place] - [address]    Frailty Screening:   Is the patient here for a new oncology consult visit in cancer care? 2. No      Clinical concerns: None.        Xin Dejesus RN              "

## 2024-01-27 NOTE — NURSING NOTE
Pt received 480 mcg Nivestym subcutaneously into right lower abdomen for ANC 2.3 today. Pt tolerated injection without incident.     Xin Dejesus RN

## 2024-01-27 NOTE — LETTER
1/27/2024         RE: Billy Carroll  [address] [place] [address]        Dear Colleague,    Thank you for referring your patient, Billy Carroll, to the [facility] BLOOD AND MARROW TRANSPLANT PROGRAM [place]. Please see a copy of my visit note below.    BMT Progress Note  01/27/2024     ID: Mr. Carroll is a 68 year old man D 17 for standard risk IgG lambda kappa multiple myeloma.    HPI: Here for follow-up. Billy is better, some looser BM but eating more and feels better. No n/v. Stools are soft, taking Imodium occasionally and metamucil daily.  Taking Ativan maybe twice daily.     ROS: Review of systems with pertinent positive and negatives in HPI    Exam:  There were no vitals taken for this visit.  Wt Readings from Last 4 Encounters:   01/27/24 93.5 kg (206 lb 3.2 oz)   01/26/24 94.2 kg (207 lb 9.6 oz)   01/25/24 94.3 kg (208 lb)   01/24/24 95.4 kg (210 lb 4.8 oz)     Constitutional: NAD, in good spirits  HEENT: sclera anicteric. OP moist without lesions  Pulm: CTAB  CV: RRR  Ext: no edema  Neuro: alert, conversant. 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    - PMHx significant for CAD, severe aortic stenosis s/p TAVR (10/24/2023) currently ongoing cardiac rehabilitation, HTN, and HLD.     BMT/MM   Total collections: 7.43 x 10^6  Cell dose thawed: 3.72 x 10^6 -- infused cell dose total 1.57 x 10^6  **cell  processing notified about this loss of cells during thaw/wash phase on 1/15 -- 42% of cells recovered with thaw/wash.   Restaging at day 28  Start maintenance at day 60      HEME/COAG - engrafting -  no more transfusions now.  - Transfusion parameters: hemoglobin <8, platelets <10  - cont GCSF on Day 5 continue until ANC >2.5 x 2 consecutive days. ANC up to 2.3 today.     IMMUNOCOMPROMISED - no active infections now.   - prophy ACV, fluc, lev, Pentamidine (12/28/23)  # hx PJP pneumonia: resume Bactrim D28 post BMT.    CARDIOVASCULAR - Monitor volume status carefully given cardiac history  #HTN: losartan 50mg daily and metoprolol XL 50mg daily.   #Severe aortic stenosis, now s/p TAVR 10/24/2023.   #Moderate nonobstructive coronary artery disease   #Hyperlipidemia - hold statin khushboo-transplant  Hold ASA     GI:  Hold vitamin supplements + alendronate  Ulcer prophy: protonix   N/V: Day -1: aloxi/dex. Has PRN zofran, ativan, and compazine at home.     FEN/Renal:all fine   - creat, lytes wnl    MUSCULOSKELETAL/FRAILTY  Baseline Frailty Score: 2  Cancer Rehab as needed outpatient    PSCYH:  # Anxiety: likely situational. Ativan prn.   Tried Zofran x2 days but had bad dreams one night. Also has Atarax prn and referral to Mental Health.    Summary:  GCSF today and tomorrow.   RTC daily through engraftment; requested thru next Wed    I spent 40 minutes in the care of this patient today, which included time necessary for preparation for the visit, obtaining history, ordering medications/tests/procedures as medically indicated, review of pertinent medical literature, counseling of the patient, communication of recommendations to the care team, and documentation time.      Jerri Frey MD

## 2024-01-28 ENCOUNTER — INFUSION THERAPY VISIT (OUTPATIENT)
Dept: TRANSPLANT | Facility: CLINIC | Age: 69
End: 2024-01-28
Payer: MEDICARE

## 2024-01-28 ENCOUNTER — APPOINTMENT (OUTPATIENT)
Dept: LAB | Facility: CLINIC | Age: 69
End: 2024-01-28
Payer: MEDICARE

## 2024-01-28 ENCOUNTER — ONCOLOGY VISIT (OUTPATIENT)
Dept: TRANSPLANT | Facility: CLINIC | Age: 69
End: 2024-01-28
Payer: MEDICARE

## 2024-01-28 VITALS
RESPIRATION RATE: 16 BRPM | DIASTOLIC BLOOD PRESSURE: 83 MMHG | BODY MASS INDEX: 29.5 KG/M2 | TEMPERATURE: 97.8 F | SYSTOLIC BLOOD PRESSURE: 142 MMHG | OXYGEN SATURATION: 97 % | HEART RATE: 81 BPM | WEIGHT: 205.6 LBS

## 2024-01-28 DIAGNOSIS — C90.00 MULTIPLE MYELOMA, REMISSION STATUS UNSPECIFIED (H): Primary | ICD-10-CM

## 2024-01-28 DIAGNOSIS — T45.1X5A ANTINEOPLASTIC CHEMOTHERAPY INDUCED PANCYTOPENIA (H): ICD-10-CM

## 2024-01-28 DIAGNOSIS — Z52.011 AUTOLOGOUS DONOR OF STEM CELLS: ICD-10-CM

## 2024-01-28 DIAGNOSIS — D61.810 ANTINEOPLASTIC CHEMOTHERAPY INDUCED PANCYTOPENIA (H): ICD-10-CM

## 2024-01-28 LAB
ANION GAP SERPL CALCULATED.3IONS-SCNC: 11 MMOL/L (ref 7–15)
BASOPHILS # BLD AUTO: 0.1 10E3/UL (ref 0–0.2)
BASOPHILS NFR BLD AUTO: 1 %
BUN SERPL-MCNC: 6.9 MG/DL (ref 8–23)
CALCIUM SERPL-MCNC: 9 MG/DL (ref 8.8–10.2)
CHLORIDE SERPL-SCNC: 106 MMOL/L (ref 98–107)
CREAT SERPL-MCNC: 0.67 MG/DL (ref 0.67–1.17)
DEPRECATED HCO3 PLAS-SCNC: 24 MMOL/L (ref 22–29)
EGFRCR SERPLBLD CKD-EPI 2021: >90 ML/MIN/1.73M2
EOSINOPHIL # BLD AUTO: 0 10E3/UL (ref 0–0.7)
EOSINOPHIL NFR BLD AUTO: 0 %
ERYTHROCYTE [DISTWIDTH] IN BLOOD BY AUTOMATED COUNT: 14.7 % (ref 10–15)
GLUCOSE SERPL-MCNC: 171 MG/DL (ref 70–99)
HCT VFR BLD AUTO: 30.5 % (ref 40–53)
HGB BLD-MCNC: 10.2 G/DL (ref 13.3–17.7)
IMM GRANULOCYTES # BLD: 0.1 10E3/UL
IMM GRANULOCYTES NFR BLD: 2 %
LYMPHOCYTES # BLD AUTO: 0.5 10E3/UL (ref 0.8–5.3)
LYMPHOCYTES NFR BLD AUTO: 10 %
MCH RBC QN AUTO: 30.9 PG (ref 26.5–33)
MCHC RBC AUTO-ENTMCNC: 33.4 G/DL (ref 31.5–36.5)
MCV RBC AUTO: 92 FL (ref 78–100)
MONOCYTES # BLD AUTO: 0.5 10E3/UL (ref 0–1.3)
MONOCYTES NFR BLD AUTO: 9 %
NEUTROPHILS # BLD AUTO: 4 10E3/UL (ref 1.6–8.3)
NEUTROPHILS NFR BLD AUTO: 78 %
NRBC # BLD AUTO: 0 10E3/UL
NRBC BLD AUTO-RTO: 0 /100
PLATELET # BLD AUTO: 35 10E3/UL (ref 150–450)
POTASSIUM SERPL-SCNC: 3.8 MMOL/L (ref 3.4–5.3)
RBC # BLD AUTO: 3.3 10E6/UL (ref 4.4–5.9)
SODIUM SERPL-SCNC: 141 MMOL/L (ref 135–145)
WBC # BLD AUTO: 5.1 10E3/UL (ref 4–11)

## 2024-01-28 PROCEDURE — 99214 OFFICE O/P EST MOD 30 MIN: CPT

## 2024-01-28 PROCEDURE — 250N000011 HC RX IP 250 OP 636: Mod: JZ | Performed by: PHYSICIAN ASSISTANT

## 2024-01-28 PROCEDURE — 96372 THER/PROPH/DIAG INJ SC/IM: CPT | Performed by: PHYSICIAN ASSISTANT

## 2024-01-28 PROCEDURE — G0463 HOSPITAL OUTPT CLINIC VISIT: HCPCS

## 2024-01-28 PROCEDURE — 36415 COLL VENOUS BLD VENIPUNCTURE: CPT

## 2024-01-28 PROCEDURE — 80048 BASIC METABOLIC PNL TOTAL CA: CPT

## 2024-01-28 PROCEDURE — 85025 COMPLETE CBC W/AUTO DIFF WBC: CPT

## 2024-01-28 RX ORDER — ALBUTEROL SULFATE 0.83 MG/ML
2.5 SOLUTION RESPIRATORY (INHALATION)
OUTPATIENT
Start: 2024-01-29 | End: 2024-01-29

## 2024-01-28 RX ORDER — PENTAMIDINE ISETHIONATE 300 MG/300MG
300 INHALANT RESPIRATORY (INHALATION)
OUTPATIENT
Start: 2024-01-29 | End: 2024-01-29

## 2024-01-28 RX ORDER — HEPARIN SODIUM,PORCINE 10 UNIT/ML
5-20 VIAL (ML) INTRAVENOUS DAILY PRN
OUTPATIENT
Start: 2024-01-29

## 2024-01-28 RX ORDER — HEPARIN SODIUM (PORCINE) LOCK FLUSH IV SOLN 100 UNIT/ML 100 UNIT/ML
5 SOLUTION INTRAVENOUS
OUTPATIENT
Start: 2024-01-29

## 2024-01-28 RX ADMIN — FILGRASTIM-AAFI 480 MCG: 480 INJECTION, SOLUTION INTRAVENOUS; SUBCUTANEOUS at 08:12

## 2024-01-28 ASSESSMENT — PAIN SCALES - GENERAL: PAINLEVEL: NO PAIN (0)

## 2024-01-28 NOTE — NURSING NOTE
"Oncology Rooming Note    January 28, 2024 7:35 AM   Billy Carroll is a 68 year old male who presents for:    Chief Complaint   Patient presents with    Oncology Clinic Visit     Return clinic visit dx MM     Initial Vitals: BP (!) 142/83   Pulse 81   Temp 97.8  F (36.6  C) (Oral)   Resp 16   Wt 93.3 kg (205 lb 9.6 oz)   SpO2 97%   BMI 29.50 kg/m   Estimated body mass index is 29.5 kg/m  as calculated from the following:    Height as of 1/11/24: 1.778 m (5' 10\").    Weight as of this encounter: 93.3 kg (205 lb 9.6 oz). Body surface area is 2.15 meters squared.  No Pain (0) Comment: Data Unavailable   No LMP for male patient.  Allergies reviewed: Yes  Medications reviewed: Yes    Medications: Medication refills not needed today.  Pharmacy name entered into [employer]:    [id number] [place], [place] - [address] [facility] [place], [place] - [address]    Frailty Screening:   Is the patient here for a new oncology consult visit in cancer care? 2. No      Clinical concerns: reports ongoing anxiety in afternoon. Managed with ativan. Left shoulder pain 2/10 -- taking tylenol.      Aisha Ly RN              "

## 2024-01-28 NOTE — LETTER
1/28/2024         RE: Billy Carroll  [address] [place] [address]        Dear Colleague,    Thank you for referring your patient, Billy Carroll, to the [facility] BLOOD AND MARROW TRANSPLANT PROGRAM [place]. Please see a copy of my visit note below.    BMT Progress Note  01/28/2024     ID: Mr. Carroll is a 68 year old man D 18 for standard risk IgG lambda kappa multiple myeloma.    HPI: Here for follow-up. Billy feels well, eating ok, had anxiety restlessness yesterday which got better with ativan,   Sleeps poorly, No n/v. Stools are soft, taking Imodium occasionally and metamucil daily.  Taking Ativan maybe twice daily.     ROS: Review of systems with pertinent positive and negatives in HPI    Exam:  Blood pressure (!) 142/83, pulse 81, temperature 97.8  F (36.6  C), temperature source Oral, resp. rate 16, weight 93.3 kg (205 lb 9.6 oz), SpO2 97%.  Wt Readings from Last 4 Encounters:   01/28/24 93.3 kg (205 lb 9.6 oz)   01/27/24 93.5 kg (206 lb 3.2 oz)   01/26/24 94.2 kg (207 lb 9.6 oz)   01/25/24 94.3 kg (208 lb)     Constitutional: NAD, in good spirits  HEENT: sclera anicteric. OP moist without lesions  Pulm: CTAB  CV: RRR  Ext: no edema  Neuro: alert, conversant. Non-focal  Psych: appropriate mood and affect  Access: CVC R chest NT, dressing cdi    Lab Results   Component Value Date    WBC 5.1 01/28/2024    ANEU 1.8 01/26/2024    HGB 10.2 (L) 01/28/2024    HCT 30.5 (L) 01/28/2024    PLT 35 (LL) 01/28/2024     01/28/2024    POTASSIUM 3.8 01/28/2024    CHLORIDE 106 01/28/2024    CO2 24 01/28/2024     (H) 01/28/2024    BUN 6.9 (L) 01/28/2024    CR 0.67 01/28/2024    MAG 2.2 01/09/2024    INR 1.12 01/23/2024    AST 19 01/27/2024    ALT 38 01/27/2024         A/P:  Billy Carroll is a 68 year old man with IgG kappa MM with extramedullary disease, D+ 18 s/p oupt auto PBSCT.    - PMHx significant for CAD, severe aortic stenosis s/p TAVR (10/24/2023) currently ongoing cardiac  rehabilitation, HTN, and HLD.     BMT/MM   Total collections: 7.43 x 10^6  Cell dose thawed: 3.72 x 10^6 -- infused cell dose total 1.57 x 10^6  **cell processing notified about this loss of cells during thaw/wash phase on 1/15 -- 42% of cells recovered with thaw/wash.   Restaging at day 28  Start maintenance at day 60      HEME/COAG - engrafting -  no more transfusions now.  - Transfusion parameters: hemoglobin <8, platelets <10  - cont GCSF until ANC >2.5 x 2 consecutive days. ANC up to 4.0 today. Last G-CSF dose today    IMMUNOCOMPROMISED - no active infections now.   - prophy ACV, fluc, lev, Pentamidine (12/28/23). Ok to stop Levaquin.   # hx PJP pneumonia: resume Bactrim D28 post BMT.    CARDIOVASCULAR - Monitor volume status carefully given cardiac history  #HTN: losartan 50mg daily and metoprolol XL 50mg daily.   #Severe aortic stenosis, now s/p TAVR 10/24/2023.   #Moderate nonobstructive coronary artery disease   #Hyperlipidemia - hold statin khushboo-transplant  Hold ASA     GI:  Hold vitamin supplements + alendronate  Ulcer prophy: protonix   N/V: Day -1: aloxi/dex. Has PRN zofran, ativan, and compazine at home.     FEN/Renal:all fine   - creat, lytes wnl    MUSCULOSKELETAL/FRAILTY  Baseline Frailty Score: 2  Cancer Rehab as needed outpatient    PSCYH:  # Anxiety: likely situational. Ativan prn.    Also has Atarax prn and referral to Mental Health. Awaiting appt.     Summary:  GCSF today    RTC on Tue, day off tomorrow.     I spent 30 minutes in the care of this patient today, which included time necessary for preparation for the visit, obtaining history, ordering medications/tests/procedures as medically indicated, review of pertinent medical literature, counseling of the patient, communication of recommendations to the care team, and documentation time.      Jerri Frey MD

## 2024-01-28 NOTE — PROGRESS NOTES
BMT Progress Note  01/28/2024     ID: Mr. Carroll is a 68 year old man D 18 for standard risk IgG lambda kappa multiple myeloma.    HPI: Here for follow-up. Billy feels well, eating ok, had anxiety restlessness yesterday which got better with ativan,   Sleeps poorly, No n/v. Stools are soft, taking Imodium occasionally and metamucil daily.  Taking Ativan maybe twice daily.     ROS: Review of systems with pertinent positive and negatives in HPI    Exam:  Blood pressure (!) 142/83, pulse 81, temperature 97.8  F (36.6  C), temperature source Oral, resp. rate 16, weight 93.3 kg (205 lb 9.6 oz), SpO2 97%.  Wt Readings from Last 4 Encounters:   01/28/24 93.3 kg (205 lb 9.6 oz)   01/27/24 93.5 kg (206 lb 3.2 oz)   01/26/24 94.2 kg (207 lb 9.6 oz)   01/25/24 94.3 kg (208 lb)     Constitutional: NAD, in good spirits  HEENT: sclera anicteric. OP moist without lesions  Pulm: CTAB  CV: RRR  Ext: no edema  Neuro: alert, conversant. Non-focal  Psych: appropriate mood and affect  Access: CVC R chest NT, dressing cdi    Lab Results   Component Value Date    WBC 5.1 01/28/2024    ANEU 1.8 01/26/2024    HGB 10.2 (L) 01/28/2024    HCT 30.5 (L) 01/28/2024    PLT 35 (LL) 01/28/2024     01/28/2024    POTASSIUM 3.8 01/28/2024    CHLORIDE 106 01/28/2024    CO2 24 01/28/2024     (H) 01/28/2024    BUN 6.9 (L) 01/28/2024    CR 0.67 01/28/2024    MAG 2.2 01/09/2024    INR 1.12 01/23/2024    AST 19 01/27/2024    ALT 38 01/27/2024         A/P:  Billy Carroll is a 68 year old man with IgG kappa MM with extramedullary disease, D+ 18 s/p oupt auto PBSCT.    - PMHx significant for CAD, severe aortic stenosis s/p TAVR (10/24/2023) currently ongoing cardiac rehabilitation, HTN, and HLD.     BMT/MM   Total collections: 7.43 x 10^6  Cell dose thawed: 3.72 x 10^6 -- infused cell dose total 1.57 x 10^6  **cell processing notified about this loss of cells during thaw/wash phase on 1/15 -- 42% of cells recovered with thaw/wash.   Restaging at day  28  Start maintenance at day 60      HEME/COAG - engrafting -  no more transfusions now.  - Transfusion parameters: hemoglobin <8, platelets <10  - cont GCSF until ANC >2.5 x 2 consecutive days. ANC up to 4.0 today. Last G-CSF dose today    IMMUNOCOMPROMISED - no active infections now.   - prophy ACV, fluc, lev, Pentamidine (12/28/23). Ok to stop Levaquin.   # hx PJP pneumonia: resume Bactrim D28 post BMT.    CARDIOVASCULAR - Monitor volume status carefully given cardiac history  #HTN: losartan 50mg daily and metoprolol XL 50mg daily.   #Severe aortic stenosis, now s/p TAVR 10/24/2023.   #Moderate nonobstructive coronary artery disease   #Hyperlipidemia - hold statin khushboo-transplant  Hold ASA     GI:  Hold vitamin supplements + alendronate  Ulcer prophy: protonix   N/V: Day -1: aloxi/dex. Has PRN zofran, ativan, and compazine at home.     FEN/Renal:all fine   - creat, lytes wnl    MUSCULOSKELETAL/FRAILTY  Baseline Frailty Score: 2  Cancer Rehab as needed outpatient    PSCYH:  # Anxiety: likely situational. Ativan prn.    Also has Atarax prn and referral to Mental Health. Awaiting appt.     Summary:  GCSF today    RTC on Tue, day off tomorrow.     I spent 30 minutes in the care of this patient today, which included time necessary for preparation for the visit, obtaining history, ordering medications/tests/procedures as medically indicated, review of pertinent medical literature, counseling of the patient, communication of recommendations to the care team, and documentation time.      Jerri Frey MD

## 2024-01-28 NOTE — PROGRESS NOTES
Infusion Nursing Note:  Billy Carroll presents today for scheduled infusion.    Patient seen by provider today: Yes: Dr. Frey   present during visit today: Not Applicable.    Note: Billy arrived feeling ok this morning. States he continues to have anxiety starting in the afternoon. Reports ativan BID is helping with those symptoms. Assessment completed by Dr. Frey. Home medications and allergies reviewed. Received nivestym 480 mcg subcutaneous to right lower abdomen. Labs monitored, no additional infusions needed. .      Intravenous Access:  [place].    Treatment Conditions:  Lab Results   Component Value Date    HGB 10.2 (L) 01/28/2024    WBC 5.1 01/28/2024    ANEU 1.8 01/26/2024    ANEUTAUTO 4.0 01/28/2024    PLT 35 (LL) 01/28/2024        Lab Results   Component Value Date     01/28/2024    POTASSIUM 3.8 01/28/2024    MAG 2.2 01/09/2024    CR 0.67 01/28/2024    SESAR 9.0 01/28/2024    BILITOTAL 0.3 01/27/2024    ALBUMIN 3.8 01/27/2024    ALT 38 01/27/2024    AST 19 01/27/2024         Post Infusion Assessment:  Patient tolerated injection without incident.  Blood return noted pre and post infusion.  No evidence of extravasations.       Discharge Plan:   [facility]  Patient will return 1/30 for next appointment.   Patient discharged in stable condition accompanied by: son.  Departure Mode: Ambulatory.      Aisha Ly RN

## 2024-01-29 ENCOUNTER — TELEPHONE (OUTPATIENT)
Dept: TRANSPLANT | Facility: CLINIC | Age: 69
End: 2024-01-29
Payer: MEDICARE

## 2024-01-29 NOTE — TELEPHONE ENCOUNTER
More constant shortness of breathe, wasn't sure what meds to take.  Call back to [phone]    Called and spoke to patient regarding report of Shortness of breath.     Patient reported that he has been feeling shortness of breath, most noticeable at rest. Patient denies chest pressure, arm pain, muscle weakness,fatigue, lower extremity edema. Patient reports that symptoms tend to be worse in the late afternoon, around 3pm, but never go away completely. He reports that the ativan helps, but it returns after it wears off. The patient has hydroxyzine which he has not been taking. Recommended the patient try the atarax. Also asked if patient has established care with a mental health professional, the patient said a referral has been placed. An appointment has not been scheduled yet. Will follow up on that referral. The Patient stated he will take an atarax and see how it goes.

## 2024-01-30 ENCOUNTER — LAB (OUTPATIENT)
Dept: LAB | Facility: CLINIC | Age: 69
End: 2024-01-30
Payer: MEDICARE

## 2024-01-30 ENCOUNTER — ONCOLOGY VISIT (OUTPATIENT)
Dept: TRANSPLANT | Facility: CLINIC | Age: 69
End: 2024-01-30
Payer: MEDICARE

## 2024-01-30 VITALS
BODY MASS INDEX: 29.53 KG/M2 | OXYGEN SATURATION: 98 % | TEMPERATURE: 97.7 F | SYSTOLIC BLOOD PRESSURE: 144 MMHG | HEART RATE: 87 BPM | RESPIRATION RATE: 18 BRPM | DIASTOLIC BLOOD PRESSURE: 78 MMHG | WEIGHT: 205.8 LBS

## 2024-01-30 DIAGNOSIS — Z01.818 EXAMINATION PRIOR TO CHEMOTHERAPY: ICD-10-CM

## 2024-01-30 DIAGNOSIS — Z86.2 PERSONAL HISTORY OF DISEASES OF BLOOD AND BLOOD-FORMING ORGANS: ICD-10-CM

## 2024-01-30 DIAGNOSIS — C90.00 MULTIPLE MYELOMA NOT HAVING ACHIEVED REMISSION (H): Primary | ICD-10-CM

## 2024-01-30 DIAGNOSIS — C90.01 MULTIPLE MYELOMA IN REMISSION (H): ICD-10-CM

## 2024-01-30 DIAGNOSIS — C90.00 MULTIPLE MYELOMA, REMISSION STATUS UNSPECIFIED (H): Primary | ICD-10-CM

## 2024-01-30 LAB
ALBUMIN SERPL BCG-MCNC: 3.8 G/DL (ref 3.5–5.2)
ALP SERPL-CCNC: 181 U/L (ref 40–150)
ALT SERPL W P-5'-P-CCNC: 36 U/L (ref 0–70)
ANION GAP SERPL CALCULATED.3IONS-SCNC: 11 MMOL/L (ref 7–15)
AST SERPL W P-5'-P-CCNC: 25 U/L (ref 0–45)
BASOPHILS # BLD AUTO: ABNORMAL 10*3/UL
BASOPHILS # BLD MANUAL: 0 10E3/UL (ref 0–0.2)
BASOPHILS NFR BLD AUTO: ABNORMAL %
BASOPHILS NFR BLD MANUAL: 0 %
BILIRUB SERPL-MCNC: 0.2 MG/DL
BMT WORKUP IRRADIATED BLOOD REQUIRED: NORMAL
BUN SERPL-MCNC: 8.7 MG/DL (ref 8–23)
CALCIUM SERPL-MCNC: 9 MG/DL (ref 8.8–10.2)
CHLORIDE SERPL-SCNC: 107 MMOL/L (ref 98–107)
CREAT SERPL-MCNC: 0.65 MG/DL (ref 0.67–1.17)
DEPRECATED HCO3 PLAS-SCNC: 23 MMOL/L (ref 22–29)
EGFRCR SERPLBLD CKD-EPI 2021: >90 ML/MIN/1.73M2
EOSINOPHIL # BLD AUTO: ABNORMAL 10*3/UL
EOSINOPHIL # BLD MANUAL: 0 10E3/UL (ref 0–0.7)
EOSINOPHIL NFR BLD AUTO: ABNORMAL %
EOSINOPHIL NFR BLD MANUAL: 0 %
ERYTHROCYTE [DISTWIDTH] IN BLOOD BY AUTOMATED COUNT: 15.5 % (ref 10–15)
GLUCOSE SERPL-MCNC: 144 MG/DL (ref 70–99)
HCT VFR BLD AUTO: 30.5 % (ref 40–53)
HGB BLD-MCNC: 10 G/DL (ref 13.3–17.7)
IMM GRANULOCYTES # BLD: ABNORMAL 10*3/UL
IMM GRANULOCYTES NFR BLD: ABNORMAL %
INR PPP: 1.07 (ref 0.85–1.15)
LYMPHOCYTES # BLD AUTO: ABNORMAL 10*3/UL
LYMPHOCYTES # BLD MANUAL: 0.3 10E3/UL (ref 0.8–5.3)
LYMPHOCYTES NFR BLD AUTO: ABNORMAL %
LYMPHOCYTES NFR BLD MANUAL: 7 %
MCH RBC QN AUTO: 30.6 PG (ref 26.5–33)
MCHC RBC AUTO-ENTMCNC: 32.8 G/DL (ref 31.5–36.5)
MCV RBC AUTO: 93 FL (ref 78–100)
MONOCYTES # BLD AUTO: ABNORMAL 10*3/UL
MONOCYTES # BLD MANUAL: 0.3 10E3/UL (ref 0–1.3)
MONOCYTES NFR BLD AUTO: ABNORMAL %
MONOCYTES NFR BLD MANUAL: 6 %
NEUTROPHILS # BLD AUTO: ABNORMAL 10*3/UL
NEUTROPHILS # BLD MANUAL: 3.7 10E3/UL (ref 1.6–8.3)
NEUTROPHILS NFR BLD AUTO: ABNORMAL %
NEUTROPHILS NFR BLD MANUAL: 87 %
NRBC # BLD AUTO: 0 10E3/UL
NRBC # BLD AUTO: 0 10E3/UL
NRBC BLD AUTO-RTO: 1 /100
NRBC BLD MANUAL-RTO: 1 %
PLAT MORPH BLD: ABNORMAL
PLATELET # BLD AUTO: 49 10E3/UL (ref 150–450)
POLYCHROMASIA BLD QL SMEAR: SLIGHT
POTASSIUM SERPL-SCNC: 3.6 MMOL/L (ref 3.4–5.3)
PROT SERPL-MCNC: 6.4 G/DL (ref 6.4–8.3)
RBC # BLD AUTO: 3.27 10E6/UL (ref 4.4–5.9)
RBC MORPH BLD: ABNORMAL
SODIUM SERPL-SCNC: 141 MMOL/L (ref 135–145)
SPECIMEN EXPIRATION DATE: NORMAL
URATE SERPL-MCNC: 6.1 MG/DL (ref 3.4–7)
WBC # BLD AUTO: 4.2 10E3/UL (ref 4–11)

## 2024-01-30 PROCEDURE — 99214 OFFICE O/P EST MOD 30 MIN: CPT

## 2024-01-30 PROCEDURE — G0463 HOSPITAL OUTPT CLINIC VISIT: HCPCS

## 2024-01-30 PROCEDURE — 80053 COMPREHEN METABOLIC PANEL: CPT

## 2024-01-30 PROCEDURE — 85610 PROTHROMBIN TIME: CPT

## 2024-01-30 PROCEDURE — 36592 COLLECT BLOOD FROM PICC: CPT

## 2024-01-30 PROCEDURE — 85007 BL SMEAR W/DIFF WBC COUNT: CPT

## 2024-01-30 PROCEDURE — 250N000011 HC RX IP 250 OP 636

## 2024-01-30 PROCEDURE — 85048 AUTOMATED LEUKOCYTE COUNT: CPT

## 2024-01-30 PROCEDURE — 84550 ASSAY OF BLOOD/URIC ACID: CPT

## 2024-01-30 RX ORDER — HEPARIN SODIUM,PORCINE 10 UNIT/ML
3 VIAL (ML) INTRAVENOUS
Status: DISCONTINUED | OUTPATIENT
Start: 2024-01-30 | End: 2024-01-30 | Stop reason: HOSPADM

## 2024-01-30 RX ADMIN — Medication 3 ML: at 09:12

## 2024-01-30 ASSESSMENT — PAIN SCALES - GENERAL: PAINLEVEL: NO PAIN (0)

## 2024-01-30 NOTE — PROGRESS NOTES
BMT Progress Note  01/30/2024     ID: Mr. Carroll is a 68 year old man D 20 for standard risk IgG lambda kappa multiple myeloma.    HPI:    Still struggling with anxiety but he did use some atarax finally and it was helpful. He has a health psych appt 2/12. Otherwise doing okay. No new acute issues. No fevers or bleeding.   Discussed I placed a consult to get line out.      ROS: Review of systems with pertinent positive and negatives in HPI    Exam:  Blood pressure (!) 144/78, pulse 87, temperature 97.7  F (36.5  C), temperature source Oral, resp. rate 18, weight 93.4 kg (205 lb 12.8 oz), SpO2 98%.  Wt Readings from Last 4 Encounters:   01/30/24 93.4 kg (205 lb 12.8 oz)   01/28/24 93.3 kg (205 lb 9.6 oz)   01/27/24 93.5 kg (206 lb 3.2 oz)   01/26/24 94.2 kg (207 lb 9.6 oz)     Constitutional: NAD  HEENT: sclera anicteric.   Pulm: CTAB  CV: RRR  Ext: no edema  Neuro: alert, conversant. Non-focal  Psych: appropriate mood and affect  Access: CVC R chest NT, dressing cdi    Lab Results   Component Value Date    WBC 5.1 01/28/2024    ANEU 1.8 01/26/2024    HGB 10.2 (L) 01/28/2024    HCT 30.5 (L) 01/28/2024    PLT 35 (LL) 01/28/2024     01/28/2024    POTASSIUM 3.8 01/28/2024    CHLORIDE 106 01/28/2024    CO2 24 01/28/2024     (H) 01/28/2024    BUN 6.9 (L) 01/28/2024    CR 0.67 01/28/2024    MAG 2.2 01/09/2024    INR 1.12 01/23/2024    AST 19 01/27/2024    ALT 38 01/27/2024         A/P:  Billy Carroll is a 68 year old man with IgG kappa MM with extramedullary disease, D+ 20 s/p oupt auto PBSCT.    - PMHx significant for CAD, severe aortic stenosis s/p TAVR (10/24/2023) currently ongoing cardiac rehabilitation, HTN, and HLD.     BMT/MM   Total collections: 7.43 x 10^6  Cell dose thawed: 3.72 x 10^6 -- infused cell dose total 1.57 x 10^6  **cell processing notified about this loss of cells during thaw/wash phase on 1/15 -- 42% of cells recovered with thaw/wash.   Restaging at day 28  Start maintenance at day 60    - will request CVC removal.      HEME/COAG - engrafting -  no more transfusions now.  - Transfusion parameters: hemoglobin <8, platelets <10  - Daily G complete and overall counts slowly recovering with low cell dose.     IMMUNOCOMPROMISED - no active infections now.   - prophy ACV, fluc, Pentamidine (12/28/23).  # hx PJP pneumonia: resume Bactrim D28 post BMT.    CARDIOVASCULAR   #HTN: losartan 50mg daily and metoprolol XL 50mg daily.   #Severe aortic stenosis, now s/p TAVR 10/24/2023.   #Moderate nonobstructive coronary artery disease   #Hyperlipidemia - hold statin khushboo-transplant  Hold ASA     GI:  Hold vitamin supplements + alendronate  Ulcer prophy: protonix   N/V: Has PRN zofran, ativan, and compazine at home.     FEN/Renal:all fine   - creat, lytes wnl    MUSCULOSKELETAL/FRAILTY  Baseline Frailty Score: 2  Cancer Rehab as needed outpatient    PSCYH:  # Anxiety: likely situational. Ativan prn.    Also has Atarax prn and referral to Mental Health. He declined zoloft.   Now has appt 2/12. Atarax helpful yesterday    RTC: Friday make sure counts okay with low cell dose, make sure line removal scheduled then can come back for day +28 anniversary.     I spent 30 minutes in the care of this patient today, which included time necessary for preparation for the visit, obtaining history, ordering medications/tests/procedures as medically indicated, review of pertinent medical literature, counseling of the patient, communication of recommendations to the care team, and documentation time.      Amy Hernandez PA-C

## 2024-01-30 NOTE — LETTER
1/30/2024         RE: Billy Carroll  [address] [place] [address]        Dear Colleague,    Thank you for referring your patient, Billy Carroll, to the [facility] BLOOD AND MARROW TRANSPLANT PROGRAM [place]. Please see a copy of my visit note below.    BMT Progress Note  01/30/2024     ID: Mr. Carroll is a 68 year old man D 20 for standard risk IgG lambda kappa multiple myeloma.    HPI:    Still struggling with anxiety but he did use some atarax finally and it was helpful. He has a health psych appt 2/12. Otherwise doing okay. No new acute issues. No fevers or bleeding.   Discussed I placed a consult to get line out.      ROS: Review of systems with pertinent positive and negatives in HPI    Exam:  Blood pressure (!) 144/78, pulse 87, temperature 97.7  F (36.5  C), temperature source Oral, resp. rate 18, weight 93.4 kg (205 lb 12.8 oz), SpO2 98%.  Wt Readings from Last 4 Encounters:   01/30/24 93.4 kg (205 lb 12.8 oz)   01/28/24 93.3 kg (205 lb 9.6 oz)   01/27/24 93.5 kg (206 lb 3.2 oz)   01/26/24 94.2 kg (207 lb 9.6 oz)     Constitutional: NAD  HEENT: sclera anicteric.   Pulm: CTAB  CV: RRR  Ext: no edema  Neuro: alert, conversant. Non-focal  Psych: appropriate mood and affect  Access: CVC R chest NT, dressing cdi    Lab Results   Component Value Date    WBC 5.1 01/28/2024    ANEU 1.8 01/26/2024    HGB 10.2 (L) 01/28/2024    HCT 30.5 (L) 01/28/2024    PLT 35 (LL) 01/28/2024     01/28/2024    POTASSIUM 3.8 01/28/2024    CHLORIDE 106 01/28/2024    CO2 24 01/28/2024     (H) 01/28/2024    BUN 6.9 (L) 01/28/2024    CR 0.67 01/28/2024    MAG 2.2 01/09/2024    INR 1.12 01/23/2024    AST 19 01/27/2024    ALT 38 01/27/2024         A/P:  Billy Carroll is a 68 year old man with IgG kappa MM with extramedullary disease, D+ 20 s/p oupt auto PBSCT.    - PMHx significant for CAD, severe aortic stenosis s/p TAVR (10/24/2023) currently ongoing cardiac rehabilitation, HTN, and HLD.     BMT/MM    Total collections: 7.43 x 10^6  Cell dose thawed: 3.72 x 10^6 -- infused cell dose total 1.57 x 10^6  **cell processing notified about this loss of cells during thaw/wash phase on 1/15 -- 42% of cells recovered with thaw/wash.   Restaging at day 28  Start maintenance at day 60   - will request CVC removal.      HEME/COAG - engrafting -  no more transfusions now.  - Transfusion parameters: hemoglobin <8, platelets <10  - Daily G complete and overall counts slowly recovering with low cell dose.     IMMUNOCOMPROMISED - no active infections now.   - prophy ACV, fluc, Pentamidine (12/28/23).  # hx PJP pneumonia: resume Bactrim D28 post BMT.    CARDIOVASCULAR   #HTN: losartan 50mg daily and metoprolol XL 50mg daily.   #Severe aortic stenosis, now s/p TAVR 10/24/2023.   #Moderate nonobstructive coronary artery disease   #Hyperlipidemia - hold statin khushboo-transplant  Hold ASA     GI:  Hold vitamin supplements + alendronate  Ulcer prophy: protonix   N/V: Has PRN zofran, ativan, and compazine at home.     FEN/Renal:all fine   - creat, lytes wnl    MUSCULOSKELETAL/FRAILTY  Baseline Frailty Score: 2  Cancer Rehab as needed outpatient    PSCYH:  # Anxiety: likely situational. Ativan prn.    Also has Atarax prn and referral to Mental Health. He declined zoloft.   Now has appt 2/12. Atarax helpful yesterday    RTC: Friday make sure counts okay with low cell dose, make sure line removal scheduled then can come back for day +28 anniversary.     I spent 30 minutes in the care of this patient today, which included time necessary for preparation for the visit, obtaining history, ordering medications/tests/procedures as medically indicated, review of pertinent medical literature, counseling of the patient, communication of recommendations to the care team, and documentation time.      Amy Hernandez PA-C

## 2024-01-30 NOTE — NURSING NOTE
Dressing change was completed. Sterile technique was used. Site WDL with mild redness around port site as well as tenderness at the port site.  Patient tolerated dressing change well and had no questions. Patient was discharged.    See Dock Flowsheet.     Oanh Machuca LPN   on 1/30/2024 at 9:51 AM

## 2024-01-30 NOTE — NURSING NOTE
"Oncology Rooming Note    January 30, 2024 9:14 AM   Billy Carroll is a 68 year old male who presents for:    Chief Complaint   Patient presents with    Blood Draw     Labs obtained via cvc, heparin flushed, VS taken, checked into next appt    Oncology Clinic Visit     Multiple Myeloma      Initial Vitals: BP (!) 144/78   Pulse 87   Temp 97.7  F (36.5  C) (Oral)   Resp 18   Wt 93.4 kg (205 lb 12.8 oz)   SpO2 98%   BMI 29.53 kg/m   Estimated body mass index is 29.53 kg/m  as calculated from the following:    Height as of 1/11/24: 1.778 m (5' 10\").    Weight as of this encounter: 93.4 kg (205 lb 12.8 oz). Body surface area is 2.15 meters squared.  No Pain (0) Comment: Data Unavailable   No LMP for male patient.  Allergies reviewed: Yes  Medications reviewed: Yes    Medications: Medication refills not needed today.  Pharmacy name entered into [facility]:    [id number] [place], [place] - [address] [facility] [place], [place] - [address]    Frailty Screening:   Is the patient here for a new oncology consult visit in cancer care? 2. No      Clinical concerns: Questions about hydroxyzine and lorazepam and when to take        Clary Jose Miguel              "

## 2024-01-31 ENCOUNTER — TELEPHONE (OUTPATIENT)
Dept: TRANSPLANT | Facility: CLINIC | Age: 69
End: 2024-01-31
Payer: MEDICARE

## 2024-01-31 DIAGNOSIS — Z52.011 AUTOLOGOUS DONOR OF STEM CELLS: Primary | ICD-10-CM

## 2024-01-31 NOTE — TELEPHONE ENCOUNTER
BMT Nurse Triage - Generic/Other Symptoms     Treating Provider:   Tk    Date of last office visit: 1/30/24    Onset of symptoms: Came on suddenly in the last hour, improved some now.    Duration of symptoms: 1 hour    Brief description of symptoms: Patient had blurring vision in left eye. Patient had a similar episode just after his transplant but it only lasted less than an hour so he didn't think to tell the team. Patient also reporting increased agitation and anxiety, but he has not taken any anti-anxiety medications yet.     Recommendations    Per SHAHANA team, recommended continuing to monitor symptoms, take anxiety medication (hydroxyzine), and seek emergency care if any signs/symptoms of stroke appear. Patient to be seen on Friday in clinic.     Bozena Ritter RN, MSN  BMT Nurse Coordinator  Phone: [phone]

## 2024-01-31 NOTE — TELEPHONE ENCOUNTER
Blurring vision in left eye started about an hour ago.  Patient concerned and would like a call back- [phone]

## 2024-02-02 ENCOUNTER — ONCOLOGY VISIT (OUTPATIENT)
Dept: TRANSPLANT | Facility: CLINIC | Age: 69
End: 2024-02-02
Attending: PHYSICIAN ASSISTANT
Payer: MEDICARE

## 2024-02-02 ENCOUNTER — ANCILLARY PROCEDURE (OUTPATIENT)
Dept: INTERVENTIONAL RADIOLOGY/VASCULAR | Facility: CLINIC | Age: 69
End: 2024-02-02
Attending: PHYSICIAN ASSISTANT
Payer: MEDICARE

## 2024-02-02 ENCOUNTER — APPOINTMENT (OUTPATIENT)
Dept: LAB | Facility: CLINIC | Age: 69
End: 2024-02-02
Payer: MEDICARE

## 2024-02-02 VITALS
DIASTOLIC BLOOD PRESSURE: 80 MMHG | RESPIRATION RATE: 18 BRPM | BODY MASS INDEX: 29.06 KG/M2 | WEIGHT: 203 LBS | HEART RATE: 83 BPM | OXYGEN SATURATION: 98 % | HEIGHT: 70 IN | SYSTOLIC BLOOD PRESSURE: 137 MMHG | TEMPERATURE: 98 F

## 2024-02-02 DIAGNOSIS — C90.00 MULTIPLE MYELOMA, REMISSION STATUS UNSPECIFIED (H): ICD-10-CM

## 2024-02-02 LAB
ACANTHOCYTES BLD QL SMEAR: ABNORMAL
ANION GAP SERPL CALCULATED.3IONS-SCNC: 11 MMOL/L (ref 7–15)
AUER BODIES BLD QL SMEAR: ABNORMAL
BASO STIPL BLD QL SMEAR: ABNORMAL
BASOPHILS # BLD AUTO: 0 10E3/UL (ref 0–0.2)
BASOPHILS NFR BLD AUTO: 1 %
BITE CELLS BLD QL SMEAR: ABNORMAL
BLISTER CELLS BLD QL SMEAR: ABNORMAL
BUN SERPL-MCNC: 9.8 MG/DL (ref 8–23)
BURR CELLS BLD QL SMEAR: ABNORMAL
CALCIUM SERPL-MCNC: 9.3 MG/DL (ref 8.8–10.2)
CHLORIDE SERPL-SCNC: 104 MMOL/L (ref 98–107)
CREAT SERPL-MCNC: 0.67 MG/DL (ref 0.67–1.17)
DACRYOCYTES BLD QL SMEAR: ABNORMAL
DEPRECATED HCO3 PLAS-SCNC: 24 MMOL/L (ref 22–29)
EGFRCR SERPLBLD CKD-EPI 2021: >90 ML/MIN/1.73M2
ELLIPTOCYTES BLD QL SMEAR: ABNORMAL
EOSINOPHIL # BLD AUTO: 0 10E3/UL (ref 0–0.7)
EOSINOPHIL NFR BLD AUTO: 0 %
ERYTHROCYTE [DISTWIDTH] IN BLOOD BY AUTOMATED COUNT: 16.3 % (ref 10–15)
FRAGMENTS BLD QL SMEAR: ABNORMAL
GLUCOSE SERPL-MCNC: 108 MG/DL (ref 70–99)
HCT VFR BLD AUTO: 33.5 % (ref 40–53)
HGB BLD-MCNC: 11.2 G/DL (ref 13.3–17.7)
HGB C CRYSTALS: ABNORMAL
HOWELL-JOLLY BOD BLD QL SMEAR: ABNORMAL
IMM GRANULOCYTES # BLD: 0.1 10E3/UL
IMM GRANULOCYTES NFR BLD: 2 %
LYMPHOCYTES # BLD AUTO: 0.5 10E3/UL (ref 0.8–5.3)
LYMPHOCYTES NFR BLD AUTO: 15 %
MCH RBC QN AUTO: 30.8 PG (ref 26.5–33)
MCHC RBC AUTO-ENTMCNC: 33.4 G/DL (ref 31.5–36.5)
MCV RBC AUTO: 92 FL (ref 78–100)
MONOCYTES # BLD AUTO: 0.7 10E3/UL (ref 0–1.3)
MONOCYTES NFR BLD AUTO: 22 %
NEUTROPHILS # BLD AUTO: 2 10E3/UL (ref 1.6–8.3)
NEUTROPHILS NFR BLD AUTO: 60 %
NEUTS HYPERSEG BLD QL SMEAR: ABNORMAL
NRBC # BLD AUTO: 0 10E3/UL
NRBC BLD AUTO-RTO: 1 /100
PLAT MORPH BLD: ABNORMAL
PLATELET # BLD AUTO: 109 10E3/UL (ref 150–450)
POLYCHROMASIA BLD QL SMEAR: SLIGHT
POTASSIUM SERPL-SCNC: 4.1 MMOL/L (ref 3.4–5.3)
RBC # BLD AUTO: 3.64 10E6/UL (ref 4.4–5.9)
RBC AGGLUT BLD QL: ABNORMAL
RBC MORPH BLD: ABNORMAL
ROULEAUX BLD QL SMEAR: ABNORMAL
SICKLE CELLS BLD QL SMEAR: ABNORMAL
SMUDGE CELLS BLD QL SMEAR: ABNORMAL
SODIUM SERPL-SCNC: 139 MMOL/L (ref 135–145)
SPHEROCYTES BLD QL SMEAR: ABNORMAL
STOMATOCYTES BLD QL SMEAR: ABNORMAL
TARGETS BLD QL SMEAR: ABNORMAL
TOXIC GRANULES BLD QL SMEAR: ABNORMAL
VARIANT LYMPHS BLD QL SMEAR: ABNORMAL
WBC # BLD AUTO: 3.3 10E3/UL (ref 4–11)

## 2024-02-02 PROCEDURE — 80048 BASIC METABOLIC PNL TOTAL CA: CPT

## 2024-02-02 PROCEDURE — 99214 OFFICE O/P EST MOD 30 MIN: CPT

## 2024-02-02 PROCEDURE — G0463 HOSPITAL OUTPT CLINIC VISIT: HCPCS

## 2024-02-02 PROCEDURE — 85004 AUTOMATED DIFF WBC COUNT: CPT

## 2024-02-02 PROCEDURE — 36589 REMOVAL TUNNELED CV CATH: CPT | Performed by: PHYSICIAN ASSISTANT

## 2024-02-02 PROCEDURE — 36592 COLLECT BLOOD FROM PICC: CPT

## 2024-02-02 RX ORDER — LIDOCAINE HYDROCHLORIDE 10 MG/ML
5 INJECTION, SOLUTION EPIDURAL; INFILTRATION; INTRACAUDAL; PERINEURAL ONCE
Status: DISCONTINUED | OUTPATIENT
Start: 2024-02-02 | End: 2024-02-02

## 2024-02-02 ASSESSMENT — PAIN SCALES - GENERAL: PAINLEVEL: NO PAIN (0)

## 2024-02-02 NOTE — LETTER
"    2/2/2024         RE: Billy Carroll  [address] [place] [address]        Dear Colleague,    Thank you for referring your patient, Billy Carroll, to the [facility] BLOOD AND MARROW TRANSPLANT PROGRAM [place]. Please see a copy of my visit note below.    BMT Progress Note  02/02/2024     ID: Mr. Carroll is a 68 year old man D 23 for standard risk IgG lambda kappa multiple myeloma.    HPI:  Returns for follow up. Persistent anxiety and agitation, denies any worsening. Not feeling aggressive or hurting self/others. Uses morning atarax when he's feeling most agitated and feels it is helpful. Not using ativan currently. He has a [facility] psych appt 2/12.   Had one soft stool, then one watery stool today. No crampy pain in belly, no blood in stool. No one else in the house has GI sx today. No fevers.  No cough or cold symptoms.     ROS: Review of systems with pertinent positive and negatives in HPI    Exam:  Blood pressure 137/80, pulse 83, temperature 98  F (36.7  C), resp. rate 18, height 1.778 m (5' 10\"), weight 92.1 kg (203 lb), SpO2 98%.  Wt Readings from Last 4 Encounters:   02/02/24 92.1 kg (203 lb)   01/30/24 93.4 kg (205 lb 12.8 oz)   01/28/24 93.3 kg (205 lb 9.6 oz)   01/27/24 93.5 kg (206 lb 3.2 oz)     Constitutional: NAD  HEENT: sclera anicteric.   Pulm: CTAB  CV: RRR  Ext: no edema  Neuro: alert, conversant. 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    - PMHx significant for CAD, severe aortic stenosis s/p TAVR (10/24/2023) currently ongoing cardiac rehabilitation, HTN, and HLD.     BMT/MM   Total collections: 7.43 x 10^6  Cell dose thawed: 3.72 x 10^6 -- infused cell dose total 1.57 x 10^6  **cell processing notified about this loss of cells during thaw/wash phase on 1/15 -- 42% of cells recovered with thaw/wash.   Restaging at day 28  Start maintenance at day 60   - CVC removal today 2/2     HEME/COAG - engrafting -  no more transfusions now  - Transfusion parameters: hemoglobin <8, platelets <10  - Daily G complete and overall counts slowly recovering with low cell dose.     IMMUNOCOMPROMISED - no active infections now  - prophy ACV, fluc, Pentamidine (12/28/23)  # hx PJP pneumonia: resume Bactrim D28 post BMT    CARDIOVASCULAR   #HTN: losartan 50mg daily and metoprolol XL 50mg daily.   #Severe aortic stenosis, now s/p TAVR 10/24/2023.   #Moderate nonobstructive coronary artery disease   #Hyperlipidemia - hold statin khushboo-transplant  Resume ASA  2/2 as plt >100    GI:  Hold vitamin supplements + alendronate  Ulcer prophy: protonix   Diarrhea x1 today 2/2 he will monitor his stools and call if significantly worsening in the next few days (ie 4x/day) and we could check culture. Imodium prn ok. C dif neg 12/2023     FEN/Renal:  - creat, lytes wnl despite one episode of diarrhea today    MUSCULOSKELETAL/FRAILTY  Baseline Frailty Score: 2  Cancer Rehab as needed outpatient    PSCYH:  # Anxiety: likely situational.    Also has Atarax prn and referral to Mental Health. He declined zoloft.   Now has appt 2/12. Atarax helpful q am  # Insomnia: melatonin, ativan ok at night, he is not taking ativan during the day so far.      Plan:   Resume ASA  Line removal today  Try melatonin at night    RTC: day 28 Feb 8 with Tk    I spent 30 minutes in the care of this patient today, which included time necessary for  preparation for the visit, obtaining history, ordering medications/tests/procedures as medically indicated, review of pertinent medical literature, counseling of the patient, communication of recommendations to the care team, and documentation time.      Juana He PA-C   [phone]

## 2024-02-02 NOTE — PROGRESS NOTES
Interventional Radiology Brief Post Procedure Note    Procedure: IR Tunneled Central Venous Line Removal-RIGHT    Proceduralist: Zabrina Lassiter PA-C     Time Out: Prior to the start of the procedure and with procedural staff participation, I verbally confirmed the patient s identity using two indicators, relevant allergies, that the procedure was appropriate and matched the consent or emergent situation, and that the correct equipment/implants were available. Immediately prior to starting the procedure I conducted the Time Out with the procedural staff and re-confirmed the patient s name, procedure, and site/side. (The Joint Commission universal protocol was followed.)  Yes    Sedation: None.    Findings: Removal of RIGHT tunneled catheter in its entirety.      Estimated Blood Loss: Minimal    SPECIMENS: None    Complications: 1. None     Condition: Stable    Plan: Discharge    Comments: See dictated procedure note for full details.    Zabrina Lassiter PA-C

## 2024-02-02 NOTE — NURSING NOTE
"Oncology Rooming Note    February 2, 2024 1:24 PM   Billy Carroll is a 68 year old male who presents for:    Chief Complaint   Patient presents with    Oncology Clinic Visit     [facility] RETURN - MULTIPLE MYELOMA     Initial Vitals: /80   Pulse 83   Temp 98  F (36.7  C)   Resp 18   Ht 1.778 m (5' 10\")   Wt 92.1 kg (203 lb)   SpO2 98%   BMI 29.13 kg/m   Estimated body mass index is 29.13 kg/m  as calculated from the following:    Height as of this encounter: 1.778 m (5' 10\").    Weight as of this encounter: 92.1 kg (203 lb). Body surface area is 2.13 meters squared.  No Pain (0) Comment: Data Unavailable   No LMP for male patient.  Allergies reviewed: Yes  Medications reviewed: Yes    Medications: Medication refills not needed today.  Pharmacy name entered into [facility]:    [id number] [place], [place] - [address] [facility] [place], [place] - [address] [facility]    Frailty Screening:   Is the patient here for a new oncology consult visit in cancer care? 2. No      Martin Sinha LPN              "

## 2024-02-02 NOTE — NURSING NOTE
Chief Complaint   Patient presents with    Oncology Clinic Visit     [facility] RETURN - MULTIPLE MYELOMA    Blood Draw     Labs collected from CVC by RN, line flushed with saline and heparin.  Vitals taken. Pt checked in for appointment(s).      Labs collected from CVC by RN, line flushed with saline and heparin.  Vitals taken. Pt checked in for appointment(s).     Mellisa Brennan RN

## 2024-02-02 NOTE — PROGRESS NOTES
"BMT Progress Note  02/02/2024     ID: Mr. Carroll is a 68 year old man D 23 for standard risk IgG lambda kappa multiple myeloma.    HPI:  Returns for follow up. Persistent anxiety and agitation, denies any worsening. Not feeling aggressive or hurting self/others. Uses morning atarax when he's feeling most agitated and feels it is helpful. Not using ativan currently. He has a health psych appt 2/12.   Had one soft stool, then one watery stool today. No crampy pain in belly, no blood in stool. No one else in the house has GI sx today. No fevers.  No cough or cold symptoms.     ROS: Review of systems with pertinent positive and negatives in HPI    Exam:  Blood pressure 137/80, pulse 83, temperature 98  F (36.7  C), resp. rate 18, height 1.778 m (5' 10\"), weight 92.1 kg (203 lb), SpO2 98%.  Wt Readings from Last 4 Encounters:   02/02/24 92.1 kg (203 lb)   01/30/24 93.4 kg (205 lb 12.8 oz)   01/28/24 93.3 kg (205 lb 9.6 oz)   01/27/24 93.5 kg (206 lb 3.2 oz)     Constitutional: NAD  HEENT: sclera anicteric.   Pulm: CTAB  CV: RRR  Ext: no edema  Neuro: alert, conversant. 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    - PMHx significant for CAD, severe aortic stenosis s/p TAVR (10/24/2023) currently ongoing cardiac rehabilitation, HTN, and HLD.     BMT/MM   Total collections: 7.43 x 10^6  Cell dose thawed: 3.72 x 10^6 -- infused cell dose total 1.57 x " 10^6  **cell processing notified about this loss of cells during thaw/wash phase on 1/15 -- 42% of cells recovered with thaw/wash.   Restaging at day 28  Start maintenance at day 60   - CVC removal today 2/2     HEME/COAG - engrafting -  no more transfusions now  - Transfusion parameters: hemoglobin <8, platelets <10  - Daily G complete and overall counts slowly recovering with low cell dose.     IMMUNOCOMPROMISED - no active infections now  - prophy ACV, fluc, Pentamidine (12/28/23)  # hx PJP pneumonia: resume Bactrim D28 post BMT    CARDIOVASCULAR   #HTN: losartan 50mg daily and metoprolol XL 50mg daily.   #Severe aortic stenosis, now s/p TAVR 10/24/2023.   #Moderate nonobstructive coronary artery disease   #Hyperlipidemia - hold statin khushboo-transplant  Resume ASA  2/2 as plt >100    GI:  Hold vitamin supplements + alendronate  Ulcer prophy: protonix   Diarrhea x1 today 2/2 he will monitor his stools and call if significantly worsening in the next few days (ie 4x/day) and we could check culture. Imodium prn ok. C dif neg 12/2023     FEN/Renal:  - creat, lytes wnl despite one episode of diarrhea today    MUSCULOSKELETAL/FRAILTY  Baseline Frailty Score: 2  Cancer Rehab as needed outpatient    PSCYH:  # Anxiety: likely situational.    Also has Atarax prn and referral to Mental Health. He declined zoloft.   Now has appt 2/12. Atarax helpful q am  # Insomnia: melatonin, ativan ok at night, he is not taking ativan during the day so far.      Plan:   Resume ASA  Line removal today  Try melatonin at night    RTC: day 28 Feb 8 with Tk    I spent 30 minutes in the care of this patient today, which included time necessary for preparation for the visit, obtaining history, ordering medications/tests/procedures as medically indicated, review of pertinent medical literature, counseling of the patient, communication of recommendations to the care team, and documentation time.      LINO BrinkC   [phone]

## 2024-02-02 NOTE — DISCHARGE INSTRUCTIONS
A collaboration between [facility] and [facility]  Experts in minimally invasive, targeted treatments performed using imaging guidance    Tunneled Central Venous Catheter Removal    Today you had your existing tunneled central venous catheter removed because it was no longer needed for treatment.    Your catheter was removed by Zabrina Lassiter PA-C    After you go home:  Avoid lying flat or bending at the waist for 2 hours following removal of the catheter to reduce risk of bleeding from catheter site.  Keep any applied tape/gauze dressings clean and dry. Change tape/gauze dressings if they get wet or soiled.  You may shower following the procedure, however cover and protect from moisture any tape/gauze dressings.   You may remove tape/gauze dressings after 3 days if the site looks like it is in the process of healing or scabbing over.  Avoid strenuous activities (elevating heart rate) or lifting more than 10 pounds for the next 3 days. Walking, elliptical and golf are examples of acceptable activities.  If there is bleeding or oozing from the procedure site, apply firm pressure to the area above your collar bone (where the catheter entered the bloodstream) for 10 minutes.  If the bleeding continues, continue to hold pressure and seek medical attention at the phone numbers below.  Mild procedure site discomfort can be treated with an ice pack and over-the-counter pain relievers.           Call our Interventional Radiology (IR) service if:  If you start bleeding from the procedure site. Our radiology provider can help you decide if you need to return to the hospital.  If you have new or worsening pain related to the procedure.  If you have concerning swelling at the procedure site.  If you develop hives or a rash or any unexplained itching.  If you have a fever of greater than 100.5  F and chills in the first 5 days after procedure.  Any other concerns related to your  procedure.    Contact Number:  [phone]  (IR control desk)  Monday - Friday 8:00 am - 4:30 pm    After hours for urgent concerns:  [phone]  After 4:30 pm Monday - Friday, Weekends and Holidays.   Ask for Interventional Radiology on-call.  Someone is available 24 hours a day.  [facility] toll free number:  [phone]

## 2024-02-06 ENCOUNTER — LAB (OUTPATIENT)
Dept: LAB | Facility: CLINIC | Age: 69
End: 2024-02-06
Payer: MEDICARE

## 2024-02-06 DIAGNOSIS — C90.00 MULTIPLE MYELOMA NOT HAVING ACHIEVED REMISSION (H): Primary | ICD-10-CM

## 2024-02-06 LAB
ALBUMIN SERPL BCG-MCNC: 4.2 G/DL (ref 3.5–5.2)
ALP SERPL-CCNC: 163 U/L (ref 40–150)
ALT SERPL W P-5'-P-CCNC: 40 U/L (ref 0–70)
ANION GAP SERPL CALCULATED.3IONS-SCNC: 10 MMOL/L (ref 7–15)
AST SERPL W P-5'-P-CCNC: 28 U/L (ref 0–45)
BASOPHILS # BLD AUTO: 0 10E3/UL (ref 0–0.2)
BASOPHILS NFR BLD AUTO: 1 %
BILIRUB SERPL-MCNC: 0.3 MG/DL
BUN SERPL-MCNC: 10.6 MG/DL (ref 8–23)
CALCIUM SERPL-MCNC: 9.2 MG/DL (ref 8.8–10.2)
CHLORIDE SERPL-SCNC: 105 MMOL/L (ref 98–107)
CREAT SERPL-MCNC: 0.76 MG/DL (ref 0.67–1.17)
DEPRECATED HCO3 PLAS-SCNC: 24 MMOL/L (ref 22–29)
EGFRCR SERPLBLD CKD-EPI 2021: >90 ML/MIN/1.73M2
EOSINOPHIL # BLD AUTO: 0 10E3/UL (ref 0–0.7)
EOSINOPHIL NFR BLD AUTO: 0 %
ERYTHROCYTE [DISTWIDTH] IN BLOOD BY AUTOMATED COUNT: 17.1 % (ref 10–15)
GLUCOSE SERPL-MCNC: 122 MG/DL (ref 70–99)
HCT VFR BLD AUTO: 34.5 % (ref 40–53)
HGB BLD-MCNC: 11.6 G/DL (ref 13.3–17.7)
IMM GRANULOCYTES # BLD: 0 10E3/UL
IMM GRANULOCYTES NFR BLD: 1 %
INR PPP: 1 (ref 0.85–1.15)
LDH SERPL L TO P-CCNC: 239 U/L (ref 0–250)
LYMPHOCYTES # BLD AUTO: 0.7 10E3/UL (ref 0.8–5.3)
LYMPHOCYTES NFR BLD AUTO: 20 %
MAGNESIUM SERPL-MCNC: 2.3 MG/DL (ref 1.7–2.3)
MCH RBC QN AUTO: 31.5 PG (ref 26.5–33)
MCHC RBC AUTO-ENTMCNC: 33.6 G/DL (ref 31.5–36.5)
MCV RBC AUTO: 94 FL (ref 78–100)
MONOCYTES # BLD AUTO: 0.6 10E3/UL (ref 0–1.3)
MONOCYTES NFR BLD AUTO: 18 %
NEUTROPHILS # BLD AUTO: 2.1 10E3/UL (ref 1.6–8.3)
NEUTROPHILS NFR BLD AUTO: 60 %
NRBC # BLD AUTO: 0 10E3/UL
NRBC BLD AUTO-RTO: 0 /100
PHOSPHATE SERPL-MCNC: 3.7 MG/DL (ref 2.5–4.5)
PLATELET # BLD AUTO: 206 10E3/UL (ref 150–450)
POTASSIUM SERPL-SCNC: 4 MMOL/L (ref 3.4–5.3)
PROT SERPL-MCNC: 6.9 G/DL (ref 6.4–8.3)
RBC # BLD AUTO: 3.68 10E6/UL (ref 4.4–5.9)
SODIUM SERPL-SCNC: 139 MMOL/L (ref 135–145)
TOTAL PROTEIN SERUM FOR ELP: 6.5 G/DL (ref 6.4–8.3)
URATE SERPL-MCNC: 6.8 MG/DL (ref 3.4–7)
WBC # BLD AUTO: 3.5 10E3/UL (ref 4–11)

## 2024-02-06 PROCEDURE — 83735 ASSAY OF MAGNESIUM: CPT

## 2024-02-06 PROCEDURE — 86334 IMMUNOFIX E-PHORESIS SERUM: CPT | Mod: 26 | Performed by: STUDENT IN AN ORGANIZED HEALTH CARE EDUCATION/TRAINING PROGRAM

## 2024-02-06 PROCEDURE — 84165 PROTEIN E-PHORESIS SERUM: CPT | Mod: TC | Performed by: STUDENT IN AN ORGANIZED HEALTH CARE EDUCATION/TRAINING PROGRAM

## 2024-02-06 PROCEDURE — 83521 IG LIGHT CHAINS FREE EACH: CPT

## 2024-02-06 PROCEDURE — 84165 PROTEIN E-PHORESIS SERUM: CPT | Mod: 26 | Performed by: STUDENT IN AN ORGANIZED HEALTH CARE EDUCATION/TRAINING PROGRAM

## 2024-02-06 PROCEDURE — 80053 COMPREHEN METABOLIC PANEL: CPT

## 2024-02-06 PROCEDURE — 82784 ASSAY IGA/IGD/IGG/IGM EACH: CPT

## 2024-02-06 PROCEDURE — 86334 IMMUNOFIX E-PHORESIS SERUM: CPT | Performed by: STUDENT IN AN ORGANIZED HEALTH CARE EDUCATION/TRAINING PROGRAM

## 2024-02-06 PROCEDURE — 36415 COLL VENOUS BLD VENIPUNCTURE: CPT

## 2024-02-06 PROCEDURE — 85025 COMPLETE CBC W/AUTO DIFF WBC: CPT

## 2024-02-06 PROCEDURE — 84100 ASSAY OF PHOSPHORUS: CPT

## 2024-02-06 PROCEDURE — 83615 LACTATE (LD) (LDH) ENZYME: CPT

## 2024-02-06 PROCEDURE — 85610 PROTHROMBIN TIME: CPT

## 2024-02-06 PROCEDURE — 84155 ASSAY OF PROTEIN SERUM: CPT | Mod: 91

## 2024-02-06 PROCEDURE — 84550 ASSAY OF BLOOD/URIC ACID: CPT

## 2024-02-06 NOTE — NURSING NOTE
Chief Complaint   Patient presents with    Labs Only     Labs drawn via  by RN, patient tolerated well.     Radha Monreal RN

## 2024-02-07 LAB
ALBUMIN SERPL ELPH-MCNC: 4.1 G/DL (ref 3.7–5.1)
ALPHA1 GLOB SERPL ELPH-MCNC: 0.3 G/DL (ref 0.2–0.4)
ALPHA2 GLOB SERPL ELPH-MCNC: 0.8 G/DL (ref 0.5–0.9)
B-GLOBULIN SERPL ELPH-MCNC: 0.8 G/DL (ref 0.6–1)
GAMMA GLOB SERPL ELPH-MCNC: 0.5 G/DL (ref 0.7–1.6)
IGA SERPL-MCNC: 11 MG/DL (ref 84–499)
IGG SERPL-MCNC: 567 MG/DL (ref 610–1616)
IGM SERPL-MCNC: <10 MG/DL (ref 35–242)
KAPPA LC FREE SER-MCNC: 0.36 MG/DL (ref 0.33–1.94)
KAPPA LC FREE/LAMBDA FREE SER NEPH: 1.8 {RATIO} (ref 0.26–1.65)
LAMBDA LC FREE SERPL-MCNC: 0.2 MG/DL (ref 0.57–2.63)
M PROTEIN SERPL ELPH-MCNC: 0.1 G/DL
PROT PATTERN SERPL ELPH-IMP: ABNORMAL
PROT PATTERN SERPL IFE-IMP: NORMAL

## 2024-02-08 ENCOUNTER — OFFICE VISIT (OUTPATIENT)
Dept: TRANSPLANT | Facility: CLINIC | Age: 69
End: 2024-02-08
Payer: MEDICARE

## 2024-02-08 VITALS
SYSTOLIC BLOOD PRESSURE: 108 MMHG | WEIGHT: 203 LBS | RESPIRATION RATE: 18 BRPM | DIASTOLIC BLOOD PRESSURE: 71 MMHG | OXYGEN SATURATION: 96 % | TEMPERATURE: 98.6 F | BODY MASS INDEX: 29.13 KG/M2 | HEART RATE: 90 BPM

## 2024-02-08 DIAGNOSIS — Z52.011 AUTOLOGOUS DONOR OF STEM CELLS: ICD-10-CM

## 2024-02-08 DIAGNOSIS — C90.01 MULTIPLE MYELOMA IN REMISSION (H): Primary | ICD-10-CM

## 2024-02-08 DIAGNOSIS — I10 ESSENTIAL HYPERTENSION: ICD-10-CM

## 2024-02-08 PROCEDURE — G0463 HOSPITAL OUTPT CLINIC VISIT: HCPCS

## 2024-02-08 PROCEDURE — 99214 OFFICE O/P EST MOD 30 MIN: CPT

## 2024-02-08 ASSESSMENT — PAIN SCALES - GENERAL: PAINLEVEL: NO PAIN (0)

## 2024-02-08 NOTE — LETTER
2/8/2024         RE: Billy Carroll  [address] [place] [address]        Dear Colleague,    Thank you for referring your patient, Billy Carroll, to the [facility] BLOOD AND MARROW TRANSPLANT PROGRAM [place]. Please see a copy of my visit note below.    BMT Progress Note  02/08/2024     ID: Mr. Carroll is a 68 year old man D 29 for standard risk IgG lambda kappa multiple myeloma.  Standard risk.    HPI: Here for follow-up. Billy feels well, eating ok, overall better, still some anxiety, re reviewed today that he stopped remeron before transplant so this can be contributing      No n/v. Stools are soft, taking Imodium occasionally and metamucil daily.  Taking hydroxizine and Ativan prn daily.     ROS: Review of systems with pertinent positive and negatives in HPI  Onc Hx: FISH:  RESULTS:    ABNORMAL: HIGH-RISK  - No high risk abnormality detected     ABNORMAL: OTHER  - Gain of chromosomes 5, 9, and 15 (25%)  - Gain of chromosome 11 (1%)     NORMAL  - No loss of 1p or gain of 1q  - No loss of 13q  - No loss of TP53  - No rearrangement of IGH  Exam:  Blood pressure 108/71, pulse 90, temperature 98.6  F (37  C), resp. rate 18, weight 92.1 kg (203 lb), SpO2 96%.  Wt Readings from Last 4 Encounters:   02/08/24 92.1 kg (203 lb)   02/02/24 92.1 kg (203 lb)   01/30/24 93.4 kg (205 lb 12.8 oz)   01/28/24 93.3 kg (205 lb 9.6 oz)     Constitutional: NAD, in good spirits  HEENT: sclera anicteric. OP moist without lesions  Pulm: CTAB  CV: RRR  Ext: no edema  Neuro: alert, conversant. Non-focal  Psych: appropriate mood and affect  Access: CVC R chest NT, dressing cdi    Lab Results   Component Value Date    WBC 3.5 (L) 02/06/2024    ANEU 3.7 01/30/2024    HGB 11.6 (L) 02/06/2024    HCT 34.5 (L) 02/06/2024     02/06/2024     02/06/2024    POTASSIUM 4.0 02/06/2024    CHLORIDE 105 02/06/2024    CO2 24 02/06/2024     (H) 02/06/2024    BUN 10.6 02/06/2024    CR 0.76 02/06/2024    MAG 2.3  02/06/2024    INR 1.00 02/06/2024    AST 28 02/06/2024    ALT 40 02/06/2024   DAy 30 MM labs:   Latest Reference Range & Units 02/06/24 11:33   Albumin Fraction 3.7 - 5.1 g/dL 4.1   Alpha 1 Fraction 0.2 - 0.4 g/dL 0.3   Alpha 2 Fraction 0.5 - 0.9 g/dL 0.8   Beta Fraction 0.6 - 1.0 g/dL 0.8   ELP Interpretation:  Small monoclonal protein (0.1 g/dL) seen in the gamma fraction. See immunofixation report on same specimen. Hypogammaglobulinemia. Pathologic significance requires clinical correlation. Marva Jordan M.D., Ph.D.   Gamma Fraction 0.7 - 1.6 g/dL 0.5 (L)   IGA 84 - 499 mg/dL 11 (L)    - 1,616 mg/dL 567 (L)   IGM 35 - 242 mg/dL <10 (L)   Immunofixation ELP  Monoclonal IgG immunoglobulin of kappa light chain type. Monoclonal antibody therapeutics (e.g. Daratumumab) may appear as monoclonal proteins on serum electrophoresis and immunofixation. Results should be interpreted with caution.  Pathologic significance requires clinical correlation.  Marva Jordan M.D., Ph.D.   Kappa Free Lt Chain 0.33 - 1.94 mg/dL 0.36   Kappa Lambda Ratio 0.26 - 1.65  1.80 (H)   Lambda Free Lt Chain 0.57 - 2.63 mg/dL 0.20 (L)   Monoclonal Peak <=0.0 g/dL 0.1 (H)   Total Protein Serum for ELP 6.4 - 8.3 g/dL 6.5   (L): Data is abnormally low  (H): Data is abnormally high      A/P:  Billy Carroll is a 68 year old man with IgG kappa MM with extramedullary disease, 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   D+ 29 s/p oupt auto PBSCT.    - PMHx significant for CAD, severe aortic stenosis s/p TAVR (10/24/2023) currently ongoing cardiac rehabilitation, HTN, and HLD.     BMT/MM   Cell dose thawed: 3.72 x 10^6 -- infused cell dose total 1.57 x 10^6  Engrafted well now   Restaging at day 28-  CR, FLAVIO  Plan PET and BMBx at day 100  Start maintenance Revlamid 10mg daily at day 60      HEME/COAG - no more transfusions now.      IMMUNOCOMPROMISED - no active infections now.   - prophy ACV, ok to stop fluc,  # hx PJP pneumonia: on Bactrim - for 1 year (3 x  week)    CARDIOVASCULAR - Monitor volume status carefully given cardiac history  #HTN: losartan 50mg daily and metoprolol XL 50mg daily.   #Severe aortic stenosis, now s/p TAVR 10/24/2023.   #Moderate nonobstructive coronary artery disease   #Hyperlipidemia - resume statin now  resumed ASA     GI:  Hold vitamin supplements + alendronate  Ulcer prophy: protonix       FEN/Renal:all fine   - creat, lytes wnl    MUSCULOSKELETAL/FRAILTY  Baseline Frailty Score: 2  Cancer Rehab as needed outpatient    PSCYH:  # Anxiety: resume Remeron 15mg q hs for insomnia and mood   Also has Atarax prn and referral to Mental Health. Awaiting appt.     Summary:  -day 100 return  -Return to  to start maintanance therapy around day 60    I spent 30 minutes in the care of this patient today, which included time necessary for preparation for the visit, obtaining history, ordering medications/tests/procedures as medically indicated, review of pertinent medical literature, counseling of the patient, communication of recommendations to the care team, and documentation time.      Jerri Frey MD

## 2024-02-08 NOTE — PROGRESS NOTES
BMT Progress Note  02/08/2024     ID: Mr. Carroll is a 68 year old man D 29 for standard risk IgG lambda kappa multiple myeloma.  Standard risk.    HPI: Here for follow-up. Billy feels well, eating ok, overall better, still some anxiety, re reviewed today that he stopped remeron before transplant so this can be contributing      No n/v. Stools are soft, taking Imodium occasionally and metamucil daily.  Taking hydroxizine and Ativan prn daily.     ROS: Review of systems with pertinent positive and negatives in HPI  Onc Hx: FISH:  RESULTS:    ABNORMAL: HIGH-RISK  - No high risk abnormality detected     ABNORMAL: OTHER  - Gain of chromosomes 5, 9, and 15 (25%)  - Gain of chromosome 11 (1%)     NORMAL  - No loss of 1p or gain of 1q  - No loss of 13q  - No loss of TP53  - No rearrangement of IGH  Exam:  Blood pressure 108/71, pulse 90, temperature 98.6  F (37  C), resp. rate 18, weight 92.1 kg (203 lb), SpO2 96%.  Wt Readings from Last 4 Encounters:   02/08/24 92.1 kg (203 lb)   02/02/24 92.1 kg (203 lb)   01/30/24 93.4 kg (205 lb 12.8 oz)   01/28/24 93.3 kg (205 lb 9.6 oz)     Constitutional: NAD, in good spirits  HEENT: sclera anicteric. OP moist without lesions  Pulm: CTAB  CV: RRR  Ext: no edema  Neuro: alert, conversant. Non-focal  Psych: appropriate mood and affect  Access: CVC R chest NT, dressing cdi    Lab Results   Component Value Date    WBC 3.5 (L) 02/06/2024    ANEU 3.7 01/30/2024    HGB 11.6 (L) 02/06/2024    HCT 34.5 (L) 02/06/2024     02/06/2024     02/06/2024    POTASSIUM 4.0 02/06/2024    CHLORIDE 105 02/06/2024    CO2 24 02/06/2024     (H) 02/06/2024    BUN 10.6 02/06/2024    CR 0.76 02/06/2024    MAG 2.3 02/06/2024    INR 1.00 02/06/2024    AST 28 02/06/2024    ALT 40 02/06/2024   DAy 30 MM labs:   Latest Reference Range & Units 02/06/24 11:33   Albumin Fraction 3.7 - 5.1 g/dL 4.1   Alpha 1 Fraction 0.2 - 0.4 g/dL 0.3   Alpha 2 Fraction 0.5 - 0.9 g/dL 0.8   Beta Fraction 0.6 - 1.0  g/dL 0.8   ELP Interpretation:  Small monoclonal protein (0.1 g/dL) seen in the gamma fraction. See immunofixation report on same specimen. Hypogammaglobulinemia. Pathologic significance requires clinical correlation. Marva Jordan M.D., Ph.D.   Gamma Fraction 0.7 - 1.6 g/dL 0.5 (L)   IGA 84 - 499 mg/dL 11 (L)    - 1,616 mg/dL 567 (L)   IGM 35 - 242 mg/dL <10 (L)   Immunofixation ELP  Monoclonal IgG immunoglobulin of kappa light chain type. Monoclonal antibody therapeutics (e.g. Daratumumab) may appear as monoclonal proteins on serum electrophoresis and immunofixation. Results should be interpreted with caution.  Pathologic significance requires clinical correlation.  Marva Jordan M.D., Ph.D.   Kappa Free Lt Chain 0.33 - 1.94 mg/dL 0.36   Kappa Lambda Ratio 0.26 - 1.65  1.80 (H)   Lambda Free Lt Chain 0.57 - 2.63 mg/dL 0.20 (L)   Monoclonal Peak <=0.0 g/dL 0.1 (H)   Total Protein Serum for ELP 6.4 - 8.3 g/dL 6.5   (L): Data is abnormally low  (H): Data is abnormally high      A/P:  Billy Carroll is a 68 year old man with IgG kappa MM with extramedullary disease, 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   D+ 29 s/p oupt auto PBSCT.    - PMHx significant for CAD, severe aortic stenosis s/p TAVR (10/24/2023) currently ongoing cardiac rehabilitation, HTN, and HLD.     BMT/MM   Cell dose thawed: 3.72 x 10^6 -- infused cell dose total 1.57 x 10^6  Engrafted well now   Restaging at day 28-  CR, FLAVIO  Plan PET and BMBx at day 100  Start maintenance Revlamid 10mg daily at day 60      HEME/COAG - no more transfusions now.      IMMUNOCOMPROMISED - no active infections now.   - prophy ACV, ok to stop fluc,  # hx PJP pneumonia: on Bactrim - for 1 year (3 x week)    CARDIOVASCULAR - Monitor volume status carefully given cardiac history  #HTN: losartan 50mg daily and metoprolol XL 50mg daily.   #Severe aortic stenosis, now s/p TAVR 10/24/2023.   #Moderate nonobstructive coronary artery disease   #Hyperlipidemia - resume statin now  resumed ASA      GI:  Hold vitamin supplements + alendronate  Ulcer prophy: protonix       FEN/Renal:all fine   - creat, lytes wnl    MUSCULOSKELETAL/FRAILTY  Baseline Frailty Score: 2  Cancer Rehab as needed outpatient    PSCYH:  # Anxiety: resume Remeron 15mg q hs for insomnia and mood   Also has Atarax prn and referral to Mental Health. Awaiting appt.     Summary:  -day 100 return  -Return to  to start maintanance therapy around day 60    I spent 30 minutes in the care of this patient today, which included time necessary for preparation for the visit, obtaining history, ordering medications/tests/procedures as medically indicated, review of pertinent medical literature, counseling of the patient, communication of recommendations to the care team, and documentation time.      Jerri Frey MD

## 2024-02-08 NOTE — NURSING NOTE
"Oncology Rooming Note    February 8, 2024 10:55 AM   Billy Carroll is a 68 year old male who presents for:    Chief Complaint   Patient presents with    Oncology Clinic Visit     Multiple myeloma not having achieved remission      Initial Vitals: /71   Pulse 90   Temp 98.6  F (37  C)   Resp 18   Wt 92.1 kg (203 lb)   SpO2 96%   BMI 29.13 kg/m   Estimated body mass index is 29.13 kg/m  as calculated from the following:    Height as of 2/2/24: 1.778 m (5' 10\").    Weight as of this encounter: 92.1 kg (203 lb). Body surface area is 2.13 meters squared.  No Pain (0) Comment: Data Unavailable   No LMP for male patient.  Allergies reviewed: No  Medications reviewed: No    Medications: Medication refills not needed today.  Pharmacy name entered into [facility]:    [id number] [place], [place] - [address] [facility] [place], [place] - [address]    Frailty Screening:   Is the patient here for a new oncology consult visit in cancer care? 2. No      Clinical concerns: no other complaints      Carter Dewey"

## 2024-02-12 ENCOUNTER — VIRTUAL VISIT (OUTPATIENT)
Dept: BEHAVIORAL HEALTH | Facility: CLINIC | Age: 69
End: 2024-02-12
Attending: PHYSICIAN ASSISTANT
Payer: MEDICARE

## 2024-02-12 DIAGNOSIS — C90.00 MULTIPLE MYELOMA, REMISSION STATUS UNSPECIFIED (H): ICD-10-CM

## 2024-02-12 PROCEDURE — 90791 PSYCH DIAGNOSTIC EVALUATION: CPT | Mod: 95 | Performed by: PSYCHOLOGIST

## 2024-02-12 ASSESSMENT — ANXIETY QUESTIONNAIRES
3. WORRYING TOO MUCH ABOUT DIFFERENT THINGS: NOT AT ALL
2. NOT BEING ABLE TO STOP OR CONTROL WORRYING: NOT AT ALL
GAD7 TOTAL SCORE: 5
1. FEELING NERVOUS, ANXIOUS, OR ON EDGE: SEVERAL DAYS
GAD7 TOTAL SCORE: 5
8. IF YOU CHECKED OFF ANY PROBLEMS, HOW DIFFICULT HAVE THESE MADE IT FOR YOU TO DO YOUR WORK, TAKE CARE OF THINGS AT HOME, OR GET ALONG WITH OTHER PEOPLE?: NOT DIFFICULT AT ALL
4. TROUBLE RELAXING: MORE THAN HALF THE DAYS
IF YOU CHECKED OFF ANY PROBLEMS ON THIS QUESTIONNAIRE, HOW DIFFICULT HAVE THESE PROBLEMS MADE IT FOR YOU TO DO YOUR WORK, TAKE CARE OF THINGS AT HOME, OR GET ALONG WITH OTHER PEOPLE: NOT DIFFICULT AT ALL
7. FEELING AFRAID AS IF SOMETHING AWFUL MIGHT HAPPEN: NOT AT ALL
7. FEELING AFRAID AS IF SOMETHING AWFUL MIGHT HAPPEN: NOT AT ALL
5. BEING SO RESTLESS THAT IT IS HARD TO SIT STILL: SEVERAL DAYS
6. BECOMING EASILY ANNOYED OR IRRITABLE: SEVERAL DAYS
GAD7 TOTAL SCORE: 5

## 2024-02-12 ASSESSMENT — COLUMBIA-SUICIDE SEVERITY RATING SCALE - C-SSRS
ATTEMPT LIFETIME: NO
6. HAVE YOU EVER DONE ANYTHING, STARTED TO DO ANYTHING, OR PREPARED TO DO ANYTHING TO END YOUR LIFE?: NO
2. HAVE YOU ACTUALLY HAD ANY THOUGHTS OF KILLING YOURSELF?: NO
TOTAL  NUMBER OF INTERRUPTED ATTEMPTS LIFETIME: NO
1. HAVE YOU WISHED YOU WERE DEAD OR WISHED YOU COULD GO TO SLEEP AND NOT WAKE UP?: NO
TOTAL  NUMBER OF ABORTED OR SELF INTERRUPTED ATTEMPTS LIFETIME: NO

## 2024-02-12 ASSESSMENT — PATIENT HEALTH QUESTIONNAIRE - PHQ9
SUM OF ALL RESPONSES TO PHQ QUESTIONS 1-9: 6
10. IF YOU CHECKED OFF ANY PROBLEMS, HOW DIFFICULT HAVE THESE PROBLEMS MADE IT FOR YOU TO DO YOUR WORK, TAKE CARE OF THINGS AT HOME, OR GET ALONG WITH OTHER PEOPLE: NOT DIFFICULT AT ALL
SUM OF ALL RESPONSES TO PHQ QUESTIONS 1-9: 6

## 2024-02-12 NOTE — PROGRESS NOTES
"    [facility]: Integrated Behavioral Health      PATIENT'S NAME: Billy Carroll  PREFERRED NAME: Billy  PRONOUNS: He/Him/His  MRN: [id number]  : 1955  ADDRESS: [address] [place] [address] [address] [place] [address]  [facility]. NUMBER:  [id number]  DATE OF SERVICE: 24  START TIME: 3:15  END TIME: 4:00  PREFERRED PHONE: [phone]  May we leave a program related message: Yes  EMERGENCY CONTACT: was not obtained  .  SERVICE MODALITY:  Video Visit:      Provider verified identity through the following two step process.  Patient provided:  Patient photo and Patient     Telemedicine Visit: The patient's condition can be safely assessed and treated via synchronous audio and visual telemedicine encounter.      Reason for Telemedicine Visit: Patient convenience (e.g. access to timely appointments / distance to available provider)    Originating Site (Patient Location): Patient's home    Distant Site (Provider Location): [facility]: [facility] [place]    Consent:  The patient/guardian has verbally consented to: the potential risks and benefits of telemedicine (video visit) versus in person care; bill my insurance or make self-payment for services provided; and responsibility for payment of non-covered services.     Patient would like the video invitation sent by:  My Chart    Mode of Communication:  Video Conference via [facility]    Distant Location (Provider):  On-site    As the provider I attest to compliance with applicable laws and regulations related to telemedicine.    UNIVERSAL ADULT Mental Health DIAGNOSTIC ASSESSMENT    Identifying Information:  Patient is a 68 year old,   individual.  Patient was referred for an assessment by their primary care provider.  Patient attended the session alone.    Chief Complaint:   The reason for seeking services at this time is: \"anxiety\".  The problem(s) began 23.    Starting with an oncology visit recently he was prescribed " "mirtazapine to help with sleep, though he began noticing increased levels of anxiety as he was engaging with the stem-cell transplant process. He notes trying other medication to help with his anxiety symptoms, such as hydroxyzine and sertraline. When he has experienced anxiety, he is reporting increased levels of agitation. He reported that his care team suggested he might have adjustment-related difficulties as it pertains to going through the transplant process. He did note elevated anxiety/panic levels while undergoing the transplant process in the early AM - this would sometimes wake him up early, feeling panicked/anxious. He notes feel fairly stable and upbeat now, walking a few miles per day also helps. States beginning the workup for stem cell transplant in December of 2023 and from that time into early February of 2024. Notes experiencing elevated anxiety levels \"about half the days\" in the last two weeks. Describes feeling \"unsettled\" when feeling anxious, including feelings of \"agitation\" referring to feelings of restlessness, concentration issues, sleep trouble, and irritability. He does not recall historically struggling with anxiety symptoms, suggesting his recent difficulties with anxiety are a more new/isolated issue.  Denies history of previous mental treatment/diagnoses. Denies history of CD treatment/detox.      Not seeing FLORIN, PD, SAD, or other anxiety disorders. Mood seems stable, perhaps mildly low due to recent health decline and related oncology treatment. Not enough to suggest MDD. Probable adjustment disorder with mixed anxiety and depressed mood given his symptoms began in response to an identifiable stressor and related changes to his lifestyle, including recreation (e.g. golf) and socialization.       Patient has not attempted to resolve these concerns in the past.    Social/Family History:  Patient reported they grew up in [place].  They were raised by biological parents .   Parents " were always together.  Patient reported that their childhood was good/positive.  Patient described their current relationships with family of origin as good with two brothers and sister. He is the third oldest.      The patient describes their cultural background as .  Cultural influences and impact on patient's life structure, values, norms, and healthcare: united Mandaeism.  I grew up in the [place]..  Contextual influences on patient's health include: Contextual Factors: Family Factors Good social support .    These factors will be addressed in the Preliminary Treatment plan. Patient identified their preferred language to be English. Patient reported they does not need the assistance of an  or other support involved in therapy.     Patient reported had no significant delays in developmental tasks.   Patient's highest education level was associate degree / vocational certificate  .  Patient identified the following learning problems: none reported.  Modifications will not be used to assist communication in therapy.  Patient reports they are  able to understand written materials.    Patient reported the following relationship history -  once.  Patient's current relationship status is  for 48.   Patient identified their sexual orientation as heterosexual.  Patient reported having 3 child(keegan). Patient identified siblings; friends; spouse as part of their support system.  Patient identified the quality of these relationships as stable and meaningful.      Patient's current living/housing situation involves staying in own home/apartment.  The immediate members of family and household include Britney Carroll, 67,wife  and they report that housing is stable.    Patient is currently retired for three years ago.  Patient reports their finances are obtained through employment. 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 Patient does not  identify finances as a current stressor.      Patient reported that they have not been involved with the legal system.  Patient does not report being under probation/ parole/ jurisdiction. They are not under any current court jurisdiction.     Patient's Strengths and Limitations:  Patient identified the following strengths or resources that will help them succeed in treatment: commitment to health and well being, friends / good social support, family support, insight, intelligence, motivation, strong social skills, and work ethic. Things that may interfere with the patient's success in treatment include: none identified.     Assessments:  The following assessments were completed by patient for this visit:  PHQ9:       12/21/2023    10:51 AM 2/12/2024     2:51 PM   PHQ-9 SCORE   PHQ-9 Total Score MyChart  6 (Mild depression)   PHQ-9 Total Score 5 6     GAD7:       12/21/2023    10:51 AM 2/12/2024     3:10 PM   FLORIN-7 SCORE   Total Score  5 (mild anxiety)   Total Score 6 5     CAGE-AID:       2/12/2024     3:12 PM   CAGE-AID Total Score   Total Score 1   Total Score MyChart 1 (A total score of 2 or greater is considered clinically significant)     PROMIS 10-Global Health (all questions and answers displayed):       2/12/2024     3:11 PM   PROMIS 10   In general, would you say your health is: Good   In general, would you say your quality of life is: Good   In general, how would you rate your physical health? Good   In general, how would you rate your mental health, including your mood and your ability to think? Good   In general, how would you rate your satisfaction with your social activities and relationships? Good   In general, please rate how well you carry out your usual social activities and roles Good   To what extent are you able to carry out your everyday physical activities such as walking, climbing stairs, carrying groceries, or moving a chair? Completely   In the past 7 days, how often have you been bothered  by emotional problems such as feeling anxious, depressed, or irritable? Sometimes   In the past 7 days, how would you rate your fatigue on average? Moderate   In the past 7 days, how would you rate your pain on average, where 0 means no pain, and 10 means worst imaginable pain? 2   In general, would you say your health is: 3   In general, would you say your quality of life is: 3   In general, how would you rate your physical health? 3   In general, how would you rate your mental health, including your mood and your ability to think? 3   In general, how would you rate your satisfaction with your social activities and relationships? 3   In general, please rate how well you carry out your usual social activities and roles. (This includes activities at home, at work and in your community, and responsibilities as a parent, child, spouse, employee, friend, etc.) 3   To what extent are you able to carry out your everyday physical activities such as walking, climbing stairs, carrying groceries, or moving a chair? 5   In the past 7 days, how often have you been bothered by emotional problems such as feeling anxious, depressed, or irritable? 3   In the past 7 days, how would you rate your fatigue on average? 3   In the past 7 days, how would you rate your pain on average, where 0 means no pain, and 10 means worst imaginable pain? 2   Global Mental Health Score 12   Global Physical Health Score 15   PROMIS TOTAL - SUBSCORES 27     PROMIS 10-Global Health (only subscores and total score):       2/12/2024     3:11 PM   PROMIS-10 Scores Only   Global Mental Health Score 12   Global Physical Health Score 15   PROMIS TOTAL - SUBSCORES 27     [place] Suicide Severity Rating Scale (Lifetime/Recent)      2/12/2024     4:07 PM   [place] Suicide Severity Rating (Lifetime/Recent)   Q1 Wish to be Dead (Lifetime) N   Q2 Non-Specific Active Suicidal Thoughts (Lifetime) N   Actual Attempt (Lifetime) N   Has subject engaged in non-suicidal  self-injurious behavior? (Lifetime) N   Interrupted Attempts (Lifetime) N   Aborted or Self-Interrupted Attempt (Lifetime) N   Preparatory Acts or Behavior (Lifetime) N   Calculated C-SSRS Risk Score (Lifetime/Recent) No Risk Indicated     [place] Suicide Severity Rating Scale (Short Version)      6/2/2023     6:10 PM   [place] Suicide Severity Rating (Short Version)   Over the past 2 weeks have you felt down, depressed, or hopeless? no   Over the past 2 weeks have you had thoughts of killing yourself? no   Have you ever attempted to kill yourself? no       Personal and Family Medical History:  Patient does not report a family history of mental health concerns.  Patient reports family history is not on file. Denies family history of MH or chemical health treatment.    Patient does not report Mental Health Diagnosis or Treatment.      Patient has not had a physical exam to rule out medical causes for current symptoms.  Date of last physical exam was within the past year. Client was encouraged to follow up with PCP if symptoms were to develop. The patient has a [place] Primary Care Provider, who is named Cipriano Fuentes..  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 06/15/2023     Priority: Medium    Anxiety 06/15/2023     Priority: Medium    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  Patient does not want help addressing pain concerns. He does report arthritis related pain, though notes being able to manage it with OTCs. There are not significant appetite / nutritional concerns / weight changes.   Patient does not report a history of head injury / trauma / cognitive impairment.      Current Outpatient Medications   Medication    acetaminophen (ACETAMINOPHEN 8 HOUR) 650 MG CR tablet    acyclovir (ZOVIRAX) 800 MG tablet    fluticasone (FLONASE) 50 MCG/ACT nasal spray    Heparin Sod, Pork, Lock Flush 10 UNIT/ML SOLN    hydrOXYzine HCl (ATARAX) 25 MG tablet    loperamide (IMODIUM) 2 MG capsule    loratadine (CLARITIN) 10 MG tablet    LORazepam (ATIVAN) 0.5 MG tablet    losartan (COZAAR) 50 MG tablet    metoprolol succinate ER (TOPROL XL) 50 MG 24 hr tablet    mirtazapine (REMERON) 15 MG tablet    nitroGLYcerin (NITROSTAT) 0.4 MG sublingual tablet    OLANZapine (ZYPREXA) 2.5 MG tablet    Omega-3 Fatty Acids (FISH OIL BURP-LESS) 1000 MG CAPS    ondansetron (ZOFRAN) 8 MG tablet    pantoprazole (PROTONIX) 40 MG EC tablet    potassium chloride ER (KLOR-CON M) 20 MEQ CR tablet    psyllium (METAMUCIL/KONSYL) Packet    sulfamethoxazole-trimethoprim (BACTRIM DS) 800-160 MG tablet     No current facility-administered medications for this visit.     Facility-Administered Medications Ordered in Other Visits   Medication    fentaNYL (PF) (SUBLIMAZE) injection    lidocaine (viscous) (XYLOCAINE) 2 % solution    lidocaine 1 % injection    lidocaine 4 % injection    midazolam (VERSED) injection     sodium chloride 0.9% infusion         Medication Adherence:  Patient reports taking prescribed medications as prescribed.    Patient Allergies:    Allergies   Allergen Reactions    Acetaminophen-Codeine Nausea    Codeine Nausea       Medical History:    Past Medical History:   Diagnosis Date    Benign essential hypertension     Hyperlipidemia     Multiple myeloma (H)     Followed by Dr. Ruelas with [facility]         Current Mental Status Exam:   Appearance:  Appropriate    Eye Contact:  Good   Psychomotor:  Normal       Gait / station:  no problem  Attitude / Demeanor: Cooperative   Speech      Rate / Production: Normal/ Responsive      Volume:  Normal  volume      Language:  intact  Mood:   Euthymic  Affect:   Appropriate    Thought Content: Clear   Thought Process: Logical       Associations: No loosening of associations  Insight:   Good   Judgment:  Intact   Orientation:  All  Attention/concentration: Good    Substance Use:   Patient did not report a family history of substance use concerns; see medical history section for details.  Patient has not received chemical dependency treatment in the past.  Patient has not ever been to detox.      Patient is not currently receiving any chemical dependency treatment.           Substance History of use Age of first use Date of last use     Pattern and duration of use (include amounts and frequency)   Alcohol used in the past   16 12/15/23 REPORTS SUBSTANCE USE: N/A   Cannabis   never used     REPORTS SUBSTANCE USE: N/A     Amphetamines   never used     REPORTS SUBSTANCE USE: N/A   Cocaine/crack    never used       REPORTS SUBSTANCE USE: N/A   Hallucinogens never used         REPORTS SUBSTANCE USE: N/A   Inhalants never used         REPORTS SUBSTANCE USE: N/A   Heroin never used         REPORTS SUBSTANCE USE: N/A   Other Opiates never used     REPORTS SUBSTANCE USE: N/A   Benzodiazepine   never used     REPORTS SUBSTANCE USE: N/A   Barbiturates never used     REPORTS  SUBSTANCE USE: N/A   Over the counter meds used in the past 8 02/12/24 REPORTS SUBSTANCE USE: N/A   Caffeine currently use 8   REPORTS SUBSTANCE USE: N/A   Nicotine  never used     REPORTS SUBSTANCE USE: N/A   Other substances not listed above:  Identify:  never used     REPORTS SUBSTANCE USE: N/A     Patient reported the following problems as a result of their substance use: no problems, not applicable.    Substance Use: No symptoms    Based on the negative CAGE score and clinical interview there  are not indications of drug or alcohol abuse.    Significant Losses / Trauma / Abuse / Neglect Issues:   Patient did not serve in the .  There are indications or report of significant loss, trauma, abuse or neglect issues related to: are no indications and client denies any losses, trauma, abuse, or neglect concerns.  Concerns for possible neglect are not present.     Safety Assessment:   Patient denies current homicidal ideation and behaviors.  Patient denies current self-injurious ideation and behaviors.    Patient denied risk behaviors associated with substance use.   Patient denies any high risk behaviors associated with mental health symptoms.  Patient reports the following current concerns for their personal safety: None.  Patient reports there are firearms in the house.     yes, they are secured. The firearms are secured in a locked space.    History of Safety Concerns:  Patient denied a history of homicidal ideation.     Patient denied a history of personal safety concerns.    Patient denied a history of assaultive behaviors.    Patient denied a history of sexual assault behaviors.     Patient denied a history of risk behaviors associated with substance use.  Patient denies any history of high risk behaviors associated with mental health symptoms.  Patient reports the following protective factors: safe and stable environment    Risk Plan:  See Recommendations for Safety and Risk Management Plan    Review  of Symptoms per patient report:   Depression: Change in sleep, Lack of interest, Change in energy level, and Feeling sad, down, or depressed  Seema:  No Symptoms  Psychosis: No Symptoms  Anxiety: Excessive worry, Nervousness, Sleep disturbance, Psychomotor agitation, Ruminations, Poor concentration, and Irritability  Panic:  No symptoms  Post Traumatic Stress Disorder:  No Symptoms   Eating Disorder: No Symptoms  ADD / ADHD:  No symptoms  Conduct Disorder: No symptoms  Autism Spectrum Disorder: No symptoms  Obsessive Compulsive Disorder: No Symptoms    Patient reports the following compulsive behaviors and treatment history:  None .      Diagnostic Criteria:   Adjustment Disorder  A. The development of emotional or behavioral symptoms in response to an identifiable stressor(s) occurring within 3 months of the onset of the stressor(s)  B. These symptoms or behaviors are clinically significant, as evidenced by one or both of the following:       - Significant impairment in social, occupational, or other important areas of functioning  C. The stress-related disturbance does not meet criteria for another disorder & is not not an exacerbation of another mental disorder  D. The symptoms do not represent normal bereavement  E. Once the stressor or its consequences have terminated, the symptoms do not persist for more than an additional 6 months       * Adjustment Disorder with Mixed Anxiety and Depressed Mood: The predominant manifestation is a combination of depression and anxiety    Functional Status:  Patient reports the following functional impairments:  health maintenance and self-care.     Nonprogrammatic care:  Patient is requesting basic services to address current mental health concerns.    Clinical Summary:  1. Psychosocial, Cultural and Contextual Factors: S/p stem cell transplant.  2. Principal DSM5 Diagnoses  (Sustained by DSM5 Criteria Listed Above):   Adjustment Disorders  309.28 (F43.23) With mixed anxiety  and depressed mood.  3. Other Diagnoses that is relevant to services:   None  4. Provisional Diagnosis:  None  5. Prognosis: Expect Improvement and Relieve Acute Symptoms.  6. Likely consequences of symptoms if not treated: Possible deterioration of mood, sleep and quality of life.  7. Client strengths include:  caring, creative, educated, empathetic, employed, goal-focused, good listener, insightful, intelligent, motivated, open to learning, open to suggestions / feedback, responsible parent, support of family, friends and providers, supportive, wants to learn, willing to ask questions, willing to relate to others, and work history .     Recommendations:     1. Plan for Safety and Risk Management:   Safety and Risk: Recommended that patient call 911 or go to the local ED should there be a change in any of these risk factors..          Report to child / adult protection services was NA.     2. Patient's identified  No cultural or diversity concerns relevant to care .     3. Initial Treatment will focus on:    Adjustment Difficulties related to: chronic disease management recent transplant .   Sleep improvement - handout provided in AVS.     4. Resources/Service Plan:    services are not indicated.   Modifications to assist communication are not indicated.   Additional disability accommodations are not indicated.      5. Collaboration:   Collaboration / coordination of treatment will be initiated with the following  support professionals: primary care physician.      6.  Referrals:   The following referral(s) will be initiated:  [facility] services .       A Release of Information has been obtained for the following:  None .     Clinical Substantiation/medical necessity for the above recommendations:  [facility] services recommended for targeted care around sleep and anxiety levels that are impacting patient quality of life and health maintenance.    7. SOLITARIO:    SOLITARIO:  Discussed the general effects of drugs and alcohol on  health and well-being and no CD issues . Provider gave patient printed information about the  effects of chemical use on their health and well being. Recommendations:  abstain from alcohol use due to health issues and per recommendations by his care team.     8. Records:   These were reviewed at time of assessment.   Information in this assessment was obtained from the medical record and  provided by patient who is a good historian.   Patient will have open access to their mental health medical record.    9.   Interactive Complexity: No    10. Safety Plan:  Patient denied any current/recent/lifetime history of suicidal ideation and/or behaviors.  No safety plan indicated at this time.     Provider Name/ Credentials:  Brandon Shepard, PEGGY  February 12, 2024

## 2024-02-12 NOTE — PATIENT INSTRUCTIONS
"Patient Education   Tips for Sleep Hygiene  \"Sleep hygiene\" means having good sleep habits.Follow these tips to sleep better at night:   Get on a schedule. Go to bed and get up at about the same time every day.  Listen to your body. Only try to sleep when you actually feel tired or sleepy.  Be patient. If you haven't been able to get to sleep after about 30 minutes or more, get up and do something calming or boring until you feel sleepy. Then return to bed and try again.  Don't have caffeine (coffee, tea, cola drinks, chocolate and some medicines), alcohol or nicotine (cigarettes). These can make it harder for you to fall asleep and stay asleep.  Use your bed for sleeping only. That means no TV, computer or homework in bed, especially during the evening and before bedtime.  Don't nap during the day. If you must nap, make sure it is for less than 20 minutes.  Create sleep rituals that remind your body it is time to sleep. Examples include breathing exercises, stretching or reading a book.  Avoid all electronic media (smart phone, computer, tablet) within 2 hours of bed time. The \"blue light\" in these devices activates the part of the brain that keeps you awake.  Dim the lights at night.  Get early morning sources of light (walk in the sunshine) to help set sleep patterns at night.  Try a bath or shower before bed. Having a warm bath 1 to 2 hours before bedtime can help you feel sleepy. Hot baths can make you alert, so be mindful of the temperature.  Don't watch the clock. Checking the clock during the night can wake you up. It can also lead to negative thoughts such as, \"I will never fall asleep,\" which can increase anxiety and sleeplessness.  Use a sleep diary. Track your sleep schedule to know your sleep patterns and to see where you can improve.  Get regular exercise every day. Try not to do heavy exercise in the 4 hours before bedtime.  Eat a healthy, balanced diet.  Try eating a light, healthy snack before bed, " but avoid eating a heavy meal.  Create the right sleeping area. A cool, dark, quiet room is best. If needed, try earplugs, fans and blackout curtains.  Keep your daytime routine the same even if you have a bad night sleep. Avoiding activities the next day can make it harder to sleep.  For informational purposes only. Not to replace the advice of your health care provider.   Copyright   2013 [facility]. All rights reserved. HESIODO 003822 - Rev 04/21.

## 2024-02-12 NOTE — LETTER
2024       RE: Billy Carroll  [address] [place] [address]     Dear Colleague,    Thank you for referring your patient, Billy Carroll, to the [facility] MENTAL HEALTH & ADDICTION SERVICES at [facility]. Please see a copy of my visit note below.        [facility] [place]: Integrated Behavioral Health      PATIENT'S NAME: Billy Carroll  PREFERRED NAME: Billy  PRONOUNS: He/Him/His  MRN: [id number]  : 1955  ADDRESS: [address] [place] [address]  ACCT. NUMBER:  [id number]  DATE OF SERVICE: 24  START TIME: 3:15  END TIME: 4:00  PREFERRED PHONE: [phone]  May we leave a program related message: Yes  EMERGENCY CONTACT: was not obtained  .  SERVICE MODALITY:  Video Visit:      Provider verified identity through the following two step process.  Patient provided:  Patient photo and Patient     Telemedicine Visit: The patient's condition can be safely assessed and treated via synchronous audio and visual telemedicine encounter.      Reason for Telemedicine Visit: Patient convenience (e.g. access to timely appointments / distance to available provider)    Originating Site (Patient Location): Patient's home    Distant Site (Provider Location): [facility]: [facility]    Consent:  The patient/guardian has verbally consented to: the potential risks and benefits of telemedicine (video visit) versus in person care; bill my insurance or make self-payment for services provided; and responsibility for payment of non-covered services.     Patient would like the video invitation sent by:  My Chart    Mode of Communication:  Video Conference via [facility]    Distant Location (Provider):  On-site    As the provider I attest to compliance with applicable laws and regulations related to telemedicine.    UNIVERSAL ADULT Mental Health DIAGNOSTIC ASSESSMENT    Identifying Information:  Patient  "is a 68 year old,   individual.  Patient was referred for an assessment by their primary care provider.  Patient attended the session alone.    Chief Complaint:   The reason for seeking services at this time is: \"anxiety\".  The problem(s) began 12/01/23.    Starting with an oncology visit recently he was prescribed mirtazapine to help with sleep, though he began noticing increased levels of anxiety as he was engaging with the stem-cell transplant process. He notes trying other medication to help with his anxiety symptoms, such as hydroxyzine and sertraline. When he has experienced anxiety, he is reporting increased levels of agitation. He reported that his care team suggested he might have adjustment-related difficulties as it pertains to going through the transplant process. He did note elevated anxiety/panic levels while undergoing the transplant process in the early AM - this would sometimes wake him up early, feeling panicked/anxious. He notes feel fairly stable and upbeat now, walking a few miles per day also helps. States beginning the workup for stem cell transplant in December of 2023 and from that time into early February of 2024. Notes experiencing elevated anxiety levels \"about half the days\" in the last two weeks. Describes feeling \"unsettled\" when feeling anxious, including feelings of \"agitation\" referring to feelings of restlessness, concentration issues, sleep trouble, and irritability. He does not recall historically struggling with anxiety symptoms, suggesting his recent difficulties with anxiety are a more new/isolated issue.  Denies history of previous mental treatment/diagnoses. Denies history of CD treatment/detox.      Not seeing FLORIN, PD, SAD, or other anxiety disorders. Mood seems stable, perhaps mildly low due to recent health decline and related oncology treatment. Not enough to suggest MDD. Probable adjustment disorder with mixed anxiety and depressed mood given his symptoms began " in response to an identifiable stressor and related changes to his lifestyle, including recreation (e.g. golf) and socialization.       Patient has not attempted to resolve these concerns in the past.    Social/Family History:  Patient reported they grew up in [place].  They were raised by biological parents .   Parents were always together.  Patient reported that their childhood was good/positive.  Patient described their current relationships with family of origin as good with two brothers and sister. He is the third oldest.      The patient describes their cultural background as .  Cultural influences and impact on patient's life structure, values, norms, and healthcare: united [employer].  I grew [place] in the city [place]..  Contextual influences on patient's health include: Contextual Factors: Family Factors Good social support .    These factors will be addressed in the Preliminary Treatment plan. Patient identified their preferred language to be English. Patient reported they does not need the assistance of an  or other support involved in therapy.     Patient reported had no significant delays in developmental tasks.   Patient's highest education level was associate degree / vocational certificate  .  Patient identified the following learning problems: none reported.  Modifications will not be used to assist communication in therapy.  Patient reports they are  able to understand written materials.    Patient reported the following relationship history -  once.  Patient's current relationship status is  for 48.   Patient identified their sexual orientation as heterosexual.  Patient reported having 3 child(keegan). Patient identified siblings; friends; spouse as part of their support system.  Patient identified the quality of these relationships as stable and meaningful.      Patient's current living/housing situation involves staying in own  home/apartment.  The immediate members of family and household include Britney Carroll, 67,wife  and they report that housing is stable.    Patient is currently retired for three years ago.  Patient reports their finances are obtained through employment. 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 Patient does not identify finances as a current stressor.      Patient reported that they have not been involved with the legal system.  Patient does not report being under probation/ parole/ jurisdiction. They are not under any current court jurisdiction.     Patient's Strengths and Limitations:  Patient identified the following strengths or resources that will help them succeed in treatment: commitment to health and well being, friends / good social support, family support, insight, intelligence, motivation, strong social skills, and work ethic. Things that may interfere with the patient's success in treatment include: none identified.     Assessments:  The following assessments were completed by patient for this visit:  PHQ9:       12/21/2023    10:51 AM 2/12/2024     2:51 PM   PHQ-9 SCORE   PHQ-9 Total Score MyChart  6 (Mild depression)   PHQ-9 Total Score 5 6     GAD7:       12/21/2023    10:51 AM 2/12/2024     3:10 PM   FLORIN-7 SCORE   Total Score  5 (mild anxiety)   Total Score 6 5     CAGE-AID:       2/12/2024     3:12 PM   CAGE-AID Total Score   Total Score 1   Total Score MyChart 1 (A total score of 2 or greater is considered clinically significant)     PROMIS 10-Global Health (all questions and answers displayed):       2/12/2024     3:11 PM   PROMIS 10   In general, would you say your health is: Good   In general, would you say your quality of life is: Good   In general, how would you rate your physical health? Good   In general, how would you rate your mental health, including your mood and your ability to think? Good   In general, how would you rate your satisfaction with your social activities and  relationships? Good   In general, please rate how well you carry out your usual social activities and roles Good   To what extent are you able to carry out your everyday physical activities such as walking, climbing stairs, carrying groceries, or moving a chair? Completely   In the past 7 days, how often have you been bothered by emotional problems such as feeling anxious, depressed, or irritable? Sometimes   In the past 7 days, how would you rate your fatigue on average? Moderate   In the past 7 days, how would you rate your pain on average, where 0 means no pain, and 10 means worst imaginable pain? 2   In general, would you say your health is: 3   In general, would you say your quality of life is: 3   In general, how would you rate your physical health? 3   In general, how would you rate your mental health, including your mood and your ability to think? 3   In general, how would you rate your satisfaction with your social activities and relationships? 3   In general, please rate how well you carry out your usual social activities and roles. (This includes activities at home, at work and in your community, and responsibilities as a parent, child, spouse, employee, friend, etc.) 3   To what extent are you able to carry out your everyday physical activities such as walking, climbing stairs, carrying groceries, or moving a chair? 5   In the past 7 days, how often have you been bothered by emotional problems such as feeling anxious, depressed, or irritable? 3   In the past 7 days, how would you rate your fatigue on average? 3   In the past 7 days, how would you rate your pain on average, where 0 means no pain, and 10 means worst imaginable pain? 2   Global Mental Health Score 12   Global Physical Health Score 15   PROMIS TOTAL - SUBSCORES 27     PROMIS 10-Global Health (only subscores and total score):       2/12/2024     3:11 PM   PROMIS-10 Scores Only   Global Mental Health Score 12   Global Physical Health Score 15    PROMIS TOTAL - SUBSCORES 27     Burt Suicide Severity Rating Scale (Lifetime/Recent)      2/12/2024     4:07 PM   Burt Suicide Severity Rating (Lifetime/Recent)   Q1 Wish to be Dead (Lifetime) N   Q2 Non-Specific Active Suicidal Thoughts (Lifetime) N   Actual Attempt (Lifetime) N   Has subject engaged in non-suicidal self-injurious behavior? (Lifetime) N   Interrupted Attempts (Lifetime) N   Aborted or Self-Interrupted Attempt (Lifetime) N   Preparatory Acts or Behavior (Lifetime) N   Calculated C-SSRS Risk Score (Lifetime/Recent) No Risk Indicated     Burt Suicide Severity Rating Scale (Short Version)      6/2/2023     6:10 PM   Burt Suicide Severity Rating (Short Version)   Over the past 2 weeks have you felt down, depressed, or hopeless? no   Over the past 2 weeks have you had thoughts of killing yourself? no   Have you ever attempted to kill yourself? no       Personal and Family Medical History:  Patient does not report a family history of mental health concerns.  Patient reports family history is not on file. Denies family history of MH or chemical health treatment.    Patient does not report Mental Health Diagnosis or Treatment.      Patient has not had a physical exam to rule out medical causes for current symptoms.  Date of last physical exam was within the past year. Client was encouraged to follow up with PCP if symptoms were to develop. The patient has a [facility] Primary Care Provider, who is named Cipriano Fuentes..  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 06/15/2023     Priority: Medium    Anxiety 06/15/2023     Priority: Medium    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  Patient does not want help addressing pain concerns. He does report arthritis related pain, though notes being able to manage it with OTCs. There are not significant appetite / nutritional concerns / weight changes.   Patient does not report a history of head injury / trauma / cognitive impairment.      Current Outpatient Medications   Medication    acetaminophen (ACETAMINOPHEN 8 HOUR) 650 MG CR tablet    acyclovir (ZOVIRAX) 800 MG tablet    fluticasone (FLONASE) 50 MCG/ACT nasal spray    Heparin Sod, Pork, Lock Flush 10 UNIT/ML SOLN    hydrOXYzine HCl (ATARAX) 25 MG tablet    loperamide (IMODIUM) 2 MG capsule    loratadine (CLARITIN) 10 MG tablet    LORazepam (ATIVAN) 0.5 MG tablet    losartan (COZAAR) 50 MG tablet    metoprolol succinate ER (TOPROL XL) 50 MG 24 hr tablet    mirtazapine (REMERON) 15 MG tablet    nitroGLYcerin (NITROSTAT) 0.4 MG sublingual tablet    OLANZapine (ZYPREXA) 2.5 MG tablet    Omega-3 Fatty Acids (FISH OIL BURP-LESS) 1000 MG CAPS    ondansetron (ZOFRAN) 8 MG tablet    pantoprazole (PROTONIX) 40 MG EC tablet    potassium chloride ER (KLOR-CON M) 20 MEQ CR tablet    psyllium (METAMUCIL/KONSYL) Packet    sulfamethoxazole-trimethoprim (BACTRIM DS)  800-160 MG tablet     No current facility-administered medications for this visit.     Facility-Administered Medications Ordered in Other Visits   Medication    fentaNYL (PF) (SUBLIMAZE) injection    lidocaine (viscous) (XYLOCAINE) 2 % solution    lidocaine 1 % injection    lidocaine 4 % injection    midazolam (VERSED) injection    sodium chloride 0.9% infusion         Medication Adherence:  Patient reports taking prescribed medications as prescribed.    Patient Allergies:    Allergies   Allergen Reactions    Acetaminophen-Codeine Nausea    Codeine Nausea       Medical History:    Past Medical History:   Diagnosis Date    Benign essential hypertension     Hyperlipidemia     Multiple myeloma (H)     [facility]         Current Mental Status Exam:   Appearance:  Appropriate    Eye Contact:  Good   Psychomotor:  Normal       Gait / station:  no problem  Attitude / Demeanor: Cooperative   Speech      Rate / Production: Normal/ Responsive      Volume:  Normal  volume      Language:  intact  Mood:   Euthymic  Affect:   Appropriate    Thought Content: Clear   Thought Process: Logical       Associations: No loosening of associations  Insight:   Good   Judgment:  Intact   Orientation:  All  Attention/concentration: Good    Substance Use:   Patient did not report a family history of substance use concerns; see medical history section for details.  Patient has not received chemical dependency treatment in the past.  Patient has not ever been to detox.      Patient is not currently receiving any chemical dependency treatment.           Substance History of use Age of first use Date of last use     Pattern and duration of use (include amounts and frequency)   Alcohol used in the past   16 12/15/23 REPORTS SUBSTANCE USE: N/A   Cannabis   never used     REPORTS SUBSTANCE USE: N/A     Amphetamines   never used     REPORTS SUBSTANCE USE: N/A   Cocaine/crack    never used       REPORTS SUBSTANCE USE: N/A    Hallucinogens never used         REPORTS SUBSTANCE USE: N/A   Inhalants never used         REPORTS SUBSTANCE USE: N/A   Heroin never used         REPORTS SUBSTANCE USE: N/A   Other Opiates never used     REPORTS SUBSTANCE USE: N/A   Benzodiazepine   never used     REPORTS SUBSTANCE USE: N/A   Barbiturates never used     REPORTS SUBSTANCE USE: N/A   Over the counter meds used in the past 8 02/12/24 REPORTS SUBSTANCE USE: N/A   Caffeine currently use 8   REPORTS SUBSTANCE USE: N/A   Nicotine  never used     REPORTS SUBSTANCE USE: N/A   Other substances not listed above:  Identify:  never used     REPORTS SUBSTANCE USE: N/A     Patient reported the following problems as a result of their substance use: no problems, not applicable.    Substance Use: No symptoms    Based on the negative CAGE score and clinical interview there  are not indications of drug or alcohol abuse.    Significant Losses / Trauma / Abuse / Neglect Issues:   Patient did not serve in the .  There are indications or report of significant loss, trauma, abuse or neglect issues related to: are no indications and client denies any losses, trauma, abuse, or neglect concerns.  Concerns for possible neglect are not present.     Safety Assessment:   Patient denies current homicidal ideation and behaviors.  Patient denies current self-injurious ideation and behaviors.    Patient denied risk behaviors associated with substance use.   Patient denies any high risk behaviors associated with mental health symptoms.  Patient reports the following current concerns for their personal safety: None.  Patient reports there are firearms in the house.     yes, they are secured. The firearms are secured in a locked space.    History of Safety Concerns:  Patient denied a history of homicidal ideation.     Patient denied a history of personal safety concerns.    Patient denied a history of assaultive behaviors.    Patient denied a history of sexual assault  behaviors.     Patient denied a history of risk behaviors associated with substance use.  Patient denies any history of high risk behaviors associated with mental health symptoms.  Patient reports the following protective factors: safe and stable environment    Risk Plan:  See Recommendations for Safety and Risk Management Plan    Review of Symptoms per patient report:   Depression: Change in sleep, Lack of interest, Change in energy level, and Feeling sad, down, or depressed  Seema:  No Symptoms  Psychosis: No Symptoms  Anxiety: Excessive worry, Nervousness, Sleep disturbance, Psychomotor agitation, Ruminations, Poor concentration, and Irritability  Panic:  No symptoms  Post Traumatic Stress Disorder:  No Symptoms   Eating Disorder: No Symptoms  ADD / ADHD:  No symptoms  Conduct Disorder: No symptoms  Autism Spectrum Disorder: No symptoms  Obsessive Compulsive Disorder: No Symptoms    Patient reports the following compulsive behaviors and treatment history:  None .      Diagnostic Criteria:   Adjustment Disorder  A. The development of emotional or behavioral symptoms in response to an identifiable stressor(s) occurring within 3 months of the onset of the stressor(s)  B. These symptoms or behaviors are clinically significant, as evidenced by one or both of the following:       - Significant impairment in social, occupational, or other important areas of functioning  C. The stress-related disturbance does not meet criteria for another disorder & is not not an exacerbation of another mental disorder  D. The symptoms do not represent normal bereavement  E. Once the stressor or its consequences have terminated, the symptoms do not persist for more than an additional 6 months       * Adjustment Disorder with Mixed Anxiety and Depressed Mood: The predominant manifestation is a combination of depression and anxiety    Functional Status:  Patient reports the following functional impairments:  health maintenance and  self-care.     Nonprogrammatic care:  Patient is requesting basic services to address current mental health concerns.    Clinical Summary:  1. Psychosocial, Cultural and Contextual Factors: S/p stem cell transplant.  2. Principal DSM5 Diagnoses  (Sustained by DSM5 Criteria Listed Above):   Adjustment Disorders  309.28 (F43.23) With mixed anxiety and depressed mood.  3. Other Diagnoses that is relevant to services:   None  4. Provisional Diagnosis:  None  5. Prognosis: Expect Improvement and Relieve Acute Symptoms.  6. Likely consequences of symptoms if not treated: Possible deterioration of mood, sleep and quality of life.  7. Client strengths include:  caring, creative, educated, empathetic, employed, goal-focused, good listener, insightful, intelligent, motivated, open to learning, open to suggestions / feedback, responsible parent, support of family, friends and providers, supportive, wants to learn, willing to ask questions, willing to relate to others, and work history .     Recommendations:     1. Plan for Safety and Risk Management:   Safety and Risk: Recommended that patient call 911 or go to the local ED should there be a change in any of these risk factors..          Report to child / adult protection services was NA.     2. Patient's identified  No cultural or diversity concerns relevant to care .     3. Initial Treatment will focus on:    Adjustment Difficulties related to: chronic disease management recent transplant .   Sleep improvement - handout provided in AVS.     4. Resources/Service Plan:    services are not indicated.   Modifications to assist communication are not indicated.   Additional disability accommodations are not indicated.      5. Collaboration:   Collaboration / coordination of treatment will be initiated with the following  support professionals: primary care physician.      6.  Referrals:   The following referral(s) will be initiated:  [facility] services .       A Release of  Information has been obtained for the following:  None .     Clinical Substantiation/medical necessity for the above recommendations:  [facility] services recommended for targeted care around sleep and anxiety levels that are impacting patient quality of life and health maintenance.    7. SOLITARIO:    SOLITARIO:  Discussed the general effects of drugs and alcohol on health and well-being and no CD issues . Provider gave patient printed information about the  effects of chemical use on their health and well being. Recommendations:  abstain from alcohol use due to health issues and per recommendations by his care team.     8. Records:   These were reviewed at time of assessment.   Information in this assessment was obtained from the medical record and  provided by patient who is a good historian.   Patient will have open access to their mental health medical record.    9.   Interactive Complexity: No    10. Safety Plan:  Patient denied any current/recent/lifetime history of suicidal ideation and/or behaviors.  No safety plan indicated at this time.     Provider Name/ Credentials:  Brandon Shepard LP  February 12, 2024         Brandon Shepard

## 2024-02-15 ENCOUNTER — THERAPY VISIT (OUTPATIENT)
Dept: PHYSICAL THERAPY | Facility: CLINIC | Age: 69
End: 2024-02-15
Payer: MEDICARE

## 2024-02-15 DIAGNOSIS — R53.81 PHYSICAL DECONDITIONING: Primary | ICD-10-CM

## 2024-02-15 PROCEDURE — 97110 THERAPEUTIC EXERCISES: CPT | Mod: GP

## 2024-02-15 PROCEDURE — 97112 NEUROMUSCULAR REEDUCATION: CPT | Mod: GP

## 2024-02-22 ENCOUNTER — MYC REFILL (OUTPATIENT)
Dept: FAMILY MEDICINE | Facility: CLINIC | Age: 69
End: 2024-02-22

## 2024-02-22 ENCOUNTER — THERAPY VISIT (OUTPATIENT)
Dept: PHYSICAL THERAPY | Facility: CLINIC | Age: 69
End: 2024-02-22
Payer: MEDICARE

## 2024-02-22 ENCOUNTER — MYC REFILL (OUTPATIENT)
Dept: TRANSPLANT | Facility: CLINIC | Age: 69
End: 2024-02-22

## 2024-02-22 DIAGNOSIS — G47.00 ORGANIC INSOMNIA: Primary | ICD-10-CM

## 2024-02-22 DIAGNOSIS — C90.00 MULTIPLE MYELOMA, REMISSION STATUS UNSPECIFIED (H): ICD-10-CM

## 2024-02-22 DIAGNOSIS — F41.9 ANXIETY: ICD-10-CM

## 2024-02-22 DIAGNOSIS — R53.81 PHYSICAL DECONDITIONING: Primary | ICD-10-CM

## 2024-02-22 PROCEDURE — 97112 NEUROMUSCULAR REEDUCATION: CPT | Mod: GP

## 2024-02-22 PROCEDURE — 97110 THERAPEUTIC EXERCISES: CPT | Mod: GP

## 2024-02-22 RX ORDER — MIRTAZAPINE 15 MG/1
15 TABLET, FILM COATED ORAL AT BEDTIME
Qty: 90 TABLET | Refills: 1 | Status: SHIPPED | OUTPATIENT
Start: 2024-02-22 | End: 2024-02-25

## 2024-02-22 RX ORDER — HYDROXYZINE HYDROCHLORIDE 25 MG/1
TABLET, FILM COATED ORAL
Qty: 15 TABLET | Refills: 3 | Status: SHIPPED | OUTPATIENT
Start: 2024-02-22

## 2024-02-25 RX ORDER — MIRTAZAPINE 15 MG/1
15 TABLET, FILM COATED ORAL AT BEDTIME
Qty: 90 TABLET | Refills: 1 | Status: SHIPPED | OUTPATIENT
Start: 2024-02-25 | End: 2024-08-19

## 2024-02-27 ENCOUNTER — VIRTUAL VISIT (OUTPATIENT)
Dept: BEHAVIORAL HEALTH | Facility: CLINIC | Age: 69
End: 2024-02-27
Payer: MEDICARE

## 2024-02-27 DIAGNOSIS — F43.23 ADJUSTMENT DISORDER WITH MIXED ANXIETY AND DEPRESSED MOOD: Primary | ICD-10-CM

## 2024-02-27 PROCEDURE — 90832 PSYTX W PT 30 MINUTES: CPT | Mod: 95 | Performed by: PSYCHOLOGIST

## 2024-02-27 NOTE — PATIENT INSTRUCTIONS
Contact Information:    For [phone]    New national suicide and crisis line is now live -- just dial 988    If you or someone you know is struggling or in crisis, help is available. Call or text Phantom or chat [employer] (copy and paste into your browser).    About 988  988 offers  access to trained crisis counselors who can help people experiencing mental health-related distress. That could be:  Thoughts of suicide  Mental health or substance use crisis, or  Any other kind of emotion distress    People can call or text Phantom or chat [employer] for themselves OR if they are worried about a loved one who may need crisis support.    988 serves as a universal entry point so that no matter where you live in the [place], you can reach a trained crisis counselor who can help.    How is 988 different from 911?  988 was established to improve access to crisis services in a way that meets our country s growing suicide and mental health related crisis care needs. 988 will provide easier access to the Lifeline network and related crisis resources, which are distinct from 911 (where the focus is on dispatching Emergency Medical Services, fire and police as needed).

## 2024-02-27 NOTE — LETTER
2/27/2024       RE: Billy Carroll  [address] [place] [address]     Dear Colleague,    Thank you for referring your patient, Billy Carroll, to the [facility] at [facility]. Please see a copy of my visit note below.    [facility]: Integrated Behavioral Health  February 27, 2024      Behavioral Health Clinician Progress Note    Patient Name: Billy Carroll           Service Type:  Individual      Service Location:   MyChart / Email (patient reached)     Session Start Time: 2:00  Session End Time: 2:21      Session Length: 16 - 37      Attendees: Patient     Service Modality:  Video Visit:      Provider verified identity through the following two step process.  Patient provided:  Patient photo and Patient is known previously to provider    Telemedicine Visit: The patient's condition can be safely assessed and treated via synchronous audio and visual telemedicine encounter.      Reason for Telemedicine Visit: Patient convenience (e.g. access to timely appointments / distance to available provider)    Originating Site (Patient Location): Patient's home    Distant Site (Provider Location): [facility]: [facility] – [place], [place]    Consent:  The patient/guardian has verbally consented to: the potential risks and benefits of telemedicine (video visit) versus in person care; bill my insurance or make self-payment for services provided; and responsibility for payment of non-covered services.     Patient would like the video invitation sent by:  My Chart    Mode of Communication:  Video Conference via [facility]well    Distant Location (Provider):  On-site    As the provider I attest to compliance with applicable laws and regulations related to telemedicine.    Visit Activities (Refresh list every visit): [facility] Only    Diagnostic Assessment Date: 2/12/2024  Treatment Plan Review Date:  NA  See Flowsheets for today's PHQ-9 and FLORIN-7 results  Previous PHQ-9:       12/21/2023    10:51 AM 2/12/2024     2:51 PM   PHQ-9 SCORE   PHQ-9 Total Score MyChart  6 (Mild depression)   PHQ-9 Total Score 5 6     Previous FLORIN-7:       12/21/2023    10:51 AM 2/12/2024     3:10 PM   FLORIN-7 SCORE   Total Score  5 (mild anxiety)   Total Score 6 5      2/12/2024  3:11 PM   PROMIS 10    In general, would you say your health is: Good    In general, would you say your quality of life is: Good    In general, how would you rate your physical health? Good    In general, how would you rate your mental health, including your mood and your ability to think? Good    In general, how would you rate your satisfaction with your social activities and relationships? Good    In general, please rate how well you carry out your usual social activities and roles Good    To what extent are you able to carry out your everyday physical activities such as walking, climbing stairs, carrying groceries, or moving a chair? Completely    In the past 7 days, how often have you been bothered by emotional problems such as feeling anxious, depressed, or irritable? Sometimes    In the past 7 days, how would you rate your fatigue on average? Moderate    In the past 7 days, how would you rate your pain on average, where 0 means no pain, and 10 means worst imaginable pain? 2    In general, would you say your health is: 3    In general, would you say your quality of life is: 3    In general, how would you rate your physical health? 3    In general, how would you rate your mental health, including your mood and your ability to think? 3    In general, how would you rate your satisfaction with your social activities and relationships? 3    In general, please rate how well you carry out your usual social activities and roles. (This includes activities at home, at work and in your community, and responsibilities as a parent, child, spouse, employee, friend, etc.)  3    To what extent are you able to carry out your everyday physical activities such as walking, climbing stairs, carrying groceries, or moving a chair? 5    In the past 7 days, how often have you been bothered by emotional problems such as feeling anxious, depressed, or irritable? 3    In the past 7 days, how would you rate your fatigue on average? 3    In the past 7 days, how would you rate your pain on average, where 0 means no pain, and 10 means worst imaginable pain? 2    Global Mental Health Score 12    Global Physical Health Score 15    PROMIS TOTAL - SUBSCORES 27              DATA  Extended Session (60+ minutes): No  Interactive Complexity: No  Crisis: No  [facility] Patient: No    Treatment Objective(s) Addressed in This Session:  Target Behavior(s): disease management/lifestyle changes sleep, diet, exercise, relationships    Depressed Mood: Increase interest, engagement, and pleasure in doing things  Anxiety: will experience a reduction in anxiety, will develop more effective coping skills to manage anxiety symptoms, will develop healthy cognitive patterns and beliefs, and will increase ability to function adaptively    Current Stressors / Issues:  [facility] met with Billy today to continue supporting his treatment of adjustment-related anxiety. Overall Billy is reporting today significant improvement to anxiety symptoms since our last visit. His sleep has improved, which he notes is helping him feel more energized and less anxious overall. He states only experiencing 2-3 days in the last few weeks brief periods of anxious distress that he notes are treated well with hydroxyzine. He notes anxiety seems to only occur at night, waking occasionally nervous or on edge. He notes he can return to sleep well within 10-15 minutes however. Discussed lifestyle changes to address anxious distress, including continuing to emphasize exercise, engaging in social support, and scheduling pleasant events with others. Billy notes he was  able to re-engage with golf, which he feels good about in particular. Discussed scheduling his own time for needs, what he considers healthy adaptations to anxiety moving forward. Billy notes he would like to use counseling as needed moving forward and this writer supported this idea for him. Provided contact information in his AVS today.       Progress on Treatment Objective(s) / Homework:  Stable - MAINTENANCE (Working to maintain change, with risk of relapse); Intervened by continuing to positively reinforce healthy behavior choice     Motivational Interviewing    MI Intervention: Expressed Empathy/Understanding, Supported Autonomy, Collaboration, Evocation, Permission to raise concern or advise, Open-ended questions, Reflections: simple and complex, Change talk (evoked), and Reframe     Change Talk Expressed by the Patient: Activation Taking steps    Provider Response to Change Talk: E - Evoked more info from patient about behavior change, A - Affirmed patient's thoughts, decisions, or attempts at behavior change, R - Reflected patient's change talk, and S - Summarized patient's change talk statements    Also provided psychoeducation about behavioral health condition, symptoms, and treatment options    Assessments completed prior to visit:  The following assessments were completed by patient for this visit:  PHQ9:       12/21/2023    10:51 AM 2/12/2024     2:51 PM   PHQ-9 SCORE   PHQ-9 Total Score MyChart  6 (Mild depression)   PHQ-9 Total Score 5 6     GAD7:       12/21/2023    10:51 AM 2/12/2024     3:10 PM   FLORIN-7 SCORE   Total Score  5 (mild anxiety)   Total Score 6 5     CAGE-AID:       2/12/2024     3:12 PM   CAGE-AID Total Score   Total Score 1   Total Score MyChart 1 (A total score of 2 or greater is considered clinically significant)     PROMIS 10-Global Health (all questions and answers displayed):       2/12/2024     3:11 PM   PROMIS 10   In general, would you say your health is: Good   In general,  would you say your quality of life is: Good   In general, how would you rate your physical health? Good   In general, how would you rate your mental health, including your mood and your ability to think? Good   In general, how would you rate your satisfaction with your social activities and relationships? Good   In general, please rate how well you carry out your usual social activities and roles Good   To what extent are you able to carry out your everyday physical activities such as walking, climbing stairs, carrying groceries, or moving a chair? Completely   In the past 7 days, how often have you been bothered by emotional problems such as feeling anxious, depressed, or irritable? Sometimes   In the past 7 days, how would you rate your fatigue on average? Moderate   In the past 7 days, how would you rate your pain on average, where 0 means no pain, and 10 means worst imaginable pain? 2   In general, would you say your health is: 3   In general, would you say your quality of life is: 3   In general, how would you rate your physical health? 3   In general, how would you rate your mental health, including your mood and your ability to think? 3   In general, how would you rate your satisfaction with your social activities and relationships? 3   In general, please rate how well you carry out your usual social activities and roles. (This includes activities at home, at work and in your community, and responsibilities as a parent, child, spouse, employee, friend, etc.) 3   To what extent are you able to carry out your everyday physical activities such as walking, climbing stairs, carrying groceries, or moving a chair? 5   In the past 7 days, how often have you been bothered by emotional problems such as feeling anxious, depressed, or irritable? 3   In the past 7 days, how would you rate your fatigue on average? 3   In the past 7 days, how would you rate your pain on average, where 0 means no pain, and 10 means worst  imaginable pain? 2   Global Mental Health Score 12   Global Physical Health Score 15   PROMIS TOTAL - SUBSCORES 27     PROMIS 10-Global Health (only subscores and total score):       2/12/2024     3:11 PM   PROMIS-10 Scores Only   Global Mental Health Score 12   Global Physical Health Score 15   PROMIS TOTAL - SUBSCORES 27     [place] Suicide Severity Rating Scale (Lifetime/Recent)      2/12/2024     4:07 PM   [place] Suicide Severity Rating (Lifetime/Recent)   Q1 Wish to be Dead (Lifetime) N   Q2 Non-Specific Active Suicidal Thoughts (Lifetime) N   Actual Attempt (Lifetime) N   Has subject engaged in non-suicidal self-injurious behavior? (Lifetime) N   Interrupted Attempts (Lifetime) N   Aborted or Self-Interrupted Attempt (Lifetime) N   Preparatory Acts or Behavior (Lifetime) N   Calculated C-SSRS Risk Score (Lifetime/Recent) No Risk Indicated     [place] Suicide Severity Rating Scale (Short Version)      6/2/2023     6:10 PM   [place] Suicide Severity Rating (Short Version)   Over the past 2 weeks have you felt down, depressed, or hopeless? no   Over the past 2 weeks have you had thoughts of killing yourself? no   Have you ever attempted to kill yourself? no       Care Plan review completed: Yes    Medication Review:  No changes to current psychiatric medication(s)    Medication Compliance:  Yes    Changes in Health Issues:   None reported    Chemical Use Review:   Substance Use: Chemical use reviewed, no active concerns identified      Tobacco Use: No current tobacco use.      Assessment: Current Emotional / Mental Status (status of significant symptoms):  Risk status (Self / Other harm or suicidal ideation)  Patient denies a history of suicidal ideation, suicide attempts, self-injurious behavior, homicidal ideation, homicidal behavior, and and other safety concerns  Patient denies current fears or concerns for personal safety.  Patient denies current or recent suicidal ideation or behaviors.  Patient denies  current or recent homicidal ideation or behaviors.  Patient denies current or recent self injurious behavior or ideation.  Patient denies other safety concerns.  A safety and risk management plan has not been developed at this time, however patient was encouraged to call [place] [phone] should there be a change in any of these risk factors.    Appearance:   Appropriate   Eye Contact:   Good   Psychomotor Behavior: Normal   Attitude:   Cooperative   Orientation:   All  Speech   Rate / Production: Normal    Volume:  Normal   Mood:    Normal  Affect:    Appropriate   Thought Content:  Clear   Thought Form:  Coherent  Logical   Insight:    Good     Diagnoses:  1. Adjustment disorder with mixed anxiety and depressed mood        Collateral Reports Completed:  Routed note to Care Team Member(s)    Plan: (Homework, other):  Patient was given information about behavioral services and encouraged to schedule a follow up appointment with the clinic [facility] as needed.  He was also given information about mental health symptoms and treatment options  and Cognitive Behavioral Therapy skills to practice when experiencing anxiety.  CD Recommendations: No indications of CD issues.     Brandon Shepard Psy.D, LP   Behavioral Health Clinician   [facility]

## 2024-02-27 NOTE — PROGRESS NOTES
[facility]: Integrated Behavioral Health  February 27, 2024      Behavioral Health Clinician Progress Note    Patient Name: Billy Carroll           Service Type:  Individual      Service Location:   MyChart / Email (patient reached)     Session Start Time: 2:00  Session End Time: 2:21      Session Length: 16 - 37      Attendees: Patient     Service Modality:  Video Visit:      Provider verified identity through the following two step process.  Patient provided:  Patient photo and Patient is known previously to provider    Telemedicine Visit: The patient's condition can be safely assessed and treated via synchronous audio and visual telemedicine encounter.      Reason for Telemedicine Visit: Patient convenience (e.g. access to timely appointments / distance to available provider)    Originating Site (Patient Location): Patient's home    Distant Site (Provider Location): [facility]    Consent:  The patient/guardian has verbally consented to: the potential risks and benefits of telemedicine (video visit) versus in person care; bill my insurance or make self-payment for services provided; and responsibility for payment of non-covered services.     Patient would like the video invitation sent by:  My Chart    Mode of Communication:  Video Conference via [facility]    Distant Location (Provider):  On-site    As the provider I attest to compliance with applicable laws and regulations related to telemedicine.    Visit Activities (Refresh list every visit): [facility] Only    Diagnostic Assessment Date: 2/12/2024  Treatment Plan Review Date: NA  See Flowsheets for today's PHQ-9 and FLORIN-7 results  Previous PHQ-9:       12/21/2023    10:51 AM 2/12/2024     2:51 PM   PHQ-9 SCORE   PHQ-9 Total Score MyChart  6 (Mild depression)   PHQ-9 Total Score 5 6     Previous FLORIN-7:       12/21/2023    10:51 AM 2/12/2024     3:10 PM   FLORIN-7 SCORE   Total Score  5 (mild anxiety)    Total Score 6 5      2/12/2024  3:11 PM   PROMIS 10    In general, would you say your health is: Good    In general, would you say your quality of life is: Good    In general, how would you rate your physical health? Good    In general, how would you rate your mental health, including your mood and your ability to think? Good    In general, how would you rate your satisfaction with your social activities and relationships? Good    In general, please rate how well you carry out your usual social activities and roles Good    To what extent are you able to carry out your everyday physical activities such as walking, climbing stairs, carrying groceries, or moving a chair? Completely    In the past 7 days, how often have you been bothered by emotional problems such as feeling anxious, depressed, or irritable? Sometimes    In the past 7 days, how would you rate your fatigue on average? Moderate    In the past 7 days, how would you rate your pain on average, where 0 means no pain, and 10 means worst imaginable pain? 2    In general, would you say your health is: 3    In general, would you say your quality of life is: 3    In general, how would you rate your physical health? 3    In general, how would you rate your mental health, including your mood and your ability to think? 3    In general, how would you rate your satisfaction with your social activities and relationships? 3    In general, please rate how well you carry out your usual social activities and roles. (This includes activities at home, at work and in your community, and responsibilities as a parent, child, spouse, employee, friend, etc.) 3    To what extent are you able to carry out your everyday physical activities such as walking, climbing stairs, carrying groceries, or moving a chair? 5    In the past 7 days, how often have you been bothered by emotional problems such as feeling anxious, depressed, or irritable? 3    In the past 7 days, how would you rate  your fatigue on average? 3    In the past 7 days, how would you rate your pain on average, where 0 means no pain, and 10 means worst imaginable pain? 2    Global Mental Health Score 12    Global Physical Health Score 15    PROMIS TOTAL - SUBSCORES 27              DATA  Extended Session (60+ minutes): No  Interactive Complexity: No  Crisis: No  [facility] Patient: No    Treatment Objective(s) Addressed in This Session:  Target Behavior(s): disease management/lifestyle changes sleep, diet, exercise, relationships    Depressed Mood: Increase interest, engagement, and pleasure in doing things  Anxiety: will experience a reduction in anxiety, will develop more effective coping skills to manage anxiety symptoms, will develop healthy cognitive patterns and beliefs, and will increase ability to function adaptively    Current Stressors / Issues:  [facility] met with Billy today to continue supporting his treatment of adjustment-related anxiety. Overall Billy is reporting today significant improvement to anxiety symptoms since our last visit. His sleep has improved, which he notes is helping him feel more energized and less anxious overall. He states only experiencing 2-3 days in the last few weeks brief periods of anxious distress that he notes are treated well with hydroxyzine. He notes anxiety seems to only occur at night, waking occasionally nervous or on edge. He notes he can return to sleep well within 10-15 minutes however. Discussed lifestyle changes to address anxious distress, including continuing to emphasize exercise, engaging in social support, and scheduling pleasant events with others. Billy notes he was able to re-engage with golf, which he feels good about in particular. Discussed scheduling his own time for needs, what he considers healthy adaptations to anxiety moving forward. Billy notes he would like to use counseling as needed moving forward and this writer supported this idea for him. Provided contact information in his  AVS today.       Progress on Treatment Objective(s) / Homework:  Stable - MAINTENANCE (Working to maintain change, with risk of relapse); Intervened by continuing to positively reinforce healthy behavior choice     Motivational Interviewing    MI Intervention: Expressed Empathy/Understanding, Supported Autonomy, Collaboration, Evocation, Permission to raise concern or advise, Open-ended questions, Reflections: simple and complex, Change talk (evoked), and Reframe     Change Talk Expressed by the Patient: Activation Taking steps    Provider Response to Change Talk: E - Evoked more info from patient about behavior change, A - Affirmed patient's thoughts, decisions, or attempts at behavior change, R - Reflected patient's change talk, and S - Summarized patient's change talk statements    Also provided psychoeducation about behavioral health condition, symptoms, and treatment options    Assessments completed prior to visit:  The following assessments were completed by patient for this visit:  PHQ9:       12/21/2023    10:51 AM 2/12/2024     2:51 PM   PHQ-9 SCORE   PHQ-9 Total Score MyChart  6 (Mild depression)   PHQ-9 Total Score 5 6     GAD7:       12/21/2023    10:51 AM 2/12/2024     3:10 PM   FLORIN-7 SCORE   Total Score  5 (mild anxiety)   Total Score 6 5     CAGE-AID:       2/12/2024     3:12 PM   CAGE-AID Total Score   Total Score 1   Total Score MyChart 1 (A total score of 2 or greater is considered clinically significant)     PROMIS 10-Global Health (all questions and answers displayed):       2/12/2024     3:11 PM   PROMIS 10   In general, would you say your health is: Good   In general, would you say your quality of life is: Good   In general, how would you rate your physical health? Good   In general, how would you rate your mental health, including your mood and your ability to think? Good   In general, how would you rate your satisfaction with your social activities and relationships? Good   In general, please  rate how well you carry out your usual social activities and roles Good   To what extent are you able to carry out your everyday physical activities such as walking, climbing stairs, carrying groceries, or moving a chair? Completely   In the past 7 days, how often have you been bothered by emotional problems such as feeling anxious, depressed, or irritable? Sometimes   In the past 7 days, how would you rate your fatigue on average? Moderate   In the past 7 days, how would you rate your pain on average, where 0 means no pain, and 10 means worst imaginable pain? 2   In general, would you say your health is: 3   In general, would you say your quality of life is: 3   In general, how would you rate your physical health? 3   In general, how would you rate your mental health, including your mood and your ability to think? 3   In general, how would you rate your satisfaction with your social activities and relationships? 3   In general, please rate how well you carry out your usual social activities and roles. (This includes activities at home, at work and in your community, and responsibilities as a parent, child, spouse, employee, friend, etc.) 3   To what extent are you able to carry out your everyday physical activities such as walking, climbing stairs, carrying groceries, or moving a chair? 5   In the past 7 days, how often have you been bothered by emotional problems such as feeling anxious, depressed, or irritable? 3   In the past 7 days, how would you rate your fatigue on average? 3   In the past 7 days, how would you rate your pain on average, where 0 means no pain, and 10 means worst imaginable pain? 2   Global Mental Health Score 12   Global Physical Health Score 15   PROMIS TOTAL - SUBSCORES 27     PROMIS 10-Global Health (only subscores and total score):       2/12/2024     3:11 PM   PROMIS-10 Scores Only   Global Mental Health Score 12   Global Physical Health Score 15   PROMIS TOTAL - SUBSCORES 27      [place] Suicide Severity Rating Scale (Lifetime/Recent)      2/12/2024     4:07 PM   [place] Suicide Severity Rating (Lifetime/Recent)   Q1 Wish to be Dead (Lifetime) N   Q2 Non-Specific Active Suicidal Thoughts (Lifetime) N   Actual Attempt (Lifetime) N   Has subject engaged in non-suicidal self-injurious behavior? (Lifetime) N   Interrupted Attempts (Lifetime) N   Aborted or Self-Interrupted Attempt (Lifetime) N   Preparatory Acts or Behavior (Lifetime) N   Calculated C-SSRS Risk Score (Lifetime/Recent) No Risk Indicated     [place] Suicide Severity Rating Scale (Short Version)      6/2/2023     6:10 PM   [place] Suicide Severity Rating (Short Version)   Over the past 2 weeks have you felt down, depressed, or hopeless? no   Over the past 2 weeks have you had thoughts of killing yourself? no   Have you ever attempted to kill yourself? no       Care Plan review completed: Yes    Medication Review:  No changes to current psychiatric medication(s)    Medication Compliance:  Yes    Changes in Health Issues:   None reported    Chemical Use Review:   Substance Use: Chemical use reviewed, no active concerns identified      Tobacco Use: No current tobacco use.      Assessment: Current Emotional / Mental Status (status of significant symptoms):  Risk status (Self / Other harm or suicidal ideation)  Patient denies a history of suicidal ideation, suicide attempts, self-injurious behavior, homicidal ideation, homicidal behavior, and and other safety concerns  Patient denies current fears or concerns for personal safety.  Patient denies current or recent suicidal ideation or behaviors.  Patient denies current or recent homicidal ideation or behaviors.  Patient denies current or recent self injurious behavior or ideation.  Patient denies other safety concerns.  A safety and risk management plan has not been developed at this time, however patient was encouraged to call [facility] should there be a change in any  of these risk factors.    Appearance:   Appropriate   Eye Contact:   Good   Psychomotor Behavior: Normal   Attitude:   Cooperative   Orientation:   All  Speech   Rate / Production: Normal    Volume:  Normal   Mood:    Normal  Affect:    Appropriate   Thought Content:  Clear   Thought Form:  Coherent  Logical   Insight:    Good     Diagnoses:  1. Adjustment disorder with mixed anxiety and depressed mood        Collateral Reports Completed:  Routed note to Care Team Member(s)    Plan: (Homework, other):  Patient was given information about behavioral services and encouraged to schedule a follow up appointment with the clinic [facility] as needed.  He was also given information about mental health symptoms and treatment options  and Cognitive Behavioral Therapy skills to practice when experiencing anxiety.  CD Recommendations: No indications of CD issues.     Brandon Shepard Psy.D, LP   Behavioral Health Clinician   -[facility] [place]

## 2024-02-29 ENCOUNTER — THERAPY VISIT (OUTPATIENT)
Dept: PHYSICAL THERAPY | Facility: CLINIC | Age: 69
End: 2024-02-29
Payer: MEDICARE

## 2024-02-29 DIAGNOSIS — R53.81 PHYSICAL DECONDITIONING: Primary | ICD-10-CM

## 2024-02-29 PROCEDURE — 97110 THERAPEUTIC EXERCISES: CPT | Mod: GP

## 2024-03-03 ENCOUNTER — HEALTH MAINTENANCE LETTER (OUTPATIENT)
Age: 69
End: 2024-03-03

## 2024-03-07 ENCOUNTER — HOSPITAL ENCOUNTER (OUTPATIENT)
Dept: CARDIOLOGY | Facility: CLINIC | Age: 69
Discharge: HOME | End: 2024-03-07
Attending: PHYSICIAN ASSISTANT | Admitting: PHYSICIAN ASSISTANT
Payer: MEDICARE

## 2024-03-07 DIAGNOSIS — I35.0 SEVERE AORTIC STENOSIS: ICD-10-CM

## 2024-03-07 LAB — LVEF ECHO: NORMAL

## 2024-03-07 PROCEDURE — 93306 TTE W/DOPPLER COMPLETE: CPT | Mod: 26 | Performed by: INTERNAL MEDICINE

## 2024-03-07 PROCEDURE — 93306 TTE W/DOPPLER COMPLETE: CPT

## 2024-03-14 DIAGNOSIS — M85.851 OSTEOPENIA OF NECK OF RIGHT FEMUR: ICD-10-CM

## 2024-03-14 DIAGNOSIS — I10 ESSENTIAL HYPERTENSION: Primary | ICD-10-CM

## 2024-03-14 RX ORDER — ALENDRONATE SODIUM 70 MG/1
TABLET ORAL
Qty: 12 TABLET | Refills: 3 | Status: SHIPPED | OUTPATIENT
Start: 2024-03-14 | End: 2024-04-16

## 2024-03-14 RX ORDER — LOSARTAN POTASSIUM 50 MG/1
100 TABLET ORAL DAILY
Qty: 180 TABLET | Refills: 3 | Status: SHIPPED | OUTPATIENT
Start: 2024-03-14

## 2024-04-01 ENCOUNTER — OFFICE VISIT (OUTPATIENT)
Dept: FAMILY MEDICINE | Facility: CLINIC | Age: 69
End: 2024-04-01
Payer: MEDICARE

## 2024-04-01 VITALS
HEART RATE: 66 BPM | OXYGEN SATURATION: 96 % | HEIGHT: 70 IN | DIASTOLIC BLOOD PRESSURE: 70 MMHG | WEIGHT: 217.3 LBS | RESPIRATION RATE: 16 BRPM | BODY MASS INDEX: 31.11 KG/M2 | TEMPERATURE: 97.8 F | SYSTOLIC BLOOD PRESSURE: 128 MMHG

## 2024-04-01 DIAGNOSIS — Z00.00 ENCOUNTER FOR MEDICARE ANNUAL WELLNESS EXAM: Primary | ICD-10-CM

## 2024-04-01 DIAGNOSIS — Z95.2 S/P TAVR (TRANSCATHETER AORTIC VALVE REPLACEMENT): ICD-10-CM

## 2024-04-01 DIAGNOSIS — C90.00 MULTIPLE MYELOMA, REMISSION STATUS UNSPECIFIED (H): ICD-10-CM

## 2024-04-01 DIAGNOSIS — Z11.59 NEED FOR HEPATITIS C SCREENING TEST: ICD-10-CM

## 2024-04-01 DIAGNOSIS — R45.89 ANXIETY ABOUT HEALTH: ICD-10-CM

## 2024-04-01 DIAGNOSIS — I10 BENIGN ESSENTIAL HYPERTENSION: ICD-10-CM

## 2024-04-01 DIAGNOSIS — D84.821 IMMUNOSUPPRESSED DUE TO CHEMOTHERAPY (H): ICD-10-CM

## 2024-04-01 DIAGNOSIS — E78.2 MIXED HYPERLIPIDEMIA: ICD-10-CM

## 2024-04-01 DIAGNOSIS — Z79.899 IMMUNOSUPPRESSED DUE TO CHEMOTHERAPY (H): ICD-10-CM

## 2024-04-01 DIAGNOSIS — T45.1X5A IMMUNOSUPPRESSED DUE TO CHEMOTHERAPY (H): ICD-10-CM

## 2024-04-01 PROCEDURE — 99214 OFFICE O/P EST MOD 30 MIN: CPT | Mod: 25 | Performed by: PHYSICIAN ASSISTANT

## 2024-04-01 PROCEDURE — G0438 PPPS, INITIAL VISIT: HCPCS | Performed by: PHYSICIAN ASSISTANT

## 2024-04-01 RX ORDER — LENALIDOMIDE 10 MG/1
10 CAPSULE ORAL DAILY
COMMUNITY
Start: 2024-02-13

## 2024-04-01 RX ORDER — ROSUVASTATIN CALCIUM 20 MG/1
20 TABLET, COATED ORAL DAILY
COMMUNITY
Start: 2024-02-08 | End: 2024-05-13

## 2024-04-01 SDOH — HEALTH STABILITY: PHYSICAL HEALTH: ON AVERAGE, HOW MANY DAYS PER WEEK DO YOU ENGAGE IN MODERATE TO STRENUOUS EXERCISE (LIKE A BRISK WALK)?: 7 DAYS

## 2024-04-01 ASSESSMENT — PAIN SCALES - GENERAL: PAINLEVEL: NO PAIN (0)

## 2024-04-01 ASSESSMENT — SOCIAL DETERMINANTS OF HEALTH (SDOH): HOW OFTEN DO YOU GET TOGETHER WITH FRIENDS OR RELATIVES?: THREE TIMES A WEEK

## 2024-04-01 NOTE — PROGRESS NOTES
Preventive Care Visit  [facility]  Cipriano Fuentes PA-C, Physician Assistant  Apr 1, 2024      Assessment & Plan     (Z00.00) Encounter for Medicare annual wellness exam  (primary encounter diagnosis)  Comment:   Plan: Screening blood work and vaccines currently up-to-date.  Patient overall is significantly improved, he had a traumatic past year with multiple myeloma, aortic valve replacement, bone marrow transplant.  Chronic medical conditions are stable at this time everything is improving.  Patient excited to get back to golfing this summer    (I10) Benign essential hypertension  Comment:   Plan: Blood pressure well controlled today. Advised to continue with current medication regimen and follow up in 12 months. Stressed the importance of regular blood pressure monitoring outside of clinic, regular aerobic exercise, low salt diet, refraining from smoking/tobacco products and moderation of alcohol use.      (E78.2) Mixed hyperlipidemia  Comment:   Plan: Recheck cholesterol in the future and adjust statin dosing as indicated.  Continue with lifestyle changes: Low animal protein diet, 30 minutes of elevated heart rate per day, avoiding alcohol and tobacco products for heart health      (R45.89) Anxiety about health  Comment:   Plan: Patient did meet with behavioral health, he is not sure that this made a significant difference at this time but can be a good resource.  Advised to continue with Remeron nightly to help with sleep, hydroxyzine as needed.  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)  Comment:   Plan: Based on multiple myeloma, patient with blood work and close follow-up through oncology    (C90.00) Multiple myeloma, remission status unspecified  "(H)  Comment:   Plan: See above, patient improving on a daily basis, anxiety is going down further gets away from bone marrow transplant    (Z11.59) Need for hepatitis C screening test  Comment:   Plan: Hepatitis C Screen Reflex to HCV RNA Quant and         Genotype        Future testing ordered no concerns at this time    Patient has been advised of split billing requirements and indicates understanding: Yes          BMI  Estimated body mass index is 31.18 kg/m  as calculated from the following:    Height as of this encounter: 1.778 m (5' 10\").    Weight as of this encounter: 98.6 kg (217 lb 4.8 oz).       Counseling  Appropriate preventive services were discussed with this patient, including applicable screening as appropriate for fall prevention, nutrition, physical activity, Tobacco-use cessation, weight loss and cognition.  Checklist reviewing preventive services available has been given to the patient.  Reviewed patient's diet, addressing concerns and/or questions.   He is at risk for psychosocial distress and has been provided with information to reduce risk.   Discussed possible causes of fatigue. The patient was provided with written information regarding signs of hearing loss.   I have reviewed Opioid Use Disorder and Substance Use Disorder risk factors and made any needed referrals.           Avila Cervantes is a 68 year old, presenting for the following:  Medicare Visit        4/1/2024     3:25 PM   Additional Questions   Roomed by Mihaela wright         Health Care Directive  Patient has a Health Care Directive on file  Advance care planning document is on file and is current.    HPI      Hyperlipidemia Follow-Up    Are you regularly taking any medication or supplement to lower your cholesterol?   Yes-    Are you having muscle aches or other side effects that you think could be caused by your cholesterol lowering medication?  No    Stable on rosuvastatin    Hypertension Follow-up    Do you check your blood " pressure regularly outside of the clinic? Yes   Are you following a low salt diet? Yes  Are your blood pressures ever more than 140 on the top number (systolic) OR more   than 90 on the bottom number (diastolic), for example 140/90? No    Anxiety   How are you doing with your anxiety since your last visit? Improved the further he gets from recent BMY for MM, stable on remeron and prn hydroxyzine, rare use of lorazepam  Are you having other symptoms that might be associated with anxiety? Yes:  insomnia  Have you had a significant life event? Health Concerns   Are you feeling depressed? No  Do you have any concerns with your use of alcohol or other drugs? No    Social History     Tobacco Use    Smoking status: Never     Passive exposure: Never    Smokeless tobacco: Never   Vaping Use    Vaping Use: Never used   Substance Use Topics    Drug use: Never         12/21/2023    10:51 AM 2/12/2024     3:10 PM   FLORIN-7 SCORE   Total Score  5 (mild anxiety)   Total Score 6 5         12/21/2023    10:51 AM 2/12/2024     2:51 PM   PHQ   PHQ-9 Total Score 5 6   Q9: Thoughts of better off dead/self-harm past 2 weeks Not at all Not at all             4/1/2024   General Health   How would you rate your overall physical health? Good   Feel stress (tense, anxious, or unable to sleep) Only a little   (!) STRESS CONCERN      4/1/2024   Nutrition   Diet: Regular (no restrictions)         4/1/2024   Exercise   Days per week of moderate/strenous exercise 7 days         4/1/2024   Social Factors   Frequency of gathering with friends or relatives Three times a week   Worry food won't last until get money to buy more No   Food not last or not have enough money for food? No   Do you have housing?  Yes   Are you worried about losing your housing? No   Lack of transportation? No   Unable to get utilities (heat,electricity)? No         8/21/2023   Fall Risk   Fallen 2 or more times in the past year? No          4/1/2024   Activities of Daily  Living- Home Safety   Needs help with the following daily activites None of the above   Safety concerns in the home None of the above         4/1/2024   Dental   Dentist two times every year? Yes         4/1/2024   Hearing Screening   Hearing concerns? (!) IT'S HARDER TO UNDERSTAND WOMEN'S VOICES THAN MEN'S VOICES.         4/1/2024   Driving Risk Screening   Patient/family members have concerns about driving No         4/1/2024   General Alertness/Fatigue Screening   Have you been more tired than usual lately? (!) YES         4/1/2024   Urinary Incontinence Screening   Bothered by leaking urine in past 6 months No         4/1/2024   TB Screening   Were you born outside of the [place]? No         Today's PHQ-2 Score:       4/1/2024     3:21 PM   PHQ-2 ( 1999 [employer])   Q1: Little interest or pleasure in doing things 0   Q2: Feeling down, depressed or hopeless 0   PHQ-2 Score 0   Q1: Little interest or pleasure in doing things Not at all   Q2: Feeling down, depressed or hopeless Not at all   PHQ-2 Score 0           4/1/2024   Substance Use   Alcohol more than 3/day or more than 7/wk No   Do you have a current opioid prescription? (!) YES   How severe/bad is pain from 1 to 10? 1/10   Do you use any other substances recreationally? No       Social History     Tobacco Use    Smoking status: Never     Passive exposure: Never    Smokeless tobacco: Never   Vaping Use    Vaping Use: Never used   Substance Use Topics    Drug use: Never           4/1/2024   AAA Screening   Family history of Abdominal Aortic Aneurysm (AAA)? No   ASCVD Risk   The 10-year ASCVD risk score (Gloria HOUSTON, et al., 2019) is: 21.9%    Values used to calculate the score:      Age: 68 years      Sex: Male      Is Non- : No      Diabetic: No      Tobacco smoker: No      Systolic Blood Pressure: 128 mmHg      Is BP treated: Yes      HDL Cholesterol: 53 mg/dL      Total Cholesterol: 234 mg/dL            Reviewed and updated as  needed this visit by Provider                    BP Readings from Last 3 Encounters:   04/01/24 128/70   02/08/24 108/71   02/02/24 137/80    Wt Readings from Last 3 Encounters:   04/01/24 98.6 kg (217 lb 4.8 oz)   02/08/24 92.1 kg (203 lb)   02/02/24 92.1 kg (203 lb)                  Current providers sharing in care for this patient include:  Patient Care Team:  Cipriano Fuentes PA-C as PCP - General (Physician Assistant)  Arcelia Sheldon as Consulting Physician (Radiation Oncology)  Adalberto Nelson DO as Physician (Cardiovascular Disease)  Bozena Ritter, RN as BMT Nurse Coordinator (Transplant)  Jerri Frey MD as BMT Physician (Transplant)  Nikole Hernandez LICSW as   Jerri Frey MD as Assigned Cancer Care Provider  Juliet Ventura PA-C as Assigned Heart and Vascular Provider  Rylie Tijerina NP as Assigned Pulmonology Provider  Brandon Shepard as Assigned Behavioral Health Provider  Cipriano Fuentes PA-C as Assigned PCP    The following health maintenance items are reviewed in Epic and correct as of today:  Health Maintenance   Topic Date Due    HEPATITIS C SCREENING  Never done    MEDICARE ANNUAL WELLNESS VISIT  12/12/2023    ANNUAL REVIEW OF HM ORDERS  08/21/2024    FALL RISK ASSESSMENT  08/21/2024    LIPID  12/21/2024    GLUCOSE  02/06/2027    ADVANCE CARE PLANNING  12/29/2028    COLORECTAL CANCER SCREENING  01/01/2029    DTAP/TDAP/TD IMMUNIZATION (3 - Td or Tdap) 11/25/2029    PHQ-2 (once per calendar year)  Completed    INFLUENZA VACCINE  Completed    Pneumococcal Vaccine: 65+ Years  Completed    ZOSTER IMMUNIZATION  Completed    RSV VACCINE (Pregnancy & 60+)  Completed    COVID-19 Vaccine  Completed    IPV IMMUNIZATION  Aged Out    HPV IMMUNIZATION  Aged Out    MENINGITIS IMMUNIZATION  Aged Out    RSV MONOCLONAL ANTIBODY  Aged Out            Objective    Exam  /70 (BP Location: Right arm, Patient Position: Sitting, Cuff Size:  "Adult Regular)   Pulse 66   Temp 97.8  F (36.6  C) (Temporal)   Resp 16   Ht 1.778 m (5' 10\")   Wt 98.6 kg (217 lb 4.8 oz)   SpO2 96%   BMI 31.18 kg/m     Estimated body mass index is 31.18 kg/m  as calculated from the following:    Height as of this encounter: 1.778 m (5' 10\").    Weight as of this encounter: 98.6 kg (217 lb 4.8 oz).    Physical Exam  GENERAL: alert and no distress  NECK: no adenopathy, no asymmetry, masses, or scars  RESP: lungs clear to auscultation - no rales, rhonchi or wheezes  CV: regular rate and rhythm, normal S1 S2, no S3 or S4, no murmur, click or rub, no peripheral edema  ABDOMEN: soft, nontender, no hepatosplenomegaly, no masses and bowel sounds normal  MS: no gross musculoskeletal defects noted, no edema        4/1/2024   Mini Cog   Clock Draw Score 2 Normal   3 Item Recall 3 objects recalled   Mini Cog Total Score 5              Signed Electronically by: Cipriano Fuentes PA-C    "

## 2024-04-01 NOTE — PATIENT INSTRUCTIONS
Preventive Care Advice   This is general advice given by our system to help you stay healthy. However, your care team may have specific advice just for you. Please talk to your care team about your preventive care needs.  Nutrition  Eat 5 or more servings of fruits and vegetables each day.  Try wheat bread, brown rice and whole grain pasta (instead of white bread, rice, and pasta).  Get enough calcium and vitamin D. Check the label on foods and aim for 100% of the RDA (recommended daily allowance).  Lifestyle  Exercise at least 150 minutes each week   (30 minutes a day, 5 days a week).  Do muscle strengthening activities 2 days a week. These help control your weight and prevent disease.  No smoking.  Wear sunscreen to prevent skin cancer.  Have a dental exam and cleaning every 6 months.  Yearly exams  See your health care team every year to talk about:  Any changes in your health.  Any medicines your care team has prescribed.  Preventive care, family planning, and ways to prevent chronic diseases.  Shots (vaccines)   HPV shots (up to age 26), if you've never had them before.  Hepatitis B shots (up to age 59), if you've never had them before.  COVID-19 shot: Get this shot when it's due.  Flu shot: Get a flu shot every year.  Tetanus shot: Get a tetanus shot every 10 years.  Pneumococcal, hepatitis A, and RSV shots: Ask your care team if you need these based on your risk.  Shingles shot (for age 50 and up).  General health tests  Diabetes screening:  Starting at age 35, Get screened for diabetes at least every 3 years.  If you are younger than age 35, ask your care team if you should be screened for diabetes.  Cholesterol test: At age 39, start having a cholesterol test every 5 years, or more often if advised.  Bone density scan (DEXA): At age 50, ask your care team if you should have this scan for osteoporosis (brittle bones).  Hepatitis C: Get tested at least once in your life.  STIs (sexually transmitted  infections)  Before age 24: Ask your care team if you should be screened for STIs.  After age 24: Get screened for STIs if you're at risk. You are at risk for STIs (including HIV) if:  You are sexually active with more than one person.  You don't use condoms every time.  You or a partner was diagnosed with a sexually transmitted infection.  If you are at risk for HIV, ask about PrEP medicine to prevent HIV.  Get tested for HIV at least once in your life, whether you are at risk for HIV or not.  Cancer screening tests  Cervical cancer screening: If you have a cervix, begin getting regular cervical cancer screening tests at age 21. Most people who have regular screenings with normal results can stop after age 65. Talk about this with your provider.  Breast cancer scan (mammogram): If you've ever had breasts, begin having regular mammograms starting at age 40. This is a scan to check for breast cancer.  Colon cancer screening: It is important to start screening for colon cancer at age 45.  Have a colonoscopy test every 10 years (or more often if you're at risk) Or, ask your provider about stool tests like a FIT test every year or Cologuard test every 3 years.  To learn more about your testing options, visit: https://www.[employer]/212728.pdf.  For help making a decision, visit: https://bit.ly/vi63985.  Prostate cancer screening test: If you have a prostate and are age 55 to 69, ask your provider if you would benefit from a yearly prostate cancer screening test.  Lung cancer screening: If you are a current or former smoker age 50 to 80, ask your care team if ongoing lung cancer screenings are right for you.  For informational purposes only. Not to replace the advice of your health care provider. Copyright   2023 [employer]. All rights reserved. Clinically reviewed by the [facility]. [employer] 112781 - REV 01/24.    Learning About Sleeping Well  What does sleeping well mean?      Sleeping well means getting enough sleep to feel good and stay healthy. How much sleep is enough varies among people.  The number of hours you sleep and how you feel when you wake up are both important. If you do not feel refreshed, you probably need more sleep. Another sign of not getting enough sleep is feeling tired during the day.  Experts recommend that adults get at least 7 or more hours of sleep per day. Children and older adults need more sleep.  Why is getting enough sleep important?  Getting enough quality sleep is a basic part of good health. When your sleep suffers, your physical health, mood, and your thoughts can suffer too. You may find yourself feeling more grumpy or stressed. Not getting enough sleep also can lead to serious problems, including injury, accidents, anxiety, and depression.  What might cause poor sleeping?  Many things can cause sleep problems, including:  Changes to your sleep schedule.  Stress. Stress can be caused by fear about a single event, such as giving a speech. Or you may have ongoing stress, such as worry about work or school.  Depression, anxiety, and other mental or emotional conditions.  Changes in your sleep habits or surroundings. This includes changes that happen where you sleep, such as noise, light, or sleeping in a different bed. It also includes changes in your sleep pattern, such as having jet lag or working a late shift.  Health problems, such as pain, breathing problems, and restless legs syndrome.  Lack of regular exercise.  Using alcohol, nicotine, or caffeine before bed.  How can you help yourself?  Here are some tips that may help you sleep more soundly and wake up feeling more refreshed.  Your sleeping area   Use your bedroom only for sleeping and sex. A bit of light reading may help you fall asleep. But if it doesn't, do your reading elsewhere in the house. Try not to use your TV, computer, smartphone, or tablet while you are in bed.  Be sure your bed is  "big enough to stretch out comfortably, especially if you have a sleep partner.  Keep your bedroom quiet, dark, and cool. Use curtains, blinds, or a sleep mask to block out light. To block out noise, use earplugs, soothing music, or a \"white noise\" machine.  Your evening and bedtime routine   Create a relaxing bedtime routine. You might want to take a warm shower or bath, or listen to soothing music.  Go to bed at the same time every night. And get up at the same time every morning, even if you feel tired.  What to avoid   Limit caffeine (coffee, tea, caffeinated sodas) during the day, and don't have any for at least 6 hours before bedtime.  Avoid drinking alcohol before bedtime. Alcohol can cause you to wake up more often during the night.  Try not to smoke or use tobacco, especially in the evening. Nicotine can keep you awake.  Limit naps during the day, especially close to bedtime.  Avoid lying in bed awake for too long. If you can't fall asleep or if you wake up in the middle of the night and can't get back to sleep within about 20 minutes, get out of bed and go to another room until you feel sleepy.  Avoid taking medicine right before bed that may keep you awake or make you feel hyper or energized. Your doctor can tell you if your medicine may do this and if you can take it earlier in the day.  If you can't sleep   Imagine yourself in a peaceful, pleasant scene. Focus on the details and feelings of being in a place that is relaxing.  Get up and do a quiet or boring activity until you feel sleepy.  Avoid drinking any liquids before going to bed to help prevent waking up often to use the bathroom.  Where can you learn more?  Go to https://www.healthThe 3Doodler.net/patiented  Enter J942 in the search box to learn more about \"Learning About Sleeping Well.\"  Current as of: July 10, 2023               Content Version: 14.0    2942-5803 [employer].   Care instructions adapted under license by your healthcare " professional. If you have questions about a medical condition or this instruction, always ask your healthcare professional. Healthwise, Inc[employer] disclaims any warranty or liability for your use of this information.

## 2024-04-11 ENCOUNTER — ANESTHESIA EVENT (OUTPATIENT)
Dept: SURGERY | Facility: AMBULATORY SURGERY CENTER | Age: 69
End: 2024-04-11
Payer: MEDICARE

## 2024-04-11 RX ORDER — ONDANSETRON 4 MG/1
4 TABLET, ORALLY DISINTEGRATING ORAL EVERY 30 MIN PRN
Status: CANCELLED | OUTPATIENT
Start: 2024-04-11

## 2024-04-11 RX ORDER — OXYCODONE HYDROCHLORIDE 5 MG/1
5 TABLET ORAL
Status: CANCELLED | OUTPATIENT
Start: 2024-04-11

## 2024-04-11 RX ORDER — NALOXONE HYDROCHLORIDE 0.4 MG/ML
0.1 INJECTION, SOLUTION INTRAMUSCULAR; INTRAVENOUS; SUBCUTANEOUS
Status: CANCELLED | OUTPATIENT
Start: 2024-04-11

## 2024-04-11 RX ORDER — OXYCODONE HYDROCHLORIDE 5 MG/1
10 TABLET ORAL
Status: CANCELLED | OUTPATIENT
Start: 2024-04-11

## 2024-04-11 RX ORDER — ONDANSETRON 2 MG/ML
4 INJECTION INTRAMUSCULAR; INTRAVENOUS EVERY 30 MIN PRN
Status: CANCELLED | OUTPATIENT
Start: 2024-04-11

## 2024-04-16 ENCOUNTER — OFFICE VISIT (OUTPATIENT)
Dept: CARDIOLOGY | Facility: CLINIC | Age: 69
End: 2024-04-16
Payer: MEDICARE

## 2024-04-16 VITALS
HEART RATE: 60 BPM | OXYGEN SATURATION: 96 % | BODY MASS INDEX: 30.99 KG/M2 | DIASTOLIC BLOOD PRESSURE: 64 MMHG | RESPIRATION RATE: 18 BRPM | SYSTOLIC BLOOD PRESSURE: 126 MMHG | WEIGHT: 216 LBS

## 2024-04-16 DIAGNOSIS — I35.0 SEVERE AORTIC STENOSIS: ICD-10-CM

## 2024-04-16 DIAGNOSIS — I25.10 CORONARY ARTERY DISEASE INVOLVING NATIVE CORONARY ARTERY OF NATIVE HEART WITHOUT ANGINA PECTORIS: ICD-10-CM

## 2024-04-16 DIAGNOSIS — I10 BENIGN ESSENTIAL HYPERTENSION: ICD-10-CM

## 2024-04-16 DIAGNOSIS — E78.2 MIXED HYPERLIPIDEMIA: ICD-10-CM

## 2024-04-16 DIAGNOSIS — C90.00 MULTIPLE MYELOMA, REMISSION STATUS UNSPECIFIED (H): Primary | ICD-10-CM

## 2024-04-16 PROCEDURE — 99214 OFFICE O/P EST MOD 30 MIN: CPT | Performed by: INTERNAL MEDICINE

## 2024-04-16 PROCEDURE — G2211 COMPLEX E/M VISIT ADD ON: HCPCS | Performed by: INTERNAL MEDICINE

## 2024-04-16 NOTE — PATIENT INSTRUCTIONS
Please contact direct nurses line Monday through Friday 8 AM to 5 PM @ [phone]    After-hours contact cardiology office at [phone].    Plan:    Hold statin

## 2024-04-16 NOTE — LETTER
4/16/2024    Cipriano Fuentes PA-C  [address], [address]  [place] [address]    RE: Billy Carroll       Dear Colleague,     I had the pleasure of seeing Billy Carroll in the [facility].    HEART CARE ENCOUNTER CONSULTATON NOTE      [facility]  [phone]      Assessment/Recommendations   Assessment:   Aortic valve stenosis status post transcatheter valve replacement (PAUL 3, October 24, 2023) via transfemoral approach.  Recent echo demonstrated normal gradients with no paravalvular leak  2.  Coronary disease, moderate, no angina  3.  Mild myeloma status post bone marrow transplant  4.  Myalgias possibly secondary to statin    Plan:   1.  Hold rosuvastatin for 3 weeks to see if symptoms improve.  Improvement in muscle aches of his legs would start him back on rosuvastatin 5 mg daily to see if he tolerates lower dose  2.  Continue metoprolol XL 50 mg daily  3.  Continue losartan for hypertension  4.  Repeat echo in 1 year       History of Present Illness/Subjective    HPI: Billy Carroll is a 68 year old male history of severe aortic stenosis, both myeloma status post bone marrow transplant, moderate nonobstructive coronary artery disease who presents to cardiology clinic in follow-up.    In October patient underwent successful aortic valve replacement via transfemoral approach with no complications.  He then underwent bone marrow transplant for his multiple myeloma and is recovering well.  Has follow-up tomorrow    Recent echocardiogram reviewed in detail.    Currently patient denies any chest pain, dyspnea, lower extremity FLACA orthopnea or PND symptoms.       Physical Examination  Review of Systems   Vitals: /64 (BP Location: Left arm, Patient Position: Sitting, Cuff Size: Adult Large)   Pulse 60   Resp 18   Wt 98 kg (216 lb)   SpO2 96%   BMI 30.99 kg/m    BMI= Body mass index is 30.99 kg/m .  Wt Readings from Last 3 Encounters:   04/16/24 98 kg (216 lb)    04/01/24 98.6 kg (217 lb 4.8 oz)   02/08/24 92.1 kg (203 lb)        Pleasant   ENT/Mouth: membranes moist, no oral lesions or bleeding gums.      EYES:  no scleral icterus, normal conjunctivae       Chest/Lungs:   lungs are clear to auscultation, no rales or wheezing, no sternal scar, equal chest wall expansion    Cardiovascular:   Regular. Normal first and second heart sounds with 2 out of 6 early peaking systolic, rubs, or gallops; the carotid, radial and posterior tibial pulses are intact, Jugular venous pressure no, no edema bilaterally    Abdomen:  no tenderness; bowel sounds are present   Extremities: no cyanosis or clubbing   Skin: no xanthelasma, warm.    Neurologic: normal  bilateral, no tremors     Psychiatric: alert and oriented x3, calm        Please refer above for cardiac ROS details.        Medical History  Surgical History Family History Social History   Past Medical History:   Diagnosis Date    Benign essential hypertension     Hyperlipidemia     Multiple myeloma (H)     Followed by Dr. Ruelas with [facility]     Past Surgical History:   Procedure Laterality Date    CV CORONARY ANGIOGRAM N/A 9/7/2023    Procedure: Coronary Angiogram;  Surgeon: Domenico Sauceda MD;  Location: [facility] CV    CV TRANSCATHETER AORTIC VALVE REPLACEMENT-FEMORAL APPROACH N/A 10/24/2023    Procedure: Transcatheter Aortic Valve Replacement-Femoral Approach;  Surgeon: Charles Field MD;  Location: [facility] CV    IR CVC TUNNEL PLACEMENT > 5 YRS OF AGE  1/2/2024    IR CVC TUNNEL REMOVAL RIGHT  2/2/2024    OR TRANSCATHETER AORTIC VALVE REPLACEMENT, FEMORAL PERCUTANEOUS APPROACH (STANDBY) N/A 10/24/2023    Procedure: OR TRANSCATHETER AORTIC VALVE REPLACEMENT, FEMORAL PERCUTANEOUS APPROACH (STANDBY);  Surgeon: Sahra Mosher MD;  Location: [facility] CV     No family history on file.     Social History     Socioeconomic History    Marital status:      Spouse name: Not on file    Number of  children: Not on file    Years of education: Not on file    Highest education level: Not on file   Occupational History    Not on file   Tobacco Use    Smoking status: Never     Passive exposure: Past    Smokeless tobacco: Never   Vaping Use    Vaping status: Never Used   Substance and Sexual Activity    Alcohol use: Not on file    Drug use: Never    Sexual activity: Not on file   Other Topics Concern    Not on file   Social History Narrative    Not on file     Social Determinants of Health     Financial Resource Strain: Low Risk  (4/1/2024)    Financial Resource Strain     Within the past 12 months, have you or your family members you live with been unable to get utilities (heat, electricity) when it was really needed?: No   Food Insecurity: Low Risk  (4/1/2024)    Food Insecurity     Within the past 12 months, did you worry that your food would run out before you got money to buy more?: No     Within the past 12 months, did the food you bought just not last and you didn t have money to get more?: No   Transportation Needs: Low Risk  (4/1/2024)    Transportation Needs     Within the past 12 months, has lack of transportation kept you from medical appointments, getting your medicines, non-medical meetings or appointments, work, or from getting things that you need?: No   Physical Activity: Unknown (4/1/2024)    Exercise Vital Sign     Days of Exercise per Week: 7 days     Minutes of Exercise per Session: Not on file   Stress: No Stress Concern Present (4/1/2024)    [employer] - Occupational Stress Questionnaire     Feeling of Stress : Only a little   Social Connections: Unknown (4/1/2024)    Social Connection and Isolation Panel [NHANES]     Frequency of Communication with Friends and Family: Not on file     Frequency of Social Gatherings with Friends and Family: Three times a week     Attends Yazidi Services: Not on file     Active Member of Clubs or Organizations: Not on file      Attends Club or Organization Meetings: Not on file     Marital Status: Not on file   Interpersonal Safety: Low Risk  (4/1/2024)    Interpersonal Safety     Do you feel physically and emotionally safe where you currently live?: Yes     Within the past 12 months, have you been hit, slapped, kicked or otherwise physically hurt by someone?: No     Within the past 12 months, have you been humiliated or emotionally abused in other ways by your partner or ex-partner?: No   Housing Stability: Low Risk  (4/1/2024)    Housing Stability     Do you have housing? : Yes     Are you worried about losing your housing?: No           Medications  Allergies   Current Outpatient Medications   Medication Sig Dispense Refill    acetaminophen (ACETAMINOPHEN 8 HOUR) 650 MG CR tablet Take 1 tablet (650 mg) by mouth every 8 hours as needed for mild pain or fever      acyclovir (ZOVIRAX) 800 MG tablet Take 1 tablet (800 mg) by mouth 2 times daily Start Day -1 (Patient taking differently: Take 400 mg by mouth 2 times daily Start Day -1) 60 tablet 11    Calcium Carb-Cholecalciferol (CALCIUM + D3 PO) Take 1 tablet by mouth 2 times daily      hydrOXYzine HCl (ATARAX) 25 MG tablet Take 1 tablet up to 4 times daily for anxiety. (Patient taking differently: Take 1 tablet by mouth as needed  for anxiety.) 15 tablet 3    LENalidomide (REVLIMID) 10 MG CAPS capsule Take 10 mg by mouth daily      loperamide (IMODIUM) 2 MG capsule Take 2 mg by mouth 4 times daily as needed for diarrhea      loratadine (CLARITIN) 10 MG tablet Take 10 mg by mouth daily as needed for allergies      losartan (COZAAR) 50 MG tablet TAKE TWO TABLETS BY MOUTH DAILY 180 tablet 3    metoprolol succinate ER (TOPROL XL) 50 MG 24 hr tablet Take 1 tablet (50 mg) by mouth daily      mirtazapine (REMERON) 15 MG tablet Take 1 tablet (15 mg) by mouth at bedtime 90 tablet 1    psyllium (METAMUCIL/KONSYL) Packet Take 1 packet by mouth as needed      rosuvastatin (CRESTOR) 20 MG tablet Take  "20 mg by mouth daily      TURMERIC PO Take 2 capsules by mouth daily         Allergies   Allergen Reactions    Acetaminophen-Codeine Nausea    Codeine Nausea          Lab Results    Chemistry/lipid CBC Cardiac Enzymes/BNP/TSH/INR   Recent Labs   Lab Test 12/21/23  1120   CHOL 234*   HDL 53   *   TRIG 430*     Recent Labs   Lab Test 12/21/23  1120 09/06/23  1105 12/12/22  1112   * 138* 85     Recent Labs   Lab Test 02/06/24  1133      POTASSIUM 4.0   CHLORIDE 105   CO2 24   *   BUN 10.6   CR 0.76   GFRESTIMATED >90   SESAR 9.2     Recent Labs   Lab Test 02/06/24  1133 02/02/24  1318 01/30/24  0905   CR 0.76 0.67 0.65*     Recent Labs   Lab Test 11/07/23  0925   A1C 5.9*          Recent Labs   Lab Test 02/06/24  1133   WBC 3.5*   HGB 11.6*   HCT 34.5*   MCV 94        Recent Labs   Lab Test 02/06/24  1133 02/02/24  1318 01/30/24  0905   HGB 11.6* 11.2* 10.0*    Recent Labs   Lab Test 06/02/23  1857   TROPONINI 0.02     Recent Labs   Lab Test 10/23/23  0941 06/07/23  0049 06/02/23  1857   BNP  --  52 47   NTBNP 74  --   --      No results for input(s): \"TSH\" in the last 23663 hours.  Recent Labs   Lab Test 02/06/24  1133 01/30/24  0905 01/23/24  1304   INR 1.00 1.07 1.12        Adalberto Nelson DO        Patient will require longitudinal management of his aortic valve disease including yearly echoes for prosthetic valve via  TAVR approach  .   Thank you for allowing me to participate in the care of your patient.      Sincerely,     Adalberto Nelson DO     [facility]  cc:   Adalberto Nelson DO  [address] [place] [address]      "

## 2024-04-17 PROBLEM — I25.10 CORONARY ARTERY DISEASE INVOLVING NATIVE CORONARY ARTERY OF NATIVE HEART WITHOUT ANGINA PECTORIS: Status: ACTIVE | Noted: 2024-04-17

## 2024-04-17 NOTE — PROGRESS NOTES
HEART CARE ENCOUNTER CONSULTATON NOTE      [facility]  [phone]      Assessment/Recommendations   Assessment:   Aortic valve stenosis status post transcatheter valve replacement (PAUL 3, October 24, 2023) via transfemoral approach.  Recent echo demonstrated normal gradients with no paravalvular leak  2.  Coronary disease, moderate, no angina  3.  Mild myeloma status post bone marrow transplant  4.  Myalgias possibly secondary to statin    Plan:   1.  Hold rosuvastatin for 3 weeks to see if symptoms improve.  Improvement in muscle aches of his legs would start him back on rosuvastatin 5 mg daily to see if he tolerates lower dose  2.  Continue metoprolol XL 50 mg daily  3.  Continue losartan for hypertension  4.  Repeat echo in 1 year       History of Present Illness/Subjective    HPI: Billy Carroll is a 68 year old male history of severe aortic stenosis, both myeloma status post bone marrow transplant, moderate nonobstructive coronary artery disease who presents to cardiology clinic in follow-up.    In October patient underwent successful aortic valve replacement via transfemoral approach with no complications.  He then underwent bone marrow transplant for his multiple myeloma and is recovering well.  Has follow-up tomorrow    Recent echocardiogram reviewed in detail.    Currently patient denies any chest pain, dyspnea, lower extremity FLACA orthopnea or PND symptoms.       Physical Examination  Review of Systems   Vitals: /64 (BP Location: Left arm, Patient Position: Sitting, Cuff Size: Adult Large)   Pulse 60   Resp 18   Wt 98 kg (216 lb)   SpO2 96%   BMI 30.99 kg/m    BMI= Body mass index is 30.99 kg/m .  Wt Readings from Last 3 Encounters:   04/16/24 98 kg (216 lb)   04/01/24 98.6 kg (217 lb 4.8 oz)   02/08/24 92.1 kg (203 lb)        Pleasant   ENT/Mouth: membranes moist, no oral lesions or bleeding gums.      EYES:  no scleral icterus, normal conjunctivae       Chest/Lungs:    lungs are clear to auscultation, no rales or wheezing, no sternal scar, equal chest wall expansion    Cardiovascular:   Regular. Normal first and second heart sounds with 2 out of 6 early peaking systolic, rubs, or gallops; the carotid, radial and posterior tibial pulses are intact, Jugular venous pressure no, no edema bilaterally    Abdomen:  no tenderness; bowel sounds are present   Extremities: no cyanosis or clubbing   Skin: no xanthelasma, warm.    Neurologic: normal  bilateral, no tremors     Psychiatric: alert and oriented x3, calm        Please refer above for cardiac ROS details.        Medical History  Surgical History Family History Social History   Past Medical History:   Diagnosis Date    Benign essential hypertension     Hyperlipidemia     Multiple myeloma (H)     Followed by Dr. Ruelas with [facility]     Past Surgical History:   Procedure Laterality Date    CV CORONARY ANGIOGRAM N/A 9/7/2023    Procedure: Coronary Angiogram;  Surgeon: Domenico Sauceda MD;  Location: [facility] CV    CV TRANSCATHETER AORTIC VALVE REPLACEMENT-FEMORAL APPROACH N/A 10/24/2023    Procedure: Transcatheter Aortic Valve Replacement-Femoral Approach;  Surgeon: Charles Field MD;  Location: [facility] CV    IR CVC TUNNEL PLACEMENT > 5 YRS OF AGE  1/2/2024    IR CVC TUNNEL REMOVAL RIGHT  2/2/2024    OR TRANSCATHETER AORTIC VALVE REPLACEMENT, FEMORAL PERCUTANEOUS APPROACH (STANDBY) N/A 10/24/2023    Procedure: OR TRANSCATHETER AORTIC VALVE REPLACEMENT, FEMORAL PERCUTANEOUS APPROACH (STANDBY);  Surgeon: Sahra Mosher MD;  Location: [facility] CATH LAB CV     No family history on file.     Social History     Socioeconomic History    Marital status:      Spouse name: Not on file    Number of children: Not on file    Years of education: Not on file    Highest education level: Not on file   Occupational History    Not on file   Tobacco Use    Smoking status: Never     Passive exposure: Past    Smokeless  tobacco: Never   Vaping Use    Vaping status: Never Used   Substance and Sexual Activity    Alcohol use: Not on file    Drug use: Never    Sexual activity: Not on file   Other Topics Concern    Not on file   Social History Narrative    Not on file     Social Determinants of Health     Financial Resource Strain: Low Risk  (4/1/2024)    Financial Resource Strain     Within the past 12 months, have you or your family members you live with been unable to get utilities (heat, electricity) when it was really needed?: No   Food Insecurity: Low Risk  (4/1/2024)    Food Insecurity     Within the past 12 months, did you worry that your food would run out before you got money to buy more?: No     Within the past 12 months, did the food you bought just not last and you didn t have money to get more?: No   Transportation Needs: Low Risk  (4/1/2024)    Transportation Needs     Within the past 12 months, has lack of transportation kept you from medical appointments, getting your medicines, non-medical meetings or appointments, work, or from getting things that you need?: No   Physical Activity: Unknown (4/1/2024)    Exercise Vital Sign     Days of Exercise per Week: 7 days     Minutes of Exercise per Session: Not on file   Stress: No Stress Concern Present (4/1/2024)    [employer] - Occupational Stress Questionnaire     Feeling of Stress : Only a little   Social Connections: Unknown (4/1/2024)    Social Connection and Isolation Panel [NHANES]     Frequency of Communication with Friends and Family: Not on file     Frequency of Social Gatherings with Friends and Family: Three times a week     Attends Shinto Services: Not on file     Active Member of Clubs or Organizations: Not on file     Attends Club or Organization Meetings: Not on file     Marital Status: Not on file   Interpersonal Safety: Low Risk  (4/1/2024)    Interpersonal Safety     Do you feel physically and emotionally safe where you  currently live?: Yes     Within the past 12 months, have you been hit, slapped, kicked or otherwise physically hurt by someone?: No     Within the past 12 months, have you been humiliated or emotionally abused in other ways by your partner or ex-partner?: No   Housing Stability: Low Risk  (4/1/2024)    Housing Stability     Do you have housing? : Yes     Are you worried about losing your housing?: No           Medications  Allergies   Current Outpatient Medications   Medication Sig Dispense Refill    acetaminophen (ACETAMINOPHEN 8 HOUR) 650 MG CR tablet Take 1 tablet (650 mg) by mouth every 8 hours as needed for mild pain or fever      acyclovir (ZOVIRAX) 800 MG tablet Take 1 tablet (800 mg) by mouth 2 times daily Start Day -1 (Patient taking differently: Take 400 mg by mouth 2 times daily Start Day -1) 60 tablet 11    Calcium Carb-Cholecalciferol (CALCIUM + D3 PO) Take 1 tablet by mouth 2 times daily      hydrOXYzine HCl (ATARAX) 25 MG tablet Take 1 tablet up to 4 times daily for anxiety. (Patient taking differently: Take 1 tablet by mouth as needed  for anxiety.) 15 tablet 3    LENalidomide (REVLIMID) 10 MG CAPS capsule Take 10 mg by mouth daily      loperamide (IMODIUM) 2 MG capsule Take 2 mg by mouth 4 times daily as needed for diarrhea      loratadine (CLARITIN) 10 MG tablet Take 10 mg by mouth daily as needed for allergies      losartan (COZAAR) 50 MG tablet TAKE TWO TABLETS BY MOUTH DAILY 180 tablet 3    metoprolol succinate ER (TOPROL XL) 50 MG 24 hr tablet Take 1 tablet (50 mg) by mouth daily      mirtazapine (REMERON) 15 MG tablet Take 1 tablet (15 mg) by mouth at bedtime 90 tablet 1    psyllium (METAMUCIL/KONSYL) Packet Take 1 packet by mouth as needed      rosuvastatin (CRESTOR) 20 MG tablet Take 20 mg by mouth daily      TURMERIC PO Take 2 capsules by mouth daily         Allergies   Allergen Reactions    Acetaminophen-Codeine Nausea    Codeine Nausea          Lab Results    Chemistry/lipid CBC Cardiac  "Enzymes/BNP/TSH/INR   Recent Labs   Lab Test 12/21/23  1120   CHOL 234*   HDL 53   *   TRIG 430*     Recent Labs   Lab Test 12/21/23  1120 09/06/23  1105 12/12/22  1112   * 138* 85     Recent Labs   Lab Test 02/06/24  1133      POTASSIUM 4.0   CHLORIDE 105   CO2 24   *   BUN 10.6   CR 0.76   GFRESTIMATED >90   SESAR 9.2     Recent Labs   Lab Test 02/06/24  1133 02/02/24  1318 01/30/24  0905   CR 0.76 0.67 0.65*     Recent Labs   Lab Test 11/07/23  0925   A1C 5.9*          Recent Labs   Lab Test 02/06/24  1133   WBC 3.5*   HGB 11.6*   HCT 34.5*   MCV 94        Recent Labs   Lab Test 02/06/24  1133 02/02/24  1318 01/30/24  0905   HGB 11.6* 11.2* 10.0*    Recent Labs   Lab Test 06/02/23  1857   TROPONINI 0.02     Recent Labs   Lab Test 10/23/23  0941 06/07/23  0049 06/02/23  1857   BNP  --  52 47   NTBNP 74  --   --      No results for input(s): \"TSH\" in the last 79809 hours.  Recent Labs   Lab Test 02/06/24  1133 01/30/24  0905 01/23/24  1304   INR 1.00 1.07 1.12        Adalberto Nelson, DO        Patient will require longitudinal management of his aortic valve disease including yearly echoes for prosthetic valve via  TAVR approach  .                   "

## 2024-04-18 ENCOUNTER — ANESTHESIA (OUTPATIENT)
Dept: SURGERY | Facility: AMBULATORY SURGERY CENTER | Age: 69
End: 2024-04-18
Payer: MEDICARE

## 2024-04-18 ENCOUNTER — HOSPITAL ENCOUNTER (OUTPATIENT)
Dept: PET IMAGING | Facility: CLINIC | Age: 69
Discharge: HOME | End: 2024-04-18
Payer: MEDICARE

## 2024-04-18 ENCOUNTER — HOSPITAL ENCOUNTER (OUTPATIENT)
Facility: AMBULATORY SURGERY CENTER | Age: 69
Discharge: HOME | End: 2024-04-18
Payer: MEDICARE

## 2024-04-18 ENCOUNTER — OFFICE VISIT (OUTPATIENT)
Dept: TRANSPLANT | Facility: CLINIC | Age: 69
End: 2024-04-18
Payer: MEDICARE

## 2024-04-18 ENCOUNTER — LAB (OUTPATIENT)
Dept: LAB | Facility: CLINIC | Age: 69
End: 2024-04-18
Payer: MEDICARE

## 2024-04-18 ENCOUNTER — ONCOLOGY VISIT (OUTPATIENT)
Dept: TRANSPLANT | Facility: CLINIC | Age: 69
End: 2024-04-18
Payer: MEDICARE

## 2024-04-18 VITALS
OXYGEN SATURATION: 96 % | DIASTOLIC BLOOD PRESSURE: 69 MMHG | RESPIRATION RATE: 16 BRPM | SYSTOLIC BLOOD PRESSURE: 133 MMHG | BODY MASS INDEX: 30.26 KG/M2 | HEART RATE: 57 BPM | TEMPERATURE: 97.8 F | WEIGHT: 210.9 LBS

## 2024-04-18 VITALS — HEART RATE: 68 BPM

## 2024-04-18 VITALS
OXYGEN SATURATION: 98 % | RESPIRATION RATE: 16 BRPM | WEIGHT: 210 LBS | DIASTOLIC BLOOD PRESSURE: 69 MMHG | BODY MASS INDEX: 30.06 KG/M2 | HEART RATE: 60 BPM | SYSTOLIC BLOOD PRESSURE: 123 MMHG | TEMPERATURE: 97.5 F | HEIGHT: 70 IN

## 2024-04-18 DIAGNOSIS — C90.00 MULTIPLE MYELOMA NOT HAVING ACHIEVED REMISSION (H): Primary | ICD-10-CM

## 2024-04-18 DIAGNOSIS — Z11.59 NEED FOR HEPATITIS C SCREENING TEST: ICD-10-CM

## 2024-04-18 DIAGNOSIS — C90.00 MULTIPLE MYELOMA NOT HAVING ACHIEVED REMISSION (H): ICD-10-CM

## 2024-04-18 LAB
ALBUMIN MFR UR ELPH: <6 MG/DL
ALBUMIN SERPL BCG-MCNC: 4.4 G/DL (ref 3.5–5.2)
ALP SERPL-CCNC: 108 U/L (ref 40–150)
ALT SERPL W P-5'-P-CCNC: 41 U/L (ref 0–70)
ANION GAP SERPL CALCULATED.3IONS-SCNC: 12 MMOL/L (ref 7–15)
AST SERPL W P-5'-P-CCNC: 44 U/L (ref 0–45)
BASOPHILS # BLD AUTO: 0.1 10E3/UL (ref 0–0.2)
BASOPHILS NFR BLD AUTO: 2 %
BILIRUB SERPL-MCNC: 0.6 MG/DL
BUN SERPL-MCNC: 12.3 MG/DL (ref 8–23)
CALCIUM SERPL-MCNC: 9.1 MG/DL (ref 8.8–10.2)
CD19 CELLS # BLD: 53 CELLS/UL (ref 107–698)
CD19 CELLS NFR BLD: 10 % (ref 6–27)
CD3 CELLS # BLD: 229 CELLS/UL (ref 603–2990)
CD3 CELLS NFR BLD: 44 % (ref 49–84)
CD3+CD4+ CELLS # BLD: 124 CELLS/UL (ref 441–2156)
CD3+CD4+ CELLS NFR BLD: 24 % (ref 28–63)
CD3+CD4+ CELLS/CD3+CD8+ CLL BLD: 1.45 % (ref 1.4–2.6)
CD3+CD8+ CELLS # BLD: 86 CELLS/UL (ref 125–1312)
CD3+CD8+ CELLS NFR BLD: 16 % (ref 10–40)
CD3-CD16+CD56+ CELLS # BLD: 231 CELLS/UL (ref 95–640)
CD3-CD16+CD56+ CELLS NFR BLD: 44 % (ref 4–25)
CHLORIDE SERPL-SCNC: 106 MMOL/L (ref 98–107)
CREAT SERPL-MCNC: 0.77 MG/DL (ref 0.67–1.17)
CREAT UR-MCNC: 25.2 MG/DL
DEPRECATED HCO3 PLAS-SCNC: 23 MMOL/L (ref 22–29)
EGFRCR SERPLBLD CKD-EPI 2021: >90 ML/MIN/1.73M2
EOSINOPHIL # BLD AUTO: 0.1 10E3/UL (ref 0–0.7)
EOSINOPHIL NFR BLD AUTO: 4 %
ERYTHROCYTE [DISTWIDTH] IN BLOOD BY AUTOMATED COUNT: 13.2 % (ref 10–15)
GLUCOSE SERPL-MCNC: 101 MG/DL (ref 70–99)
HCT VFR BLD AUTO: 36.4 % (ref 40–53)
HGB BLD-MCNC: 12 G/DL (ref 13.3–17.7)
IMM GRANULOCYTES # BLD: 0 10E3/UL
IMM GRANULOCYTES NFR BLD: 0 %
LDH SERPL L TO P-CCNC: 262 U/L (ref 0–250)
LYMPHOCYTES # BLD AUTO: 0.5 10E3/UL (ref 0.8–5.3)
LYMPHOCYTES NFR BLD AUTO: 15 %
MAGNESIUM SERPL-MCNC: 2.3 MG/DL (ref 1.7–2.3)
MCH RBC QN AUTO: 29.1 PG (ref 26.5–33)
MCHC RBC AUTO-ENTMCNC: 33 G/DL (ref 31.5–36.5)
MCV RBC AUTO: 88 FL (ref 78–100)
MONOCYTES # BLD AUTO: 0.4 10E3/UL (ref 0–1.3)
MONOCYTES NFR BLD AUTO: 14 %
NEUTROPHILS # BLD AUTO: 1.9 10E3/UL (ref 1.6–8.3)
NEUTROPHILS NFR BLD AUTO: 65 %
NRBC # BLD AUTO: 0 10E3/UL
NRBC BLD AUTO-RTO: 0 /100
PHOSPHATE SERPL-MCNC: 3.7 MG/DL (ref 2.5–4.5)
PLATELET # BLD AUTO: 202 10E3/UL (ref 150–450)
POTASSIUM SERPL-SCNC: 4 MMOL/L (ref 3.4–5.3)
PROT SERPL-MCNC: 6.6 G/DL (ref 6.4–8.3)
PROT/CREAT 24H UR: NORMAL MG/G{CREAT}
RBC # BLD AUTO: 4.13 10E6/UL (ref 4.4–5.9)
SODIUM SERPL-SCNC: 141 MMOL/L (ref 135–145)
T CELL EXTENDED COMMENT: ABNORMAL
URATE SERPL-MCNC: 6 MG/DL (ref 3.4–7)
WBC # BLD AUTO: 2.9 10E3/UL (ref 4–11)

## 2024-04-18 PROCEDURE — 83521 IG LIGHT CHAINS FREE EACH: CPT

## 2024-04-18 PROCEDURE — 36415 COLL VENOUS BLD VENIPUNCTURE: CPT

## 2024-04-18 PROCEDURE — 86803 HEPATITIS C AB TEST: CPT

## 2024-04-18 PROCEDURE — 71260 CT THORAX DX C+: CPT | Mod: 26 | Performed by: RADIOLOGY

## 2024-04-18 PROCEDURE — 78816 PET IMAGE W/CT FULL BODY: CPT | Mod: PS

## 2024-04-18 PROCEDURE — 84166 PROTEIN E-PHORESIS/URINE/CSF: CPT | Mod: 26 | Performed by: PATHOLOGY

## 2024-04-18 PROCEDURE — 250N000011 HC RX IP 250 OP 636

## 2024-04-18 PROCEDURE — 82784 ASSAY IGA/IGD/IGG/IGM EACH: CPT

## 2024-04-18 PROCEDURE — 86335 IMMUNFIX E-PHORSIS/URINE/CSF: CPT | Mod: 26 | Performed by: PATHOLOGY

## 2024-04-18 PROCEDURE — 83735 ASSAY OF MAGNESIUM: CPT

## 2024-04-18 PROCEDURE — 71260 CT THORAX DX C+: CPT

## 2024-04-18 PROCEDURE — 84165 PROTEIN E-PHORESIS SERUM: CPT | Mod: TC | Performed by: PATHOLOGY

## 2024-04-18 PROCEDURE — 88311 DECALCIFY TISSUE: CPT | Mod: 26 | Performed by: STUDENT IN AN ORGANIZED HEALTH CARE EDUCATION/TRAINING PROGRAM

## 2024-04-18 PROCEDURE — 84100 ASSAY OF PHOSPHORUS: CPT

## 2024-04-18 PROCEDURE — 88342 IMHCHEM/IMCYTCHM 1ST ANTB: CPT | Mod: 26 | Performed by: STUDENT IN AN ORGANIZED HEALTH CARE EDUCATION/TRAINING PROGRAM

## 2024-04-18 PROCEDURE — 343N000001 HC RX 343

## 2024-04-18 PROCEDURE — 88341 IMHCHEM/IMCYTCHM EA ADD ANTB: CPT | Mod: 26 | Performed by: STUDENT IN AN ORGANIZED HEALTH CARE EDUCATION/TRAINING PROGRAM

## 2024-04-18 PROCEDURE — 85025 COMPLETE CBC W/AUTO DIFF WBC: CPT

## 2024-04-18 PROCEDURE — 84550 ASSAY OF BLOOD/URIC ACID: CPT

## 2024-04-18 PROCEDURE — 84165 PROTEIN E-PHORESIS SERUM: CPT | Mod: 26 | Performed by: PATHOLOGY

## 2024-04-18 PROCEDURE — 88184 FLOWCYTOMETRY/ TC 1 MARKER: CPT

## 2024-04-18 PROCEDURE — 86334 IMMUNOFIX E-PHORESIS SERUM: CPT | Performed by: PATHOLOGY

## 2024-04-18 PROCEDURE — 86359 T CELLS TOTAL COUNT: CPT

## 2024-04-18 PROCEDURE — 38222 DX BONE MARROW BX & ASPIR: CPT | Performed by: NURSE ANESTHETIST, CERTIFIED REGISTERED

## 2024-04-18 PROCEDURE — 88185 FLOWCYTOMETRY/TC ADD-ON: CPT

## 2024-04-18 PROCEDURE — 38222 DX BONE MARROW BX & ASPIR: CPT | Performed by: ANESTHESIOLOGY

## 2024-04-18 PROCEDURE — 80053 COMPREHEN METABOLIC PANEL: CPT

## 2024-04-18 PROCEDURE — 86335 IMMUNFIX E-PHORSIS/URINE/CSF: CPT | Mod: XU | Performed by: PATHOLOGY

## 2024-04-18 PROCEDURE — 86334 IMMUNOFIX E-PHORESIS SERUM: CPT | Mod: 26 | Performed by: PATHOLOGY

## 2024-04-18 PROCEDURE — 74177 CT ABD & PELVIS W/CONTRAST: CPT | Mod: 26 | Performed by: RADIOLOGY

## 2024-04-18 PROCEDURE — 88305 TISSUE EXAM BY PATHOLOGIST: CPT | Mod: 26 | Performed by: STUDENT IN AN ORGANIZED HEALTH CARE EDUCATION/TRAINING PROGRAM

## 2024-04-18 PROCEDURE — 88187 FLOWCYTOMETRY/READ 2-8: CPT | Mod: GC | Performed by: STUDENT IN AN ORGANIZED HEALTH CARE EDUCATION/TRAINING PROGRAM

## 2024-04-18 PROCEDURE — 83615 LACTATE (LD) (LDH) ENZYME: CPT

## 2024-04-18 PROCEDURE — 84155 ASSAY OF PROTEIN SERUM: CPT

## 2024-04-18 PROCEDURE — 38222 DX BONE MARROW BX & ASPIR: CPT

## 2024-04-18 PROCEDURE — 84166 PROTEIN E-PHORESIS/URINE/CSF: CPT | Performed by: PATHOLOGY

## 2024-04-18 PROCEDURE — 85097 BONE MARROW INTERPRETATION: CPT | Mod: GC | Performed by: STUDENT IN AN ORGANIZED HEALTH CARE EDUCATION/TRAINING PROGRAM

## 2024-04-18 PROCEDURE — 84156 ASSAY OF PROTEIN URINE: CPT

## 2024-04-18 PROCEDURE — A9552 F18 FDG: HCPCS

## 2024-04-18 PROCEDURE — 88311 DECALCIFY TISSUE: CPT | Mod: TC

## 2024-04-18 PROCEDURE — 78816 PET IMAGE W/CT FULL BODY: CPT | Mod: 26 | Performed by: RADIOLOGY

## 2024-04-18 RX ORDER — LIDOCAINE HYDROCHLORIDE 10 MG/ML
8-10 INJECTION, SOLUTION EPIDURAL; INFILTRATION; INTRACAUDAL; PERINEURAL
Status: CANCELLED | OUTPATIENT
Start: 2024-04-18

## 2024-04-18 RX ORDER — PROPOFOL 10 MG/ML
INJECTION, EMULSION INTRAVENOUS CONTINUOUS PRN
Status: DISCONTINUED | OUTPATIENT
Start: 2024-04-18 | End: 2024-04-18

## 2024-04-18 RX ORDER — LIDOCAINE 40 MG/G
CREAM TOPICAL
Status: CANCELLED | OUTPATIENT
Start: 2024-04-18

## 2024-04-18 RX ORDER — SODIUM CHLORIDE, SODIUM LACTATE, POTASSIUM CHLORIDE, CALCIUM CHLORIDE 600; 310; 30; 20 MG/100ML; MG/100ML; MG/100ML; MG/100ML
INJECTION, SOLUTION INTRAVENOUS CONTINUOUS
Status: DISCONTINUED | OUTPATIENT
Start: 2024-04-18 | End: 2024-04-19 | Stop reason: HOSPADM

## 2024-04-18 RX ORDER — LIDOCAINE HYDROCHLORIDE 20 MG/ML
INJECTION, SOLUTION INFILTRATION; PERINEURAL PRN
Status: DISCONTINUED | OUTPATIENT
Start: 2024-04-18 | End: 2024-04-18

## 2024-04-18 RX ORDER — LIDOCAINE 40 MG/G
CREAM TOPICAL
Status: DISCONTINUED | OUTPATIENT
Start: 2024-04-18 | End: 2024-04-19 | Stop reason: HOSPADM

## 2024-04-18 RX ORDER — ONDANSETRON 4 MG/1
4 TABLET, ORALLY DISINTEGRATING ORAL EVERY 30 MIN PRN
Status: DISCONTINUED | OUTPATIENT
Start: 2024-04-18 | End: 2024-04-19 | Stop reason: HOSPADM

## 2024-04-18 RX ORDER — FENTANYL CITRATE 50 UG/ML
50 INJECTION, SOLUTION INTRAMUSCULAR; INTRAVENOUS EVERY 5 MIN PRN
Status: DISCONTINUED | OUTPATIENT
Start: 2024-04-18 | End: 2024-04-19 | Stop reason: HOSPADM

## 2024-04-18 RX ORDER — FENTANYL CITRATE 50 UG/ML
25 INJECTION, SOLUTION INTRAMUSCULAR; INTRAVENOUS EVERY 5 MIN PRN
Status: DISCONTINUED | OUTPATIENT
Start: 2024-04-18 | End: 2024-04-19 | Stop reason: HOSPADM

## 2024-04-18 RX ORDER — PROPOFOL 10 MG/ML
INJECTION, EMULSION INTRAVENOUS PRN
Status: DISCONTINUED | OUTPATIENT
Start: 2024-04-18 | End: 2024-04-18

## 2024-04-18 RX ORDER — HYDROMORPHONE HYDROCHLORIDE 1 MG/ML
0.4 INJECTION, SOLUTION INTRAMUSCULAR; INTRAVENOUS; SUBCUTANEOUS EVERY 5 MIN PRN
Status: DISCONTINUED | OUTPATIENT
Start: 2024-04-18 | End: 2024-04-19 | Stop reason: HOSPADM

## 2024-04-18 RX ORDER — ONDANSETRON 2 MG/ML
4 INJECTION INTRAMUSCULAR; INTRAVENOUS EVERY 30 MIN PRN
Status: DISCONTINUED | OUTPATIENT
Start: 2024-04-18 | End: 2024-04-19 | Stop reason: HOSPADM

## 2024-04-18 RX ORDER — NALOXONE HYDROCHLORIDE 0.4 MG/ML
0.1 INJECTION, SOLUTION INTRAMUSCULAR; INTRAVENOUS; SUBCUTANEOUS
Status: DISCONTINUED | OUTPATIENT
Start: 2024-04-18 | End: 2024-04-19 | Stop reason: HOSPADM

## 2024-04-18 RX ORDER — HYDROMORPHONE HYDROCHLORIDE 1 MG/ML
0.2 INJECTION, SOLUTION INTRAMUSCULAR; INTRAVENOUS; SUBCUTANEOUS EVERY 5 MIN PRN
Status: DISCONTINUED | OUTPATIENT
Start: 2024-04-18 | End: 2024-04-19 | Stop reason: HOSPADM

## 2024-04-18 RX ORDER — LIDOCAINE HYDROCHLORIDE 10 MG/ML
8-10 INJECTION, SOLUTION EPIDURAL; INFILTRATION; INTRACAUDAL; PERINEURAL
Status: DISCONTINUED | OUTPATIENT
Start: 2024-04-18 | End: 2024-04-19 | Stop reason: HOSPADM

## 2024-04-18 RX ORDER — ACETAMINOPHEN 325 MG/1
975 TABLET ORAL ONCE
Status: DISCONTINUED | OUTPATIENT
Start: 2024-04-18 | End: 2024-04-19 | Stop reason: HOSPADM

## 2024-04-18 RX ORDER — IOPAMIDOL 755 MG/ML
50-135 INJECTION, SOLUTION INTRAVASCULAR ONCE
Status: COMPLETED | OUTPATIENT
Start: 2024-04-18 | End: 2024-04-18

## 2024-04-18 RX ADMIN — FLUDEOXYGLUCOSE F-18 12.93 MILLICURIE: 500 INJECTION, SOLUTION INTRAVENOUS at 07:53

## 2024-04-18 RX ADMIN — SODIUM CHLORIDE, SODIUM LACTATE, POTASSIUM CHLORIDE, CALCIUM CHLORIDE: 600; 310; 30; 20 INJECTION, SOLUTION INTRAVENOUS at 13:07

## 2024-04-18 RX ADMIN — PROPOFOL 50 MG: 10 INJECTION, EMULSION INTRAVENOUS at 13:13

## 2024-04-18 RX ADMIN — PROPOFOL 150 MCG/KG/MIN: 10 INJECTION, EMULSION INTRAVENOUS at 13:13

## 2024-04-18 RX ADMIN — LIDOCAINE HYDROCHLORIDE 40 MG: 20 INJECTION, SOLUTION INFILTRATION; PERINEURAL at 13:13

## 2024-04-18 RX ADMIN — IOPAMIDOL 132 ML: 755 INJECTION, SOLUTION INTRAVENOUS at 08:54

## 2024-04-18 ASSESSMENT — PAIN SCALES - GENERAL: PAINLEVEL: NO PAIN (0)

## 2024-04-18 NOTE — ANESTHESIA CARE TRANSFER NOTE
Patient: Billy Carroll    Procedure: Procedure(s):  BIOPSY, BONE MARROW       Diagnosis: Multiple myeloma not having achieved remission (H) [C90.00]  Diagnosis Additional Information: No value filed.    Anesthesia Type:   MAC     Note:    Oropharynx: oropharynx clear of all foreign objects  Level of Consciousness: awake  Oxygen Supplementation: room air    Independent Airway: airway patency satisfactory and stable  Dentition: dentition unchanged  Vital Signs Stable: post-procedure vital signs reviewed and stable  Report to RN Given: handoff report given  Patient transferred to: Phase II    Handoff Report: Identifed the Patient, Identified the Reponsible Provider, Reviewed the pertinent medical history, Discussed the surgical course, Reviewed Intra-OP anesthesia mangement and issues during anesthesia, Set expectations for post-procedure period and Allowed opportunity for questions and acknowledgement of understanding      Vitals:  Vitals Value Taken Time   /61 04/18/24 1336   Temp 36.4  C (97.5  F) 04/18/24 1336   Pulse 70 04/18/24 1336   Resp 16 04/18/24 1336   SpO2 94 % 04/18/24 1336       Electronically Signed By: LEANNE Cowan CRNA  April 18, 2024  1:37 PM

## 2024-04-18 NOTE — ANESTHESIA PREPROCEDURE EVALUATION
Anesthesia Pre-Procedure Evaluation    Patient: Billy Carroll   MRN: [id number] : 1955        Procedure : Procedure(s):  BIOPSY, BONE MARROW          Past Medical History:   Diagnosis Date    Benign essential hypertension     Hyperlipidemia     Multiple myeloma (H)     Followed by Dr. Ruelas with [facility]      Past Surgical History:   Procedure Laterality Date    CV CORONARY ANGIOGRAM N/A 2023    Procedure: Coronary Angiogram;  Surgeon: Domenico Sauceda MD;  Location: [facility] CV    CV TRANSCATHETER AORTIC VALVE REPLACEMENT-FEMORAL APPROACH N/A 10/24/2023    Procedure: Transcatheter Aortic Valve Replacement-Femoral Approach;  Surgeon: Charles Field MD;  Location: [facility] CV    IR CVC TUNNEL PLACEMENT > 5 YRS OF AGE  2024    IR CVC TUNNEL REMOVAL RIGHT  2024    OR TRANSCATHETER AORTIC VALVE REPLACEMENT, FEMORAL PERCUTANEOUS APPROACH (STANDBY) N/A 10/24/2023    Procedure: OR TRANSCATHETER AORTIC VALVE REPLACEMENT, FEMORAL PERCUTANEOUS APPROACH (STANDBY);  Surgeon: Sahra Mosher MD;  Location: [facility] CATH LAB CV      Allergies   Allergen Reactions    Acetaminophen-Codeine Nausea    Codeine Nausea      Social History     Tobacco Use    Smoking status: Never     Passive exposure: Past    Smokeless tobacco: Never   Substance Use Topics    Alcohol use: Not on file      Wt Readings from Last 1 Encounters:   24 95.3 kg (210 lb)        Anesthesia Evaluation            ROS/MED HX  ENT/Pulmonary:  - neg pulmonary ROS     Neurologic:  - neg neurologic ROS     Cardiovascular:     (+)  hypertension- -  CAD -  - -                           valvular problems/murmurs type: AS          METS/Exercise Tolerance: 4 - Raking leaves, gardening    Hematologic:       Musculoskeletal:       GI/Hepatic:  - neg GI/hepatic ROS     Renal/Genitourinary:  - neg Renal ROS     Endo:  - neg endo ROS     Psychiatric/Substance Use:  - neg psychiatric ROS     Infectious Disease:  - neg infectious  "disease ROS     Malignancy:       Other:            Physical Exam    Airway  airway exam normal           Respiratory Devices and Support         Dental       (+) Modest Abnormalities - crowns, retainers, 1 or 2 missing teeth      Cardiovascular   cardiovascular exam normal          Pulmonary   pulmonary exam normal                OUTSIDE LABS:  CBC:   Lab Results   Component Value Date    WBC 2.9 (L) 04/18/2024    WBC 3.5 (L) 02/06/2024    HGB 12.0 (L) 04/18/2024    HGB 11.6 (L) 02/06/2024    HCT 36.4 (L) 04/18/2024    HCT 34.5 (L) 02/06/2024     04/18/2024     02/06/2024     BMP:   Lab Results   Component Value Date     04/18/2024     02/06/2024    POTASSIUM 4.0 04/18/2024    POTASSIUM 4.0 02/06/2024    CHLORIDE 106 04/18/2024    CHLORIDE 105 02/06/2024    CO2 23 04/18/2024    CO2 24 02/06/2024    BUN 12.3 04/18/2024    BUN 10.6 02/06/2024    CR 0.77 04/18/2024    CR 0.76 02/06/2024     (H) 04/18/2024     (H) 02/06/2024     COAGS:   Lab Results   Component Value Date    PTT 25 12/21/2023    INR 1.00 02/06/2024     POC: No results found for: \"BGM\", \"HCG\", \"HCGS\"  HEPATIC:   Lab Results   Component Value Date    ALBUMIN 4.4 04/18/2024    PROTTOTAL 6.6 04/18/2024    ALT 41 04/18/2024    AST 44 04/18/2024    ALKPHOS 108 04/18/2024    BILITOTAL 0.6 04/18/2024     OTHER:   Lab Results   Component Value Date    LACT 1.4 06/02/2023    A1C 5.9 (H) 11/07/2023    SESAR 9.1 04/18/2024    PHOS 3.7 04/18/2024    MAG 2.3 04/18/2024    CRP 11.3 (H) 06/02/2023       Anesthesia Plan    ASA Status:  3    NPO Status:  NPO Appropriate    Anesthesia Type: MAC.     - Reason for MAC: straight local not clinically adequate   Induction: Intravenous.   Maintenance: TIVA.        Consents    Anesthesia Plan(s) and associated risks, benefits, and realistic alternatives discussed. Questions answered and patient/representative(s) expressed understanding.     - Discussed: Risks, Benefits and Alternatives for " "BOTH SEDATION and the PROCEDURE were discussed     - Discussed with:  Patient            Postoperative Care    Pain management: Oral pain medications, Multi-modal analgesia.   PONV prophylaxis: Ondansetron (or other 5HT-3), Dexamethasone or Solumedrol     Comments:               Yuan Fonseca MD, MD    I have reviewed the pertinent notes and labs in the chart from the past 30 days and (re)examined the patient.  Any updates or changes from those notes are reflected in this note.              # Obesity: Estimated body mass index is 30.13 kg/m  as calculated from the following:    Height as of this encounter: 1.778 m (5' 10\").    Weight as of this encounter: 95.3 kg (210 lb).      "

## 2024-04-18 NOTE — PROGRESS NOTES
Chief Complaint   Patient presents with    Blood Draw     Vitals, blood draw. Pt checked into next appt.     Jewell Adames MA

## 2024-04-18 NOTE — PROCEDURES
"BMT ONC Adult Bone Marrow Biopsy Procedure Note  April 18, 2024  /69   Pulse 60   Temp 97.5  F (36.4  C) (Temporal)   Resp 16   Ht 1.778 m (5' 10\")   Wt 95.3 kg (210 lb)   SpO2 98%   BMI 30.13 kg/m       Learning needs assessment complete within 12 months? YES    DIAGNOSIS: MM day +100 s/o auto BMT     PROCEDURE: Unilateral Bone Marrow Biopsy and Unilateral Aspirate    LOCATION: [facility] 5th floor-Procedure Room    Patient s identification was positively verified by verbal identification and invasive procedure safety checklist was completed. Informed consent was obtained. Following the administration of  MAC per anesthesia  as pre-medication, patient was placed in the prone position and prepped and draped in a sterile manner. Approximately 10 cc of 1% Lidocaine was used over the left posterior iliac spine. Following this a 3 mm incision was made. Trephine bone marrow core(s) was (were) obtained from the [facility]. Bone marrow aspirates were obtained from the [facility]. Aspirates were sent for morphology, immunophenotyping, and research studies, clonoseq. A total of approximately 30 ml of marrow was aspirated. Following this procedure a sterile dressing was applied to the bone marrow biopsy site(s). The patient was placed in the supine position to maintain pressure on the biopsy site. Post-procedure wound care instructions were given.     Complications: YES- soft bones, required 8g trephine for core    Interventions: YES, 8g trephine as above    Length of procedure:21 minutes to 45 minutes    Procedure performed by: Sonya Ritter NP    "

## 2024-04-18 NOTE — ANESTHESIA POSTPROCEDURE EVALUATION
Patient: Billy Carroll    Procedure: Procedure(s):  BIOPSY, BONE MARROW       Anesthesia Type:  MAC    Note:  Disposition: Outpatient   Postop Pain Control: Uneventful            Sign Out: Well controlled pain   PONV: No   Neuro/Psych: Uneventful            Sign Out: Acceptable/Baseline neuro status   Airway/Respiratory: Uneventful            Sign Out: Acceptable/Baseline resp. status   CV/Hemodynamics: Uneventful            Sign Out: Acceptable CV status; No obvious hypovolemia; No obvious fluid overload   Other NRE: NONE   DID A NON-ROUTINE EVENT OCCUR? No           Last vitals:  Vitals Value Taken Time   /69 04/18/24 1413   Temp 36.4  C (97.5  F) 04/18/24 1336   Pulse 60 04/18/24 1413   Resp 16 04/18/24 1413   SpO2 98 % 04/18/24 1413       Electronically Signed By: Yuan Fonseca MD, MD  April 18, 2024  2:43 PM

## 2024-04-18 NOTE — LETTER
2024         RE: Billy Carroll  [address] [place] [address]        Dear Colleague,    Thank you for referring your patient, Billy Carroll, to the [facility] BLOOD AND MARROW TRANSPLANT PROGRAM [place]. Please see a copy of my visit note below.    Chief Complaint   Patient presents with    Blood Draw     Vitals, blood draw. Pt checked into next appt.     Jewell CastrotersMELODIE tracy    Patient Name: Billy Carroll  Patient MRN: [id number]  Patient : 1955    Abbreviated H&P and Pre-sedation Assessment for bone marrow biopsy & aspiration with sedation    Chief complaint and/or reason for Procedure: Multiple Myeloma     Planned level of sedation: MAC w/ local     History of problems with sedation: (patient or family hx): No    ASA Assessment Category: 2 - Mild systemic disease    History of sleep apnea: No    History of blood thinners: No     Appropriate NPO status: No food since 21:00 on , he did drink 1 bottle of water since 7:00 am, last intake at 11:00 am     Current tobacco use: No    Any recent fever, cough, chest or sinus congestion, SOB, weight loss, chest pain, diarrhea or constipation. No    Medications   Currently Scheduled Medications   No current facility-administered medications for this visit.       Home Med List)  (Not in a hospital admission)      Allergies  Acetaminophen-codeine and Codeine    PMH:  Past Medical History:   Diagnosis Date    Benign essential hypertension     Hyperlipidemia     Multiple myeloma (H)     Followed by Dr. Ruelas with [facility]       Past Surgical History:   Procedure Laterality Date    CV CORONARY ANGIOGRAM N/A 2023    Procedure: Coronary Angiogram;  Surgeon: Domenico Sauceda MD;  Location: [facility] CV    CV TRANSCATHETER AORTIC VALVE REPLACEMENT-FEMORAL APPROACH N/A 10/24/2023    Procedure: Transcatheter Aortic Valve Replacement-Femoral Approach;  Surgeon: Charles Field MD;  Location: [facility] CV    IR CVC  TUNNEL PLACEMENT > 5 YRS OF AGE  1/2/2024    IR CVC TUNNEL REMOVAL RIGHT  2/2/2024    OR TRANSCATHETER AORTIC VALVE REPLACEMENT, FEMORAL PERCUTANEOUS APPROACH (STANDBY) N/A 10/24/2023    Procedure: OR TRANSCATHETER AORTIC VALVE REPLACEMENT, FEMORAL PERCUTANEOUS APPROACH (STANDBY);  Surgeon: Sahra Mosher MD;  Location: [facility] CATH LAB CV       Focused Physical exam pertinent to procedure:          (Details of heart, lung, ASA assessment and mallampati assessment in pre procedure assessment flowsheet)  General- healthy,alert,no distress   Recent vital signs-  /69   Pulse 57   Temp 97.8  F (36.6  C) (Oral)   Resp 16   Wt 95.7 kg (210 lb 14.4 oz)   SpO2 96%   BMI 30.26 kg/m    HEART-regular rate and rhythm and no murmurs, gallops, or rub  LUNGS-Clear to Ausculation  OROPHARYNGEAL - MALLAMPATTI- Class II (visualization of the soft palate, fauces, and uvula)    A/P:Reviewed history, medications, allergies, clinical information and pre procedure assessment. The patient was informed of the risks and benefits of the procedure.  They would like to proceed.  Billy ARMANDO Carroll is approved for use of sedation during their procedure as noted above.      Jimi Zuniga PA-C

## 2024-04-18 NOTE — DISCHARGE INSTRUCTIONS
How to Care for your Bone Marrow Biopsy    Activity  Relax and take it easy for the next 24 hours.   Resume regular activity after 24 hours.    Diet   Resume pre-procedure diet and drink plenty of fluids.    If you received sedation, you may feel a little nauseated so start with a clear liquid diet until the nausea passes.    Do Not Immerse Bone Marrow Biopsy Puncture Site in Water  Do not take a bath until the puncture site has healed.  Do not sit in a hot tub or spa until the puncture site has healed.  Do not swim until the puncture site has healed.  Wait 24 hours before taking a shower.    Drainage  Drainage should be minimal.  IF bleeding should occur and soaks through the dressing, lie down and put pressure on the puncture site.    IF bleeding persists, apply gentle pressure with your hand over the dressing for 5 minutes.    IF the pressure doesn't stop the bleeding, contact your provider immediately.    Dressing  Keep the dressing dry and in place for 24 hours, unless instructed otherwise.    IF bleeding soaks through the dressing in the first 24 hours do NOT remove the dressing as you may pull off any scab that has formed.  Instead, reinforce the dressing with extra gauze and tape.    No Alcohol  Do not drink alcoholic beverages for the next 24 hours.    No Driving or Operating Machinery  No driving or operating machinery for the next 24 hours.    Notify your provider IF:    Excessive bleeding or drainage at the puncture site    Excessive swelling, redness or tenderness at the puncture site    Fever above 100.5 degrees taken orally    Severe pain    Drainage that is green, yellow, thick white or has a bad odor    Telephone Numbers  Bone Marrow transplant clinic:  [phone] (Monday thru Friday, 8:00 am to 4:00 pm)  After business hours call the [facility]:  [phone] and ask for the Hematology/BMT doctor on call.  Or call the Emergency Room at the [facility]  [facility]:  [phone].

## 2024-04-18 NOTE — LETTER
4/18/2024         RE: Billy Carroll  [address] [place] [address]        Dear Colleague,    Thank you for referring your patient, Billy Carroll, to the [facility] BLOOD AND MARROW TRANSPLANT PROGRAM [place]. Please see a copy of my visit note below.    See procedure note in other encounter.      Again, thank you for allowing me to participate in the care of your patient.        Sincerely,        [facility] Advanced Practice Provider

## 2024-04-18 NOTE — PROGRESS NOTES
Patient Name: Billy Carroll  Patient MRN: [id number]  Patient : 1955    Abbreviated H&P and Pre-sedation Assessment for bone marrow biopsy & aspiration with sedation    Chief complaint and/or reason for Procedure: Multiple Myeloma     Planned level of sedation: MAC w/ local     History of problems with sedation: (patient or family hx): No    ASA Assessment Category: 2 - Mild systemic disease    History of sleep apnea: No    History of blood thinners: No     Appropriate NPO status: No food since 21:00 on , he did drink 1 bottle of water since 7:00 am, last intake at 11:00 am     Current tobacco use: No    Any recent fever, cough, chest or sinus congestion, SOB, weight loss, chest pain, diarrhea or constipation. No    Medications   Currently Scheduled Medications   No current facility-administered medications for this visit.       Home Med List)  (Not in a hospital admission)      Allergies  Acetaminophen-codeine and Codeine    PMH:  Past Medical History:   Diagnosis Date    Benign essential hypertension     Hyperlipidemia     Multiple myeloma (H)     Followed by Dr. Ruelas with [facility]       Past Surgical History:   Procedure Laterality Date    CV CORONARY ANGIOGRAM N/A 2023    Procedure: Coronary Angiogram;  Surgeon: Domenico Sauceda MD;  Location: [facility]    CV TRANSCATHETER AORTIC VALVE REPLACEMENT-FEMORAL APPROACH N/A 10/24/2023    Procedure: Transcatheter Aortic Valve Replacement-Femoral Approach;  Surgeon: Charles Field MD;  Location: [facility] CV    IR CVC TUNNEL PLACEMENT > 5 YRS OF AGE  2024    IR CVC TUNNEL REMOVAL RIGHT  2024    OR TRANSCATHETER AORTIC VALVE REPLACEMENT, FEMORAL PERCUTANEOUS APPROACH (STANDBY) N/A 10/24/2023    Procedure: OR TRANSCATHETER AORTIC VALVE REPLACEMENT, FEMORAL PERCUTANEOUS APPROACH (STANDBY);  Surgeon: Sahra Mosher MD;  Location: [facility] CV       Focused Physical exam pertinent to procedure:          (Details of  heart, lung, ASA assessment and mallampati assessment in pre procedure assessment flowsheet)  General- healthy,alert,no distress   Recent vital signs-  /69   Pulse 57   Temp 97.8  F (36.6  C) (Oral)   Resp 16   Wt 95.7 kg (210 lb 14.4 oz)   SpO2 96%   BMI 30.26 kg/m    HEART-regular rate and rhythm and no murmurs, gallops, or rub  LUNGS-Clear to Ausculation  OROPHARYNGEAL - MALLAMPATTI- Class II (visualization of the soft palate, fauces, and uvula)    A/P:Reviewed history, medications, allergies, clinical information and pre procedure assessment. The patient was informed of the risks and benefits of the procedure.  They would like to proceed.  Billy Carroll is approved for use of sedation during their procedure as noted above.      Jimi Zuniga PA-C

## 2024-04-19 LAB
ALBUMIN SERPL ELPH-MCNC: 3.9 G/DL (ref 3.7–5.1)
ALPHA1 GLOB SERPL ELPH-MCNC: 0.3 G/DL (ref 0.2–0.4)
ALPHA2 GLOB SERPL ELPH-MCNC: 0.9 G/DL (ref 0.5–0.9)
B-GLOBULIN SERPL ELPH-MCNC: 0.7 G/DL (ref 0.6–1)
GAMMA GLOB SERPL ELPH-MCNC: 0.4 G/DL (ref 0.7–1.6)
HCV AB SERPL QL IA: NONREACTIVE
IGA SERPL-MCNC: 5 MG/DL (ref 84–499)
IGG SERPL-MCNC: 421 MG/DL (ref 610–1616)
IGM SERPL-MCNC: <10 MG/DL (ref 35–242)
KAPPA LC FREE SER-MCNC: 0.78 MG/DL (ref 0.33–1.94)
KAPPA LC FREE/LAMBDA FREE SER NEPH: 3 {RATIO} (ref 0.26–1.65)
LAMBDA LC FREE SERPL-MCNC: 0.26 MG/DL (ref 0.57–2.63)
M PROTEIN SERPL ELPH-MCNC: 0.1 G/DL
PATH REPORT.COMMENTS IMP SPEC: ABNORMAL
PATH REPORT.COMMENTS IMP SPEC: NORMAL
PATH REPORT.FINAL DX SPEC: NORMAL
PATH REPORT.FINAL DX SPEC: NORMAL
PATH REPORT.GROSS SPEC: NORMAL
PATH REPORT.MICROSCOPIC SPEC OTHER STN: NORMAL
PATH REPORT.RELEVANT HX SPEC: NORMAL
PATH REPORT.RELEVANT HX SPEC: NORMAL
PROT ELPH PNL UR ELPH: NORMAL
PROT PATTERN SERPL ELPH-IMP: ABNORMAL
PROT PATTERN SERPL IFE-IMP: NORMAL
PROT PATTERN UR ELPH-IMP: NORMAL
TOTAL PROTEIN SERUM FOR ELP: 6.2 G/DL (ref 6.4–8.3)

## 2024-04-25 ENCOUNTER — OFFICE VISIT (OUTPATIENT)
Dept: TRANSPLANT | Facility: CLINIC | Age: 69
End: 2024-04-25
Payer: MEDICARE

## 2024-04-25 VITALS
TEMPERATURE: 98.3 F | SYSTOLIC BLOOD PRESSURE: 136 MMHG | BODY MASS INDEX: 30.13 KG/M2 | DIASTOLIC BLOOD PRESSURE: 76 MMHG | OXYGEN SATURATION: 96 % | HEART RATE: 57 BPM | RESPIRATION RATE: 16 BRPM | WEIGHT: 210 LBS

## 2024-04-25 DIAGNOSIS — C90.01 MULTIPLE MYELOMA IN REMISSION (H): Primary | ICD-10-CM

## 2024-04-25 PROCEDURE — 250N000011 HC RX IP 250 OP 636

## 2024-04-25 PROCEDURE — 99214 OFFICE O/P EST MOD 30 MIN: CPT

## 2024-04-25 PROCEDURE — 91320 SARSCV2 VAC 30MCG TRS-SUC IM: CPT

## 2024-04-25 PROCEDURE — G0463 HOSPITAL OUTPT CLINIC VISIT: HCPCS | Mod: 25

## 2024-04-25 PROCEDURE — 90480 ADMN SARSCOV2 VAC 1/ONLY CMP: CPT

## 2024-04-25 RX ORDER — DIPHENHYDRAMINE HYDROCHLORIDE 50 MG/ML
50 INJECTION INTRAMUSCULAR; INTRAVENOUS
Status: DISCONTINUED | OUTPATIENT
Start: 2024-04-25 | End: 2024-04-29 | Stop reason: HOSPADM

## 2024-04-25 RX ORDER — DIPHENHYDRAMINE HCL 25 MG
50 CAPSULE ORAL
Status: DISCONTINUED | OUTPATIENT
Start: 2024-04-25 | End: 2024-04-29 | Stop reason: HOSPADM

## 2024-04-25 RX ORDER — EPINEPHRINE 1 MG/ML
0.3 INJECTION, SOLUTION INTRAMUSCULAR; SUBCUTANEOUS EVERY 5 MIN PRN
Status: DISCONTINUED | OUTPATIENT
Start: 2024-04-25 | End: 2024-04-29 | Stop reason: HOSPADM

## 2024-04-25 RX ADMIN — COVID-19 VACCINE, MRNA 30 MCG: 0.05 INJECTION, SUSPENSION INTRAMUSCULAR at 11:13

## 2024-04-25 ASSESSMENT — PAIN SCALES - GENERAL: PAINLEVEL: NO PAIN (0)

## 2024-04-25 NOTE — NURSING NOTE
"Oncology Rooming Note    April 25, 2024 10:20 AM   Billy Carroll is a 68 year old male who presents for:    Chief Complaint   Patient presents with    Oncology Clinic Visit     Multiple myeloma      Initial Vitals: /76 (BP Location: Right arm, Patient Position: Sitting, Cuff Size: Adult Large)   Pulse 57   Temp 98.3  F (36.8  C) (Oral)   Resp 16   Wt 95.3 kg (210 lb)   SpO2 96%   BMI 30.13 kg/m   Estimated body mass index is 30.13 kg/m  as calculated from the following:    Height as of 4/18/24: 1.778 m (5' 10\").    Weight as of this encounter: 95.3 kg (210 lb). Body surface area is 2.17 meters squared.  No Pain (0) Comment: Data Unavailable   No LMP for male patient.  Allergies reviewed: Yes  Medications reviewed: Yes    Medications: Medication refills not needed today.  Pharmacy name entered into [facility]:    [id number] [place], [place] - [address] [facility] [place], [place] - [address]    Frailty Screening:   Is the patient here for a new oncology consult visit in cancer care? 2. No      Clinical concerns: none       Devora Leiva              "

## 2024-04-25 NOTE — LETTER
4/25/2024         RE: Billy Carroll  [address] [place] [address]        Dear Colleague,    Thank you for referring your patient, Billy Carroll, to the [facility] [place]. Please see a copy of my visit note below.    BMT Progress Note  04/25/2024     ID: Mr. Carroll is a 68 year old man D 106 for standard risk IgG lambda kappa multiple myeloma.  Standard risk.    HPI: Here for follow-up. Billy feels well, eating ok, overall much better, eating well, active and has no new complains.      No n/v. Stools are soft.  Taking hydroxizine and Ativan prn daily.     ROS: Review of systems with pertinent positive and negatives in HPI  Onc Hx: FISH:  RESULTS:    ABNORMAL: HIGH-RISK  - No high risk abnormality detected     ABNORMAL: OTHER  - Gain of chromosomes 5, 9, and 15 (25%)  - Gain of chromosome 11 (1%)     NORMAL  - No loss of 1p or gain of 1q  - No loss of 13q  - No loss of TP53  - No rearrangement of IGH  Exam:  Blood pressure 136/76, pulse 57, temperature 98.3  F (36.8  C), temperature source Oral, resp. rate 16, weight 95.3 kg (210 lb), SpO2 96%.  Wt Readings from Last 4 Encounters:   04/25/24 95.3 kg (210 lb)   04/18/24 95.3 kg (210 lb)   04/18/24 95.7 kg (210 lb 14.4 oz)   04/16/24 98 kg (216 lb)     Constitutional: NAD, in good spirits  HEENT: sclera anicteric. OP moist without lesions  Pulm: CTAB  CV: RRR  Ext: no edema  Neuro: alert, conversant. Non-focal  Psych: appropriate mood and affect  Access: CVC R chest NT, dressing cdi    Lab Results   Component Value Date    WBC 2.9 (L) 04/18/2024    ANEU 3.7 01/30/2024    HGB 12.0 (L) 04/18/2024    HCT 36.4 (L) 04/18/2024     04/18/2024     04/18/2024    POTASSIUM 4.0 04/18/2024    CHLORIDE 106 04/18/2024    CO2 23 04/18/2024     (H) 04/18/2024    BUN 12.3 04/18/2024    CR 0.77 04/18/2024    MAG 2.3 04/18/2024    INR 1.00 02/06/2024    AST 44 04/18/2024    ALT 41 04/18/2024          Latest  Reference Range & Units 04/18/24 11:15   Immunofix ELP Urine  No monoclonal protein seen on immunofixation. Pathologic significance requires clinical correlation.  UBALDO Michelle M.D., Ph.D., Pathologist ()   Immunofixation ELP  Very small monoclonal IgG immunoglobulin of kappa light chain type. Monoclonal antibody therapeutics (e.g. Daratumumab) may appear as monoclonal proteins on serum electrophoresis and immunofixation, usually as very small IgG kappa monoclonals. Results should be interpreted with caution if the patient is receiving monoclonal antibody therapy. Pathologic significance requires clinical correlation.  UBALDO Michelle M.D., Ph.D., Pathologist ()   Kappa Free Lt Chain 0.33 - 1.94 mg/dL 0.78   Kappa Lambda Ratio 0.26 - 1.65  3.00 (H)   Lambda Free Lt Chain 0.57 - 2.63 mg/dL 0.26 (L)   Monoclonal Peak <=0.0 g/dL 0.1 (H)   Total Protein Serum for ELP 6.4 - 8.3 g/dL 6.2 (L)   (H): Data is abnormally high  (L): Data is abnormally low  Bone marrow, posterior iliac crest, left decalcified trephine biopsy and touch imprint; left aspirate clot, particle crush, direct aspirate smear, concentrate aspirate smear, and peripheral blood smear:     - No morphologic or immunophenotypic evidence of residual plasma cell neoplasm, see comment  - Normocellular marrow (20% estimated cellularity), with trilineage hematopoiesis, no dysplasia, and no increase in plasma cells (1.4%)  - Peripheral blood showing slight normochromic normocytic anemia and moderate leukopenia with absolute lymphopenia and no circulating plasma cells    Clonoseq: very low level of MRD detected    A/P:  Billy Carroll is a 68 year old man with IgG kappa MM with extramedullary disease, 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   D+ 106 s/p oupt auto PBSCT.    - PMHx significant for CAD, severe aortic stenosis s/p TAVR (10/24/2023) currently ongoing cardiac rehabilitation, HTN, and HLD.     BMT/MM cell dose 1.57 x 10^6  Engrafted well now   Restaging at  day 28-  CR, FLAVIO  PET and BMBx at day 100 - stringent CR with MRD by clonoseq    Cont maintenance Revlamid 10mg daily      HEME/COAG - no more transfusions now.  Counts are decent  CD4 124    IMMUNOCOMPROMISED - no active infections now.   - prophy ACV,   # hx PJP pneumonia: on Bactrim - for 1 year (3 x week)    CARDIOVASCULAR - Monitor volume status carefully given cardiac history  #HTN: losartan 50mg daily and metoprolol XL 50mg daily.   #Severe aortic stenosis, now s/p TAVR 10/24/2023.   #Moderate nonobstructive coronary artery disease   #Hyperlipidemia - resume statin now  resumed ASA     GI:  Hold vitamin supplements + alendronate  Ulcer prophy: protonix       FEN/Renal:all fine   - creat, lytes wnl    MUSCULOSKELETAL/FRAILTY  Baseline Frailty Score: 2  Cancer Rehab as needed outpatient    PSCYH:  # Anxiety: resume Remeron 15mg q hs for insomnia and mood   Also has Atarax prn and referral to Mental Health. Awaiting appt.     Summary:  -day 180 return  -Return to  to cont maintanance therapy around day 60    I spent 30 minutes in the care of this patient today, which included time necessary for preparation for the visit, obtaining history, ordering medications/tests/procedures as medically indicated, review of pertinent medical literature, counseling of the patient, communication of recommendations to the care team, and documentation time.      Jerri Frey MD

## 2024-04-25 NOTE — PROGRESS NOTES
BMT Progress Note  04/25/2024     ID: Mr. Carroll is a 68 year old man D 106 for standard risk IgG lambda kappa multiple myeloma.  Standard risk.    HPI: Here for follow-up. Billy feels well, eating ok, overall much better, eating well, active and has no new complains.      No n/v. Stools are soft.  Taking hydroxizine and Ativan prn daily.     ROS: Review of systems with pertinent positive and negatives in HPI  Onc Hx: FISH:  RESULTS:    ABNORMAL: HIGH-RISK  - No high risk abnormality detected     ABNORMAL: OTHER  - Gain of chromosomes 5, 9, and 15 (25%)  - Gain of chromosome 11 (1%)     NORMAL  - No loss of 1p or gain of 1q  - No loss of 13q  - No loss of TP53  - No rearrangement of IGH  Exam:  Blood pressure 136/76, pulse 57, temperature 98.3  F (36.8  C), temperature source Oral, resp. rate 16, weight 95.3 kg (210 lb), SpO2 96%.  Wt Readings from Last 4 Encounters:   04/25/24 95.3 kg (210 lb)   04/18/24 95.3 kg (210 lb)   04/18/24 95.7 kg (210 lb 14.4 oz)   04/16/24 98 kg (216 lb)     Constitutional: NAD, in good spirits  HEENT: sclera anicteric. OP moist without lesions  Pulm: CTAB  CV: RRR  Ext: no edema  Neuro: alert, conversant. Non-focal  Psych: appropriate mood and affect  Access: CVC R chest NT, dressing cdi    Lab Results   Component Value Date    WBC 2.9 (L) 04/18/2024    ANEU 3.7 01/30/2024    HGB 12.0 (L) 04/18/2024    HCT 36.4 (L) 04/18/2024     04/18/2024     04/18/2024    POTASSIUM 4.0 04/18/2024    CHLORIDE 106 04/18/2024    CO2 23 04/18/2024     (H) 04/18/2024    BUN 12.3 04/18/2024    CR 0.77 04/18/2024    MAG 2.3 04/18/2024    INR 1.00 02/06/2024    AST 44 04/18/2024    ALT 41 04/18/2024          Latest Reference Range & Units 04/18/24 11:15   Immunofix ELP Urine  No monoclonal protein seen on immunofixation. Pathologic significance requires clinical correlation.  UBALDO Michelle M.D., Ph.D., Pathologist ()   Immunofixation ELP  Very small monoclonal IgG  immunoglobulin of kappa light chain type. Monoclonal antibody therapeutics (e.g. Daratumumab) may appear as monoclonal proteins on serum electrophoresis and immunofixation, usually as very small IgG kappa monoclonals. Results should be interpreted with caution if the patient is receiving monoclonal antibody therapy. Pathologic significance requires clinical correlation.  UBALDO Michelle M.D., Ph.D., Pathologist ()   Kappa Free Lt Chain 0.33 - 1.94 mg/dL 0.78   Kappa Lambda Ratio 0.26 - 1.65  3.00 (H)   Lambda Free Lt Chain 0.57 - 2.63 mg/dL 0.26 (L)   Monoclonal Peak <=0.0 g/dL 0.1 (H)   Total Protein Serum for ELP 6.4 - 8.3 g/dL 6.2 (L)   (H): Data is abnormally high  (L): Data is abnormally low  Bone marrow, posterior iliac crest, left decalcified trephine biopsy and touch imprint; left aspirate clot, particle crush, direct aspirate smear, concentrate aspirate smear, and peripheral blood smear:     - No morphologic or immunophenotypic evidence of residual plasma cell neoplasm, see comment  - Normocellular marrow (20% estimated cellularity), with trilineage hematopoiesis, no dysplasia, and no increase in plasma cells (1.4%)  - Peripheral blood showing slight normochromic normocytic anemia and moderate leukopenia with absolute lymphopenia and no circulating plasma cells    Clonoseq: very low level of MRD detected    A/P:  Billy Carroll is a 68 year old man with IgG kappa MM with extramedullary disease, 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   D+ 106 s/p oupt auto PBSCT.    - PMHx significant for CAD, severe aortic stenosis s/p TAVR (10/24/2023) currently ongoing cardiac rehabilitation, HTN, and HLD.     BMT/MM cell dose 1.57 x 10^6  Engrafted well now   Restaging at day 28-  CR, FLAVIO  PET and BMBx at day 100 - stringent CR with MRD by clonoseq    Cont maintenance Revlamid 10mg daily      HEME/COAG - no more transfusions now.  Counts are decent  CD4 124    IMMUNOCOMPROMISED - no active infections now.   - prophy ACV,   # hx PJP  pneumonia: on Bactrim - for 1 year (3 x week)    CARDIOVASCULAR - Monitor volume status carefully given cardiac history  #HTN: losartan 50mg daily and metoprolol XL 50mg daily.   #Severe aortic stenosis, now s/p TAVR 10/24/2023.   #Moderate nonobstructive coronary artery disease   #Hyperlipidemia - resume statin now  resumed ASA     GI:  Hold vitamin supplements + alendronate  Ulcer prophy: protonix       FEN/Renal:all fine   - creat, lytes wnl    MUSCULOSKELETAL/FRAILTY  Baseline Frailty Score: 2  Cancer Rehab as needed outpatient    PSCYH:  # Anxiety: resume Remeron 15mg q hs for insomnia and mood   Also has Atarax prn and referral to Mental Health. Awaiting appt.     Summary:  -day 180 return  -Return to  to [facility] maintanance therapy around day 60    I spent 30 minutes in the care of this patient today, which included time necessary for preparation for the visit, obtaining history, ordering medications/tests/procedures as medically indicated, review of pertinent medical literature, counseling of the patient, communication of recommendations to the care team, and documentation time.      Jerri Frey MD

## 2024-04-26 LAB
ABC 95% CONFIDENCE INTERVAL (B-CELL): NORMAL
ABC CLONOSEQ B-CELL TRACKING (MRD) RESULT: NORMAL
ABC DOMINANT SEQUENCES (B-CELL): 1
ABC RESIDUAL CLONAL CELLS/ MILL NUCLEATED CELLS BCELL: 2 PER MILLION NUCLEATED CELLS

## 2024-05-09 ENCOUNTER — MYC MEDICAL ADVICE (OUTPATIENT)
Dept: CARDIOLOGY | Facility: CLINIC | Age: 69
End: 2024-05-09
Payer: MEDICARE

## 2024-05-09 DIAGNOSIS — E78.2 MIXED HYPERLIPIDEMIA: Primary | ICD-10-CM

## 2024-05-13 RX ORDER — ROSUVASTATIN CALCIUM 5 MG/1
5 TABLET, COATED ORAL DAILY
Qty: 90 TABLET | Refills: 3 | Status: SHIPPED | OUTPATIENT
Start: 2024-05-13

## 2024-05-13 NOTE — TELEPHONE ENCOUNTER
Improvement in myalgias with stopping rosuvastatin at 20 mg daily.  Will trial rosuvastatin 5 mg daily to see if symptoms of myalgias remain tolerable.

## 2024-05-14 NOTE — PROGRESS NOTES
02/29/24 0500   Appointment Info   Signing clinician's name / credentials Yuan MercadoLAUREN   Visits Used 4   Medical Diagnosis Multiple myeloma in remission   PT Tx Diagnosis physical deconditioning; imbalance   Quick Adds Certification;Student Supervision   Progress Note/Certification   Start of Care Date 01/08/24   Onset of illness/injury or Date of Surgery 01/05/24  (Order date.)   Therapy Frequency 1x/week   Predicted Duration 60 days   Certification date from 01/08/24   Certification date to 03/07/24   Progress Note Due Date   (at 10th)   Progress Note Completed Date 01/08/24   Supervision   Student Supervision Direct supervision provided;Therapy services provided with the co-signing licensed therapist guiding and directing the services, and providing the skilled judgement and assessment throughout the session   GOALS   PT Goals 2;3;4   PT Goal 1   Goal Identifier HEP   Goal Description Pt will demonstrate IND with strength, balance, and muscular and aerobic endurance HEP, while working to improve capacity for functional mobility.   Goal Progress initiated 1/8/2024   Target Date 03/07/24   PT Goal 2   Goal Identifier [id number]   Goal Description Pt will complete 6 Minute Walk Test, ambulating an additional 50 meters (the MCID for 6MWT), to demonstrate improved tolerance for cardiorespiratory endurance and physical activity.   Goal Progress baseline: 437m   Target Date 03/07/24   PT Goal 3   Goal Identifier FGA   Goal Description Pt will score 30pts on FGA, to demonstrate improved dynamic balance and postural control with ambulation, and decreased risk for falling with functional mobility.   Goal Progress baseline: 28/30   Target Date 03/07/24   PT Goal 4   Goal Identifier mCTSIB   Goal Description Pt will complete 30s for all conditions of mCTSIB to demonstrate improvement of sensory integration, in presence of ALS/high level balance difficulty.   Goal Progress baseline: 30s, 30s, 30s, 30s (though significant  sway on condition #5)   Target Date 03/07/24   Subjective Report   Subjective Report Pt reports things are going very well overall. Was able to golf at the simulator last weekend. Fell on an icy patch at the dog park last week, denies hitting head, was able to get up indep, states he has some aches in hands/elbows/hips but is very agreeable to therapy today.   Treatment Interventions (PT)   Interventions Therapeutic Procedure/Exercise;Neuromuscular Re-education   Therapeutic Procedure/Exercise   Therapeutic Procedures: strength, endurance, ROM, flexibility minutes (33158) 40   Skilled Intervention strengthening, core stability, pt edu, HEP   Patient Response/Progress Pt performed a variety of exercises to maintain and improve functional mobility, balance, and strength. He performed floor transfers for improved LE strength with and without UE assist, 4 reps each; pt educated on various techniques (hand positioning, LE sequencing), good pt tolerance. Lunges in // bars: 3 sets x 6 reps, increased time for proper technique, unilat UE support for balance. Unilat farmers carries: 10#, multiple reps with normal ambulation, high knees with pause, and stepping over hurdles with cues for long strides; pt showing increased stability and strength. Bird dogs: 2x10 reps; cues to keep low back flat, part-practicing for familiarity with intervention. Added floor recovery, lunges, and bird dogs to HEP, see PTRx. Pt feels like he is progressing very well, is able to do all exercises just on different days. Education with allocating certain exercises to different days, including golfing and walking at the dog park, pt in support. Tactile and verbal cueing was provided throughout to ensure proper form.   Education   Learner/Method Patient;Demonstration;Pictures/Video;Listening   Plan   Home program HEP; see tx detail above.   Plan for next session f/u on golf simulator and HEP; progress strength; continue high level balance challenges;  pt goal to swing golf club; boxing?   Comments   Comments Plan for continuation at x1/month as pt progresses through cancer treatment and periods of recovery.   Total Session Time   Timed Code Treatment Minutes 40   Total Treatment Time (sum of timed and untimed services) 40       DISCHARGE  Reason for Discharge: Patient choosing to discontinue therapy, with no further appointments scheduled. PT hopeful for resolution of pt's condition.     Equipment Issued: N/A    Discharge Plan: Patient to continue home program, if appropriate.     Referring Provider:  Jerri Frey MD

## 2024-07-08 ENCOUNTER — LAB (OUTPATIENT)
Dept: LAB | Facility: CLINIC | Age: 69
End: 2024-07-08
Payer: MEDICARE

## 2024-07-08 DIAGNOSIS — C90.00 MULTIPLE MYELOMA NOT HAVING ACHIEVED REMISSION (H): Primary | ICD-10-CM

## 2024-07-08 LAB
ALBUMIN MFR UR ELPH: 6.3 MG/DL
ALBUMIN SERPL BCG-MCNC: 4.2 G/DL (ref 3.5–5.2)
ALP SERPL-CCNC: 127 U/L (ref 40–150)
ALT SERPL W P-5'-P-CCNC: 42 U/L (ref 0–70)
ANION GAP SERPL CALCULATED.3IONS-SCNC: 12 MMOL/L (ref 7–15)
AST SERPL W P-5'-P-CCNC: 35 U/L (ref 0–45)
BASOPHILS # BLD AUTO: 0.1 10E3/UL (ref 0–0.2)
BASOPHILS NFR BLD AUTO: 3 %
BILIRUB SERPL-MCNC: 0.4 MG/DL
BUN SERPL-MCNC: 11.6 MG/DL (ref 8–23)
CALCIUM SERPL-MCNC: 8.8 MG/DL (ref 8.8–10.2)
CD19 CELLS # BLD: 79 CELLS/UL (ref 107–698)
CD19 CELLS NFR BLD: 9 % (ref 6–27)
CD3 CELLS # BLD: 405 CELLS/UL (ref 603–2990)
CD3 CELLS NFR BLD: 48 % (ref 49–84)
CD3+CD4+ CELLS # BLD: 263 CELLS/UL (ref 441–2156)
CD3+CD4+ CELLS NFR BLD: 31 % (ref 28–63)
CD3+CD4+ CELLS/CD3+CD8+ CLL BLD: 2 % (ref 1.4–2.6)
CD3+CD8+ CELLS # BLD: 131 CELLS/UL (ref 125–1312)
CD3+CD8+ CELLS NFR BLD: 16 % (ref 10–40)
CD3-CD16+CD56+ CELLS # BLD: 356 CELLS/UL (ref 95–640)
CD3-CD16+CD56+ CELLS NFR BLD: 42 % (ref 4–25)
CHLORIDE SERPL-SCNC: 106 MMOL/L (ref 98–107)
CREAT SERPL-MCNC: 0.8 MG/DL (ref 0.67–1.17)
CREAT UR-MCNC: 51.3 MG/DL
DEPRECATED HCO3 PLAS-SCNC: 22 MMOL/L (ref 22–29)
EGFRCR SERPLBLD CKD-EPI 2021: >90 ML/MIN/1.73M2
EOSINOPHIL # BLD AUTO: 0.2 10E3/UL (ref 0–0.7)
EOSINOPHIL NFR BLD AUTO: 7 %
ERYTHROCYTE [DISTWIDTH] IN BLOOD BY AUTOMATED COUNT: 15.6 % (ref 10–15)
GLUCOSE SERPL-MCNC: 98 MG/DL (ref 70–99)
HCT VFR BLD AUTO: 37.9 % (ref 40–53)
HGB BLD-MCNC: 11.7 G/DL (ref 13.3–17.7)
IMM GRANULOCYTES # BLD: 0 10E3/UL
IMM GRANULOCYTES NFR BLD: 0 %
LDH SERPL L TO P-CCNC: 199 U/L (ref 0–250)
LYMPHOCYTES # BLD AUTO: 0.7 10E3/UL (ref 0.8–5.3)
LYMPHOCYTES NFR BLD AUTO: 21 %
MAGNESIUM SERPL-MCNC: 2.1 MG/DL (ref 1.7–2.3)
MCH RBC QN AUTO: 25.8 PG (ref 26.5–33)
MCHC RBC AUTO-ENTMCNC: 30.9 G/DL (ref 31.5–36.5)
MCV RBC AUTO: 84 FL (ref 78–100)
MONOCYTES # BLD AUTO: 0.5 10E3/UL (ref 0–1.3)
MONOCYTES NFR BLD AUTO: 13 %
NEUTROPHILS # BLD AUTO: 2 10E3/UL (ref 1.6–8.3)
NEUTROPHILS NFR BLD AUTO: 56 %
NRBC # BLD AUTO: 0 10E3/UL
NRBC BLD AUTO-RTO: 0 /100
PHOSPHATE SERPL-MCNC: 2.9 MG/DL (ref 2.5–4.5)
PLATELET # BLD AUTO: 220 10E3/UL (ref 150–450)
POTASSIUM SERPL-SCNC: 4.2 MMOL/L (ref 3.4–5.3)
PROT SERPL-MCNC: 6.3 G/DL (ref 6.4–8.3)
PROT/CREAT 24H UR: 0.12 MG/MG CR (ref 0–0.2)
RBC # BLD AUTO: 4.53 10E6/UL (ref 4.4–5.9)
SODIUM SERPL-SCNC: 140 MMOL/L (ref 135–145)
T CELL EXTENDED COMMENT: ABNORMAL
TOTAL PROTEIN SERUM FOR ELP: 6 G/DL (ref 6.4–8.3)
URATE SERPL-MCNC: 5.6 MG/DL (ref 3.4–7)
WBC # BLD AUTO: 3.5 10E3/UL (ref 4–11)

## 2024-07-08 PROCEDURE — 84166 PROTEIN E-PHORESIS/URINE/CSF: CPT | Mod: 26 | Performed by: STUDENT IN AN ORGANIZED HEALTH CARE EDUCATION/TRAINING PROGRAM

## 2024-07-08 PROCEDURE — 83615 LACTATE (LD) (LDH) ENZYME: CPT

## 2024-07-08 PROCEDURE — 86335 IMMUNFIX E-PHORSIS/URINE/CSF: CPT | Performed by: STUDENT IN AN ORGANIZED HEALTH CARE EDUCATION/TRAINING PROGRAM

## 2024-07-08 PROCEDURE — 84156 ASSAY OF PROTEIN URINE: CPT

## 2024-07-08 PROCEDURE — 86334 IMMUNOFIX E-PHORESIS SERUM: CPT | Mod: 26 | Performed by: STUDENT IN AN ORGANIZED HEALTH CARE EDUCATION/TRAINING PROGRAM

## 2024-07-08 PROCEDURE — 84166 PROTEIN E-PHORESIS/URINE/CSF: CPT | Performed by: STUDENT IN AN ORGANIZED HEALTH CARE EDUCATION/TRAINING PROGRAM

## 2024-07-08 PROCEDURE — 84100 ASSAY OF PHOSPHORUS: CPT

## 2024-07-08 PROCEDURE — 83735 ASSAY OF MAGNESIUM: CPT

## 2024-07-08 PROCEDURE — 82784 ASSAY IGA/IGD/IGG/IGM EACH: CPT

## 2024-07-08 PROCEDURE — 85041 AUTOMATED RBC COUNT: CPT

## 2024-07-08 PROCEDURE — 86360 T CELL ABSOLUTE COUNT/RATIO: CPT

## 2024-07-08 PROCEDURE — 82040 ASSAY OF SERUM ALBUMIN: CPT

## 2024-07-08 PROCEDURE — 86335 IMMUNFIX E-PHORSIS/URINE/CSF: CPT | Mod: 26 | Performed by: STUDENT IN AN ORGANIZED HEALTH CARE EDUCATION/TRAINING PROGRAM

## 2024-07-08 PROCEDURE — 83521 IG LIGHT CHAINS FREE EACH: CPT

## 2024-07-08 PROCEDURE — 84165 PROTEIN E-PHORESIS SERUM: CPT | Mod: 26 | Performed by: STUDENT IN AN ORGANIZED HEALTH CARE EDUCATION/TRAINING PROGRAM

## 2024-07-08 PROCEDURE — 84550 ASSAY OF BLOOD/URIC ACID: CPT

## 2024-07-08 PROCEDURE — 84165 PROTEIN E-PHORESIS SERUM: CPT | Mod: TC | Performed by: STUDENT IN AN ORGANIZED HEALTH CARE EDUCATION/TRAINING PROGRAM

## 2024-07-08 PROCEDURE — 86359 T CELLS TOTAL COUNT: CPT

## 2024-07-08 PROCEDURE — 84155 ASSAY OF PROTEIN SERUM: CPT

## 2024-07-08 PROCEDURE — 82306 VITAMIN D 25 HYDROXY: CPT

## 2024-07-08 PROCEDURE — 86334 IMMUNOFIX E-PHORESIS SERUM: CPT | Performed by: STUDENT IN AN ORGANIZED HEALTH CARE EDUCATION/TRAINING PROGRAM

## 2024-07-08 PROCEDURE — 36415 COLL VENOUS BLD VENIPUNCTURE: CPT

## 2024-07-08 NOTE — NURSING NOTE
Chief Complaint   Patient presents with    Labs Only    Blood Draw     Labs drawn via  by RN.     Labs collected from venipuncture by RN.     Marita Vargas RN

## 2024-07-08 NOTE — NURSING NOTE
Chief Complaint   Patient presents with    Labs Only     Labs drawn with  by RN.     Lab orders not released and notified care coordinator labs were not in. Jakub a green, purple, and red gel top.     Radha Monreal RN

## 2024-07-09 LAB
ALBUMIN SERPL ELPH-MCNC: 3.9 G/DL (ref 3.7–5.1)
ALPHA1 GLOB SERPL ELPH-MCNC: 0.3 G/DL (ref 0.2–0.4)
ALPHA2 GLOB SERPL ELPH-MCNC: 0.6 G/DL (ref 0.5–0.9)
B-GLOBULIN SERPL ELPH-MCNC: 0.8 G/DL (ref 0.6–1)
GAMMA GLOB SERPL ELPH-MCNC: 0.4 G/DL (ref 0.7–1.6)
IGA SERPL-MCNC: 8 MG/DL (ref 84–499)
IGG SERPL-MCNC: 429 MG/DL (ref 610–1616)
IGM SERPL-MCNC: 13 MG/DL (ref 35–242)
KAPPA LC FREE SER-MCNC: 0.57 MG/DL (ref 0.33–1.94)
KAPPA LC FREE/LAMBDA FREE SER NEPH: 1.36 {RATIO} (ref 0.26–1.65)
LAMBDA LC FREE SERPL-MCNC: 0.42 MG/DL (ref 0.57–2.63)
M PROTEIN SERPL ELPH-MCNC: 0.1 G/DL
PATH REPORT.COMMENTS IMP SPEC: ABNORMAL
PATH REPORT.COMMENTS IMP SPEC: NORMAL
PROT ELPH PNL UR ELPH: NORMAL
PROT PATTERN SERPL ELPH-IMP: ABNORMAL
PROT PATTERN SERPL IFE-IMP: NORMAL
PROT PATTERN UR ELPH-IMP: NORMAL

## 2024-07-10 LAB
DEPRECATED CALCIDIOL+CALCIFEROL SERPL-MC: <35 UG/L (ref 20–75)
VITAMIN D2 SERPL-MCNC: <5 UG/L
VITAMIN D3 SERPL-MCNC: 30 UG/L

## 2024-07-11 ENCOUNTER — OFFICE VISIT (OUTPATIENT)
Dept: TRANSPLANT | Facility: CLINIC | Age: 69
End: 2024-07-11
Payer: MEDICARE

## 2024-07-11 VITALS
WEIGHT: 203 LBS | DIASTOLIC BLOOD PRESSURE: 80 MMHG | SYSTOLIC BLOOD PRESSURE: 134 MMHG | BODY MASS INDEX: 29.13 KG/M2 | TEMPERATURE: 98.2 F | OXYGEN SATURATION: 97 % | RESPIRATION RATE: 16 BRPM | HEART RATE: 59 BPM

## 2024-07-11 DIAGNOSIS — Z94.81 STATUS POST BONE MARROW TRANSPLANT (H): Primary | ICD-10-CM

## 2024-07-11 DIAGNOSIS — T45.1X5A CHEMOTHERAPY-INDUCED NEUROPATHY (H): ICD-10-CM

## 2024-07-11 DIAGNOSIS — G62.0 CHEMOTHERAPY-INDUCED NEUROPATHY (H): ICD-10-CM

## 2024-07-11 DIAGNOSIS — C90.01 MULTIPLE MYELOMA IN REMISSION (H): ICD-10-CM

## 2024-07-11 DIAGNOSIS — D64.81 ANTINEOPLASTIC CHEMOTHERAPY INDUCED ANEMIA: ICD-10-CM

## 2024-07-11 DIAGNOSIS — T45.1X5A ANTINEOPLASTIC CHEMOTHERAPY INDUCED ANEMIA: ICD-10-CM

## 2024-07-11 PROCEDURE — G0463 HOSPITAL OUTPT CLINIC VISIT: HCPCS

## 2024-07-11 PROCEDURE — 99215 OFFICE O/P EST HI 40 MIN: CPT

## 2024-07-11 ASSESSMENT — PAIN SCALES - GENERAL: PAINLEVEL: NO PAIN (0)

## 2024-07-11 NOTE — PROGRESS NOTES
BMT Progress Note  07/11/2024     ID: Mr. Carroll is a 68 year old man D 183 for standard risk IgG lambda kappa multiple myeloma.  Standard risk.    INTERVAL HISTORY: Feels OK overall.  Energy is returning, but not quite back to pre-transplant baseline.  Out and able to golf with friends without issues.  Appetite is good.  Developing some neuropathy in his feet with lenalidomide maintenance.  No nausea or vomiting.  He has some pain from prior myeloma bone lesions, but these are stable and unchanged compared to prior to to transplant.  No swelling. No shortness of breath. No cough.  No recent illnesses.  Some intermittent cramping in his hands.      ROS: Review of systems with pertinent positive and negatives in HPI    Onc Hx: FISH:  RESULTS:    ABNORMAL: HIGH-RISK  - No high risk abnormality detected     ABNORMAL: OTHER  - Gain of chromosomes 5, 9, and 15 (25%)  - Gain of chromosome 11 (1%)     NORMAL  - No loss of 1p or gain of 1q  - No loss of 13q  - No loss of TP53  - No rearrangement of IGH    Exam:  Blood pressure 134/80, pulse 59, temperature 98.2  F (36.8  C), temperature source Oral, resp. rate 16, weight 92.1 kg (203 lb), SpO2 97%.  Wt Readings from Last 4 Encounters:   07/11/24 92.1 kg (203 lb)   04/25/24 95.3 kg (210 lb)   04/18/24 95.3 kg (210 lb)   04/18/24 95.7 kg (210 lb 14.4 oz)     Constitutional: NAD, in good spirits  HEENT: sclera anicteric. OP moist without lesions.  Good dentition.    Pulm: CTAB, normal WOB on RA  CV: RRR  Ext: no edema  Neuro: alert, conversant. Non-focal  Psych: appropriate mood and affect  Access:  No access.     Lab Results   Component Value Date    WBC 3.5 (L) 07/08/2024    ANEU 3.7 01/30/2024    HGB 11.7 (L) 07/08/2024    HCT 37.9 (L) 07/08/2024     07/08/2024     07/08/2024    POTASSIUM 4.2 07/08/2024    CHLORIDE 106 07/08/2024    CO2 22 07/08/2024    GLC 98 07/08/2024    BUN 11.6 07/08/2024    CR 0.80 07/08/2024    MAG 2.1 07/08/2024    INR 1.00 02/06/2024     AST 35 07/08/2024    ALT 42 07/08/2024        Latest Reference Range & Units 07/08/24 11:43 07/08/24 13:43 07/08/24 13:47   Absolute CD16+56 95 - 640 cells/uL  356    Absolute CD19 107 - 698 cells/uL  79 (L)    Absolute CD3 603 - 2,990 cells/uL  405 (L)    Absolute CD4 441 - 2,156 cells/uL  263 (L)    Absolute CD8 125 - 1,312 cells/uL  131    Albumin Fraction 3.7 - 5.1 g/dL 3.9     Alpha 1 Fraction 0.2 - 0.4 g/dL 0.3     Alpha 2 Fraction 0.5 - 0.9 g/dL 0.6     Beta Fraction 0.6 - 1.0 g/dL 0.8     CD16 + 56 Natural Killer Cells 4 - 25 %  42 (H)    CD19 B Cells 6 - 27 %  9    CD3 Mature T 49 - 84 %  48 (L)    CD4:CD8 Ratio 1.40 - 2.60   2.00    CD4 [place] T 28 - 63 %  31    CD8 Suppressor T 10 - 40 %  16    ELP Interpretation:  Very small monoclonal protein (0.1 g/dL) seen in the gamma fraction. See immunofixation report on same specimen. Hypogammaglobulinemia. Pathologic significance requires clinical correlation. Marva Jordan M.D., Ph.D.     ELP Interpretation Urine    No obvious monoclonal protein seen and the absence of a monoclonal immunoglobulin was confirmed by immunofixation of this same urine sample. See immunofixation report on same sample. Pathologic significance requires clinical correlation.  Marva Jordan M.D., Ph.D.   Gamma Fraction 0.7 - 1.6 g/dL 0.4 (L)     IGA 84 - 499 mg/dL 8 (L)      - 1,616 mg/dL 429 (L)     IGM 35 - 242 mg/dL 13 (L)     Immunofix ELP Urine    No monoclonal protein seen on immunofixation. Pathologic significance requires clinical correlation.  Marva Jordan M.D., Ph.D.   Immunofixation ELP  Monoclonal IgG immunoglobulin of kappa light chain type. Monoclonal antibody therapeutics (e.g. Daratumumab) may appear as monoclonal proteins on serum electrophoresis and immunofixation. Results should be interpreted with caution. Pathologic significance requires clinical correlation.  Marva Jordan M.D., Ph.D.     Kappa Free Lt Chain 0.33 - 1.94 mg/dL  0.57    Kappa Lambda Ratio 0.26 -  1.65   1.36    Lambda Free Lt Chain 0.57 - 2.63 mg/dL  0.42 (L)    Monoclonal Peak <=0.0 g/dL 0.1 (H)     Total Protein Serum for ELP 6.4 - 8.3 g/dL 6.0 (L)     (L): Data is abnormally low  (H): Data is abnormally high    Clonoseq 4/18/24: very low level of MRD detected    Last BMBx from D100 in April 2024.  No D180 BMBx per protocol.      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    - PMHx significant for CAD, severe aortic stenosis s/p TAVR (10/24/2023) currently ongoing cardiac rehabilitation, HTN, and HLD.     BMT/MM cell dose 1.57 x 10^6  Engrafted well now   Restaging at day 28-  CR, FLAVIO  PET and BMBx at day 100 - stringent CR with MRD by clonoseq  D180 Mspike remains stable at 0.1.  Note this never went to 0.0, although he does not appear to have had prior monoclonal antibody therapy.  He follows closely with his referring oncologist for lenalidomide maintenance.      Cont maintenance Revlamid 10mg daily     Continue antiresorptive therapy locally.  Teeth are in good condition at D180 visit.  Discussed what to report with bisphosphonates.      HEME/COAG - no more transfusions now.  Counts are improving.    Remains mildly anemia.  WBC and plts recovered at D180.   CD4 263, but still low at D180.     IMMUNOCOMPROMISED - no active infections now.   - prophy ACV,   # hx PJP pneumonia: on Bactrim - for 1 year (3 x week)    CARDIOVASCULAR - Monitor volume status carefully given cardiac history  #HTN: losartan 50mg daily and metoprolol XL 50mg daily.   #Severe aortic stenosis, now s/p TAVR 10/24/2023.   #Moderate nonobstructive coronary artery disease   #Hyperlipidemia - resume statin now  resumed ASA     GI:  Ulcer prophy: protonix discontinued.      FEN/Renal:all fine   - creat, lytes wnl  -Calcium/vitamin D supplementation for bone health    MUSCULOSKELETAL/FRAILTY  Baseline Frailty Score: 2  Cancer Rehab as needed  outpatient    PSCYH:  # Anxiety: resume Remeron 15mg q hs for insomnia and mood   Also has Atarax prn and referral to Mental Health.    Summary:  -day 360 return with staging per protocol.    -Return to  to [facility] maintanance therapy around day 60    A total of 40 minutes spent on the date of the encounter doing chart review, review of test results, interpretation of tests, patient visit, and documentation.  The patient was given the opportunity to ask multiple questions today, all of which were answered to their satisfaction.     Jimi Claudio MD, PhD   of Medicine   Oncology/BMT/Cellular Therapies

## 2024-07-11 NOTE — LETTER
7/11/2024      Billy Carroll  [address] [place] [address]      Dear Colleague,    Thank you for referring your patient, Billy Carroll, to the [facility] [place]. Please see a copy of my visit note below.    BMT Progress Note  07/11/2024     ID: Mr. Carroll is a 68 year old man D 183 for standard risk IgG lambda kappa multiple myeloma.  Standard risk.    INTERVAL HISTORY: Feels OK overall.  Energy is returning, but not quite back to pre-transplant baseline.  Out and able to golf with friends without issues.  Appetite is good.  Developing some neuropathy in his feet with lenalidomide maintenance.  No nausea or vomiting.  He has some pain from prior myeloma bone lesions, but these are stable and unchanged compared to prior to to transplant.  No swelling. No shortness of breath. No cough.  No recent illnesses.  Some intermittent cramping in his hands.      ROS: Review of systems with pertinent positive and negatives in HPI    Onc Hx: FISH:  RESULTS:    ABNORMAL: HIGH-RISK  - No high risk abnormality detected     ABNORMAL: OTHER  - Gain of chromosomes 5, 9, and 15 (25%)  - Gain of chromosome 11 (1%)     NORMAL  - No loss of 1p or gain of 1q  - No loss of 13q  - No loss of TP53  - No rearrangement of IGH    Exam:  Blood pressure 134/80, pulse 59, temperature 98.2  F (36.8  C), temperature source Oral, resp. rate 16, weight 92.1 kg (203 lb), SpO2 97%.  Wt Readings from Last 4 Encounters:   07/11/24 92.1 kg (203 lb)   04/25/24 95.3 kg (210 lb)   04/18/24 95.3 kg (210 lb)   04/18/24 95.7 kg (210 lb 14.4 oz)     Constitutional: NAD, in good spirits  HEENT: sclera anicteric. OP moist without lesions.  Good dentition.    Pulm: CTAB, normal WOB on RA  CV: RRR  Ext: no edema  Neuro: alert, conversant. Non-focal  Psych: appropriate mood and affect  Access:  No access.     Lab Results   Component Value Date    WBC 3.5 (L) 07/08/2024    ANEU 3.7 01/30/2024    HGB 11.7 (L)  07/08/2024    HCT 37.9 (L) 07/08/2024     07/08/2024     07/08/2024    POTASSIUM 4.2 07/08/2024    CHLORIDE 106 07/08/2024    CO2 22 07/08/2024    GLC 98 07/08/2024    BUN 11.6 07/08/2024    CR 0.80 07/08/2024    MAG 2.1 07/08/2024    INR 1.00 02/06/2024    AST 35 07/08/2024    ALT 42 07/08/2024        Latest Reference Range & Units 07/08/24 11:43 07/08/24 13:43 07/08/24 13:47   Absolute CD16+56 95 - 640 cells/uL  356    Absolute CD19 107 - 698 cells/uL  79 (L)    Absolute CD3 603 - 2,990 cells/uL  405 (L)    Absolute CD4 441 - 2,156 cells/uL  263 (L)    Absolute CD8 125 - 1,312 cells/uL  131    Albumin Fraction 3.7 - 5.1 g/dL 3.9     Alpha 1 Fraction 0.2 - 0.4 g/dL 0.3     Alpha 2 Fraction 0.5 - 0.9 g/dL 0.6     Beta Fraction 0.6 - 1.0 g/dL 0.8     CD16 + 56 Natural Killer Cells 4 - 25 %  42 (H)    CD19 B Cells 6 - 27 %  9    CD3 Mature T 49 - 84 %  48 (L)    CD4:CD8 Ratio 1.40 - 2.60   2.00    CD4 [place] T 28 - 63 %  31    CD8 Suppressor T 10 - 40 %  16    ELP Interpretation:  Very small monoclonal protein (0.1 g/dL) seen in the gamma fraction. See immunofixation report on same specimen. Hypogammaglobulinemia. Pathologic significance requires clinical correlation. Marva Jordan M.D., Ph.D.     ELP Interpretation Urine    No obvious monoclonal protein seen and the absence of a monoclonal immunoglobulin was confirmed by immunofixation of this same urine sample. See immunofixation report on same sample. Pathologic significance requires clinical correlation.  Marva Jordan M.D., Ph.D.   Gamma Fraction 0.7 - 1.6 g/dL 0.4 (L)     IGA 84 - 499 mg/dL 8 (L)      - 1,616 mg/dL 429 (L)     IGM 35 - 242 mg/dL 13 (L)     Immunofix ELP Urine    No monoclonal protein seen on immunofixation. Pathologic significance requires clinical correlation.  Marva Jordan M.D., Ph.D.   Immunofixation ELP  Monoclonal IgG immunoglobulin of kappa light chain type. Monoclonal antibody therapeutics (e.g. Daratumumab) may appear as  monoclonal proteins on serum electrophoresis and immunofixation. Results should be interpreted with caution. Pathologic significance requires clinical correlation.  Marva Jordan M.D., Ph.D.     Kappa Free Lt Chain 0.33 - 1.94 mg/dL  0.57    Kappa Lambda Ratio 0.26 - 1.65   1.36    Lambda Free Lt Chain 0.57 - 2.63 mg/dL  0.42 (L)    Monoclonal Peak <=0.0 g/dL 0.1 (H)     Total Protein Serum for ELP 6.4 - 8.3 g/dL 6.0 (L)     (L): Data is abnormally low  (H): Data is abnormally high    Clonoseq 4/18/24: very low level of MRD detected    Last BMBx from D100 in April 2024.  No D180 BMBx per protocol.      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    - PMHx significant for CAD, severe aortic stenosis s/p TAVR (10/24/2023) currently ongoing cardiac rehabilitation, HTN, and HLD.     BMT/MM cell dose 1.57 x 10^6  Engrafted well now   Restaging at day 28-  CR, FLAVIO  PET and BMBx at day 100 - stringent CR with MRD by clonoseq  D180 Mspike remains stable at 0.1.  Note this never went to 0.0, although he does not appear to have had prior monoclonal antibody therapy.  He follows closely with his referring oncologist for lenalidomide maintenance.      Cont maintenance Revlamid 10mg daily     Continue antiresorptive therapy locally.  Teeth are in good condition at D180 visit.  Discussed what to report with bisphosphonates.      HEME/COAG - no more transfusions now.  Counts are improving.    Remains mildly anemia.  WBC and plts recovered at D180.   CD4 263, but still low at D180.     IMMUNOCOMPROMISED - no active infections now.   - prophy ACV,   # hx PJP pneumonia: on Bactrim - for 1 year (3 x week)    CARDIOVASCULAR - Monitor volume status carefully given cardiac history  #HTN: losartan 50mg daily and metoprolol XL 50mg daily.   #Severe aortic stenosis, now s/p TAVR 10/24/2023.   #Moderate nonobstructive coronary artery disease    #Hyperlipidemia - resume statin now  resumed ASA     GI:  Ulcer prophy: protonix discontinued.      FEN/Renal:all fine   - creat, lytes wnl  -Calcium/vitamin D supplementation for bone health    MUSCULOSKELETAL/FRAILTY  Baseline Frailty Score: 2  Cancer Rehab as needed outpatient    PSCYH:  # Anxiety: resume Remeron 15mg q hs for insomnia and mood   Also has Atarax prn and referral to Mental Health.    Summary:  -day 360 return with staging per protocol.    -Return to  to cont maintanance therapy around day 60    A total of 40 minutes spent on the date of the encounter doing chart review, review of test results, interpretation of tests, patient visit, and documentation.  The patient was given the opportunity to ask multiple questions today, all of which were answered to their satisfaction.     Jimi Claudio MD, PhD   of Medicine   Oncology/BMT/Cellular Therapies

## 2024-07-11 NOTE — NURSING NOTE
"Oncology Rooming Note    July 11, 2024 12:06 PM   Billy Carroll is a 68 year old male who presents for:    Chief Complaint   Patient presents with    Oncology Clinic Visit     MM     Initial Vitals: /80   Pulse 59   Temp 98.2  F (36.8  C) (Oral)   Resp 16   Wt 92.1 kg (203 lb)   SpO2 97%   BMI 29.13 kg/m   Estimated body mass index is 29.13 kg/m  as calculated from the following:    Height as of 4/18/24: 1.778 m (5' 10\").    Weight as of this encounter: 92.1 kg (203 lb). Body surface area is 2.13 meters squared.  No Pain (0) Comment: Data Unavailable   No LMP for male patient.  Allergies reviewed: Yes  Medications reviewed: Yes    Medications: Medication refills not needed today.  Pharmacy name entered into [facility]:    [id number] [place], [place] - [address] [facility] [place], [place] - [address]    Frailty Screening:   Is the patient here for a new oncology consult visit in cancer care? 2. No      Clinical concerns:        Radha Jha              "

## 2024-07-15 ENCOUNTER — TRANSFERRED RECORDS (OUTPATIENT)
Dept: HEALTH INFORMATION MANAGEMENT | Facility: CLINIC | Age: 69
End: 2024-07-15
Payer: COMMERCIAL

## 2024-07-16 ENCOUNTER — NURSE TRIAGE (OUTPATIENT)
Dept: FAMILY MEDICINE | Facility: CLINIC | Age: 69
End: 2024-07-16

## 2024-07-16 ENCOUNTER — ANCILLARY PROCEDURE (OUTPATIENT)
Dept: GENERAL RADIOLOGY | Facility: CLINIC | Age: 69
End: 2024-07-16
Attending: PHYSICIAN ASSISTANT
Payer: MEDICARE

## 2024-07-16 ENCOUNTER — OFFICE VISIT (OUTPATIENT)
Dept: FAMILY MEDICINE | Facility: CLINIC | Age: 69
End: 2024-07-16
Payer: MEDICARE

## 2024-07-16 VITALS
RESPIRATION RATE: 18 BRPM | SYSTOLIC BLOOD PRESSURE: 126 MMHG | TEMPERATURE: 98.3 F | DIASTOLIC BLOOD PRESSURE: 68 MMHG | HEART RATE: 71 BPM | OXYGEN SATURATION: 94 %

## 2024-07-16 DIAGNOSIS — R05.1 ACUTE COUGH: Primary | ICD-10-CM

## 2024-07-16 LAB
FLUAV AG SPEC QL IA: NEGATIVE
FLUBV AG SPEC QL IA: NEGATIVE

## 2024-07-16 PROCEDURE — 71046 X-RAY EXAM CHEST 2 VIEWS: CPT | Mod: TC | Performed by: RADIOLOGY

## 2024-07-16 PROCEDURE — 99214 OFFICE O/P EST MOD 30 MIN: CPT | Performed by: PHYSICIAN ASSISTANT

## 2024-07-16 PROCEDURE — 87635 SARS-COV-2 COVID-19 AMP PRB: CPT | Performed by: PHYSICIAN ASSISTANT

## 2024-07-16 PROCEDURE — 87804 INFLUENZA ASSAY W/OPTIC: CPT | Performed by: PHYSICIAN ASSISTANT

## 2024-07-16 RX ORDER — ALBUTEROL SULFATE 90 UG/1
2 AEROSOL, METERED RESPIRATORY (INHALATION) EVERY 6 HOURS PRN
Qty: 18 G | Refills: 0 | Status: SHIPPED | OUTPATIENT
Start: 2024-07-16

## 2024-07-16 NOTE — TELEPHONE ENCOUNTER
"Nurse Triage SBAR    Is this a 2nd Level Triage? NO    Situation: cough    Background: patient is immunocompromised- had stem cell transplant recently. Calling for appointment with C[facility]- states \"he told me he'd always have a spot open for me.\"    Assessment:   -symptoms started on Saturday 7/13  -home covid test negative  -cough started as mild, has now progressed & moved into chest  -mucous is \"not clear\" but hasn't checked this morning, unsure what color, denies hemoptysis   -temp 99.5 this morning, however is taking tylenol  -episodes of coughing occur a few times per hour, trouble catching breath briefly after coughing fit but denies wheezing or continuous shortness of breath  -cough is worse at night, has been sleeping elevated which has been helpful    Recommendation: per review of schedules, Cipriano already has 3 overbooked appointments today. No available appointments with colleagues. Patient plans to go to [facility]. Will route to PCP for further advisement.      Shasha Dejesus RN TIMOTHYN  [facility]    ----------------------------------------------------------  Reason for Disposition   SEVERE coughing spells (e.g., whooping sound after coughing, vomiting after coughing)    Additional Information   Negative: Bluish (or gray) lips or face   Negative: SEVERE difficulty breathing (e.g., struggling for each breath, speaks in single words)   Negative: Rapid onset of cough and has hives   Negative: Coughing started suddenly after medicine, an allergic food or bee sting   Negative: Difficulty breathing after exposure to flames, smoke, or fumes   Negative: Sounds like a life-threatening emergency to the triager   Negative: MODERATE difficulty breathing (e.g., speaks in phrases, SOB even at rest, pulse 100-120) and still present when not coughing   Negative: Chest pain present when not coughing   Negative: Passed out (i.e., fainted, collapsed and was not responding)   Negative: Patient " sounds very sick or weak to the triager   Negative: MILD difficulty breathing (e.g., minimal/no SOB at rest, SOB with walking, pulse <100) and still present when not coughing   Negative: Coughed up > 1 tablespoon (15 ml) blood (Exception: Blood-tinged sputum.)   Negative: Fever > 103 F (39.4 C)   Negative: Fever > 101 F (38.3 C) and over 60 years of age   Negative: Fever > 100.0 F (37.8 C) and has diabetes mellitus or a weak immune system (e.g., HIV positive, cancer chemotherapy, organ transplant, splenectomy, chronic steroids)   Negative: Fever > 100.0 F (37.8 C) and bedridden (e.g., CVA, chronic illness, recovering from surgery)   Negative: Increasing ankle swelling   Negative: Wheezing is present    Protocols used: Cough-A-OH

## 2024-07-16 NOTE — PROGRESS NOTES
Assessment & Plan:      Problem List Items Addressed This Visit    None  Visit Diagnoses       Acute cough    -  Primary    Relevant Medications    albuterol (PROAIR HFA/PROVENTIL HFA/VENTOLIN HFA) 108 (90 Base) MCG/ACT inhaler    Other Relevant Orders    XR Chest 2 Views (Completed)    Influenza A & B Antigen - Clinic Collect    Symptomatic COVID-19 Virus (Coronavirus) by PCR Nose          Medical Decision Making  Patient who is currently immunosuppressed due to multiple myeloma status post bone marrow transplant, presents for cough for 4 days.  Chest x-ray is negative for focal pneumonia.  Symptoms appear most consistent with a viral upper respiratory infection with some mild signs of wheezing on today's exam.  Recommend trial of albuterol inhaler.  Otherwise continue with conservative measures of rest, fluids, Mucinex, and honey lozenges.  Discussed treatment and symptomatic care.  Allergies and medication interactions reviewed.  Discussed signs of worsening symptoms and when to follow-up with PCP if no symptom improvement.     Subjective:      Billy Carroll is a 68 year old male with history of multiple myeloma status post bone marrow transplant currently immunosuppressed, here for evaluation of cough.  Onset of symptoms was 3 to 4 days.  Patient spoke with his oncologist and they recommended he be seen for symptoms to rule out pneumonia.  Patient was unable to be evaluated by their primary care provider in a meaningful time.  Thus, patient presented to the walk-in care clinic.  No fevers.  Associated symptoms include rhinorrhea.     The following portions of the patient's history were reviewed and updated as appropriate: allergies, current medications, and problem list.     Review of Systems  Pertinent items are noted in HPI.    Allergies  Allergies   Allergen Reactions    Acetaminophen-Codeine Nausea    Codeine Nausea       No family history on file.    Social History     Tobacco Use    Smoking status: Never      Passive exposure: Past    Smokeless tobacco: Never   Substance Use Topics    Alcohol use: Not on file        Objective:      /68   Pulse 71   Temp 98.3  F (36.8  C) (Oral)   Resp 18   SpO2 94%   General appearance - alert, well appearing, and in no distress and non-toxic  Ears - TMs intact with moderate serous fluid and bulging bilaterally, no erythema  Nose - normal and patent, no erythema, discharge or polyps  Mouth - mucous membranes moist, pharynx normal without lesions  Neck - supple, no significant adenopathy  Chest - mild expiratory wheeze heard throughout the lungs, otherwise no obvious rhonchi or rales  Heart - normal rate, regular rhythm, normal S1, S2, no murmurs, rubs, clicks or gallops     Lab & Imaging Results    Results for orders placed or performed in visit on 07/16/24   XR Chest 2 Views     Status: None    Narrative    EXAM: XR CHEST 2 VIEWS  LOCATION: [facility]  DATE: 7/16/2024    INDICATION: cough and SOB; immunosuppressed; rule out pneumonia  COMPARISON: 12/26/2023      Impression    IMPRESSION: TAVR. Normal heart size. No acute pulmonary infiltrate seen.       I personally reviewed these results and discussed findings with the patient.    The use of Dragon/[employer] dictation services was used to construct the content of this note; any grammatical errors are non-intentional. Please contact the author directly if you are in need of any clarification.

## 2024-07-16 NOTE — PATIENT INSTRUCTIONS
Cough    You were seen today for a cough. This is likely due to a virus and will improve over the next 1-2 weeks on its own.    Symptom management:  - Drink plenty of non-caffeinated fluids  - Avoid smoke exposure  - May use tylenol or ibuprofen for discomfort  - Drink a warm non-caffeinated tea with honey  - Place a warm humidifier in your bedroom at night  - [employer]'s VaporRub    Reasons to return for re-evaluation:  - Develop a fever 100.4 or higher, current fever worsens, or fever does not improve in 72 hours  - Difficulty breathing or shortness of breath  - Cough continues to worsen including coughing up blood or coughing up thick, colored phlegm  - Unable to tolerate fluids    Otherwise, if symptoms have not improved in 7 days, follow-up with your primary care provider.

## 2024-07-17 LAB — SARS-COV-2 RNA RESP QL NAA+PROBE: NEGATIVE

## 2024-08-17 DIAGNOSIS — G47.00 ORGANIC INSOMNIA: ICD-10-CM

## 2024-08-19 RX ORDER — MIRTAZAPINE 15 MG/1
15 TABLET, FILM COATED ORAL AT BEDTIME
Qty: 90 TABLET | Refills: 1 | Status: SHIPPED | OUTPATIENT
Start: 2024-08-19

## 2024-09-15 ENCOUNTER — OFFICE VISIT (OUTPATIENT)
Dept: FAMILY MEDICINE | Facility: CLINIC | Age: 69
End: 2024-09-15
Payer: MEDICARE

## 2024-09-15 VITALS
OXYGEN SATURATION: 96 % | DIASTOLIC BLOOD PRESSURE: 70 MMHG | BODY MASS INDEX: 29.13 KG/M2 | SYSTOLIC BLOOD PRESSURE: 137 MMHG | WEIGHT: 203 LBS | HEART RATE: 64 BPM | TEMPERATURE: 97.8 F | RESPIRATION RATE: 19 BRPM

## 2024-09-15 DIAGNOSIS — B02.9 HERPES ZOSTER WITHOUT COMPLICATION: Primary | ICD-10-CM

## 2024-09-15 DIAGNOSIS — Z79.899 IMMUNOSUPPRESSED DUE TO CHEMOTHERAPY (H): ICD-10-CM

## 2024-09-15 DIAGNOSIS — C90.00 MULTIPLE MYELOMA, REMISSION STATUS UNSPECIFIED (H): ICD-10-CM

## 2024-09-15 DIAGNOSIS — T45.1X5A IMMUNOSUPPRESSED DUE TO CHEMOTHERAPY (H): ICD-10-CM

## 2024-09-15 DIAGNOSIS — D84.821 IMMUNOSUPPRESSED DUE TO CHEMOTHERAPY (H): ICD-10-CM

## 2024-09-15 PROCEDURE — 99214 OFFICE O/P EST MOD 30 MIN: CPT | Performed by: PHYSICIAN ASSISTANT

## 2024-09-15 RX ORDER — VALACYCLOVIR HYDROCHLORIDE 1 G/1
1000 TABLET, FILM COATED ORAL 3 TIMES DAILY
Qty: 21 TABLET | Refills: 0 | Status: SHIPPED | OUTPATIENT
Start: 2024-09-15 | End: 2024-09-22

## 2024-09-15 NOTE — PATIENT INSTRUCTIONS
Discontinue the prophylactic acyclovir  Take the valacyclovir as written 3 times a day for 1 week for treatment of herpes zoster      Discussed differentials and treatment options, lesions are benign and patient is reassured.  Take medication as prescribed, and recheck with PCP if not resolving as expected, sooner with Walk-In Clinic or Emergency Room if worsening.      Herpes Zoster:    This is a viral illness caused by the chicken post virus that lies dormant in the nerve root. It can be stimulated by stress. If effects one nerve pattern, so will not spread to another area or cross the midline. The secretions are contagious, but once the lesions dry, they are not contagious. It is treated with antiviral medication that is most effective when given in the first 72 hours to help dry the lesions and reduce the risk of long-term pain from the condition.     Prescribed: Valtrex. Discussed pain medication options and prescribed: OTC Tylenol. If requires stonger or more pain medication call primary to discuss.     Patient handout on Herpes zoster provided.     Follow-up with primary for persistence or worsening of symptoms.

## 2024-09-15 NOTE — PROGRESS NOTES
Patient presents with:  Derm Problem:  Itchy rash on right hip and leg.        Clinical Decision Making:  Patient will discontinue the acyclovir prophylactic treatment.  Will use the valacyclovir for treatment of herpes zoster.  May resume the acyclovir prophylaxis after completion of the valacyclovir.  Will continue to monitor symptoms until resolution.  May contact the oncologist during the week during office hours if new symptoms or concerns arise or not getting full resolution with this course of treatment.  Patient voiced understanding questions were answered to his satisfaction before discharge      ICD-10-CM    1. Herpes zoster without complication  B02.9 valACYclovir (VALTREX) 1000 mg tablet      2. Multiple myeloma, remission status unspecified (H)  C90.00       3. 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  Take medication as prescribed, and recheck with PCP if not resolving as expected, sooner with Walk-In Clinic or Emergency Room if worsening.      Herpes Zoster:    This is a viral illness caused by the chicken post virus that lies dormant in the nerve root. It can be stimulated by stress. If effects one nerve pattern, so will not spread to another area or cross the midline. The secretions are contagious, but once the lesions dry, they are not contagious. It is treated with antiviral medication that is most effective when given in the first 72 hours to help dry the lesions and reduce the risk of long-term pain from the condition.     Prescribed: Valtrex. Discussed pain medication options and prescribed: OTC Tylenol. If requires stonger or more pain medication call primary to discuss.     Patient handout on Herpes  zoster provided.     Follow-up with primary for persistence or worsening of symptoms.                HPI:  Billy Carroll is a 69 year old male who has a past medical history of multiple myeloma and is on Revlimid therapy who presents today for a pruritic rash on the right hip and thigh.  Patient states that the rash started two days ago.  There is no associated headache fever prodrome.  Patient has had herpes zoster vaccination.  He does not have involvement of the genitalia of the face or anywhere else on the body at this time has been pruritic but has not been painful.  Treatment with acyclovir 400 mg twice a day for shingles prophylaxis for his Revlimid therapy for multiple myeloma.     History obtained from chart review and the patient.    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       Social History     Tobacco Use    Smoking status: Never     Passive exposure: Past    Smokeless tobacco: Never   Substance Use Topics    Alcohol use: Not on file        Review of Systems  As above in HPI otherwise negative.    Vitals:    09/15/24 1139   BP: 137/70   Pulse: 64   Resp: 19   Temp: 97.8  F (36.6  C)   SpO2: 96%   Weight: 92.1 kg (203 lb)       General: Patient is resting comfortably no acute distress is afebrile  HEENT: Head is normocephalic atraumatic   eyes are PERRL EOMI sclera anicteric   TMs are clear bilaterally  Throat is with mild pharyngeal wall erythema and no exudate  No cervical lymphadenopathy present  LUNGS: Clear to auscultation bilaterally  HEART: Regular rate and rhythm  Skin: With rash that is warm to touch is confluent in areas and has a fluid-filled vesicular rash on the right lateral thigh radiating to the anterior medial aspect of the thigh.  Does not involve the genitalia at this time.  There is no other rash anywhere else on the body.    Physical Exam      At the end of the encounter, I discussed results, diagnosis, medications. Discussed red flags for immediate return to clinic/ER, as well as indications for follow up if no improvement. Patient understood and agreed to plan. Patient was stable for discharge.

## 2024-09-20 DIAGNOSIS — L25.9 CONTACT DERMATITIS, UNSPECIFIED CONTACT DERMATITIS TYPE, UNSPECIFIED TRIGGER: Primary | ICD-10-CM

## 2024-09-20 RX ORDER — TRIAMCINOLONE ACETONIDE 1 MG/G
CREAM TOPICAL 2 TIMES DAILY
Qty: 30 G | Refills: 1 | Status: SHIPPED | OUTPATIENT
Start: 2024-09-20

## 2024-09-30 DIAGNOSIS — I35.0 SEVERE AORTIC STENOSIS: Primary | ICD-10-CM

## 2024-10-04 ENCOUNTER — HOSPITAL ENCOUNTER (OUTPATIENT)
Facility: AMBULATORY SURGERY CENTER | Age: 69
End: 2024-10-04
Attending: PHYSICIAN ASSISTANT
Payer: MEDICARE

## 2024-10-04 DIAGNOSIS — C90.00 MULTIPLE MYELOMA NOT HAVING ACHIEVED REMISSION (H): Primary | ICD-10-CM

## 2024-10-04 DIAGNOSIS — Z94.81 STATUS POST BONE MARROW TRANSPLANT (H): ICD-10-CM

## 2024-10-04 NOTE — PROGRESS NOTES
Orders signed and case request entered for 1-year BAN visit.    Bozena Ritter RN, MSN  BMT & Cellular Therapy Nurse Coordinator  [facility]  Phone: [phone]  Fax: [phone]

## 2024-10-21 ENCOUNTER — HOSPITAL ENCOUNTER (OUTPATIENT)
Dept: CARDIOLOGY | Facility: CLINIC | Age: 69
Discharge: HOME | End: 2024-10-21
Attending: INTERNAL MEDICINE | Admitting: INTERNAL MEDICINE
Payer: MEDICARE

## 2024-10-21 DIAGNOSIS — I35.0 SEVERE AORTIC STENOSIS: ICD-10-CM

## 2024-10-21 LAB — LVEF ECHO: NORMAL

## 2024-10-21 PROCEDURE — 93306 TTE W/DOPPLER COMPLETE: CPT

## 2024-10-21 PROCEDURE — 93306 TTE W/DOPPLER COMPLETE: CPT | Mod: 26 | Performed by: INTERNAL MEDICINE

## 2024-10-26 ENCOUNTER — OFFICE VISIT (OUTPATIENT)
Dept: URGENT CARE | Facility: URGENT CARE | Age: 69
End: 2024-10-26
Payer: MEDICARE

## 2024-10-26 VITALS
WEIGHT: 203 LBS | SYSTOLIC BLOOD PRESSURE: 146 MMHG | TEMPERATURE: 98.3 F | OXYGEN SATURATION: 98 % | HEART RATE: 88 BPM | RESPIRATION RATE: 12 BRPM | BODY MASS INDEX: 29.13 KG/M2 | DIASTOLIC BLOOD PRESSURE: 76 MMHG

## 2024-10-26 DIAGNOSIS — T45.1X5A IMMUNOSUPPRESSED DUE TO CHEMOTHERAPY (H): ICD-10-CM

## 2024-10-26 DIAGNOSIS — Z95.2 S/P TAVR (TRANSCATHETER AORTIC VALVE REPLACEMENT): Primary | ICD-10-CM

## 2024-10-26 DIAGNOSIS — D84.821 IMMUNOSUPPRESSED DUE TO CHEMOTHERAPY (H): ICD-10-CM

## 2024-10-26 DIAGNOSIS — L50.9 URTICARIA: Primary | ICD-10-CM

## 2024-10-26 DIAGNOSIS — Z79.899 IMMUNOSUPPRESSED DUE TO CHEMOTHERAPY (H): ICD-10-CM

## 2024-10-26 PROCEDURE — 99214 OFFICE O/P EST MOD 30 MIN: CPT | Performed by: NURSE PRACTITIONER

## 2024-10-26 ASSESSMENT — ENCOUNTER SYMPTOMS
FEVER: 0
WHEEZING: 0
COUGH: 0
FATIGUE: 1
SHORTNESS OF BREATH: 0

## 2024-10-26 NOTE — PROGRESS NOTES
Assessment & Plan     Immunosuppressed due to chemotherapy (H)      Urticaria       Patient who is immunosuppressed due to chemotherapy for multiple myeloma, at his usual baseline, with pruritic maculopapular rash starting a couple of days ago, more concentrated around the hip and buttock area semiraised, also tracking up the back and upper arms area.    He did have a infusion for bone density as per usual every 3 months on Monday, 4 days before it started.    No blistering, fever sweats or chills.    Is already on Claritin and cannot remember why, but denies any seasonal allergy type symptoms.  Had a similar rash in September which went away on its own after a week, not related to the same infusion.    Rash appears very consistent with hives.  No throat swelling, shortness of breath.  No new foods.  No other new medications recently.  No viral symptoms, but patient is immunosuppressed.    Advised symptomatic treatment, watchful waiting.  Can try his Claritin which she is already taking up to twice a day as needed, avoidance of hot showers, tight clothing.  The rash is more concentrated around where his jeans are located in the hips.     Come back if the rash is changing such as blistering, painful, draining or to ER with fever sweats or chills.    Recheck in a couple of weeks if not worsening, but persistent.    Patient says his oncologist is already aware of this rash and he was told to come here, can give an update on Monday as to status.          Return in about 2 weeks (around 11/9/2024).    Melinda Escalante [facility]    Avila Cervantes is a 69 year old male who presents to clinic today for the following health issues:  Chief Complaint   Patient presents with    Derm Problem     Around hips and underneath arms       HPI    Sees oncology - hx stem cell transplant for mult myeloma.  On maintenance chemotherapy.      Takes claritin every day.  Started taking it 'months' ago.   Not sure why he takes it.      Started noticing rash 2 days ago.  Itchy.    Has fatigue that's ongoing.  No fevers. Feeling at his baseline otherwise.      Denies ho seasonal allergies.      On suppressive bactrim 3x per week to suppress pneumonia.     Had infusion for bone density 6 days ago.     Had similar rash in Sept and was thought to be contact dermatitis. Rec'd a topical treatment, but rash had resolved on its own before using.  Wasn't assoc with infusion.        Review of Systems   Constitutional:  Positive for fatigue. Negative for fever.   HENT:  Negative for congestion.    Respiratory:  Negative for cough, shortness of breath and wheezing.    Allergic/Immunologic: Positive for immunocompromised state. Negative for environmental allergies.           Objective    BP (!) 146/76   Pulse 88   Temp 98.3  F (36.8  C) (Oral)   Resp 12   Wt 92.1 kg (203 lb)   SpO2 98%   BMI 29.13 kg/m    Physical Exam  Constitutional:       Appearance: Normal appearance. He is not ill-appearing.   Pulmonary:      Effort: Pulmonary effort is normal.   Musculoskeletal:         General: Normal range of motion.   Skin:     Findings: Erythema (maculopapular rash on hips, buttock, upper arms, back) present.   Neurological:      Mental Status: He is alert and oriented to person, place, and time.   Psychiatric:         Mood and Affect: Mood normal.

## 2024-10-26 NOTE — PATIENT INSTRUCTIONS
Take your claritin daily and can increase to twice daily only as needed for itchy/discomfort.      Loose clothing, avoid hot showers - room temp and watch for changes - pain, fevers, shortness, throat swelling.      Call your oncologist on Monday with an update - discuss hives.      Give it a couple weeks - discuss with primary care or oncology if still there.

## 2024-10-28 ENCOUNTER — OFFICE VISIT (OUTPATIENT)
Dept: CARDIOLOGY | Facility: CLINIC | Age: 69
End: 2024-10-28
Payer: MEDICARE

## 2024-10-28 ENCOUNTER — LAB (OUTPATIENT)
Dept: CARDIOLOGY | Facility: CLINIC | Age: 69
End: 2024-10-28
Payer: MEDICARE

## 2024-10-28 VITALS
WEIGHT: 210 LBS | DIASTOLIC BLOOD PRESSURE: 54 MMHG | RESPIRATION RATE: 14 BRPM | BODY MASS INDEX: 30.13 KG/M2 | HEART RATE: 55 BPM | SYSTOLIC BLOOD PRESSURE: 136 MMHG

## 2024-10-28 DIAGNOSIS — Z94.81 STATUS POST BONE MARROW TRANSPLANT (H): ICD-10-CM

## 2024-10-28 DIAGNOSIS — I10 ESSENTIAL HYPERTENSION: ICD-10-CM

## 2024-10-28 DIAGNOSIS — T45.1X5A IMMUNOSUPPRESSED DUE TO CHEMOTHERAPY (H): ICD-10-CM

## 2024-10-28 DIAGNOSIS — Z95.2 S/P TAVR (TRANSCATHETER AORTIC VALVE REPLACEMENT): ICD-10-CM

## 2024-10-28 DIAGNOSIS — D84.821 IMMUNOSUPPRESSED DUE TO CHEMOTHERAPY (H): ICD-10-CM

## 2024-10-28 DIAGNOSIS — I25.10 CORONARY ARTERY DISEASE INVOLVING NATIVE CORONARY ARTERY OF NATIVE HEART WITHOUT ANGINA PECTORIS: ICD-10-CM

## 2024-10-28 DIAGNOSIS — Z79.899 IMMUNOSUPPRESSED DUE TO CHEMOTHERAPY (H): ICD-10-CM

## 2024-10-28 DIAGNOSIS — E78.2 MIXED HYPERLIPIDEMIA: Primary | ICD-10-CM

## 2024-10-28 DIAGNOSIS — I35.0 SEVERE AORTIC STENOSIS: ICD-10-CM

## 2024-10-28 DIAGNOSIS — C90.00 MULTIPLE MYELOMA, REMISSION STATUS UNSPECIFIED (H): ICD-10-CM

## 2024-10-28 DIAGNOSIS — I10 BENIGN ESSENTIAL HYPERTENSION: ICD-10-CM

## 2024-10-28 LAB
ANION GAP SERPL CALCULATED.3IONS-SCNC: 9 MMOL/L (ref 7–15)
ATRIAL RATE - MUSE: 56 BPM
BUN SERPL-MCNC: 11.4 MG/DL (ref 8–23)
CALCIUM SERPL-MCNC: 8.6 MG/DL (ref 8.8–10.4)
CHLORIDE SERPL-SCNC: 106 MMOL/L (ref 98–107)
CHOLEST SERPL-MCNC: 165 MG/DL
CREAT SERPL-MCNC: 0.82 MG/DL (ref 0.67–1.17)
DIASTOLIC BLOOD PRESSURE - MUSE: NORMAL MMHG
EGFRCR SERPLBLD CKD-EPI 2021: >90 ML/MIN/1.73M2
ERYTHROCYTE [DISTWIDTH] IN BLOOD BY AUTOMATED COUNT: 15.9 % (ref 10–15)
FASTING STATUS PATIENT QL REPORTED: NO
FASTING STATUS PATIENT QL REPORTED: NO
GLUCOSE SERPL-MCNC: 98 MG/DL (ref 70–99)
HCO3 SERPL-SCNC: 26 MMOL/L (ref 22–29)
HCT VFR BLD AUTO: 38.2 % (ref 40–53)
HDLC SERPL-MCNC: 53 MG/DL
HGB BLD-MCNC: 11.8 G/DL (ref 13.3–17.7)
INTERPRETATION ECG - MUSE: NORMAL
LDLC SERPL CALC-MCNC: 63 MG/DL
MCH RBC QN AUTO: 27.1 PG (ref 26.5–33)
MCHC RBC AUTO-ENTMCNC: 30.9 G/DL (ref 31.5–36.5)
MCV RBC AUTO: 88 FL (ref 78–100)
NONHDLC SERPL-MCNC: 112 MG/DL
P AXIS - MUSE: 6 DEGREES
PLATELET # BLD AUTO: 222 10E3/UL (ref 150–450)
POTASSIUM SERPL-SCNC: 4.2 MMOL/L (ref 3.4–5.3)
PR INTERVAL - MUSE: 176 MS
QRS DURATION - MUSE: 90 MS
QT - MUSE: 452 MS
QTC - MUSE: 436 MS
R AXIS - MUSE: -34 DEGREES
RBC # BLD AUTO: 4.36 10E6/UL (ref 4.4–5.9)
SODIUM SERPL-SCNC: 141 MMOL/L (ref 135–145)
SYSTOLIC BLOOD PRESSURE - MUSE: NORMAL MMHG
T AXIS - MUSE: 19 DEGREES
TRIGL SERPL-MCNC: 244 MG/DL
VENTRICULAR RATE- MUSE: 56 BPM
WBC # BLD AUTO: 2.6 10E3/UL (ref 4–11)

## 2024-10-28 PROCEDURE — 80061 LIPID PANEL: CPT | Performed by: INTERNAL MEDICINE

## 2024-10-28 PROCEDURE — 80048 BASIC METABOLIC PNL TOTAL CA: CPT

## 2024-10-28 PROCEDURE — 99214 OFFICE O/P EST MOD 30 MIN: CPT | Performed by: INTERNAL MEDICINE

## 2024-10-28 PROCEDURE — 85027 COMPLETE CBC AUTOMATED: CPT

## 2024-10-28 PROCEDURE — 36415 COLL VENOUS BLD VENIPUNCTURE: CPT

## 2024-10-28 PROCEDURE — 93000 ELECTROCARDIOGRAM COMPLETE: CPT | Performed by: STUDENT IN AN ORGANIZED HEALTH CARE EDUCATION/TRAINING PROGRAM

## 2024-10-28 RX ORDER — LACTOBACILLUS RHAMNOSUS GG 10B CELL
1 CAPSULE ORAL DAILY
COMMUNITY

## 2024-10-28 RX ORDER — ASPIRIN 81 MG/1
81 TABLET ORAL DAILY
COMMUNITY

## 2024-10-28 RX ORDER — LOSARTAN POTASSIUM 50 MG/1
50 TABLET ORAL DAILY
Status: SHIPPED
Start: 2024-10-28

## 2024-10-28 NOTE — PROGRESS NOTES
HEART CARE ENCOUNTER NOTE       [facility]  [phone]    I was present with the physician assistant student who participated in the service and in the documentation of the note. I have verified the history and personally performed the physical exam and medical decision making. I agree with the assessment and plan of care as documented in the note.    I personally reviewed vital signs, medications, and imaging, labs (all stable).  LDL 63    Lipid profile accidentally drawn today so he shouldn't need another one in December.     He should have yearly echoes for his TAVR valve.     Date of Service (when I saw the patient): 10/28/24    Liz Jones PA-C, MPAS  Structural Heart Program  [facility]      Assessment/Recommendations   Assessment:  Severe Aortic Stenosis - s/p TAVR on 10/24/23  Chronic heart failure with preserved ejection fraction, NYHA class I - compensated  Moderate nonobstructive Coronary artery disease - he denies chest pain  Hypertension - /54 today; controlled  Hyperlipidemia - Last  on 12/21/23; currently on 5 mg rosuvastatin due to myalgias at higher doses and currently tolerating.  Multiple Myeloma - status post bone marrow transplant, on chemotherapy every three months    Plan:  Continue 5 mg rosuvastatin daily. Repeat fasting lipid panel in December.   Continue 50 mg metoprolol XL and 50 mg losartan daily.   Okay to take OTC NSAIDs PRN for arthritis from cardiac standpoint. Discuss with Oncology team.   Discussed importance of regular exercise and healthy diet.   Prophylactic antibiotics prior to all dental visits  Follow up with Dr. Nelson in April 2025 for annual cardiology appointment. No need for future follow up with TAVR team at this time.    Thank you for the opportunity to participate in the care of Billy Carroll. It's been a pleasure working with him.  Please do not hesitate to call with any questions or  concerns.       History of Present Illness/Subjective    I had the opportunity to see Billy Carroll at the [facility] for 1 year follow up status post TAVR placement with 26 mm PAUL Resilia on 10/24/2023 with Dr. Field.     Patient reports that he has been feeling well since the surgery occurred. Reports significant improvement in energy levels and denies any episodes of shortness of breath, dyspnea on exertion, or lightheadedness/dizziness. Notes intermittent mild episodes of left chest/shoulder pain that are aching and appear superficial. These last less than 30 seconds in duration without any other associated symptoms. No other episodes of chest pain. Patient walks 1.5 miles multiple times per day and golfs regularly without difficulty.     Past medical history is significant for moderate nonbstructive CAD, severe aortic stenosis s/p TAVR, HTN, HLD, anxiety, and multiple myeloma s/p bone marrow transplant and current chemotherapy. Patient reports diffuse suspect allergic hives without known cause for the past 5 days. This episode started about one week after chemotherapy infusion but he reports a similar episode one month ago, two months after an infusion, that lasted for one week without known cause. Has seen PCP and urgent care for this and advised to take claritin BID. Will see oncology for this if symptoms do not improve.    Billy Carroll denies exertional chest discomfort, palpitations, shortness of breath at rest, PND, orthopnea, lightheadedness, dizziness, pre-syncope or syncope.  Billy Carroll also denies any recent weight loss, changes in appetite, nausea or vomiting.     Patient denies any other questions or concerns at this time.  ____________________________________________________________  Echo: 10/21/24  Interpretation Summary      The left ventricle is normal in size.   Left ventricular function is normal.The ejection fraction is 55-60%.   The left atrium is mildly dilated.    There is a documented 26mm Lisa 3 valve Resilia in the aortic position that   appears to be functioning well with a mean gradient of 12 mmHg, peak velocity   2.3 m/s, DI 0.48. Mild paravalvular leak.   Mildly dilated ascending aorta (4.1 cm).   Compared to prior study, there is no significant change.       EKG (personally reviewed and interpreted):  10/28/24: Sinus Bradycardia at rate of 56 bpm with LAD. , QRS 90, QT/QTc 452/436. No significant changes compared to prior on 12/21/23     Physical Examination Review of Systems   Vitals: /54 (BP Location: Left arm, Patient Position: Sitting, Cuff Size: Adult Regular)   Pulse 55   Resp 14   Wt 95.3 kg (210 lb)   BMI 30.13 kg/m    BMI= Body mass index is 30.13 kg/m .  Wt Readings from Last 3 Encounters:   10/28/24 95.3 kg (210 lb)   10/26/24 92.1 kg (203 lb)   09/15/24 92.1 kg (203 lb)       General Appearance:   Alert, cooperative and in no acute distress   ENT/Mouth: membranes moist, no oral lesions or bleeding gums.      EYES:  no scleral icterus, normal conjunctivae   Neck: Supple without lymphadenopathy.  Thyroid not visualized   Chest/Lungs:   lungs are clear to auscultation, no rales or wheezing   Cardiovascular:   Regular. Normal first and second heart sounds. Presence of II/VI holosytolic rumbling murmurs No rubs, or gallops; the carotid, radial and posterior tibial pulses are intact, No edema bilaterally    Abdomen:  Soft and nontender. Bowel sounds are present in all quadrants   Extremities: no cyanosis or clubbing.   Skin: no xanthelasma, warm.    Neurologic: normal gait, normal  bilateral, no tremors   Psychiatric: Normal mood and affect       Please refer above for cardiac ROS details.      Medical History  Surgical History Family History Social History   Past Medical History:   Diagnosis Date    Benign essential hypertension     Hyperlipidemia     Multiple myeloma (H)     [facility]     Past Surgical  History:   Procedure Laterality Date    BONE MARROW BIOPSY, BONE SPECIMEN, NEEDLE/TROCAR Left 4/18/2024    Procedure: BIOPSY, BONE MARROW;  Surgeon: Sonya Ritter NP;  [place] [facility] [place]    [place] CORONARY ANGIOGRAM N/A 9/7/2023    Procedure: Coronary Angiogram;  Surgeon: Domenico Sauceda MD;  Location: [facility] CV    CV TRANSCATHETER AORTIC VALVE REPLACEMENT-FEMORAL APPROACH N/A 10/24/2023    Procedure: Transcatheter Aortic Valve Replacement-Femoral Approach;  Surgeon: Charles Field MD;  Location: [facility] CV    IR CVC TUNNEL PLACEMENT > 5 YRS OF AGE  1/2/2024    IR CVC TUNNEL REMOVAL RIGHT  2/2/2024    OR TRANSCATHETER AORTIC VALVE REPLACEMENT, FEMORAL PERCUTANEOUS APPROACH (STANDBY) N/A 10/24/2023    Procedure: OR TRANSCATHETER AORTIC VALVE REPLACEMENT, FEMORAL PERCUTANEOUS APPROACH (STANDBY);  Surgeon: Sahra Mosher MD;  Location: [facility] CV     History reviewed. No pertinent family history. Social History     Socioeconomic History    Marital status:      Spouse name: Not on file    Number of children: Not on file    Years of education: Not on file    Highest education level: Not on file   Occupational History    Not on file   Tobacco Use    Smoking status: Never     Passive exposure: Past    Smokeless tobacco: Never   Vaping Use    Vaping status: Never Used   Substance and Sexual Activity    Alcohol use: Not on file    Drug use: Never    Sexual activity: Not on file   Other Topics Concern    Not on file   Social History Narrative    Not on file     Social Drivers of Health     Financial Resource Strain: Low Risk  (4/1/2024)    Financial Resource Strain     Within the past 12 months, have you or your family members you live with been unable to get utilities (heat, electricity) when it was really needed?: No   Food Insecurity: Low Risk  (4/1/2024)    Food Insecurity     Within the past 12 months, did you worry that your food would run out before you got money to buy more?:  No     Within the past 12 months, did the food you bought just not last and you didn t have money to get more?: No   Transportation Needs: Low Risk  (4/1/2024)    Transportation Needs     Within the past 12 months, has lack of transportation kept you from medical appointments, getting your medicines, non-medical meetings or appointments, work, or from getting things that you need?: No   Physical Activity: Unknown (4/1/2024)    Exercise Vital Sign     Days of Exercise per Week: 7 days     Minutes of Exercise per Session: Not on file   Stress: No Stress Concern Present (4/1/2024)    [employer] [employer] - Occupational Stress Questionnaire     Feeling of Stress : Only a little   Social Connections: Unknown (4/1/2024)    Social Connection and Isolation Panel [NHANES]     Frequency of Communication with Friends and Family: Not on file     Frequency of Social Gatherings with Friends and Family: Three times a week     Attends Nondenominational Services: Not on file     Active Member of Clubs or Organizations: Not on file     Attends Club or Organization Meetings: Not on file     Marital Status: Not on file   Interpersonal Safety: Low Risk  (4/1/2024)    Interpersonal Safety     Do you feel physically and emotionally safe where you currently live?: Yes     Within the past 12 months, have you been hit, slapped, kicked or otherwise physically hurt by someone?: No     Within the past 12 months, have you been humiliated or emotionally abused in other ways by your partner or ex-partner?: No   Housing Stability: Low Risk  (4/1/2024)    Housing Stability     Do you have housing? : Yes     Are you worried about losing your housing?: No          Medications  Allergies   Current Outpatient Medications   Medication Sig Dispense Refill    acetaminophen (ACETAMINOPHEN 8 HOUR) 650 MG CR tablet Take 1 tablet (650 mg) by mouth every 8 hours as needed for mild pain or fever      acyclovir (ZOVIRAX) 800 MG tablet Take 1 tablet  (800 mg) by mouth 2 times daily Start Day -1 60 tablet 11    albuterol (PROAIR HFA/PROVENTIL HFA/VENTOLIN HFA) 108 (90 Base) MCG/ACT inhaler Inhale 2 puffs into the lungs every 6 hours as needed for shortness of breath, wheezing or cough 18 g 0    aspirin 81 MG EC tablet Take 81 mg by mouth daily.      Calcium Carb-Cholecalciferol (CALCIUM + D3 PO) Take 1 tablet by mouth 2 times daily      hydrOXYzine HCl (ATARAX) 25 MG tablet Take 1 tablet up to 4 times daily for anxiety. 15 tablet 3    lactobacillus rhamnosus, GG, (CULTURELL) capsule Take 1 capsule by mouth daily.      LENalidomide (REVLIMID) 10 MG CAPS capsule Take 10 mg by mouth daily      loperamide (IMODIUM) 2 MG capsule Take 2 mg by mouth 4 times daily as needed for diarrhea      loratadine (CLARITIN) 10 MG tablet Take 10 mg by mouth daily as needed for allergies      losartan (COZAAR) 50 MG tablet Take 1 tablet (50 mg) by mouth daily.      metoprolol succinate ER (TOPROL XL) 50 MG 24 hr tablet Take 1 tablet (50 mg) by mouth daily      mirtazapine (REMERON) 15 MG tablet TAKE 1 TABLET BY MOUTH AT BEDTIME. 90 tablet 1    psyllium (METAMUCIL/KONSYL) Packet Take 1 packet by mouth as needed      rosuvastatin (CRESTOR) 5 MG tablet Take 1 tablet (5 mg) by mouth daily 90 tablet 3    TURMERIC PO Take 2 capsules by mouth daily      triamcinolone (KENALOG) 0.1 % external cream Apply topically 2 times daily. (Patient not taking: Reported on 10/28/2024) 30 g 1    valACYclovir (VALTREX) 1000 mg tablet Take 1 tablet (1,000 mg) by mouth 3 times daily for 7 days. 21 tablet 0    Allergies   Allergen Reactions    Acetaminophen-Codeine Nausea    Codeine Nausea         Lab Results    Chemistry/lipid CBC Cardiac Enzymes/BNP/TSH/INR   Recent Labs   Lab Test 12/21/23  1120   CHOL 234*   HDL 53   *   TRIG 430*     Recent Labs   Lab Test 12/21/23  1120 09/06/23  1105 12/12/22  1112   * 138* 85     Recent Labs   Lab Test 07/08/24  1143      POTASSIUM 4.2   CHLORIDE  "106   CO2 22   GLC 98   BUN 11.6   CR 0.80   GFRESTIMATED >90   SESAR 8.8     Recent Labs   Lab Test 07/08/24  1143 04/18/24  1115 02/06/24  1133   CR 0.80 0.77 0.76     Recent Labs   Lab Test 11/07/23  0925   A1C 5.9*    Recent Labs   Lab Test 07/08/24  1143   WBC 3.5*   HGB 11.7*   HCT 37.9*   MCV 84        Recent Labs   Lab Test 07/08/24  1143 04/18/24  1115 02/06/24  1133   HGB 11.7* 12.0* 11.6*    Recent Labs   Lab Test 06/02/23  1857   TROPONINI 0.02     Recent Labs   Lab Test 10/23/23  0941 06/07/23  0049 06/02/23  1857   BNP  --  52 47   NTBNP 74  --   --      No results for input(s): \"TSH\" in the last 31061 hours.  Recent Labs   Lab Test 02/06/24  1133 01/30/24  0905 01/23/24  1304   INR 1.00 1.07 1.12            LINO AndersenS  "

## 2024-10-28 NOTE — LETTER
10/28/2024    Cipriano Fuentes PA-C  [address], [address]  [place] [address]    RE: Billy Carroll       Dear Colleague,     I had the pleasure of seeing Billy Carroll in the [facility].  HEART CARE ENCOUNTER NOTE       [facility]  [phone]    I was present with the physician assistant student who participated in the service and in the documentation of the note. I have verified the history and personally performed the physical exam and medical decision making. I agree with the assessment and plan of care as documented in the note.    I personally reviewed vital signs, medications, and imaging, labs (all stable).  LDL 63    Lipid profile accidentally drawn today so he shouldn't need another one in December.     He should have yearly echoes for his TAVR valve.     Date of Service (when I saw the patient): 10/28/24    Liz Jones [facility]-C, [facility]  Structural Heart Program  [facility]      Assessment/Recommendations   Assessment:  Severe Aortic Stenosis - s/p TAVR on 10/24/23  Chronic heart failure with preserved ejection fraction, NYHA class I - compensated  Moderate nonobstructive Coronary artery disease - he denies chest pain  Hypertension - /54 today; controlled  Hyperlipidemia - Last  on 12/21/23; currently on 5 mg rosuvastatin due to myalgias at higher doses and currently tolerating.  Multiple Myeloma - status post bone marrow transplant, on chemotherapy every three months    Plan:  Continue 5 mg rosuvastatin daily. Repeat fasting lipid panel in December.   Continue 50 mg metoprolol XL and 50 mg losartan daily.   Okay to take OTC NSAIDs PRN for arthritis from cardiac standpoint. Discuss with Oncology team.   Discussed importance of regular exercise and healthy diet.   Prophylactic antibiotics prior to all dental visits  Follow up with Dr. Nelson in April 2025 for annual cardiology appointment. No need for future  follow up with TAVR team at this time.    Thank you for the opportunity to participate in the care of Billy Carroll. It's been a pleasure working with him.  Please do not hesitate to call with any questions or concerns.       History of Present Illness/Subjective    I had the opportunity to see Billy Carroll at the [facility] for 1 year follow up status post TAVR placement with 26 mm PAUL Resilia on 10/24/2023 with Dr. Field.     Patient reports that he has been feeling well since the surgery occurred. Reports significant improvement in energy levels and denies any episodes of shortness of breath, dyspnea on exertion, or lightheadedness/dizziness. Notes intermittent mild episodes of left chest/shoulder pain that are aching and appear superficial. These last less than 30 seconds in duration without any other associated symptoms. No other episodes of chest pain. Patient walks 1.5 miles multiple times per day and golfs regularly without difficulty.     Past medical history is significant for moderate nonbstructive CAD, severe aortic stenosis s/p TAVR, HTN, HLD, anxiety, and multiple myeloma s/p bone marrow transplant and current chemotherapy. Patient reports diffuse suspect allergic hives without known cause for the past 5 days. This episode started about one week after chemotherapy infusion but he reports a similar episode one month ago, two months after an infusion, that lasted for one week without known cause. Has seen PCP and urgent care for this and advised to take claritin BID. Will see oncology for this if symptoms do not improve.    Billy Carroll denies exertional chest discomfort, palpitations, shortness of breath at rest, PND, orthopnea, lightheadedness, dizziness, pre-syncope or syncope.  Billy Carroll also denies any recent weight loss, changes in appetite, nausea or vomiting.     Patient denies any other questions or concerns at this  time.  ____________________________________________________________  Echo: 10/21/24  Interpretation Summary      The left ventricle is normal in size.   Left ventricular function is normal.The ejection fraction is 55-60%.   The left atrium is mildly dilated.   There is a documented 26mm Lisa 3 valve Resilia in the aortic position that   appears to be functioning well with a mean gradient of 12 mmHg, peak velocity   2.3 m/s, DI 0.48. Mild paravalvular leak.   Mildly dilated ascending aorta (4.1 cm).   Compared to prior study, there is no significant change.       EKG (personally reviewed and interpreted):  10/28/24: Sinus Bradycardia at rate of 56 bpm with LAD. , QRS 90, QT/QTc 452/436. No significant changes compared to prior on 12/21/23     Physical Examination Review of Systems   Vitals: /54 (BP Location: Left arm, Patient Position: Sitting, Cuff Size: Adult Regular)   Pulse 55   Resp 14   Wt 95.3 kg (210 lb)   BMI 30.13 kg/m    BMI= Body mass index is 30.13 kg/m .  Wt Readings from Last 3 Encounters:   10/28/24 95.3 kg (210 lb)   10/26/24 92.1 kg (203 lb)   09/15/24 92.1 kg (203 lb)       General Appearance:   Alert, cooperative and in no acute distress   ENT/Mouth: membranes moist, no oral lesions or bleeding gums.      EYES:  no scleral icterus, normal conjunctivae   Neck: Supple without lymphadenopathy.  Thyroid not visualized   Chest/Lungs:   lungs are clear to auscultation, no rales or wheezing   Cardiovascular:   Regular. Normal first and second heart sounds. Presence of II/VI holosytolic rumbling murmurs No rubs, or gallops; the carotid, radial and posterior tibial pulses are intact, No edema bilaterally    Abdomen:  Soft and nontender. Bowel sounds are present in all quadrants   Extremities: no cyanosis or clubbing.   Skin: no xanthelasma, warm.    Neurologic: normal gait, normal  bilateral, no tremors   Psychiatric: Normal mood and affect       Please refer above for cardiac ROS  details.      Medical History  Surgical History Family History Social History   Past Medical History:   Diagnosis Date     Benign essential hypertension      Hyperlipidemia      Multiple myeloma (H)     Followed by Dr. Ruelas with [facility]     Past Surgical History:   Procedure Laterality Date     BONE MARROW BIOPSY, BONE SPECIMEN, NEEDLE/TROCAR Left 4/18/2024    Procedure: BIOPSY, BONE MARROW;  Surgeon: Sonya Ritter NP;  Location: [facility] OR     CV CORONARY ANGIOGRAM N/A 9/7/2023    Procedure: Coronary Angiogram;  Surgeon: Domenico Sauceda MD;  Location: [facility] CV     CV TRANSCATHETER AORTIC VALVE REPLACEMENT-FEMORAL APPROACH N/A 10/24/2023    Procedure: Transcatheter Aortic Valve Replacement-Femoral Approach;  Surgeon: Charles Field MD;  Location: [facility] CV     IR CVC TUNNEL PLACEMENT > 5 YRS OF AGE  1/2/2024     IR CVC TUNNEL REMOVAL RIGHT  2/2/2024     OR TRANSCATHETER AORTIC VALVE REPLACEMENT, FEMORAL PERCUTANEOUS APPROACH (STANDBY) N/A 10/24/2023    Procedure: OR TRANSCATHETER AORTIC VALVE REPLACEMENT, FEMORAL PERCUTANEOUS APPROACH (STANDBY);  Surgeon: Sahra Mosher MD;  Location: [facility] LAB CV     History reviewed. No pertinent family history. Social History     Socioeconomic History     Marital status:      Spouse name: Not on file     Number of children: Not on file     Years of education: Not on file     Highest education level: Not on file   Occupational History     Not on file   Tobacco Use     Smoking status: Never     Passive exposure: Past     Smokeless tobacco: Never   Vaping Use     Vaping status: Never Used   Substance and Sexual Activity     Alcohol use: Not on file     Drug use: Never     Sexual activity: Not on file   Other Topics Concern     Not on file   Social History Narrative     Not on file     Social Drivers of Health     Financial Resource Strain: Low Risk  (4/1/2024)    Financial Resource Strain      Within the past 12 months, have you or  your family members you live with been unable to get utilities (heat, electricity) when it was really needed?: No   Food Insecurity: Low Risk  (4/1/2024)    Food Insecurity      Within the past 12 months, did you worry that your food would run out before you got money to buy more?: No      Within the past 12 months, did the food you bought just not last and you didn t have money to get more?: No   Transportation Needs: Low Risk  (4/1/2024)    Transportation Needs      Within the past 12 months, has lack of transportation kept you from medical appointments, getting your medicines, non-medical meetings or appointments, work, or from getting things that you need?: No   Physical Activity: Unknown (4/1/2024)    Exercise Vital Sign      Days of Exercise per Week: 7 days      Minutes of Exercise per Session: Not on file   Stress: No Stress Concern Present (4/1/2024)    [employer] [place] [employer] - Occupational Stress Questionnaire      Feeling of Stress : Only a little   Social Connections: Unknown (4/1/2024)    Social Connection and Isolation Panel [NHANES]      Frequency of Communication with Friends and Family: Not on file      Frequency of Social Gatherings with Friends and Family: Three times a week      Attends Nondenominational Services: Not on file      Active Member of Clubs or Organizations: Not on file      Attends Club or Organization Meetings: Not on file      Marital Status: Not on file   Interpersonal Safety: Low Risk  (4/1/2024)    Interpersonal Safety      Do you feel physically and emotionally safe where you currently live?: Yes      Within the past 12 months, have you been hit, slapped, kicked or otherwise physically hurt by someone?: No      Within the past 12 months, have you been humiliated or emotionally abused in other ways by your partner or ex-partner?: No   Housing Stability: Low Risk  (4/1/2024)    Housing Stability      Do you have housing? : Yes      Are you worried about losing your  housing?: No          Medications  Allergies   Current Outpatient Medications   Medication Sig Dispense Refill     acetaminophen (ACETAMINOPHEN 8 HOUR) 650 MG CR tablet Take 1 tablet (650 mg) by mouth every 8 hours as needed for mild pain or fever       acyclovir (ZOVIRAX) 800 MG tablet Take 1 tablet (800 mg) by mouth 2 times daily Start Day -1 60 tablet 11     albuterol (PROAIR HFA/PROVENTIL HFA/VENTOLIN HFA) 108 (90 Base) MCG/ACT inhaler Inhale 2 puffs into the lungs every 6 hours as needed for shortness of breath, wheezing or cough 18 g 0     aspirin 81 MG EC tablet Take 81 mg by mouth daily.       Calcium Carb-Cholecalciferol (CALCIUM + D3 PO) Take 1 tablet by mouth 2 times daily       hydrOXYzine HCl (ATARAX) 25 MG tablet Take 1 tablet up to 4 times daily for anxiety. 15 tablet 3     lactobacillus rhamnosus, GG, (CULTURELL) capsule Take 1 capsule by mouth daily.       LENalidomide (REVLIMID) 10 MG CAPS capsule Take 10 mg by mouth daily       loperamide (IMODIUM) 2 MG capsule Take 2 mg by mouth 4 times daily as needed for diarrhea       loratadine (CLARITIN) 10 MG tablet Take 10 mg by mouth daily as needed for allergies       losartan (COZAAR) 50 MG tablet Take 1 tablet (50 mg) by mouth daily.       metoprolol succinate ER (TOPROL XL) 50 MG 24 hr tablet Take 1 tablet (50 mg) by mouth daily       mirtazapine (REMERON) 15 MG tablet TAKE 1 TABLET BY MOUTH AT BEDTIME. 90 tablet 1     psyllium (METAMUCIL/KONSYL) Packet Take 1 packet by mouth as needed       rosuvastatin (CRESTOR) 5 MG tablet Take 1 tablet (5 mg) by mouth daily 90 tablet 3     TURMERIC PO Take 2 capsules by mouth daily       triamcinolone (KENALOG) 0.1 % external cream Apply topically 2 times daily. (Patient not taking: Reported on 10/28/2024) 30 g 1     valACYclovir (VALTREX) 1000 mg tablet Take 1 tablet (1,000 mg) by mouth 3 times daily for 7 days. 21 tablet 0    Allergies   Allergen Reactions     Acetaminophen-Codeine Nausea     Codeine Nausea     "     Lab Results    Chemistry/lipid CBC Cardiac Enzymes/BNP/TSH/INR   Recent Labs   Lab Test 12/21/23  1120   CHOL 234*   HDL 53   *   TRIG 430*     Recent Labs   Lab Test 12/21/23  1120 09/06/23  1105 12/12/22  1112   * 138* 85     Recent Labs   Lab Test 07/08/24  1143      POTASSIUM 4.2   CHLORIDE 106   CO2 22   GLC 98   BUN 11.6   CR 0.80   GFRESTIMATED >90   SESAR 8.8     Recent Labs   Lab Test 07/08/24  1143 04/18/24  1115 02/06/24  1133   CR 0.80 0.77 0.76     Recent Labs   Lab Test 11/07/23  0925   A1C 5.9*    Recent Labs   Lab Test 07/08/24  1143   WBC 3.5*   HGB 11.7*   HCT 37.9*   MCV 84        Recent Labs   Lab Test 07/08/24  1143 04/18/24  1115 02/06/24  1133   HGB 11.7* 12.0* 11.6*    Recent Labs   Lab Test 06/02/23  1857   TROPONINI 0.02     Recent Labs   Lab Test 10/23/23  0941 06/07/23  0049 06/02/23  1857   BNP  --  52 47   NTBNP 74  --   --      No results for input(s): \"TSH\" in the last 01548 hours.  Recent Labs   Lab Test 02/06/24  1133 01/30/24  0905 01/23/24  1304   INR 1.00 1.07 1.12            Tomy Montes De Oca PA-S      Thank you for allowing me to participate in the care of your patient.      Sincerely,     MATT LOZANO PA-C     [facility]  cc:   Juliet Ventura PA-C  [facility]  [address], [address]  [place] [address]      "

## 2024-10-28 NOTE — PATIENT INSTRUCTIONS
Billy Carroll,    It was a pleasure to see you today in the clinic regarding your 1 year TAVR.     My recommendations after this visit include:     - ok to use as needed advil or aleve from our standpoint  - no other changes    - have fasting lipids drawn in December (order in place)    See Dr. Nelson in April.        **Remember you will need prophylactic antibiotics for all dental visits in the future.  You can get the Rx from your dentist, your PCP or from us.  If possible, still refrain from going to the dentist until 6 months or more after your valve replacement      If you have questions or concerns, please call using the numbers below:    Valve Clinic Phone   [phone]    After Hours/Scheduling  [phone]    Otherwise you can dial the nurse directly at:    Yehuda Gill RN  [phone]

## 2024-10-29 DIAGNOSIS — Z94.81 STATUS POST BONE MARROW TRANSPLANT (H): Primary | ICD-10-CM

## 2024-10-31 ENCOUNTER — TRANSFERRED RECORDS (OUTPATIENT)
Dept: HEALTH INFORMATION MANAGEMENT | Facility: CLINIC | Age: 69
End: 2024-10-31
Payer: MEDICARE

## 2024-12-12 NOTE — TELEPHONE ENCOUNTER
FUTURE VISIT INFORMATION      SURGERY INFORMATION:  Date: 1/9/25  [facility]  Surgeon:  Liliya Degroot PA-C   Anesthesia Type:  MAC with Local  Procedure: BIOPSY, BONE MARROW     RECORDS REQUESTED FROM:       Primary Care Provider: Cipriano Fuentes PA-C - [facility]    Pertinent Medical History: severe aortic stenosis, hypertension, cad    Most recent EKG+ Tracing: 10/28/24    Most recent ECHO: 10/21/24    Most recent Coronary Angiogram: 9/7/23    Most recent PFT's: 12/22/23

## 2025-01-07 ENCOUNTER — PRE VISIT (OUTPATIENT)
Dept: SURGERY | Facility: CLINIC | Age: 70
End: 2025-01-07

## 2025-01-07 ENCOUNTER — VIRTUAL VISIT (OUTPATIENT)
Dept: SURGERY | Facility: CLINIC | Age: 70
End: 2025-01-07
Payer: MEDICARE

## 2025-01-07 ENCOUNTER — VIRTUAL VISIT (OUTPATIENT)
Dept: TRANSPLANT | Facility: CLINIC | Age: 70
End: 2025-01-07
Payer: MEDICARE

## 2025-01-07 VITALS — HEIGHT: 70 IN | WEIGHT: 210 LBS | BODY MASS INDEX: 30.06 KG/M2

## 2025-01-07 VITALS — BODY MASS INDEX: 28.63 KG/M2 | WEIGHT: 200 LBS | HEIGHT: 70 IN

## 2025-01-07 DIAGNOSIS — Z79.52 LONG TERM (CURRENT) USE OF SYSTEMIC STEROIDS: ICD-10-CM

## 2025-01-07 DIAGNOSIS — Z91.89 AT RISK FOR HYPOTHYROIDISM: ICD-10-CM

## 2025-01-07 DIAGNOSIS — Z94.81 STATUS POST BONE MARROW TRANSPLANT (H): ICD-10-CM

## 2025-01-07 DIAGNOSIS — Z92.89 HISTORY OF BLOOD TRANSFUSION: ICD-10-CM

## 2025-01-07 DIAGNOSIS — Z79.620 LONG TERM (CURRENT) USE OF IMMUNOSUPPRESSIVE BIOLOGIC: ICD-10-CM

## 2025-01-07 DIAGNOSIS — Z94.81 STATUS POST BONE MARROW TRANSPLANT (H): Primary | ICD-10-CM

## 2025-01-07 DIAGNOSIS — Z92.21 STATUS POST CHEMOTHERAPY: ICD-10-CM

## 2025-01-07 DIAGNOSIS — Z91.89 AT HIGH RISK FOR OSTEOPOROSIS: ICD-10-CM

## 2025-01-07 DIAGNOSIS — Z01.818 PREOP EXAMINATION: Primary | ICD-10-CM

## 2025-01-07 DIAGNOSIS — C90.00 MULTIPLE MYELOMA, REMISSION STATUS UNSPECIFIED (H): ICD-10-CM

## 2025-01-07 PROCEDURE — 98007 SYNCH AUDIO-VIDEO EST HI 40: CPT | Mod: 93 | Performed by: NURSE PRACTITIONER

## 2025-01-07 PROCEDURE — 98001 SYNCH AUDIO-VIDEO NEW LOW 30: CPT | Performed by: NURSE PRACTITIONER

## 2025-01-07 RX ORDER — SULFAMETHOXAZOLE AND TRIMETHOPRIM 800; 160 MG/1; MG/1
1 TABLET ORAL
COMMUNITY
Start: 2024-04-26

## 2025-01-07 ASSESSMENT — PAIN SCALES - GENERAL: PAINLEVEL_OUTOF10: NO PAIN (0)

## 2025-01-07 NOTE — PROGRESS NOTES
"    BMT 1-Year Post-Autologous BMT  Survivorship Care Plan        Date: January 7, 2025    Treatment Team:  Patient Care Team:  Cipriano Fuentes PA-C as PCP - General (Physician Assistant)  Arcelia Sheldon as Consulting Physician (Radiation Oncology)  Adalberto Nelson DO as Physician (Cardiovascular Disease)  Bozena Ritter RN as BMT Nurse Coordinator (Transplant)  Jerri Frey MD as BMT Physician (Transplant)  Nikole Hernandez LICSW as   Jerri Frey MD as Assigned Cancer Care Provider  Rylie Tijerina NP as Assigned Pulmonology Provider  Brandon Shepard as Assigned Behavioral Health Provider  Cipriano Fuentes PA-C as Assigned PCP  Adalberto Nelson DO as Assigned Heart and Vascular Provider    BMT Transplant Date: Auto [facility]SC Txp; Day 363 (1/10/24)    Patient ID:  Mr. Carroll is a 69 year old man 1 year post autologous stem cell transplant for standard risk IgG lambda kappa multiple myeloma. Standard risk.    CC: Billy Carroll was seen in the BMT Survivorship clinic on January 7, 2025 for a comprehensive, 1-year post autologous stem cell transplant, consultation and evaluation for transplant late effects. This is a 60-minute visit designed to educate patients about recommended follow-up care based on contemporary clinical practice guidelines, create a care plan that can be used as a guide to follow up care for the patient and their care team, and place any screening or diagnostic tests recommended for the screening of post-transplant complications based on patient risk and transplant type. All recommendations are outlined in the note below. Any tests not ordered during this visit are traditionally performed by the patient's PCP. All patients are advised to schedule a routine preventative care or \"Well Visit\" with their PCP if they have not done so already in the past year since transplant, to discuss these current and future " "recommendations.    Hematology/Oncology Treatment History:     1.  Billy Carroll is a 69-year-old male with a diagnosis of an IgG multiple myeloma. He was followed for smoldering myeloma with stable quantitative IgG, calcium and hemoglobin as well as a normal skeletal survey.  Bone marrow biopsy shows 10 to 15% plasma cell. He then presented with back pain and had a CT scan which showed large soft tissue mass centered at the right T5 costovertebral junction and associated osseous destruction of T4, T5 and medial right #5 rib, moderate compression deformity at T5 and and diffuse lytic lesions throughout the remainder of the visible skeleton. He received palliative radiation therapy to the involved area and systemic therapy with VRD.  2. PJP pneumonia in June 2023  3.  Aortic stenosis status post TAVR     HPI: Billy was seen in the virtual BMT survivorship clinic today. States he has been feeling well overall but continues to note occasional diarrhea. Denied any other acute complaints other than a notable \"urticarial\" rash that appears to develop 3 weeks after his Zometa infusions. We discussed this further and decided to order a DXA scan to evaluate overall bone mineral density in order for this to aid in decision making for bone mineral density health going forward. States he has been enjoying his long term. He has been playing golf and taking his dog for long walks. He denied any concerns regarding his sexual health or emotional wellbeing.     Medical History:  Past Medical History:   Diagnosis Date    Benign essential hypertension     Hyperlipidemia     Multiple myeloma (H)     [facility]       Surgical History:  Past Surgical History:   Procedure Laterality Date    BONE MARROW BIOPSY, BONE SPECIMEN, NEEDLE/TROCAR Left 4/18/2024    Procedure: BIOPSY, BONE MARROW;  Surgeon: Sonya Ritter NP;  Location: [facility] OR    CV CORONARY ANGIOGRAM N/A 9/7/2023    Procedure: Coronary Angiogram;  " Surgeon: Domenico Sauceda MD;  Location: [facility] CV    CV TRANSCATHETER AORTIC VALVE REPLACEMENT-FEMORAL APPROACH N/A 10/24/2023    Procedure: Transcatheter Aortic Valve Replacement-Femoral Approach;  Surgeon: Charles Field MD;  Location: [facility] CV    IR CVC TUNNEL PLACEMENT > 5 YRS OF AGE  1/2/2024    IR CVC TUNNEL REMOVAL RIGHT  2/2/2024    OR TRANSCATHETER AORTIC VALVE REPLACEMENT, FEMORAL PERCUTANEOUS APPROACH (STANDBY) N/A 10/24/2023    Procedure: OR TRANSCATHETER AORTIC VALVE REPLACEMENT, FEMORAL PERCUTANEOUS APPROACH (STANDBY);  Surgeon: Sahra Mosher MD;  Location: [facility] LAB CV       Family History:  Family History   Problem Relation Age of Onset    Anesthesia Reaction No family hx of     Thrombosis No family hx of        Social History:  Social History     Socioeconomic History    Marital status:      Spouse name: Not on file    Number of children: Not on file    Years of education: Not on file    Highest education level: Not on file   Occupational History    Not on file   Tobacco Use    Smoking status: Never     Passive exposure: Past    Smokeless tobacco: Never   Vaping Use    Vaping status: Never Used   Substance and Sexual Activity    Alcohol use: Yes     Alcohol/week: 1.0 - 2.0 standard drink of alcohol     Types: 1 - 2 Standard drinks or equivalent per week     Comment: 7-14    Drug use: Never    Sexual activity: Not on file   Other Topics Concern    Not on file   Social History Narrative    Not on file     Social Drivers of Health     Financial Resource Strain: Low Risk  (4/1/2024)    Financial Resource Strain     Within the past 12 months, have you or your family members you live with been unable to get utilities (heat, electricity) when it was really needed?: No   Food Insecurity: Low Risk  (4/1/2024)    Food Insecurity     Within the past 12 months, did you worry that your food would run out before you got money to buy more?: No     Within the past 12  months, did the food you bought just not last and you didn t have money to get more?: No   Transportation Needs: Low Risk  (4/1/2024)    Transportation Needs     Within the past 12 months, has lack of transportation kept you from medical appointments, getting your medicines, non-medical meetings or appointments, work, or from getting things that you need?: No   Physical Activity: Unknown (4/1/2024)    Exercise Vital Sign     Days of Exercise per Week: 7 days     Minutes of Exercise per Session: Not on file   Stress: No Stress Concern Present (4/1/2024)    [place] [employer] - Occupational Stress Questionnaire     Feeling of Stress : Only a little   Social Connections: Unknown (4/1/2024)    Social Connection and Isolation Panel [NHANES]     Frequency of Communication with Friends and Family: Not on file     Frequency of Social Gatherings with Friends and Family: Three times a week     Attends Confucianism Services: Not on file     Active Member of Clubs or Organizations: Not on file     Attends Club or Organization Meetings: Not on file     Marital Status: Not on file   Interpersonal Safety: Low Risk  (4/1/2024)    Interpersonal Safety     Do you feel physically and emotionally safe where you currently live?: Yes     Within the past 12 months, have you been hit, slapped, kicked or otherwise physically hurt by someone?: No     Within the past 12 months, have you been humiliated or emotionally abused in other ways by your partner or ex-partner?: No   Housing Stability: Low Risk  (4/1/2024)    Housing Stability     Do you have housing? : Yes     Are you worried about losing your housing?: No       Survivorship Care Plan:     This individualized care plan is designed to inform you and your healthcare team of the recommended follow-up visits, tests, health maintenance activities, and cancer screening you should receive after transplant.     General Health Maintenance:   Vaccinations should be given at 1  and 2 years after your transplant, these may be given at your annual anniversary visits in the BMT clinic, by your primary care provider, or your local oncologist. An exception is the influenza vaccine, which can be given after day +60 post-transplant during influenza season. See table below for more information.   You can receive the COVID19 vaccine if you have not already, for more information on how to sign up for an appointment you can the [employer] vaccine website at https://3VR.org/covid19/covid19-vaccine or the         [place] Department of Health Vaccine Connector portal at [place]://[place].gov/covid19/vaccine/connector/connector.jsp  For general health concerns you can be seen by your primary care provider.   If you have questions about your transplant or follow up tests contact your BMT RN coordinator.        Vaccine Administration Schedule       Vaccinations Post-BMT: Please refer to our [facility] BMT Vaccination Guidelines updated in March of 2021.         Immune System:  Risks Preventative Measures Recommendations   Infections Symptoms of a cold such as fever, cough, congestion, and shortness of breath should be reported to your primary care provider  Immunizations will be administered at 1 & 2 years after transplant. See table above. Vaccines at anniversary visit next week     Eyes:   Risks Preventative Measures Recommendations   Cataracts, dry eyes, viral infections, and other eye changes Yearly eye exam   Screening and treatment for high blood pressure or diabetes  Call if you have eye pain, visual changes, or floaters immediately Patient will schedule an annual routine exam with community ophthalmologist    Sees local eye provider annually. Last visit 3 months ago, no evidence of cataracts.      Mouth:  Risks Preventative Measures Recommendations   Dry mouth, cavities, and oral cancer You can use over the counter Biotene for dry mouth or try sucking on sour candy before meals  Get a  dental checkup every year and a cleaning every 6 months  If you have a prosthetic heart valve or central venous catheter or  port , you may need to take an antibiotic before your dental visits None at this time, patient has dental provider and will schedule routine exam         Lungs  Risks Preventative Measures Recommendations   Changes in function from chemotherapy or radiation; lung infections (pneumonia) Tell your provider about difficulty breathing, a cough, or new shortness of breath   Avoid use of tobacco products or smoking  Routine lung exams Pulmonary Function Tests (Recommended annually post-transplant)    Placed order for PFT test. Notes mild shortness of breath when walking in the cold weather.     Heart and Blood Vessels  Heart Disease Risk Score: The 10-year ASCVD risk score (Gloria HOUSTON, et al., 2019) is: 22%    Values used to calculate the score:      Age: 69 years      Sex: Male      Is Non- : No      Diabetic: No      Tobacco smoker: No      Systolic Blood Pressure: 136 mmHg      Is BP treated: Yes      HDL Cholesterol: 53 mg/dL      Total Cholesterol: 165 mg/dL    Risks Preventative Measures Recommendations   Damage from chemotherapy and/or radiation; early development of heart valve disease  Ask your provider if you should receive consultation from a cardiologist (heart doctor) or have special screening  Follow a  heart healthy  lifestyle. For more information visit the National Heart, Lung, and Blood [place] website at: https://www.nhlbi.nih.gov/health/health-topics/topics/heart-healthy-lifestyle-changes   Don t smoke. If you currently smoke and are ready to quit, we can help you find ways to quit  Maintain a healthy weight  Exercise regularly  Avoid foods that have high amounts of:  Salt/sodium (less than 2,300 mg of sodium per day)  Saturated and trans fats  Limit alcohol to less than 1 drink for women and 2 drinks for men per day  Sugar such as soft drinks or sugary  snacks Fasting Lipid Profile or Direct LDL (Recommended annually)    History of aortic valve replacement. Follows up with a cardiologist. Currently on a statin, dose recently reduced due to joint pain.    PCP to check lipid panel annually going forward.    Recent Labs   Lab Test 10/28/24  0955 12/21/23  1120   CHOL 165 234*   HDL 53 53   LDL 63 117*   TRIG 244* 430*            Hormones  Risks Preventative Measures Recommendations   Low thyroid function, low function of other glands (adrenals and others) Certain endocrine/hormone disorders are more common after transplant. For this reason, talk to your provider about:  Fatigue  Muscle weakness  Changes in cold tolerance  Reduced interest in sex  Erectile dysfunction   Certain tests may be used to monitor for hormone changes if you are experiencing symptoms. These tests are done at 1 year TSH with T4 reflex (Recommended 1 & 2 years post-transplant), Testosterone/FSH/LH (Men, Ordered based on symptoms), Fasting Glucose (Recommended 6 mos & annually post-transplant), and Hgb A1C (Recommended annually post-transplant)    PCP to check blood sugar and Hgb A1C once per year       Liver:  Risks Preventative Measures Recommendations    Damage from chemotherapy or other drugs, buildup of iron from blood transfusions, and infections Certain tests may be ordered at your next survivorship visit to evaluate liver function based on your individual risk factors.  Talk to your provider before taking herbal supplements or over the counter drugs like Tylenol.  Ask your doctor if you should be treated for iron overload  Avoid alcohol in excess   Hepatic Panel/LFTs (Recommended every 3-6 mos the 1st year post-transplant & annually) and Serum Ferritin (Recommended 1 year post-transplant)       Kidneys and Bladder:  Risks Preventative Measures Recommendations   Damage from chemotherapy or other drugs, infections, high blood pressure Monitoring blood tests of kidney function during follow  up visits  Treating high blood pressure and diabetes   Drink adequate amounts of water  Report symptoms of infection such as frequent urination, pain with urination, foul odor to urine, or blood in the urine  Talk to your provider before taking herbal supplements or over the counter drugs like Ibuprofen Serum creatinine checked as part of anniversary labs or at least annually by primary provider       Nervous System:  Risks Preventative Measures Recommendations   Neuropathy from chemotherapy, changes in cognitive function Report changes in sensation or discomfort in feet or hands  Tell your provider about ongoing changes in memory, ability to concentrate, or inability to make decisions   None at this time       Muscle & Connective Tissue  Risks Preventative Measures Recommendations   Reduced muscle strength & stamina Let your provider know if:  You notice changes in your muscle strength  Require extra assistance with daily activities  Need help creating an exercise routine  Notice reduced range of motion of the arms, hips, or legs  Follow general guidelines for physical activity as recommended by the Office of Disease Prevention & Health Promotion:    Avoid Inactivity  Some physical activity is better than none -- any amount has benefits.    Do Aerobic Activity  Do aerobic physical activity in episodes of at least 10 minutes, as many times as possible per day. This could include going for walks or using the elliptical or stationary bike.  Ask your doctor what aerobic activities would be safe and helpful for you, and set a goal for yourself!    Strengthen Muscles  Do muscle-strengthening activities (such as lifting light weights or using resistance bands and/or going up and down stairs) that are moderate or high intensity and involve all major muscle groups at least 4 days a week. Bone Scan (Recommended 1 year) and Calcium & Vitamin D Levels (Recommended annually)     Emotional Health  Risks Preventative Measures  Recommendations   Stress, depression, anxiety Talk to your provider about:  Changes in feelings, mood, or emotional wellbeing  Interest in support groups  If you are concerned about your caregivers emotional wellbeing  Desire to speak with a counselor for ongoing support None at this time       Sexual Health  Risks Preventative Measures Recommendations   Reduced libido due to hormonal changes, erectile dysfunction, vaginal dryness, and sexually transmitted diseases  Talk to your provider about:  Reduced libido or concerns regarding your sexual health  Men: Erectile dysfunction   Women: Vaginal dryness, pain during intercourse, vaginal bleeding after intercourse, changes in vaginal discharge that may indicate infection (green/white/foul odor)   General Recommendations:  It is safe to have sex if your platelet count is > 50,000 and you feel physically and emotionally ready   Women can use water-based lubricants to reduce discomfort from dryness. Prescription topical estrogen may help as well.  Use barrier protection such as condoms to prevent sexually transmitted diseases, you are at higher risk for infections due to a weakened immune system    For more information check out the National Marrow Donor Program web page on this topic:  https://bethematch.org/patients-and-families/life-after-transplant/coping-with-life-after-transplant/relationships-and-sexual-health/  None at this time         Cancer Screening:  Risks Preventative Measures Recommendations   Higher risk for the development of solid tumors, PTLD, and blood cancers Preform a full self skin exam monthly to assess for any changes in moles or signs of skin cancer  Minimize excessive sun exposure and wear sunscreen  Women should preform breast-self exams and report any changes in such as a new lump, discharge from nipples, and red or dimpled areas of the breast.   Screening for colorectal cancer should begin at age 50.   Men should perform a monthly testicular  self-exam if they have been exposed to radiation as part of their cancer treatment.    Fecal Occult Blood Test (Recommended annually) and Colonoscopy (Recommended every 10 years after the age of 50)    Last colonoscopy was July of 2024         Recommended Tests & Follow-Up:  Referrals & Tests Ordered Today:  Your BMT physician will review these tests and referral results. If you require treatment based on the results, a member of the BMT team will contact you.  Orders placed today:  Orders Placed This Encounter   Procedures    DXA scan    TSH    Testosterone    Ferritin    Vitamin D panel    General PFT Lab (Please always keep checked)    Pulmonary Function Test     (Note to providers: After signing orders use the refresh tool in the note window to display results)   Future Tests/Referrals:   All recommendations above should be completed within 2 months either by your primary care provider or local oncologist (cancer doctor).   Recommendations that were not ordered today should be completed by your:  Primary Care Provider     LEANNE Varghese CNP    I spent 45 minutes with the patient and family, over half of which was spent discussing preventative care strategies, self-management practices, and potential complications after transplant.      Information used for these recommendations was obtained from: SANIYA Villatoro., GLENDY Schaeffer, JEREMIAH Garcia, RAMIRO Castaneda, ALFONZO Smith., ANGELICA Sierra.,   Senia, K. M. (2013). Prevalence of Hematopoietic Cell Transplant Survivors in the [place]. Biology of Blood and Marrow Transplantation?: Journal of the American Society for Blood and Marrow Transplantation, 19(10), 0335-9391. doi 10.1016/j.bbmt.2013.07.020

## 2025-01-07 NOTE — NURSING NOTE
Patient confirms medications and allergies are accurate via patients echeck in completion, and or denies any changes since last reviewed/verified.       Current patient location: [address] [place] [address] [address] [address]    Is the patient currently in the state of [place]? YES    Visit mode:VIDEO    If the visit is dropped, the patient can be reconnected by:VIDEO VISIT: Text to cell phone:   Telephone Information:   Mobile [phone]       Will anyone else be joining the visit? NO  (If patient encounters technical issues they should call [phone]    Are changes needed to the allergy or medication list? No    Are refills needed on medications prescribed by this physician? NO    Rooming Documentation:  Unable to complete questionnaire(s) due to time    Reason for visit: RECHECK    Melinda COOPER

## 2025-01-07 NOTE — H&P
Pre-Operative H & P     CC:  Preoperative exam to assess for increased cardiopulmonary risk while undergoing surgery and anesthesia.    Date of Encounter: 1/7/2025  Primary Care Physician:  Cipriano Fuentes     Reason for visit:   Encounter Diagnoses   Name Primary?    Status post bone marrow transplant (H)     Preop examination Yes       HPI  Billy Carroll is a 69 year old male who presents for pre-operative H & P in preparation for  Procedure Information       Case: [id number] Date/Time: 01/13/25 1400    Procedure: BIOPSY, BONE MARROW (Update)    Anesthesia type: MAC with Local    Diagnosis: Multiple myeloma not having achieved remission (H) [C90.00]    Pre-op diagnosis: Multiple myeloma not having achieved remission (H) [C90.00]    Location: [facility] PROCEDURE ROOM 01 / [facility] [place]    Providers: Liliya Degroot PA-C            Billy Carroll is a 69 year old male with heart failure, s/p TAVR, hypertension, hyperlipidemia, mildly dilated descending aorta, allergic rhinitis, anemia and obesity that is s/p chemo and bone marrow transplant for multiple myeloma.  His boen marrow/stem cell transplant was on 1/9/24.  Procedure planned as above for surveillance.      History is obtained from the patient and chart review    Hx of abnormal bleeding or anti-platelet use: aspirin      Past Medical History  Past Medical History:   Diagnosis Date    Benign essential hypertension     Hyperlipidemia     Multiple myeloma (H)     Followed by Dr. Ruelas with [facility]       Past Surgical History  Past Surgical History:   Procedure Laterality Date    BONE MARROW BIOPSY, BONE SPECIMEN, NEEDLE/TROCAR Left 4/18/2024    Procedure: BIOPSY, BONE MARROW;  Surgeon: Sonya Ritter NP;  Location: [facility] OR    CV CORONARY ANGIOGRAM N/A 9/7/2023    Procedure: Coronary Angiogram;  Surgeon: Domenico Sauceda MD;  Location: [facility] CATH LAB CV    CV TRANSCATHETER AORTIC VALVE  REPLACEMENT-FEMORAL APPROACH N/A 10/24/2023    Procedure: Transcatheter Aortic Valve Replacement-Femoral Approach;  Surgeon: Charles Field MD;  Location: [facility] LAB CV    IR CVC TUNNEL PLACEMENT > 5 YRS OF AGE  1/2/2024    IR CVC TUNNEL REMOVAL RIGHT  2/2/2024    OR TRANSCATHETER AORTIC VALVE REPLACEMENT, FEMORAL PERCUTANEOUS APPROACH (STANDBY) N/A 10/24/2023    Procedure: OR TRANSCATHETER AORTIC VALVE REPLACEMENT, FEMORAL PERCUTANEOUS APPROACH (STANDBY);  Surgeon: Sahra Mosher MD;  Location: [facility] CV       Prior to Admission Medications  Current Outpatient Medications   Medication Sig Dispense Refill    acetaminophen (ACETAMINOPHEN 8 HOUR) 650 MG CR tablet Take 1 tablet (650 mg) by mouth every 8 hours as needed for mild pain or fever      acyclovir (ZOVIRAX) 800 MG tablet Take 1 tablet (800 mg) by mouth 2 times daily Start Day -1 60 tablet 11    albuterol (PROAIR HFA/PROVENTIL HFA/VENTOLIN HFA) 108 (90 Base) MCG/ACT inhaler Inhale 2 puffs into the lungs every 6 hours as needed for shortness of breath, wheezing or cough 18 g 0    aspirin 81 MG EC tablet Take 81 mg by mouth every morning.      Calcium Carb-Cholecalciferol (CALCIUM + D3 PO) Take 1 tablet by mouth 2 times daily      hydrOXYzine HCl (ATARAX) 25 MG tablet Take 1 tablet up to 4 times daily for anxiety. (Patient taking differently: Take 25 mg by mouth every 4 hours as needed. Take 1 tablet up to 4 times daily for anxiety.) 15 tablet 3    lactobacillus rhamnosus, GG, (CULTURELL) capsule Take 1 capsule by mouth every morning.      LENalidomide (REVLIMID) 10 MG CAPS capsule Take 10 mg by mouth every morning      loperamide (IMODIUM) 2 MG capsule Take 2 mg by mouth 4 times daily as needed for diarrhea      loratadine (CLARITIN) 10 MG tablet Take 10 mg by mouth every evening.      losartan (COZAAR) 50 MG tablet Take 1 tablet (50 mg) by mouth daily. (Patient taking differently: Take 50 mg by mouth every morning.)      metoprolol succinate  ER (TOPROL XL) 50 MG 24 hr tablet Take 50 mg by mouth every morning.      mirtazapine (REMERON) 15 MG tablet TAKE 1 TABLET BY MOUTH AT BEDTIME. 90 tablet 1    psyllium (METAMUCIL/KONSYL) Packet Take 1 packet by mouth as needed      rosuvastatin (CRESTOR) 5 MG tablet Take 1 tablet (5 mg) by mouth daily (Patient taking differently: Take 5 mg by mouth every morning.) 90 tablet 3    sulfamethoxazole-trimethoprim (BACTRIM DS) 800-160 MG tablet Take 1 tablet by mouth Every Mon, Wed, Fri Morning.      TURMERIC PO Take 2 capsules by mouth every morning.      triamcinolone (KENALOG) 0.1 % external cream Apply topically 2 times daily. (Patient not taking: Reported on 10/28/2024) 30 g 1       Allergies  Allergies   Allergen Reactions    Acetaminophen-Codeine Nausea    Codeine Nausea       Social History  Social History     Socioeconomic History    Marital status:      Spouse name: Not on file    Number of children: Not on file    Years of education: Not on file    Highest education level: Not on file   Occupational History    Not on file   Tobacco Use    Smoking status: Never     Passive exposure: Past    Smokeless tobacco: Never   Vaping Use    Vaping status: Never Used   Substance and Sexual Activity    Alcohol use: Yes     Alcohol/week: 1.0 - 2.0 standard drink of alcohol     Types: 1 - 2 Standard drinks or equivalent per week     Comment: 7-14    Drug use: Never    Sexual activity: Not on file   Other Topics Concern    Not on file   Social History Narrative    Not on file     Social Drivers of Health     Financial Resource Strain: Low Risk  (4/1/2024)    Financial Resource Strain     Within the past 12 months, have you or your family members you live with been unable to get utilities (heat, electricity) when it was really needed?: No   Food Insecurity: Low Risk  (4/1/2024)    Food Insecurity     Within the past 12 months, did you worry that your food would run out before you got money to buy more?: No     Within the  past 12 months, did the food you bought just not last and you didn t have money to get more?: No   Transportation Needs: Low Risk  (4/1/2024)    Transportation Needs     Within the past 12 months, has lack of transportation kept you from medical appointments, getting your medicines, non-medical meetings or appointments, work, or from getting things that you need?: No   Physical Activity: Unknown (4/1/2024)    Exercise Vital Sign     Days of Exercise per Week: 7 days     Minutes of Exercise per Session: Not on file   Stress: No Stress Concern Present (4/1/2024)    [employer] - Occupational Stress Questionnaire     Feeling of Stress : Only a little   Social Connections: Unknown (4/1/2024)    Social Connection and Isolation Panel [NHANES]     Frequency of Communication with Friends and Family: Not on file     Frequency of Social Gatherings with Friends and Family: Three times a week     Attends Islam Services: Not on file     Active Member of Clubs or Organizations: Not on file     Attends Club or Organization Meetings: Not on file     Marital Status: Not on file   Interpersonal Safety: Low Risk  (4/1/2024)    Interpersonal Safety     Do you feel physically and emotionally safe where you currently live?: Yes     Within the past 12 months, have you been hit, slapped, kicked or otherwise physically hurt by someone?: No     Within the past 12 months, have you been humiliated or emotionally abused in other ways by your partner or ex-partner?: No   Housing Stability: Low Risk  (4/1/2024)    Housing Stability     Do you have housing? : Yes     Are you worried about losing your housing?: No       Family History  Family History   Problem Relation Age of Onset    Anesthesia Reaction No family hx of     Thrombosis No family hx of        Review of Systems  The complete review of systems is negative other than noted in the HPI or here.   Anesthesia Evaluation   Pt has had prior anesthetic.     No  "history of anesthetic complications       ROS/MED HX  ENT/Pulmonary:     (+)     EUSEBIO risk factors,  hypertension,                              (-) recent URI   Neurologic:  - neg neurologic ROS     Cardiovascular: Comment: Mildly dilated ascending aorta    (+) Dyslipidemia hypertension- -  CAD (non-obstructive) -  - -   Taking blood thinners   CHF                     valvular problems/murmurs  s/p TAVR 10/24/23.    Previous cardiac testing   Echo: Date: 10/2024 Results:  Echo  10/21/24  Interpretation Summary     The left ventricle is normal in size.  Left ventricular function is normal.The ejection fraction is 55-60%.  The left atrium is mildly dilated.  There is a documented 26mm Lisa 3 valve Resilia in the aortic position that  appears to be functioning well with a mean gradient of 12 mmHg, peak velocity  2.3 m/s, DI 0.48. Mild paravalvular leak.  Mildly dilated ascending aorta (4.1 cm).  Compared to prior study, there is no significant change.  Stress Test:  Date: Results:    ECG Reviewed:  Date: 10/2024 Results:  Sinus bradycardia  Left axis deviation  Minimal voltage criteria for LVH, may be normal variant  Abnormal ECG    Cath:  Date: 9/2023 Results:  CONCLUSIONS: Moderate nonobstructive coronary disease.       METS/Exercise Tolerance: >4 METS Comment: Walks 1-1.5 miles a couple times per day.  Denies any exertional dyspnea or angina.    Hematologic:     (+)      anemia, history of blood transfusion, no previous transfusion reaction,        Musculoskeletal:   (+)  arthritis,             GI/Hepatic: Comment: \"Gut issues\" from meds.      Renal/Genitourinary:  - neg Renal ROS     Endo:  - neg endo ROS   (+)               Obesity,       Psychiatric/Substance Use:     (+) psychiatric history anxiety       Infectious Disease:  - neg infectious disease ROS     Malignancy:   (+) Malignancy, History of Other.Other CA multiple myeloma -s/p bone marrow/stem cell transplant 1/9/24 status post Chemo.    Other:  - neg " other ROS          Virtual visit -  No vitals were obtained    Physical Exam  Constitutional: Awake, alert, cooperative, no apparent distress, and appears stated age.  Eyes: Pupils equal  HENT: Normocephalic  Respiratory: non labored breathing   Neurologic: Awake, alert, oriented to name, place and time.   Neuropsychiatric: Calm, cooperative. Normal affect.      Prior Labs/Diagnostic Studies   All labs and imaging personally reviewed     EKG/ stress test - if available please see in ROS above     Component      Latest Ref Rng 10/28/2024  9:55 AM   Sodium      135 - 145 mmol/L 141    Potassium      3.4 - 5.3 mmol/L 4.2    Chloride      98 - 107 mmol/L 106    Carbon Dioxide (CO2)      22 - 29 mmol/L 26    Anion Gap      7 - 15 mmol/L 9    Urea Nitrogen      8.0 - 23.0 mg/dL 11.4    Creatinine      0.67 - 1.17 mg/dL 0.82    GFR Estimate      >60 mL/min/1.73m2 >90    Calcium      8.8 - 10.4 mg/dL 8.6 (L)    Glucose      70 - 99 mg/dL 98    Patient Fasting? No    WBC      4.0 - 11.0 10e3/uL 2.6 (L)    RBC Count      4.40 - 5.90 10e6/uL 4.36 (L)    Hemoglobin      13.3 - 17.7 g/dL 11.8 (L)    Hematocrit      40.0 - 53.0 % 38.2 (L)    MCV      78 - 100 fL 88    MCH      26.5 - 33.0 pg 27.1    MCHC      31.5 - 36.5 g/dL 30.9 (L)    RDW      10.0 - 15.0 % 15.9 (H)    Platelet Count      150 - 450 10e3/uL 222       Legend:  (L) Low  (H) High      The patient's records and results personally reviewed by this provider.     Outside records reviewed from: Care Everywhere      Assessment    Billy Carroll is a 69 year old male seen as a PAC referral for risk assessment and optimization for anesthesia.    Plan/Recommendations  Pt will be optimized for the proposed procedure.  See below for details on the assessment, risk, and preoperative recommendations    NEUROLOGY  - No history of TIA, CVA or seizure    -Post Op delirium risk factors:  Age    ENT  - No current airway concerns.  Will need to be reassessed day of  "surgery.  Mallampati: Unable to assess  TM: Unable to assess    CARDIAC  - No history of Afib  - s/p TAVR 10/24/23  - last cards visit was on 10/2/24  - cardiac testing as above    - METS (Metabolic Equivalents)  Patient performs 4 or more METS exercise without symptoms             Total Score: 0      RCRI-Low risk: Class 2 0.9% complication rate             Total Score: 1    RCRI: CHF        PULMONARY    EUSEBIO Medium Risk             Total Score: 3    EUSEBIO: Hypertension    EUSEBIO: Over 50 ys old    EUSEBIO: Male      - Denies asthma or inhaler use  - Tobacco History    History   Smoking Status    Never   Smokeless Tobacco    Never       GI  - denies GERD  PONV Medium Risk  Total Score: 2           1 AN PONV: Patient is not a current smoker    1 AN PONV: Intended Post Op Opioids            ENDOCRINE   - BMI: Estimated body mass index is 30.13 kg/m  as calculated from the following:    Height as of this encounter: 1.778 m (5' 10\").    Weight as of this encounter: 95.3 kg (210 lb).  Obesity (BMI >30)  - No history of Diabetes Mellitus    HEME  VTE High Risk 3%             Total Score: 8    VTE: Greater than 59 yrs old    VTE: Male    VTE: Current cancer      - hold aspirin starting tomorrow  - Chronic anemia  Recommend perioperative use of blood conservation techniques intraoperatively and close monitoring for postoperative bleeding.    - s/p bone marrow transplant for multiple myeloma.  Procedure planned as above.       PSYCH  - hold atarax DOS        Different anesthesia methods/types have been discussed with the patient, but they are aware that the final plan will be decided by the assigned anesthesia provider on the date of service.      The patient is optimized for their procedure. AVS with information on surgery time/arrival time, meds and NPO status given by nursing staff. No further diagnostic testing indicated.    Please refer to the physical examination documented by the anesthesiologist in the anesthesia record on " the day of surgery.    Video-Visit Details    Type of service:  Video Visit    Provider received verbal consent for a Video Visit from the patient? Yes   Video Start Time:  1157  Video End Time: 1208    Originating Location (pt. Location): Home    Distant Location (provider location):  Off-site  Mode of Communication:  Video Conference via [employer]    On the day of service:     Prep time: 12 minutes  Visit time: 11 minutes  Documentation time: 12 minutes  ------------------------------------------  Total time: 35 minutes      LEANNE Whitley CNP  Preoperative Assessment Center  [facility]  Clinic and Surgery Center  Phone: [phone]  Fax: [phone]

## 2025-01-07 NOTE — LETTER
1/7/2025      Billy Carroll  [address] [place] [address]      Dear Colleague,    Thank you for referring your patient, Billy Carroll, to the [facility] BLOOD AND MARROW TRANSPLANT PROGRAM [place]. Please see a copy of my visit note below.    Virtual Visit Details    Type of service:  Video Visit   Joined the call at 1/7/2025, 9:06:26 am.  Left the call at 1/7/2025, 10:01:08 am.  Provider was on the call for 54 minutes 42 seconds .      Originating Location (pt. Location): Home    Distant Location (provider location):  Off-site  Platform used for Video Visit: [facility]        BMT 1-Year Post-Autologous BMT  Survivorship Care Plan        Date: January 7, 2025    Treatment Team:  Patient Care Team:  Cipriano Fuentes PA-C as PCP - General (Physician Assistant)  Arcelia Sheldon as Consulting Physician (Radiation Oncology)  Adalberto Nelson DO as Physician (Cardiovascular Disease)  Bozena Ritter RN as BMT Nurse Coordinator (Transplant)  Jerri Frey MD as BMT Physician (Transplant)  Nikole Hernandez LICSW as   Jerri Frey MD as Assigned Cancer Care Provider  Rylie Tijerina NP as Assigned Pulmonology Provider  Brandon Shepard as Assigned Behavioral Health Provider  Cipriano Fuentes PA-C as Assigned PCP  Adalberto Nelson DO as Assigned Heart and Vascular Provider    BMT Transplant Date: Auto PBSC Txp; Day 363 (1/10/24)    Patient ID:  Mr. Carroll is a 69 year old man 1 year post autologous stem cell transplant for standard risk IgG lambda kappa multiple myeloma. Standard risk.    CC: Billy Carroll was seen in the BMT Survivorship clinic on January 7, 2025 for a comprehensive, 1-year post autologous stem cell transplant, consultation and evaluation for transplant late effects. This is a 60-minute visit designed to educate patients about recommended follow-up care based on contemporary clinical practice guidelines, create a  "care plan that can be used as a guide to follow up care for the patient and their care team, and place any screening or diagnostic tests recommended for the screening of post-transplant complications based on patient risk and transplant type. All recommendations are outlined in the note below. Any tests not ordered during this visit are traditionally performed by the patient's PCP. All patients are advised to schedule a routine preventative care or \"Well Visit\" with their PCP if they have not done so already in the past year since transplant, to discuss these current and future recommendations.    Hematology/Oncology Treatment History:     1.  Billy Carroll is a 69-year-old male with a diagnosis of an IgG multiple myeloma. He was followed for smoldering myeloma with stable quantitative IgG, calcium and hemoglobin as well as a normal skeletal survey.  Bone marrow biopsy shows 10 to 15% plasma cell. He then presented with back pain and had a CT scan which showed large soft tissue mass centered at the right T5 costovertebral junction and associated osseous destruction of T4, T5 and medial right #5 rib, moderate compression deformity at T5 and and diffuse lytic lesions throughout the remainder of the visible skeleton. He received palliative radiation therapy to the involved area and systemic therapy with VRD.  2. PJP pneumonia in June 2023  3.  Aortic stenosis status post TAVR     HPI: Billy was seen in the virtual BMT survivorship clinic today. States he has been feeling well overall but continues to note occasional diarrhea. Denied any other acute complaints other than a notable \"urticarial\" rash that appears to develop 3 weeks after his Zometa infusions. We discussed this further and decided to order a DXA scan to evaluate overall bone mineral density in order for this to aid in decision making for bone mineral density health going forward. States he has been enjoying his snf. He has been playing golf and taking " his dog for long walks. He denied any concerns regarding his sexual health or emotional wellbeing.     Medical History:  Past Medical History:   Diagnosis Date     Benign essential hypertension      Hyperlipidemia      Multiple myeloma (H)     [facility]       Surgical History:  Past Surgical History:   Procedure Laterality Date     BONE MARROW BIOPSY, BONE SPECIMEN, NEEDLE/TROCAR Left 4/18/2024    Procedure: BIOPSY, BONE MARROW;  Surgeon: Sonya Ritter NP;  [place] [facility] [place]     CV CORONARY ANGIOGRAM N/A 9/7/2023    Procedure: Coronary Angiogram;  Surgeon: Domenico Sauceda MD;  Location: [facility] CV     CV TRANSCATHETER AORTIC VALVE REPLACEMENT-FEMORAL APPROACH N/A 10/24/2023    Procedure: Transcatheter Aortic Valve Replacement-Femoral Approach;  Surgeon: Charles Field MD;  Location: [facility] CV     IR CVC TUNNEL PLACEMENT > 5 YRS OF AGE  1/2/2024     IR CVC TUNNEL REMOVAL RIGHT  2/2/2024     OR TRANSCATHETER AORTIC VALVE REPLACEMENT, FEMORAL PERCUTANEOUS APPROACH (STANDBY) N/A 10/24/2023    Procedure: OR TRANSCATHETER AORTIC VALVE REPLACEMENT, FEMORAL PERCUTANEOUS APPROACH (STANDBY);  Surgeon: Sahra Mosher MD;  Location: [facility] CV       Family History:  Family History   Problem Relation Age of Onset     Anesthesia Reaction No family hx of      Thrombosis No family hx of        Social History:  Social History     Socioeconomic History     Marital status:      Spouse name: Not on file     Number of children: Not on file     Years of education: Not on file     Highest education level: Not on file   Occupational History     Not on file   Tobacco Use     Smoking status: Never     Passive exposure: Past     Smokeless tobacco: Never   Vaping Use     Vaping status: Never Used   Substance and Sexual Activity     Alcohol use: Yes     Alcohol/week: 1.0 - 2.0 standard drink of alcohol     Types: 1 - 2 Standard drinks or equivalent per week      Comment: 7-14     Drug use: Never     Sexual activity: Not on file   Other Topics Concern     Not on file   Social History Narrative     Not on file     Social Drivers of Health     Financial Resource Strain: Low Risk  (4/1/2024)    Financial Resource Strain      Within the past 12 months, have you or your family members you live with been unable to get utilities (heat, electricity) when it was really needed?: No   Food Insecurity: Low Risk  (4/1/2024)    Food Insecurity      Within the past 12 months, did you worry that your food would run out before you got money to buy more?: No      Within the past 12 months, did the food you bought just not last and you didn t have money to get more?: No   Transportation Needs: Low Risk  (4/1/2024)    Transportation Needs      Within the past 12 months, has lack of transportation kept you from medical appointments, getting your medicines, non-medical meetings or appointments, work, or from getting things that you need?: No   Physical Activity: Unknown (4/1/2024)    Exercise Vital Sign      Days of Exercise per Week: 7 days      Minutes of Exercise per Session: Not on file   Stress: No Stress Concern Present (4/1/2024)    [employer] [place] [employer] - Occupational Stress Questionnaire      Feeling of Stress : Only a little   Social Connections: Unknown (4/1/2024)    Social Connection and Isolation Panel [NHANES]      Frequency of Communication with Friends and Family: Not on file      Frequency of Social Gatherings with Friends and Family: Three times a week      Attends Cheondoism Services: Not on file      Active Member of Clubs or Organizations: Not on file      Attends Club or Organization Meetings: Not on file      Marital Status: Not on file   Interpersonal Safety: Low Risk  (4/1/2024)    Interpersonal Safety      Do you feel physically and emotionally safe where you currently live?: Yes      Within the past 12 months, have you been hit, slapped, kicked or  otherwise physically hurt by someone?: No      Within the past 12 months, have you been humiliated or emotionally abused in other ways by your partner or ex-partner?: No   Housing Stability: Low Risk  (4/1/2024)    Housing Stability      Do you have housing? : Yes      Are you worried about losing your housing?: No       Survivorship Care Plan:     This individualized care plan is designed to inform you and your healthcare team of the recommended follow-up visits, tests, health maintenance activities, and cancer screening you should receive after transplant.     General Health Maintenance:   Vaccinations should be given at 1 and 2 years after your transplant, these may be given at your annual anniversary visits in the BMT clinic, by your primary care provider, or your local oncologist. An exception is the influenza vaccine, which can be given after day +60 post-transplant during influenza season. See table below for more information.   You can receive the COVID19 vaccine if you have not already, for more information on how to sign up for an appointment you can the [employer] vaccine website at [email or web address] or the         [place] Department of Health Vaccine Connector portal at https://[place].gov/covid19/vaccine/connector/connector.jsp  For general health concerns you can be seen by your primary care provider.   If you have questions about your transplant or follow up tests contact your BMT RN coordinator.        Vaccine Administration Schedule       Vaccinations Post-BMT: Please refer to our [facility] BMT Vaccination Guidelines updated in March of 2021.         Immune System:  Risks Preventative Measures Recommendations   Infections Symptoms of a cold such as fever, cough, congestion, and shortness of breath should be reported to your primary care provider  Immunizations will be administered at 1 & 2 years after transplant. See table above. Vaccines at anniversary visit next  week     Eyes:   Risks Preventative Measures Recommendations   Cataracts, dry eyes, viral infections, and other eye changes Yearly eye exam   Screening and treatment for high blood pressure or diabetes  Call if you have eye pain, visual changes, or floaters immediately Patient will schedule an annual routine exam with community ophthalmologist    Sees local eye provider annually. Last visit 3 months ago, no evidence of cataracts.      Mouth:  Risks Preventative Measures Recommendations   Dry mouth, cavities, and oral cancer You can use over the counter Biotene for dry mouth or try sucking on sour candy before meals  Get a dental checkup every year and a cleaning every 6 months  If you have a prosthetic heart valve or central venous catheter or  port , you may need to take an antibiotic before your dental visits None at this time, patient has dental provider and will schedule routine exam         Lungs  Risks Preventative Measures Recommendations   Changes in function from chemotherapy or radiation; lung infections (pneumonia) Tell your provider about difficulty breathing, a cough, or new shortness of breath   Avoid use of tobacco products or smoking  Routine lung exams Pulmonary Function Tests (Recommended annually post-transplant)    Placed order for PFT test. Notes mild shortness of breath when walking in the cold weather.     Heart and Blood Vessels  Heart Disease Risk Score: The 10-year ASCVD risk score (Gloria HOUSTON, et al., 2019) is: 22%    Values used to calculate the score:      Age: 69 years      Sex: Male      Is Non- : No      Diabetic: No      Tobacco smoker: No      Systolic Blood Pressure: 136 mmHg      Is BP treated: Yes      HDL Cholesterol: 53 mg/dL      Total Cholesterol: 165 mg/dL    Risks Preventative Measures Recommendations   Damage from chemotherapy and/or radiation; early development of heart valve disease  Ask your provider if you should receive consultation from a  cardiologist (heart doctor) or have special screening  Follow a  heart healthy  lifestyle. For more information visit the National Heart, Lung, and Blood [place] website at: https://www.nhlbi.nih.gov/health/health-topics/topics/heart-healthy-lifestyle-changes   Don t smoke. If you currently smoke and are ready to quit, we can help you find ways to quit  Maintain a healthy weight  Exercise regularly  Avoid foods that have high amounts of:  Salt/sodium (less than 2,300 mg of sodium per day)  Saturated and trans fats  Limit alcohol to less than 1 drink for women and 2 drinks for men per day  Sugar such as soft drinks or sugary snacks Fasting Lipid Profile or Direct LDL (Recommended annually)    History of aortic valve replacement. Follows up with a cardiologist. Currently on a statin, dose recently reduced due to joint pain.    PCP to check lipid panel annually going forward.    Recent Labs   Lab Test 10/28/24  0955 12/21/23  1120   CHOL 165 234*   HDL 53 53   LDL 63 117*   TRIG 244* 430*            Hormones  Risks Preventative Measures Recommendations   Low thyroid function, low function of other glands (adrenals and others) Certain endocrine/hormone disorders are more common after transplant. For this reason, talk to your provider about:  Fatigue  Muscle weakness  Changes in cold tolerance  Reduced interest in sex  Erectile dysfunction   Certain tests may be used to monitor for hormone changes if you are experiencing symptoms. These tests are done at 1 year TSH with T4 reflex (Recommended 1 & 2 years post-transplant), Testosterone/FSH/LH (Men, Ordered based on symptoms), Fasting Glucose (Recommended 6 mos & annually post-transplant), and Hgb A1C (Recommended annually post-transplant)    PCP to check blood sugar and Hgb A1C once per year       Liver:  Risks Preventative Measures Recommendations    Damage from chemotherapy or other drugs, buildup of iron from blood transfusions, and infections Certain tests may be  ordered at your next survivorship visit to evaluate liver function based on your individual risk factors.  Talk to your provider before taking herbal supplements or over the counter drugs like Tylenol.  Ask your doctor if you should be treated for iron overload  Avoid alcohol in excess   Hepatic Panel/LFTs (Recommended every 3-6 mos the 1st year post-transplant & annually) and Serum Ferritin (Recommended 1 year post-transplant)       Kidneys and Bladder:  Risks Preventative Measures Recommendations   Damage from chemotherapy or other drugs, infections, high blood pressure Monitoring blood tests of kidney function during follow up visits  Treating high blood pressure and diabetes   Drink adequate amounts of water  Report symptoms of infection such as frequent urination, pain with urination, foul odor to urine, or blood in the urine  Talk to your provider before taking herbal supplements or over the counter drugs like Ibuprofen Serum creatinine checked as part of anniversary labs or at least annually by primary provider       Nervous System:  Risks Preventative Measures Recommendations   Neuropathy from chemotherapy, changes in cognitive function Report changes in sensation or discomfort in feet or hands  Tell your provider about ongoing changes in memory, ability to concentrate, or inability to make decisions   None at this time       Muscle & Connective Tissue  Risks Preventative Measures Recommendations   Reduced muscle strength & stamina Let your provider know if:  You notice changes in your muscle strength  Require extra assistance with daily activities  Need help creating an exercise routine  Notice reduced range of motion of the arms, hips, or legs  Follow general guidelines for physical activity as recommended by the Office of Disease Prevention & Health Promotion:    Avoid Inactivity  Some physical activity is better than none -- any amount has benefits.    Do Aerobic Activity  Do aerobic physical activity in  episodes of at least 10 minutes, as many times as possible per day. This could include going for walks or using the elliptical or stationary bike.  Ask your doctor what aerobic activities would be safe and helpful for you, and set a goal for yourself!    Strengthen Muscles  Do muscle-strengthening activities (such as lifting light weights or using resistance bands and/or going up and down stairs) that are moderate or high intensity and involve all major muscle groups at least 4 days a week. Bone Scan (Recommended 1 year) and Calcium & Vitamin D Levels (Recommended annually)     Emotional Health  Risks Preventative Measures Recommendations   Stress, depression, anxiety Talk to your provider about:  Changes in feelings, mood, or emotional wellbeing  Interest in support groups  If you are concerned about your caregivers emotional wellbeing  Desire to speak with a counselor for ongoing support None at this time       Sexual Health  Risks Preventative Measures Recommendations   Reduced libido due to hormonal changes, erectile dysfunction, vaginal dryness, and sexually transmitted diseases  Talk to your provider about:  Reduced libido or concerns regarding your sexual health  Men: Erectile dysfunction   Women: Vaginal dryness, pain during intercourse, vaginal bleeding after intercourse, changes in vaginal discharge that may indicate infection (green/white/foul odor)   General Recommendations:  It is safe to have sex if your platelet count is > 50,000 and you feel physically and emotionally ready   Women can use water-based lubricants to reduce discomfort from dryness. Prescription topical estrogen may help as well.  Use barrier protection such as condoms to prevent sexually transmitted diseases, you are at higher risk for infections due to a weakened immune system    For more information check out the National Marrow Donor Program web page on this topic:   https://bethematch.org/patients-and-families/life-after-transplant/coping-with-life-after-transplant/relationships-and-sexual-health/  None at this time         Cancer Screening:  Risks Preventative Measures Recommendations   Higher risk for the development of solid tumors, PTLD, and blood cancers Preform a full self skin exam monthly to assess for any changes in moles or signs of skin cancer  Minimize excessive sun exposure and wear sunscreen  Women should preform breast-self exams and report any changes in such as a new lump, discharge from nipples, and red or dimpled areas of the breast.   Screening for colorectal cancer should begin at age 50.   Men should perform a monthly testicular self-exam if they have been exposed to radiation as part of their cancer treatment.    Fecal Occult Blood Test (Recommended annually) and Colonoscopy (Recommended every 10 years after the age of 50)    Last colonoscopy was July of 2024         Recommended Tests & Follow-Up:  Referrals & Tests Ordered Today:  Your BMT physician will review these tests and referral results. If you require treatment based on the results, a member of the BMT team will contact you.  Orders placed today:  Orders Placed This Encounter   Procedures     DXA scan     TSH     Testosterone     Ferritin     Vitamin D panel     General PFT Lab (Please always keep checked)     Pulmonary Function Test     (Note to providers: After signing orders use the refresh tool in the note window to display results)   Future Tests/Referrals:   All recommendations above should be completed within 2 months either by your primary care provider or local oncologist (cancer doctor).   Recommendations that were not ordered today should be completed by your:  Primary Care Provider     LEANNE Varghese CNP    I spent 45 minutes with the patient and family, over half of which was spent discussing preventative care strategies, self-management practices, and potential  complications after transplant.      Information used for these recommendations was obtained from: SANIYA Villatoro., GLENDY Schaeffer, JEREMIAH Garcia, ARIANNA Castaneda., ALFONZO Smith., ANGELICA Sierra.,   Senia, KMariola DELGADILLO. (2013). Prevalence of Hematopoietic Cell Transplant Survivors in the [place]. Biology of Blood and Marrow Transplantation?: Journal of the American Society for Blood and Marrow Transplantation, 19(10), 3210-4243. doi 10.1016/j.bbmt.2013.07.020        Again, thank you for allowing me to participate in the care of your patient.        Sincerely,        LEANNE Varghese CNP    Electronically signed

## 2025-01-07 NOTE — PROGRESS NOTES
Virtual Visit Details    Type of service:  Video Visit   Joined the call at 1/7/2025, 9:06:26 am.  Left the call at 1/7/2025, 10:01:08 am.  Provider was on the call for 54 minutes 42 seconds .      Originating Location (pt. Location): Home    Distant Location (provider location):  Off-site  Platform used for Video Visit: [employer]

## 2025-01-07 NOTE — PROGRESS NOTES
Billy is a 69 year old who is being evaluated via a billable video visit.      How would you like to obtain your AVS? Morgan Gramajo   Billy is a 69 year old, presenting for the following health issues:  Pre-Op Exam          AILIN Escobedo LPN

## 2025-01-07 NOTE — PATIENT INSTRUCTIONS
Preparing for Your Surgery      Name:  Billy Carroll   MRN:  [id number]   :  1955   Today's Date:  2025         Arriving for surgery:  Surgery date:  25  Arrival time:  12:30 pm  Surgery time: 2:00 pm    1st- you have a PET scan over at the hospital- [address] [place] [address]. Arrive at 8:30 for 9:00 am appointment    Visit Type: PET ONCOLOGY WHOLE BODY  How do I prepare for my exam?:   Avoid caffeine, nicotine, and exercise 24 hours prior to the exam. NO eating after 3:00 am  Do not eat or drink anything (except for water) 6 hours prior to the exam. No gum, hard candies, or lozenges.      2nd- 11:45 am- Please check in on the 2nd [facility] of [facility] [address] [place] [address] for lab work. After this you will go up to the 5th floor for surgery.    Restrictions due to COVID 19:    Please maintain social distance.  Masking is optional        parking is available for anyone with mobility limitations or disabilities. (Monday- Friday 7 am- 5 pm)    Please come to:    [facility]  [address] [place] [phone]    Check in on the 5th floor, Ambulatory [facility] Center.    What can I eat or drink?    -  You may eat and drink normally until 8 hours before arrival time  (Until 3:00 am on 25- because of the PET scan)  -  You may have clear liquids until 2 hours before arrival time  (Until 10:30 am on 25)    Examples of clear liquids:  Water  Clear broth  Juices (apple, white grape, white cranberry  and cider) without pulp  Noncarbonated, powder based beverages  (lemonade and Praful-Aid)  Sodas (Sprite, 7-Up, ginger ale and seltzer)  Coffee or tea (without milk or cream)  Gatorade    --No alcohol or cannabis products for at least 24 hours before surgery    Which medicines can I take?    Hold Aspirin for 7 days before surgery.     Hold Supplements for 7 days before surgery. Turmeric   Hold Ibuprofen (Advil, Motrin) for 1 day before surgery--unless otherwise directed by  surgeon.  Hold Naproxen (Aleve) for 4 days before surgery.    -  DO NOT take the following medications the day of surgery:   Calcium - vitamin D   Hydroxyzine (Atarax)   Culturell probiotic   Psyllium (metamucil)    -  PLEASE TAKE the following medications the day of surgery    Acetaminophen (Tylenol) as needed   Acyclovir   Albuterol inhaler as needed   Lenalidomide    Losartan (Cozaar)   Metoprolol succinate   Rosuvastatin (Crestor)   Bactrim antibiotic      How do I prepare myself?  - Please take 2 showers (one the night prior to surgery and one the morning of surgery) using Scrubcare or Hibiclens soap.    Use this soap only from the neck to your toes. Avoid genital area      Leave the soap on your skin for one minute--then rinse thoroughly.      You may use your own shampoo and conditioner; no other hair products.   - Please remove all jewelry and body piercings.  - No lotions, deodorants or fragrance.  - Bring your ID and insurance card.    -If you have a Deep Brain Stimulator, a Spinal Cord Stimulator or any implanted Neuro device you must bring the remote to the Surgery Center          ALL PATIENTS ARE REQUIRED TO HAVE A RESPONSIBLE ADULT TO DRIVE AND BE IN ATTENDANCE WITH THEM FOR 24 HOURS FOLLOWING SURGERY       Covid testing policy as of 12/06/2022  Your surgeon will notify and schedule you for a COVID test if one is needed before surgery--please direct any questions or COVID symptoms to your surgeon      Questions or Concerns:    -For questions regarding the day of surgery please contact the Ambulatory Surgery Center at [phone].    -If you have health changes between today and your surgery please contact your surgeon.     For questions after surgery please call your surgeons office.

## 2025-01-09 ENCOUNTER — OFFICE VISIT (OUTPATIENT)
Dept: PULMONOLOGY | Facility: CLINIC | Age: 70
End: 2025-01-09
Attending: NURSE PRACTITIONER
Payer: MEDICARE

## 2025-01-09 DIAGNOSIS — C90.00 MULTIPLE MYELOMA, REMISSION STATUS UNSPECIFIED (H): ICD-10-CM

## 2025-01-09 DIAGNOSIS — Z94.81 STATUS POST BONE MARROW TRANSPLANT (H): ICD-10-CM

## 2025-01-09 DIAGNOSIS — Z92.21 STATUS POST CHEMOTHERAPY: ICD-10-CM

## 2025-01-09 LAB
DLCOCOR-%PRED-PRE: 98 %
DLCOCOR-PRE: 25.41 ML/MIN/MMHG
DLCOUNC-%PRED-PRE: 93 %
DLCOUNC-PRE: 23.93 ML/MIN/MMHG
DLCOUNC-PRED: 25.67 ML/MIN/MMHG
ERV-%PRED-PRE: 20 %
ERV-PRE: 0.3 L
ERV-PRED: 1.44 L
EXPTIME-PRE: 10.02 SEC
FEF2575-%PRED-PRE: 73 %
FEF2575-PRE: 1.66 L/SEC
FEF2575-PRED: 2.26 L/SEC
FEFMAX-%PRED-PRE: 107 %
FEFMAX-PRE: 8.83 L/SEC
FEFMAX-PRED: 8.22 L/SEC
FEV1-%PRED-PRE: 83 %
FEV1-PRE: 2.47 L
FEV1FEV6-PRE: 72 %
FEV1FEV6-PRED: 77 %
FEV1FVC-PRE: 71 %
FEV1FVC-PRED: 77 %
FEV1SVC-PRE: 69 %
FEV1SVC-PRED: 71 %
FIFMAX-PRE: 4.63 L/SEC
FVC-%PRED-PRE: 89 %
FVC-PRE: 3.46 L
FVC-PRED: 3.87 L
IC-%PRED-PRE: 113 %
IC-PRE: 3.27 L
IC-PRED: 2.89 L
VA-%PRED-PRE: 85 %
VA-PRE: 5.51 L
VC-%PRED-PRE: 85 %
VC-PRE: 3.57 L
VC-PRED: 4.16 L

## 2025-01-10 DIAGNOSIS — C90.01 MULTIPLE MYELOMA IN REMISSION (H): Primary | ICD-10-CM

## 2025-01-10 DIAGNOSIS — Z94.81 STATUS POST BONE MARROW TRANSPLANT (H): ICD-10-CM

## 2025-01-10 PROBLEM — C90.00 MULTIPLE MYELOMA (H): Status: ACTIVE | Noted: 2023-06-04

## 2025-01-10 NOTE — PROGRESS NOTES
Case request placed for upcoming sedated bone marrow biopsy.     Bozena Ritter RN, MSN  BMT & Cellular Therapy Nurse Coordinator  [facility] Blood and Marrow Transplant Program  Phone: [phone]  Fax: [phone]

## 2025-01-17 ENCOUNTER — TRANSFERRED RECORDS (OUTPATIENT)
Dept: HEALTH INFORMATION MANAGEMENT | Facility: CLINIC | Age: 70
End: 2025-01-17
Payer: MEDICARE

## 2025-02-04 ENCOUNTER — ANCILLARY PROCEDURE (OUTPATIENT)
Dept: BONE DENSITY | Facility: CLINIC | Age: 70
End: 2025-02-04
Attending: NURSE PRACTITIONER
Payer: MEDICARE

## 2025-02-04 DIAGNOSIS — Z79.52 LONG TERM (CURRENT) USE OF SYSTEMIC STEROIDS: ICD-10-CM

## 2025-02-04 DIAGNOSIS — C90.00 MULTIPLE MYELOMA, REMISSION STATUS UNSPECIFIED (H): ICD-10-CM

## 2025-02-04 DIAGNOSIS — Z94.81 STATUS POST BONE MARROW TRANSPLANT (H): ICD-10-CM

## 2025-02-04 DIAGNOSIS — Z91.89 AT HIGH RISK FOR OSTEOPOROSIS: ICD-10-CM

## 2025-02-04 PROCEDURE — 77091 TBS TECHL CALCULATION ONLY: CPT

## 2025-02-04 PROCEDURE — 77080 DXA BONE DENSITY AXIAL: CPT | Mod: TC

## 2025-02-04 RX ORDER — LIDOCAINE HYDROCHLORIDE 10 MG/ML
8-10 INJECTION, SOLUTION EPIDURAL; INFILTRATION; INTRACAUDAL; PERINEURAL
Status: CANCELLED | OUTPATIENT
Start: 2025-02-04

## 2025-02-04 RX ORDER — LIDOCAINE 40 MG/G
CREAM TOPICAL
Status: CANCELLED | OUTPATIENT
Start: 2025-02-04

## 2025-02-05 ENCOUNTER — ANESTHESIA EVENT (OUTPATIENT)
Dept: SURGERY | Facility: AMBULATORY SURGERY CENTER | Age: 70
End: 2025-02-05
Payer: MEDICARE

## 2025-02-05 NOTE — ANESTHESIA PREPROCEDURE EVALUATION
Anesthesia Pre-Procedure Evaluation    Patient: Billy Carroll   MRN: [id number] : 1955        Procedure : Procedure(s):  BIOPSY, BONE MARROW          Past Medical History:   Diagnosis Date    Benign essential hypertension     Hyperlipidemia     Multiple myeloma (H)     Followed by Dr. Ruelas with [facility]      Past Surgical History:   Procedure Laterality Date    BONE MARROW BIOPSY, BONE SPECIMEN, NEEDLE/TROCAR Left 2024    Procedure: BIOPSY, BONE MARROW;  Surgeon: Sonya Ritter NP;  [place] [facility] [place] CORONARY ANGIOGRAM N/A 2023    Procedure: Coronary Angiogram;  Surgeon: Domenico Sauceda MD;  Location: [facility] CV    CV TRANSCATHETER AORTIC VALVE REPLACEMENT-FEMORAL APPROACH N/A 10/24/2023    Procedure: Transcatheter Aortic Valve Replacement-Femoral Approach;  Surgeon: Charles Field MD;  Location: [facility] CV    IR CVC TUNNEL PLACEMENT > 5 YRS OF AGE  2024    IR CVC TUNNEL REMOVAL RIGHT  2024    OR TRANSCATHETER AORTIC VALVE REPLACEMENT, FEMORAL PERCUTANEOUS APPROACH (STANDBY) N/A 10/24/2023    Procedure: OR TRANSCATHETER AORTIC VALVE REPLACEMENT, FEMORAL PERCUTANEOUS APPROACH (STANDBY);  Surgeon: Sahra Mosher MD;  Location: [facility] CATH LAB CV      Allergies   Allergen Reactions    Acetaminophen-Codeine Nausea    Codeine Nausea      Social History     Tobacco Use    Smoking status: Never     Passive exposure: Past    Smokeless tobacco: Never   Substance Use Topics    Alcohol use: Yes     Alcohol/week: 1.0 - 2.0 standard drink of alcohol     Types: 1 - 2 Standard drinks or equivalent per week     Comment: 7-14      Wt Readings from Last 1 Encounters:   25 95.3 kg (210 lb)        Anesthesia Evaluation   Pt has had prior anesthetic.     No history of anesthetic complications       ROS/MED HX  ENT/Pulmonary:     (+)     EUSEBIO risk factors,  hypertension,                              (-) recent URI   Neurologic:  - neg neurologic ROS    "  Cardiovascular: Comment: Mildly dilated ascending aorta    (+) Dyslipidemia hypertension- -  CAD (non-obstructive) -  - -   Taking blood thinners   CHF                     valvular problems/murmurs  s/p TAVR 10/24/23.    Previous cardiac testing   Echo: Date: 10/2024 Results:  Echo  10/21/24  Interpretation Summary     The left ventricle is normal in size.  Left ventricular function is normal.The ejection fraction is 55-60%.  The left atrium is mildly dilated.  There is a documented 26mm Lisa 3 valve Resilia in the aortic position that  appears to be functioning well with a mean gradient of 12 mmHg, peak velocity  2.3 m/s, DI 0.48. Mild paravalvular leak.  Mildly dilated ascending aorta (4.1 cm).  Compared to prior study, there is no significant change.  Stress Test:  Date: Results:    ECG Reviewed:  Date: 10/2024 Results:  Sinus bradycardia  Left axis deviation  Minimal voltage criteria for LVH, may be normal variant  Abnormal ECG    Cath:  Date: 9/2023 Results:  CONCLUSIONS: Moderate nonobstructive coronary disease.       METS/Exercise Tolerance: >4 METS Comment: Walks 1-1.5 miles a couple times per day.  Denies any exertional dyspnea or angina.    Hematologic:     (+)      anemia, history of blood transfusion, no previous transfusion reaction,        Musculoskeletal:   (+)  arthritis,             GI/Hepatic: Comment: \"Gut issues\" from meds.      Renal/Genitourinary:  - neg Renal ROS     Endo:  - neg endo ROS   (+)               Obesity,       Psychiatric/Substance Use:     (+) psychiatric history anxiety       Infectious Disease:  - neg infectious disease ROS     Malignancy:   (+) Malignancy, History of Other.Other CA multiple myeloma -s/p bone marrow/stem cell transplant 1/9/24 status post Chemo.    Other:  - neg other ROS          Physical Exam    Airway  airway exam normal      Mallampati: I   TM distance: > 3 FB   Neck ROM: full   Mouth opening: > 3 cm    Respiratory Devices and Support         Dental     " "  (+) Modest Abnormalities - crowns, retainers, 1 or 2 missing teeth      Cardiovascular   cardiovascular exam normal          Pulmonary   pulmonary exam normal                OUTSIDE LABS:  CBC:   Lab Results   Component Value Date    WBC 2.6 (L) 10/28/2024    WBC 3.5 (L) 07/08/2024    HGB 11.8 (L) 10/28/2024    HGB 11.7 (L) 07/08/2024    HCT 38.2 (L) 10/28/2024    HCT 37.9 (L) 07/08/2024     10/28/2024     07/08/2024     BMP:   Lab Results   Component Value Date     10/28/2024     07/08/2024    POTASSIUM 4.2 10/28/2024    POTASSIUM 4.2 07/08/2024    CHLORIDE 106 10/28/2024    CHLORIDE 106 07/08/2024    CO2 26 10/28/2024    CO2 22 07/08/2024    BUN 11.4 10/28/2024    BUN 11.6 07/08/2024    CR 0.82 10/28/2024    CR 0.80 07/08/2024    GLC 98 10/28/2024    GLC 98 07/08/2024     COAGS:   Lab Results   Component Value Date    PTT 25 12/21/2023    INR 1.00 02/06/2024     POC: No results found for: \"BGM\", \"HCG\", \"HCGS\"  HEPATIC:   Lab Results   Component Value Date    ALBUMIN 4.2 07/08/2024    PROTTOTAL 6.3 (L) 07/08/2024    ALT 42 07/08/2024    AST 35 07/08/2024    ALKPHOS 127 07/08/2024    BILITOTAL 0.4 07/08/2024     OTHER:   Lab Results   Component Value Date    LACT 1.4 06/02/2023    A1C 5.9 (H) 11/07/2023    SESAR 8.6 (L) 10/28/2024    PHOS 2.9 07/08/2024    MAG 2.1 07/08/2024    CRP 11.3 (H) 06/02/2023       Anesthesia Plan    ASA Status:  3    NPO Status:  NPO Appropriate    Anesthesia Type: MAC.     - Reason for MAC: straight local not clinically adequate   Induction: Intravenous, Propofol.   Maintenance: TIVA.        Consents    Anesthesia Plan(s) and associated risks, benefits, and realistic alternatives discussed. Questions answered and patient/representative(s) expressed understanding.     - Discussed: Risks, Benefits and Alternatives for BOTH SEDATION and the PROCEDURE were discussed     - Discussed with:  Patient      - Extended Intubation/Ventilatory Support Discussed: No.      - " "Patient is DNR/DNI Status: No     Use of blood products discussed: No .     Postoperative Care    Pain management: IV analgesics, Oral pain medications, Multi-modal analgesia.   PONV prophylaxis: Dexamethasone or Solumedrol, Ondansetron (or other 5HT-3), Background Propofol Infusion     Comments:               Darrell iKrby MD    I have reviewed the pertinent notes and labs in the chart from the past 30 days and (re)examined the patient.  Any updates or changes from those notes are reflected in this note.    Clinically Significant Risk Factors Present on Admission                   # Hypertension: Noted on problem list           # Obesity: Estimated body mass index is 30.13 kg/m  as calculated from the following:    Height as of 1/7/25: 1.778 m (5' 10\").    Weight as of 1/7/25: 95.3 kg (210 lb).                "

## 2025-02-06 ENCOUNTER — ANESTHESIA (OUTPATIENT)
Dept: SURGERY | Facility: AMBULATORY SURGERY CENTER | Age: 70
End: 2025-02-06
Payer: MEDICARE

## 2025-02-06 ENCOUNTER — LAB (OUTPATIENT)
Dept: LAB | Facility: CLINIC | Age: 70
End: 2025-02-06
Payer: MEDICARE

## 2025-02-06 ENCOUNTER — OFFICE VISIT (OUTPATIENT)
Dept: TRANSPLANT | Facility: CLINIC | Age: 70
End: 2025-02-06
Payer: MEDICARE

## 2025-02-06 VITALS
DIASTOLIC BLOOD PRESSURE: 75 MMHG | SYSTOLIC BLOOD PRESSURE: 147 MMHG | HEART RATE: 60 BPM | TEMPERATURE: 97.7 F | RESPIRATION RATE: 16 BRPM | OXYGEN SATURATION: 97 % | BODY MASS INDEX: 30.58 KG/M2 | WEIGHT: 213.1 LBS

## 2025-02-06 VITALS — HEART RATE: 62 BPM

## 2025-02-06 DIAGNOSIS — Z92.21 STATUS POST CHEMOTHERAPY: ICD-10-CM

## 2025-02-06 DIAGNOSIS — G47.00 ORGANIC INSOMNIA: ICD-10-CM

## 2025-02-06 DIAGNOSIS — Z94.81 STATUS POST BONE MARROW TRANSPLANT (H): ICD-10-CM

## 2025-02-06 DIAGNOSIS — Z91.89 AT HIGH RISK FOR OSTEOPOROSIS: ICD-10-CM

## 2025-02-06 DIAGNOSIS — Z94.81 STATUS POST BONE MARROW TRANSPLANT (H): Primary | ICD-10-CM

## 2025-02-06 DIAGNOSIS — Z91.89 AT RISK FOR HYPOTHYROIDISM: ICD-10-CM

## 2025-02-06 DIAGNOSIS — C90.00 MULTIPLE MYELOMA, REMISSION STATUS UNSPECIFIED (H): ICD-10-CM

## 2025-02-06 DIAGNOSIS — Z79.620 LONG TERM (CURRENT) USE OF IMMUNOSUPPRESSIVE BIOLOGIC: ICD-10-CM

## 2025-02-06 DIAGNOSIS — Z79.52 LONG TERM (CURRENT) USE OF SYSTEMIC STEROIDS: ICD-10-CM

## 2025-02-06 DIAGNOSIS — C90.00 MULTIPLE MYELOMA NOT HAVING ACHIEVED REMISSION (H): ICD-10-CM

## 2025-02-06 DIAGNOSIS — Z92.89 HISTORY OF BLOOD TRANSFUSION: ICD-10-CM

## 2025-02-06 LAB
ALBUMIN SERPL BCG-MCNC: 4.3 G/DL (ref 3.5–5.2)
ALP SERPL-CCNC: 118 U/L (ref 40–150)
ALT SERPL W P-5'-P-CCNC: 60 U/L (ref 0–70)
ANION GAP SERPL CALCULATED.3IONS-SCNC: 12 MMOL/L (ref 7–15)
AST SERPL W P-5'-P-CCNC: 45 U/L (ref 0–45)
BASOPHILS # BLD AUTO: 0.1 10E3/UL (ref 0–0.2)
BASOPHILS NFR BLD AUTO: 2 %
BILIRUB SERPL-MCNC: 0.6 MG/DL
BUN SERPL-MCNC: 9.8 MG/DL (ref 8–23)
CALCIUM SERPL-MCNC: 8.5 MG/DL (ref 8.8–10.4)
CD19 CELLS # BLD: 60 CELLS/UL (ref 107–698)
CD19 CELLS NFR BLD: 7 % (ref 6–27)
CD3 CELLS # BLD: 346 CELLS/UL (ref 603–2990)
CD3 CELLS NFR BLD: 42 % (ref 49–84)
CD3+CD4+ CELLS # BLD: 231 CELLS/UL (ref 441–2156)
CD3+CD4+ CELLS NFR BLD: 28 % (ref 28–63)
CD3+CD4+ CELLS/CD3+CD8+ CLL BLD: 2.17 % (ref 1.4–2.6)
CD3+CD8+ CELLS # BLD: 107 CELLS/UL (ref 125–1312)
CD3+CD8+ CELLS NFR BLD: 13 % (ref 10–40)
CD3-CD16+CD56+ CELLS # BLD: 422 CELLS/UL (ref 95–640)
CD3-CD16+CD56+ CELLS NFR BLD: 51 % (ref 4–25)
CHLORIDE SERPL-SCNC: 107 MMOL/L (ref 98–107)
CREAT SERPL-MCNC: 0.86 MG/DL (ref 0.67–1.17)
EGFRCR SERPLBLD CKD-EPI 2021: >90 ML/MIN/1.73M2
EOSINOPHIL # BLD AUTO: 0.2 10E3/UL (ref 0–0.7)
EOSINOPHIL NFR BLD AUTO: 6 %
ERYTHROCYTE [DISTWIDTH] IN BLOOD BY AUTOMATED COUNT: 14.6 % (ref 10–15)
FERRITIN SERPL-MCNC: 22 NG/ML (ref 31–409)
GLUCOSE SERPL-MCNC: 105 MG/DL (ref 70–99)
HCO3 SERPL-SCNC: 23 MMOL/L (ref 22–29)
HCT VFR BLD AUTO: 38.9 % (ref 40–53)
HGB BLD-MCNC: 12.6 G/DL (ref 13.3–17.7)
IMM GRANULOCYTES # BLD: 0 10E3/UL
IMM GRANULOCYTES NFR BLD: 0 %
LDH SERPL L TO P-CCNC: 203 U/L (ref 0–250)
LYMPHOCYTES # BLD AUTO: 0.7 10E3/UL (ref 0.8–5.3)
LYMPHOCYTES NFR BLD AUTO: 25 %
MAGNESIUM SERPL-MCNC: 2.2 MG/DL (ref 1.7–2.3)
MCH RBC QN AUTO: 27.8 PG (ref 26.5–33)
MCHC RBC AUTO-ENTMCNC: 32.4 G/DL (ref 31.5–36.5)
MCV RBC AUTO: 86 FL (ref 78–100)
MONOCYTES # BLD AUTO: 0.4 10E3/UL (ref 0–1.3)
MONOCYTES NFR BLD AUTO: 13 %
NEUTROPHILS # BLD AUTO: 1.5 10E3/UL (ref 1.6–8.3)
NEUTROPHILS NFR BLD AUTO: 53 %
NRBC # BLD AUTO: 0 10E3/UL
NRBC BLD AUTO-RTO: 0 /100
PHOSPHATE SERPL-MCNC: 2.9 MG/DL (ref 2.5–4.5)
PLATELET # BLD AUTO: 183 10E3/UL (ref 150–450)
POTASSIUM SERPL-SCNC: 3.9 MMOL/L (ref 3.4–5.3)
PROT SERPL-MCNC: 6.6 G/DL (ref 6.4–8.3)
RBC # BLD AUTO: 4.54 10E6/UL (ref 4.4–5.9)
SODIUM SERPL-SCNC: 142 MMOL/L (ref 135–145)
T CELL EXTENDED COMMENT: ABNORMAL
TOTAL PROTEIN SERUM FOR ELP: 6.4 G/DL (ref 6.4–8.3)
TSH SERPL DL<=0.005 MIU/L-ACNC: 4.09 UIU/ML (ref 0.3–4.2)
URATE SERPL-MCNC: 5.6 MG/DL (ref 3.4–7)
VIT D+METAB SERPL-MCNC: 17 NG/ML (ref 20–50)
WBC # BLD AUTO: 2.9 10E3/UL (ref 4–11)

## 2025-02-06 PROCEDURE — 82310 ASSAY OF CALCIUM: CPT

## 2025-02-06 PROCEDURE — 84403 ASSAY OF TOTAL TESTOSTERONE: CPT

## 2025-02-06 PROCEDURE — 36415 COLL VENOUS BLD VENIPUNCTURE: CPT

## 2025-02-06 PROCEDURE — 86357 NK CELLS TOTAL COUNT: CPT

## 2025-02-06 PROCEDURE — 85018 HEMOGLOBIN: CPT

## 2025-02-06 PROCEDURE — 85004 AUTOMATED DIFF WBC COUNT: CPT

## 2025-02-06 PROCEDURE — 82040 ASSAY OF SERUM ALBUMIN: CPT

## 2025-02-06 PROCEDURE — 84155 ASSAY OF PROTEIN SERUM: CPT

## 2025-02-06 PROCEDURE — 82728 ASSAY OF FERRITIN: CPT

## 2025-02-06 PROCEDURE — 86355 B CELLS TOTAL COUNT: CPT

## 2025-02-06 PROCEDURE — 82306 VITAMIN D 25 HYDROXY: CPT

## 2025-02-06 PROCEDURE — 84443 ASSAY THYROID STIM HORMONE: CPT

## 2025-02-06 PROCEDURE — 83735 ASSAY OF MAGNESIUM: CPT

## 2025-02-06 PROCEDURE — 84550 ASSAY OF BLOOD/URIC ACID: CPT

## 2025-02-06 PROCEDURE — 83615 LACTATE (LD) (LDH) ENZYME: CPT

## 2025-02-06 PROCEDURE — 84100 ASSAY OF PHOSPHORUS: CPT

## 2025-02-06 RX ORDER — PROPOFOL 10 MG/ML
INJECTION, EMULSION INTRAVENOUS CONTINUOUS PRN
Status: DISCONTINUED | OUTPATIENT
Start: 2025-02-06 | End: 2025-02-06

## 2025-02-06 RX ORDER — SODIUM CHLORIDE, SODIUM LACTATE, POTASSIUM CHLORIDE, CALCIUM CHLORIDE 600; 310; 30; 20 MG/100ML; MG/100ML; MG/100ML; MG/100ML
INJECTION, SOLUTION INTRAVENOUS CONTINUOUS PRN
Status: DISCONTINUED | OUTPATIENT
Start: 2025-02-06 | End: 2025-02-06

## 2025-02-06 RX ORDER — MIRTAZAPINE 15 MG/1
15 TABLET, FILM COATED ORAL AT BEDTIME
Qty: 90 TABLET | Refills: 2 | Status: SHIPPED | OUTPATIENT
Start: 2025-02-06

## 2025-02-06 RX ORDER — PROPOFOL 10 MG/ML
INJECTION, EMULSION INTRAVENOUS PRN
Status: DISCONTINUED | OUTPATIENT
Start: 2025-02-06 | End: 2025-02-06

## 2025-02-06 RX ORDER — LIDOCAINE HYDROCHLORIDE 20 MG/ML
INJECTION, SOLUTION INFILTRATION; PERINEURAL PRN
Status: DISCONTINUED | OUTPATIENT
Start: 2025-02-06 | End: 2025-02-06

## 2025-02-06 RX ADMIN — SODIUM CHLORIDE, SODIUM LACTATE, POTASSIUM CHLORIDE, CALCIUM CHLORIDE: 600; 310; 30; 20 INJECTION, SOLUTION INTRAVENOUS at 12:46

## 2025-02-06 RX ADMIN — PROPOFOL 200 MCG/KG/MIN: 10 INJECTION, EMULSION INTRAVENOUS at 12:47

## 2025-02-06 RX ADMIN — LIDOCAINE HYDROCHLORIDE 100 MG: 20 INJECTION, SOLUTION INFILTRATION; PERINEURAL at 12:47

## 2025-02-06 RX ADMIN — PROPOFOL 40 MG: 10 INJECTION, EMULSION INTRAVENOUS at 12:50

## 2025-02-06 ASSESSMENT — PAIN SCALES - GENERAL: PAINLEVEL_OUTOF10: NO PAIN (0)

## 2025-02-06 NOTE — ANESTHESIA POSTPROCEDURE EVALUATION
Patient: Billy Carroll    Procedure: Procedure(s):  BIOPSY, BONE MARROW       Anesthesia Type:  MAC    Note:  Disposition: Outpatient   Postop Pain Control: Uneventful            Sign Out: Well controlled pain   PONV: No   Neuro/Psych: Uneventful            Sign Out: Acceptable/Baseline neuro status   Airway/Respiratory: Uneventful            Sign Out: Acceptable/Baseline resp. status   CV/Hemodynamics: Uneventful            Sign Out: Acceptable CV status; No obvious hypovolemia; No obvious fluid overload   Other NRE:    DID A NON-ROUTINE EVENT OCCUR?            Last vitals:  Vitals Value Taken Time   /67 02/06/25 1315   Temp 36.3  C (97.4  F) 02/06/25 1307   Pulse 52 02/06/25 1315   Resp 16 02/06/25 1315   SpO2 100 % 02/06/25 1330       Electronically Signed By: Darrell Kirby MD  February 6, 2025  1:31 PM

## 2025-02-06 NOTE — ANESTHESIA CARE TRANSFER NOTE
Patient: Billy Carroll    Procedure: Procedure(s):  BIOPSY, BONE MARROW       Diagnosis: Multiple myeloma in remission (H) [C90.01]  Status post bone marrow transplant (H) [Z94.81]  Diagnosis Additional Information: No value filed.    Anesthesia Type:   MAC     Note:    Oropharynx: oropharynx clear of all foreign objects and spontaneously breathing  Level of Consciousness: awake  Oxygen Supplementation: room air    Independent Airway: airway patency satisfactory and stable  Dentition: dentition unchanged  Vital Signs Stable: post-procedure vital signs reviewed and stable  Report to RN Given: handoff report given  Patient transferred to: Phase II    Handoff Report: Identifed the Patient, Identified the Reponsible Provider, Reviewed the pertinent medical history, Discussed the surgical course, Reviewed Intra-OP anesthesia mangement and issues during anesthesia, Set expectations for post-procedure period and Allowed opportunity for questions and acknowledgement of understanding      Vitals:  Vitals Value Taken Time   /86 02/06/25 1307   Temp 36.3  C (97.4  F) 02/06/25 1307   Pulse 66 02/06/25 1307   Resp 12 02/06/25 1307   SpO2 95 % 02/06/25 1313   Vitals shown include unfiled device data.    Electronically Signed By: LEANNE Reynaga CRNA  February 6, 2025  1:14 PM

## 2025-02-06 NOTE — LETTER
2/6/2025      Billy Carroll  [address] [place] [address]      Dear Colleague,    Thank you for referring your patient, Billy Carroll, to the [facility] BLOOD AND MARROW TRANSPLANT PROGRAM [place]. Please see a copy of my visit note below.    BMT ONC Adult Bone Marrow Biopsy Procedure Note  February 6, 2025  There were no vitals taken for this visit.     Learning needs assessment complete within 12 months? YES    DIAGNOSIS: MM     PROCEDURE: Unilateral Bone Marrow Biopsy and Unilateral Aspirate    [place] [facility] [place]    Patient s identification was positively verified by verbal identification and invasive procedure safety checklist was completed. Informed consent was obtained. Following the administration of  MAC  as pre-medication, patient was placed in the prone position and prepped and draped in a sterile manner. Approximately 10 cc of 1% Lidocaine was used over the left posterior iliac spine. Following this a 3 mm incision was made. Trephine bone marrow core(s) was (were) obtained from the [facility]. Bone marrow aspirates were obtained from the [facility]. Aspirates were sent for morphology, immunophenotyping, and clonoseq. A total of approximately 12 ml of marrow was aspirated. Following this procedure a sterile dressing was applied to the bone marrow biopsy site(s). The patient was placed in the supine position to maintain pressure on the biopsy site. Post-procedure wound care instructions were given.     Complications: NO         Interventions: NO    Length of procedure:20 minutes or less      Procedure performed by: Kassandra Sims pa-c  [phone]        Again, thank you for allowing me to participate in the care of your patient.        Sincerely,        [facility] Provider    Electronically signed

## 2025-02-06 NOTE — PROGRESS NOTES
BMT ONC Adult Bone Marrow Biopsy Procedure Note  February 6, 2025  There were no vitals taken for this visit.     Learning needs assessment complete within 12 months? YES    DIAGNOSIS: MM     PROCEDURE: Unilateral Bone Marrow Biopsy and Unilateral Aspirate    LOC[place]: [facility] [place] 5th floor-Procedure Room    Patient s identification was positively verified by verbal identification and invasive procedure safety checklist was completed. Informed consent was obtained. Following the administration of  MAC  as pre-medication, patient was placed in the prone position and prepped and draped in a sterile manner. Approximately 10 cc of 1% Lidocaine was used over the left posterior iliac spine. Following this a 3 mm incision was made. Trephine bone marrow core(s) was (were) obtained from the IC. Bone marrow aspirates were obtained from the [facility]. Aspirates were sent for morphology, immunophenotyping, and clonoseq. A total of approximately 12 ml of marrow was aspirated. Following this procedure a sterile dressing was applied to the bone marrow biopsy site(s). The patient was placed in the supine position to maintain pressure on the biopsy site. Post-procedure wound care instructions were given.     Complications: NO         Interventions: NO    Length of procedure:20 minutes or less      Procedure performed by: Kassandra Sims pa-c  [phone]

## 2025-02-06 NOTE — NURSING NOTE
Chief Complaint   Patient presents with    Blood Draw     Vitals, blood drawn off PIV that was already in place.     YESSENIA Decker LPN

## 2025-02-08 LAB — TESTOST SERPL-MCNC: 200 NG/DL (ref 240–950)

## 2025-02-09 LAB
DEPRECATED CALCIDIOL+CALCIFEROL SERPL-MC: <27 UG/L (ref 20–75)
VITAMIN D2 SERPL-MCNC: <5 UG/L
VITAMIN D3 SERPL-MCNC: 22 UG/L

## 2025-02-10 ENCOUNTER — OFFICE VISIT (OUTPATIENT)
Dept: TRANSPLANT | Facility: CLINIC | Age: 70
End: 2025-02-10
Payer: MEDICARE

## 2025-02-10 VITALS
HEART RATE: 55 BPM | BODY MASS INDEX: 31.04 KG/M2 | DIASTOLIC BLOOD PRESSURE: 82 MMHG | TEMPERATURE: 97.7 F | SYSTOLIC BLOOD PRESSURE: 145 MMHG | RESPIRATION RATE: 16 BRPM | WEIGHT: 216.3 LBS | OXYGEN SATURATION: 98 %

## 2025-02-10 DIAGNOSIS — C90.01 MULTIPLE MYELOMA IN REMISSION (H): ICD-10-CM

## 2025-02-10 DIAGNOSIS — E61.1 IRON DEFICIENCY: Primary | ICD-10-CM

## 2025-02-10 DIAGNOSIS — Z94.81 STATUS POST BONE MARROW TRANSPLANT (H): ICD-10-CM

## 2025-02-10 LAB
IRON BINDING CAPACITY (ROCHE): 500 UG/DL (ref 240–430)
IRON SATN MFR SERPL: 14 % (ref 15–46)
IRON SERPL-MCNC: 72 UG/DL (ref 61–157)

## 2025-02-10 PROCEDURE — G0009 ADMIN PNEUMOCOCCAL VACCINE: HCPCS

## 2025-02-10 PROCEDURE — 90677 PCV20 VACCINE IM: CPT

## 2025-02-10 PROCEDURE — 250N000021 HC RX MED A9270 GY (STAT IND- M) 250

## 2025-02-10 PROCEDURE — 99214 OFFICE O/P EST MOD 30 MIN: CPT

## 2025-02-10 PROCEDURE — 90750 HZV VACC RECOMBINANT IM: CPT

## 2025-02-10 PROCEDURE — 90471 IMMUNIZATION ADMIN: CPT

## 2025-02-10 PROCEDURE — 90697 DTAP-IPV-HIB-HEPB VACCINE IM: CPT

## 2025-02-10 PROCEDURE — G0463 HOSPITAL OUTPT CLINIC VISIT: HCPCS | Mod: 25

## 2025-02-10 PROCEDURE — 250N000011 HC RX IP 250 OP 636

## 2025-02-10 PROCEDURE — 90472 IMMUNIZATION ADMIN EACH ADD: CPT

## 2025-02-10 RX ORDER — ACYCLOVIR 400 MG/1
400 TABLET ORAL EVERY 12 HOURS
COMMUNITY
Start: 2024-12-16

## 2025-02-10 RX ADMIN — ZOSTER VACCINE RECOMBINANT, ADJUVANTED 0.5 ML: KIT at 13:02

## 2025-02-10 RX ADMIN — DIPHTHERIA AND TETANUS TOXOIDS AND ACELLULAR PERTUSSIS, INACTIVATED POLIOVIRUS, HAEMOPHILUS B CONJUGATE AND HEPATITIS B VACCINE 0.5 ML: 15; 5; 20; 20; 3; 5; 29; 7; 26; 10; 3 INJECTION, SUSPENSION INTRAMUSCULAR at 13:01

## 2025-02-10 RX ADMIN — PNEUMOCOCCAL 20-VALENT CONJUGATE VACCINE 0.5 ML
2.2; 2.2; 2.2; 2.2; 2.2; 2.2; 2.2; 2.2; 2.2; 2.2; 2.2; 2.2; 2.2; 2.2; 2.2; 2.2; 4.4; 2.2; 2.2; 2.2 INJECTION, SUSPENSION INTRAMUSCULAR at 13:01

## 2025-02-10 ASSESSMENT — PAIN SCALES - GENERAL: PAINLEVEL_OUTOF10: NO PAIN (0)

## 2025-02-10 NOTE — NURSING NOTE
"Oncology Rooming Note    February 10, 2025 11:13 AM   Billy Carroll is a 69 year old male who presents for:    Chief Complaint   Patient presents with    Oncology Clinic Visit     Multiple Myeloma     Initial Vitals: BP (!) 145/82 (BP Location: Right arm, Patient Position: Sitting, Cuff Size: Adult Large)   Pulse 55   Temp 97.7  F (36.5  C) (Oral)   Resp 16   Wt 98.1 kg (216 lb 4.8 oz)   SpO2 98%   BMI 31.04 kg/m   Estimated body mass index is 31.04 kg/m  as calculated from the following:    Height as of 2/6/25: 1.778 m (5' 10\").    Weight as of this encounter: 98.1 kg (216 lb 4.8 oz). Body surface area is 2.2 meters squared.  No Pain (0) Comment: Data Unavailable   No LMP for male patient.  Allergies reviewed: Yes  Medications reviewed: Yes    Medications: Medication refills not needed today.  Pharmacy name entered into [employer]: [facility] [place], [place] - [address]    Frailty Screening:   Is the patient here for a new oncology consult visit in cancer care? 2. No      Clinical concerns: None       Oanh Machuca LPN  2/10/2025              "

## 2025-02-10 NOTE — PROGRESS NOTES
BMT Progress Note  02/10/2025     ID: Mr. Carroll is a 69 year old man D 397 for standard risk IgG lambda kappa multiple myeloma.  Standard risk.    INTERVAL HISTORY: Feels OK overall.  Energy is returning, but not quite back to pre-transplant baseline.  Out and able to golf with friends without issues.  Appetite is good.  Developing some neuropathy in his feet with lenalidomide maintenance.  No nausea or vomiting.  He has some pain from prior myeloma bone lesions, but these are stable and unchanged compared to prior to to transplant.  No swelling. No shortness of breath. No cough.  No recent illnesses.  Some intermittent cramping in his hands.      ROS: Review of systems with pertinent positive and negatives in HPI    Onc Hx: FISH:  RESULTS:    ABNORMAL: HIGH-RISK  - No high risk abnormality detected     ABNORMAL: OTHER  - Gain of chromosomes 5, 9, and 15 (25%)  - Gain of chromosome 11 (1%)     NORMAL  - No loss of 1p or gain of 1q  - No loss of 13q  - No loss of TP53  - No rearrangement of IGH    Exam:  Blood pressure (!) 145/82, pulse 55, temperature 97.7  F (36.5  C), temperature source Oral, resp. rate 16, weight 98.1 kg (216 lb 4.8 oz), SpO2 98%.  Wt Readings from Last 4 Encounters:   02/10/25 98.1 kg (216 lb 4.8 oz)   02/06/25 95.3 kg (210 lb)   02/06/25 96.7 kg (213 lb 1.6 oz)   01/07/25 95.3 kg (210 lb)     Constitutional: NAD, in good spirits  HEENT: sclera anicteric. OP moist without lesions.  Good dentition.    Pulm: CTAB, normal WOB on RA  CV: RRR  Ext: no edema  Neuro: alert, conversant. Non-focal  Psych: appropriate mood and affect  Access:  No access.     Lab Results   Component Value Date    WBC 2.9 (L) 02/06/2025    ANEU 3.7 01/30/2024    HGB 12.6 (L) 02/06/2025    HCT 38.9 (L) 02/06/2025     02/06/2025     02/06/2025    POTASSIUM 3.9 02/06/2025    CHLORIDE 107 02/06/2025    CO2 23 02/06/2025     (H) 02/06/2025    BUN 9.8 02/06/2025    CR 0.86 02/06/2025    MAG 2.2 02/06/2025     INR 1.00 02/06/2024    AST 45 02/06/2025    ALT 60 02/06/2025        [facility] Reference Range & Units 07/08/24 11:43 07/08/24 13:43 07/08/24 13:47   Absolute CD16+56 95 - 640 cells/uL  356    Absolute CD19 107 - 698 cells/uL  79 (L)    Absolute CD3 603 - 2,990 cells/uL  405 (L)    Absolute CD4 441 - 2,156 cells/uL  263 (L)    Absolute CD8 125 - 1,312 cells/uL  131    Albumin Fraction 3.7 - 5.1 g/dL 3.9     Alpha 1 Fraction 0.2 - 0.4 g/dL 0.3     Alpha 2 Fraction 0.5 - 0.9 g/dL 0.6     Beta Fraction 0.6 - 1.0 g/dL 0.8     CD16 + 56 Natural Killer Cells 4 - 25 %  42 (H)    CD19 B Cells 6 - 27 %  9    CD3 Mature T 49 - 84 %  48 (L)    CD4:CD8 Ratio 1.40 - 2.60   2.00    CD4 [place] T 28 - 63 %  31    CD8 Suppressor T 10 - 40 %  16    ELP Interpretation:  Very small monoclonal protein (0.1 g/dL) seen in the gamma fraction. See immunofixation report on same specimen. Hypogammaglobulinemia. Pathologic significance requires clinical correlation. Marva Jordan M.D., Ph.D.     ELP Interpretation Urine    No obvious monoclonal protein seen and the absence of a monoclonal immunoglobulin was confirmed by immunofixation of this same urine sample. See immunofixation report on same sample. Pathologic significance requires clinical correlation.  Marva Jordan M.D., Ph.D.   Gamma Fraction 0.7 - 1.6 g/dL 0.4 (L)     IGA 84 - 499 mg/dL 8 (L)      - 1,616 mg/dL 429 (L)     IGM 35 - 242 mg/dL 13 (L)     Immunofix ELP Urine    No monoclonal protein seen on immunofixation. Pathologic significance requires clinical correlation.  Marva Jordan M.D., Ph.D.   Immunofixation ELP  Monoclonal IgG immunoglobulin of kappa light chain type. Monoclonal antibody therapeutics (e.g. Daratumumab) may appear as monoclonal proteins on serum electrophoresis and immunofixation. Results should be interpreted with caution. Pathologic significance requires clinical correlation.  Marva Jordan M.D., Ph.D.     Kappa Free Lt Chain 0.33 - 1.94 mg/dL  0.57    Kappa  Lambda Ratio 0.26 - 1.65   1.36    Lambda Free Lt Chain 0.57 - 2.63 mg/dL  0.42 (L)    Monoclonal Peak <=0.0 g/dL 0.1 (H)     Total Protein Serum for ELP 6.4 - 8.3 g/dL 6.0 (L)     (L): Data is abnormally low  (H): Data is abnormally high    Clonoseq 4/18/24: very low level of MRD detected    Last BMBx from D100 in April 2024.  No D180 BMBx per protocol.      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    - PMHx significant for CAD, severe aortic stenosis s/p TAVR (10/24/2023) currently ongoing cardiac rehabilitation, HTN, and HLD.     BMT/MM cell dose 1.57 x 10^6  Engrafted well now   Restaging at day 28-  CR, FLAVIO  PET and BMBx at day 100 - stringent CR with MRD by clonoseq  D180 Mspike remains stable at 0.1.  Note this never went to 0.0, although he does not appear to have had prior monoclonal antibody therapy.  He follows closely with his referring oncologist for lenalidomide maintenance.      Cont maintenance Revlamid 10mg daily     Continue antiresorptive therapy locally.  Teeth are in good condition at D180 visit.  Discussed what to report with bisphosphonates.      HEME/COAG - no more transfusions now.  Counts are improving.    Remains mildly anemia.  WBC and plts recovered at D180.   CD4 263, but still low at D180.     IMMUNOCOMPROMISED - no active infections now.   - prophy ACV,   # hx PJP pneumonia: on Bactrim - for 1 year (3 x week)    CARDIOVASCULAR - Monitor volume status carefully given cardiac history  #HTN: losartan 50mg daily and metoprolol XL 50mg daily.   #Severe aortic stenosis, now s/p TAVR 10/24/2023.   #Moderate nonobstructive coronary artery disease   #Hyperlipidemia - resume statin now  resumed ASA     GI:  Ulcer prophy: protonix discontinued.      FEN/Renal:all fine   - creat, lytes wnl  -Calcium/vitamin D supplementation for bone health    MUSCULOSKELETAL/FRAILTY  Baseline Frailty Score: 2  Cancer  Rehab as needed outpatient    PSCYH:  # Anxiety: resume Remeron 15mg q hs for insomnia and mood   Also has Atarax prn and referral to Mental Health.    Summary:  -1.5yr  return with staging per protocol.    -Return to  to [facility] maintanance therapy      A total of 40 minutes spent on the date of the encounter doing chart review, review of test results, interpretation of tests, patient visit, and documentation.  The patient was given the opportunity to ask multiple questions today, all of which were answered to their satisfaction.     Jerri Frey MD

## 2025-02-10 NOTE — NURSING NOTE
Administrations This Visit       CLdF-WES-Qfu-Hepatitis B Recmb (VAXELIS) injection (pre-filled syringe) 0.5 mL       Admin Date  02/10/2025 Action  $Given Dose  0.5 mL Route  Intramuscular Documented By  Sharmin Arredondo RN              pneumococcal (PREVNAR 20) injection 0.5 mL       Admin Date  02/10/2025 Action  $Given Dose  0.5 mL Route  Intramuscular Documented By  Sharmin Arredondo, LATONYA              zoster vaccine recombinant adjuvanted (SHINGRIX) injection 0.5 mL       Admin Date  02/10/2025 Action  $Given Dose  0.5 mL Route  Intramuscular Documented By  Sharmin Arredondo RN                  12mo vaccines given, per Dr. Frey, intramuscularly in bilateral deltoids. See MAR for details. Patient tolerated well, discharged in stable condition. VIS provided.    Sharmin Arredondo RN

## 2025-02-10 NOTE — LETTER
2/10/2025      Billy Carroll  [address] [place] [address]      Dear Colleague,    Thank you for referring your patient, Billy Carroll, to the [facility] [place]. Please see a copy of my visit note below.    BMT Progress Note  02/10/2025     ID: Mr. Carroll is a 69 year old man D 397 for standard risk IgG lambda kappa multiple myeloma.  Standard risk.    INTERVAL HISTORY: Feels OK overall.  Energy is returning, but not quite back to pre-transplant baseline.  Out and able to golf with friends without issues.  Appetite is good.  Developing some neuropathy in his feet with lenalidomide maintenance.  No nausea or vomiting.  He has some pain from prior myeloma bone lesions, but these are stable and unchanged compared to prior to to transplant.  No swelling. No shortness of breath. No cough.  No recent illnesses.  Some intermittent cramping in his hands.      ROS: Review of systems with pertinent positive and negatives in HPI    Onc Hx: FISH:  RESULTS:    ABNORMAL: HIGH-RISK  - No high risk abnormality detected     ABNORMAL: OTHER  - Gain of chromosomes 5, 9, and 15 (25%)  - Gain of chromosome 11 (1%)     NORMAL  - No loss of 1p or gain of 1q  - No loss of 13q  - No loss of TP53  - No rearrangement of IGH    Exam:  Blood pressure (!) 145/82, pulse 55, temperature 97.7  F (36.5  C), temperature source Oral, resp. rate 16, weight 98.1 kg (216 lb 4.8 oz), SpO2 98%.  Wt Readings from Last 4 Encounters:   02/10/25 98.1 kg (216 lb 4.8 oz)   02/06/25 95.3 kg (210 lb)   02/06/25 96.7 kg (213 lb 1.6 oz)   01/07/25 95.3 kg (210 lb)     Constitutional: NAD, in good spirits  HEENT: sclera anicteric. OP moist without lesions.  Good dentition.    Pulm: CTAB, normal WOB on RA  CV: RRR  Ext: no edema  Neuro: alert, conversant. Non-focal  Psych: appropriate mood and affect  Access:  No access.     Lab Results   Component Value Date    WBC 2.9 (L) 02/06/2025    ANEU 3.7 01/30/2024     HGB 12.6 (L) 02/06/2025    HCT 38.9 (L) 02/06/2025     02/06/2025     02/06/2025    POTASSIUM 3.9 02/06/2025    CHLORIDE 107 02/06/2025    CO2 23 02/06/2025     (H) 02/06/2025    BUN 9.8 02/06/2025    CR 0.86 02/06/2025    MAG 2.2 02/06/2025    INR 1.00 02/06/2024    AST 45 02/06/2025    ALT 60 02/06/2025        [facility] Reference Range & Units 07/08/24 11:43 07/08/24 13:43 07/08/24 13:47   Absolute CD16+56 95 - 640 cells/uL  356    Absolute CD19 107 - 698 cells/uL  79 (L)    Absolute CD3 603 - 2,990 cells/uL  405 (L)    Absolute CD4 441 - 2,156 cells/uL  263 (L)    Absolute CD8 125 - 1,312 cells/uL  131    Albumin Fraction 3.7 - 5.1 g/dL 3.9     Alpha 1 Fraction 0.2 - 0.4 g/dL 0.3     Alpha 2 Fraction 0.5 - 0.9 g/dL 0.6     Beta Fraction 0.6 - 1.0 g/dL 0.8     CD16 + 56 Natural Killer Cells 4 - 25 %  42 (H)    CD19 B Cells 6 - 27 %  9    CD3 Mature T 49 - 84 %  48 (L)    CD4:CD8 Ratio 1.40 - 2.60   2.00    CD4 [place] T 28 - 63 %  31    CD8 Suppressor T 10 - 40 %  16    ELP Interpretation:  Very small monoclonal protein (0.1 g/dL) seen in the gamma fraction. See immunofixation report on same specimen. Hypogammaglobulinemia. Pathologic significance requires clinical correlation. Marva Jordan M.D., Ph.D.     ELP Interpretation Urine    No obvious monoclonal protein seen and the absence of a monoclonal immunoglobulin was confirmed by immunofixation of this same urine sample. See immunofixation report on same sample. Pathologic significance requires clinical correlation.  Marva Jordan M.D., Ph.D.   Gamma Fraction 0.7 - 1.6 g/dL 0.4 (L)     IGA 84 - 499 mg/dL 8 (L)      - 1,616 mg/dL 429 (L)     IGM 35 - 242 mg/dL 13 (L)     Immunofix ELP Urine    No monoclonal protein seen on immunofixation. Pathologic significance requires clinical correlation.  Marva Jordan M.D., Ph.D.   Immunofixation ELP  Monoclonal IgG immunoglobulin of kappa light chain type. Monoclonal antibody therapeutics (e.g.  Daratumumab) may appear as monoclonal proteins on serum electrophoresis and immunofixation. Results should be interpreted with caution. Pathologic significance requires clinical correlation.  Marva Jordan M.D., Ph.D.     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    - PMHx significant for CAD, severe aortic stenosis s/p TAVR (10/24/2023) currently ongoing cardiac rehabilitation, HTN, and HLD.     BMT/MM cell dose 1.57 x 10^6  Engrafted well now     PET and BMBx at day 100 - stringent CR with MRD by clonoseq  1 year restaging - PET and BMBx with stringent CR.  cont lenalidomide maintenance.      Cont maintenance Revlamid 10mg daily   DEXA scan with osteopenia/early osteoporosis .   I recommended Reclast yearly     HEME/COAG - no more transfusions now.  Counts are improving.    Remains mildly anemia. Iron deficiency detected today - start iron replacement   CD4 263    IMMUNOCOMPROMISED - no active infections now.   - prophy ACV,   # hx PJP pneumonia: on Bactrim - for 1 year (3 x week)- ok to stop now.   1 year vaccines today     CARDIOVASCULAR - Monitor volume status carefully given cardiac history  #HTN: losartan 50mg daily and metoprolol XL 50mg daily.   #Severe aortic stenosis, now s/p TAVR 10/24/2023.   #Moderate nonobstructive coronary artery disease   #Hyperlipidemia - resume statin now  resumed ASA     GI:  Ulcer prophy: protonix discontinued.      FEN/Renal:all fine   - creat, lytes wnl  -Calcium/vitamin D supplementation for bone  health    MUSCULOSKELETAL/FRAILTY  Baseline Frailty Score: 2  Cancer Rehab as needed outpatient    PSCYH:  # Anxiety: resume Remeron 15mg q hs for insomnia and mood   Also has Atarax prn and referral to Mental Health.    Summary:  -1.5yr  return with staging per protocol.    -Return to  to cont maintanance therapy    - start oral iron replacement therapy and recheck in 2 months     A total of 40 minutes spent on the date of the encounter doing chart review, review of test results, interpretation of tests, patient visit, and documentation.  The patient was given the opportunity to ask multiple questions today, all of which were answered to their satisfaction.     Jerri Frey MD              Again, thank you for allowing me to participate in the care of your patient.        Sincerely,        Jerri Frey MD    Electronically signed

## 2025-02-11 DIAGNOSIS — E55.9 VITAMIN D DEFICIENCY: ICD-10-CM

## 2025-02-11 DIAGNOSIS — R79.89 LOW VITAMIN D LEVEL: ICD-10-CM

## 2025-02-11 DIAGNOSIS — R79.89 LOW TESTOSTERONE: Primary | ICD-10-CM

## 2025-02-11 RX ORDER — ERGOCALCIFEROL 1.25 MG/1
50000 CAPSULE, LIQUID FILLED ORAL WEEKLY
Qty: 12 CAPSULE | Refills: 0 | Status: SHIPPED | OUTPATIENT
Start: 2025-02-11

## 2025-02-11 NOTE — RESULT ENCOUNTER NOTE
Testosterone total slightly low. Recommend further work up through endocrine. Order placed. Vitamin D level low. Ordered high dose Vitamin D 50,000 units PO weekly x 12 weeks with laboratory recheck at that time. Advised patient to have PCP continue to follow Vitamin D in the future. Patient made aware of the plan via [employer].    Viky Zee DNP, MSN, APRN, ACNP-BC  Pager: [phone]

## 2025-02-11 NOTE — PROGRESS NOTES
Prescription for Vitamin D therapy called into pharmacy (see previous note), endocrine referral placed for low random testosterone level for further evaluation.    Viky Zee DNP, MSN, APRN, ACNP-BC  Pager: [phone]

## 2025-02-12 DIAGNOSIS — Z94.81 STATUS POST BONE MARROW TRANSPLANT (H): Primary | ICD-10-CM

## 2025-02-12 DIAGNOSIS — E61.1 IRON DEFICIENCY: ICD-10-CM

## 2025-02-12 DIAGNOSIS — C90.01 MULTIPLE MYELOMA IN REMISSION (H): ICD-10-CM

## 2025-02-12 DIAGNOSIS — Z52.011 AUTOLOGOUS DONOR OF STEM CELLS: ICD-10-CM

## 2025-02-12 RX ORDER — FERROUS SULFATE 325(65) MG
325 TABLET ORAL 2 TIMES DAILY
Qty: 60 TABLET | Refills: 5 | Status: SHIPPED | OUTPATIENT
Start: 2025-02-12

## 2025-02-13 LAB
ABC 95% CONFIDENCE INTERVAL (B-CELL): NORMAL
ABC CLONOSEQ B-CELL TRACKING (MRD) RESULT: NORMAL
ABC DOMINANT SEQUENCES (B-CELL): 1
ABC RESIDUAL CLONAL CELLS/ MILL NUCLEATED CELLS BCELL: 0

## 2025-02-17 ENCOUNTER — OFFICE VISIT (OUTPATIENT)
Dept: FAMILY MEDICINE | Facility: CLINIC | Age: 70
End: 2025-02-17
Payer: MEDICARE

## 2025-02-17 ENCOUNTER — NURSE TRIAGE (OUTPATIENT)
Dept: FAMILY MEDICINE | Facility: CLINIC | Age: 70
End: 2025-02-17

## 2025-02-17 VITALS
OXYGEN SATURATION: 97 % | RESPIRATION RATE: 16 BRPM | DIASTOLIC BLOOD PRESSURE: 74 MMHG | BODY MASS INDEX: 30.06 KG/M2 | WEIGHT: 210 LBS | TEMPERATURE: 97.7 F | SYSTOLIC BLOOD PRESSURE: 150 MMHG | HEART RATE: 61 BPM | HEIGHT: 70 IN

## 2025-02-17 DIAGNOSIS — R05.1 ACUTE COUGH: Primary | ICD-10-CM

## 2025-02-17 PROCEDURE — 99213 OFFICE O/P EST LOW 20 MIN: CPT | Performed by: PHYSICIAN ASSISTANT

## 2025-02-17 PROCEDURE — G2211 COMPLEX E/M VISIT ADD ON: HCPCS | Performed by: PHYSICIAN ASSISTANT

## 2025-02-17 RX ORDER — PREDNISONE 10 MG/1
TABLET ORAL
Qty: 18 TABLET | Refills: 0 | Status: SHIPPED | OUTPATIENT
Start: 2025-02-17 | End: 2025-02-26

## 2025-02-17 ASSESSMENT — ENCOUNTER SYMPTOMS: COUGH: 1

## 2025-02-17 ASSESSMENT — PAIN SCALES - GENERAL: PAINLEVEL_OUTOF10: NO PAIN (0)

## 2025-02-17 NOTE — TELEPHONE ENCOUNTER
"Call received from patient:  One year since stem cell transplant  Restarted vaccinations  Deep cough  Started 2/14/25 and since then symptoms are getting a little worse  Started out as tickle in throat  Taking Dayquil  Afebrile  No trouble breathing  Sinus drainage-mild  Productive cough and unsure of phlegm color  No hemoptysis  No chest pain  Feels tightness in upper chest  Takes Acyclovir BID, Bactrim three times per week  No taking antifungal  No wheezing  Runny nose in mornings- \"not bad\"  Has not checked O2 since feeling good  No known COVID-19 nor Influenza exposure    Writer recommended evaluation today and informed patient no available appts but writer will speak with BEVERLY Fuentes PA-C, regarding possibility of double booked appt this afternoon.  Patient verbalized understanding and in agreement with plan.    Writer huddled with BEVERLY Fuentes PA-C, who agreed to double book patient this afternoon at 1320.      Writer called patient and offered double booked appt with PCP today at 1320.  Patient verbalized understanding and in agreement with plan.    Discussed with patient to please expect a wait as this is a double booked appt.    Patient verbalized understanding and in agreement with plan.    ALESSIA Escobar, RN-[facility]    Reason for Disposition   SEVERE coughing spells (e.g., whooping sound after coughing, vomiting after coughing)     Worsening symptoms, history of multiple myeloma and stem cell transplant    Additional Information   Negative: Bluish (or gray) lips or face   Negative: SEVERE difficulty breathing (e.g., struggling for each breath, speaks in single words)   Negative: Rapid onset of cough and has hives   Negative: Coughing started suddenly after medicine, an allergic food or bee sting   Negative: Difficulty breathing after exposure to flames, smoke, or fumes   Negative: Sounds like a life-threatening emergency to the triager   Negative: Previous asthma attacks " and this feels like asthma attack   Negative: Dry cough (non-productive; no sputum or minimal clear sputum) and within 14 days of COVID-19 Exposure   Negative: MODERATE difficulty breathing (e.g., speaks in phrases, SOB even at rest, pulse 100-120) and still present when not coughing   Negative: Chest pain present when not coughing   Negative: Passed out (e.g., fainted, lost consciousness, blacked out and was not responding)   Negative: Patient sounds very sick or weak to the triager   Negative: MILD difficulty breathing (e.g., minimal/no SOB at rest, SOB with walking, pulse <100) and still present when not coughing   Negative: Coughed up > 1 tablespoon (15 ml) blood (Exception: Blood-tinged sputum.)   Negative: Fever > 103 F (39.4 C)   Negative: Fever > 101 F (38.3 C) and over 60 years of age   Negative: Fever > 100 F (37.8 C) and has diabetes mellitus or a weak immune system (e.g., HIV positive, cancer chemotherapy, organ transplant, splenectomy, chronic steroids)   Negative: Fever > 100 F (37.8 C) and bedridden (e.g., CVA, chronic illness, recovering from surgery)   Negative: Increasing ankle swelling   Negative: Wheezing is present    Protocols used: Cough-A-OH

## 2025-02-17 NOTE — PROGRESS NOTES
"  Assessment & Plan     (R05.1) Acute cough  (primary encounter diagnosis)  Comment:   Plan: predniSONE (DELTASONE) 10 MG tablet,         amoxicillin-clavulanate (AUGMENTIN) 875-125 MG         tablet          Reviewed symptomatic cares including rest, hydration, steam inhalation and over the counter medications as needed for symptomatic relief  Follow up if symptoms worsening or no improvement in 4-5 days  Given prednisone and has albuterol for symptomatic cares, discussed medication side effects.  No CXR performed today based on good SpO2.      BMI  Estimated body mass index is 30.13 kg/m  as calculated from the following:    Height as of this encounter: 1.778 m (5' 10\").    Weight as of this encounter: 95.3 kg (210 lb).             Avila Cervantes is a 69 year old, presenting for the following health issues:  Cough (Started on Saturday as a sore throat/Chest tightness/Mucous/Currently taking DayQuil)        2/17/2025     1:17 PM   Additional Questions   Roomed by Missy MIMS     Cough    History of Present Illness       Reason for visit:  Sore throat cough  Symptom onset:  1-3 days ago   He is taking medications regularly.         Acute Illness  Acute illness concerns: cough and sore throat  Onset/Duration: 3 days  Symptoms:  Fever: No  Chills/Sweats: No  Headache (location?): No  Sinus Pressure: No  Conjunctivitis:  No  Ear Pain: no  Rhinorrhea: YES  Congestion: YES  Sore Throat: YES  Cough: YES-non-productive, waxing and waning over time  Wheeze: YES  Decreased Appetite: No  Nausea: No  Vomiting: No  Diarrhea: No  Dysuria/Freq.: No  Dysuria or Hematuria: No  Fatigue/Achiness: No  Sick/Strep Exposure: No  Therapies tried and outcome: decongestants    Pt is a bone marrow transplant recipient, hx of PNA, on 3x weekly bastrim and antiviral based on BETINA hx.            Objective    BP (!) 162/82 (BP Location: Right arm, Patient Position: Sitting, Cuff Size: Adult Large)   Pulse 61   Temp 97.7  F (36.5  C) (Temporal) " "  Resp 16   Ht 1.778 m (5' 10\")   Wt 95.3 kg (210 lb)   SpO2 97%   BMI 30.13 kg/m    Body mass index is 30.13 kg/m .  Physical Exam   GENERAL: alert and no distress  HENT: ear canals and TM's normal, nose and mouth without ulcers or lesions  NECK: no adenopathy, no asymmetry, masses, or scars  RESP: lungs clear to auscultation - no rales, rhonchi or wheezes and expiratory wheezes throughout  CV: regular rate and rhythm, normal S1 S2, no S3 or S4, no murmur, click or rub, no peripheral edema  ABDOMEN: soft, nontender, no hepatosplenomegaly, no masses and bowel sounds normal  MS: no gross musculoskeletal defects noted, no edema            Signed Electronically by: Cipriano Fuentes PA-C    "

## 2025-03-07 ENCOUNTER — MYC MEDICAL ADVICE (OUTPATIENT)
Dept: CARDIOLOGY | Facility: CLINIC | Age: 70
End: 2025-03-07
Payer: MEDICARE

## 2025-03-13 NOTE — TELEPHONE ENCOUNTER
[place] Community Measures Blood Pressure guideline reviewed.  Patients recent blood pressure is outside of guideline parameters.  Called pt to review, no answer.  Left voicemail message asking patient to check their blood pressure using a home blood pressure cuff or by going to a [facility].  Patient instructed to then call [phone] ([facility]) and leave a message with their name, date of birth, and blood pressure reading that was completed within the last 24 hours and where it was completed.  Will await call back for further review.

## 2025-03-15 ENCOUNTER — OFFICE VISIT (OUTPATIENT)
Dept: URGENT CARE | Facility: URGENT CARE | Age: 70
End: 2025-03-15
Payer: MEDICARE

## 2025-03-15 VITALS
DIASTOLIC BLOOD PRESSURE: 78 MMHG | SYSTOLIC BLOOD PRESSURE: 161 MMHG | BODY MASS INDEX: 30.56 KG/M2 | OXYGEN SATURATION: 96 % | RESPIRATION RATE: 20 BRPM | HEART RATE: 60 BPM | WEIGHT: 213 LBS | TEMPERATURE: 97.9 F

## 2025-03-15 DIAGNOSIS — J18.9 PNEUMONIA DUE TO INFECTIOUS ORGANISM, UNSPECIFIED LATERALITY, UNSPECIFIED PART OF LUNG: Primary | ICD-10-CM

## 2025-03-15 DIAGNOSIS — D84.9 IMMUNOCOMPROMISED: ICD-10-CM

## 2025-03-15 PROCEDURE — 3077F SYST BP >= 140 MM HG: CPT | Performed by: FAMILY MEDICINE

## 2025-03-15 PROCEDURE — 3078F DIAST BP <80 MM HG: CPT | Performed by: FAMILY MEDICINE

## 2025-03-15 PROCEDURE — 99213 OFFICE O/P EST LOW 20 MIN: CPT | Performed by: FAMILY MEDICINE

## 2025-03-15 RX ORDER — AZITHROMYCIN 250 MG/1
TABLET, FILM COATED ORAL
Qty: 6 TABLET | Refills: 0 | Status: SHIPPED | OUTPATIENT
Start: 2025-03-15 | End: 2025-03-20

## 2025-03-15 RX ORDER — CEFDINIR 300 MG/1
300 CAPSULE ORAL 2 TIMES DAILY
Qty: 20 CAPSULE | Refills: 0 | Status: SHIPPED | OUTPATIENT
Start: 2025-03-15 | End: 2025-03-25

## 2025-03-15 RX ORDER — PREDNISONE 10 MG/1
20 TABLET ORAL DAILY
Qty: 10 TABLET | Refills: 0 | Status: SHIPPED | OUTPATIENT
Start: 2025-03-15 | End: 2025-03-20

## 2025-03-15 NOTE — PROGRESS NOTES
Billy Carroll is a 69 year old male who comes in today for cough    Started mid Feb, saw provider  Got amox and prednisone  Improved    Then to [place], felt okay    Got back, similar symptoms back    Deeper in chest    Stem cell transplant a year ago    History of pneumonia    Had multiple myeloma    In remission    ROS    Physical Exam  Constitutional:       Appearance: He is well-developed.   HENT:      Head: Normocephalic and atraumatic.      Right Ear: Tympanic membrane, ear canal and external ear normal.      Left Ear: Tympanic membrane, ear canal and external ear normal.      Nose: Nose normal.      Mouth/Throat:      Mouth: Mucous membranes are moist.      Pharynx: Oropharynx is clear.   Eyes:      Conjunctiva/sclera: Conjunctivae normal.   Neck:      Vascular: No carotid bruit.   Cardiovascular:      Rate and Rhythm: Normal rate and regular rhythm.      Heart sounds: Normal heart sounds.   Pulmonary:      Effort: Pulmonary effort is normal. No respiratory distress.      Breath sounds: Normal breath sounds.   Chest:      Chest wall: No tenderness.   Abdominal:      Palpations: Abdomen is soft.      Tenderness: There is no abdominal tenderness.   Neurological:      Mental Status: He is alert and oriented to person, place, and time.      Cranial Nerves: No cranial nerve deficit.   Psychiatric:         Speech: Speech normal.         Behavior: Behavior normal.     No edema    Radials symmetric    No sinus/ submandib tenderness    ASSESSMENT / PLAN:  (J18.9) Pneumonia due to infectious organism, unspecified laterality, unspecified part of lung  (primary encounter diagnosis)  Comment: discussed in detail with patient.  Similar symptoms to February.  He is high risk given immune status.  Prudent to treat with antibiotics and prednisone.  Cxr would not .  Had GI symptoms on the augmentin last time so go with combination of cefdinir and azith.  Short course prednisone.    Plan: cefdinir (OMNICEF) 300  MG capsule, azithromycin        (ZITHROMAX) 250 MG tablet, predniSONE         (DELTASONE) 10 MG tablet            (D84.9) Immunocompromised  Comment: as above   Plan: as above    Follow up prn symptoms    Be seen promptly if symptoms acutely worsen       I reviewed the patient's medications, allergies, medical history, family history, and social history.    Lonnie Serna MD

## 2025-03-15 NOTE — PATIENT INSTRUCTIONS
Take the antibiotics and prednisone    Continue chronic meds    Stay well hydrated    Follow up as needed based on symptoms     Be seen promptly if symptoms acutely worsen

## 2025-03-22 ENCOUNTER — OFFICE VISIT (OUTPATIENT)
Dept: URGENT CARE | Facility: URGENT CARE | Age: 70
End: 2025-03-22
Payer: MEDICARE

## 2025-03-22 VITALS
WEIGHT: 210 LBS | SYSTOLIC BLOOD PRESSURE: 125 MMHG | DIASTOLIC BLOOD PRESSURE: 79 MMHG | BODY MASS INDEX: 30.13 KG/M2 | HEART RATE: 66 BPM | OXYGEN SATURATION: 96 % | TEMPERATURE: 98.7 F | RESPIRATION RATE: 18 BRPM

## 2025-03-22 DIAGNOSIS — R07.0 THROAT PAIN: Primary | ICD-10-CM

## 2025-03-22 LAB
DEPRECATED S PYO AG THROAT QL EIA: NEGATIVE
S PYO DNA THROAT QL NAA+PROBE: NOT DETECTED

## 2025-03-22 PROCEDURE — 3074F SYST BP LT 130 MM HG: CPT | Performed by: FAMILY MEDICINE

## 2025-03-22 PROCEDURE — 99213 OFFICE O/P EST LOW 20 MIN: CPT | Performed by: FAMILY MEDICINE

## 2025-03-22 PROCEDURE — 3078F DIAST BP <80 MM HG: CPT | Performed by: FAMILY MEDICINE

## 2025-03-22 PROCEDURE — 87651 STREP A DNA AMP PROBE: CPT | Performed by: FAMILY MEDICINE

## 2025-03-22 RX ORDER — NYSTATIN 100000 [USP'U]/ML
500000 SUSPENSION ORAL 4 TIMES DAILY
Qty: 200 ML | Refills: 0 | Status: SHIPPED | OUTPATIENT
Start: 2025-03-22

## 2025-03-23 NOTE — PROGRESS NOTES
Assessment/Plan:   1. Throat pain (Primary)  ***  - Streptococcus A Rapid Screen w/Reflex to PCR - Clinic Collect  - Group A Streptococcus PCR Throat Swab  - nystatin (MYCOSTATIN) 448526 UNIT/ML suspension; Take 5 mLs (500,000 Units) by mouth 4 times daily.  Dispense: 200 mL; Refill: 0    Possible thrush?    I discussed red flag symptoms, return precautions to clinic/ER and follow up care with patient/parent.  Expected clinical course, symptomatic cares advised. Questions answered. Patient/parent amenable with plan.        Subjective:     Billy Carroll is a 69 year old male who presents ***    Allergies   Allergen Reactions    Acetaminophen-Codeine Nausea    Codeine Nausea     Current Outpatient Medications   Medication Sig Dispense Refill    acetaminophen (ACETAMINOPHEN 8 HOUR) 650 MG CR tablet Take 1 tablet (650 mg) by mouth every 8 hours as needed for mild pain or fever      acyclovir (ZOVIRAX) 400 MG tablet Take 400 mg by mouth every 12 hours.      acyclovir (ZOVIRAX) 800 MG tablet Take 1 tablet (800 mg) by mouth 2 times daily Start Day -1 60 tablet 11    albuterol (PROAIR HFA/PROVENTIL HFA/VENTOLIN HFA) 108 (90 Base) MCG/ACT inhaler Inhale 2 puffs into the lungs every 6 hours as needed for shortness of breath, wheezing or cough 18 g 0    aspirin 81 MG EC tablet Take 81 mg by mouth daily.      Calcium Carb-Cholecalciferol (CALCIUM + D3 PO) Take 1 tablet by mouth 2 times daily      cefdinir (OMNICEF) 300 MG capsule Take 1 capsule (300 mg) by mouth 2 times daily for 10 days. 20 capsule 0    ferrous sulfate (FEROSUL) 325 (65 Fe) MG tablet Take 1 tablet (325 mg) by mouth 2 times daily. 60 tablet 5    hydrOXYzine HCl (ATARAX) 25 MG tablet Take 1 tablet up to 4 times daily for anxiety. 15 tablet 3    lactobacillus rhamnosus, GG, (CULTURELL) capsule Take 1 capsule by mouth every morning.      LENalidomide (REVLIMID) 10 MG CAPS capsule Take 10 mg by mouth every morning      loperamide (IMODIUM) 2 MG capsule Take 2 mg by  mouth 4 times daily as needed for diarrhea      loratadine (CLARITIN) 10 MG tablet Take 10 mg by mouth every evening.      losartan (COZAAR) 50 MG tablet Take 1 tablet (50 mg) by mouth daily.      metoprolol succinate ER (TOPROL XL) 50 MG 24 hr tablet Take 50 mg by mouth every morning.      mirtazapine (REMERON) 15 MG tablet TAKE 1 TABLET BY MOUTH AT BEDTIME. 90 tablet 2    nystatin (MYCOSTATIN) 521107 UNIT/ML suspension Take 5 mLs (500,000 Units) by mouth 4 times daily. 200 mL 0    psyllium (METAMUCIL/KONSYL) Packet Take 1 packet by mouth as needed      rosuvastatin (CRESTOR) 5 MG tablet Take 1 tablet (5 mg) by mouth daily 90 tablet 3    sulfamethoxazole-trimethoprim (BACTRIM DS) 800-160 MG tablet Take 1 tablet by mouth Every Mon, Wed, Fri Morning.      triamcinolone (KENALOG) 0.1 % external cream Apply topically 2 times daily. 30 g 1    TURMERIC PO Take 2 capsules by mouth every morning.      vitamin D2 (ERGOCALCIFEROL) 23354 units (1250 mcg) capsule Take 1 capsule (50,000 Units) by mouth once a week. 12 capsule 0     No current facility-administered medications for this visit.     Facility-Administered Medications Ordered in Other Visits   Medication Dose Route Frequency Provider Last Rate Last Admin    fentaNYL (PF) (SUBLIMAZE) injection   Intravenous PRN Manas, Maru, DO   50 mcg at 06/05/23 1251    lidocaine (viscous) (XYLOCAINE) 2 % solution   Mouth/Throat PRN Manas, Maru, DO   30 mL at 06/05/23 1231    lidocaine 1 % injection    PRN Manas, Maru, DO   4 mL at 06/05/23 1259    lidocaine 4 % injection    PRN Manas, Maru, DO   4 mL at 06/05/23 1300    midazolam (VERSED) injection   Intravenous PRN Manas, Maru, DO   1 mg at 06/05/23 1247    sodium chloride 0.9% infusion   Intravenous Continuous PRN Manas, Maru, DO   Stopped at 06/06/23 0945     Patient Active Problem List   Diagnosis    Pneumonia of both lungs due to infectious organism, unspecified part of lung    Multiple myeloma (H)     Mass of thoracic vertebra    Immunosuppressed due to chemotherapy    Lumbosacral radiculopathy at L5    Pneumonitis    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    Anxiety    Organic insomnia    Chest pain due to myocardial ischemia, unspecified ischemic chest pain type    Severe aortic stenosis --S/P TAVR on 10/24/23    Benign essential hypertension    Hyperlipidemia    Autologous donor of stem cells    Coronary artery disease involving native coronary artery of native heart without angina pectoris    Status post bone marrow transplant (H)       Objective:     /79   Pulse 66   Temp 98.7  F (37.1  C)   Resp 18   Wt 95.3 kg (210 lb)   SpO2 96%   BMI 30.13 kg/m      Physical        Results for orders placed or performed in visit on 03/22/25   Streptococcus A Rapid Screen w/Reflex to PCR - Clinic Collect     Status: Normal    Specimen: Throat; Swab   Result Value Ref Range    Group A Strep antigen Negative Negative   Group A Streptococcus PCR Throat Swab     Status: Normal    Specimen: Throat; Swab   Result Value Ref Range    Group A strep by PCR Not Detected Not Detected    Narrative    The Xpert Xpress Strep A test, performed on the [employer], is a rapid, qualitative in vitro diagnostic test for the detection of Streptococcus pyogenes (Group A ß-hemolytic Streptococcus, Strep A) in throat swab specimens from patients with signs and symptoms of pharyngitis. The Xpert Xpress Strep A test can be used as an aid in the diagnosis of Group A Streptococcal pharyngitis. The assay is not intended to monitor treatment for Group A Streptococcus infections. The Xpert Xpress Strep A test utilizes an automated real-time polymerase chain reaction (PCR) to detect Streptococcus pyogenes DNA.       This note has been dictated in part using voice recognition software.  Any grammatical or context distortions are unintentional and inherent to the software.  Please feel free to contact me directly for  clarification if needed.

## 2025-04-06 ENCOUNTER — TRANSFERRED RECORDS (OUTPATIENT)
Dept: ADMINISTRATIVE | Facility: CLINIC | Age: 70
End: 2025-04-06
Payer: MEDICARE

## 2025-04-07 NOTE — PROCEDURES
2025        Billy Carroll   [address] [address]      Billy Carroll,  :  1955    I am writing to let you know the results of the tests that were done the other day.   Thank you for allowing [facility] the opportunity to take part in your healthcare.  At [facility] we strive to provide each patient with the finest gastroenterology care available.  We hope your experience was pleasant and informative.    Billy- your labs are normal.  Iron and TIBC 2025 11:45   Description Result Units Flags Range   Iron 141 ug/dL     Iron Bind.Cap.(TIBC) 412 ug/dL  250-450   Iron Saturation 34 %  15-55   UIBC 271 ug/dL  111-343   Comments   Performed At: DV, [facility] [address] [place], [place], [id number]  Amanda Melendez MD, Phone: [phone]   CBC With Differential/Platelet 2025 11:45   Description Result Units Flags Range   Hemoglobin 14.1 g/dL  13.0-17.7   Hematocrit 43.5 %  37.5-51.0   MCV 96 fL  79-97   MCHC 32.4 g/dL  31.5-35.7   MCH 31.1 pg  26.6-33.0   RDW 19.4 % H 11.6-15.4   Platelets 170 x10E3/uL  150-450   Neutrophils 49 %  Not Estab.   Lymphs 28 %  Not Estab.   Monocytes 13 %  Not Estab.   Eos 6 %  Not Estab.   Basos 3 %  Not Estab.   Neutrophils (Absolute) 1.4 x10E3/uL  1.4-7.0   Lymphs (Absolute) 0.8 x10E3/uL  0.7-3.1   Monocytes(Absolute) 0.4 x10E3/uL  0.1-0.9   Eos (Absolute) 0.2 x10E3/uL  0.0-0.4   Baso (Absolute) 0.1 x10E3/uL  0.0-0.2   Immature Grans (Abs) 0.0 x10E3/uL  0.0-0.1   Immature Granulocytes 1 %  Not Estab.   RBC 4.54 x10E6/uL  4.14-5.80   WBC 2.7 x10E3/uL L 3.4-10.8   Comments   Performed At: DV, [facility] [address], [place], [place], [id number]  Amanda Melendez MD, Phone: [phone]         I hope you are feeling well.  Please call sooner if you have a problem, otherwise I'll see you as we had previously scheduled.        Thank you.    Electronically signed by:  Jasmine Pinzon NP 2025 06:56 PM  Document generated by:  Jasmine  Alberta LAROSE  04/06/2025  If your provider ordered multiple tests; the results may not become available at the same time.  If multiple test results are received within 14 days of one another, you may receive a duplicate.  cc:  Cipriano AVILEZ

## 2025-04-08 ENCOUNTER — TRANSFERRED RECORDS (OUTPATIENT)
Dept: ADMINISTRATIVE | Facility: CLINIC | Age: 70
End: 2025-04-08
Payer: MEDICARE

## 2025-04-09 DIAGNOSIS — I10 BENIGN ESSENTIAL HYPERTENSION: ICD-10-CM

## 2025-04-09 DIAGNOSIS — Z95.2 S/P TAVR (TRANSCATHETER AORTIC VALVE REPLACEMENT): ICD-10-CM

## 2025-04-09 NOTE — PROCEDURES
2025        Billy Glen   [address] [place] [address]      Billy Carroll,  :  1955    I'm writing to let you know about the tests that were taken recently.   Thank you for allowing [facility] the opportunity to take part in your healthcare.  At [facility] we strive to provide each patient with the finest gastroenterology care available.  We hope your experience was pleasant and informative.    Your testing for celiac disease/gluten allergy is negative.  Iron and TIBC 2025 11:45   Description Result Units Flags Range   Iron 141 ug/dL     Iron Bind.Cap.(TIBC) 412 ug/dL  250-450   Iron Saturation 34 %  15-55   UIBC 271 ug/dL  111-343   Comments   Performed At: DV, [facility] [address] [place], [place], [id number]  Amanda Melendez MD, Phone: [phone]   CBC With Differential/Platelet 2025 11:45   Description Result Units Flags Range   Hemoglobin 14.1 g/dL  13.0-17.7   Hematocrit 43.5 %  37.5-51.0   MCV 96 fL  79-97   MCHC 32.4 g/dL  31.5-35.7   MCH 31.1 pg  26.6-33.0   RDW 19.4 % H 11.6-15.4   Platelets 170 x10E3/uL  150-450   Neutrophils 49 %  Not Estab.   Lymphs 28 %  Not Estab.   Monocytes 13 %  Not Estab.   Eos 6 %  Not Estab.   Basos 3 %  Not Estab.   Neutrophils (Absolute) 1.4 x10E3/uL  1.4-7.0   Lymphs (Absolute) 0.8 x10E3/uL  0.7-3.1   Monocytes(Absolute) 0.4 x10E3/uL  0.1-0.9   Eos (Absolute) 0.2 x10E3/uL  0.0-0.4   Baso (Absolute) 0.1 x10E3/uL  0.0-0.2   Immature Grans (Abs) 0.0 x10E3/uL  0.0-0.1   Immature Granulocytes 1 %  Not Estab.   RBC 4.54 x10E6/uL  4.14-5.80   WBC 2.7 x10E3/uL L 3.4-10.8   Comments   Performed At: DV, [facility] [address] [place], [place], [id number]  Amanda Melendez MD, Phone: [phone]     Glia(IgG/A)+IgA+T-JARAMILLO 2025 11:45   Description Result Units Flags Range   Deamidated Gliadin Abs, IgA 2 units  0-19   Deamidated Gliadin Abs, IgG 3 units  0-19   Immunoglobulin A, Qn, Serum 10 mg/dL L    t-Transglutaminase (tTG)  IgA <2 U/mL  0-3   Comments   Performed At: , [facility]  [address], [place], [place], [id number]  Amanda Melendez MD, Phone: [phone]  Performed At: , [facility] [place] [address] [place], [place], [id number]  Michael Conner MD, Phone: [phone]   Deamidated Gliadin Abs, IgA:                                    Negative                   0 - 19                                    Weak Positive             20 - 30                                    Moderate to Strong Positive   >30   Deamidated Gliadin Abs, IgG:                                    Negative                   0 - 19                                    Weak Positive             20 - 30                                    Moderate to Strong Positive   >30   Immunoglobulin A, Qn, Serum:  Result confirmed on concentration.   t-Transglutaminase (tTG) IgA:                                               Negative        0 -  3                                               Weak Positive   4 - 10                                               Positive           >10                                                                    .                  Tissue Transglutaminase (tTG) has been identified                  as the endomysial antigen.  Studies have demonstr-                  ated that endomysial IgA antibodies have over 99%                  specificity for gluten sensitive enteropathy.           Thank you.    Electronically signed by:  Aaliyah Goetz NP 04/08/2025 03:23 PM  Document generated by:  Aaliyah Goetz NP  04/08/2025  If your provider ordered multiple tests; the results may not become available at the same time.  If multiple test results are received within 14 days of one another, you may receive a duplicate.  cc:  Cipriano Fuentes PAC

## 2025-04-09 NOTE — TELEPHONE ENCOUNTER
Medication Question or Refill    Contacts       Contact Date/Time Type Contact Phone/Fax    04/09/2025 12:05 PM CDT Phone (Incoming) Billy Carroll (Self) [phone] (M)            What medication are you calling about (include dose and sig)?: Metoprolol 50 mg one daily (confirmed with patient the dose and directions)    Preferred Pharmacy:   [id number] [place], [place] - [address]  [address] [address]  Phone: [phone] Fax: [phone]    Controlled Substance Agreement on file:   CSA -- Patient Level:    CSA: None found at the patient level.       Who prescribed the medication?: [facility] but was told that they want PCP to take over medication    Do you need a refill? Yes    When did you use the medication last? Today    Patient offered an appointment? No    Do you have any questions or concerns?  Yes: Wondering if PCP can take over filling medication now. Has a few days worth left      Could we send this information to you in [facility] or would you prefer to receive a phone call?:   No preference   Okay to leave a detailed message?: Yes at Home number on file [phone] (home)     Neonatology Daily Progress Note    Ishan Peter is a 3 week old male infant  MRN: [id number]  Gestational Age: 33w0d  Birth weight: 2770 g (6 lb 1.7 oz)   DOL: 22 days    PMA: 36w1d    HOSPITAL PROBLEMS:  Principal Problem:    Prematurity, 2,000-2,499 grams, 33-34 completed weeks (CMD)  Active Problems:    Liveborn by  (CMD)    Slow feeding in     LGA (large for gestational age) fetus affecting management of mother (CMD)    Hyperbilirubinemia of prematurity    Apnea of prematurity  Resolved Problems:    RDS (respiratory distress syndrome in the )  (CMD)    Prematurity (CMD)     INTERVAL EVENTS SINCE PREVIOUS NOTE:  Continues in room air. Tolerating full feedings. Working on PO. No acute events.     Self limiting michelle/desat on     Vital signs:     Vital Last Value 24 Hour Range   Temperature 98.8 °F (37.1 °C) (08/15/24 0600) Temp  Min: 98.2 °F (36.8 °C)  Max: 99.1 °F (37.3 °C)   Pulse 167 (08/15/24 07) Pulse  Min: 147  Max: 213   Respiratory (!) 62 (08/15/24 07) Resp  Min: 25  Max: 82   Non-Invasive  Blood Pressure 73/40 (24 2100) BP  Min: 73/40  Max: 80/47   Pulse Oximetry 97 % (08/15/24 0700) SpO2  Min: 95 %  Max: 100 %   Arterial   Blood Pressure   No data recorded     General: Well appearing, no acute distress, responds to stimuli, NG in place  HENT: AFSOF, normocephalic, ears normally set, no cleft, palate intact  Neck: supple, full range of motion  CV: RRR, no murmurs, 2+ pulses, good perfusion  Lungs: clear and equal bilaterally, no retractions, no nasal flaring  Abdomen: soft, non-tender, non-distended, no organomegaly  Extremities: negative O/B; FROM x 4  Neuro: good tone  Skin: no rash  : normal for GA; anus patent  Back: no josé miguel, no dimples      Labs (Last 24 hours)  No results found for this or any previous visit (from the past 24 hour(s)).           ASSESSMENT AND PLAN BY SYSTEM       FLUID ELECTROLYTES NUTRITION     Weight Length Head circumference       Wt Readings from Last 2 Encounters:   24 3005 g (6 lb 10 oz) (74%, Z= 0.64)*     * Growth percentiles are based on Alger (Boys, 22-50 Weeks) data.      current - 19.69\" (50 cm) current - 35 cm (13.78\")   Weight change: -40 g (-1.4 oz)         Dosing Weight: 3000 g (6 lb 9.8 oz)          No results found    POC GLUCOSE:  No results available in last 24 hours      Alkaline Phosphatase:  No results found     INPUT:    IVF:     No IVFs being administered                  Feeding:     MBM and neosure 60 ml q3hr                     GIR:  0 mg/kg/min        % PO: 53%     OUTPUT:    Intake/Output          0700  08/15 0659 08/15 07 0659    P.O. (mL/kg) 254 (84.53)     NG/GT (mL/kg) 226 (75.21)     Total Intake(mL/kg) 480 (159.73)     Net +480           Unmeasured Urine Occurrence 8 x            Assessment:    infant and slow feeding of     Plan:  - Continue MBM 20 kcal/oz and Neosure 22 kcal/oz  - 60 ml every 3 hours PO/NG, ~160 ml/kg/day  - continue PVS with iron 1 ml daily  - encourage PO    CENTRAL LINE AND CATHETER DISCUSSION     Central Line  Type of Central Line: none         GI Tube 24 Orogastric Oral cavity (Active)     Line Functioning appropriately: N/a  Necessity/Indication: No Central line in place  Continued need for Central Line discussed: N/A 2024    Urinary Catheter  Necessity/Indication: N/A  Continued need for Urinary Catheter discussed: N/A      PAIN/SEDATION     NPASS Pain Score  Min: 0  Max: 1    Pain/Sedation Medications: None in use    Plan:   - continue to monitor N-PASS scores      BILIRUBIN     Assessment: Hyperbilirubinemia s/p Phototherapy    Baby's blood type:  No results found  Maternal blood type  A+   Information for the patient's mother:  Chayito Treviño [[id number]]   No results foundAntibody:   Information for the patient's mother:  Chayito Treviño [[id number]]   No results found    Last Bilirubin:     No results found      Light level:  13.8 mg/dL    Phototherapy discontinued on 8/1, restarted on 8/4-8/6    Plan:  - Check Bilirubin level prn    APNEA/BRADYCARDIA/DESATURATION     Assessment: At Risk for Apnea of Prematurity    24H Events:     No data found.    Last Significant Event:        -Medications: - No medications    Plan:   - continue to monitor clinically      RESPIRATORY     Mode: room air  Ventilator days:   Days on CPAP/HFNC: 7/24-7/29  Days on Oxygen: 7/24-7/29     Assessment: Respiratory distress - resolved    Intubation  Necessity/Indication: Not Intubated  Readiness for Extubation discussed: N/A 2024    Plan:   - continue SpO2 monitoring  - CBG/CXR PRN      CARDIOVASCULAR     Assessment: No active cardiac issues     No valid procedures specified.     Plan:  - continue to monitor clinically      HEMATOLOGY     Assessment: No active hematologic issues    No results found    Transfusions:   PRBC: None    Platelet: None   FFP: None     Plan:  - monitor clinically      INFECTIOUS DISEASE     Assessment: No concerns for infection at this time    No results found      Microbiology Results       None           - Blood culture and CBC on admission:  CBC only   - Initial antibiotic course: No Antibiotics were initiated due to low risk of Sepsis - But will initiate later, if clinically indicated.    Plan:  - continue to monitor clinically       NEUROLOGY     Assessment: No active neurologic concerns    Head Ultrasound Day of LIfe 7: Not indicated  Head Ultrasound Day of Life 28: Not indicated    Plan:   - continue to monitor clinically      OPHTHALMOLOGY     Assessment: No active ophthalmology issues      Plan:   - ROP Exam not indicated    MEDICATIONS     Current Facility-Administered Medications   Medication    pediatric multivitamin with iron (POLY-VI-SOL WITH IRON) oral solution 1 mL    hepatitis B VACCINE (RECOMBIVAX-HB) 5 MCG/0.5ML vaccine 5 mcg        DISCHARGE TASKS   Discharge planning was discussed      HEALTH  MAINTENANCE AND DISCHARGE PLANNING     Immunizations:   Most Recent Immunizations   Administered Date(s) Administered    None   Pended Date(s) Pended    Hep B, adolescent or pediatric 2024      Hearing Test:       ROP Eye Exam Needed?: No (24 1000)     Car Seat Screen:      CCHD Screening:   Screening complete:    Right hand reading %:    Foot reading %:    CHD:      Circumcision:      [place] Screen- date drawn (most recent results):    Last 3 results:      Synagis Candidate:      Pediatrician: SULY    PARENTAL COMMUNICATION     Family present during rounds: parents updated at bedside on  during evening rounds

## 2025-04-10 ENCOUNTER — TRANSFERRED RECORDS (OUTPATIENT)
Dept: ADMINISTRATIVE | Facility: CLINIC | Age: 70
End: 2025-04-10
Payer: MEDICARE

## 2025-04-10 RX ORDER — METOPROLOL SUCCINATE 50 MG/1
50 TABLET, EXTENDED RELEASE ORAL EVERY MORNING
Qty: 90 TABLET | Refills: 3 | Status: SHIPPED | OUTPATIENT
Start: 2025-04-10

## 2025-04-10 NOTE — TELEPHONE ENCOUNTER
Patient called and had stated that he has 2 days left of this medication. He was wondering if he still needed to take this or if he was going to get a refill. Writer informed that we were currently waiting on PCP to review this. Will reach out to the patient once there is updates from PCP.

## 2025-04-14 NOTE — PROCEDURES
[facility]   [address], [address], [place] [address]     Patient Name: Billy Carroll  Gender:  Male  Exam Date: 04/10/2025 Visit Number:  [id number]  Age: 69 Years YOB: 1955  Attending MD: Peter Oreilly MD Medical Record#:  [id number]  -----------------------------------------------------------------------------------------------------------------------------   Procedure:    Upper GI Endoscopy   Indications:    Iron Deficiency Anemia   Diarrhea, rule out celiac sprue  Provider:        Peter Oreilly MD   Referring MD: Referral Self   Primary MD:      Cipriano Fuentes PAC  Medications:   Admitting Medication:   0.9% Normal Saline at [facility]   Intra Procedure Medications:   Patient received monitored anesthesia care.     Complications: No immediate complications  ___________________________________________________________________________________________  Procedure:   An examination of the heart and lungs was performed within acceptable limits.  . The patient was therefore deemed a reasonable candidate for sedation.   The risks and benefits were explained to the patient, who appeared to understand. After obtaining informed consent, the scope was passed under direct vision. Throughout the procedure the patient's blood pressure, pulse and oxygen saturations were monitored.  The scope was introduced through the mouth and advanced to the third portion of duodenum.         Findings:   Esophagus:    Normal esophagus.   The z-line is 42 centimeters from the incisors.  Top of the gastric folds is 42 centimeters from the incisors.  Stomach:    Normal stomach.    H. Pylori biopsies taken.   The diaphragm hiatus is at 42 centimeters from the incisors.  Duodenum:    Normal duodenum.    Celiac Sprue biopsies taken.  Celiac Sprue biopsies taken.  Impression:   Iron deficiency anemia, unspecified iron deficiency anemia type  Diarrhea, unspecified type    Preliminary Plan:  Return to  primary care provider as necessary.  Pathology Results:  A: DUODENUM, BIOPSY:           1. Normal duodenal mucosa           2. Negative for celiac disease and other enteropathy      B: STOMACH, BIOPSY:           1. Helicobacter gastritis (organisms identified on H&E stain)           2. Negative for atrophic gastritis and dysplasia           3. Sampling: Antral and body mucosae      MICROSCOPIC  A: Performed   B: Performed.     Electronically signed by: Timmy Parra MD    Interpreted at [facility], [address] [place] [id number]    Orders  Labs:  Test Comments Timeframe Assessment   H. pylori Stool Ag, EIA  3 Months B96.81       Additional Comments:  As we discussed over the telephone, we will plan on treatment for this H. pylori infection, with a course of antibiotics for a couple of weeks, alongside acid suppression therapy.  Please do continue the acid suppression therapy with omeprazole [Prilosec] once a day for the next couple of months, and then discontinue this medication also.  A few weeks after that, proceed with a stool test, to confirm eradication of this infection.      _Electronically signed by:___________________  Peter Oreilly MD                 04/10/2025    cc: Cipriano AVILEZ  cc: Jasmine Pinzon NP

## 2025-04-16 ENCOUNTER — LAB (OUTPATIENT)
Dept: LAB | Facility: CLINIC | Age: 70
End: 2025-04-16
Payer: MEDICARE

## 2025-04-16 DIAGNOSIS — C90.00 MULTIPLE MYELOMA NOT HAVING ACHIEVED REMISSION (H): ICD-10-CM

## 2025-04-16 DIAGNOSIS — C90.01 MULTIPLE MYELOMA IN REMISSION (H): ICD-10-CM

## 2025-04-16 DIAGNOSIS — Z94.81 STATUS POST BONE MARROW TRANSPLANT (H): ICD-10-CM

## 2025-04-16 DIAGNOSIS — E61.1 IRON DEFICIENCY: ICD-10-CM

## 2025-04-16 LAB
BASOPHILS # BLD AUTO: 0.1 10E3/UL (ref 0–0.2)
BASOPHILS NFR BLD AUTO: 2 %
EOSINOPHIL # BLD AUTO: 0.2 10E3/UL (ref 0–0.7)
EOSINOPHIL NFR BLD AUTO: 8 %
ERYTHROCYTE [DISTWIDTH] IN BLOOD BY AUTOMATED COUNT: 16.5 % (ref 10–15)
FERRITIN SERPL-MCNC: 101 NG/ML (ref 31–409)
HCT VFR BLD AUTO: 39.5 % (ref 40–53)
HGB BLD-MCNC: 13.8 G/DL (ref 13.3–17.7)
IMM GRANULOCYTES # BLD: 0 10E3/UL
IMM GRANULOCYTES NFR BLD: 0 %
IRON BINDING CAPACITY (ROCHE): 377 UG/DL (ref 240–430)
IRON SATN MFR SERPL: 36 % (ref 15–46)
IRON SERPL-MCNC: 134 UG/DL (ref 61–157)
LYMPHOCYTES # BLD AUTO: 0.8 10E3/UL (ref 0.8–5.3)
LYMPHOCYTES NFR BLD AUTO: 33 %
MCH RBC QN AUTO: 32 PG (ref 26.5–33)
MCHC RBC AUTO-ENTMCNC: 34.9 G/DL (ref 31.5–36.5)
MCV RBC AUTO: 92 FL (ref 78–100)
MONOCYTES # BLD AUTO: 0.4 10E3/UL (ref 0–1.3)
MONOCYTES NFR BLD AUTO: 14 %
NEUTROPHILS # BLD AUTO: 1.1 10E3/UL (ref 1.6–8.3)
NEUTROPHILS NFR BLD AUTO: 42 %
NRBC # BLD AUTO: 0 10E3/UL
NRBC BLD AUTO-RTO: 0 /100
PLATELET # BLD AUTO: 141 10E3/UL (ref 150–450)
RBC # BLD AUTO: 4.31 10E6/UL (ref 4.4–5.9)
WBC # BLD AUTO: 2.6 10E3/UL (ref 4–11)

## 2025-04-16 PROCEDURE — 85004 AUTOMATED DIFF WBC COUNT: CPT

## 2025-04-16 PROCEDURE — 36415 COLL VENOUS BLD VENIPUNCTURE: CPT

## 2025-04-16 PROCEDURE — 83540 ASSAY OF IRON: CPT

## 2025-04-16 PROCEDURE — 82728 ASSAY OF FERRITIN: CPT

## 2025-04-16 PROCEDURE — 83550 IRON BINDING TEST: CPT

## 2025-04-16 NOTE — NURSING NOTE
Chief Complaint   Patient presents with    Labs Only     Labs drawn via  by RN in lab.      Labs collected from venipuncture by RN.     Giovana Mak RN

## 2025-04-17 ENCOUNTER — TRANSFERRED RECORDS (OUTPATIENT)
Dept: HEALTH INFORMATION MANAGEMENT | Facility: CLINIC | Age: 70
End: 2025-04-17
Payer: MEDICARE

## 2025-04-21 ENCOUNTER — TRANSFERRED RECORDS (OUTPATIENT)
Dept: HEALTH INFORMATION MANAGEMENT | Facility: CLINIC | Age: 70
End: 2025-04-21
Payer: MEDICARE

## 2025-04-21 ENCOUNTER — MEDICAL CORRESPONDENCE (OUTPATIENT)
Dept: TRANSPLANT | Facility: CLINIC | Age: 70
End: 2025-04-21
Payer: MEDICARE

## 2025-05-18 ENCOUNTER — HEALTH MAINTENANCE LETTER (OUTPATIENT)
Age: 70
End: 2025-05-18

## 2025-06-01 ENCOUNTER — TRANSFERRED RECORDS (OUTPATIENT)
Dept: HEALTH INFORMATION MANAGEMENT | Facility: CLINIC | Age: 70
End: 2025-06-01
Payer: MEDICARE

## 2025-06-03 ENCOUNTER — OFFICE VISIT (OUTPATIENT)
Dept: CARDIOLOGY | Facility: CLINIC | Age: 70
End: 2025-06-03
Payer: MEDICARE

## 2025-06-03 VITALS
DIASTOLIC BLOOD PRESSURE: 76 MMHG | WEIGHT: 210.6 LBS | BODY MASS INDEX: 30.22 KG/M2 | OXYGEN SATURATION: 96 % | RESPIRATION RATE: 16 BRPM | SYSTOLIC BLOOD PRESSURE: 129 MMHG | HEART RATE: 50 BPM

## 2025-06-03 DIAGNOSIS — Z95.2 S/P TAVR (TRANSCATHETER AORTIC VALVE REPLACEMENT): ICD-10-CM

## 2025-06-03 DIAGNOSIS — I35.0 NONRHEUMATIC AORTIC VALVE STENOSIS: Primary | ICD-10-CM

## 2025-06-03 DIAGNOSIS — I10 BENIGN ESSENTIAL HYPERTENSION: ICD-10-CM

## 2025-06-03 PROCEDURE — 3078F DIAST BP <80 MM HG: CPT | Performed by: INTERNAL MEDICINE

## 2025-06-03 PROCEDURE — 99214 OFFICE O/P EST MOD 30 MIN: CPT | Performed by: INTERNAL MEDICINE

## 2025-06-03 PROCEDURE — G2211 COMPLEX E/M VISIT ADD ON: HCPCS | Performed by: INTERNAL MEDICINE

## 2025-06-03 PROCEDURE — 3074F SYST BP LT 130 MM HG: CPT | Performed by: INTERNAL MEDICINE

## 2025-06-03 RX ORDER — METOPROLOL SUCCINATE 25 MG/1
25 TABLET, EXTENDED RELEASE ORAL EVERY MORNING
COMMUNITY
Start: 2025-06-03

## 2025-06-03 NOTE — PATIENT INSTRUCTIONS
Please contact direct nurses line Monday through Friday 8 AM to 5 PM @ [phone]    After-hours contact cardiology office at [phone].    Plan:    Reduce metoprolol to 25 mg daily    Complete echo

## 2025-06-03 NOTE — LETTER
6/3/2025    Cipriano Fuentes PA-C  [address], [address]  [place] [address]    RE: Billy Carroll       Dear Colleague,     I had the pleasure of seeing Billy Carroll in the [facility].    HEART CARE ENCOUNTER CONSULTATON NOTE      [facility]  [phone]      Assessment/Recommendations   Assessment:   Aortic valve stenosis status post transcatheter valve replacement (PAUL 3, October 24, 2023) via transfemoral approach.  Recent echo demonstrated normal gradients (peak kishor: 2.3 m/sec) with mild to trace paravalvular leak (Echo, reviewed from 10/2024).    2.  Coronary disease, moderate, no angina  3.  Multiple myeloma status post bone marrow transplant, stable.  4.  Myalgias possibly secondary to higher dose statin.  Continue rosuvastatin at current dose.  LDL less than 70.  Recent: LDL:63.     Plan:   1.  Hold rosuvastatin 5 mg daily   2.  Reduce metoprolol XL to 25 mg daily  3.  Continue losartan 50 mg daily for hypertension  4.  Repeat echo in 10/2025  5.  Follow-up in 12 months       History of Present Illness/Subjective    HPI: Billy Carroll is a 69 year old male history of severe aortic stenosis status post transaortic valve replacement, hypertension, hyperlipidemia, nonobstructive coronary disease who presents to cardiology clinic in follow-up.    Since his last clinical evaluation patient is stable.  No active chest pain or dyspnea.  No lightheadedness.    Heart rates are slightly lower.  Checked his iWatch which demonstrates average heart rates between 47 to 100 bpm.  Will reduce metoprolol to 25 mg daily    Recent echocardiogram in 2024 was reviewed.  Demonstrated stable valve function.  Trace to mild paravalvular leak.  Stable gradients.  Normal left ventricular ejection fraction.     Physical Examination  Review of Systems   Vitals: /76 (BP Location: Left arm, Patient Position: Sitting, Cuff Size: Adult Large)   Pulse 50   Resp 16   Wt 95.5 kg (210  lb 9.6 oz)   SpO2 96%   BMI 30.22 kg/m    BMI= Body mass index is 30.22 kg/m .  Wt Readings from Last 3 Encounters:   06/03/25 95.5 kg (210 lb 9.6 oz)   03/22/25 95.3 kg (210 lb)   03/15/25 96.6 kg (213 lb)        Pleasant   ENT/Mouth: membranes moist, no oral lesions or bleeding gums.      EYES:  no scleral icterus, normal conjunctivae       Chest/Lungs:   lungs are clear to auscultation, no rales    Cardiovascular:   Regular. Normal first and second heart sounds with 2 out of 6 early peaking systolic, rubs, or gallops; Jugular venous pressure no, no edema bilaterally. No change.        Extremities: no cyanosis or clubbing   Skin: no xanthelasma, warm.    Neurologic: normal  bilateral, no tremors     Psychiatric: alert and oriented x3, calm        Please refer above for cardiac ROS details.        Medical History  Surgical History Family History Social History   Past Medical History:   Diagnosis Date     Benign essential hypertension      Hyperlipidemia      Multiple myeloma (H)     Followed by Dr. Ruelas with [facility]     Past Surgical History:   Procedure Laterality Date     BONE MARROW BIOPSY, BONE SPECIMEN, NEEDLE/TROCAR Left 4/18/2024    Procedure: BIOPSY, BONE MARROW;  Surgeon: Sonya Ritter NP;  Location: [facility] OR     BONE MARROW BIOPSY, BONE SPECIMEN, NEEDLE/TROCAR Left 2/6/2025    Procedure: BIOPSY, BONE MARROW;  Surgeon: Kassandra Sims PA-C;  Location: [facility] OR     CV CORONARY ANGIOGRAM N/A 9/7/2023    Procedure: Coronary Angiogram;  Surgeon: Domenico Sauceda MD;  Location: [facility] CV     CV TRANSCATHETER AORTIC VALVE REPLACEMENT-FEMORAL APPROACH N/A 10/24/2023    Procedure: Transcatheter Aortic Valve Replacement-Femoral Approach;  Surgeon: Charles Field MD;  Location: [facility] CATH LAB CV     IR CVC TUNNEL PLACEMENT > 5 YRS OF AGE  1/2/2024     IR CVC TUNNEL REMOVAL RIGHT  2/2/2024     OR TRANSCATHETER AORTIC VALVE REPLACEMENT, FEMORAL PERCUTANEOUS APPROACH (STANDBY)  N/A 10/24/2023    Procedure: OR TRANSCATHETER AORTIC VALVE REPLACEMENT, FEMORAL PERCUTANEOUS APPROACH (STANDBY);  Surgeon: Sahra Mosher MD;  Location: [facility] CV     Family History   Problem Relation Age of Onset     Anesthesia Reaction No family hx of      Thrombosis No family hx of         Social History     Socioeconomic History     Marital status:      Spouse name: Not on file     Number of children: Not on file     Years of education: Not on file     Highest education level: Not on file   Occupational History     Not on file   Tobacco Use     Smoking status: Never     Passive exposure: Past     Smokeless tobacco: Never   Vaping Use     Vaping status: Never Used   Substance and Sexual Activity     Alcohol use: Yes     Alcohol/week: 1.0 - 2.0 standard drink of alcohol     Types: 1 - 2 Standard drinks or equivalent per week     Comment: 7-14     Drug use: Never     Sexual activity: Not on file   Other Topics Concern     Not on file   Social History Narrative     Not on file     Social Drivers of Health     Financial Resource Strain: Low Risk  (4/1/2024)    Financial Resource Strain      Within the past 12 months, have you or your family members you live with been unable to get utilities (heat, electricity) when it was really needed?: No   Food Insecurity: Low Risk  (4/1/2024)    Food Insecurity      Within the past 12 months, did you worry that your food would run out before you got money to buy more?: No      Within the past 12 months, did the food you bought just not last and you didn t have money to get more?: No   Transportation Needs: Low Risk  (4/1/2024)    Transportation Needs      Within the past 12 months, has lack of transportation kept you from medical appointments, getting your medicines, non-medical meetings or appointments, work, or from getting things that you need?: No   Physical Activity: Unknown (4/1/2024)    Exercise Vital Sign      Days of Exercise per Week: 7 days      Minutes  of Exercise per Session: Not on file   Stress: No Stress Concern Present (4/1/2024)    [place] [employer] - Occupational Stress Questionnaire      Feeling of Stress : Only a little   Social Connections: Unknown (4/1/2024)    Social Connection and Isolation Panel [NHANES]      Frequency of Communication with Friends and Family: Not on file      Frequency of Social Gatherings with Friends and Family: Three times a week      Attends Adventism Services: Not on file      Active Member of Clubs or Organizations: Not on file      Attends Club or Organization Meetings: Not on file      Marital Status: Not on file   Interpersonal Safety: Low Risk  (2/6/2025)    Interpersonal Safety      Do you feel physically and emotionally safe where you currently live?: Yes      Within the past 12 months, have you been hit, slapped, kicked or otherwise physically hurt by someone?: No      Within the past 12 months, have you been humiliated or emotionally abused in other ways by your partner or ex-partner?: No   Housing Stability: Low Risk  (4/1/2024)    Housing Stability      Do you have housing? : Yes      Are you worried about losing your housing?: No           Medications  Allergies   Current Outpatient Medications   Medication Sig Dispense Refill     acetaminophen (ACETAMINOPHEN 8 HOUR) 650 MG CR tablet Take 1 tablet (650 mg) by mouth every 8 hours as needed for mild pain or fever       acyclovir (ZOVIRAX) 400 MG tablet Take 400 mg by mouth every 12 hours.       albuterol (PROAIR HFA/PROVENTIL HFA/VENTOLIN HFA) 108 (90 Base) MCG/ACT inhaler Inhale 2 puffs into the lungs every 6 hours as needed for shortness of breath, wheezing or cough 18 g 0     aspirin 81 MG EC tablet Take 81 mg by mouth daily.       Calcium Carb-Cholecalciferol (CALCIUM + D3 PO) Take 1 tablet by mouth 2 times daily       ferrous sulfate (FEROSUL) 325 (65 Fe) MG tablet Take 1 tablet (325 mg) by mouth 2 times daily. (Patient taking differently:  Take 325 mg by mouth daily.) 60 tablet 5     lactobacillus rhamnosus, GG, (CULTURELL) capsule Take 1 capsule by mouth every morning.       LENalidomide (REVLIMID) 10 MG CAPS capsule Take 10 mg by mouth every morning       loperamide (IMODIUM) 2 MG capsule Take 2 mg by mouth 4 times daily as needed for diarrhea       loratadine (CLARITIN) 10 MG tablet Take 10 mg by mouth every evening.       losartan (COZAAR) 50 MG tablet Take 1 tablet (50 mg) by mouth daily.       metoprolol succinate ER (TOPROL XL) 50 MG 24 hr tablet Take 1 tablet (50 mg) by mouth every morning. 90 tablet 3     mirtazapine (REMERON) 15 MG tablet TAKE 1 TABLET BY MOUTH AT BEDTIME. 90 tablet 2     psyllium (METAMUCIL/KONSYL) Packet Take 1 packet by mouth as needed       rosuvastatin (CRESTOR) 5 MG tablet TAKE ONE TABLET BY MOUTH ONE TIME DAILY 90 tablet 0     sulfamethoxazole-trimethoprim (BACTRIM DS) 800-160 MG tablet Take 1 tablet by mouth Every Mon, Wed, Fri Morning.       TURMERIC PO Take 2 capsules by mouth every morning.       acyclovir (ZOVIRAX) 800 MG tablet Take 1 tablet (800 mg) by mouth 2 times daily Start Day -1 (Patient not taking: Reported on 6/3/2025) 60 tablet 11     hydrOXYzine HCl (ATARAX) 25 MG tablet Take 1 tablet up to 4 times daily for anxiety. (Patient not taking: Reported on 6/3/2025) 15 tablet 3     nystatin (MYCOSTATIN) 769832 UNIT/ML suspension Take 5 mLs (500,000 Units) by mouth 4 times daily. (Patient not taking: Reported on 6/3/2025) 200 mL 0     triamcinolone (KENALOG) 0.1 % external cream Apply topically 2 times daily. (Patient not taking: Reported on 6/3/2025) 30 g 1     vitamin D2 (ERGOCALCIFEROL) 10330 units (1250 mcg) capsule Take 1 capsule (50,000 Units) by mouth once a week. (Patient not taking: Reported on 6/3/2025) 12 capsule 0       Allergies   Allergen Reactions     Acetaminophen-Codeine Nausea     Codeine Nausea          Lab Results    Chemistry/lipid CBC Cardiac Enzymes/BNP/TSH/INR   Recent Labs   Lab Test  "10/28/24  0955 12/21/23  1120   CHOL 165 234*   HDL 53 53   LDL 63 117*   TRIG 244* 430*     Creatinine   Date Value Ref Range Status   02/06/2025 0.86 0.67 - 1.17 mg/dL Final     Recent Labs   Lab Test 02/06/24  1133      POTASSIUM 4.0   CHLORIDE 105   CO2 24   *   BUN 10.6   CR 0.76   GFRESTIMATED >90   SESAR 9.2           Recent Labs   Lab Test 06/02/23  1857   TROPONINI 0.02     Recent Labs   Lab Test 10/23/23  0941 06/07/23  0049 06/02/23  1857   BNP  --  52 47   NTBNP 74  --   --      No results for input(s): \"TSH\" in the last 82494 hours.  Recent Labs   Lab Test 02/06/24  1133 01/30/24  0905 01/23/24  1304   INR 1.00 1.07 1.12        Adalberto Nelson DO    The longitudinal plan of care for the diagnosis(es)/condition(s) as documented were addressed during this visit. Due to the added complexity in care, I will continue to support Billy in the subsequent management and with ongoing continuity of care.              Thank you for allowing me to participate in the care of your patient.      Sincerely,     Adalberto Nelson DO     [facility]  cc:   Adalberto Nelson DO  [address] [place] [address]      "

## 2025-06-03 NOTE — PROGRESS NOTES
HEART CARE ENCOUNTER CONSULTATON NOTE      [facility]  [phone]      Assessment/Recommendations   Assessment:   Aortic valve stenosis status post transcatheter valve replacement (PAUL 3, October 24, 2023) via transfemoral approach.  Recent echo demonstrated normal gradients (peak kishor: 2.3 m/sec) with mild to trace paravalvular leak (Echo, reviewed from 10/2024).    2.  Coronary disease, moderate, no angina  3.  Multiple myeloma status post bone marrow transplant, stable.  4.  Myalgias possibly secondary to higher dose statin.  Continue rosuvastatin at current dose.  LDL less than 70.  Recent: LDL:63.     Plan:   1.  Hold rosuvastatin 5 mg daily   2.  Reduce metoprolol XL to 25 mg daily  3.  Continue losartan 50 mg daily for hypertension  4.  Repeat echo in 10/2025  5.  Follow-up in 12 months       History of Present Illness/Subjective    HPI: Billy Carroll is a 69 year old male history of severe aortic stenosis status post transaortic valve replacement, hypertension, hyperlipidemia, nonobstructive coronary disease who presents to cardiology clinic in follow-up.    Since his last clinical evaluation patient is stable.  No active chest pain or dyspnea.  No lightheadedness.    Heart rates are slightly lower.  Checked his iWatch which demonstrates average heart rates between 47 to 100 bpm.  Will reduce metoprolol to 25 mg daily    Recent echocardiogram in 2024 was reviewed.  Demonstrated stable valve function.  Trace to mild paravalvular leak.  Stable gradients.  Normal left ventricular ejection fraction.     Physical Examination  Review of Systems   Vitals: /76 (BP Location: Left arm, Patient Position: Sitting, Cuff Size: Adult Large)   Pulse 50   Resp 16   Wt 95.5 kg (210 lb 9.6 oz)   SpO2 96%   BMI 30.22 kg/m    BMI= Body mass index is 30.22 kg/m .  Wt Readings from Last 3 Encounters:   06/03/25 95.5 kg (210 lb 9.6 oz)   03/22/25 95.3 kg (210 lb)   03/15/25 96.6 kg (213 lb)         Pleasant   ENT/Mouth: membranes moist, no oral lesions or bleeding gums.      EYES:  no scleral icterus, normal conjunctivae       Chest/Lungs:   lungs are clear to auscultation, no rales    Cardiovascular:   Regular. Normal first and second heart sounds with 2 out of 6 early peaking systolic, rubs, or gallops; Jugular venous pressure no, no edema bilaterally. No change.        Extremities: no cyanosis or clubbing   Skin: no xanthelasma, warm.    Neurologic: normal  bilateral, no tremors     Psychiatric: alert and oriented x3, calm        Please refer above for cardiac ROS details.        Medical History  Surgical History Family History Social History   Past Medical History:   Diagnosis Date    Benign essential hypertension     Hyperlipidemia     Multiple myeloma (H)     [facility]     Past Surgical History:   Procedure Laterality Date    BONE MARROW BIOPSY, BONE SPECIMEN, NEEDLE/TROCAR Left 4/18/2024    Procedure: BIOPSY, BONE MARROW;  Surgeon: Sonya Ritter NP;  Location: [facility] OR    BONE MARROW BIOPSY, BONE SPECIMEN, NEEDLE/TROCAR Left 2/6/2025    Procedure: BIOPSY, BONE MARROW;  Surgeon: Kassandra Sims PA-C;  Location: [facility] OR    CV CORONARY ANGIOGRAM N/A 9/7/2023    Procedure: Coronary Angiogram;  Surgeon: Domenico Sauceda MD;  Location: [facility] CV    CV TRANSCATHETER AORTIC VALVE REPLACEMENT-FEMORAL APPROACH N/A 10/24/2023    Procedure: Transcatheter Aortic Valve Replacement-Femoral Approach;  Surgeon: Charles Field MD;  Location: [facility] CV    IR CVC TUNNEL PLACEMENT > 5 YRS OF AGE  1/2/2024    IR CVC TUNNEL REMOVAL RIGHT  2/2/2024    OR TRANSCATHETER AORTIC VALVE REPLACEMENT, FEMORAL PERCUTANEOUS APPROACH (STANDBY) N/A 10/24/2023    Procedure: OR TRANSCATHETER AORTIC VALVE REPLACEMENT, FEMORAL PERCUTANEOUS APPROACH (STANDBY);  Surgeon: Sahra Mosher MD;  Location: [facility] CV     Family History   Problem Relation Age of  Onset    Anesthesia Reaction No family hx of     Thrombosis No family hx of         Social History     Socioeconomic History    Marital status:      Spouse name: Not on file    Number of children: Not on file    Years of education: Not on file    Highest education level: Not on file   Occupational History    Not on file   Tobacco Use    Smoking status: Never     Passive exposure: Past    Smokeless tobacco: Never   Vaping Use    Vaping status: Never Used   Substance and Sexual Activity    Alcohol use: Yes     Alcohol/week: 1.0 - 2.0 standard drink of alcohol     Types: 1 - 2 Standard drinks or equivalent per week     Comment: 7-14    Drug use: Never    Sexual activity: Not on file   Other Topics Concern    Not on file   Social History Narrative    Not on file     Social Drivers of Health     Financial Resource Strain: Low Risk  (4/1/2024)    Financial Resource Strain     Within the past 12 months, have you or your family members you live with been unable to get utilities (heat, electricity) when it was really needed?: No   Food Insecurity: Low Risk  (4/1/2024)    Food Insecurity     Within the past 12 months, did you worry that your food would run out before you got money to buy more?: No     Within the past 12 months, did the food you bought just not last and you didn t have money to get more?: No   Transportation Needs: Low Risk  (4/1/2024)    Transportation Needs     Within the past 12 months, has lack of transportation kept you from medical appointments, getting your medicines, non-medical meetings or appointments, work, or from getting things that you need?: No   Physical Activity: Unknown (4/1/2024)    Exercise Vital Sign     Days of Exercise per Week: 7 days     Minutes of Exercise per Session: Not on file   Stress: No Stress Concern Present (4/1/2024)    [place] [employer]ational [employer] - Occupational Stress Questionnaire     Feeling of Stress : Only a little   Social Connections: Unknown  (4/1/2024)    Social Connection and Isolation Panel [NHANES]     Frequency of Communication with Friends and Family: Not on file     Frequency of Social Gatherings with Friends and Family: Three times a week     Attends Taoism Services: Not on file     Active Member of Clubs or Organizations: Not on file     Attends Club or Organization Meetings: Not on file     Marital Status: Not on file   Interpersonal Safety: Low Risk  (2/6/2025)    Interpersonal Safety     Do you feel physically and emotionally safe where you currently live?: Yes     Within the past 12 months, have you been hit, slapped, kicked or otherwise physically hurt by someone?: No     Within the past 12 months, have you been humiliated or emotionally abused in other ways by your partner or ex-partner?: No   Housing Stability: Low Risk  (4/1/2024)    Housing Stability     Do you have housing? : Yes     Are you worried about losing your housing?: No           Medications  Allergies   Current Outpatient Medications   Medication Sig Dispense Refill    acetaminophen (ACETAMINOPHEN 8 HOUR) 650 MG CR tablet Take 1 tablet (650 mg) by mouth every 8 hours as needed for mild pain or fever      acyclovir (ZOVIRAX) 400 MG tablet Take 400 mg by mouth every 12 hours.      albuterol (PROAIR HFA/PROVENTIL HFA/VENTOLIN HFA) 108 (90 Base) MCG/ACT inhaler Inhale 2 puffs into the lungs every 6 hours as needed for shortness of breath, wheezing or cough 18 g 0    aspirin 81 MG EC tablet Take 81 mg by mouth daily.      Calcium Carb-Cholecalciferol (CALCIUM + D3 PO) Take 1 tablet by mouth 2 times daily      ferrous sulfate (FEROSUL) 325 (65 Fe) MG tablet Take 1 tablet (325 mg) by mouth 2 times daily. (Patient taking differently: Take 325 mg by mouth daily.) 60 tablet 5    lactobacillus rhamnosus, GG, (CULTURELL) capsule Take 1 capsule by mouth every morning.      LENalidomide (REVLIMID) 10 MG CAPS capsule Take 10 mg by mouth every morning      loperamide (IMODIUM) 2 MG  capsule Take 2 mg by mouth 4 times daily as needed for diarrhea      loratadine (CLARITIN) 10 MG tablet Take 10 mg by mouth every evening.      losartan (COZAAR) 50 MG tablet Take 1 tablet (50 mg) by mouth daily.      metoprolol succinate ER (TOPROL XL) 50 MG 24 hr tablet Take 1 tablet (50 mg) by mouth every morning. 90 tablet 3    mirtazapine (REMERON) 15 MG tablet TAKE 1 TABLET BY MOUTH AT BEDTIME. 90 tablet 2    psyllium (METAMUCIL/KONSYL) Packet Take 1 packet by mouth as needed      rosuvastatin (CRESTOR) 5 MG tablet TAKE ONE TABLET BY MOUTH ONE TIME DAILY 90 tablet 0    sulfamethoxazole-trimethoprim (BACTRIM DS) 800-160 MG tablet Take 1 tablet by mouth Every Mon, Wed, Fri Morning.      TURMERIC PO Take 2 capsules by mouth every morning.      acyclovir (ZOVIRAX) 800 MG tablet Take 1 tablet (800 mg) by mouth 2 times daily Start Day -1 (Patient not taking: Reported on 6/3/2025) 60 tablet 11    hydrOXYzine HCl (ATARAX) 25 MG tablet Take 1 tablet up to 4 times daily for anxiety. (Patient not taking: Reported on 6/3/2025) 15 tablet 3    nystatin (MYCOSTATIN) 628381 UNIT/ML suspension Take 5 mLs (500,000 Units) by mouth 4 times daily. (Patient not taking: Reported on 6/3/2025) 200 mL 0    triamcinolone (KENALOG) 0.1 % external cream Apply topically 2 times daily. (Patient not taking: Reported on 6/3/2025) 30 g 1    vitamin D2 (ERGOCALCIFEROL) 13312 units (1250 mcg) capsule Take 1 capsule (50,000 Units) by mouth once a week. (Patient not taking: Reported on 6/3/2025) 12 capsule 0       Allergies   Allergen Reactions    Acetaminophen-Codeine Nausea    Codeine Nausea          Lab Results    Chemistry/lipid CBC Cardiac Enzymes/BNP/TSH/INR   Recent Labs   Lab Test 10/28/24  0955 12/21/23  1120   CHOL 165 234*   HDL 53 53   LDL 63 117*   TRIG 244* 430*     Creatinine   Date Value Ref Range Status   02/06/2025 0.86 0.67 - 1.17 mg/dL Final     Recent Labs   Lab Test 02/06/24  1133      POTASSIUM 4.0   CHLORIDE 105   CO2  "24   *   BUN 10.6   CR 0.76   GFRESTIMATED >90   SESAR 9.2           Recent Labs   Lab Test 06/02/23  1857   TROPONINI 0.02     Recent Labs   Lab Test 10/23/23  0941 06/07/23  0049 06/02/23  1857   BNP  --  52 47   NTBNP 74  --   --      No results for input(s): \"TSH\" in the last 34932 hours.  Recent Labs   Lab Test 02/06/24  1133 01/30/24  0905 01/23/24  1304   INR 1.00 1.07 1.12        Adalberto Nelson, DO    The longitudinal plan of care for the diagnosis(es)/condition(s) as documented were addressed during this visit. Due to the added complexity in care, I will continue to support Billy in the subsequent management and with ongoing continuity of care.            "

## 2025-06-23 ENCOUNTER — TRANSFERRED RECORDS (OUTPATIENT)
Dept: ADMINISTRATIVE | Facility: CLINIC | Age: 70
End: 2025-06-23
Payer: MEDICARE

## 2025-06-24 NOTE — PROCEDURES
2025        Billy Glen   [address] [place] [address]      Billy Carroll,  :  1955    I'm writing to let you know about the tests that were taken recently.   Thank you for allowing [facility] the opportunity to take part in your healthcare.  At [facility] we strive to provide each patient with the finest gastroenterology care available.  We hope your experience was pleasant and informative.    Stool test was negative for any H. pylori infection.  It appears that the course of antibiotics has successfully eradicated the infection.  H. pylori Stool Ag, EIA 2025 10:04   Description Result Units Flags Range   H. pylori Stool Ag, EIA Negative   Negative   Comments   SRC:Stool                    Performed At: , [address], [id number]  Michael Conner MD, Phone: [phone]         I hope you are feeling well.        Thank you.    Electronically signed by:  Peter Oreilly MD 2025 02:26 PM  Document generated by:  Peter Oreilly MD  2025  If your provider ordered multiple tests; the results may not become available at the same time.  If multiple test results are received within 14 days of one another, you may receive a duplicate.  cc:  Cipriano Fuentes PAC

## 2025-07-10 ENCOUNTER — MYC MEDICAL ADVICE (OUTPATIENT)
Dept: FAMILY MEDICINE | Facility: CLINIC | Age: 70
End: 2025-07-10
Payer: MEDICARE

## 2025-07-17 ENCOUNTER — TRANSFERRED RECORDS (OUTPATIENT)
Dept: HEALTH INFORMATION MANAGEMENT | Facility: CLINIC | Age: 70
End: 2025-07-17
Payer: MEDICARE

## (undated) DEVICE — CUSTOM PACK CORONARY SAN5BCRHEA

## (undated) DEVICE — HEMOSTAT COMPRESSION VASC REGULAR OD24 CM 3524

## (undated) DEVICE — CATH ANGIO JUDKINS JL4 6FRX100CM INFINITI 534620T

## (undated) DEVICE — WIRE GUIDE AMPLATZ SUPER STIFF 0.035"X260CM J-TIP

## (undated) DEVICE — GUIDEWIRE LUNDERQUIST 0.035X260MM CVD TSCMG-35-260-7-LES

## (undated) DEVICE — 0.035IN X 145CM STRAIGHT FIXED CORE GUIDEWIRE, TFE COATED (TSF-35-145)

## (undated) DEVICE — 6 FR IMPULSE ANGIO DIAG CATH AL1  5 PACK

## (undated) DEVICE — CATH ANGIO JUDKINS R4 6FRX100CM INFINITI 534621T

## (undated) DEVICE — MANIFOLD KIT ANGIO AUTOMATED 014613

## (undated) DEVICE — TRAY BONE MARROW BIOPSY ASC 640 31-0097A

## (undated) DEVICE — CATH ANGIO INFINITI VESTAN STAINLESS STEEL 155 D 534554S

## (undated) DEVICE — EXCHANGE WIRE .035 260 STAR/JFC/035/260/ M001491681

## (undated) DEVICE — CATH ANGIO JUDKINS JL3.5 6FRX100CM INFINITI 534618T

## (undated) DEVICE — ELECTRODE DEFIB CADENCE 22550R

## (undated) DEVICE — CATH ANGIO INFINITI PIGTAIL 155 6 SH 6FRX110CM 534654S

## (undated) DEVICE — INTRO SHEATH 8FRX10CM PINNACLE RSS802

## (undated) DEVICE — Device

## (undated) DEVICE — PLATE GROUNDING ADULT W/CORD 9165L

## (undated) DEVICE — KIT HAND CONTROL ACIST 014644 AR-P54

## (undated) DEVICE — CUSTOM PACK PERIPH VASCULAR SCV5BPVHEA

## (undated) DEVICE — DRAPE CV INCISE CVARTS 89466

## (undated) DEVICE — NDL BX BONE MARROW 11GA 4"

## (undated) DEVICE — KIT VALVE AORTIC SAPIEN 3 ULTRA RESILIA OD26 MM S3URCM26A

## (undated) DEVICE — PITCHER STERILE 1000ML  SSK9004A

## (undated) DEVICE — INTRO SHEATH 6FRX10CM PINNACLE RSS602

## (undated) DEVICE — INFLATION DEVICE ATRION QL2530

## (undated) DEVICE — GUIDEWIRE VASC SAFARI2 0.035X275CM H74939406XS1

## (undated) DEVICE — SYR ANGIOGRAPHY MULTIUSE KIT ACIST 014612

## (undated) DEVICE — SUCTION CANISTER MEDIVAC LINER 3000ML W/LID 65651-530

## (undated) DEVICE — INTRO MICRO MINI STICK 4FR STIFF NITINOL 45-753

## (undated) DEVICE — WIRE GUIDE 0.035"X260CM AMPTLAZ XSTIFF CVD THSCF-35-260-3-A

## (undated) DEVICE — SHTH INTRO 0.021IN ID 6FR DIA

## (undated) DEVICE — INTRO TERUMO 6FRX25CM W/MARKER RSB603

## (undated) DEVICE — VALVE DELIVERY SYSTEM 26MM SAPIEN3 ULTRA COMMANDER 9750CM26

## (undated) DEVICE — PREP CHLORAPREP 26ML TINTED HI-LITE ORANGE 930815

## (undated) RX ORDER — LIDOCAINE HYDROCHLORIDE 10 MG/ML
INJECTION, SOLUTION EPIDURAL; INFILTRATION; INTRACAUDAL; PERINEURAL
Status: DISPENSED
Start: 2024-02-02

## (undated) RX ORDER — CEFAZOLIN SODIUM/WATER 2 G/20 ML
SYRINGE (ML) INTRAVENOUS
Status: DISPENSED
Start: 2024-01-02

## (undated) RX ORDER — LIDOCAINE HYDROCHLORIDE AND EPINEPHRINE 10; 10 MG/ML; UG/ML
INJECTION, SOLUTION INFILTRATION; PERINEURAL
Status: DISPENSED
Start: 2024-01-02

## (undated) RX ORDER — FENTANYL CITRATE 50 UG/ML
INJECTION, SOLUTION INTRAMUSCULAR; INTRAVENOUS
Status: DISPENSED
Start: 2024-01-02

## (undated) RX ORDER — FENTANYL CITRATE 50 UG/ML
INJECTION, SOLUTION INTRAMUSCULAR; INTRAVENOUS
Status: DISPENSED
Start: 2023-10-24

## (undated) RX ORDER — CALCIUM GLUCONATE 94 MG/ML
INJECTION, SOLUTION INTRAVENOUS
Status: DISPENSED
Start: 2024-01-03

## (undated) RX ORDER — CALCIUM GLUCONATE 94 MG/ML
INJECTION, SOLUTION INTRAVENOUS
Status: DISPENSED
Start: 2024-01-04

## (undated) RX ORDER — FENTANYL CITRATE 50 UG/ML
INJECTION, SOLUTION INTRAMUSCULAR; INTRAVENOUS
Status: DISPENSED
Start: 2023-09-07

## (undated) RX ORDER — CEFAZOLIN SODIUM/WATER 2 G/20 ML
SYRINGE (ML) INTRAVENOUS
Status: DISPENSED
Start: 2023-10-24

## (undated) RX ORDER — LIDOCAINE HYDROCHLORIDE 10 MG/ML
INJECTION, SOLUTION INFILTRATION; PERINEURAL
Status: DISPENSED
Start: 2024-01-02

## (undated) RX ORDER — HEPARIN SODIUM 1000 [USP'U]/ML
INJECTION, SOLUTION INTRAVENOUS; SUBCUTANEOUS
Status: DISPENSED
Start: 2024-01-02

## (undated) RX ORDER — PENTAMIDINE ISETHIONATE 300 MG/300MG
INHALANT RESPIRATORY (INHALATION)
Status: DISPENSED
Start: 2023-12-28